# Patient Record
Sex: MALE | Race: BLACK OR AFRICAN AMERICAN | URBAN - METROPOLITAN AREA
[De-identification: names, ages, dates, MRNs, and addresses within clinical notes are randomized per-mention and may not be internally consistent; named-entity substitution may affect disease eponyms.]

---

## 2017-06-19 ENCOUNTER — OUTPATIENT (OUTPATIENT)
Dept: OUTPATIENT SERVICES | Facility: HOSPITAL | Age: 52
LOS: 1 days | Discharge: HOME | End: 2017-06-19

## 2017-06-19 DIAGNOSIS — T85.122A DISPLACEMENT OF IMPLANTED ELECTRONIC NEUROSTIMULATOR OF SPINAL CORD ELECTRODE (LEAD), INITIAL ENCOUNTER: ICD-10-CM

## 2017-06-19 DIAGNOSIS — T81.31XA DISRUPTION OF EXTERNAL OPERATION (SURGICAL) WOUND, NOT ELSEWHERE CLASSIFIED, INITIAL ENCOUNTER: ICD-10-CM

## 2017-06-28 DIAGNOSIS — Z01.818 ENCOUNTER FOR OTHER PREPROCEDURAL EXAMINATION: ICD-10-CM

## 2017-06-28 DIAGNOSIS — G47.30 SLEEP APNEA, UNSPECIFIED: ICD-10-CM

## 2017-06-28 DIAGNOSIS — E66.9 OBESITY, UNSPECIFIED: ICD-10-CM

## 2017-06-28 DIAGNOSIS — I10 ESSENTIAL (PRIMARY) HYPERTENSION: ICD-10-CM

## 2017-06-28 DIAGNOSIS — Z87.891 PERSONAL HISTORY OF NICOTINE DEPENDENCE: ICD-10-CM

## 2017-07-14 ENCOUNTER — OUTPATIENT (OUTPATIENT)
Dept: OUTPATIENT SERVICES | Facility: HOSPITAL | Age: 52
LOS: 1 days | Discharge: HOME | End: 2017-07-14

## 2017-07-14 DIAGNOSIS — Z01.818 ENCOUNTER FOR OTHER PREPROCEDURAL EXAMINATION: ICD-10-CM

## 2017-07-14 DIAGNOSIS — F45.42 PAIN DISORDER WITH RELATED PSYCHOLOGICAL FACTORS: ICD-10-CM

## 2017-07-14 DIAGNOSIS — M45.5 ANKYLOSING SPONDYLITIS OF THORACOLUMBAR REGION: ICD-10-CM

## 2017-07-14 DIAGNOSIS — T85.122A DISPLACEMENT OF IMPLANTED ELECTRONIC NEUROSTIMULATOR OF SPINAL CORD ELECTRODE (LEAD), INITIAL ENCOUNTER: ICD-10-CM

## 2017-07-14 DIAGNOSIS — T81.31XA DISRUPTION OF EXTERNAL OPERATION (SURGICAL) WOUND, NOT ELSEWHERE CLASSIFIED, INITIAL ENCOUNTER: ICD-10-CM

## 2017-07-21 ENCOUNTER — OUTPATIENT (OUTPATIENT)
Dept: OUTPATIENT SERVICES | Facility: HOSPITAL | Age: 52
LOS: 1 days | Discharge: HOME | End: 2017-07-21

## 2017-07-21 DIAGNOSIS — T85.122A DISPLACEMENT OF IMPLANTED ELECTRONIC NEUROSTIMULATOR OF SPINAL CORD ELECTRODE (LEAD), INITIAL ENCOUNTER: ICD-10-CM

## 2017-07-21 DIAGNOSIS — T81.31XA DISRUPTION OF EXTERNAL OPERATION (SURGICAL) WOUND, NOT ELSEWHERE CLASSIFIED, INITIAL ENCOUNTER: ICD-10-CM

## 2017-07-26 DIAGNOSIS — G89.29 OTHER CHRONIC PAIN: ICD-10-CM

## 2017-07-26 DIAGNOSIS — Z91.041 RADIOGRAPHIC DYE ALLERGY STATUS: ICD-10-CM

## 2017-07-26 DIAGNOSIS — E66.01 MORBID (SEVERE) OBESITY DUE TO EXCESS CALORIES: ICD-10-CM

## 2017-07-26 DIAGNOSIS — G62.9 POLYNEUROPATHY, UNSPECIFIED: ICD-10-CM

## 2017-07-26 DIAGNOSIS — F17.210 NICOTINE DEPENDENCE, CIGARETTES, UNCOMPLICATED: ICD-10-CM

## 2017-07-26 DIAGNOSIS — M54.5 LOW BACK PAIN: ICD-10-CM

## 2017-07-26 DIAGNOSIS — Z88.0 ALLERGY STATUS TO PENICILLIN: ICD-10-CM

## 2017-08-25 ENCOUNTER — EMERGENCY (EMERGENCY)
Facility: HOSPITAL | Age: 52
LOS: 0 days | Discharge: HOME | End: 2017-08-25
Admitting: INTERNAL MEDICINE

## 2017-08-25 DIAGNOSIS — T81.31XA DISRUPTION OF EXTERNAL OPERATION (SURGICAL) WOUND, NOT ELSEWHERE CLASSIFIED, INITIAL ENCOUNTER: ICD-10-CM

## 2017-08-25 DIAGNOSIS — G89.29 OTHER CHRONIC PAIN: ICD-10-CM

## 2017-08-25 DIAGNOSIS — M54.5 LOW BACK PAIN: ICD-10-CM

## 2017-08-25 DIAGNOSIS — Z98.890 OTHER SPECIFIED POSTPROCEDURAL STATES: ICD-10-CM
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
## 2017-08-25 DIAGNOSIS — T85.122A DISPLACEMENT OF IMPLANTED ELECTRONIC NEUROSTIMULATOR OF SPINAL CORD ELECTRODE (LEAD), INITIAL ENCOUNTER: ICD-10-CM

## 2017-08-25 DIAGNOSIS — Z88.0 ALLERGY STATUS TO PENICILLIN: ICD-10-CM

## 2017-08-25 DIAGNOSIS — I10 ESSENTIAL (PRIMARY) HYPERTENSION: ICD-10-CM

## 2017-08-25 DIAGNOSIS — M54.9 DORSALGIA, UNSPECIFIED: ICD-10-CM

## 2017-08-25 DIAGNOSIS — Z91.041 RADIOGRAPHIC DYE ALLERGY STATUS: ICD-10-CM

## 2017-08-25 DIAGNOSIS — Z79.899 OTHER LONG TERM (CURRENT) DRUG THERAPY: ICD-10-CM

## 2017-08-25 DIAGNOSIS — Z79.891 LONG TERM (CURRENT) USE OF OPIATE ANALGESIC: ICD-10-CM

## 2017-09-17 ENCOUNTER — INPATIENT (INPATIENT)
Facility: HOSPITAL | Age: 52
LOS: 4 days | Discharge: HOME | End: 2017-09-22
Attending: NEUROLOGICAL SURGERY | Admitting: INTERNAL MEDICINE

## 2017-09-17 DIAGNOSIS — T81.31XA DISRUPTION OF EXTERNAL OPERATION (SURGICAL) WOUND, NOT ELSEWHERE CLASSIFIED, INITIAL ENCOUNTER: ICD-10-CM

## 2017-09-17 DIAGNOSIS — T85.122A DISPLACEMENT OF IMPLANTED ELECTRONIC NEUROSTIMULATOR OF SPINAL CORD ELECTRODE (LEAD), INITIAL ENCOUNTER: ICD-10-CM
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
## 2017-09-26 DIAGNOSIS — T81.31XA DISRUPTION OF EXTERNAL OPERATION (SURGICAL) WOUND, NOT ELSEWHERE CLASSIFIED, INITIAL ENCOUNTER: ICD-10-CM

## 2017-09-26 DIAGNOSIS — I10 ESSENTIAL (PRIMARY) HYPERTENSION: ICD-10-CM

## 2017-09-26 DIAGNOSIS — G47.33 OBSTRUCTIVE SLEEP APNEA (ADULT) (PEDIATRIC): ICD-10-CM

## 2017-09-26 DIAGNOSIS — M51.26 OTHER INTERVERTEBRAL DISC DISPLACEMENT, LUMBAR REGION: ICD-10-CM

## 2017-09-26 DIAGNOSIS — M54.9 DORSALGIA, UNSPECIFIED: ICD-10-CM

## 2017-09-26 DIAGNOSIS — M10.9 GOUT, UNSPECIFIED: ICD-10-CM

## 2017-09-26 DIAGNOSIS — E66.01 MORBID (SEVERE) OBESITY DUE TO EXCESS CALORIES: ICD-10-CM

## 2017-09-26 DIAGNOSIS — Z88.0 ALLERGY STATUS TO PENICILLIN: ICD-10-CM

## 2017-09-26 DIAGNOSIS — F17.210 NICOTINE DEPENDENCE, CIGARETTES, UNCOMPLICATED: ICD-10-CM

## 2017-09-26 DIAGNOSIS — Z96.89 PRESENCE OF OTHER SPECIFIED FUNCTIONAL IMPLANTS: ICD-10-CM

## 2017-10-10 PROBLEM — Z00.00 ENCOUNTER FOR PREVENTIVE HEALTH EXAMINATION: Status: ACTIVE | Noted: 2017-10-10

## 2017-10-11 ENCOUNTER — EMERGENCY (EMERGENCY)
Facility: HOSPITAL | Age: 52
LOS: 0 days | Discharge: HOME | End: 2017-10-11
Admitting: INTERNAL MEDICINE

## 2017-10-11 DIAGNOSIS — Z79.899 OTHER LONG TERM (CURRENT) DRUG THERAPY: ICD-10-CM

## 2017-10-11 DIAGNOSIS — M54.5 LOW BACK PAIN: ICD-10-CM

## 2017-10-11 DIAGNOSIS — Z91.041 RADIOGRAPHIC DYE ALLERGY STATUS: ICD-10-CM

## 2017-10-11 DIAGNOSIS — Z88.0 ALLERGY STATUS TO PENICILLIN: ICD-10-CM

## 2017-10-11 DIAGNOSIS — Z79.891 LONG TERM (CURRENT) USE OF OPIATE ANALGESIC: ICD-10-CM

## 2017-10-11 DIAGNOSIS — R06.02 SHORTNESS OF BREATH: ICD-10-CM

## 2017-10-11 DIAGNOSIS — Z98.890 OTHER SPECIFIED POSTPROCEDURAL STATES: ICD-10-CM

## 2017-10-11 DIAGNOSIS — Z87.891 PERSONAL HISTORY OF NICOTINE DEPENDENCE: ICD-10-CM

## 2017-10-11 DIAGNOSIS — T85.122A DISPLACEMENT OF IMPLANTED ELECTRONIC NEUROSTIMULATOR OF SPINAL CORD ELECTRODE (LEAD), INITIAL ENCOUNTER: ICD-10-CM

## 2017-10-11 DIAGNOSIS — I10 ESSENTIAL (PRIMARY) HYPERTENSION: ICD-10-CM

## 2017-10-11 DIAGNOSIS — T81.31XA DISRUPTION OF EXTERNAL OPERATION (SURGICAL) WOUND, NOT ELSEWHERE CLASSIFIED, INITIAL ENCOUNTER: ICD-10-CM

## 2017-10-13 ENCOUNTER — EMERGENCY (EMERGENCY)
Facility: HOSPITAL | Age: 52
LOS: 0 days | Discharge: HOME | End: 2017-10-13
Admitting: FAMILY MEDICINE

## 2017-10-13 DIAGNOSIS — M54.5 LOW BACK PAIN: ICD-10-CM

## 2017-10-13 DIAGNOSIS — Z91.041 RADIOGRAPHIC DYE ALLERGY STATUS: ICD-10-CM

## 2017-10-13 DIAGNOSIS — Z79.891 LONG TERM (CURRENT) USE OF OPIATE ANALGESIC: ICD-10-CM

## 2017-10-13 DIAGNOSIS — R11.0 NAUSEA: ICD-10-CM

## 2017-10-13 DIAGNOSIS — T85.122A DISPLACEMENT OF IMPLANTED ELECTRONIC NEUROSTIMULATOR OF SPINAL CORD ELECTRODE (LEAD), INITIAL ENCOUNTER: ICD-10-CM

## 2017-10-13 DIAGNOSIS — Z79.899 OTHER LONG TERM (CURRENT) DRUG THERAPY: ICD-10-CM

## 2017-10-13 DIAGNOSIS — I10 ESSENTIAL (PRIMARY) HYPERTENSION: ICD-10-CM

## 2017-10-13 DIAGNOSIS — Z98.890 OTHER SPECIFIED POSTPROCEDURAL STATES: ICD-10-CM

## 2017-10-13 DIAGNOSIS — Z88.0 ALLERGY STATUS TO PENICILLIN: ICD-10-CM

## 2017-10-13 DIAGNOSIS — T81.31XA DISRUPTION OF EXTERNAL OPERATION (SURGICAL) WOUND, NOT ELSEWHERE CLASSIFIED, INITIAL ENCOUNTER: ICD-10-CM

## 2017-11-06 ENCOUNTER — OUTPATIENT (OUTPATIENT)
Dept: OUTPATIENT SERVICES | Facility: HOSPITAL | Age: 52
LOS: 1 days | Discharge: HOME | End: 2017-11-06

## 2017-11-06 DIAGNOSIS — G89.29 OTHER CHRONIC PAIN: ICD-10-CM

## 2017-11-06 DIAGNOSIS — T81.31XA DISRUPTION OF EXTERNAL OPERATION (SURGICAL) WOUND, NOT ELSEWHERE CLASSIFIED, INITIAL ENCOUNTER: ICD-10-CM

## 2017-11-06 DIAGNOSIS — Z01.818 ENCOUNTER FOR OTHER PREPROCEDURAL EXAMINATION: ICD-10-CM

## 2017-11-06 DIAGNOSIS — T85.122A DISPLACEMENT OF IMPLANTED ELECTRONIC NEUROSTIMULATOR OF SPINAL CORD ELECTRODE (LEAD), INITIAL ENCOUNTER: ICD-10-CM

## 2017-11-13 ENCOUNTER — INPATIENT (INPATIENT)
Facility: HOSPITAL | Age: 52
LOS: 0 days | Discharge: HOME | End: 2017-11-14
Attending: NEUROLOGICAL SURGERY

## 2017-11-13 DIAGNOSIS — T85.122A DISPLACEMENT OF IMPLANTED ELECTRONIC NEUROSTIMULATOR OF SPINAL CORD ELECTRODE (LEAD), INITIAL ENCOUNTER: ICD-10-CM

## 2017-11-13 DIAGNOSIS — T81.31XA DISRUPTION OF EXTERNAL OPERATION (SURGICAL) WOUND, NOT ELSEWHERE CLASSIFIED, INITIAL ENCOUNTER: ICD-10-CM

## 2017-11-17 DIAGNOSIS — T85.122A DISPLACEMENT OF IMPLANTED ELECTRONIC NEUROSTIMULATOR OF SPINAL CORD ELECTRODE (LEAD), INITIAL ENCOUNTER: ICD-10-CM

## 2017-11-17 DIAGNOSIS — I10 ESSENTIAL (PRIMARY) HYPERTENSION: ICD-10-CM

## 2017-11-17 DIAGNOSIS — G89.29 OTHER CHRONIC PAIN: ICD-10-CM
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
## 2017-11-17 DIAGNOSIS — F17.210 NICOTINE DEPENDENCE, CIGARETTES, UNCOMPLICATED: ICD-10-CM

## 2017-11-17 DIAGNOSIS — E66.01 MORBID (SEVERE) OBESITY DUE TO EXCESS CALORIES: ICD-10-CM

## 2017-11-17 DIAGNOSIS — M48.36 TRAUMATIC SPONDYLOPATHY, LUMBAR REGION: ICD-10-CM

## 2017-11-17 DIAGNOSIS — M54.5 LOW BACK PAIN: ICD-10-CM

## 2017-11-17 DIAGNOSIS — G47.33 OBSTRUCTIVE SLEEP APNEA (ADULT) (PEDIATRIC): ICD-10-CM

## 2018-01-08 ENCOUNTER — EMERGENCY (EMERGENCY)
Facility: HOSPITAL | Age: 53
LOS: 1 days | Discharge: LEFT BEFORE DISCHARGE | End: 2018-01-08
Admitting: INTERNAL MEDICINE

## 2018-01-08 DIAGNOSIS — M54.9 DORSALGIA, UNSPECIFIED: ICD-10-CM

## 2018-01-08 DIAGNOSIS — Z91.041 RADIOGRAPHIC DYE ALLERGY STATUS: ICD-10-CM

## 2018-01-08 DIAGNOSIS — M54.6 PAIN IN THORACIC SPINE: ICD-10-CM

## 2018-01-08 DIAGNOSIS — I10 ESSENTIAL (PRIMARY) HYPERTENSION: ICD-10-CM

## 2018-01-08 DIAGNOSIS — Z98.890 OTHER SPECIFIED POSTPROCEDURAL STATES: ICD-10-CM

## 2018-01-08 DIAGNOSIS — Z79.899 OTHER LONG TERM (CURRENT) DRUG THERAPY: ICD-10-CM

## 2018-01-08 DIAGNOSIS — Z79.891 LONG TERM (CURRENT) USE OF OPIATE ANALGESIC: ICD-10-CM

## 2018-01-08 DIAGNOSIS — Z88.0 ALLERGY STATUS TO PENICILLIN: ICD-10-CM

## 2018-01-24 ENCOUNTER — EMERGENCY (EMERGENCY)
Facility: HOSPITAL | Age: 53
LOS: 0 days | Discharge: LEFT BEFORE DISCHARGE | End: 2018-01-24

## 2018-01-24 DIAGNOSIS — Z79.899 OTHER LONG TERM (CURRENT) DRUG THERAPY: ICD-10-CM

## 2018-01-24 DIAGNOSIS — Z91.041 RADIOGRAPHIC DYE ALLERGY STATUS: ICD-10-CM

## 2018-01-24 DIAGNOSIS — Z98.890 OTHER SPECIFIED POSTPROCEDURAL STATES: ICD-10-CM

## 2018-01-24 DIAGNOSIS — R06.02 SHORTNESS OF BREATH: ICD-10-CM

## 2018-01-24 DIAGNOSIS — I10 ESSENTIAL (PRIMARY) HYPERTENSION: ICD-10-CM

## 2018-01-24 DIAGNOSIS — M54.5 LOW BACK PAIN: ICD-10-CM

## 2018-01-24 DIAGNOSIS — R07.89 OTHER CHEST PAIN: ICD-10-CM

## 2018-01-24 DIAGNOSIS — T85.122A DISPLACEMENT OF IMPLANTED ELECTRONIC NEUROSTIMULATOR OF SPINAL CORD ELECTRODE (LEAD), INITIAL ENCOUNTER: ICD-10-CM

## 2018-01-24 DIAGNOSIS — R10.9 UNSPECIFIED ABDOMINAL PAIN: ICD-10-CM

## 2018-01-24 DIAGNOSIS — M54.6 PAIN IN THORACIC SPINE: ICD-10-CM

## 2018-01-24 DIAGNOSIS — Z79.891 LONG TERM (CURRENT) USE OF OPIATE ANALGESIC: ICD-10-CM

## 2018-01-24 DIAGNOSIS — T81.31XA DISRUPTION OF EXTERNAL OPERATION (SURGICAL) WOUND, NOT ELSEWHERE CLASSIFIED, INITIAL ENCOUNTER: ICD-10-CM

## 2018-01-24 DIAGNOSIS — Z88.0 ALLERGY STATUS TO PENICILLIN: ICD-10-CM

## 2018-02-23 ENCOUNTER — INPATIENT (INPATIENT)
Facility: HOSPITAL | Age: 53
LOS: 20 days | Discharge: HOME HEALTH | End: 2018-03-16
Attending: INTERNAL MEDICINE | Admitting: INTERNAL MEDICINE

## 2018-02-23 VITALS
SYSTOLIC BLOOD PRESSURE: 192 MMHG | HEART RATE: 128 BPM | TEMPERATURE: 97 F | RESPIRATION RATE: 24 BRPM | DIASTOLIC BLOOD PRESSURE: 105 MMHG | OXYGEN SATURATION: 100 %

## 2018-02-23 DIAGNOSIS — L02.212 CUTANEOUS ABSCESS OF BACK [ANY PART, EXCEPT BUTTOCK]: ICD-10-CM

## 2018-02-23 DIAGNOSIS — Z98.890 OTHER SPECIFIED POSTPROCEDURAL STATES: Chronic | ICD-10-CM

## 2018-02-23 DIAGNOSIS — G47.33 OBSTRUCTIVE SLEEP APNEA (ADULT) (PEDIATRIC): ICD-10-CM

## 2018-02-23 DIAGNOSIS — M51.37 OTHER INTERVERTEBRAL DISC DEGENERATION, LUMBOSACRAL REGION: ICD-10-CM

## 2018-02-23 DIAGNOSIS — R60.0 LOCALIZED EDEMA: ICD-10-CM

## 2018-02-23 DIAGNOSIS — Z96.89 PRESENCE OF OTHER SPECIFIED FUNCTIONAL IMPLANTS: Chronic | ICD-10-CM

## 2018-02-23 DIAGNOSIS — R06.02 SHORTNESS OF BREATH: ICD-10-CM

## 2018-02-23 DIAGNOSIS — I10 ESSENTIAL (PRIMARY) HYPERTENSION: ICD-10-CM

## 2018-02-23 LAB
ANION GAP SERPL CALC-SCNC: 8 MMOL/L — SIGNIFICANT CHANGE UP (ref 7–14)
BUN SERPL-MCNC: 9 MG/DL — LOW (ref 10–20)
CALCIUM SERPL-MCNC: 9.4 MG/DL — SIGNIFICANT CHANGE UP (ref 8.5–10.1)
CHLORIDE SERPL-SCNC: 102 MMOL/L — SIGNIFICANT CHANGE UP (ref 98–110)
CO2 SERPL-SCNC: 30 MMOL/L — SIGNIFICANT CHANGE UP (ref 17–32)
CREAT SERPL-MCNC: 0.9 MG/DL — SIGNIFICANT CHANGE UP (ref 0.7–1.5)
GLUCOSE SERPL-MCNC: 245 MG/DL — HIGH (ref 70–110)
HCT VFR BLD CALC: 44.2 % — SIGNIFICANT CHANGE UP (ref 42–52)
HGB BLD-MCNC: 14.4 G/DL — SIGNIFICANT CHANGE UP (ref 14–18)
MCHC RBC-ENTMCNC: 28.9 PG — SIGNIFICANT CHANGE UP (ref 27–31)
MCHC RBC-ENTMCNC: 32.6 G/DL — SIGNIFICANT CHANGE UP (ref 32–37)
MCV RBC AUTO: 88.8 FL — SIGNIFICANT CHANGE UP (ref 80–94)
NRBC # BLD: 0 /100 WBCS — SIGNIFICANT CHANGE UP (ref 0–0)
PLATELET # BLD AUTO: 312 K/UL — SIGNIFICANT CHANGE UP (ref 130–400)
POTASSIUM SERPL-MCNC: 4.1 MMOL/L — SIGNIFICANT CHANGE UP (ref 3.5–5)
POTASSIUM SERPL-SCNC: 4.1 MMOL/L — SIGNIFICANT CHANGE UP (ref 3.5–5)
RBC # BLD: 4.98 M/UL — SIGNIFICANT CHANGE UP (ref 4.7–6.1)
RBC # FLD: 13 % — SIGNIFICANT CHANGE UP (ref 11.5–14.5)
SODIUM SERPL-SCNC: 140 MMOL/L — SIGNIFICANT CHANGE UP (ref 135–146)
WBC # BLD: 8.2 K/UL — SIGNIFICANT CHANGE UP (ref 4.8–10.8)
WBC # FLD AUTO: 8.2 K/UL — SIGNIFICANT CHANGE UP (ref 4.8–10.8)

## 2018-02-23 RX ORDER — METRONIDAZOLE 500 MG
500 TABLET ORAL EVERY 8 HOURS
Qty: 0 | Refills: 0 | Status: DISCONTINUED | OUTPATIENT
Start: 2018-02-23 | End: 2018-02-25

## 2018-02-23 RX ORDER — ENOXAPARIN SODIUM 100 MG/ML
40 INJECTION SUBCUTANEOUS AT BEDTIME
Qty: 0 | Refills: 0 | Status: DISCONTINUED | OUTPATIENT
Start: 2018-02-23 | End: 2018-03-16

## 2018-02-23 RX ORDER — CIPROFLOXACIN LACTATE 400MG/40ML
400 VIAL (ML) INTRAVENOUS EVERY 12 HOURS
Qty: 0 | Refills: 0 | Status: DISCONTINUED | OUTPATIENT
Start: 2018-02-23 | End: 2018-02-25

## 2018-02-23 RX ORDER — AMLODIPINE BESYLATE 2.5 MG/1
5 TABLET ORAL AT BEDTIME
Qty: 0 | Refills: 0 | Status: DISCONTINUED | OUTPATIENT
Start: 2018-02-23 | End: 2018-03-01

## 2018-02-23 RX ORDER — LOSARTAN POTASSIUM 100 MG/1
100 TABLET, FILM COATED ORAL AT BEDTIME
Qty: 0 | Refills: 0 | Status: DISCONTINUED | OUTPATIENT
Start: 2018-02-23 | End: 2018-03-16

## 2018-02-23 RX ORDER — OXYCODONE HYDROCHLORIDE 5 MG/1
10 TABLET ORAL
Qty: 0 | Refills: 0 | Status: DISCONTINUED | OUTPATIENT
Start: 2018-02-23 | End: 2018-03-02

## 2018-02-23 RX ORDER — SENNA PLUS 8.6 MG/1
1 TABLET ORAL AT BEDTIME
Qty: 0 | Refills: 0 | Status: DISCONTINUED | OUTPATIENT
Start: 2018-02-23 | End: 2018-03-16

## 2018-02-23 RX ORDER — VANCOMYCIN HCL 1 G
1500 VIAL (EA) INTRAVENOUS EVERY 12 HOURS
Qty: 0 | Refills: 0 | Status: DISCONTINUED | OUTPATIENT
Start: 2018-02-23 | End: 2018-02-27

## 2018-02-23 RX ORDER — ACETAMINOPHEN 500 MG
650 TABLET ORAL EVERY 6 HOURS
Qty: 0 | Refills: 0 | Status: DISCONTINUED | OUTPATIENT
Start: 2018-02-23 | End: 2018-03-16

## 2018-02-23 RX ORDER — PANTOPRAZOLE SODIUM 20 MG/1
40 TABLET, DELAYED RELEASE ORAL
Qty: 0 | Refills: 0 | Status: DISCONTINUED | OUTPATIENT
Start: 2018-02-23 | End: 2018-03-16

## 2018-02-23 RX ORDER — KETAMINE HYDROCHLORIDE 100 MG/ML
60 INJECTION INTRAMUSCULAR; INTRAVENOUS ONCE
Qty: 0 | Refills: 0 | Status: DISCONTINUED | OUTPATIENT
Start: 2018-02-23 | End: 2018-02-23

## 2018-02-23 RX ORDER — CYCLOBENZAPRINE HYDROCHLORIDE 10 MG/1
10 TABLET, FILM COATED ORAL THREE TIMES A DAY
Qty: 0 | Refills: 0 | Status: DISCONTINUED | OUTPATIENT
Start: 2018-02-23 | End: 2018-03-16

## 2018-02-23 RX ORDER — HYDROMORPHONE HYDROCHLORIDE 2 MG/ML
1 INJECTION INTRAMUSCULAR; INTRAVENOUS; SUBCUTANEOUS
Qty: 0 | Refills: 0 | Status: DISCONTINUED | OUTPATIENT
Start: 2018-02-23 | End: 2018-02-24

## 2018-02-23 RX ORDER — DOCUSATE SODIUM 100 MG
100 CAPSULE ORAL THREE TIMES A DAY
Qty: 0 | Refills: 0 | Status: DISCONTINUED | OUTPATIENT
Start: 2018-02-23 | End: 2018-03-16

## 2018-02-23 RX ADMIN — Medication 100 MILLIGRAM(S): at 21:17

## 2018-02-23 RX ADMIN — ENOXAPARIN SODIUM 40 MILLIGRAM(S): 100 INJECTION SUBCUTANEOUS at 21:18

## 2018-02-23 RX ADMIN — SENNA PLUS 1 TABLET(S): 8.6 TABLET ORAL at 21:17

## 2018-02-23 RX ADMIN — Medication 500 MILLIGRAM(S): at 21:15

## 2018-02-23 RX ADMIN — OXYCODONE HYDROCHLORIDE 10 MILLIGRAM(S): 5 TABLET ORAL at 21:17

## 2018-02-23 RX ADMIN — AMLODIPINE BESYLATE 5 MILLIGRAM(S): 2.5 TABLET ORAL at 21:19

## 2018-02-23 RX ADMIN — CYCLOBENZAPRINE HYDROCHLORIDE 10 MILLIGRAM(S): 10 TABLET, FILM COATED ORAL at 21:17

## 2018-02-23 RX ADMIN — HYDROMORPHONE HYDROCHLORIDE 1 MILLIGRAM(S): 2 INJECTION INTRAMUSCULAR; INTRAVENOUS; SUBCUTANEOUS at 18:49

## 2018-02-23 RX ADMIN — Medication 500 MILLIGRAM(S): at 19:28

## 2018-02-23 RX ADMIN — OXYCODONE HYDROCHLORIDE 10 MILLIGRAM(S): 5 TABLET ORAL at 23:35

## 2018-02-23 RX ADMIN — KETAMINE HYDROCHLORIDE 60 MILLIGRAM(S): 100 INJECTION INTRAMUSCULAR; INTRAVENOUS at 14:04

## 2018-02-23 RX ADMIN — LOSARTAN POTASSIUM 100 MILLIGRAM(S): 100 TABLET, FILM COATED ORAL at 21:17

## 2018-02-23 NOTE — ED PROVIDER NOTE - CARE PLAN
Principal Discharge DX:	Wound dehiscence  Secondary Diagnosis:	Intractable back pain  Secondary Diagnosis:	Back abscess

## 2018-02-23 NOTE — H&P ADULT - PROBLEM SELECTOR PLAN 3
On CPAP. If MRI required as per neurosurgery might need pulm eval as patient has RUSSELL and Mallampati score 4. New with LE pitting edema. Asymptomatic now. Consider 2D echo. Unlikely endocarditis. F/U cultures  Will check BNP

## 2018-02-23 NOTE — ED PROVIDER NOTE - SKIN, MLM
Skin normal color for race, warm, dry and intact. Superficial erythematous mildly wet laceration at wound site, appearing as if scab removed.

## 2018-02-23 NOTE — ED ADULT TRIAGE NOTE - CHIEF COMPLAINT QUOTE
Pt c/o back pain, had spinal cord stimulator placed on July, was moved in November and pt has been having pain since then but now pain is worse and going to abd

## 2018-02-23 NOTE — H&P ADULT - ATTENDING COMMENTS
pt seen and examined idependently, extensivr discussion with family, I have read and agree with above exam and poa  mri spine r/o abcess with sedation, pulm eval for sleep apnoea, continue iv abx,   neurosurgery eval,id eval  duplex le  conitinue current treatment

## 2018-02-23 NOTE — H&P ADULT - PSH
H/O arthroscopy of right knee    History of excision of pilonidal cyst    Spinal cord stimulator status

## 2018-02-23 NOTE — ED PROVIDER NOTE - ATTENDING CONTRIBUTION TO CARE
Pt is a 51yo male with hx of spinal stim surgery (Dr. Patrick) who comes in for 2 weeks of pain at site of surgery with wound dehiscense and leakage of fluid.  No fever.  Reports pain radiates down into buttocks and legs.  No vomiting.  Reports pain is severe and not responding to higher doses of opioids given to him by his pain mgmt doctor.    Exam: diffuse tenderness along spine, surgical scar with fluctuance, obese, clear lungs  Imp: ?abscess  Plan: labs, neurosx consult

## 2018-02-23 NOTE — H&P ADULT - PROBLEM SELECTOR PLAN 1
R/O epidural space infection. R/O OM R/O discitis R/O hardware infection r/O myelitis  Will cover for anaerobes and MRSA with IV antibiotics with adequate bony penetration  ID eval. F/U culture. F/U Neurosurgery for further recommendations  Might need MRI under anesthesia if needed might need pulmonary evaluation due to hx of RUSSELL.

## 2018-02-23 NOTE — H&P ADULT - NSHPPHYSICALEXAM_GEN_ALL_CORE
VS: WNL  General appearance: Morbid obesity noted. In moderate distress due to Pain  HEENT: Malampati score 4, Soft neck, no rigidity  Heart: RRR, normal S1 S2  Lungs: GBBS poor inspiratory effort due to pain  Abdomen: soft, non tender, non distended, +BS  Back: Surgical site 1x0.5 cm draining abcess with yellow drainage, tender to palpation, pain elicited with battery palpation too.   Extremities: +3 Pitting edema  Neuro: AAOx3, Unable to assess LE power and tone and gait due to severe tenderness/ pain on ambulation  Patient ambulating with a cane

## 2018-02-23 NOTE — H&P ADULT - PROBLEM SELECTOR PLAN 5
On CPAP. If MRI required as per neurosurgery might need pulm eval as patient has RUSSELL and Mallampati score 4.

## 2018-02-23 NOTE — H&P ADULT - PMH
Disc disease, degenerative, lumbar or lumbosacral    Hypertension    Morbid obesity    Obstructive sleep apnea

## 2018-02-23 NOTE — ED ADULT NURSE NOTE - OBJECTIVE STATEMENT
Pt presents to ED c/o generalized back pain. Pt had spinal stimulator moved in November. Has been in increasing pain over the past several weeks - states he can feel the battery pulling inside his back. Last night, he noticed there was a large "blister" at bottom of surgical scar that opened and drained bloody pus. No longer draining. Pt saw pain management doctor today who sent him to ED for possible infection.

## 2018-02-23 NOTE — CONSULT NOTE ADULT - SUBJECTIVE AND OBJECTIVE BOX
HISTORY OF PRESENT ILLNESS: Mr Mobley has a past history of placement of a pain stimulator which was revised back in November of 2018, was doing well and developed pain worsening over last 2 weeks and noticed a blister on his thoracic back wound which drained at 3am this morning, Culture obtained in the ED and patient admitted for ABX.    PAST MEDICAL & SURGICAL HISTORY:  Depression  Hypertension  Spinal cord stimulator status    FAMILY HISTORY:  Family history of diabetes mellitus in mother (Mother)  Family history of early CAD (Father)    Allergies    IV Contrast (Unknown)  penicillin (Unknown)    Intolerances        REVIEW OF SYSTEMS  negative for any major pulmonary, GI, cardiac, and psychiatric history other than those listed above.    MEDICATIONS:  Percocet 10      Vital Signs Last 24 Hrs  T(C): 36.1 (23 Feb 2018 11:24), Max: 36.1 (23 Feb 2018 11:24)  T(F): 97 (23 Feb 2018 11:24), Max: 97 (23 Feb 2018 11:24)  HR: 128 (23 Feb 2018 11:24) (128 - 128)  BP: 192/105 (23 Feb 2018 11:24) (192/105 - 192/105)  BP(mean): --  RR: 24 (23 Feb 2018 11:24) (24 - 24)  SpO2: 100% (23 Feb 2018 11:24) (100% - 100%)    PHYSICAL EXAM:  Neurological: Alert x3, NAD, NIHCOLSON with good strength ambulating well  Wound - thoracic- small amount of whitish drainage noted, tender, no fluctuance possibly stich abcsess, left flank wound- intact, no drainage, possible mild swelling, no erythema or fluctuance  LABS:                        14.4   8.20  )-----------( 312      ( 23 Feb 2018 12:23 )             44.2     02-23    140  |  102  |  9<L>  ----------------------------<  245<H>  4.1   |  30  |  0.9    Ca    9.4      23 Feb 2018 12:23    CULTURES: done    Assessment: wound drainage from thoracic wound of back s/p insertion of spinal cord stimulator    Plan: wound cultures obtained, will f/u with attending

## 2018-02-23 NOTE — ED PROVIDER NOTE - FAMILY HISTORY
Father  Still living? Unknown  Family history of early CAD, Age at diagnosis: Age Unknown     Mother  Still living? Unknown  Family history of diabetes mellitus in mother, Age at diagnosis: Age Unknown

## 2018-02-23 NOTE — ED PROVIDER NOTE - OBJECTIVE STATEMENT
Pt reports hx of L4-L5 and L5-S1 radiculopathy confirmed by MRI report at bedside, dx in 2015, that was palliated by spinal surgery with neurostimulator in Jul 2018 and corrected Nov 2018. Pt reports increasing back pain at the level of his surgery that wraps around to his corresponding dermatome. Pt is followed by pain mngmnt Dr Best who prescribed OP MRI which pt was unable to tolerate. Pt came to ED after Dr. Best was concerned for possible infection at wound site. Pt reports + draining at wound site. Physical exam today was positive for minimal irritation at wound site that appeared mildly wet. Pt denies fever, chills, nausea, vomiting, urinary dysfunction, constipation, or diarrhea. He denies chest pain or SOB, but reports heart palpitations. Pt reports he actively smokes 2 packs of cigs/day. He also reports increased b/l leg swelling, but notes he has not been ambulating for 2+ weeks due to pain. Pt was educated on importance of weight loss and healthy diet, and smoking cessation and its effects on wound care.

## 2018-02-23 NOTE — H&P ADULT - NSHPLABSRESULTS_GEN_ALL_CORE
14.4   8.20  )-----------( 312      ( 23 Feb 2018 12:23 )             44.2   02-23    140  |  102  |  9<L>  ----------------------------<  245<H>  4.1   |  30  |  0.9    Ca    9.4      23 Feb 2018 12:23

## 2018-02-23 NOTE — H&P ADULT - ASSESSMENT
Old surgical wound infection possible discitis, myelitis, neuritis  Possible Hardware infection  HTN  RUSSELL on CPAP

## 2018-02-23 NOTE — H&P ADULT - HISTORY OF PRESENT ILLNESS
53 yo M with Hx of Lumbar disc disease s/p Discectomy with Neurostimulator placement last year and neurostimulator revision last November , hx of previous neurostimulator battery infection treated with clindamycin, Hx of HTN presenting for above chief complaint. Patient reports that over the last few days he has been experiencing worsening mid spinal, Lower lumbar back pain, worse with motion, ambulation, valsalva, neck flexion, "100/10" in severity, electric in nature, relieved by sitting straight and avoiding motion, radiating bilaterally anteriorly to the abdomen and groin area and posteriorly along the sciatic nerve root distribution in the R lower extremity (along T10-T12, L1-L3 and L5-S1 dermatomes). He also reported that yesterday his family noticed a blister on the surgical wound that bursted and thick yellowish serous fluid started draining. Patient was at the pain management doctor yesterday who advised him to Go to the hospital for IV antibiotics. He was supposed to get an MRI done on Thursday but could not complete the test due to severe pain and discomfort. He also reports that over the last few days he has been having shortness of breath with ambulation and worsening LE swelling.  He reports no fever, chills, headaches, photo or phonophobia. No hx of cardiac disease.

## 2018-02-23 NOTE — ED PROVIDER NOTE - CHIEF COMPLAINT
The patient is a 52y Male complaining of back pain at wound site that wraps around to corresponding dermatomal level.

## 2018-02-24 LAB
ANION GAP SERPL CALC-SCNC: 8 MMOL/L — SIGNIFICANT CHANGE UP (ref 7–14)
BUN SERPL-MCNC: 10 MG/DL — SIGNIFICANT CHANGE UP (ref 10–20)
CALCIUM SERPL-MCNC: 9 MG/DL — SIGNIFICANT CHANGE UP (ref 8.5–10.1)
CHLORIDE SERPL-SCNC: 103 MMOL/L — SIGNIFICANT CHANGE UP (ref 98–110)
CO2 SERPL-SCNC: 27 MMOL/L — SIGNIFICANT CHANGE UP (ref 17–32)
CREAT SERPL-MCNC: 0.8 MG/DL — SIGNIFICANT CHANGE UP (ref 0.7–1.5)
GLUCOSE SERPL-MCNC: 129 MG/DL — HIGH (ref 70–110)
HCT VFR BLD CALC: 42.2 % — SIGNIFICANT CHANGE UP (ref 42–52)
HGB BLD-MCNC: 13.6 G/DL — LOW (ref 14–18)
MAGNESIUM SERPL-MCNC: 2.4 MG/DL — SIGNIFICANT CHANGE UP (ref 1.8–2.4)
MCHC RBC-ENTMCNC: 28.9 PG — SIGNIFICANT CHANGE UP (ref 27–31)
MCHC RBC-ENTMCNC: 32.2 G/DL — SIGNIFICANT CHANGE UP (ref 32–37)
MCV RBC AUTO: 89.8 FL — SIGNIFICANT CHANGE UP (ref 80–94)
NRBC # BLD: 0 /100 WBCS — SIGNIFICANT CHANGE UP (ref 0–0)
PLATELET # BLD AUTO: 267 K/UL — SIGNIFICANT CHANGE UP (ref 130–400)
POTASSIUM SERPL-MCNC: 4.2 MMOL/L — SIGNIFICANT CHANGE UP (ref 3.5–5)
POTASSIUM SERPL-SCNC: 4.2 MMOL/L — SIGNIFICANT CHANGE UP (ref 3.5–5)
RBC # BLD: 4.7 M/UL — SIGNIFICANT CHANGE UP (ref 4.7–6.1)
RBC # FLD: 13.2 % — SIGNIFICANT CHANGE UP (ref 11.5–14.5)
SODIUM SERPL-SCNC: 138 MMOL/L — SIGNIFICANT CHANGE UP (ref 135–146)
WBC # BLD: 7.25 K/UL — SIGNIFICANT CHANGE UP (ref 4.8–10.8)
WBC # FLD AUTO: 7.25 K/UL — SIGNIFICANT CHANGE UP (ref 4.8–10.8)

## 2018-02-24 RX ORDER — HYDROMORPHONE HYDROCHLORIDE 2 MG/ML
30 INJECTION INTRAMUSCULAR; INTRAVENOUS; SUBCUTANEOUS
Qty: 0 | Refills: 0 | Status: DISCONTINUED | OUTPATIENT
Start: 2018-02-24 | End: 2018-03-03

## 2018-02-24 RX ORDER — HYDROMORPHONE HYDROCHLORIDE 2 MG/ML
1 INJECTION INTRAMUSCULAR; INTRAVENOUS; SUBCUTANEOUS EVERY 4 HOURS
Qty: 0 | Refills: 0 | Status: DISCONTINUED | OUTPATIENT
Start: 2018-02-24 | End: 2018-02-25

## 2018-02-24 RX ORDER — HYDROMORPHONE HYDROCHLORIDE 2 MG/ML
30 INJECTION INTRAMUSCULAR; INTRAVENOUS; SUBCUTANEOUS
Qty: 0 | Refills: 0 | Status: DISCONTINUED | OUTPATIENT
Start: 2018-02-24 | End: 2018-02-24

## 2018-02-24 RX ORDER — NALOXONE HYDROCHLORIDE 4 MG/.1ML
0.1 SPRAY NASAL
Qty: 0 | Refills: 0 | Status: DISCONTINUED | OUTPATIENT
Start: 2018-02-24 | End: 2018-03-16

## 2018-02-24 RX ORDER — ONDANSETRON 8 MG/1
4 TABLET, FILM COATED ORAL EVERY 6 HOURS
Qty: 0 | Refills: 0 | Status: DISCONTINUED | OUTPATIENT
Start: 2018-02-24 | End: 2018-03-16

## 2018-02-24 RX ORDER — HYDROMORPHONE HYDROCHLORIDE 2 MG/ML
1 INJECTION INTRAMUSCULAR; INTRAVENOUS; SUBCUTANEOUS EVERY 4 HOURS
Qty: 0 | Refills: 0 | Status: DISCONTINUED | OUTPATIENT
Start: 2018-02-24 | End: 2018-02-24

## 2018-02-24 RX ADMIN — Medication 100 MILLIGRAM(S): at 05:12

## 2018-02-24 RX ADMIN — HYDROMORPHONE HYDROCHLORIDE 1 MILLIGRAM(S): 2 INJECTION INTRAMUSCULAR; INTRAVENOUS; SUBCUTANEOUS at 03:22

## 2018-02-24 RX ADMIN — Medication 100 MILLIGRAM(S): at 05:02

## 2018-02-24 RX ADMIN — HYDROMORPHONE HYDROCHLORIDE 1 MILLIGRAM(S): 2 INJECTION INTRAMUSCULAR; INTRAVENOUS; SUBCUTANEOUS at 22:53

## 2018-02-24 RX ADMIN — OXYCODONE HYDROCHLORIDE 10 MILLIGRAM(S): 5 TABLET ORAL at 06:30

## 2018-02-24 RX ADMIN — Medication 300 MILLIGRAM(S): at 07:08

## 2018-02-24 RX ADMIN — HYDROMORPHONE HYDROCHLORIDE 1 MILLIGRAM(S): 2 INJECTION INTRAMUSCULAR; INTRAVENOUS; SUBCUTANEOUS at 14:14

## 2018-02-24 RX ADMIN — AMLODIPINE BESYLATE 5 MILLIGRAM(S): 2.5 TABLET ORAL at 21:33

## 2018-02-24 RX ADMIN — Medication 100 MILLIGRAM(S): at 14:14

## 2018-02-24 RX ADMIN — HYDROMORPHONE HYDROCHLORIDE 1 MILLIGRAM(S): 2 INJECTION INTRAMUSCULAR; INTRAVENOUS; SUBCUTANEOUS at 09:59

## 2018-02-24 RX ADMIN — OXYCODONE HYDROCHLORIDE 10 MILLIGRAM(S): 5 TABLET ORAL at 05:10

## 2018-02-24 RX ADMIN — PANTOPRAZOLE SODIUM 40 MILLIGRAM(S): 20 TABLET, DELAYED RELEASE ORAL at 05:12

## 2018-02-24 RX ADMIN — Medication 500 MILLIGRAM(S): at 05:11

## 2018-02-24 RX ADMIN — Medication 500 MILLIGRAM(S): at 06:29

## 2018-02-24 RX ADMIN — Medication 100 MILLIGRAM(S): at 14:15

## 2018-02-24 RX ADMIN — SENNA PLUS 1 TABLET(S): 8.6 TABLET ORAL at 21:35

## 2018-02-24 RX ADMIN — ENOXAPARIN SODIUM 40 MILLIGRAM(S): 100 INJECTION SUBCUTANEOUS at 21:35

## 2018-02-24 RX ADMIN — Medication 100 MILLIGRAM(S): at 21:37

## 2018-02-24 RX ADMIN — OXYCODONE HYDROCHLORIDE 10 MILLIGRAM(S): 5 TABLET ORAL at 15:58

## 2018-02-24 RX ADMIN — OXYCODONE HYDROCHLORIDE 10 MILLIGRAM(S): 5 TABLET ORAL at 14:59

## 2018-02-24 RX ADMIN — Medication 100 MILLIGRAM(S): at 21:33

## 2018-02-24 RX ADMIN — HYDROMORPHONE HYDROCHLORIDE 1 MILLIGRAM(S): 2 INJECTION INTRAMUSCULAR; INTRAVENOUS; SUBCUTANEOUS at 17:43

## 2018-02-24 RX ADMIN — HYDROMORPHONE HYDROCHLORIDE 1 MILLIGRAM(S): 2 INJECTION INTRAMUSCULAR; INTRAVENOUS; SUBCUTANEOUS at 02:19

## 2018-02-24 RX ADMIN — LOSARTAN POTASSIUM 100 MILLIGRAM(S): 100 TABLET, FILM COATED ORAL at 21:33

## 2018-02-24 RX ADMIN — Medication 200 MILLIGRAM(S): at 06:08

## 2018-02-24 RX ADMIN — Medication 300 MILLIGRAM(S): at 19:39

## 2018-02-25 RX ORDER — FUROSEMIDE 40 MG
20 TABLET ORAL ONCE
Qty: 0 | Refills: 0 | Status: COMPLETED | OUTPATIENT
Start: 2018-02-25 | End: 2018-02-25

## 2018-02-25 RX ORDER — SODIUM CHLORIDE 9 MG/ML
1000 INJECTION INTRAMUSCULAR; INTRAVENOUS; SUBCUTANEOUS
Qty: 0 | Refills: 0 | Status: DISCONTINUED | OUTPATIENT
Start: 2018-02-25 | End: 2018-03-16

## 2018-02-25 RX ORDER — DIPHENHYDRAMINE HCL 50 MG
25 CAPSULE ORAL ONCE
Qty: 0 | Refills: 0 | Status: DISCONTINUED | OUTPATIENT
Start: 2018-02-25 | End: 2018-03-01

## 2018-02-25 RX ORDER — ALPRAZOLAM 0.25 MG
0.5 TABLET ORAL ONCE
Qty: 0 | Refills: 0 | Status: DISCONTINUED | OUTPATIENT
Start: 2018-02-25 | End: 2018-02-25

## 2018-02-25 RX ADMIN — Medication 300 MILLIGRAM(S): at 18:30

## 2018-02-25 RX ADMIN — Medication 20 MILLIGRAM(S): at 22:08

## 2018-02-25 RX ADMIN — HYDROMORPHONE HYDROCHLORIDE 30 MILLILITER(S): 2 INJECTION INTRAMUSCULAR; INTRAVENOUS; SUBCUTANEOUS at 11:47

## 2018-02-25 RX ADMIN — HYDROMORPHONE HYDROCHLORIDE 1 MILLIGRAM(S): 2 INJECTION INTRAMUSCULAR; INTRAVENOUS; SUBCUTANEOUS at 04:45

## 2018-02-25 RX ADMIN — AMLODIPINE BESYLATE 5 MILLIGRAM(S): 2.5 TABLET ORAL at 22:06

## 2018-02-25 RX ADMIN — Medication 0.5 MILLIGRAM(S): at 23:56

## 2018-02-25 RX ADMIN — Medication 100 MILLIGRAM(S): at 05:26

## 2018-02-25 RX ADMIN — HYDROMORPHONE HYDROCHLORIDE 30 MILLILITER(S): 2 INJECTION INTRAMUSCULAR; INTRAVENOUS; SUBCUTANEOUS at 23:09

## 2018-02-25 RX ADMIN — ENOXAPARIN SODIUM 40 MILLIGRAM(S): 100 INJECTION SUBCUTANEOUS at 22:07

## 2018-02-25 RX ADMIN — Medication 500 MILLIGRAM(S): at 05:25

## 2018-02-25 RX ADMIN — Medication 300 MILLIGRAM(S): at 05:26

## 2018-02-25 RX ADMIN — HYDROMORPHONE HYDROCHLORIDE 30 MILLILITER(S): 2 INJECTION INTRAMUSCULAR; INTRAVENOUS; SUBCUTANEOUS at 00:09

## 2018-02-25 RX ADMIN — Medication 100 MILLIGRAM(S): at 05:25

## 2018-02-25 RX ADMIN — Medication 200 MILLIGRAM(S): at 05:26

## 2018-02-25 RX ADMIN — LOSARTAN POTASSIUM 100 MILLIGRAM(S): 100 TABLET, FILM COATED ORAL at 22:06

## 2018-02-25 NOTE — CONSULT NOTE ADULT - SUBJECTIVE AND OBJECTIVE BOX
JOSE ALBERTO NO 52yMalePatient is a 52y old  Male who presents with a chief complaint of "Back pain shooting to abdomen and R LE and oozing back blister" (23 Feb 2018 17:16)      Patient has history of:  IV Contrast (Unknown)  penicillin (Unknown)      Adult/Nursing Home residence    WOUND DEHISCENCE; INTRACTABLE BACK PAIN; BACK ABSCESS  ^POST OP ISSUE SENT BY DR Chauhan h/o HF  Unknown h/o HF  Family history of diabetes mellitus in mother (Mother)  Family history of early CAD (Father)  Handoff  MEWS Score  Morbid obesity  Obstructive sleep apnea  Disc disease, degenerative, lumbar or lumbosacral  Depression  Hypertension  Wound dehiscence  Edema extremities  Shortness of breath on exertion  Hypertension  Obstructive sleep apnea  Disc disease, degenerative, lumbar or lumbosacral  Back abscess  History of excision of pilonidal cyst  H/O arthroscopy of right knee  Spinal cord stimulator status  POST OP ISSUE SENT BY DR Armas  Back abscess  Intractable back pain        Patient treated with:  ciprofloxacin   IVPB 400 milliGRAM(s) IV Intermittent every 12 hours  metroNIDAZOLE  IVPB 500 milliGRAM(s) IV Intermittent every 8 hours  vancomycin  IVPB 1500 milliGRAM(s) IV Intermittent every 12 hours        PHYSICAL EXAM  T(F): 95.8 (02-25-18 @ 10:01), Max: 98.3 (02-25-18 @ 00:15)  HR: 76 (02-25-18 @ 10:01) (76 - 97)  BP: 150/85 (02-25-18 @ 10:01) (133/62 - 186/90)  RR: 18 (02-25-18 @ 10:01) (18 - 22)  SpO2: 98% (02-25-18 @ 01:15) (97% - 98%)  Daily     Daily   HEENT: normal, no nuchal rigidity  Cor: RSR Nl S1 S2  Lungs: clear  Decreased breath sounds at bases    Abdomen: Nontender, Nl BS,     Ext: No clubbing,cyanosis or edema    LAB & RADIOLOGIC RESULTS:                        13.6   7.25  )-----------( 267      ( 24 Feb 2018 06:10 )             42.2         02-24    138  |  103  |  10  ----------------------------<  129<H>  4.2   |  27  |  0.8    Mg     2.4     02-24                       Culture - Abscess with Gram Stain (collected 23 Feb 2018 15:51)  Source: .Abscess thoracic incision  Preliminary Report (25 Feb 2018 09:38):    Moderate Staphylococcus aureus          Cxray:

## 2018-02-25 NOTE — PROGRESS NOTE ADULT - PROBLEM SELECTOR PLAN 1
infectious disease consultation noted and appreciated  continue on vancomycin and flagyl  neurosurgery follow up and plan needed

## 2018-02-25 NOTE — PROGRESS NOTE ADULT - SUBJECTIVE AND OBJECTIVE BOX
JOSE ALBERTO NO  52y  Male    Patient is a 52y old  Male who presents with a chief complaint of "Back pain shooting to abdomen and R LE and oozing back blister" (23 Feb 2018 17:16)    ALLERGIES:  IV Contrast (Unknown)  penicillin (Unknown)      INTERVAL HPI/OVERNIGHT EVENTS:  patient still c/o pain on morphine drips	    VITALS:  T(F): 95.8 (02-25-18 @ 10:01), Max: 98.3 (02-25-18 @ 00:15)  HR: 76 (02-25-18 @ 10:01) (76 - 97)  BP: 150/85 (02-25-18 @ 10:01) (133/62 - 186/90)  RR: 18 (02-25-18 @ 10:01) (18 - 22)  SpO2: 98% (02-25-18 @ 01:15) (97% - 98%)    LABS:  02-24    138  |  103  |  10  ----------------------------<  129<H>  4.2   |  27  |  0.8    Ca    9.0      24 Feb 2018 06:10  Mg     2.4     02-24      MICROBIOLOGY:    MEDICATION:  acetaminophen   Tablet. 650 milliGRAM(s) Oral every 6 hours PRN  amLODIPine   Tablet 5 milliGRAM(s) Oral at bedtime  cyclobenzaprine 10 milliGRAM(s) Oral three times a day PRN  docusate sodium 100 milliGRAM(s) Oral three times a day  enoxaparin Injectable 40 milliGRAM(s) SubCutaneous at bedtime  HYDROmorphone  Injectable 1 milliGRAM(s) IV Push every 4 hours PRN  HYDROmorphone PCA (1 mG/mL) 30 milliLiter(s) PCA Continuous PCA Continuous  losartan 100 milliGRAM(s) Oral at bedtime  naloxone Injectable 0.1 milliGRAM(s) IV Push every 3 minutes PRN  naproxen 500 milliGRAM(s) Oral two times a day  ondansetron Injectable 4 milliGRAM(s) IV Push every 6 hours PRN  oxyCODONE    IR 10 milliGRAM(s) Oral four times a day after meals PRN  pantoprazole    Tablet 40 milliGRAM(s) Oral before breakfast  senna 1 Tablet(s) Oral at bedtime  vancomycin  IVPB 1500 milliGRAM(s) IV Intermittent every 12 hours    RADIOLOGY & ADDITIONAL TESTS:

## 2018-02-25 NOTE — CHART NOTE - NSCHARTNOTEFT_GEN_A_CORE
Discussed case with neurosurgery PA on 4771, patient is in pain and needs MRI as recommended by neurosurgery but might need sedation as patient did not tolerate MRI from his pain as outpatient. Recommended by NS to get CT chest/abd/pelv w w/o c/s for the time being, and schedule MRI for soft tissue visualisation.

## 2018-02-26 LAB
-  AMPICILLIN/SULBACTAM: SIGNIFICANT CHANGE UP
-  CEFAZOLIN: SIGNIFICANT CHANGE UP
-  CIPROFLOXACIN: SIGNIFICANT CHANGE UP
-  CLINDAMYCIN: SIGNIFICANT CHANGE UP
-  ERYTHROMYCIN: SIGNIFICANT CHANGE UP
-  GENTAMICIN: SIGNIFICANT CHANGE UP
-  LEVOFLOXACIN: SIGNIFICANT CHANGE UP
-  MOXIFLOXACIN(AEROBIC): SIGNIFICANT CHANGE UP
-  OXACILLIN: SIGNIFICANT CHANGE UP
-  PENICILLIN: SIGNIFICANT CHANGE UP
-  RIFAMPIN: SIGNIFICANT CHANGE UP
-  TETRACYCLINE: SIGNIFICANT CHANGE UP
-  TRIMETHOPRIM/SULFAMETHOXAZOLE: SIGNIFICANT CHANGE UP
-  VANCOMYCIN: SIGNIFICANT CHANGE UP
ANION GAP SERPL CALC-SCNC: 7 MMOL/L — SIGNIFICANT CHANGE UP (ref 7–14)
BUN SERPL-MCNC: 8 MG/DL — LOW (ref 10–20)
CALCIUM SERPL-MCNC: 8.6 MG/DL — SIGNIFICANT CHANGE UP (ref 8.5–10.1)
CHLORIDE SERPL-SCNC: 99 MMOL/L — SIGNIFICANT CHANGE UP (ref 98–110)
CO2 SERPL-SCNC: 25 MMOL/L — SIGNIFICANT CHANGE UP (ref 17–32)
CREAT SERPL-MCNC: 0.8 MG/DL — SIGNIFICANT CHANGE UP (ref 0.7–1.5)
GLUCOSE SERPL-MCNC: 188 MG/DL — HIGH (ref 70–110)
HCT VFR BLD CALC: 41.1 % — LOW (ref 42–52)
HGB BLD-MCNC: 13.1 G/DL — LOW (ref 14–18)
MAGNESIUM SERPL-MCNC: 2.1 MG/DL — SIGNIFICANT CHANGE UP (ref 1.8–2.4)
MCHC RBC-ENTMCNC: 28.7 PG — SIGNIFICANT CHANGE UP (ref 27–31)
MCHC RBC-ENTMCNC: 31.9 G/DL — LOW (ref 32–37)
MCV RBC AUTO: 90.1 FL — SIGNIFICANT CHANGE UP (ref 80–94)
METHOD TYPE: SIGNIFICANT CHANGE UP
NRBC # BLD: 0 /100 WBCS — SIGNIFICANT CHANGE UP (ref 0–0)
PLATELET # BLD AUTO: 262 K/UL — SIGNIFICANT CHANGE UP (ref 130–400)
POTASSIUM SERPL-MCNC: 3.9 MMOL/L — SIGNIFICANT CHANGE UP (ref 3.5–5)
POTASSIUM SERPL-SCNC: 3.9 MMOL/L — SIGNIFICANT CHANGE UP (ref 3.5–5)
RBC # BLD: 4.56 M/UL — LOW (ref 4.7–6.1)
RBC # FLD: 13 % — SIGNIFICANT CHANGE UP (ref 11.5–14.5)
SODIUM SERPL-SCNC: 131 MMOL/L — LOW (ref 135–146)
WBC # BLD: 7.72 K/UL — SIGNIFICANT CHANGE UP (ref 4.8–10.8)
WBC # FLD AUTO: 7.72 K/UL — SIGNIFICANT CHANGE UP (ref 4.8–10.8)

## 2018-02-26 RX ORDER — ALPRAZOLAM 0.25 MG
0.5 TABLET ORAL ONCE
Qty: 0 | Refills: 0 | Status: DISCONTINUED | OUTPATIENT
Start: 2018-02-26 | End: 2018-02-26

## 2018-02-26 RX ADMIN — OXYCODONE HYDROCHLORIDE 10 MILLIGRAM(S): 5 TABLET ORAL at 21:19

## 2018-02-26 RX ADMIN — SODIUM CHLORIDE 10 MILLILITER(S): 9 INJECTION INTRAMUSCULAR; INTRAVENOUS; SUBCUTANEOUS at 21:51

## 2018-02-26 RX ADMIN — ENOXAPARIN SODIUM 40 MILLIGRAM(S): 100 INJECTION SUBCUTANEOUS at 21:12

## 2018-02-26 RX ADMIN — Medication 300 MILLIGRAM(S): at 17:49

## 2018-02-26 RX ADMIN — Medication 100 MILLIGRAM(S): at 05:11

## 2018-02-26 RX ADMIN — SENNA PLUS 1 TABLET(S): 8.6 TABLET ORAL at 21:12

## 2018-02-26 RX ADMIN — Medication 100 MILLIGRAM(S): at 21:12

## 2018-02-26 RX ADMIN — Medication 500 MILLIGRAM(S): at 17:48

## 2018-02-26 RX ADMIN — Medication 300 MILLIGRAM(S): at 05:11

## 2018-02-26 RX ADMIN — HYDROMORPHONE HYDROCHLORIDE 30 MILLILITER(S): 2 INJECTION INTRAMUSCULAR; INTRAVENOUS; SUBCUTANEOUS at 14:13

## 2018-02-26 RX ADMIN — AMLODIPINE BESYLATE 5 MILLIGRAM(S): 2.5 TABLET ORAL at 21:12

## 2018-02-26 RX ADMIN — OXYCODONE HYDROCHLORIDE 10 MILLIGRAM(S): 5 TABLET ORAL at 14:46

## 2018-02-26 RX ADMIN — Medication 0.5 MILLIGRAM(S): at 17:47

## 2018-02-26 RX ADMIN — OXYCODONE HYDROCHLORIDE 10 MILLIGRAM(S): 5 TABLET ORAL at 21:50

## 2018-02-26 RX ADMIN — Medication 0.5 MILLIGRAM(S): at 23:26

## 2018-02-26 RX ADMIN — Medication 100 MILLIGRAM(S): at 14:47

## 2018-02-26 RX ADMIN — LOSARTAN POTASSIUM 100 MILLIGRAM(S): 100 TABLET, FILM COATED ORAL at 21:12

## 2018-02-26 NOTE — CHART NOTE - NSCHARTNOTEFT_GEN_A_CORE
Patient's CT chest was reviewed.  There is no abscess or deep or subdermal signs signs of infection.  There is no tracking along his wires and no stranding in the fat or soft tissues.      The findings upon inspection of the wound and based on the CT support a localized skin infection likely a simple stitch abscess.  There does not appear to be any spinal infection or infection of his implant.      Given this, recommend continue antibiotics and dressing changes to the region daily.  Agree with pain management and patient has outpatient pain management which is managing his stimulator and his pain issues.  Acute adjustment in house is fine.  Patient has past MRI which shows multi-level degenerative lumbar disk disease and spondylosis but patient is NOT a candidate for any spinal procedures given his obesity and the diffuse scope of his degeneration.      Discussed with patient.  Recommend outpatient follow up in 1-2 weeks after discharge for wound check. Patient's CT/MRI pending.  Patient seen and examined.  There is no signs of abscess or deep or subdermal signs of infection.  There is no erythema and I cannot express any pus or purulence from the small area of excoriation from the inferior portion of the thoracic incision    The findings upon inspection of the wound support a localized skin infection likely a simple stitch abscess.  There does not appear to be any spinal infection or infection of his implant.      Given this, recommend continue antibiotics and dressing changes to the region daily.  Agree with pain management and patient has outpatient pain management which is managing his stimulator and his pain issues.  Acute adjustment in house is fine.  Patient has past MRI which shows multi-level degenerative lumbar disk disease and spondylosis but patient is NOT a candidate for any spinal procedures given his obesity and the diffuse scope of his degeneration.      Discussed with patient.  Recommend outpatient follow up in 1-2 weeks after discharge for wound check.  Will await results of any scanning.

## 2018-02-26 NOTE — CONSULT NOTE ADULT - SUBJECTIVE AND OBJECTIVE BOX
HPI:  51 yo M with Hx of Lumbar disc disease s/p Discectomy with Neurostimulator placement last year and neurostimulator revision last November , hx of previous neurostimulator battery infection treated with clindamycin, Hx of HTN presenting for above chief complaint. Patient reports that over the last few days he has been experiencing worsening mid spinal, Lower lumbar back pain, worse with motion, ambulation, valsalva, neck flexion, "100/10" in severity, electric in nature, relieved by sitting straight and avoiding motion, radiating bilaterally anteriorly to the abdomen and groin area and posteriorly along the sciatic nerve root distribution in the R lower extremity (along T10-T12, L1-L3 and L5-S1 dermatomes). He also reported that yesterday his family noticed a blister on the surgical wound that bursted and thick yellowish serous fluid started draining. Patient was at the pain management doctor yesterday who advised him to Go to the hospital for IV antibiotics. He was supposed to get an MRI done on Thursday but could not complete the test due to severe pain and discomfort. He also reports that over the last few days he has been having shortness of breath with ambulation and worsening LE swelling.  He reports no fever, chills, headaches, photo or phonophobia. No hx of cardiac disease. (23 Feb 2018 17:16)      PAST MEDICAL & SURGICAL HISTORY:  Morbid obesity  Obstructive sleep apnea  Disc disease, degenerative, lumbar or lumbosacral  Hypertension  History of excision of pilonidal cyst  H/O arthroscopy of right knee  Spinal cord stimulator status      Hospital Course:    TODAY'S SUBJECTIVE & REVIEW OF SYMPTOMS:     Constitutional WNL   Cardio WNL   Resp WNL   GI WNL  Heme WNL  Endo WNL  Skin WNL  MSK back pain  Neuro weakness  Cognitive WNL  Psych WNL      MEDICATIONS  (STANDING):  amLODIPine   Tablet 5 milliGRAM(s) Oral at bedtime  diphenhydrAMINE   Injectable 25 milliGRAM(s) IV Push once  docusate sodium 100 milliGRAM(s) Oral three times a day  enoxaparin Injectable 40 milliGRAM(s) SubCutaneous at bedtime  HYDROmorphone PCA (1 mG/mL) 30 milliLiter(s) PCA Continuous PCA Continuous  losartan 100 milliGRAM(s) Oral at bedtime  naproxen 500 milliGRAM(s) Oral two times a day  pantoprazole    Tablet 40 milliGRAM(s) Oral before breakfast  senna 1 Tablet(s) Oral at bedtime  sodium chloride 0.9%. 1000 milliLiter(s) (10 mL/Hr) IV Continuous <Continuous>  vancomycin  IVPB 1500 milliGRAM(s) IV Intermittent every 12 hours    MEDICATIONS  (PRN):  acetaminophen   Tablet. 650 milliGRAM(s) Oral every 6 hours PRN Mild Pain (1 - 3)  cyclobenzaprine 10 milliGRAM(s) Oral three times a day PRN Muscle Spasm  naloxone Injectable 0.1 milliGRAM(s) IV Push every 3 minutes PRN For ANY of the following changes in patient status:  A. RR LESS THAN 10 breaths per minute, B. Oxygen saturation LESS THAN 90%, C. Sedation score of 6  ondansetron Injectable 4 milliGRAM(s) IV Push every 6 hours PRN Nausea  oxyCODONE    IR 10 milliGRAM(s) Oral four times a day after meals PRN Moderate Pain (4 - 6)      FAMILY HISTORY:  Family history of diabetes mellitus in mother (Mother)  Family history of early CAD (Father)      Allergies    IV Contrast (Unknown)  penicillin (Unknown)    Intolerances        SOCIAL HISTORY:    [  ] Etoh  [  ] Smoking  [  ] Substance abuse     Home Environment:  [  ] Home Alone  [x  ] Lives with Family  [  ] Home Health Aid    Dwelling:  [  ] Apartment  [x  ] Private House  [  ] Adult Home  [  ] Skilled Nursing Facility      [  ] Short Term  [  ] Long Term  [ x ] Stairs       Elevator [  ]    FUNCTIONAL STATUS PTA: (Check all that apply)  Ambulation: [ x  ]Independent    [  ] Dependent     [  ] Non-Ambulatory  Assistive Device: [x  ] SA Cane  [  ]  Q Cane  [  ] Walker  [  ]  Wheelchair  ADL : [ x ] Independent  [  ]  Dependent       Vital Signs Last 24 Hrs  T(C): 36.1 (25 Feb 2018 17:12), Max: 36.1 (25 Feb 2018 17:12)  T(F): 96.9 (25 Feb 2018 17:12), Max: 96.9 (25 Feb 2018 17:12)  HR: 83 (25 Feb 2018 17:12) (76 - 88)  BP: 178/91 (26 Feb 2018 03:49) (140/68 - 183/86)  BP(mean): --  RR: 18 (26 Feb 2018 03:49) (18 - 20)  SpO2: --      PHYSICAL EXAM: Alert & Oriented X3  GENERAL: NAD, well-groomed, well-developed  HEAD:  Atraumatic, Normocephalic  EYES: EOMI, PERRLA, conjunctiva and sclera clear  NECK: Supple, No JVD, Normal thyroid  CHEST/LUNG: Clear to percussion bilaterally; No rales, rhonchi, wheezing, or rubs  HEART: Regular rate and rhythm; No murmurs, rubs, or gallops  ABDOMEN: Soft, Nontender, Nondistended; Bowel sounds present  EXTREMITIES: edema michael le    NERVOUS SYSTEM:  Cranial Nerves 2-12 intact [  ] Abnormal  [  ]  ROM: WFL all extremities [  ]  Abnormal [x  ]limited le  Motor Strength: WFL all extremities  [  ]  Abnormal [x  ]limited le  Sensation: intact to light touch [  ] Abnormal [ x ]decreased light touch michael le  Reflexes: Symmetric [  ]  Abnormal [  ]    FUNCTIONAL STATUS:  Bed Mobility: Independent [  ]  Supervision [  ]  Needs Assistance [x  ]  N/A [  ]  Transfers: Independent [  ]  Supervision [  ]  Needs Assistance [ x ]  N/A [  ]   Ambulation: Independent [  ]  Supervision [  ]  Needs Assistance [x  ]  N/A [  ]  ADL: Independent [  ] Requires Assistance [  ] N/A [  ]      LABS:                        13.1   7.72  )-----------( 262      ( 26 Feb 2018 07:15 )             41.1     02-26    131<L>  |  99  |  8<L>  ----------------------------<  188<H>  3.9   |  25  |  0.8    Ca    8.6      26 Feb 2018 07:15  Mg     2.1     02-26            RADIOLOGY & ADDITIONAL STUDIES:    Assesment:

## 2018-02-26 NOTE — PROGRESS NOTE ADULT - SUBJECTIVE AND OBJECTIVE BOX
Patient is a 52y old  Male who presents with a chief complaint of "Back pain shooting to abdomen and R LE and oozing back blister" (23 Feb 2018 17:16)      PAST MEDICAL & SURGICAL HISTORY:  Morbid obesity  Obstructive sleep apnea  Disc disease, degenerative, lumbar or lumbosacral  Hypertension  History of excision of pilonidal cyst  H/O arthroscopy of right knee  Spinal cord stimulator status      MEDICATIONS  (STANDING):  amLODIPine   Tablet 5 milliGRAM(s) Oral at bedtime  diphenhydrAMINE   Injectable 25 milliGRAM(s) IV Push once  docusate sodium 100 milliGRAM(s) Oral three times a day  enoxaparin Injectable 40 milliGRAM(s) SubCutaneous at bedtime  HYDROmorphone PCA (1 mG/mL) 30 milliLiter(s) PCA Continuous PCA Continuous  losartan 100 milliGRAM(s) Oral at bedtime  naproxen 500 milliGRAM(s) Oral two times a day  pantoprazole    Tablet 40 milliGRAM(s) Oral before breakfast  senna 1 Tablet(s) Oral at bedtime  sodium chloride 0.9%. 1000 milliLiter(s) (10 mL/Hr) IV Continuous <Continuous>  vancomycin  IVPB 1500 milliGRAM(s) IV Intermittent every 12 hours    MEDICATIONS  (PRN):  acetaminophen   Tablet. 650 milliGRAM(s) Oral every 6 hours PRN Mild Pain (1 - 3)  cyclobenzaprine 10 milliGRAM(s) Oral three times a day PRN Muscle Spasm  naloxone Injectable 0.1 milliGRAM(s) IV Push every 3 minutes PRN For ANY of the following changes in patient status:  A. RR LESS THAN 10 breaths per minute, B. Oxygen saturation LESS THAN 90%, C. Sedation score of 6  ondansetron Injectable 4 milliGRAM(s) IV Push every 6 hours PRN Nausea  oxyCODONE    IR 10 milliGRAM(s) Oral four times a day after meals PRN Moderate Pain (4 - 6)      Overnight events:    Vital Signs Last 24 Hrs  T(C): 36.9 (26 Feb 2018 09:20), Max: 36.9 (26 Feb 2018 09:20)  T(F): 98.5 (26 Feb 2018 09:20), Max: 98.5 (26 Feb 2018 09:20)  HR: 81 (26 Feb 2018 09:20) (81 - 88)  BP: 185/92 (26 Feb 2018 09:20) (140/68 - 185/92)  BP(mean): --  RR: 18 (26 Feb 2018 09:20) (18 - 20)  SpO2: --  CAPILLARY BLOOD GLUCOSE        I&O's Summary      Physical Exam:    -     General :     -      HEENT:    -      Cardiac:    -      Pulm:    -      GI:    -      Musculoskeletal:    -      Neuro:        Labs:                        13.1   7.72  )-----------( 262      ( 26 Feb 2018 07:15 )             41.1             02-26    131<L>  |  99  |  8<L>  ----------------------------<  188<H>  3.9   |  25  |  0.8    Ca    8.6      26 Feb 2018 07:15  Mg     2.1     02-26                          Culture - Blood (collected 24 Feb 2018 06:10)  Source: .Blood None  Preliminary Report (25 Feb 2018 17:00):    No growth to date.    Culture - Abscess with Gram Stain (collected 23 Feb 2018 15:51)  Source: .Abscess thoracic incision  Preliminary Report (25 Feb 2018 09:38):    Moderate Staphylococcus aureus  Organism: Staphylococcus aureus (26 Feb 2018 10:54)  Organism: Staphylococcus aureus (26 Feb 2018 10:54)        Imaging:    ECG:    T(C): 36.9 (02-26-18 @ 09:20), Max: 36.9 (02-26-18 @ 09:20)  HR: 81 (02-26-18 @ 09:20) (81 - 88)  BP: 185/92 (02-26-18 @ 09:20) (140/68 - 185/92)  RR: 18 (02-26-18 @ 09:20) (18 - 20)  SpO2: --

## 2018-02-26 NOTE — PROGRESS NOTE ADULT - PROBLEM SELECTOR PLAN 1
continue current antibiotic regimen. vancomycin 1500  follow with imaging studies to be done with sedation as patient cannot tolerate. follow with pulmonary clearance prior to considering sedation for imaging studies.

## 2018-02-26 NOTE — PROGRESS NOTE ADULT - ASSESSMENT
Assessment and Plan:   Problem/Plan - 1:  ·  Problem: Back abscess.s/p spinal cord stimiulator,  Plan: infectious disease consultation noted and appreciated  continue on vancomycin and flagyl  neurosurgery follow up and plan needed. MRI with sedation? pulmonary clearance.  duplex lower extremity  dc excessive ivf  ID followup   Pulmonary eval  Dr Eastman  DVT proph  Continue PCA pump  Pain mgmt follow up

## 2018-02-26 NOTE — PROGRESS NOTE ADULT - ASSESSMENT
Patient is a 52y old  Male who presents with a chief complaint of "Back pain shooting to abdomen and R LE and oozing back blister

## 2018-02-26 NOTE — PROGRESS NOTE ADULT - SUBJECTIVE AND OBJECTIVE BOX
Patient was seen and examined. Spoke with RN. Chart reviewed. c/o lower exrtremity swelling, and back painNo events overnight. wife at bedside, extensive discussion,  Vital Signs Last 24 Hrs  T(F): 98.5 (26 Feb 2018 09:20), Max: 98.5 (26 Feb 2018 09:20)  HR: 81 (26 Feb 2018 09:20) (81 - 88)  BP: 185/92 (26 Feb 2018 09:20) (140/68 - 185/92)  SpO2: --  MEDICATIONS  (STANDING):  amLODIPine   Tablet 5 milliGRAM(s) Oral at bedtime  diphenhydrAMINE   Injectable 25 milliGRAM(s) IV Push once  docusate sodium 100 milliGRAM(s) Oral three times a day  enoxaparin Injectable 40 milliGRAM(s) SubCutaneous at bedtime  HYDROmorphone PCA (1 mG/mL) 30 milliLiter(s) PCA Continuous PCA Continuous  losartan 100 milliGRAM(s) Oral at bedtime  naproxen 500 milliGRAM(s) Oral two times a day  pantoprazole    Tablet 40 milliGRAM(s) Oral before breakfast  senna 1 Tablet(s) Oral at bedtime  sodium chloride 0.9%. 1000 milliLiter(s) (10 mL/Hr) IV Continuous <Continuous>  vancomycin  IVPB 1500 milliGRAM(s) IV Intermittent every 12 hours    MEDICATIONS  (PRN):  acetaminophen   Tablet. 650 milliGRAM(s) Oral every 6 hours PRN Mild Pain (1 - 3)  cyclobenzaprine 10 milliGRAM(s) Oral three times a day PRN Muscle Spasm  naloxone Injectable 0.1 milliGRAM(s) IV Push every 3 minutes PRN For ANY of the following changes in patient status:  A. RR LESS THAN 10 breaths per minute, B. Oxygen saturation LESS THAN 90%, C. Sedation score of 6  ondansetron Injectable 4 milliGRAM(s) IV Push every 6 hours PRN Nausea  oxyCODONE    IR 10 milliGRAM(s) Oral four times a day after meals PRN Moderate Pain (4 - 6)    Labs:                        13.1   7.72  )-----------( 262      ( 26 Feb 2018 07:15 )             41.1     26 Feb 2018 07:15    131    |  99     |  8      ----------------------------<  188    3.9     |  25     |  0.8      Ca    8.6        26 Feb 2018 07:15  Mg     2.1       26 Feb 2018 07:15            Culture - Blood (collected 24 Feb 2018 06:10)  Source: .Blood None  Preliminary Report (25 Feb 2018 17:00):    No growth to date.    Culture - Abscess with Gram Stain (collected 23 Feb 2018 15:51)  Source: .Abscess thoracic incision  Preliminary Report (25 Feb 2018 09:38):    Moderate Staphylococcus aureus  Organism: Staphylococcus aureus (26 Feb 2018 10:54)  Organism: Staphylococcus aureus (26 Feb 2018 10:54)        Radiology:    General: comfortable, NAD  Neurology: A&Ox3, nonfocal  Head:  Normocephalic, atraumatic  ENT:  Mucosa moist, no ulcerations  Neck:  Supple, no JVD,   Skin: back incision with induration,  Resp: CTA B/L  CV: RRR, S1S2,   GI: Soft, NT, bowel sounds morbid obesity  MS: 2+edema, + peripheral pulses, FROM all 4 extremity

## 2018-02-26 NOTE — CONSULT NOTE ADULT - SUBJECTIVE AND OBJECTIVE BOX
Patient is a 52y old  Male who presents with a chief complaint of "Back pain shooting to abdomen and R LE and oozing back blister" (23 Feb 2018 17:16)      HPI:  53 yo M with Hx of Lumbar disc disease s/p Discectomy with Neurostimulator placement last year and neurostimulator revision last November , hx of previous neurostimulator battery infection treated with clindamycin, Hx of HTN presenting for above chief complaint. Patient reports that over the last few days he has been experiencing worsening mid spinal, Lower lumbar back pain, worse with motion, ambulation, valsalva, neck flexion, "100/10" in severity, electric in nature, relieved by sitting straight and avoiding motion, radiating bilaterally anteriorly to the abdomen and groin area and posteriorly along the sciatic nerve root distribution in the R lower extremity (along T10-T12, L1-L3 and L5-S1 dermatomes). He also reported that yesterday his family noticed a blister on the surgical wound that bursted and thick yellowish serous fluid started draining. Patient was at the pain management doctor yesterday who advised him to Go to the hospital for IV antibiotics. He was supposed to get an MRI done on Thursday but could not complete the test due to severe pain and discomfort. He also reports that over the last few days he has been having shortness of breath with ambulation and worsening LE swelling.  He reports no fever, chills, headaches, photo or phonophobia. No hx of cardiac disease. (23 Feb 2018 17:16)      PAST MEDICAL & SURGICAL HISTORY:  Morbid obesity  Obstructive sleep apnea  Disc disease, degenerative, lumbar or lumbosacral  Hypertension  History of excision of pilonidal cyst  H/O arthroscopy of right knee  Spinal cord stimulator status      SOCIAL HX:   Smoking                             FAMILY HISTORY:  Family history of diabetes mellitus in mother (Mother)  Family history of early CAD (Father)          Allergies    IV Contrast (Unknown)  penicillin (Unknown)    Intolerances          PHYSICAL EXAM  Vital Signs Last 24 Hrs  T(C): 36.7 (26 Feb 2018 13:46), Max: 36.9 (26 Feb 2018 09:20)  T(F): 98 (26 Feb 2018 13:46), Max: 98.5 (26 Feb 2018 09:20)  HR: 90 (26 Feb 2018 13:46) (81 - 90)  BP: 196/98 (26 Feb 2018 13:46) (140/68 - 196/98)  BP(mean): --  RR: 19 (26 Feb 2018 13:46) (18 - 20)  SpO2: --    General:    HEENT: AAO x3            Lymph Nodes: No lymphadenapathy  Neck:  Supple  Lungs: Clear bilaterally  Cardiovascular: S1S2  Abdomen: Soft, non tender  Extremities: NO edema or cyanosis  Skin: Intact          LAB:                        13.1   7.72  )-----------( 262      ( 26 Feb 2018 07:15 )             41.1                                               02-26    131<L>  |  99  |  8<L>  ----------------------------<  188<H>  3.9   |  25  |  0.8    Ca    8.6      26 Feb 2018 07:15  Mg     2.1     02-26                                                                                                                                          Culture - Blood (collected 24 Feb 2018 06:10)  Source: .Blood None  Preliminary Report (25 Feb 2018 17:00):    No growth to date.    Culture - Abscess with Gram Stain (collected 23 Feb 2018 15:51)  Source: .Abscess thoracic incision  Preliminary Report (25 Feb 2018 09:38):    Moderate Staphylococcus aureus  Organism: Staphylococcus aureus (26 Feb 2018 10:54)  Organism: Staphylococcus aureus (26 Feb 2018 10:54)                                                    MEDICATIONS  (STANDING):  amLODIPine   Tablet 5 milliGRAM(s) Oral at bedtime  diphenhydrAMINE   Injectable 25 milliGRAM(s) IV Push once  docusate sodium 100 milliGRAM(s) Oral three times a day  enoxaparin Injectable 40 milliGRAM(s) SubCutaneous at bedtime  HYDROmorphone PCA (1 mG/mL) 30 milliLiter(s) PCA Continuous PCA Continuous  losartan 100 milliGRAM(s) Oral at bedtime  naproxen 500 milliGRAM(s) Oral two times a day  pantoprazole    Tablet 40 milliGRAM(s) Oral before breakfast  senna 1 Tablet(s) Oral at bedtime  sodium chloride 0.9%. 1000 milliLiter(s) (10 mL/Hr) IV Continuous <Continuous>  vancomycin  IVPB 1500 milliGRAM(s) IV Intermittent every 12 hours    MEDICATIONS  (PRN):  acetaminophen   Tablet. 650 milliGRAM(s) Oral every 6 hours PRN Mild Pain (1 - 3)  cyclobenzaprine 10 milliGRAM(s) Oral three times a day PRN Muscle Spasm  naloxone Injectable 0.1 milliGRAM(s) IV Push every 3 minutes PRN For ANY of the following changes in patient status:  A. RR LESS THAN 10 breaths per minute, B. Oxygen saturation LESS THAN 90%, C. Sedation score of 6  ondansetron Injectable 4 milliGRAM(s) IV Push every 6 hours PRN Nausea  oxyCODONE    IR 10 milliGRAM(s) Oral four times a day after meals PRN Moderate Pain (4 - 6)

## 2018-02-27 LAB
ANION GAP SERPL CALC-SCNC: 8 MMOL/L — SIGNIFICANT CHANGE UP (ref 7–14)
BASOPHILS # BLD AUTO: 0.04 K/UL — SIGNIFICANT CHANGE UP (ref 0–0.2)
BASOPHILS NFR BLD AUTO: 0.5 % — SIGNIFICANT CHANGE UP (ref 0–1)
BUN SERPL-MCNC: 9 MG/DL — LOW (ref 10–20)
CALCIUM SERPL-MCNC: 9.1 MG/DL — SIGNIFICANT CHANGE UP (ref 8.5–10.1)
CHLORIDE SERPL-SCNC: 102 MMOL/L — SIGNIFICANT CHANGE UP (ref 98–110)
CO2 SERPL-SCNC: 30 MMOL/L — SIGNIFICANT CHANGE UP (ref 17–32)
CREAT SERPL-MCNC: 0.7 MG/DL — SIGNIFICANT CHANGE UP (ref 0.7–1.5)
EOSINOPHIL # BLD AUTO: 0.27 K/UL — SIGNIFICANT CHANGE UP (ref 0–0.7)
EOSINOPHIL NFR BLD AUTO: 3.3 % — SIGNIFICANT CHANGE UP (ref 0–8)
GLUCOSE SERPL-MCNC: 144 MG/DL — HIGH (ref 70–110)
HCT VFR BLD CALC: 41.4 % — LOW (ref 42–52)
HGB BLD-MCNC: 13.1 G/DL — LOW (ref 14–18)
IMM GRANULOCYTES NFR BLD AUTO: 0.2 % — SIGNIFICANT CHANGE UP (ref 0.1–0.3)
LYMPHOCYTES # BLD AUTO: 1.98 K/UL — SIGNIFICANT CHANGE UP (ref 1.2–3.4)
LYMPHOCYTES # BLD AUTO: 24.6 % — SIGNIFICANT CHANGE UP (ref 20.5–51.1)
MCHC RBC-ENTMCNC: 28.7 PG — SIGNIFICANT CHANGE UP (ref 27–31)
MCHC RBC-ENTMCNC: 31.6 G/DL — LOW (ref 32–37)
MCV RBC AUTO: 90.8 FL — SIGNIFICANT CHANGE UP (ref 80–94)
MONOCYTES # BLD AUTO: 0.82 K/UL — HIGH (ref 0.1–0.6)
MONOCYTES NFR BLD AUTO: 10.2 % — HIGH (ref 1.7–9.3)
NEUTROPHILS # BLD AUTO: 4.93 K/UL — SIGNIFICANT CHANGE UP (ref 1.4–6.5)
NEUTROPHILS NFR BLD AUTO: 61.2 % — SIGNIFICANT CHANGE UP (ref 42.2–75.2)
NRBC # BLD: 0 /100 WBCS — SIGNIFICANT CHANGE UP (ref 0–0)
PLATELET # BLD AUTO: 256 K/UL — SIGNIFICANT CHANGE UP (ref 130–400)
POTASSIUM SERPL-MCNC: 4.2 MMOL/L — SIGNIFICANT CHANGE UP (ref 3.5–5)
POTASSIUM SERPL-SCNC: 4.2 MMOL/L — SIGNIFICANT CHANGE UP (ref 3.5–5)
RBC # BLD: 4.56 M/UL — LOW (ref 4.7–6.1)
RBC # FLD: 12.9 % — SIGNIFICANT CHANGE UP (ref 11.5–14.5)
SODIUM SERPL-SCNC: 140 MMOL/L — SIGNIFICANT CHANGE UP (ref 135–146)
WBC # BLD: 8.06 K/UL — SIGNIFICANT CHANGE UP (ref 4.8–10.8)
WBC # FLD AUTO: 8.06 K/UL — SIGNIFICANT CHANGE UP (ref 4.8–10.8)

## 2018-02-27 RX ORDER — CEFAZOLIN SODIUM 1 G
2000 VIAL (EA) INJECTION EVERY 6 HOURS
Qty: 0 | Refills: 0 | Status: DISCONTINUED | OUTPATIENT
Start: 2018-02-28 | End: 2018-03-06

## 2018-02-27 RX ORDER — FUROSEMIDE 40 MG
40 TABLET ORAL DAILY
Qty: 0 | Refills: 0 | Status: DISCONTINUED | OUTPATIENT
Start: 2018-02-27 | End: 2018-03-02

## 2018-02-27 RX ORDER — NAFCILLIN 10 G/100ML
INJECTION, POWDER, FOR SOLUTION INTRAVENOUS
Qty: 0 | Refills: 0 | Status: DISCONTINUED | OUTPATIENT
Start: 2018-02-27 | End: 2018-02-27

## 2018-02-27 RX ORDER — CEFAZOLIN SODIUM 1 G
2000 VIAL (EA) INJECTION ONCE
Qty: 0 | Refills: 0 | Status: COMPLETED | OUTPATIENT
Start: 2018-02-27 | End: 2018-02-27

## 2018-02-27 RX ORDER — CEFAZOLIN SODIUM 1 G
VIAL (EA) INJECTION
Qty: 0 | Refills: 0 | Status: DISCONTINUED | OUTPATIENT
Start: 2018-02-27 | End: 2018-03-06

## 2018-02-27 RX ORDER — ALPRAZOLAM 0.25 MG
0.5 TABLET ORAL ONCE
Qty: 0 | Refills: 0 | Status: DISCONTINUED | OUTPATIENT
Start: 2018-02-27 | End: 2018-02-27

## 2018-02-27 RX ADMIN — Medication 100 MILLIGRAM(S): at 22:48

## 2018-02-27 RX ADMIN — LOSARTAN POTASSIUM 100 MILLIGRAM(S): 100 TABLET, FILM COATED ORAL at 21:22

## 2018-02-27 RX ADMIN — AMLODIPINE BESYLATE 5 MILLIGRAM(S): 2.5 TABLET ORAL at 21:21

## 2018-02-27 RX ADMIN — SENNA PLUS 1 TABLET(S): 8.6 TABLET ORAL at 21:22

## 2018-02-27 RX ADMIN — Medication 100 MILLIGRAM(S): at 06:12

## 2018-02-27 RX ADMIN — Medication 0.5 MILLIGRAM(S): at 21:20

## 2018-02-27 RX ADMIN — OXYCODONE HYDROCHLORIDE 10 MILLIGRAM(S): 5 TABLET ORAL at 02:45

## 2018-02-27 RX ADMIN — Medication 100 MILLIGRAM(S): at 22:45

## 2018-02-27 RX ADMIN — Medication 100 MILLIGRAM(S): at 17:41

## 2018-02-27 RX ADMIN — HYDROMORPHONE HYDROCHLORIDE 30 MILLILITER(S): 2 INJECTION INTRAMUSCULAR; INTRAVENOUS; SUBCUTANEOUS at 04:31

## 2018-02-27 RX ADMIN — PANTOPRAZOLE SODIUM 40 MILLIGRAM(S): 20 TABLET, DELAYED RELEASE ORAL at 06:12

## 2018-02-27 RX ADMIN — ENOXAPARIN SODIUM 40 MILLIGRAM(S): 100 INJECTION SUBCUTANEOUS at 21:22

## 2018-02-27 RX ADMIN — Medication 40 MILLIGRAM(S): at 17:41

## 2018-02-27 RX ADMIN — Medication 500 MILLIGRAM(S): at 06:12

## 2018-02-27 RX ADMIN — HYDROMORPHONE HYDROCHLORIDE 30 MILLILITER(S): 2 INJECTION INTRAMUSCULAR; INTRAVENOUS; SUBCUTANEOUS at 15:34

## 2018-02-27 RX ADMIN — SODIUM CHLORIDE 10 MILLILITER(S): 9 INJECTION INTRAMUSCULAR; INTRAVENOUS; SUBCUTANEOUS at 23:00

## 2018-02-27 RX ADMIN — Medication 500 MILLIGRAM(S): at 17:42

## 2018-02-27 RX ADMIN — OXYCODONE HYDROCHLORIDE 10 MILLIGRAM(S): 5 TABLET ORAL at 04:32

## 2018-02-27 RX ADMIN — Medication 100 MILLIGRAM(S): at 21:23

## 2018-02-27 RX ADMIN — OXYCODONE HYDROCHLORIDE 10 MILLIGRAM(S): 5 TABLET ORAL at 16:29

## 2018-02-27 RX ADMIN — Medication 300 MILLIGRAM(S): at 06:13

## 2018-02-27 NOTE — PROGRESS NOTE ADULT - SUBJECTIVE AND OBJECTIVE BOX
Patient is a 52y old  Male who presents with a chief complaint of "Back pain shooting to abdomen and R LE and oozing back blister" (23 Feb 2018 17:16)      PAST MEDICAL & SURGICAL HISTORY:  Morbid obesity  Obstructive sleep apnea  Disc disease, degenerative, lumbar or lumbosacral  Hypertension  History of excision of pilonidal cyst  H/O arthroscopy of right knee  Spinal cord stimulator status      MEDICATIONS  (STANDING):  amLODIPine   Tablet 5 milliGRAM(s) Oral at bedtime  clindamycin IVPB 900 milliGRAM(s) IV Intermittent every 8 hours  clindamycin IVPB 900 milliGRAM(s) IV Intermittent every 8 hours  diphenhydrAMINE   Injectable 25 milliGRAM(s) IV Push once  docusate sodium 100 milliGRAM(s) Oral three times a day  enoxaparin Injectable 40 milliGRAM(s) SubCutaneous at bedtime  furosemide    Tablet 40 milliGRAM(s) Oral daily  HYDROmorphone PCA (1 mG/mL) 30 milliLiter(s) PCA Continuous PCA Continuous  losartan 100 milliGRAM(s) Oral at bedtime  naproxen 500 milliGRAM(s) Oral two times a day  pantoprazole    Tablet 40 milliGRAM(s) Oral before breakfast  senna 1 Tablet(s) Oral at bedtime  sodium chloride 0.9%. 1000 milliLiter(s) (10 mL/Hr) IV Continuous <Continuous>    MEDICATIONS  (PRN):  acetaminophen   Tablet. 650 milliGRAM(s) Oral every 6 hours PRN Mild Pain (1 - 3)  cyclobenzaprine 10 milliGRAM(s) Oral three times a day PRN Muscle Spasm  naloxone Injectable 0.1 milliGRAM(s) IV Push every 3 minutes PRN For ANY of the following changes in patient status:  A. RR LESS THAN 10 breaths per minute, B. Oxygen saturation LESS THAN 90%, C. Sedation score of 6  ondansetron Injectable 4 milliGRAM(s) IV Push every 6 hours PRN Nausea  oxyCODONE    IR 10 milliGRAM(s) Oral four times a day after meals PRN Moderate Pain (4 - 6)      Overnight events:    Vital Signs Last 24 Hrs  T(C): 36.4 (27 Feb 2018 13:34), Max: 37.6 (27 Feb 2018 03:20)  T(F): 97.5 (27 Feb 2018 13:34), Max: 99.6 (27 Feb 2018 03:20)  HR: 79 (27 Feb 2018 13:34) (75 - 109)  BP: 139/80 (27 Feb 2018 13:34) (137/88 - 180/80)  BP(mean): --  RR: 20 (27 Feb 2018 13:34) (18 - 20)  SpO2: 95% (26 Feb 2018 17:10) (95% - 95%)  CAPILLARY BLOOD GLUCOSE        I&O's Summary    26 Feb 2018 07:01  -  27 Feb 2018 07:00  --------------------------------------------------------  IN: 0 mL / OUT: 100 mL / NET: -100 mL    27 Feb 2018 07:01  -  27 Feb 2018 13:51  --------------------------------------------------------  IN: 240 mL / OUT: 0 mL / NET: 240 mL        Physical Exam:    -     General : NAD    -      HEENT: NECK SOFT NO MASS     -      Cardiac: S1 S2 NO MURMUR     -      Pulm: GOOD BILATERAL AIR ENTRY     -      GI: ABD SOFT NON TENDER.     -      Musculoskeletal: LOWER BACK PAIN. BACK BLISTER QUESTIONABLE ABSCESS IN Neurotransmitter site.     -      Neuro: AAOx3         Labs:                        13.1   8.06  )-----------( 256      ( 27 Feb 2018 06:45 )             41.4             02-27    140  |  102  |  9<L>  ----------------------------<  144<H>  4.2   |  30  |  0.7    Ca    9.1      27 Feb 2018 06:45  Mg     2.1     02-26                            Imaging:    ECG:    T(C): 36.4 (02-27-18 @ 13:34), Max: 37.6 (02-27-18 @ 03:20)  HR: 79 (02-27-18 @ 13:34) (75 - 109)  BP: 139/80 (02-27-18 @ 13:34) (137/88 - 180/80)  RR: 20 (02-27-18 @ 13:34) (18 - 20)  SpO2: 95% (02-26-18 @ 17:10) (95% - 95%)

## 2018-02-27 NOTE — CONSULT NOTE ADULT - SUBJECTIVE AND OBJECTIVE BOX
JOSE ALBERTO NO  MRN-[id number]      HISTORY OF PRESENT ILLNESS:  Patient is a 52y old  Male who presents with a chief complaint of "Back pain shooting to abdomen and R LE and oozing back blister" (23 Feb 2018 17:16)    51 yo M with Hx of Lumbar disc disease s/p Discectomy with Neurostimulator placement last year and neurostimulator revision last November , hx of previous neurostimulator battery infection treated with clindamycin, Hx of HTN presenting for above chief complaint. Patient reports that over the last few days he has been experiencing worsening mid spinal, Lower lumbar back pain, worse with motion, ambulation, valsalva, neck flexion, "100/10" in severity, electric in nature, relieved by sitting straight and avoiding motion, radiating bilaterally anteriorly to the abdomen and groin area and posteriorly along the sciatic nerve root distribution in the R lower extremity (along T10-T12, L1-L3 and L5-S1 dermatomes). He also reported that yesterday his family noticed a blister on the surgical wound that bursted and thick yellowish serous fluid started draining. Patient was at the pain management doctor yesterday who advised him to Go to the hospital for IV antibiotics. He was supposed to get an MRI done on Thursday but could not complete the test due to severe pain and discomfort. He also reports that over the last few days he has been having shortness of breath with ambulation and worsening LE swelling.  He reports no fever, chills, headaches, photo or phonophobia. No hx of cardiac disease.       Back wound growing   Moderate Staphylococcus aureus  PMH/PSH:  PAST MEDICAL & SURGICAL HISTORY:  Morbid obesity  Obstructive sleep apnea  Disc disease, degenerative, lumbar or lumbosacral  Hypertension  History of excision of pilonidal cyst  H/O arthroscopy of right knee  Spinal cord stimulator status    ALLERGIES:  Allergies    IV Contrast (Unknown)  penicillin (Unknown)    Intolerances      SOCIAL HABITS:  FAMILY HISTORY:   FAMILY HISTORY:  Family history of diabetes mellitus in mother (Mother)  Family history of early CAD (Father)      REVIEW OF SYSTEM:  Elements of review of systems are negative or non-applicable except as noted above in HPI section.       HOME MEDICATIONS:  amLODIPine 5 mg oral tablet  losartan 100 mg oral tablet  methocarbamol 750 mg oral tablet  naproxen 500 mg oral delayed release tablet    MEDICATIONS:  MEDICATIONS  (STANDING):  amLODIPine   Tablet 5 milliGRAM(s) Oral at bedtime  diphenhydrAMINE   Injectable 25 milliGRAM(s) IV Push once  docusate sodium 100 milliGRAM(s) Oral three times a day  enoxaparin Injectable 40 milliGRAM(s) SubCutaneous at bedtime  furosemide    Tablet 40 milliGRAM(s) Oral daily  HYDROmorphone PCA (1 mG/mL) 30 milliLiter(s) PCA Continuous PCA Continuous  losartan 100 milliGRAM(s) Oral at bedtime  naproxen 500 milliGRAM(s) Oral two times a day  pantoprazole    Tablet 40 milliGRAM(s) Oral before breakfast  senna 1 Tablet(s) Oral at bedtime  sodium chloride 0.9%. 1000 milliLiter(s) (10 mL/Hr) IV Continuous <Continuous>  vancomycin  IVPB 1500 milliGRAM(s) IV Intermittent every 12 hours    MEDICATIONS  (PRN):  acetaminophen   Tablet. 650 milliGRAM(s) Oral every 6 hours PRN Mild Pain (1 - 3)  cyclobenzaprine 10 milliGRAM(s) Oral three times a day PRN Muscle Spasm  naloxone Injectable 0.1 milliGRAM(s) IV Push every 3 minutes PRN For ANY of the following changes in patient status:  A. RR LESS THAN 10 breaths per minute, B. Oxygen saturation LESS THAN 90%, C. Sedation score of 6  ondansetron Injectable 4 milliGRAM(s) IV Push every 6 hours PRN Nausea  oxyCODONE    IR 10 milliGRAM(s) Oral four times a day after meals PRN Moderate Pain (4 - 6)        VITALS:   Vital Signs Last 24 Hrs  T(C): 36.7 (27 Feb 2018 06:00), Max: 37.6 (27 Feb 2018 03:20)  T(F): 98 (27 Feb 2018 06:00), Max: 99.6 (27 Feb 2018 03:20)  HR: 75 (27 Feb 2018 06:00) (75 - 109)  BP: 137/88 (27 Feb 2018 06:00) (137/88 - 196/98)  BP(mean): --  RR: 20 (27 Feb 2018 06:00) (18 - 20)  SpO2: 95% (26 Feb 2018 17:10) (95% - 95%)  200.66      PHYSICAL EXAM:    GENERAL: NAD, well-developed  HEAD:  Atraumatic, Normocephalic  NECK: Supple, No JVD  CHEST/LUNG: Clear to auscultation bilaterally; No wheeze  HEART: Regular rate and rhythm; No murmurs, rubs, or gallops  ABDOMEN: Soft, Nontender, Nondistended; Bowel sounds present  EXTREMITIES:  Good peripheral Pulses, No clubbing, cyanosis, or edema        LABS:                        13.1   8.06  )-----------( 256      ( 27 Feb 2018 06:45 )             41.4     02-27    140  |  102  |  9<L>  ----------------------------<  144<H>  4.2   |  30  |  0.7    Ca    9.1      27 Feb 2018 06:45  Mg     2.1     02-26                Culture - Blood (collected 02-24-18 @ 06:10)  Source: .Blood None  Preliminary Report (02-25-18 @ 17:00):    No growth to date.    Culture - Abscess with Gram Stain (collected 02-23-18 @ 15:51)  Source: .Abscess thoracic incision  Preliminary Report (02-25-18 @ 09:38):    Moderate Staphylococcus aureus  Organism: Staphylococcus aureus (02-26-18 @ 10:54)  Organism: Staphylococcus aureus (02-26-18 @ 10:54)      -  Ampicillin/Sulbactam: S <=8/4      -  Cefazolin: S <=4      -  Ciprofloxacin: S <=1      -  Clindamycin: S <=0.25      -  Erythromycin: S <=0.25      -  Gentamicin: S <=1      -  Levofloxacin: S <=0.5      -  Moxifloxacin(Aerobic): S <=0.5      -  Oxacillin: S <=0.25      -  Penicillin: R 4      -  RIF- Rifampin: S <=1      -  Tetra/Doxy: S <=1      -  Trimethoprim/Sulfamethoxazole: S <=0.5/9.5      -  Vancomycin: S 2      Method Type: JAYCOB            ABG & VENT SETTINGS (when applicable)        DIAGNOSTIC STUDIES:    < from: [facility] Duplex Lower Ext Vein Scan, Bilat (02.26.18 @ 14:52) >  Impression:    No evidence of deep venous thrombosis or superficial thrombophlebitis in  bilateral lower extremities.    < end of copied text > JOSE ALBERTO NO  MRN-[id number]      HISTORY OF PRESENT ILLNESS:  Patient is a 52y old  Male who presents with a chief complaint of "Back pain shooting to abdomen and R LE and oozing back blister" (23 Feb 2018 17:16)    51 yo M with Hx of Lumbar disc disease s/p Discectomy with Neurostimulator placement last year and neurostimulator revision last November , hx of previous neurostimulator battery infection treated with clindamycin, Hx of HTN presenting for above chief complaint. Patient reports that over the last few days he has been experiencing worsening mid spinal, Lower lumbar back pain, worse with motion, ambulation, valsalva, neck flexion, "100/10" in severity, electric in nature, relieved by sitting straight and avoiding motion, radiating bilaterally anteriorly to the abdomen and groin area and posteriorly along the sciatic nerve root distribution in the R lower extremity (along T10-T12, L1-L3 and L5-S1 dermatomes). He also reported that yesterday his family noticed a blister on the surgical wound that bursted and thick yellowish serous fluid started draining. Patient was at the pain management doctor yesterday who advised him to Go to the hospital for IV antibiotics. He was supposed to get an MRI done on Thursday but could not complete the test due to severe pain and discomfort. He also reports that over the last few days he has been having shortness of breath with ambulation and worsening LE swelling.  He reports no fever, chills, headaches, photo or phonophobia. No hx of cardiac disease.   Pt compliant with cpap      Back wound growing   Moderate Staphylococcus aureus  PMH/PSH:  PAST MEDICAL & SURGICAL HISTORY:  Morbid obesity  Obstructive sleep apnea  Disc disease, degenerative, lumbar or lumbosacral  Hypertension  History of excision of pilonidal cyst  H/O arthroscopy of right knee  Spinal cord stimulator status    ALLERGIES:  Allergies    IV Contrast (Unknown)  penicillin (Unknown)    Intolerances      SOCIAL HABITS:   neg x 3    FAMILY HISTORY:   FAMILY HISTORY:  Family history of diabetes mellitus in mother (Mother)  Family history of early CAD (Father)      REVIEW OF SYSTEM:  Elements of review of systems are negative or non-applicable except as noted above in HPI section.       HOME MEDICATIONS:  amLODIPine 5 mg oral tablet  losartan 100 mg oral tablet  methocarbamol 750 mg oral tablet  naproxen 500 mg oral delayed release tablet    MEDICATIONS:  MEDICATIONS  (STANDING):  amLODIPine   Tablet 5 milliGRAM(s) Oral at bedtime  diphenhydrAMINE   Injectable 25 milliGRAM(s) IV Push once  docusate sodium 100 milliGRAM(s) Oral three times a day  enoxaparin Injectable 40 milliGRAM(s) SubCutaneous at bedtime  furosemide    Tablet 40 milliGRAM(s) Oral daily  HYDROmorphone PCA (1 mG/mL) 30 milliLiter(s) PCA Continuous PCA Continuous  losartan 100 milliGRAM(s) Oral at bedtime  naproxen 500 milliGRAM(s) Oral two times a day  pantoprazole    Tablet 40 milliGRAM(s) Oral before breakfast  senna 1 Tablet(s) Oral at bedtime  sodium chloride 0.9%. 1000 milliLiter(s) (10 mL/Hr) IV Continuous <Continuous>  vancomycin  IVPB 1500 milliGRAM(s) IV Intermittent every 12 hours    MEDICATIONS  (PRN):  acetaminophen   Tablet. 650 milliGRAM(s) Oral every 6 hours PRN Mild Pain (1 - 3)  cyclobenzaprine 10 milliGRAM(s) Oral three times a day PRN Muscle Spasm  naloxone Injectable 0.1 milliGRAM(s) IV Push every 3 minutes PRN For ANY of the following changes in patient status:  A. RR LESS THAN 10 breaths per minute, B. Oxygen saturation LESS THAN 90%, C. Sedation score of 6  ondansetron Injectable 4 milliGRAM(s) IV Push every 6 hours PRN Nausea  oxyCODONE    IR 10 milliGRAM(s) Oral four times a day after meals PRN Moderate Pain (4 - 6)        VITALS:   Vital Signs Last 24 Hrs  T(C): 36.7 (27 Feb 2018 06:00), Max: 37.6 (27 Feb 2018 03:20)  T(F): 98 (27 Feb 2018 06:00), Max: 99.6 (27 Feb 2018 03:20)  HR: 75 (27 Feb 2018 06:00) (75 - 109)  BP: 137/88 (27 Feb 2018 06:00) (137/88 - 196/98)  BP(mean): --  RR: 20 (27 Feb 2018 06:00) (18 - 20)  SpO2: 95% (26 Feb 2018 17:10) (95% - 95%)  200.66      PHYSICAL EXAM:    GENERAL: NAD, well-developed  HEAD:  Atraumatic, Normocephalic  NECK: Supple, No JVD  CHEST/LUNG: Clear to auscultation bilaterally; No wheeze  HEART: Regular rate and rhythm; No murmurs, rubs, or gallops  ABDOMEN:  obese Soft, Nontender, Nondistended; Bowel sounds present  EXTREMITIES:  Good peripheral Pulses, No clubbing, cyanosis, or edema        LABS:                        13.1   8.06  )-----------( 256      ( 27 Feb 2018 06:45 )             41.4     02-27    140  |  102  |  9<L>  ----------------------------<  144<H>  4.2   |  30  |  0.7    Ca    9.1      27 Feb 2018 06:45  Mg     2.1     02-26                Culture - Blood (collected 02-24-18 @ 06:10)  Source: .Blood None  Preliminary Report (02-25-18 @ 17:00):    No growth to date.    Culture - Abscess with Gram Stain (collected 02-23-18 @ 15:51)  Source: .Abscess thoracic incision  Preliminary Report (02-25-18 @ 09:38):    Moderate Staphylococcus aureus  Organism: Staphylococcus aureus (02-26-18 @ 10:54)  Organism: Staphylococcus aureus (02-26-18 @ 10:54)      -  Ampicillin/Sulbactam: S <=8/4      -  Cefazolin: S <=4      -  Ciprofloxacin: S <=1      -  Clindamycin: S <=0.25      -  Erythromycin: S <=0.25      -  Gentamicin: S <=1      -  Levofloxacin: S <=0.5      -  Moxifloxacin(Aerobic): S <=0.5      -  Oxacillin: S <=0.25      -  Penicillin: R 4      -  RIF- Rifampin: S <=1      -  Tetra/Doxy: S <=1      -  Trimethoprim/Sulfamethoxazole: S <=0.5/9.5      -  Vancomycin: S 2      Method Type: JAYCOB            ABG & VENT SETTINGS (when applicable)        DIAGNOSTIC STUDIES:    < from: [facility] Duplex Lower Ext Vein Scan, Bilat (02.26.18 @ 14:52) >  Impression:    No evidence of deep venous thrombosis or superficial thrombophlebitis in  bilateral lower extremities.    < end of copied text >

## 2018-02-27 NOTE — PROGRESS NOTE ADULT - SUBJECTIVE AND OBJECTIVE BOX
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  Nafcillin 2gm q4h  Clindamycin 900mg q8h  D/C other antibiotics  Will need a PICC line  MRI results pending.

## 2018-02-27 NOTE — PROGRESS NOTE ADULT - PROBLEM SELECTOR PLAN 1
continue current antibiotic regimen. vancomycin 1500  follow with imaging studies MRI T&L SPINE to be done with sedation as patient cannot tolerate PROCEDURE.   MRI made aware of Neurotransmitter and question of compatibility for MRI.   Follow with Vancomycin level in am continue current antibiotic regimen. vancomycin 1500  follow with imaging studies MRI T&L SPINE to be done with sedation as patient cannot tolerate PROCEDURE.   MRI made aware of Neurotransmitter and question of compatibility for MRI.   continue with nafcillin and clindamycin. vancomycin discontinued. follow with further ID recommendations.

## 2018-02-27 NOTE — PROGRESS NOTE ADULT - SUBJECTIVE AND OBJECTIVE BOX
Patient was seen and examined. Spoke with RN. Chart reviewed.  Had pain overnight- now improved sitting in recliner  Vital Signs Last 24 Hrs  T(F): 98 (27 Feb 2018 06:00), Max: 99.6 (27 Feb 2018 03:20)  HR: 75 (27 Feb 2018 06:00) (75 - 109)  BP: 137/88 (27 Feb 2018 06:00) (137/88 - 196/98)  SpO2: 95% (26 Feb 2018 17:10) (95% - 95%)  MEDICATIONS  (STANDING):  amLODIPine   Tablet 5 milliGRAM(s) Oral at bedtime  diphenhydrAMINE   Injectable 25 milliGRAM(s) IV Push once  docusate sodium 100 milliGRAM(s) Oral three times a day  enoxaparin Injectable 40 milliGRAM(s) SubCutaneous at bedtime  HYDROmorphone PCA (1 mG/mL) 30 milliLiter(s) PCA Continuous PCA Continuous  losartan 100 milliGRAM(s) Oral at bedtime  naproxen 500 milliGRAM(s) Oral two times a day  pantoprazole    Tablet 40 milliGRAM(s) Oral before breakfast  senna 1 Tablet(s) Oral at bedtime  sodium chloride 0.9%. 1000 milliLiter(s) (10 mL/Hr) IV Continuous <Continuous>  vancomycin  IVPB 1500 milliGRAM(s) IV Intermittent every 12 hours    MEDICATIONS  (PRN):  acetaminophen   Tablet. 650 milliGRAM(s) Oral every 6 hours PRN Mild Pain (1 - 3)  cyclobenzaprine 10 milliGRAM(s) Oral three times a day PRN Muscle Spasm  naloxone Injectable 0.1 milliGRAM(s) IV Push every 3 minutes PRN For ANY of the following changes in patient status:  A. RR LESS THAN 10 breaths per minute, B. Oxygen saturation LESS THAN 90%, C. Sedation score of 6  ondansetron Injectable 4 milliGRAM(s) IV Push every 6 hours PRN Nausea  oxyCODONE    IR 10 milliGRAM(s) Oral four times a day after meals PRN Moderate Pain (4 - 6)    Labs:                        13.1   7.72  )-----------( 262      ( 26 Feb 2018 07:15 )             41.1     26 Feb 2018 07:15    131    |  99     |  8      ----------------------------<  188    3.9     |  25     |  0.8      Ca    8.6        26 Feb 2018 07:15  Mg     2.1       26 Feb 2018 07:15            General: comfortable, NAD  Neurology: A&Ox3, nonfocal  Head:  Normocephalic, atraumatic  ENT:  Mucosa moist, no ulcerations  Neck:  Supple, no JVD,   Skin: surgical wound- back  Resp: CTA B/L  CV: RRR, S1S2,   GI: Soft, NT, bowel sounds; morbid obesity  MS: No edema, + peripheral pulses, FROM all 4 extremity      A/P:  51 yo M with Hx of Lumbar disc disease s/p Discectomy with Neurostimulator placement last year and neurostimulator revision last November , hx of previous neurostimulator battery infection treated with clindamycin, Hx of HTN presenting for worsening back pain and surgical wound    Vanco as per ID- f/u levels  ID f/u,  obtain CT thoracolumbar spine to evaluate for large drainable collection- needs ketamine (please call anesthesia).  ESR/CRP    Further plan to be made after imaging studies and upon NS Dr. Patrick's review.   DVT prophylaxis  Decubitus prevention- all measures as per RN protocol  Please call or text me with any questions or updates

## 2018-02-27 NOTE — PHYSICAL THERAPY INITIAL EVALUATION ADULT - ASSISTIVE DEVICE FOR TRANSFER: GAIT, REHAB EVAL
standard walker/straight cane/Pt with use of wall for support; educated on use of walker. Attempted SW, pt required supervision, pam. Pt reported he does not feel comfortable with RW.

## 2018-02-27 NOTE — CONSULT NOTE ADULT - SUBJECTIVE AND OBJECTIVE BOX
Chief Complaint:    HPI:  51 yo M with Hx of Lumbar disc disease s/p Discectomy with Neurostimulator placement last year and neurostimulator revision last November , hx of previous neurostimulator battery infection treated with clindamycin, Hx of HTN presenting for above chief complaint. Patient reports that over the last few days he has been experiencing worsening mid spinal, Lower lumbar back pain, worse with motion, ambulation, valsalva, neck flexion, "100/10" in severity, electric in nature, relieved by sitting straight and avoiding motion, radiating bilaterally anteriorly to the abdomen and groin area and posteriorly along the sciatic nerve root distribution in the R lower extremity (along T10-T12, L1-L3 and L5-S1 dermatomes). He also reported that yesterday his family noticed a blister on the surgical wound that bursted and thick yellowish serous fluid started draining. Patient was at the pain management doctor yesterday who advised him to Go to the hospital for IV antibiotics. He was supposed to get an MRI done on Thursday but could not complete the test due to severe pain and discomfort. He also reports that over the last few days he has been having shortness of breath with ambulation and worsening LE swelling.  He reports no fever, chills, headaches, photo or phonophobia. No hx of cardiac disease. (23 Feb 2018 17:16)    Mr. Mobley has a complicated spinal cord stimulator history. He initially had the medtronic spinal cord sitmulator placed in July 2017. After which he states that he had an infection at the generator site and the wound would not close. He states that he was placed on antibiotics and the stimulator was kept in place. After the antibiotics were able to help the wound close he states that the stimulator was not working properly and that he needed it to be 'pulled down'. He had a revision on the stimulator in November 2017. Since then he states that he has had worsening pain at the generator site and along the left side of spine and now on both sides of his low back. He has been on escalating doses of opioids as well.      Allergies    IV Contrast (Unknown)  penicillin (Unknown)    Intolerances        PAST MEDICAL & SURGICAL HISTORY:  Morbid obesity  Obstructive sleep apnea  Disc disease, degenerative, lumbar or lumbosacral  Hypertension  History of excision of pilonidal cyst  H/O arthroscopy of right knee  Spinal cord stimulator status      SOCIAL HISTORY:  Denies Smoking, Alcohol, or Drug Use    PAIN MEDICATIONS:  acetaminophen   Tablet. 650 milliGRAM(s) Oral every 6 hours PRN  cyclobenzaprine 10 milliGRAM(s) Oral three times a day PRN  HYDROmorphone PCA (1 mG/mL) 30 milliLiter(s) PCA Continuous PCA Continuous  naproxen 500 milliGRAM(s) Oral two times a day  ondansetron Injectable 4 milliGRAM(s) IV Push every 6 hours PRN  oxyCODONE    IR 10 milliGRAM(s) Oral four times a day after meals PRN    Heme:  enoxaparin Injectable 40 milliGRAM(s) SubCutaneous at bedtime    Antibiotics:  vancomycin  IVPB 1500 milliGRAM(s) IV Intermittent every 12 hours    Cardiovascular:  amLODIPine   Tablet 5 milliGRAM(s) Oral at bedtime  losartan 100 milliGRAM(s) Oral at bedtime    GI:  docusate sodium 100 milliGRAM(s) Oral three times a day  pantoprazole    Tablet 40 milliGRAM(s) Oral before breakfast  senna 1 Tablet(s) Oral at bedtime    Endocrine:    All Other Medications:  naloxone Injectable 0.1 milliGRAM(s) IV Push every 3 minutes PRN  sodium chloride 0.9%. 1000 milliLiter(s) IV Continuous <Continuous>      Vital Signs Last 24 Hrs  T(C): 36.7 (27 Feb 2018 06:00), Max: 37.6 (27 Feb 2018 03:20)  T(F): 98 (27 Feb 2018 06:00), Max: 99.6 (27 Feb 2018 03:20)  HR: 75 (27 Feb 2018 06:00) (75 - 109)  BP: 137/88 (27 Feb 2018 06:00) (137/88 - 196/98)  BP(mean): --  RR: 20 (27 Feb 2018 06:00) (18 - 20)  SpO2: 95% (26 Feb 2018 17:10) (95% - 95%)    PAIN SCORE:         SCALE USED: (1-10 VNRS)      PHYSICAL EXAM:    GENERAL: NAD, well-groomed, well-developed  HEAD:  Atraumatic, Normocephalic  EYES: EOMI, PERRLA, conjunctiva and sclera clear    lumbar spine-  left side- spinal cord stimulator leads-taught, tense- minimal tenderness.  battery site- minimal ttp. no erythema.  midline incision- small 5 mm erythema/skin breakdown at bottom of stimulator incision. non draining. incision otherwise non tender no erythema.        LABS:                          13.1   7.72  )-----------( 262      ( 26 Feb 2018 07:15 )             41.1     02-26    131<L>  |  99  |  8<L>  ----------------------------<  188<H>  3.9   |  25  |  0.8    Ca    8.6      26 Feb 2018 07:15  Mg     2.1     02-26    Radiology:  ultrasound venous- negative.        [place]  Reviewed Chief Complaint:    HPI:  53 yo M with Hx of Lumbar disc disease s/p Discectomy with Neurostimulator placement last year and neurostimulator revision last November , hx of previous neurostimulator battery infection treated with clindamycin, Hx of HTN presenting for above chief complaint. Patient reports that over the last few days he has been experiencing worsening mid spinal, Lower lumbar back pain, worse with motion, ambulation, valsalva, neck flexion, "100/10" in severity, electric in nature, relieved by sitting straight and avoiding motion, radiating bilaterally anteriorly to the abdomen and groin area and posteriorly along the sciatic nerve root distribution in the R lower extremity (along T10-T12, L1-L3 and L5-S1 dermatomes). He also reported that yesterday his family noticed a blister on the surgical wound that bursted and thick yellowish serous fluid started draining. Patient was at the pain management doctor yesterday who advised him to Go to the hospital for IV antibiotics. He was supposed to get an MRI done on Thursday but could not complete the test due to severe pain and discomfort. He also reports that over the last few days he has been having shortness of breath with ambulation and worsening LE swelling.  He reports no fever, chills, headaches, photo or phonophobia. No hx of cardiac disease. (23 Feb 2018 17:16)    Mr. Mobley has a complicated spinal cord stimulator history. He initially had the medtronic spinal cord sitmulator placed in July 2017. After which he states that he had an infection at the generator site and the wound would not close. He states that he was placed on antibiotics and the stimulator was kept in place. After the antibiotics were able to help the wound close he states that the stimulator was not working properly and that he needed it to be 'pulled down'. He had a revision on the stimulator in November 2017. Since then he states that he has had worsening pain at the generator site and along the left side of spine and now on both sides of his low back. He has been on escalating doses of opioids as well.      Allergies    IV Contrast (Unknown)  penicillin (Unknown)    Intolerances        PAST MEDICAL & SURGICAL HISTORY:  Morbid obesity  Obstructive sleep apnea  Disc disease, degenerative, lumbar or lumbosacral  Hypertension  History of excision of pilonidal cyst  H/O arthroscopy of right knee  Spinal cord stimulator status      SOCIAL HISTORY:  Denies Smoking, Alcohol, or Drug Use    PAIN MEDICATIONS:  acetaminophen   Tablet. 650 milliGRAM(s) Oral every 6 hours PRN  cyclobenzaprine 10 milliGRAM(s) Oral three times a day PRN  HYDROmorphone PCA (1 mG/mL) 30 milliLiter(s) PCA Continuous PCA Continuous  naproxen 500 milliGRAM(s) Oral two times a day  ondansetron Injectable 4 milliGRAM(s) IV Push every 6 hours PRN  oxyCODONE    IR 10 milliGRAM(s) Oral four times a day after meals PRN    Heme:  enoxaparin Injectable 40 milliGRAM(s) SubCutaneous at bedtime    Antibiotics:  vancomycin  IVPB 1500 milliGRAM(s) IV Intermittent every 12 hours    Cardiovascular:  amLODIPine   Tablet 5 milliGRAM(s) Oral at bedtime  losartan 100 milliGRAM(s) Oral at bedtime    GI:  docusate sodium 100 milliGRAM(s) Oral three times a day  pantoprazole    Tablet 40 milliGRAM(s) Oral before breakfast  senna 1 Tablet(s) Oral at bedtime    Endocrine:    All Other Medications:  naloxone Injectable 0.1 milliGRAM(s) IV Push every 3 minutes PRN  sodium chloride 0.9%. 1000 milliLiter(s) IV Continuous <Continuous>      Vital Signs Last 24 Hrs  T(C): 36.7 (27 Feb 2018 06:00), Max: 37.6 (27 Feb 2018 03:20)  T(F): 98 (27 Feb 2018 06:00), Max: 99.6 (27 Feb 2018 03:20)  HR: 75 (27 Feb 2018 06:00) (75 - 109)  BP: 137/88 (27 Feb 2018 06:00) (137/88 - 196/98)  BP(mean): --  RR: 20 (27 Feb 2018 06:00) (18 - 20)  SpO2: 95% (26 Feb 2018 17:10) (95% - 95%)    PAIN SCORE:         SCALE USED: (1-10 VNRS)      PHYSICAL EXAM:    GENERAL: NAD, well-groomed, well-developed  HEAD:  Atraumatic, Normocephalic  EYES: EOMI, PERRLA, conjunctiva and sclera clear    lumbar spine-  left side- spinal cord stimulator leads-taught, tense- minimal tenderness.  battery site- minimal ttp. no erythema.  midline incision- small 5 mm erythema/skin breakdown at bottom of stimulator incision. non draining. incision otherwise non tender no erythema.        LABS:  Blood cultures- negative  Thoracic incision culture- (+) staph aureus                        13.1   7.72  )-----------( 262      ( 26 Feb 2018 07:15 )             41.1     02-26    131<L>  |  99  |  8<L>  ----------------------------<  188<H>  3.9   |  25  |  0.8    Ca    8.6      26 Feb 2018 07:15  Mg     2.1     02-26    Radiology:  ultrasound venous- negative.        [place]  Reviewed-  2/16/18 oxycodone acetaminophen 10/325 #120  2/12/18 oxycodone acetaminophen 10/325#21  2/9/18 Morphine Sulfate ER 30 mg BID #60  2/8/18 oxycodone 30 mg tab #60  1/30/18 oxycodone acetaminophen 10/325 #30  1/10/18 oxycodone acetaminophen 10/325#90  1/2/18 oxycodone acetaminophen 10/325 #60

## 2018-02-27 NOTE — PHYSICAL THERAPY INITIAL EVALUATION ADULT - GAIT DEVIATIONS NOTED, PT EVAL
decreased weight-shifting ability/decreased step length/decreased phylicia/decreased velocity of limb motion

## 2018-02-28 LAB
ANION GAP SERPL CALC-SCNC: 4 MMOL/L — LOW (ref 7–14)
BASOPHILS # BLD AUTO: 0.03 K/UL — SIGNIFICANT CHANGE UP (ref 0–0.2)
BASOPHILS NFR BLD AUTO: 0.5 % — SIGNIFICANT CHANGE UP (ref 0–1)
BUN SERPL-MCNC: 8 MG/DL — LOW (ref 10–20)
CALCIUM SERPL-MCNC: 8.6 MG/DL — SIGNIFICANT CHANGE UP (ref 8.5–10.1)
CHLORIDE SERPL-SCNC: 102 MMOL/L — SIGNIFICANT CHANGE UP (ref 98–110)
CO2 SERPL-SCNC: 30 MMOL/L — SIGNIFICANT CHANGE UP (ref 17–32)
CREAT SERPL-MCNC: 0.7 MG/DL — SIGNIFICANT CHANGE UP (ref 0.7–1.5)
EOSINOPHIL # BLD AUTO: 0.23 K/UL — SIGNIFICANT CHANGE UP (ref 0–0.7)
EOSINOPHIL NFR BLD AUTO: 3.6 % — SIGNIFICANT CHANGE UP (ref 0–8)
GLUCOSE SERPL-MCNC: 155 MG/DL — HIGH (ref 70–110)
HCT VFR BLD CALC: 41 % — LOW (ref 42–52)
HGB BLD-MCNC: 12.9 G/DL — LOW (ref 14–18)
IMM GRANULOCYTES NFR BLD AUTO: 0.3 % — SIGNIFICANT CHANGE UP (ref 0.1–0.3)
LYMPHOCYTES # BLD AUTO: 1.94 K/UL — SIGNIFICANT CHANGE UP (ref 1.2–3.4)
LYMPHOCYTES # BLD AUTO: 30.1 % — SIGNIFICANT CHANGE UP (ref 20.5–51.1)
MCHC RBC-ENTMCNC: 28.7 PG — SIGNIFICANT CHANGE UP (ref 27–31)
MCHC RBC-ENTMCNC: 31.5 G/DL — LOW (ref 32–37)
MCV RBC AUTO: 91.1 FL — SIGNIFICANT CHANGE UP (ref 80–94)
MONOCYTES # BLD AUTO: 0.54 K/UL — SIGNIFICANT CHANGE UP (ref 0.1–0.6)
MONOCYTES NFR BLD AUTO: 8.4 % — SIGNIFICANT CHANGE UP (ref 1.7–9.3)
NEUTROPHILS # BLD AUTO: 3.68 K/UL — SIGNIFICANT CHANGE UP (ref 1.4–6.5)
NEUTROPHILS NFR BLD AUTO: 57.1 % — SIGNIFICANT CHANGE UP (ref 42.2–75.2)
NRBC # BLD: 0 /100 WBCS — SIGNIFICANT CHANGE UP (ref 0–0)
PLATELET # BLD AUTO: 279 K/UL — SIGNIFICANT CHANGE UP (ref 130–400)
POTASSIUM SERPL-MCNC: 4.1 MMOL/L — SIGNIFICANT CHANGE UP (ref 3.5–5)
POTASSIUM SERPL-SCNC: 4.1 MMOL/L — SIGNIFICANT CHANGE UP (ref 3.5–5)
RBC # BLD: 4.5 M/UL — LOW (ref 4.7–6.1)
RBC # FLD: 13.1 % — SIGNIFICANT CHANGE UP (ref 11.5–14.5)
SODIUM SERPL-SCNC: 136 MMOL/L — SIGNIFICANT CHANGE UP (ref 135–146)
WBC # BLD: 6.44 K/UL — SIGNIFICANT CHANGE UP (ref 4.8–10.8)
WBC # FLD AUTO: 6.44 K/UL — SIGNIFICANT CHANGE UP (ref 4.8–10.8)

## 2018-02-28 RX ORDER — ALPRAZOLAM 0.25 MG
0.5 TABLET ORAL AT BEDTIME
Qty: 0 | Refills: 0 | Status: DISCONTINUED | OUTPATIENT
Start: 2018-02-28 | End: 2018-03-07

## 2018-02-28 RX ORDER — ALPRAZOLAM 0.25 MG
0.25 TABLET ORAL ONCE
Qty: 0 | Refills: 0 | Status: DISCONTINUED | OUTPATIENT
Start: 2018-02-28 | End: 2018-02-28

## 2018-02-28 RX ADMIN — Medication 500 MILLIGRAM(S): at 06:10

## 2018-02-28 RX ADMIN — Medication 500 MILLIGRAM(S): at 18:37

## 2018-02-28 RX ADMIN — Medication 0.25 MILLIGRAM(S): at 12:02

## 2018-02-28 RX ADMIN — HYDROMORPHONE HYDROCHLORIDE 30 MILLILITER(S): 2 INJECTION INTRAMUSCULAR; INTRAVENOUS; SUBCUTANEOUS at 06:41

## 2018-02-28 RX ADMIN — OXYCODONE HYDROCHLORIDE 10 MILLIGRAM(S): 5 TABLET ORAL at 19:05

## 2018-02-28 RX ADMIN — ENOXAPARIN SODIUM 40 MILLIGRAM(S): 100 INJECTION SUBCUTANEOUS at 21:22

## 2018-02-28 RX ADMIN — OXYCODONE HYDROCHLORIDE 10 MILLIGRAM(S): 5 TABLET ORAL at 16:30

## 2018-02-28 RX ADMIN — Medication 100 MILLIGRAM(S): at 06:08

## 2018-02-28 RX ADMIN — Medication 40 MILLIGRAM(S): at 06:09

## 2018-02-28 RX ADMIN — Medication 500 MILLIGRAM(S): at 19:05

## 2018-02-28 RX ADMIN — Medication 100 MILLIGRAM(S): at 06:07

## 2018-02-28 RX ADMIN — Medication 100 MILLIGRAM(S): at 21:22

## 2018-02-28 RX ADMIN — Medication 500 MILLIGRAM(S): at 07:00

## 2018-02-28 RX ADMIN — Medication 100 MILLIGRAM(S): at 21:21

## 2018-02-28 RX ADMIN — HYDROMORPHONE HYDROCHLORIDE 30 MILLILITER(S): 2 INJECTION INTRAMUSCULAR; INTRAVENOUS; SUBCUTANEOUS at 22:43

## 2018-02-28 RX ADMIN — Medication 0.5 MILLIGRAM(S): at 21:22

## 2018-02-28 RX ADMIN — SENNA PLUS 1 TABLET(S): 8.6 TABLET ORAL at 21:22

## 2018-02-28 RX ADMIN — Medication 100 MILLIGRAM(S): at 11:20

## 2018-02-28 RX ADMIN — Medication 100 MILLIGRAM(S): at 21:15

## 2018-02-28 RX ADMIN — PANTOPRAZOLE SODIUM 40 MILLIGRAM(S): 20 TABLET, DELAYED RELEASE ORAL at 06:10

## 2018-02-28 RX ADMIN — OXYCODONE HYDROCHLORIDE 10 MILLIGRAM(S): 5 TABLET ORAL at 21:24

## 2018-02-28 RX ADMIN — AMLODIPINE BESYLATE 5 MILLIGRAM(S): 2.5 TABLET ORAL at 21:15

## 2018-02-28 RX ADMIN — Medication 100 MILLIGRAM(S): at 18:37

## 2018-02-28 RX ADMIN — LOSARTAN POTASSIUM 100 MILLIGRAM(S): 100 TABLET, FILM COATED ORAL at 21:22

## 2018-02-28 NOTE — PROGRESS NOTE ADULT - ASSESSMENT
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  Ancef 2gm q8h  Clindamycin 900mg q8h.  MRI pending  will need long term iv antibiotics.

## 2018-02-28 NOTE — PROGRESS NOTE ADULT - SUBJECTIVE AND OBJECTIVE BOX
Patient is a 52y old  Male who presents with a chief complaint of "Back pain shooting to abdomen and R LE and oozing back blister" (23 Feb 2018 17:16)      PAST MEDICAL & SURGICAL HISTORY:  Morbid obesity  Obstructive sleep apnea  Disc disease, degenerative, lumbar or lumbosacral  Hypertension  History of excision of pilonidal cyst  H/O arthroscopy of right knee  Spinal cord stimulator status      MEDICATIONS  (STANDING):  amLODIPine   Tablet 5 milliGRAM(s) Oral at bedtime  clindamycin IVPB 900 milliGRAM(s) IV Intermittent every 8 hours  clindamycin IVPB 900 milliGRAM(s) IV Intermittent every 8 hours  diphenhydrAMINE   Injectable 25 milliGRAM(s) IV Push once  docusate sodium 100 milliGRAM(s) Oral three times a day  enoxaparin Injectable 40 milliGRAM(s) SubCutaneous at bedtime  furosemide    Tablet 40 milliGRAM(s) Oral daily  HYDROmorphone PCA (1 mG/mL) 30 milliLiter(s) PCA Continuous PCA Continuous  losartan 100 milliGRAM(s) Oral at bedtime  naproxen 500 milliGRAM(s) Oral two times a day  pantoprazole    Tablet 40 milliGRAM(s) Oral before breakfast  senna 1 Tablet(s) Oral at bedtime  sodium chloride 0.9%. 1000 milliLiter(s) (10 mL/Hr) IV Continuous <Continuous>    MEDICATIONS  (PRN):  acetaminophen   Tablet. 650 milliGRAM(s) Oral every 6 hours PRN Mild Pain (1 - 3)  cyclobenzaprine 10 milliGRAM(s) Oral three times a day PRN Muscle Spasm  naloxone Injectable 0.1 milliGRAM(s) IV Push every 3 minutes PRN For ANY of the following changes in patient status:  A. RR LESS THAN 10 breaths per minute, B. Oxygen saturation LESS THAN 90%, C. Sedation score of 6  ondansetron Injectable 4 milliGRAM(s) IV Push every 6 hours PRN Nausea  oxyCODONE    IR 10 milliGRAM(s) Oral four times a day after meals PRN Moderate Pain (4 - 6)      Overnight events:    Vital Signs Last 24 Hrs  T(C): 36.6 (28 Feb 2018 11:00), Max: 36.6 (28 Feb 2018 11:00)  T(F): 97.9 (28 Feb 2018 11:00), Max: 97.9 (28 Feb 2018 11:00)  HR: 97 (28 Feb 2018 11:00) (79 - 97)  BP: 140/73 (28 Feb 2018 11:00) (139/80 - 184/83)  BP(mean): --  RR: 20 (28 Feb 2018 11:00) (20 - 20)  SpO2: --      I&O's Summary    26 Feb 2018 07:01  -  27 Feb 2018 07:00  --------------------------------------------------------  IN: 0 mL / OUT: 100 mL / NET: -100 mL    27 Feb 2018 07:01  -  27 Feb 2018 13:51  --------------------------------------------------------  IN: 240 mL / OUT: 0 mL / NET: 240 mL        Physical Exam:    -     General : NAD    -      HEENT: NECK SOFT NO MASS     -      Cardiac: S1 S2 NO MURMUR     -      Pulm: GOOD BILATERAL AIR ENTRY     -      GI: ABD SOFT NON TENDER.     -      Musculoskeletal: LOWER BACK PAIN. BACK BLISTER QUESTIONABLE ABSCESS IN Neurotransmitter site. no oozing or discharge noted on exam today.     -      Neuro: AAOx3         Labs:                                     12.9   6.44  )-----------( 279      ( 28 Feb 2018 06:33 )             41.0                  13.1   8.06  )-----------( 256      ( 27 Feb 2018 06:45 )             41.4             02-27 02-28    136  |  102  |  8<L>  ----------------------------<  155<H>  4.1   |  30  |  0.7    Ca    8.6      28 Feb 2018 06:33        140  |  102  |  9<L>  ----------------------------<  144<H>  4.2   |  30  |  0.7    Ca    9.1      27 Feb 2018 06:45  Mg     2.1     02-26                            Imaging:    ECG:    T(C): 36.4 (02-27-18 @ 13:34), Max: 37.6 (02-27-18 @ 03:20)  HR: 79 (02-27-18 @ 13:34) (75 - 109)  BP: 139/80 (02-27-18 @ 13:34) (137/88 - 180/80)  RR: 20 (02-27-18 @ 13:34) (18 - 20)  SpO2: 95% (02-26-18 @ 17:10) (95% - 95%)

## 2018-02-28 NOTE — CONSULT NOTE ADULT - SUBJECTIVE AND OBJECTIVE BOX
Patient is a 52y old  Male who presents with a chief complaint of "Back pain shooting to abdomen and R LE and oozing back blister" (23 Feb 2018 17:16)      HPI:  51 yo M with Hx of Lumbar disc disease s/p Discectomy with Neurostimulator placement last year and neurostimulator revision last November , hx of previous neurostimulator battery infection treated with clindamycin, Hx of HTN presenting for above chief complaint. Patient reports that over the last few days he has been experiencing worsening mid spinal, Lower lumbar back pain, worse with motion, ambulation, valsalva, neck flexion, "100/10" in severity, electric in nature, relieved by sitting straight and avoiding motion, radiating bilaterally anteriorly to the abdomen and groin area and posteriorly along the sciatic nerve root distribution in the R lower extremity (along T10-T12, L1-L3 and L5-S1 dermatomes). He also reported that yesterday his family noticed a blister on the surgical wound that bursted and thick yellowish serous fluid started draining. Patient was at the pain management doctor yesterday who advised him to Go to the hospital for IV antibiotics. He was supposed to get an MRI done on Thursday but could not complete the test due to severe pain and discomfort. He also reports that over the last few days he has been having shortness of breath with ambulation and worsening LE swelling.  He reports no fever, chills, headaches, photo or phonophobia. No hx of cardiac disease. (23 Feb 2018 17:16)    UROLOGY Pt with complaint of frequency q 3 hrs, nocturia x 1-2, occasional urgency, weak to medium stream, pt states stream starts and then abruptly stops and then restarts with hesitancy and straining, occasional PV fullness with PV driblling. Pt also with c/o of pain radiating to his groin.    PAST MEDICAL & SURGICAL HISTORY:  Morbid obesity  Obstructive sleep apnea  Disc disease, degenerative, lumbar or lumbosacral  Hypertension  History of excision of pilonidal cyst  H/O arthroscopy of right knee  Spinal cord stimulator status      REVIEW OF SYSTEMS:    CONSTITUTIONAL:  fevers or chills  HEENT: No visual changes  ENDO: No sweating  NECK: No pain or stiffness  MUSCULOSKELETAL: No back pain, no joint pain  RESPIRATORY: No shortness of breath  CARDIOVASCULAR: No chest pain  GASTROINTESTINAL: No abdominal or epigastric pain. No nausea, vomiting,  No diarrhea or constipation.   NEUROLOGICAL: No mental status changes  PSYCH: No depression, no mood changes  SKIN: No itching      MEDICATIONS  (STANDING):  amLODIPine   Tablet 5 milliGRAM(s) Oral at bedtime  ceFAZolin   IVPB 2000 milliGRAM(s) IV Intermittent every 6 hours  ceFAZolin   IVPB      clindamycin IVPB 900 milliGRAM(s) IV Intermittent every 8 hours  clindamycin IVPB 900 milliGRAM(s) IV Intermittent every 8 hours  diphenhydrAMINE   Injectable 25 milliGRAM(s) IV Push once  docusate sodium 100 milliGRAM(s) Oral three times a day  enoxaparin Injectable 40 milliGRAM(s) SubCutaneous at bedtime  furosemide    Tablet 40 milliGRAM(s) Oral daily  HYDROmorphone PCA (1 mG/mL) 30 milliLiter(s) PCA Continuous PCA Continuous  losartan 100 milliGRAM(s) Oral at bedtime  naproxen 500 milliGRAM(s) Oral two times a day  pantoprazole    Tablet 40 milliGRAM(s) Oral before breakfast  senna 1 Tablet(s) Oral at bedtime  sodium chloride 0.9%. 1000 milliLiter(s) (10 mL/Hr) IV Continuous <Continuous>    MEDICATIONS  (PRN):  acetaminophen   Tablet. 650 milliGRAM(s) Oral every 6 hours PRN Mild Pain (1 - 3)  cyclobenzaprine 10 milliGRAM(s) Oral three times a day PRN Muscle Spasm  naloxone Injectable 0.1 milliGRAM(s) IV Push every 3 minutes PRN For ANY of the following changes in patient status:  A. RR LESS THAN 10 breaths per minute, B. Oxygen saturation LESS THAN 90%, C. Sedation score of 6  ondansetron Injectable 4 milliGRAM(s) IV Push every 6 hours PRN Nausea  oxyCODONE    IR 10 milliGRAM(s) Oral four times a day after meals PRN Moderate Pain (4 - 6)      Allergies    IV Contrast (Unknown)  penicillin (Unknown)    Intolerances        SOCIAL HISTORY: No illicit drug use    FAMILY HISTORY:  Family history of diabetes mellitus in mother (Mother)  Family history of early CAD (Father)      Vital Signs Last 24 Hrs  T(C): 36.6 (28 Feb 2018 11:00), Max: 36.6 (28 Feb 2018 11:00)  T(F): 97.9 (28 Feb 2018 11:00), Max: 97.9 (28 Feb 2018 11:00)  HR: 97 (28 Feb 2018 11:00) (79 - 97)  BP: 140/73 (28 Feb 2018 11:00) (139/80 - 184/83)  BP(mean): --  RR: 20 (28 Feb 2018 11:00) (20 - 20)  SpO2: --    Daily Height in cm: 200.66 (27 Feb 2018 13:51)    Daily     PHYSICAL EXAM:    Constitutional: NAD, well-developed morbidly obese    Back: No CVA tenderness + low back pain at paraspinal muscles radiating out to the iliac crest.    Abd: Soft, NT/ND  morbidly obese bladder is not palpable, no urge to void with deep palpation    : nl m uncircumcised easily retractible no lesions or d/c. testicle x 2 sym non tender no masses     Extremities: +1 edema, with trophic skin changes    LABS:                        12.9   6.44  )-----------( 279      ( 28 Feb 2018 06:33 )             41.0     02-28    136  |  102  |  8<L>  ----------------------------<  155<H>  4.1   |  30  |  0.7    Ca    8.6      28 Feb 2018 06:33          Urine Culture: Culture - Abscess with Gram Stain (02.23.18 @ 15:51)    -  Ampicillin/Sulbactam: S <=8/4    -  Cefazolin: S <=4    -  Ciprofloxacin: S <=1    -  Clindamycin: S <=0.25    -  Erythromycin: S <=0.25    -  Gentamicin: S <=1    -  Levofloxacin: S <=0.5    -  Moxifloxacin(Aerobic): S <=0.5    -  Oxacillin: S <=0.25    -  Penicillin: R 4    -  RIF- Rifampin: S <=1    -  Tetra/Doxy: S <=1    -  Trimethoprim/Sulfamethoxazole: S <=0.5/9.5    -  Vancomycin: S 2    Specimen Source: .Abscess thoracic incision    Culture Results:   Moderate Staphylococcus aureus    Organism Identification: Staphylococcus aureus    Organism: Staphylococcus aureus    Method Type: JAYCOB

## 2018-02-28 NOTE — PROGRESS NOTE ADULT - PROBLEM SELECTOR PLAN 1
continue current antibiotic regimen.   follow with imaging studies MRI T&L SPINE to be done with sedation as patient cannot tolerate PROCEDURE.   MRI made aware of Neurotransmitter compatibility as per Neurosurgery.   continue with nafcillin and clindamycin.  follow with further ID recommendations.

## 2018-03-01 LAB
CULTURE RESULTS: SIGNIFICANT CHANGE UP
CULTURE RESULTS: SIGNIFICANT CHANGE UP
ORGANISM # SPEC MICROSCOPIC CNT: SIGNIFICANT CHANGE UP
ORGANISM # SPEC MICROSCOPIC CNT: SIGNIFICANT CHANGE UP
SPECIMEN SOURCE: SIGNIFICANT CHANGE UP
SPECIMEN SOURCE: SIGNIFICANT CHANGE UP

## 2018-03-01 RX ORDER — TAMSULOSIN HYDROCHLORIDE 0.4 MG/1
0.4 CAPSULE ORAL AT BEDTIME
Qty: 0 | Refills: 0 | Status: DISCONTINUED | OUTPATIENT
Start: 2018-03-01 | End: 2018-03-16

## 2018-03-01 RX ORDER — DIPHENHYDRAMINE HCL 50 MG
50 CAPSULE ORAL ONCE
Qty: 0 | Refills: 0 | Status: COMPLETED | OUTPATIENT
Start: 2018-03-01 | End: 2018-03-01

## 2018-03-01 RX ORDER — FUROSEMIDE 40 MG
40 TABLET ORAL
Qty: 0 | Refills: 0 | Status: DISCONTINUED | OUTPATIENT
Start: 2018-03-01 | End: 2018-03-06

## 2018-03-01 RX ADMIN — SENNA PLUS 1 TABLET(S): 8.6 TABLET ORAL at 21:46

## 2018-03-01 RX ADMIN — Medication 100 MILLIGRAM(S): at 21:56

## 2018-03-01 RX ADMIN — OXYCODONE HYDROCHLORIDE 10 MILLIGRAM(S): 5 TABLET ORAL at 13:21

## 2018-03-01 RX ADMIN — Medication 100 MILLIGRAM(S): at 12:14

## 2018-03-01 RX ADMIN — LOSARTAN POTASSIUM 100 MILLIGRAM(S): 100 TABLET, FILM COATED ORAL at 21:46

## 2018-03-01 RX ADMIN — Medication 40 MILLIGRAM(S): at 18:13

## 2018-03-01 RX ADMIN — OXYCODONE HYDROCHLORIDE 10 MILLIGRAM(S): 5 TABLET ORAL at 08:41

## 2018-03-01 RX ADMIN — Medication 100 MILLIGRAM(S): at 00:03

## 2018-03-01 RX ADMIN — Medication 100 MILLIGRAM(S): at 21:46

## 2018-03-01 RX ADMIN — Medication 500 MILLIGRAM(S): at 05:37

## 2018-03-01 RX ADMIN — Medication 50 MILLIGRAM(S): at 13:19

## 2018-03-01 RX ADMIN — Medication 0.5 MILLIGRAM(S): at 21:45

## 2018-03-01 RX ADMIN — Medication 100 MILLIGRAM(S): at 05:36

## 2018-03-01 RX ADMIN — OXYCODONE HYDROCHLORIDE 10 MILLIGRAM(S): 5 TABLET ORAL at 20:17

## 2018-03-01 RX ADMIN — Medication 100 MILLIGRAM(S): at 05:37

## 2018-03-01 RX ADMIN — OXYCODONE HYDROCHLORIDE 10 MILLIGRAM(S): 5 TABLET ORAL at 19:10

## 2018-03-01 RX ADMIN — OXYCODONE HYDROCHLORIDE 10 MILLIGRAM(S): 5 TABLET ORAL at 16:46

## 2018-03-01 RX ADMIN — Medication 100 MILLIGRAM(S): at 18:12

## 2018-03-01 RX ADMIN — Medication 40 MILLIGRAM(S): at 15:24

## 2018-03-01 RX ADMIN — ENOXAPARIN SODIUM 40 MILLIGRAM(S): 100 INJECTION SUBCUTANEOUS at 21:56

## 2018-03-01 RX ADMIN — Medication 40 MILLIGRAM(S): at 05:37

## 2018-03-01 RX ADMIN — TAMSULOSIN HYDROCHLORIDE 0.4 MILLIGRAM(S): 0.4 CAPSULE ORAL at 21:53

## 2018-03-01 NOTE — PROGRESS NOTE ADULT - PROBLEM SELECTOR PLAN 1
continue current antibiotic regimen.   FOLLOWING WITH ct T&L SPINE as patient cannot have MRI done due to weight and space limit of MRI being exceeded.  cannot tolerate PROCEDURE.   MRI made aware of Neurotransmitter compatibility as per Neurosurgery.   continue with nafcillin and clindamycin.  follow with further ID recommendations.

## 2018-03-01 NOTE — CONSULT NOTE ADULT - SUBJECTIVE AND OBJECTIVE BOX
[facility]  INITIAL CONSULT NOTE  --------------------------------------------------------------------------------  HPI:    51 yo bm with multiple comorbidities as below, admitted 2/23 with back pain from spinal abscess.  Pt's hosp course significant for persistent back pain and only able to remain in sitted position with worsening of chronic pedal edema, thus renal consulted    PAST HISTORY  --------------------------------------------------------------------------------  PAST MEDICAL & SURGICAL HISTORY:  Morbid obesity  Obstructive sleep apnea  Disc disease, degenerative, lumbar or lumbosacral  Hypertension  History of excision of pilonidal cyst  H/O arthroscopy of right knee  Spinal cord stimulator status    FAMILY HISTORY:  Family history of diabetes mellitus in mother (Mother)  Family history of early CAD (Father)    PAST SOCIAL HISTORY:    ALLERGIES & MEDICATIONS  --------------------------------------------------------------------------------  Allergies    IV Contrast (Unknown)  penicillin (Unknown)    Intolerances      Standing Inpatient Medications  amLODIPine   Tablet 5 milliGRAM(s) Oral at bedtime  ceFAZolin   IVPB 2000 milliGRAM(s) IV Intermittent every 6 hours  ceFAZolin   IVPB      clindamycin IVPB 900 milliGRAM(s) IV Intermittent every 8 hours  clindamycin IVPB 900 milliGRAM(s) IV Intermittent every 8 hours  docusate sodium 100 milliGRAM(s) Oral three times a day  enoxaparin Injectable 40 milliGRAM(s) SubCutaneous at bedtime  furosemide    Tablet 40 milliGRAM(s) Oral daily  furosemide   Injectable 40 milliGRAM(s) IV Push two times a day  HYDROmorphone PCA (1 mG/mL) 30 milliLiter(s) PCA Continuous PCA Continuous  losartan 100 milliGRAM(s) Oral at bedtime  naproxen 500 milliGRAM(s) Oral two times a day  pantoprazole    Tablet 40 milliGRAM(s) Oral before breakfast  senna 1 Tablet(s) Oral at bedtime  sodium chloride 0.9%. 1000 milliLiter(s) IV Continuous <Continuous>    PRN Inpatient Medications  acetaminophen   Tablet. 650 milliGRAM(s) Oral every 6 hours PRN  ALPRAZolam 0.5 milliGRAM(s) Oral at bedtime PRN  cyclobenzaprine 10 milliGRAM(s) Oral three times a day PRN  naloxone Injectable 0.1 milliGRAM(s) IV Push every 3 minutes PRN  ondansetron Injectable 4 milliGRAM(s) IV Push every 6 hours PRN  oxyCODONE    IR 10 milliGRAM(s) Oral four times a day after meals PRN      REVIEW OF SYSTEMS  --------------------------------------------------------------------------------  as above ,all else neg    VITALS/PHYSICAL EXAM  --------------------------------------------------------------------------------  T(C): 35.8 (03-01-18 @ 14:13), Max: 35.8 (03-01-18 @ 14:13)  HR: 91 (03-01-18 @ 14:13) (80 - 91)  BP: 181/96 (03-01-18 @ 14:13) (155/75 - 181/96)  RR: 20 (03-01-18 @ 14:13) (20 - 20)  SpO2: --  Wt(kg): --  Height (cm): 200.66 (03-01-18 @ 10:42)      Physical Exam:  	  NAD  morbidly obese  moist mucosa  no jvd  decreased b/l bs  distant hs  soft  3+pedal edema to thigh  no [place]  no rash    LABS/STUDIES  --------------------------------------------------------------------------------              12.9   6.44  >-----------<  279      [02-28-18 @ 06:33]              41.0     136  |  102  |  8   ----------------------------<  155      [02-28-18 @ 06:33]  4.1   |  30  |  0.7        Ca     8.6     [02-28-18 @ 06:33]            Creatinine Trend:  SCr 0.7 [02-28 @ 06:33]  SCr 0.7 [02-27 @ 06:45]  SCr 0.8 [02-26 @ 07:15]  SCr 0.8 [02-24 @ 06:10]  SCr 0.9 [02-23 @ 12:23]

## 2018-03-01 NOTE — PROGRESS NOTE ADULT - SUBJECTIVE AND OBJECTIVE BOX
Patient was seen and examined. Spoke with RN. Chart reviewed.    No events overnight.  Vital Signs Last 24 Hrs  T(F): 96.2 (01 Mar 2018 05:08), Max: 98.8 (28 Feb 2018 15:49)  HR: 80 (01 Mar 2018 05:08) (80 - 97)  BP: 155/75 (01 Mar 2018 05:08) (140/73 - 163/76)  SpO2: --  MEDICATIONS  (STANDING):  amLODIPine   Tablet 5 milliGRAM(s) Oral at bedtime  ceFAZolin   IVPB 2000 milliGRAM(s) IV Intermittent every 6 hours  ceFAZolin   IVPB      clindamycin IVPB 900 milliGRAM(s) IV Intermittent every 8 hours  clindamycin IVPB 900 milliGRAM(s) IV Intermittent every 8 hours  diphenhydrAMINE   Injectable 25 milliGRAM(s) IV Push once  docusate sodium 100 milliGRAM(s) Oral three times a day  enoxaparin Injectable 40 milliGRAM(s) SubCutaneous at bedtime  furosemide    Tablet 40 milliGRAM(s) Oral daily  HYDROmorphone PCA (1 mG/mL) 30 milliLiter(s) PCA Continuous PCA Continuous  losartan 100 milliGRAM(s) Oral at bedtime  naproxen 500 milliGRAM(s) Oral two times a day  pantoprazole    Tablet 40 milliGRAM(s) Oral before breakfast  senna 1 Tablet(s) Oral at bedtime  sodium chloride 0.9%. 1000 milliLiter(s) (10 mL/Hr) IV Continuous <Continuous>    MEDICATIONS  (PRN):  acetaminophen   Tablet. 650 milliGRAM(s) Oral every 6 hours PRN Mild Pain (1 - 3)  ALPRAZolam 0.5 milliGRAM(s) Oral at bedtime PRN anxiety  cyclobenzaprine 10 milliGRAM(s) Oral three times a day PRN Muscle Spasm  naloxone Injectable 0.1 milliGRAM(s) IV Push every 3 minutes PRN For ANY of the following changes in patient status:  A. RR LESS THAN 10 breaths per minute, B. Oxygen saturation LESS THAN 90%, C. Sedation score of 6  ondansetron Injectable 4 milliGRAM(s) IV Push every 6 hours PRN Nausea  oxyCODONE    IR 10 milliGRAM(s) Oral four times a day after meals PRN Moderate Pain (4 - 6)    Labs:                        12.9   6.44  )-----------( 279      ( 28 Feb 2018 06:33 )             41.0     28 Feb 2018 06:33    136    |  102    |  8      ----------------------------<  155    4.1     |  30     |  0.7      Ca    8.6        28 Feb 2018 06:33              Radiology:    General: comfortable, NAD  Neurology: A&Ox3, nonfocal  Head:  Normocephalic, atraumatic  ENT:  Mucosa moist, no ulcerations  Neck:  Supple, no JVD,   Skin: no breakdowns (as per RN)  Resp: CTA B/L  CV: RRR, S1S2,   GI: Soft, NT, bowel sounds  MS: No edema, + peripheral pulses, FROM all 4 extremity Patient was seen and examined. Spoke with RN. Chart reviewed.c/o excessive lower extremity swelling,    No events overnight.  Vital Signs Last 24 Hrs  T(F): 96.2 (01 Mar 2018 05:08), Max: 98.8 (28 Feb 2018 15:49)  HR: 80 (01 Mar 2018 05:08) (80 - 97)  BP: 155/75 (01 Mar 2018 05:08) (140/73 - 163/76)  SpO2: --  MEDICATIONS  (STANDING):  amLODIPine   Tablet 5 milliGRAM(s) Oral at bedtime  ceFAZolin   IVPB 2000 milliGRAM(s) IV Intermittent every 6 hours  ceFAZolin   IVPB      clindamycin IVPB 900 milliGRAM(s) IV Intermittent every 8 hours  clindamycin IVPB 900 milliGRAM(s) IV Intermittent every 8 hours  diphenhydrAMINE   Injectable 25 milliGRAM(s) IV Push once  docusate sodium 100 milliGRAM(s) Oral three times a day  enoxaparin Injectable 40 milliGRAM(s) SubCutaneous at bedtime  furosemide    Tablet 40 milliGRAM(s) Oral daily  HYDROmorphone PCA (1 mG/mL) 30 milliLiter(s) PCA Continuous PCA Continuous  losartan 100 milliGRAM(s) Oral at bedtime  naproxen 500 milliGRAM(s) Oral two times a day  pantoprazole    Tablet 40 milliGRAM(s) Oral before breakfast  senna 1 Tablet(s) Oral at bedtime  sodium chloride 0.9%. 1000 milliLiter(s) (10 mL/Hr) IV Continuous <Continuous>    MEDICATIONS  (PRN):  acetaminophen   Tablet. 650 milliGRAM(s) Oral every 6 hours PRN Mild Pain (1 - 3)  ALPRAZolam 0.5 milliGRAM(s) Oral at bedtime PRN anxiety  cyclobenzaprine 10 milliGRAM(s) Oral three times a day PRN Muscle Spasm  naloxone Injectable 0.1 milliGRAM(s) IV Push every 3 minutes PRN For ANY of the following changes in patient status:  A. RR LESS THAN 10 breaths per minute, B. Oxygen saturation LESS THAN 90%, C. Sedation score of 6  ondansetron Injectable 4 milliGRAM(s) IV Push every 6 hours PRN Nausea  oxyCODONE    IR 10 milliGRAM(s) Oral four times a day after meals PRN Moderate Pain (4 - 6)    Labs:                        12.9   6.44  )-----------( 279      ( 28 Feb 2018 06:33 )             41.0     28 Feb 2018 06:33    136    |  102    |  8      ----------------------------<  155    4.1     |  30     |  0.7      Ca    8.6        28 Feb 2018 06:33              Radiology:    General: comfortable, NAD  Neurology: A&Ox3, nonfocal  Head:  Normocephalic, atraumatic  ENT:  Mucosa moist, no ulcerations  Neck:  Supple, no JVD,   Skin: no breakdowns (as per RN)  Resp: CTA B/L  CV: RRR, S1S2,   GI: Soft, NT, bowel sounds morbid obesity  MS chr stasis changes, 2+ edema+ peripheral pulses, FROM all 4 extremity  back wound+ c/d

## 2018-03-01 NOTE — PROGRESS NOTE ADULT - SUBJECTIVE AND OBJECTIVE BOX
Patient is a 52y old  Male who presents with a chief complaint of "Back pain shooting to abdomen and R LE and oozing back blister" (23 Feb 2018 17:16)      PAST MEDICAL & SURGICAL HISTORY:  Morbid obesity  Obstructive sleep apnea  Disc disease, degenerative, lumbar or lumbosacral  Hypertension  History of excision of pilonidal cyst  H/O arthroscopy of right knee  Spinal cord stimulator status      MEDICATIONS  (STANDING):  amLODIPine   Tablet 5 milliGRAM(s) Oral at bedtime  ceFAZolin   IVPB 2000 milliGRAM(s) IV Intermittent every 6 hours  ceFAZolin   IVPB      clindamycin IVPB 900 milliGRAM(s) IV Intermittent every 8 hours  clindamycin IVPB 900 milliGRAM(s) IV Intermittent every 8 hours  diphenhydrAMINE   Injectable 25 milliGRAM(s) IV Push once  docusate sodium 100 milliGRAM(s) Oral three times a day  enoxaparin Injectable 40 milliGRAM(s) SubCutaneous at bedtime  furosemide    Tablet 40 milliGRAM(s) Oral daily  HYDROmorphone PCA (1 mG/mL) 30 milliLiter(s) PCA Continuous PCA Continuous  losartan 100 milliGRAM(s) Oral at bedtime  naproxen 500 milliGRAM(s) Oral two times a day  pantoprazole    Tablet 40 milliGRAM(s) Oral before breakfast  senna 1 Tablet(s) Oral at bedtime  sodium chloride 0.9%. 1000 milliLiter(s) (10 mL/Hr) IV Continuous <Continuous>    MEDICATIONS  (PRN):  acetaminophen   Tablet. 650 milliGRAM(s) Oral every 6 hours PRN Mild Pain (1 - 3)  ALPRAZolam 0.5 milliGRAM(s) Oral at bedtime PRN anxiety  cyclobenzaprine 10 milliGRAM(s) Oral three times a day PRN Muscle Spasm  naloxone Injectable 0.1 milliGRAM(s) IV Push every 3 minutes PRN For ANY of the following changes in patient status:  A. RR LESS THAN 10 breaths per minute, B. Oxygen saturation LESS THAN 90%, C. Sedation score of 6  ondansetron Injectable 4 milliGRAM(s) IV Push every 6 hours PRN Nausea  oxyCODONE    IR 10 milliGRAM(s) Oral four times a day after meals PRN Moderate Pain (4 - 6)      Overnight events:    Vital Signs Last 24 Hrs  T(C): 35.7 (01 Mar 2018 05:08), Max: 37.1 (28 Feb 2018 15:49)  T(F): 96.2 (01 Mar 2018 05:08), Max: 98.8 (28 Feb 2018 15:49)  HR: 80 (01 Mar 2018 05:08) (80 - 97)  BP: 155/75 (01 Mar 2018 05:08) (140/73 - 163/76)  BP(mean): --  RR: 20 (01 Mar 2018 05:08) (20 - 20)  SpO2: --  CAPILLARY BLOOD GLUCOSE        I&O's Summary      Physical Exam:    -     General : NAD    -      HEENT: NECK SOFT NO MASS    -      Cardiac: S1S2 NO MURMUR    -      Pulm: GOOD AIR ENTRY B/L     -      GI: ABD SOFT NON-TENDER     -      Musculoskeletal: LOWER BACK PAIN PERSISTENT.  NO DRAINAGE AT THIS TIME. WILL FOLLOW WITH IMAGING.     -      Neuro: AAOX3         Labs:                        12.9   6.44  )-----------( 279      ( 28 Feb 2018 06:33 )             41.0             02-28    136  |  102  |  8<L>  ----------------------------<  155<H>  4.1   |  30  |  0.7    Ca    8.6      28 Feb 2018 06:33                            Imaging:    ECG:    T(C): 35.7 (03-01-18 @ 05:08), Max: 37.1 (02-28-18 @ 15:49)  HR: 80 (03-01-18 @ 05:08) (80 - 97)  BP: 155/75 (03-01-18 @ 05:08) (140/73 - 163/76)  RR: 20 (03-01-18 @ 05:08) (20 - 20)  SpO2: --

## 2018-03-01 NOTE — PROGRESS NOTE ADULT - ASSESSMENT
· Assessment		  Patient is a 52y old  Male who presents with a chief complaint of "Back pain shooting to abdomen and R LE and oozing back blister    Problem/Plan - 1:  ·  Problem: Back abscess.  Plan: continue current antibiotic regimen.   follow with imaging studies MRI T&L SPINE to be done with sedation as patient cannot tolerate PROCEDURE.   MRI made aware of Neurotransmitter compatibility as per Neurosurgery.   continue with nafcillin and clindamycin.  follow with further ID recommendations.     Problem/Plan - 2:  ·  Problem: Disc disease, degenerative, lumbar or lumbosacral.  Plan: F/U neurosurgery recommendations   DVT PPI ppx.     Problem/Plan - 3:  ·  Problem: Obstructive sleep apnea.  Plan: stable patient on CPAP.     Problem/Plan - 4:  ·  Problem: Hypertension.  Plan: on losartan and Amlodipine.   · Assessment		  Patient is a 52y old  Male who presents with a chief complaint of "Back pain shooting to abdomen and R LE and oozing back blister    Problem/Plan - 1:  ·  Problem: Back abscess.  Plan: continue current antibiotic regimen.   follow with imaging studies MRI T&L SPINE to be done with sedation as patient cannot tolerate PROCEDURE.   MRI made aware of Neurotransmitter compatibility as per Neurosurgery.   continue with nafcillin and clindamycin.  follow with further ID recommendations.     Problem/Plan - 2:  ·  Problem: Disc disease, degenerative, lumbar or lumbosacral.  Plan: F/U neurosurgery recommendations   DVT PPI ppx.     Problem/Plan - 3:  ·  Problem: Obstructive sleep apnea.  Plan: stable patient on CPAP.     Problem/Plan - 4:  ·  Problem: Hypertension.  Plan: on losartan and Amlodipine. · Assessment		  Patient is a 52y old  Male who presents with a chief complaint of "Back pain shooting to abdomen and R LE and oozing back blister    Problem/Plan - 1:  ·  Problem: Back abscess.  Plan: continue current antibiotic regimen.   follow with imaging studies MRI T&L SPINE to be done with sedation as patient cannot tolerate PROCEDURE.   MRI made aware of Neurotransmitter compatibility as per Neurosurgery.   continue with nafcillin and clindamycin.  follow with further ID recommendations.  ct with sedation today    Problem/Plan - 2:  ·  Problem: Disc disease, degenerative, lumbar or lumbosacral.  Plan: F/U neurosurgery recommendations   DVT PPI ppx.     Problem/Plan - 3:  ·  Problem: Obstructive sleep apnea.  Plan: stable patient on CPAP.     Problem/Plan - 4:  ·  Problem: Hypertension.  Plan: on losartan and dc  Amlodipine.  renal dr padgett   iv lasix 40mg Q 12 today them q24hrs from am  follow lytes       discussed with nursing and ho at bedside

## 2018-03-01 NOTE — PROGRESS NOTE ADULT - SUBJECTIVE AND OBJECTIVE BOX
KAYLAJOSE ALBERTO GARZA  52y, Male      OVERNIGHT EVENTS:    no fevers, has pain, no more drianage.    VITALS:  T(F): 96.2, Max: 98.8 (02-28-18 @ 15:49)  HR: 80  BP: 155/75  RR: 20Vital Signs Last 24 Hrs  T(C): 35.7 (01 Mar 2018 05:08), Max: 37.1 (28 Feb 2018 15:49)  T(F): 96.2 (01 Mar 2018 05:08), Max: 98.8 (28 Feb 2018 15:49)  HR: 80 (01 Mar 2018 05:08) (80 - 97)  BP: 155/75 (01 Mar 2018 05:08) (140/73 - 163/76)  BP(mean): --  RR: 20 (01 Mar 2018 05:08) (20 - 20)  SpO2: --    TESTS & MEASUREMENTS:                        12.9   6.44  )-----------( 279      ( 28 Feb 2018 06:33 )             41.0     02-28    136  |  102  |  8<L>  ----------------------------<  155<H>  4.1   |  30  |  0.7    Ca    8.6      28 Feb 2018 06:33          Culture - Blood (collected 02-24-18 @ 06:10)  Source: .Blood None  Preliminary Report (02-25-18 @ 17:00):    No growth to date.    Culture - Abscess with Gram Stain (collected 02-23-18 @ 15:51)  Source: .Abscess thoracic incision  Preliminary Report (02-25-18 @ 09:38):    Moderate Staphylococcus aureus  Organism: Staphylococcus aureus (02-26-18 @ 10:54)  Organism: Staphylococcus aureus (02-26-18 @ 10:54)      -  Ampicillin/Sulbactam: S <=8/4      -  Cefazolin: S <=4      -  Ciprofloxacin: S <=1      -  Clindamycin: S <=0.25      -  Erythromycin: S <=0.25      -  Gentamicin: S <=1      -  Levofloxacin: S <=0.5      -  Moxifloxacin(Aerobic): S <=0.5      -  Oxacillin: S <=0.25      -  Penicillin: R 4      -  RIF- Rifampin: S <=1      -  Tetra/Doxy: S <=1      -  Trimethoprim/Sulfamethoxazole: S <=0.5/9.5      -  Vancomycin: S 2      Method Type: JAYCOB            RADIOLOGY & ADDITIONAL TESTS:    ANTIBIOTICS:  ceFAZolin   IVPB 2000 milliGRAM(s) IV Intermittent every 6 hours  ceFAZolin   IVPB      clindamycin IVPB 900 milliGRAM(s) IV Intermittent every 8 hours  clindamycin IVPB 900 milliGRAM(s) IV Intermittent every 8 hours

## 2018-03-01 NOTE — PROGRESS NOTE ADULT - ASSESSMENT
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  drainage has ceased  Ancef 2gm q8h  Clindamycin 900mg q8h.  MRI pending. Might determine duration of therapy.  will need long term iv antibiotics.

## 2018-03-02 DIAGNOSIS — R60.0 LOCALIZED EDEMA: ICD-10-CM

## 2018-03-02 LAB
ANION GAP SERPL CALC-SCNC: 15 MMOL/L — HIGH (ref 7–14)
BASOPHILS # BLD AUTO: 0.04 K/UL — SIGNIFICANT CHANGE UP (ref 0–0.2)
BASOPHILS NFR BLD AUTO: 0.6 % — SIGNIFICANT CHANGE UP (ref 0–1)
BUN SERPL-MCNC: 9 MG/DL — LOW (ref 10–20)
CALCIUM SERPL-MCNC: 9 MG/DL — SIGNIFICANT CHANGE UP (ref 8.5–10.1)
CHLORIDE SERPL-SCNC: 99 MMOL/L — SIGNIFICANT CHANGE UP (ref 98–110)
CO2 SERPL-SCNC: 26 MMOL/L — SIGNIFICANT CHANGE UP (ref 17–32)
CREAT SERPL-MCNC: 0.9 MG/DL — SIGNIFICANT CHANGE UP (ref 0.7–1.5)
EOSINOPHIL # BLD AUTO: 0.32 K/UL — SIGNIFICANT CHANGE UP (ref 0–0.7)
EOSINOPHIL NFR BLD AUTO: 4.5 % — SIGNIFICANT CHANGE UP (ref 0–8)
GLUCOSE SERPL-MCNC: 213 MG/DL — HIGH (ref 70–110)
HCT VFR BLD CALC: 41.5 % — LOW (ref 42–52)
HGB BLD-MCNC: 13.2 G/DL — LOW (ref 14–18)
IMM GRANULOCYTES NFR BLD AUTO: 0.3 % — SIGNIFICANT CHANGE UP (ref 0.1–0.3)
LYMPHOCYTES # BLD AUTO: 2.06 K/UL — SIGNIFICANT CHANGE UP (ref 1.2–3.4)
LYMPHOCYTES # BLD AUTO: 28.8 % — SIGNIFICANT CHANGE UP (ref 20.5–51.1)
MCHC RBC-ENTMCNC: 28.5 PG — SIGNIFICANT CHANGE UP (ref 27–31)
MCHC RBC-ENTMCNC: 31.8 G/DL — LOW (ref 32–37)
MCV RBC AUTO: 89.6 FL — SIGNIFICANT CHANGE UP (ref 80–94)
MONOCYTES # BLD AUTO: 0.6 K/UL — SIGNIFICANT CHANGE UP (ref 0.1–0.6)
MONOCYTES NFR BLD AUTO: 8.4 % — SIGNIFICANT CHANGE UP (ref 1.7–9.3)
NEUTROPHILS # BLD AUTO: 4.12 K/UL — SIGNIFICANT CHANGE UP (ref 1.4–6.5)
NEUTROPHILS NFR BLD AUTO: 57.4 % — SIGNIFICANT CHANGE UP (ref 42.2–75.2)
NRBC # BLD: 0 /100 WBCS — SIGNIFICANT CHANGE UP (ref 0–0)
PLATELET # BLD AUTO: 344 K/UL — SIGNIFICANT CHANGE UP (ref 130–400)
POTASSIUM SERPL-MCNC: 4 MMOL/L — SIGNIFICANT CHANGE UP (ref 3.5–5)
POTASSIUM SERPL-SCNC: 4 MMOL/L — SIGNIFICANT CHANGE UP (ref 3.5–5)
RBC # BLD: 4.63 M/UL — LOW (ref 4.7–6.1)
RBC # FLD: 13.2 % — SIGNIFICANT CHANGE UP (ref 11.5–14.5)
SODIUM SERPL-SCNC: 140 MMOL/L — SIGNIFICANT CHANGE UP (ref 135–146)
WBC # BLD: 7.16 K/UL — SIGNIFICANT CHANGE UP (ref 4.8–10.8)
WBC # FLD AUTO: 7.16 K/UL — SIGNIFICANT CHANGE UP (ref 4.8–10.8)

## 2018-03-02 RX ORDER — OXYCODONE HYDROCHLORIDE 5 MG/1
10 TABLET ORAL
Qty: 0 | Refills: 0 | Status: DISCONTINUED | OUTPATIENT
Start: 2018-03-02 | End: 2018-03-07

## 2018-03-02 RX ADMIN — OXYCODONE HYDROCHLORIDE 10 MILLIGRAM(S): 5 TABLET ORAL at 03:45

## 2018-03-02 RX ADMIN — OXYCODONE HYDROCHLORIDE 10 MILLIGRAM(S): 5 TABLET ORAL at 17:36

## 2018-03-02 RX ADMIN — Medication 100 MILLIGRAM(S): at 07:02

## 2018-03-02 RX ADMIN — Medication 100 MILLIGRAM(S): at 12:17

## 2018-03-02 RX ADMIN — Medication 0.5 MILLIGRAM(S): at 21:10

## 2018-03-02 RX ADMIN — Medication 100 MILLIGRAM(S): at 06:59

## 2018-03-02 RX ADMIN — Medication 500 MILLIGRAM(S): at 17:32

## 2018-03-02 RX ADMIN — Medication 100 MILLIGRAM(S): at 17:32

## 2018-03-02 RX ADMIN — HYDROMORPHONE HYDROCHLORIDE 30 MILLILITER(S): 2 INJECTION INTRAMUSCULAR; INTRAVENOUS; SUBCUTANEOUS at 04:33

## 2018-03-02 RX ADMIN — Medication 500 MILLIGRAM(S): at 18:52

## 2018-03-02 RX ADMIN — OXYCODONE HYDROCHLORIDE 10 MILLIGRAM(S): 5 TABLET ORAL at 22:45

## 2018-03-02 RX ADMIN — PANTOPRAZOLE SODIUM 40 MILLIGRAM(S): 20 TABLET, DELAYED RELEASE ORAL at 07:03

## 2018-03-02 RX ADMIN — Medication 100 MILLIGRAM(S): at 07:01

## 2018-03-02 RX ADMIN — Medication 100 MILLIGRAM(S): at 23:26

## 2018-03-02 RX ADMIN — OXYCODONE HYDROCHLORIDE 10 MILLIGRAM(S): 5 TABLET ORAL at 22:30

## 2018-03-02 RX ADMIN — Medication 100 MILLIGRAM(S): at 00:17

## 2018-03-02 RX ADMIN — OXYCODONE HYDROCHLORIDE 10 MILLIGRAM(S): 5 TABLET ORAL at 10:04

## 2018-03-02 RX ADMIN — Medication 100 MILLIGRAM(S): at 13:30

## 2018-03-02 RX ADMIN — OXYCODONE HYDROCHLORIDE 10 MILLIGRAM(S): 5 TABLET ORAL at 00:46

## 2018-03-02 RX ADMIN — ENOXAPARIN SODIUM 40 MILLIGRAM(S): 100 INJECTION SUBCUTANEOUS at 21:11

## 2018-03-02 RX ADMIN — SENNA PLUS 1 TABLET(S): 8.6 TABLET ORAL at 21:12

## 2018-03-02 RX ADMIN — Medication 40 MILLIGRAM(S): at 17:32

## 2018-03-02 RX ADMIN — Medication 500 MILLIGRAM(S): at 07:02

## 2018-03-02 RX ADMIN — HYDROMORPHONE HYDROCHLORIDE 30 MILLILITER(S): 2 INJECTION INTRAMUSCULAR; INTRAVENOUS; SUBCUTANEOUS at 22:54

## 2018-03-02 RX ADMIN — OXYCODONE HYDROCHLORIDE 10 MILLIGRAM(S): 5 TABLET ORAL at 08:22

## 2018-03-02 RX ADMIN — LOSARTAN POTASSIUM 100 MILLIGRAM(S): 100 TABLET, FILM COATED ORAL at 21:12

## 2018-03-02 RX ADMIN — OXYCODONE HYDROCHLORIDE 10 MILLIGRAM(S): 5 TABLET ORAL at 18:52

## 2018-03-02 RX ADMIN — TAMSULOSIN HYDROCHLORIDE 0.4 MILLIGRAM(S): 0.4 CAPSULE ORAL at 21:12

## 2018-03-02 RX ADMIN — Medication 100 MILLIGRAM(S): at 21:12

## 2018-03-02 NOTE — PROGRESS NOTE ADULT - ASSESSMENT
Pedal edema  morbid obesity  back pain  HTN  RUSSELL    plan:  check ua and u p/c  elevate legs if possible  ACE wraps to legs   avoid nsaid  off norvasc  cont iv lasix -> change to q12h 6 and 6  keep negative  fluid restriction  f/u bmp Pedal edema  morbid obesity  back pain  HTN  RUSSELL    plan:  check ua and u p/c  elevate legs if possible  ACE wraps to legs   avoid nsaid  off norvasc  cont iv lasix -> change to q12h 6 and 6  keep negative  fluid restriction  f/u bmp  full code

## 2018-03-02 NOTE — CHART NOTE - NSCHARTNOTEFT_GEN_A_CORE
I have reviewed the patient's CT scans.  There is no signs of abscess deep or superficial or around any of the spinal stimulator components.  Typical infiltrative changes are usual post operative changes to be expected.  The findings appear consistent with just a superficial skin infection--likely a stitch abscess.  At this time there is no need for removal of the SCS or intervention surgically as these superficial findings usually respond well to antibiotics and local wound care.  Recommend dressing changes and continued antibiotics.  Continue followup with pain management as outpatient for adjustment to the SCS and follow up with me in neurosurgery in 1 week for wound check.

## 2018-03-02 NOTE — PROGRESS NOTE ADULT - ASSESSMENT
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  drainage has ceased  MRI could not be done given pts size  CT revealed no abscess. Paraspinal abscess could not be ruled out.  Ancef 2gm q8h  Clindamycin 900mg q8h.  PICC please  6 weeks of Ancef 2gm q8 hrs starting 2/23  D/C clinda.  F/U with Dr Meeks [phone].    recall prn please.

## 2018-03-02 NOTE — PROGRESS NOTE ADULT - SUBJECTIVE AND OBJECTIVE BOX
Renal f/u:    Voiding better with iv lasix.  Legs slightly less swollen.  Still with back discomfort.  Refused lasix at night so can sleep.    PAST HISTORY  --------------------------------------------------------------------------------  PAST MEDICAL & SURGICAL HISTORY:  Morbid obesity  Obstructive sleep apnea  Disc disease, degenerative, lumbar or lumbosacral  Hypertension  History of excision of pilonidal cyst  H/O arthroscopy of right knee  Spinal cord stimulator status    FAMILY HISTORY:  Family history of diabetes mellitus in mother (Mother)  Family history of early CAD (Father)    PAST SOCIAL HISTORY:    ALLERGIES & MEDICATIONS  --------------------------------------------------------------------------------  Allergies    IV Contrast (Unknown)  penicillin (Unknown)    Intolerances    Physical Exam:  	  NAD  morbidly obese  moist mucosa  no jvd  decreased b/l bs  distant hs  soft  3+pedal edema to thigh  no matamoros  no rash    Hospital Medications:   MEDICATIONS  (STANDING):  ceFAZolin   IVPB 2000 milliGRAM(s) IV Intermittent every 6 hours  ceFAZolin   IVPB      docusate sodium 100 milliGRAM(s) Oral three times a day  enoxaparin Injectable 40 milliGRAM(s) SubCutaneous at bedtime  furosemide   Injectable 40 milliGRAM(s) IV Push two times a day  HYDROmorphone PCA (1 mG/mL) 30 milliLiter(s) PCA Continuous PCA Continuous  losartan 100 milliGRAM(s) Oral at bedtime  naproxen 500 milliGRAM(s) Oral two times a day  pantoprazole    Tablet 40 milliGRAM(s) Oral before breakfast  senna 1 Tablet(s) Oral at bedtime  sodium chloride 0.9%. 1000 milliLiter(s) (10 mL/Hr) IV Continuous <Continuous>  tamsulosin 0.4 milliGRAM(s) Oral at bedtime        VITALS:  T(F): 97.8 (03-02-18 @ 05:12), Max: 97.8 (03-02-18 @ 05:12)  HR: 89 (03-02-18 @ 12:31)  BP: 175/84 (03-02-18 @ 12:31)  RR: 20 (03-02-18 @ 12:31)  SpO2: --  Wt(kg): --        LABS:            Urine Studies:        RADIOLOGY & ADDITIONAL STUDIES: Renal f/u:    Voiding better with iv lasix.  Legs slightly less swollen.  Still with back discomfort.  Refused lasix at night so can sleep.    PAST HISTORY  --------------------------------------------------------------------------------  PAST MEDICAL & SURGICAL HISTORY:  Morbid obesity  Obstructive sleep apnea  Disc disease, degenerative, lumbar or lumbosacral  Hypertension  History of excision of pilonidal cyst  H/O arthroscopy of right knee  Spinal cord stimulator status    FAMILY HISTORY:  Family history of diabetes mellitus in mother (Mother)  Family history of early CAD (Father)    PAST SOCIAL HISTORY:    ALLERGIES & MEDICATIONS  --------------------------------------------------------------------------------  Allergies    IV Contrast (Unknown)  penicillin (Unknown)    Intolerances    Physical Exam:  	  NAD  morbidly obese  moist mucosa  no jvd  decreased b/l bs  distant hs  soft  3+pedal edema to thigh  no matamoros  no rash    Hospital Medications:   MEDICATIONS  (STANDING):  ceFAZolin   IVPB 2000 milliGRAM(s) IV Intermittent every 6 hours  ceFAZolin   IVPB      docusate sodium 100 milliGRAM(s) Oral three times a day  enoxaparin Injectable 40 milliGRAM(s) SubCutaneous at bedtime  furosemide   Injectable 40 milliGRAM(s) IV Push two times a day  HYDROmorphone PCA (1 mG/mL) 30 milliLiter(s) PCA Continuous PCA Continuous  losartan 100 milliGRAM(s) Oral at bedtime  naproxen 500 milliGRAM(s) Oral two times a day  pantoprazole    Tablet 40 milliGRAM(s) Oral before breakfast  senna 1 Tablet(s) Oral at bedtime  sodium chloride 0.9%. 1000 milliLiter(s) (10 mL/Hr) IV Continuous <Continuous>  tamsulosin 0.4 milliGRAM(s) Oral at bedtime        VITALS:  T(F): 97.8 (03-02-18 @ 05:12), Max: 97.8 (03-02-18 @ 05:12)  HR: 89 (03-02-18 @ 12:31)  BP: 175/84 (03-02-18 @ 12:31)  RR: 20 (03-02-18 @ 12:31)  SpO2: --  Wt(kg): --        LABS:            Urine Studies:        advance directives d/w pt in detail

## 2018-03-02 NOTE — DIETITIAN INITIAL EVALUATION ADULT. - OTHER INFO
Pt p/w back pain from spinal abscess and worsening pedal edema to thigh. Monitor renal fxn and lytes closely. RD Screen--LOS

## 2018-03-02 NOTE — DIETITIAN INITIAL EVALUATION ADULT. - ENERGY NEEDS
Estimated Calorie Needs: 2577-9946 kcal/day (20-25 kcal/kg IBW)   Estimated Protein Needs: 100-120 gm/day (1-1.2 gm/kg IBW)   Estimated Fluid Needs: per physician

## 2018-03-02 NOTE — DIETITIAN INITIAL EVALUATION ADULT. - PHYSICAL APPEARANCE
A&Ox3, 4+ edema to R leg, 3+ pedal edema to thigh, BMI: 55.1 (stated wt: 490#/6'7"), Geovany 18, UBW: 360-365 (last weighed that ~2 years ago)/other (specify)/obese

## 2018-03-02 NOTE — PROGRESS NOTE ADULT - SUBJECTIVE AND OBJECTIVE BOX
Patient was seen and examined. Spoke with RN. Chart reviewed.  Complains of chronic pain  Vital Signs Last 24 Hrs  T(F): 97.8 (02 Mar 2018 05:12), Max: 97.8 (02 Mar 2018 05:12)  HR: 91 (02 Mar 2018 05:12) (91 - 96)  BP: 135/77 (02 Mar 2018 05:12) (135/77 - 192/88)  SpO2: --  MEDICATIONS  (STANDING):  ceFAZolin   IVPB 2000 milliGRAM(s) IV Intermittent every 6 hours  ceFAZolin   IVPB      clindamycin IVPB 900 milliGRAM(s) IV Intermittent every 8 hours  clindamycin IVPB 900 milliGRAM(s) IV Intermittent every 8 hours  docusate sodium 100 milliGRAM(s) Oral three times a day  enoxaparin Injectable 40 milliGRAM(s) SubCutaneous at bedtime  furosemide   Injectable 40 milliGRAM(s) IV Push two times a day  HYDROmorphone PCA (1 mG/mL) 30 milliLiter(s) PCA Continuous PCA Continuous  losartan 100 milliGRAM(s) Oral at bedtime  naproxen 500 milliGRAM(s) Oral two times a day  pantoprazole    Tablet 40 milliGRAM(s) Oral before breakfast  senna 1 Tablet(s) Oral at bedtime  sodium chloride 0.9%. 1000 milliLiter(s) (10 mL/Hr) IV Continuous <Continuous>  tamsulosin 0.4 milliGRAM(s) Oral at bedtime    MEDICATIONS  (PRN):  acetaminophen   Tablet. 650 milliGRAM(s) Oral every 6 hours PRN Mild Pain (1 - 3)  ALPRAZolam 0.5 milliGRAM(s) Oral at bedtime PRN anxiety  cyclobenzaprine 10 milliGRAM(s) Oral three times a day PRN Muscle Spasm  naloxone Injectable 0.1 milliGRAM(s) IV Push every 3 minutes PRN For ANY of the following changes in patient status:  A. RR LESS THAN 10 breaths per minute, B. Oxygen saturation LESS THAN 90%, C. Sedation score of 6  ondansetron Injectable 4 milliGRAM(s) IV Push every 6 hours PRN Nausea  oxyCODONE    IR 10 milliGRAM(s) Oral four times a day after meals PRN Moderate Pain (4 - 6)        General: comfortable, NAD  Neurology: A&Ox3, nonfocal  Head:  Normocephalic, atraumatic  ENT:  Mucosa moist, no ulcerations  Neck:  Supple, no JVD,   Skin: no breakdowns (as per RN)  Resp: CTA B/L  CV: RRR, S1S2,   GI: Soft, NT, bowel sounds; obese  MS: No edema, + peripheral pulses, FROM all 4 extremity      A/P:  Patient is a 52y old  Male who presents with a chief complaint of "Back pain shooting to abdomen and R LE and oozing back blister    Problem/Plan - 1:  ·  Problem: Back abscess.  As per ID- 6 weeks of Ancef 2gm q8 hrs starting 2/23 via PICC  D/C clinda.    Problem/Plan - 2:  ·  Problem: Disc disease, degenerative, lumbar or lumbosacral. Pain control; NS follow up   DVT PPI ppx.     Problem/Plan - 3:  ·  Problem: Obstructive sleep apnea.  Plan: stable patient on CPAP.     Problem/Plan - 4:  ·  Problem: Hypertension.  Plan: on losartan and Amlodipine.    DVT prophylaxis  Decubitus prevention- all measures as per RN protocol  Please call or text me with any questions or updates

## 2018-03-02 NOTE — DIETITIAN INITIAL EVALUATION ADULT. - ORAL INTAKE PTA
good/Pt reports good appetite/po intake. Drinks 2-3 slimfast shakes daily. Pt reports follow low sodium diet at home, doesn't cook with salt; states he doesn't even have salt at home. NKFA.

## 2018-03-02 NOTE — DIETITIAN INITIAL EVALUATION ADULT. - PROBLEM SELECTOR PLAN 3
New with LE pitting edema. Asymptomatic now. Consider 2D echo. Unlikely endocarditis. F/U cultures  Will check BNP

## 2018-03-02 NOTE — PROGRESS NOTE ADULT - PROBLEM SELECTOR PLAN 1
continue current antibiotic regimen.   FOLLOWING WITH ct T&L SPINE as patient cannot have MRI done due to weight and space limit of MRI being exceeded.  cannot tolerate PROCEDURE.   Case discussed with ID as there is low suspicion for a deep intraspinal abscess. patient needs IV antibiotics for 6 weeks and therefore will need a picc.

## 2018-03-02 NOTE — DIETITIAN INITIAL EVALUATION ADULT. - FEEDING SKILL
Pt reports food isn't good, but eats ~50% of his meals, as well as getting food brought in from home vs. EMR po intake of 100% of all meals./independent

## 2018-03-02 NOTE — PROGRESS NOTE ADULT - SUBJECTIVE AND OBJECTIVE BOX
Patient is a 52y old  Male who presents with a chief complaint of "Back pain shooting to abdomen and R LE and oozing back blister" (23 Feb 2018 17:16)      PAST MEDICAL & SURGICAL HISTORY:  Morbid obesity  Obstructive sleep apnea  Disc disease, degenerative, lumbar or lumbosacral  Hypertension  History of excision of pilonidal cyst  H/O arthroscopy of right knee  Spinal cord stimulator status      MEDICATIONS  (STANDING):  ceFAZolin   IVPB 2000 milliGRAM(s) IV Intermittent every 6 hours  ceFAZolin   IVPB      clindamycin IVPB 900 milliGRAM(s) IV Intermittent every 8 hours  clindamycin IVPB 900 milliGRAM(s) IV Intermittent every 8 hours  docusate sodium 100 milliGRAM(s) Oral three times a day  enoxaparin Injectable 40 milliGRAM(s) SubCutaneous at bedtime  furosemide   Injectable 40 milliGRAM(s) IV Push two times a day  HYDROmorphone PCA (1 mG/mL) 30 milliLiter(s) PCA Continuous PCA Continuous  losartan 100 milliGRAM(s) Oral at bedtime  naproxen 500 milliGRAM(s) Oral two times a day  pantoprazole    Tablet 40 milliGRAM(s) Oral before breakfast  senna 1 Tablet(s) Oral at bedtime  sodium chloride 0.9%. 1000 milliLiter(s) (10 mL/Hr) IV Continuous <Continuous>  tamsulosin 0.4 milliGRAM(s) Oral at bedtime    MEDICATIONS  (PRN):  acetaminophen   Tablet. 650 milliGRAM(s) Oral every 6 hours PRN Mild Pain (1 - 3)  ALPRAZolam 0.5 milliGRAM(s) Oral at bedtime PRN anxiety  cyclobenzaprine 10 milliGRAM(s) Oral three times a day PRN Muscle Spasm  naloxone Injectable 0.1 milliGRAM(s) IV Push every 3 minutes PRN For ANY of the following changes in patient status:  A. RR LESS THAN 10 breaths per minute, B. Oxygen saturation LESS THAN 90%, C. Sedation score of 6  ondansetron Injectable 4 milliGRAM(s) IV Push every 6 hours PRN Nausea  oxyCODONE    IR 10 milliGRAM(s) Oral four times a day after meals PRN Moderate Pain (4 - 6)      Overnight events:    Vital Signs Last 24 Hrs  T(C): 36.6 (02 Mar 2018 05:12), Max: 36.6 (02 Mar 2018 05:12)  T(F): 97.8 (02 Mar 2018 05:12), Max: 97.8 (02 Mar 2018 05:12)  HR: 91 (02 Mar 2018 05:12) (91 - 96)  BP: 135/77 (02 Mar 2018 05:12) (135/77 - 192/88)  BP(mean): --  RR: 20 (02 Mar 2018 05:12) (20 - 20)  SpO2: --  CAPILLARY BLOOD GLUCOSE        I&O's Summary      Physical Exam:    -     General :     -      HEENT:    -      Cardiac:    -      Pulm:    -      GI:    -      Musculoskeletal:    -      Neuro:        Labs:                                        Imaging:    ECG:    T(C): 36.6 (03-02-18 @ 05:12), Max: 36.6 (03-02-18 @ 05:12)  HR: 91 (03-02-18 @ 05:12) (91 - 96)  BP: 135/77 (03-02-18 @ 05:12) (135/77 - 192/88)  RR: 20 (03-02-18 @ 05:12) (20 - 20)  SpO2: --

## 2018-03-02 NOTE — PROGRESS NOTE ADULT - SUBJECTIVE AND OBJECTIVE BOX
ONEALJOSE ALBERTO  52y, Male      OVERNIGHT EVENTS:    no fevers, has pain, no drainage.    VITALS:  T(F): 97.8, Max: 97.8 (03-02-18 @ 05:12)  HR: 91  BP: 135/77  RR: 20Vital Signs Last 24 Hrs  T(C): 36.6 (02 Mar 2018 05:12), Max: 36.6 (02 Mar 2018 05:12)  T(F): 97.8 (02 Mar 2018 05:12), Max: 97.8 (02 Mar 2018 05:12)  HR: 91 (02 Mar 2018 05:12) (91 - 96)  BP: 135/77 (02 Mar 2018 05:12) (135/77 - 192/88)  BP(mean): --  RR: 20 (02 Mar 2018 05:12) (20 - 20)  SpO2: --    TESTS & MEASUREMENTS:              Culture - Blood (collected 02-24-18 @ 06:10)  Source: .Blood None  Final Report (03-01-18 @ 17:01):    No growth at 5 days.    Culture - Abscess with Gram Stain (collected 02-23-18 @ 15:51)  Source: .Abscess thoracic incision  Final Report (03-01-18 @ 14:26):    Moderate Staphylococcus aureus  Organism: Staphylococcus aureus (03-01-18 @ 14:26)  Organism: Staphylococcus aureus (03-01-18 @ 14:26)      -  Ampicillin/Sulbactam: S <=8/4      -  Cefazolin: S <=4      -  Ciprofloxacin: S <=1      -  Clindamycin: S <=0.25      -  Erythromycin: S <=0.25      -  Gentamicin: S <=1      -  Levofloxacin: S <=0.5      -  Moxifloxacin(Aerobic): S <=0.5      -  Oxacillin: S <=0.25      -  Penicillin: R 4      -  RIF- Rifampin: S <=1      -  Tetra/Doxy: S <=1      -  Trimethoprim/Sulfamethoxazole: S <=0.5/9.5      -  Vancomycin: S 2      Method Type: JAYCOB            RADIOLOGY & ADDITIONAL TESTS:    ANTIBIOTICS:  ceFAZolin   IVPB 2000 milliGRAM(s) IV Intermittent every 6 hours  ceFAZolin   IVPB      clindamycin IVPB 900 milliGRAM(s) IV Intermittent every 8 hours  clindamycin IVPB 900 milliGRAM(s) IV Intermittent every 8 hours

## 2018-03-03 RX ORDER — HYDROMORPHONE HYDROCHLORIDE 2 MG/ML
30 INJECTION INTRAMUSCULAR; INTRAVENOUS; SUBCUTANEOUS
Qty: 0 | Refills: 0 | Status: DISCONTINUED | OUTPATIENT
Start: 2018-03-03 | End: 2018-03-06

## 2018-03-03 RX ORDER — NIFEDIPINE 30 MG
30 TABLET, EXTENDED RELEASE 24 HR ORAL DAILY
Qty: 0 | Refills: 0 | Status: DISCONTINUED | OUTPATIENT
Start: 2018-03-03 | End: 2018-03-16

## 2018-03-03 RX ADMIN — Medication 100 MILLIGRAM(S): at 17:12

## 2018-03-03 RX ADMIN — Medication 500 MILLIGRAM(S): at 06:00

## 2018-03-03 RX ADMIN — OXYCODONE HYDROCHLORIDE 10 MILLIGRAM(S): 5 TABLET ORAL at 01:40

## 2018-03-03 RX ADMIN — OXYCODONE HYDROCHLORIDE 10 MILLIGRAM(S): 5 TABLET ORAL at 05:44

## 2018-03-03 RX ADMIN — Medication 40 MILLIGRAM(S): at 06:44

## 2018-03-03 RX ADMIN — SENNA PLUS 1 TABLET(S): 8.6 TABLET ORAL at 21:43

## 2018-03-03 RX ADMIN — TAMSULOSIN HYDROCHLORIDE 0.4 MILLIGRAM(S): 0.4 CAPSULE ORAL at 21:43

## 2018-03-03 RX ADMIN — Medication 100 MILLIGRAM(S): at 05:44

## 2018-03-03 RX ADMIN — HYDROMORPHONE HYDROCHLORIDE 30 MILLILITER(S): 2 INJECTION INTRAMUSCULAR; INTRAVENOUS; SUBCUTANEOUS at 09:42

## 2018-03-03 RX ADMIN — Medication 100 MILLIGRAM(S): at 13:59

## 2018-03-03 RX ADMIN — Medication 30 MILLIGRAM(S): at 15:38

## 2018-03-03 RX ADMIN — LOSARTAN POTASSIUM 100 MILLIGRAM(S): 100 TABLET, FILM COATED ORAL at 21:42

## 2018-03-03 RX ADMIN — OXYCODONE HYDROCHLORIDE 10 MILLIGRAM(S): 5 TABLET ORAL at 21:50

## 2018-03-03 RX ADMIN — Medication 500 MILLIGRAM(S): at 17:15

## 2018-03-03 RX ADMIN — OXYCODONE HYDROCHLORIDE 10 MILLIGRAM(S): 5 TABLET ORAL at 17:50

## 2018-03-03 RX ADMIN — PANTOPRAZOLE SODIUM 40 MILLIGRAM(S): 20 TABLET, DELAYED RELEASE ORAL at 08:15

## 2018-03-03 RX ADMIN — Medication 100 MILLIGRAM(S): at 21:40

## 2018-03-03 RX ADMIN — Medication 0.5 MILLIGRAM(S): at 21:48

## 2018-03-03 RX ADMIN — ENOXAPARIN SODIUM 40 MILLIGRAM(S): 100 INJECTION SUBCUTANEOUS at 21:41

## 2018-03-03 RX ADMIN — Medication 500 MILLIGRAM(S): at 05:44

## 2018-03-03 RX ADMIN — Medication 100 MILLIGRAM(S): at 05:43

## 2018-03-03 RX ADMIN — OXYCODONE HYDROCHLORIDE 10 MILLIGRAM(S): 5 TABLET ORAL at 17:21

## 2018-03-03 RX ADMIN — Medication 100 MILLIGRAM(S): at 11:22

## 2018-03-03 RX ADMIN — Medication 100 MILLIGRAM(S): at 23:13

## 2018-03-03 RX ADMIN — Medication 500 MILLIGRAM(S): at 17:50

## 2018-03-03 RX ADMIN — Medication 40 MILLIGRAM(S): at 17:16

## 2018-03-03 NOTE — PROGRESS NOTE ADULT - SUBJECTIVE AND OBJECTIVE BOX
Patient was seen and examined. Spoke with RN. Chart reviewed.    No events overnight.  Vital Signs Last 24 Hrs  T(F): 96.3 (03 Mar 2018 17:57), Max: 96.7 (03 Mar 2018 03:00)  HR: 102 (03 Mar 2018 17:57) (79 - 102)  BP: 161/84 (03 Mar 2018 17:57) (113/56 - 197/89)  SpO2: --  MEDICATIONS  (STANDING):  ceFAZolin   IVPB 2000 milliGRAM(s) IV Intermittent every 6 hours  ceFAZolin   IVPB      docusate sodium 100 milliGRAM(s) Oral three times a day  enoxaparin Injectable 40 milliGRAM(s) SubCutaneous at bedtime  furosemide   Injectable 40 milliGRAM(s) IV Push two times a day  HYDROmorphone PCA (1 mG/mL) 30 milliLiter(s) PCA Continuous PCA Continuous  losartan 100 milliGRAM(s) Oral at bedtime  naproxen 500 milliGRAM(s) Oral two times a day  NIFEdipine XL 30 milliGRAM(s) Oral daily  pantoprazole    Tablet 40 milliGRAM(s) Oral before breakfast  senna 1 Tablet(s) Oral at bedtime  sodium chloride 0.9%. 1000 milliLiter(s) (10 mL/Hr) IV Continuous <Continuous>  tamsulosin 0.4 milliGRAM(s) Oral at bedtime    MEDICATIONS  (PRN):  acetaminophen   Tablet. 650 milliGRAM(s) Oral every 6 hours PRN Mild Pain (1 - 3)  ALPRAZolam 0.5 milliGRAM(s) Oral at bedtime PRN anxiety  cyclobenzaprine 10 milliGRAM(s) Oral three times a day PRN Muscle Spasm  naloxone Injectable 0.1 milliGRAM(s) IV Push every 3 minutes PRN For ANY of the following changes in patient status:  A. RR LESS THAN 10 breaths per minute, B. Oxygen saturation LESS THAN 90%, C. Sedation score of 6  ondansetron Injectable 4 milliGRAM(s) IV Push every 6 hours PRN Nausea  oxyCODONE    IR 10 milliGRAM(s) Oral four times a day after meals PRN Moderate Pain (4 - 6)    Labs:                        13.2   7.16  )-----------( 344      ( 02 Mar 2018 18:12 )             41.5     02 Mar 2018 18:12    140    |  99     |  9      ----------------------------<  213    4.0     |  26     |  0.9      Ca    9.0        02 Mar 2018 18:12              Radiology:    General: comfortable, NAD  Neurology: A&Ox3, nonfocal  Head:  Normocephalic, atraumatic  ENT:  Mucosa moist, no ulcerations  Neck:  Supple, no JVD,   Skin: no breakdowns (as per RN)  Resp: CTA B/L  CV: RRR, S1S2,   GI: Soft, NT, bowel sound obese  back,dehiscence

## 2018-03-03 NOTE — PROGRESS NOTE ADULT - ASSESSMENT
Problem/Plan - 1:  ·  Problem: Back abscess.  Plan: continue current antibiotic regimen.   follow with imaging studies MRI T&L SPINE to be done with sedation as patient cannot tolerate PROCEDURE.   MRI made aware of Neurotransmitter compatibility as per Neurosurgery.   continue with nafcillin and clindamycin.  follow with further ID recommendations.  ct with sedation today    Problem/Plan - 2:  ·  Problem: Disc disease, degenerative, lumbar or lumbosacral.  Plan: F/U neurosurgery recommendations   DVT PPI ppx.     Problem/Plan - 3:  ·  Problem: Obstructive sleep apnea.  Plan: stable patient on CPAP.     Problem/Plan - 4:Problem: Hypertension.  Plan: on losartan and dc  Amlodipine.  renal dr padgett   iv lasix 40mg Q 12 today them q24hrs from am  follow fco       discussed with nursing and ho at [facility]

## 2018-03-04 LAB
FLU A RESULT: NEGATIVE — SIGNIFICANT CHANGE UP
FLU A RESULT: NEGATIVE — SIGNIFICANT CHANGE UP
FLUAV AG NPH QL: NEGATIVE — SIGNIFICANT CHANGE UP
FLUBV AG NPH QL: NEGATIVE — SIGNIFICANT CHANGE UP

## 2018-03-04 RX ADMIN — Medication 100 MILLIGRAM(S): at 21:29

## 2018-03-04 RX ADMIN — OXYCODONE HYDROCHLORIDE 10 MILLIGRAM(S): 5 TABLET ORAL at 21:28

## 2018-03-04 RX ADMIN — Medication 500 MILLIGRAM(S): at 17:17

## 2018-03-04 RX ADMIN — Medication 100 MILLIGRAM(S): at 17:15

## 2018-03-04 RX ADMIN — SENNA PLUS 1 TABLET(S): 8.6 TABLET ORAL at 21:31

## 2018-03-04 RX ADMIN — LOSARTAN POTASSIUM 100 MILLIGRAM(S): 100 TABLET, FILM COATED ORAL at 21:30

## 2018-03-04 RX ADMIN — ENOXAPARIN SODIUM 40 MILLIGRAM(S): 100 INJECTION SUBCUTANEOUS at 21:30

## 2018-03-04 RX ADMIN — Medication 100 MILLIGRAM(S): at 05:07

## 2018-03-04 RX ADMIN — OXYCODONE HYDROCHLORIDE 10 MILLIGRAM(S): 5 TABLET ORAL at 04:20

## 2018-03-04 RX ADMIN — Medication 100 MILLIGRAM(S): at 23:54

## 2018-03-04 RX ADMIN — OXYCODONE HYDROCHLORIDE 10 MILLIGRAM(S): 5 TABLET ORAL at 11:13

## 2018-03-04 RX ADMIN — HYDROMORPHONE HYDROCHLORIDE 30 MILLILITER(S): 2 INJECTION INTRAMUSCULAR; INTRAVENOUS; SUBCUTANEOUS at 07:51

## 2018-03-04 RX ADMIN — Medication 30 MILLIGRAM(S): at 05:13

## 2018-03-04 RX ADMIN — Medication 100 MILLIGRAM(S): at 11:07

## 2018-03-04 RX ADMIN — Medication 500 MILLIGRAM(S): at 05:09

## 2018-03-04 RX ADMIN — Medication 100 MILLIGRAM(S): at 05:09

## 2018-03-04 RX ADMIN — PANTOPRAZOLE SODIUM 40 MILLIGRAM(S): 20 TABLET, DELAYED RELEASE ORAL at 06:05

## 2018-03-04 RX ADMIN — Medication 0.5 MILLIGRAM(S): at 21:28

## 2018-03-04 RX ADMIN — OXYCODONE HYDROCHLORIDE 10 MILLIGRAM(S): 5 TABLET ORAL at 11:40

## 2018-03-04 RX ADMIN — Medication 500 MILLIGRAM(S): at 17:50

## 2018-03-04 RX ADMIN — Medication 40 MILLIGRAM(S): at 17:17

## 2018-03-04 NOTE — PROGRESS NOTE ADULT - SUBJECTIVE AND OBJECTIVE BOX
Patient is a 52y old  Male who presents with a chief complaint of "Back pain shooting to abdomen and R LE and oozing back blister" (23 Feb 2018 17:16)      PAST MEDICAL & SURGICAL HISTORY:  Morbid obesity  Obstructive sleep apnea  Disc disease, degenerative, lumbar or lumbosacral  Hypertension  History of excision of pilonidal cyst  H/O arthroscopy of right knee  Spinal cord stimulator status      MEDICATIONS  (STANDING):  ceFAZolin   IVPB 2000 milliGRAM(s) IV Intermittent every 6 hours  ceFAZolin   IVPB      docusate sodium 100 milliGRAM(s) Oral three times a day  enoxaparin Injectable 40 milliGRAM(s) SubCutaneous at bedtime  furosemide   Injectable 40 milliGRAM(s) IV Push two times a day  HYDROmorphone PCA (1 mG/mL) 30 milliLiter(s) PCA Continuous PCA Continuous  losartan 100 milliGRAM(s) Oral at bedtime  naproxen 500 milliGRAM(s) Oral two times a day  NIFEdipine XL 30 milliGRAM(s) Oral daily  pantoprazole    Tablet 40 milliGRAM(s) Oral before breakfast  senna 1 Tablet(s) Oral at bedtime  sodium chloride 0.9%. 1000 milliLiter(s) (10 mL/Hr) IV Continuous <Continuous>  tamsulosin 0.4 milliGRAM(s) Oral at bedtime    MEDICATIONS  (PRN):  acetaminophen   Tablet. 650 milliGRAM(s) Oral every 6 hours PRN Mild Pain (1 - 3)  ALPRAZolam 0.5 milliGRAM(s) Oral at bedtime PRN anxiety  cyclobenzaprine 10 milliGRAM(s) Oral three times a day PRN Muscle Spasm  naloxone Injectable 0.1 milliGRAM(s) IV Push every 3 minutes PRN For ANY of the following changes in patient status:  A. RR LESS THAN 10 breaths per minute, B. Oxygen saturation LESS THAN 90%, C. Sedation score of 6  ondansetron Injectable 4 milliGRAM(s) IV Push every 6 hours PRN Nausea  oxyCODONE    IR 10 milliGRAM(s) Oral four times a day after meals PRN Moderate Pain (4 - 6)      Overnight events:    Vital Signs Last 24 Hrs  T(C): 35.7 (03 Mar 2018 17:57), Max: 35.9 (03 Mar 2018 03:00)  T(F): 96.3 (03 Mar 2018 17:57), Max: 96.7 (03 Mar 2018 03:00)  HR: 102 (03 Mar 2018 17:57) (79 - 102)  BP: 161/84 (03 Mar 2018 17:57) (113/56 - 197/89)  BP(mean): --  RR: 20 (03 Mar 2018 17:57) (20 - 20)  SpO2: --  CAPILLARY BLOOD GLUCOSE        I&O's Summary    03 Mar 2018 07:01  -  04 Mar 2018 00:26  --------------------------------------------------------  IN: 110 mL / OUT: 0 mL / NET: 110 mL        Physical Exam:    -     General :     -      HEENT:    -      Cardiac:    -      Pulm:    -      GI:    -      Musculoskeletal:    -      Neuro:        Labs:                        13.2   7.16  )-----------( 344      ( 02 Mar 2018 18:12 )             41.5             03-02    140  |  99  |  9<L>  ----------------------------<  213<H>  4.0   |  26  |  0.9    Ca    9.0      02 Mar 2018 18:12                            Imaging:    ECG:    T(C): 35.7 (03-03-18 @ 17:57), Max: 35.9 (03-03-18 @ 03:00)  HR: 102 (03-03-18 @ 17:57) (79 - 102)  BP: 161/84 (03-03-18 @ 17:57) (113/56 - 197/89)  RR: 20 (03-03-18 @ 17:57) (20 - 20)  SpO2: -- Patient is a 52y old  Male who presents with a chief complaint of "Back pain shooting to abdomen and R LE and oozing back blister" (23 Feb 2018 17:16)      PAST MEDICAL & SURGICAL HISTORY:  Morbid obesity  Obstructive sleep apnea  Disc disease, degenerative, lumbar or lumbosacral  Hypertension  History of excision of pilonidal cyst  H/O arthroscopy of right knee  Spinal cord stimulator status      MEDICATIONS  (STANDING):  ceFAZolin   IVPB 2000 milliGRAM(s) IV Intermittent every 6 hours  ceFAZolin   IVPB      docusate sodium 100 milliGRAM(s) Oral three times a day  enoxaparin Injectable 40 milliGRAM(s) SubCutaneous at bedtime  furosemide   Injectable 40 milliGRAM(s) IV Push two times a day  HYDROmorphone PCA (1 mG/mL) 30 milliLiter(s) PCA Continuous PCA Continuous  losartan 100 milliGRAM(s) Oral at bedtime  naproxen 500 milliGRAM(s) Oral two times a day  NIFEdipine XL 30 milliGRAM(s) Oral daily  pantoprazole    Tablet 40 milliGRAM(s) Oral before breakfast  senna 1 Tablet(s) Oral at bedtime  sodium chloride 0.9%. 1000 milliLiter(s) (10 mL/Hr) IV Continuous <Continuous>  tamsulosin 0.4 milliGRAM(s) Oral at bedtime    MEDICATIONS  (PRN):  acetaminophen   Tablet. 650 milliGRAM(s) Oral every 6 hours PRN Mild Pain (1 - 3)  ALPRAZolam 0.5 milliGRAM(s) Oral at bedtime PRN anxiety  cyclobenzaprine 10 milliGRAM(s) Oral three times a day PRN Muscle Spasm  naloxone Injectable 0.1 milliGRAM(s) IV Push every 3 minutes PRN For ANY of the following changes in patient status:  A. RR LESS THAN 10 breaths per minute, B. Oxygen saturation LESS THAN 90%, C. Sedation score of 6  ondansetron Injectable 4 milliGRAM(s) IV Push every 6 hours PRN Nausea  oxyCODONE    IR 10 milliGRAM(s) Oral four times a day after meals PRN Moderate Pain (4 - 6)      Overnight events:    Vital Signs Last 24 Hrs  T(C): 35.7 (03 Mar 2018 17:57), Max: 35.9 (03 Mar 2018 03:00)  T(F): 96.3 (03 Mar 2018 17:57), Max: 96.7 (03 Mar 2018 03:00)  HR: 102 (03 Mar 2018 17:57) (79 - 102)  BP: 161/84 (03 Mar 2018 17:57) (113/56 - 197/89)  BP(mean): --  RR: 20 (03 Mar 2018 17:57) (20 - 20)  SpO2: --  CAPILLARY BLOOD GLUCOSE        I&O's Summary    03 Mar 2018 07:01  -  04 Mar 2018 00:26  --------------------------------------------------------  IN: 110 mL / OUT: 0 mL / NET: 110 mL        Physical Exam:    -     General : NAD     -      HEENT: NECK SOFT NO MASS     -      Cardiac: S1S2 NO MURMUR     -      Pulm: GOOD AIR ENTRY BILATERAL     -      GI: ABD SOFT NON-TENDER     -      Musculoskeletal: WNL     -      Neuro: AAOX3         Labs:                        13.2   7.16  )-----------( 344      ( 02 Mar 2018 18:12 )             41.5             03-02    140  |  99  |  9<L>  ----------------------------<  213<H>  4.0   |  26  |  0.9    Ca    9.0      02 Mar 2018 18:12                            Imaging:    ECG:    T(C): 35.7 (03-03-18 @ 17:57), Max: 35.9 (03-03-18 @ 03:00)  HR: 102 (03-03-18 @ 17:57) (79 - 102)  BP: 161/84 (03-03-18 @ 17:57) (113/56 - 197/89)  RR: 20 (03-03-18 @ 17:57) (20 - 20)  SpO2: --

## 2018-03-04 NOTE — PROGRESS NOTE ADULT - SUBJECTIVE AND OBJECTIVE BOX
Patient was seen and examined. Spoke with RN. Chart reviewed.  No events overnight other than chronic pain  Vital Signs Last 24 Hrs  T(F): 97.7 (04 Mar 2018 06:02), Max: 97.7 (04 Mar 2018 06:02)  HR: 92 (04 Mar 2018 06:02) (87 - 102)  BP: 132/61 (04 Mar 2018 06:02) (132/61 - 197/89)  SpO2: --  MEDICATIONS  (STANDING):  ceFAZolin   IVPB 2000 milliGRAM(s) IV Intermittent every 6 hours  ceFAZolin   IVPB      docusate sodium 100 milliGRAM(s) Oral three times a day  enoxaparin Injectable 40 milliGRAM(s) SubCutaneous at bedtime  furosemide   Injectable 40 milliGRAM(s) IV Push two times a day  HYDROmorphone PCA (1 mG/mL) 30 milliLiter(s) PCA Continuous PCA Continuous  losartan 100 milliGRAM(s) Oral at bedtime  naproxen 500 milliGRAM(s) Oral two times a day  NIFEdipine XL 30 milliGRAM(s) Oral daily  pantoprazole    Tablet 40 milliGRAM(s) Oral before breakfast  senna 1 Tablet(s) Oral at bedtime  sodium chloride 0.9%. 1000 milliLiter(s) (10 mL/Hr) IV Continuous <Continuous>  tamsulosin 0.4 milliGRAM(s) Oral at bedtime    MEDICATIONS  (PRN):  acetaminophen   Tablet. 650 milliGRAM(s) Oral every 6 hours PRN Mild Pain (1 - 3)  ALPRAZolam 0.5 milliGRAM(s) Oral at bedtime PRN anxiety  cyclobenzaprine 10 milliGRAM(s) Oral three times a day PRN Muscle Spasm  naloxone Injectable 0.1 milliGRAM(s) IV Push every 3 minutes PRN For ANY of the following changes in patient status:  A. RR LESS THAN 10 breaths per minute, B. Oxygen saturation LESS THAN 90%, C. Sedation score of 6  ondansetron Injectable 4 milliGRAM(s) IV Push every 6 hours PRN Nausea  oxyCODONE    IR 10 milliGRAM(s) Oral four times a day after meals PRN Moderate Pain (4 - 6)    Labs:                        13.2   7.16  )-----------( 344      ( 02 Mar 2018 18:12 )             41.5     02 Mar 2018 18:12    140    |  99     |  9      ----------------------------<  213    4.0     |  26     |  0.9      Ca    9.0        02 Mar 2018 18:12            General: comfortable, NAD  Neurology: A&Ox3, nonfocal  Head:  Normocephalic, atraumatic  ENT:  Mucosa moist, no ulcerations  Neck:  Supple, no JVD,   Resp: CTA B/L  CV: RRR, S1S2,   GI: Soft, NT, bowel sounds, morbidly obese  MS: No edema, + peripheral pulses, FROM all 4 extremity      A/P:  Patient is a 52y old  Male who presents with back pain s/p neurosurgery in August and November by Dr Patrick.    Please call NS to follow- Pt wants to speak with Dr Patrick    Problem/Plan - 1:  ·  Problem: Back abscess.  Plan: continue current antibiotic regimen.   FOLLOWING WITH ct T&L SPINE as patient cannot have MRI done due to weight and space limit of MRI being exceeded.  cannot tolerate PROCEDURE.   Case discussed with ID as there is low suspicion for a deep intraspinal abscess. patient needs IV antibiotics for 6 weeks and therefore needs a picc.     Problem/Plan - 2:  ·  Problem: Disc disease, degenerative, lumbar or lumbosacral.  Plan: F/U neurosurgery recommendations.   Follow with Dr. Lombardo Pain management.   DVT PPI ppx.     Problem/Plan - 3:  ·  Problem: Obstructive sleep apnea.  Plan: stable patient on CPAP.     Problem/Plan - 4:  ·  Problem: Hypertension.  Plan: on losartan   Lasix 40 IV bid.     Problem/Plan - 5:  ·  Problem: Lower extremity edema.  Plan: continue with Lasix   Ace wraps of lower extremities as tolerated.    Plan as per neurosurgery    DVT prophylaxis  Decubitus prevention- all measures as per RN protocol  Please call or text me with any questions or updates

## 2018-03-05 RX ORDER — MORPHINE SULFATE 50 MG/1
2 CAPSULE, EXTENDED RELEASE ORAL
Qty: 0 | Refills: 0 | Status: DISCONTINUED | OUTPATIENT
Start: 2018-03-05 | End: 2018-03-13

## 2018-03-05 RX ORDER — MORPHINE SULFATE 50 MG/1
60 CAPSULE, EXTENDED RELEASE ORAL EVERY 12 HOURS
Qty: 0 | Refills: 0 | Status: DISCONTINUED | OUTPATIENT
Start: 2018-03-05 | End: 2018-03-08

## 2018-03-05 RX ADMIN — Medication 30 MILLIGRAM(S): at 05:21

## 2018-03-05 RX ADMIN — PANTOPRAZOLE SODIUM 40 MILLIGRAM(S): 20 TABLET, DELAYED RELEASE ORAL at 06:38

## 2018-03-05 RX ADMIN — Medication 40 MILLIGRAM(S): at 19:09

## 2018-03-05 RX ADMIN — Medication 500 MILLIGRAM(S): at 05:20

## 2018-03-05 RX ADMIN — SENNA PLUS 1 TABLET(S): 8.6 TABLET ORAL at 21:42

## 2018-03-05 RX ADMIN — Medication 100 MILLIGRAM(S): at 12:02

## 2018-03-05 RX ADMIN — Medication 100 MILLIGRAM(S): at 21:42

## 2018-03-05 RX ADMIN — Medication 500 MILLIGRAM(S): at 19:08

## 2018-03-05 RX ADMIN — Medication 40 MILLIGRAM(S): at 05:19

## 2018-03-05 RX ADMIN — OXYCODONE HYDROCHLORIDE 10 MILLIGRAM(S): 5 TABLET ORAL at 08:27

## 2018-03-05 RX ADMIN — ENOXAPARIN SODIUM 40 MILLIGRAM(S): 100 INJECTION SUBCUTANEOUS at 21:43

## 2018-03-05 RX ADMIN — LOSARTAN POTASSIUM 100 MILLIGRAM(S): 100 TABLET, FILM COATED ORAL at 21:42

## 2018-03-05 RX ADMIN — OXYCODONE HYDROCHLORIDE 10 MILLIGRAM(S): 5 TABLET ORAL at 21:43

## 2018-03-05 RX ADMIN — MORPHINE SULFATE 60 MILLIGRAM(S): 50 CAPSULE, EXTENDED RELEASE ORAL at 19:09

## 2018-03-05 RX ADMIN — OXYCODONE HYDROCHLORIDE 10 MILLIGRAM(S): 5 TABLET ORAL at 02:33

## 2018-03-05 RX ADMIN — Medication 100 MILLIGRAM(S): at 19:12

## 2018-03-05 RX ADMIN — Medication 100 MILLIGRAM(S): at 05:18

## 2018-03-05 RX ADMIN — Medication 0.5 MILLIGRAM(S): at 21:43

## 2018-03-05 RX ADMIN — TAMSULOSIN HYDROCHLORIDE 0.4 MILLIGRAM(S): 0.4 CAPSULE ORAL at 21:42

## 2018-03-05 RX ADMIN — OXYCODONE HYDROCHLORIDE 10 MILLIGRAM(S): 5 TABLET ORAL at 22:30

## 2018-03-05 RX ADMIN — HYDROMORPHONE HYDROCHLORIDE 30 MILLILITER(S): 2 INJECTION INTRAMUSCULAR; INTRAVENOUS; SUBCUTANEOUS at 02:18

## 2018-03-05 RX ADMIN — Medication 100 MILLIGRAM(S): at 05:19

## 2018-03-05 NOTE — PROGRESS NOTE ADULT - SUBJECTIVE AND OBJECTIVE BOX
Patient is a 52y old  Male who presents with a chief complaint of "Back pain shooting to abdomen and R LE and oozing back blister" (23 Feb 2018 17:16).       PAST MEDICAL & SURGICAL HISTORY:  Morbid obesity  Obstructive sleep apnea  Disc disease, degenerative, lumbar or lumbosacral  Hypertension  History of excision of pilonidal cyst  H/O arthroscopy of right knee  Spinal cord stimulator status      IV Contrast (Unknown)  penicillin (Unknown)      MEDICATIONS  (STANDING):  ceFAZolin   IVPB 2000 milliGRAM(s) IV Intermittent every 6 hours  ceFAZolin   IVPB      docusate sodium 100 milliGRAM(s) Oral three times a day  enoxaparin Injectable 40 milliGRAM(s) SubCutaneous at bedtime  furosemide   Injectable 40 milliGRAM(s) IV Push two times a day  HYDROmorphone PCA (1 mG/mL) 30 milliLiter(s) PCA Continuous PCA Continuous  losartan 100 milliGRAM(s) Oral at bedtime  morphine ER Tablet 60 milliGRAM(s) Oral every 12 hours  naproxen 500 milliGRAM(s) Oral two times a day  NIFEdipine XL 30 milliGRAM(s) Oral daily  pantoprazole    Tablet 40 milliGRAM(s) Oral before breakfast  senna 1 Tablet(s) Oral at bedtime  sodium chloride 0.9%. 1000 milliLiter(s) (10 mL/Hr) IV Continuous <Continuous>  tamsulosin 0.4 milliGRAM(s) Oral at bedtime    MEDICATIONS  (PRN):  acetaminophen   Tablet. 650 milliGRAM(s) Oral every 6 hours PRN Mild Pain (1 - 3)  ALPRAZolam 0.5 milliGRAM(s) Oral at bedtime PRN anxiety  cyclobenzaprine 10 milliGRAM(s) Oral three times a day PRN Muscle Spasm  morphine  - Injectable 2 milliGRAM(s) IV Push every 2 hours PRN Severe Pain (7 - 10)  naloxone Injectable 0.1 milliGRAM(s) IV Push every 3 minutes PRN For ANY of the following changes in patient status:  A. RR LESS THAN 10 breaths per minute, B. Oxygen saturation LESS THAN 90%, C. Sedation score of 6  ondansetron Injectable 4 milliGRAM(s) IV Push every 6 hours PRN Nausea  oxyCODONE    IR 10 milliGRAM(s) Oral four times a day after meals PRN Moderate Pain (4 - 6)      Overnight events:    Vital Signs Last 24 Hrs  T(C): 36.3 (05 Mar 2018 13:59), Max: 36.4 (05 Mar 2018 05:21)  T(F): 97.3 (05 Mar 2018 13:59), Max: 97.6 (05 Mar 2018 05:21)  HR: 84 (05 Mar 2018 13:59) (78 - 94)  BP: 153/70 (05 Mar 2018 13:59) (106/60 - 154/70)  BP(mean): --  RR: 20 (05 Mar 2018 13:59) (20 - 20)  SpO2: --  CAPILLARY BLOOD GLUCOSE        I&O's Summary    04 Mar 2018 07:01  -  05 Mar 2018 07:00  --------------------------------------------------------  IN: 160 mL / OUT: 0 mL / NET: 160 mL        PHYSICAL EXAM:  	  HEENT:   Normal oral mucosa, PERRL, EOMI	  Lymphatic: No lymphadenopathy  Cardiovascular: Normal S1 S2, No JVD, No murmurs, No edema  Respiratory: Lungs clear to auscultation	  Psychiatry: A & O x 3, Mood & affect appropriate  Gastrointestinal:  Soft, Non-tender, + BS	  Skin in the back: + surgical  scar, c/d/i, no pus/ drainage. + erythema on bilateral lower extremities  Neurologic: Non-focal, A&Ox3, nonfocal, NICHOLSON x 4  Extremities: Normal range of motion. + erythema, pain on palpation. +  3+ pitting edmea      Labs:              No new labs    Imaging:    < from: CT Lumbar Spine w/ IV Cont (03.01.18 @ 13:51) >  1.  CT scan through the thoracic and lumbar spine limited due to the   large field of view and artifact from patient body habitus.    2.  Spinal stimulator present, entering the canal between T10-11 and   terminating within the dorsal aspect of the spinal canal.    3.  Infiltrative changes around the superficial portion of the stimulator   with no discrete paraspinal abscess.    4.  Intraspinal abscess cannot be excluded on CT scan.      < end of copied text > Patient is a 52y old  Male who presents with a chief complaint of "Back pain shooting to abdomen and R LE and oozing back blister" (23 Feb 2018 17:16). back pain improved, bilateral lower extremity pain and swelling improved.       PAST MEDICAL & SURGICAL HISTORY:  Morbid obesity  Obstructive sleep apnea  Disc disease, degenerative, lumbar or lumbosacral  Hypertension  History of excision of pilonidal cyst  H/O arthroscopy of right knee  Spinal cord stimulator status      IV Contrast (Unknown)  penicillin (Unknown)      MEDICATIONS  (STANDING):  ceFAZolin   IVPB 2000 milliGRAM(s) IV Intermittent every 6 hours  ceFAZolin   IVPB      docusate sodium 100 milliGRAM(s) Oral three times a day  enoxaparin Injectable 40 milliGRAM(s) SubCutaneous at bedtime  furosemide   Injectable 40 milliGRAM(s) IV Push two times a day  HYDROmorphone PCA (1 mG/mL) 30 milliLiter(s) PCA Continuous PCA Continuous  losartan 100 milliGRAM(s) Oral at bedtime  morphine ER Tablet 60 milliGRAM(s) Oral every 12 hours  naproxen 500 milliGRAM(s) Oral two times a day  NIFEdipine XL 30 milliGRAM(s) Oral daily  pantoprazole    Tablet 40 milliGRAM(s) Oral before breakfast  senna 1 Tablet(s) Oral at bedtime  sodium chloride 0.9%. 1000 milliLiter(s) (10 mL/Hr) IV Continuous <Continuous>  tamsulosin 0.4 milliGRAM(s) Oral at bedtime    MEDICATIONS  (PRN):  acetaminophen   Tablet. 650 milliGRAM(s) Oral every 6 hours PRN Mild Pain (1 - 3)  ALPRAZolam 0.5 milliGRAM(s) Oral at bedtime PRN anxiety  cyclobenzaprine 10 milliGRAM(s) Oral three times a day PRN Muscle Spasm  morphine  - Injectable 2 milliGRAM(s) IV Push every 2 hours PRN Severe Pain (7 - 10)  naloxone Injectable 0.1 milliGRAM(s) IV Push every 3 minutes PRN For ANY of the following changes in patient status:  A. RR LESS THAN 10 breaths per minute, B. Oxygen saturation LESS THAN 90%, C. Sedation score of 6  ondansetron Injectable 4 milliGRAM(s) IV Push every 6 hours PRN Nausea  oxyCODONE    IR 10 milliGRAM(s) Oral four times a day after meals PRN Moderate Pain (4 - 6)      Overnight events:    Vital Signs Last 24 Hrs  T(C): 36.3 (05 Mar 2018 13:59), Max: 36.4 (05 Mar 2018 05:21)  T(F): 97.3 (05 Mar 2018 13:59), Max: 97.6 (05 Mar 2018 05:21)  HR: 84 (05 Mar 2018 13:59) (78 - 94)  BP: 153/70 (05 Mar 2018 13:59) (106/60 - 154/70)  BP(mean): --  RR: 20 (05 Mar 2018 13:59) (20 - 20)  SpO2: --  CAPILLARY BLOOD GLUCOSE        I&O's Summary    04 Mar 2018 07:01  -  05 Mar 2018 07:00  --------------------------------------------------------  IN: 160 mL / OUT: 0 mL / NET: 160 mL        PHYSICAL EXAM:  	  HEENT:   Normal oral mucosa, PERRL, EOMI	  Lymphatic: No lymphadenopathy  Cardiovascular: Normal S1 S2, No JVD, No murmurs, No edema  Respiratory: Lungs clear to auscultation	  Psychiatry: A & O x 3, Mood & affect appropriate  Gastrointestinal:  Soft, Non-tender, + BS	  Skin in the back: + surgical  scar, c/d/i, no pus/ drainage. + erythema on bilateral lower extremities  Neurologic: Non-focal, A&Ox3, nonfocal, NICHOLSON x 4  Extremities: Normal range of motion. + erythema, pain on palpation. +  3+ pitting edmea      Labs:              No new labs    Imaging:    < from: CT Lumbar Spine w/ IV Cont (03.01.18 @ 13:51) >  1.  CT scan through the thoracic and lumbar spine limited due to the   large field of view and artifact from patient body habitus.    2.  Spinal stimulator present, entering the canal between T10-11 and   terminating within the dorsal aspect of the spinal canal.    3.  Infiltrative changes around the superficial portion of the stimulator   with no discrete paraspinal abscess.    4.  Intraspinal abscess cannot be excluded on CT scan.      < end of copied text >

## 2018-03-05 NOTE — PROGRESS NOTE ADULT - PROBLEM SELECTOR PROBLEM 1
Back abscess

## 2018-03-05 NOTE — PROGRESS NOTE ADULT - PROBLEM SELECTOR PLAN 1
continue current antibiotic regimen.   FOLLOWING WITH ct T&L SPINE as patient cannot have MRI done due to weight and space limit of MRI being exceeded.  cannot tolerate PROCEDURE.   Case discussed with ID as there is low suspicion for a deep intraspinal abscess. patient needs IV antibiotics for 6 weeks and therefore will need a picc to be placed today

## 2018-03-05 NOTE — PROGRESS NOTE ADULT - PROBLEM SELECTOR PLAN 5
continue with Lasix   Follow electrolytes   Discontinued Amlodipine   Ace wraps of lower extremities as tolerated.

## 2018-03-05 NOTE — PROGRESS NOTE ADULT - ENMT
negative
No oral lesions; no gross abnormalities
negative

## 2018-03-05 NOTE — PROGRESS NOTE ADULT - SUBJECTIVE AND OBJECTIVE BOX
Patient was seen and examined. Spoke with RN. Chart reviewed.  No events overnight. Still using PCA pump; no new complaints  Awiting Dr Patrick f/u  Vital Signs Last 24 Hrs  T(F): 97.6 (05 Mar 2018 05:21), Max: 97.6 (05 Mar 2018 05:21)  HR: 78 (05 Mar 2018 05:21) (78 - 94)  BP: 106/60 (05 Mar 2018 05:21) (106/60 - 154/70)  SpO2: 99% (04 Mar 2018 14:00) (99% - 99%)  MEDICATIONS  (STANDING):  ceFAZolin   IVPB 2000 milliGRAM(s) IV Intermittent every 6 hours  ceFAZolin   IVPB      docusate sodium 100 milliGRAM(s) Oral three times a day  enoxaparin Injectable 40 milliGRAM(s) SubCutaneous at bedtime  furosemide   Injectable 40 milliGRAM(s) IV Push two times a day  HYDROmorphone PCA (1 mG/mL) 30 milliLiter(s) PCA Continuous PCA Continuous  losartan 100 milliGRAM(s) Oral at bedtime  naproxen 500 milliGRAM(s) Oral two times a day  NIFEdipine XL 30 milliGRAM(s) Oral daily  pantoprazole    Tablet 40 milliGRAM(s) Oral before breakfast  senna 1 Tablet(s) Oral at bedtime  sodium chloride 0.9%. 1000 milliLiter(s) (10 mL/Hr) IV Continuous <Continuous>  tamsulosin 0.4 milliGRAM(s) Oral at bedtime    MEDICATIONS  (PRN):  acetaminophen   Tablet. 650 milliGRAM(s) Oral every 6 hours PRN Mild Pain (1 - 3)  ALPRAZolam 0.5 milliGRAM(s) Oral at bedtime PRN anxiety  cyclobenzaprine 10 milliGRAM(s) Oral three times a day PRN Muscle Spasm  naloxone Injectable 0.1 milliGRAM(s) IV Push every 3 minutes PRN For ANY of the following changes in patient status:  A. RR LESS THAN 10 breaths per minute, B. Oxygen saturation LESS THAN 90%, C. Sedation score of 6  ondansetron Injectable 4 milliGRAM(s) IV Push every 6 hours PRN Nausea  oxyCODONE    IR 10 milliGRAM(s) Oral four times a day after meals PRN Moderate Pain (4 - 6)        General: comfortable, NAD  Neurology: A&Ox3, nonfocal  Head:  Normocephalic, atraumatic  ENT:  Mucosa moist, no ulcerations  Neck:  Supple, no JVD,   Resp: CTA B/L  CV: RRR, S1S2,   GI: Soft, NT, bowel sounds, morbid obesity  MS: No edema, + peripheral pulses, FROM all 4 extremity      A/P:  Patient is a 52y old  Male who presents with back pain s/p neurosurgery in August and November by Dr Patrick.    Please call NS to follow- Pt wants to speak with Dr Patrick    ID f/u- Dr paulson    Problem/Plan - 1:  ·  Problem: Back abscess.  Plan: continue current antibiotic regimen.   FOLLOWING WITH ct T&L SPINE as patient cannot have MRI done due to weight and space limit of MRI being exceeded.    As per ID  there is low suspicion for a deep intraspinal abscess. patient needs IV antibiotics for 6 weeks and therefore needs a picc- but refusing until speaks with Dr Patrick.     Problem/Plan - 2:  ·  Problem: Disc disease, degenerative, lumbar or lumbosacral.  Plan: F/U neurosurgery recommendations.   Follow with Dr. Lombardo Pain management.   DVT PPI ppx.     Problem/Plan - 3:  ·  Problem: Obstructive sleep apnea.  Plan: stable patient on CPAP.     Problem/Plan - 4:  ·  Problem: Hypertension.  Plan: on losartan   Lasix 40 IV bid.     Problem/Plan - 5:  ·  Problem: Lower extremity edema.  Plan: continue with Lasix   Ace wraps of lower extremities as tolerated.    Plan as per neurosurgery      DVT prophylaxis  Decubitus prevention- all measures as per RN protocol  Please call or text me with any questions or updates

## 2018-03-05 NOTE — PROGRESS NOTE ADULT - PROBLEM SELECTOR PROBLEM 3
Obstructive sleep apnea

## 2018-03-05 NOTE — PROGRESS NOTE ADULT - HEMATOLOGY/LYMPHATICS
not applicable

## 2018-03-05 NOTE — PROGRESS NOTE ADULT - PROBLEM SELECTOR PLAN 4
on losartan   Lasix 40 IV bid
on losartan and Amlodipine.

## 2018-03-05 NOTE — PROGRESS NOTE ADULT - PROBLEM SELECTOR PROBLEM 2
Disc disease, degenerative, lumbar or lumbosacral

## 2018-03-05 NOTE — PROGRESS NOTE ADULT - ASSESSMENT
51 yo M with PMHx as listed presents with back pain and admitted for       1.   - Per neural surgery: CT scan did not show  abscess   - per ID: PICC line and IV antibiotics (Cefazolin 2gm q6) for 6 weeks  -pt can not go to MRI 2/2 body habitus 51 yo M with PMHx as listed presents with acute  back pain.      1. acute on chronic back pain possibly 2/2 spinal abscess 2/2 MEREDITH  - Per neural surgery: CT scan did not show spinal abscess. pt likely has superficial abscess from stitches, no need to take out spinal stimulator for now, continue with antibiotics  - per ID: PICC line and IV antibiotics (Cefazolin 2gm q6) for 6 weeks  -pt can not go to MRI 2/2 body habitus  - pt currently on PCA Dilaudid pump, spoke with pain management on the phone, switch to PO MS contin 60mg q12 and oxycodine 10mg q6 prn and morphine 2 mg IV q2 prn.   - if pain is not controlled, change MS contin to Oxycontin 80mg q12.    2. 53 yo M with PMHx as listed presents with acute  back pain.      1. acute on chronic back pain possibly 2/2 spinal abscess 2/2 MEREDITH  - Per neural surgery: CT scan did not show spinal abscess. pt likely has superficial abscess from stitches, no need to take out spinal stimulator for now, continue with antibiotics  - per ID: PICC line and IV antibiotics (Cefazolin 2gm q6) for 6 weeks  -pt can not go to MRI 2/2 body habitus  - pt currently on PCA Dilaudid pump, spoke with pain management on the phone, switch to PO MS contin 60mg q12 and oxycodine 10mg q6 prn and morphine 2 mg IV q2 prn.   - if pain is not controlled, change MS contin to Oxycontin 80mg q12.    2.  bilateral lower extremity pain and swelling- improved  - decrease IV lasix 40mg BID to PO lasix 20mg BID  - echo shows normal EF    3. HTN- controlled  - c/w losartan 100mg qd and nifedipine    4. RUSSELL on cpap    5. DVT ppx- lovenox subq

## 2018-03-05 NOTE — PROGRESS NOTE ADULT - SUBJECTIVE AND OBJECTIVE BOX
Patient is a 52y old  Male who presents with a chief complaint of "Back pain shooting to abdomen and R LE and oozing back blister" (23 Feb 2018 17:16)      PAST MEDICAL & SURGICAL HISTORY:  Morbid obesity  Obstructive sleep apnea  Disc disease, degenerative, lumbar or lumbosacral  Hypertension  History of excision of pilonidal cyst  H/O arthroscopy of right knee  Spinal cord stimulator status      MEDICATIONS  (STANDING):  ceFAZolin   IVPB 2000 milliGRAM(s) IV Intermittent every 6 hours  ceFAZolin   IVPB      docusate sodium 100 milliGRAM(s) Oral three times a day  enoxaparin Injectable 40 milliGRAM(s) SubCutaneous at bedtime  furosemide   Injectable 40 milliGRAM(s) IV Push two times a day  HYDROmorphone PCA (1 mG/mL) 30 milliLiter(s) PCA Continuous PCA Continuous  losartan 100 milliGRAM(s) Oral at bedtime  naproxen 500 milliGRAM(s) Oral two times a day  NIFEdipine XL 30 milliGRAM(s) Oral daily  pantoprazole    Tablet 40 milliGRAM(s) Oral before breakfast  senna 1 Tablet(s) Oral at bedtime  sodium chloride 0.9%. 1000 milliLiter(s) (10 mL/Hr) IV Continuous <Continuous>  tamsulosin 0.4 milliGRAM(s) Oral at bedtime    MEDICATIONS  (PRN):  acetaminophen   Tablet. 650 milliGRAM(s) Oral every 6 hours PRN Mild Pain (1 - 3)  ALPRAZolam 0.5 milliGRAM(s) Oral at bedtime PRN anxiety  cyclobenzaprine 10 milliGRAM(s) Oral three times a day PRN Muscle Spasm  naloxone Injectable 0.1 milliGRAM(s) IV Push every 3 minutes PRN For ANY of the following changes in patient status:  A. RR LESS THAN 10 breaths per minute, B. Oxygen saturation LESS THAN 90%, C. Sedation score of 6  ondansetron Injectable 4 milliGRAM(s) IV Push every 6 hours PRN Nausea  oxyCODONE    IR 10 milliGRAM(s) Oral four times a day after meals PRN Moderate Pain (4 - 6)      Overnight events:    Vital Signs Last 24 Hrs  T(C): 35.8 (05 Mar 2018 10:30), Max: 36.4 (05 Mar 2018 05:21)  T(F): 96.5 (05 Mar 2018 10:30), Max: 97.6 (05 Mar 2018 05:21)  HR: 91 (05 Mar 2018 10:30) (78 - 94)  BP: 153/71 (05 Mar 2018 10:30) (106/60 - 154/70)  BP(mean): --  RR: 20 (05 Mar 2018 10:30) (20 - 22)  SpO2: 99% (04 Mar 2018 14:00) (99% - 99%)  CAPILLARY BLOOD GLUCOSE        I&O's Summary    04 Mar 2018 07:01  -  05 Mar 2018 07:00  --------------------------------------------------------  IN: 160 mL / OUT: 0 mL / NET: 160 mL        Physical Exam:    -     General :  NAD     -      HEENT: NECK SOFT NO MASS     -      Cardiac: S1S2 NO MURMUR     -      Pulm: GOOD BILATERAL AIR ENTRY     -      GI: ABD SOFT NON TENDER     -      Musculoskeletal: PERSISTENT LOWER BACK PAIN     -      Neuro: AAOX3         Labs:                                        Imaging:    ECG:    T(C): 35.8 (03-05-18 @ 10:30), Max: 36.4 (03-05-18 @ 05:21)  HR: 91 (03-05-18 @ 10:30) (78 - 94)  BP: 153/71 (03-05-18 @ 10:30) (106/60 - 154/70)  RR: 20 (03-05-18 @ 10:30) (20 - 22)  SpO2: 99% (03-04-18 @ 14:00) (99% - 99%)

## 2018-03-06 RX ORDER — FUROSEMIDE 40 MG
20 TABLET ORAL
Qty: 0 | Refills: 0 | Status: DISCONTINUED | OUTPATIENT
Start: 2018-03-06 | End: 2018-03-07

## 2018-03-06 RX ORDER — CEFAZOLIN SODIUM 1 G
2000 VIAL (EA) INJECTION EVERY 8 HOURS
Qty: 0 | Refills: 0 | Status: DISCONTINUED | OUTPATIENT
Start: 2018-03-06 | End: 2018-03-16

## 2018-03-06 RX ORDER — FUROSEMIDE 40 MG
80 TABLET ORAL ONCE
Qty: 0 | Refills: 0 | Status: COMPLETED | OUTPATIENT
Start: 2018-03-06 | End: 2018-03-06

## 2018-03-06 RX ADMIN — MORPHINE SULFATE 2 MILLIGRAM(S): 50 CAPSULE, EXTENDED RELEASE ORAL at 02:04

## 2018-03-06 RX ADMIN — ENOXAPARIN SODIUM 40 MILLIGRAM(S): 100 INJECTION SUBCUTANEOUS at 21:25

## 2018-03-06 RX ADMIN — Medication 80 MILLIGRAM(S): at 15:06

## 2018-03-06 RX ADMIN — Medication 20 MILLIGRAM(S): at 18:00

## 2018-03-06 RX ADMIN — Medication 40 MILLIGRAM(S): at 05:47

## 2018-03-06 RX ADMIN — MORPHINE SULFATE 2 MILLIGRAM(S): 50 CAPSULE, EXTENDED RELEASE ORAL at 02:45

## 2018-03-06 RX ADMIN — Medication 500 MILLIGRAM(S): at 18:00

## 2018-03-06 RX ADMIN — Medication 30 MILLIGRAM(S): at 05:47

## 2018-03-06 RX ADMIN — MORPHINE SULFATE 60 MILLIGRAM(S): 50 CAPSULE, EXTENDED RELEASE ORAL at 06:45

## 2018-03-06 RX ADMIN — MORPHINE SULFATE 60 MILLIGRAM(S): 50 CAPSULE, EXTENDED RELEASE ORAL at 05:47

## 2018-03-06 RX ADMIN — Medication 100 MILLIGRAM(S): at 21:27

## 2018-03-06 RX ADMIN — OXYCODONE HYDROCHLORIDE 10 MILLIGRAM(S): 5 TABLET ORAL at 09:29

## 2018-03-06 RX ADMIN — Medication 100 MILLIGRAM(S): at 00:59

## 2018-03-06 RX ADMIN — TAMSULOSIN HYDROCHLORIDE 0.4 MILLIGRAM(S): 0.4 CAPSULE ORAL at 21:23

## 2018-03-06 RX ADMIN — Medication 500 MILLIGRAM(S): at 05:48

## 2018-03-06 RX ADMIN — OXYCODONE HYDROCHLORIDE 10 MILLIGRAM(S): 5 TABLET ORAL at 11:02

## 2018-03-06 RX ADMIN — Medication 100 MILLIGRAM(S): at 05:47

## 2018-03-06 RX ADMIN — Medication 100 MILLIGRAM(S): at 13:42

## 2018-03-06 RX ADMIN — SENNA PLUS 1 TABLET(S): 8.6 TABLET ORAL at 21:24

## 2018-03-06 RX ADMIN — Medication 100 MILLIGRAM(S): at 21:23

## 2018-03-06 RX ADMIN — MORPHINE SULFATE 60 MILLIGRAM(S): 50 CAPSULE, EXTENDED RELEASE ORAL at 18:00

## 2018-03-06 RX ADMIN — PANTOPRAZOLE SODIUM 40 MILLIGRAM(S): 20 TABLET, DELAYED RELEASE ORAL at 06:46

## 2018-03-06 RX ADMIN — Medication 0.5 MILLIGRAM(S): at 21:24

## 2018-03-06 RX ADMIN — OXYCODONE HYDROCHLORIDE 10 MILLIGRAM(S): 5 TABLET ORAL at 13:51

## 2018-03-06 RX ADMIN — Medication 500 MILLIGRAM(S): at 06:45

## 2018-03-06 RX ADMIN — Medication 100 MILLIGRAM(S): at 13:43

## 2018-03-06 RX ADMIN — OXYCODONE HYDROCHLORIDE 10 MILLIGRAM(S): 5 TABLET ORAL at 21:24

## 2018-03-06 RX ADMIN — LOSARTAN POTASSIUM 100 MILLIGRAM(S): 100 TABLET, FILM COATED ORAL at 21:23

## 2018-03-06 RX ADMIN — Medication 100 MILLIGRAM(S): at 05:48

## 2018-03-06 NOTE — CHART NOTE - NSCHARTNOTEFT_GEN_A_CORE
I saw Mr. Mobley today.  He still complains of chronic pain in the lower back as well around the stimulator regions all unchanged from both pre-op and post-op.  His incision on the left lower back where the stimulator battery is placed is completely healed and there is no signs of infection around it.  There is no signs of fluid collection around the battery and I can palpate the battery without any difficulty.  The CT scan shows no hematoma around it and no signs of infection/abscess around it or the wires.  The thoracic incision is now also healed.,  The small inferior stitch abscess has resolved and there is no erythema/cellulitis, or fluctuance around the thoracic incision.  The CT scan shows no deep infection/abscess and no signs of infection around the wires or the lead.  Continuation of the IV abx via PICC is a reasonable idea to prevent any chance of contamination of the stimulator, but at this time there is no stimulator infection.    This patient has a chronic pain disorder, and need to be managed by the Pain Management department.  His issues are not surgical as he is not a surgical candidate for any lumbar spine procedures, which is why he was referred for the SCS.  With his SCS and proper pain management rx he should be able to get some control of his pain, but it is not necessarily possible for him to be pain free given his obesity, leg swelling, and pyschological issues regarding work and perception of disease process.      It is appropriate that he be managed by inpatient Pain Management while inhouse, then follow up with Dr. Matthews in Pain Management after discharge.  From a neurosurgical perspective we will see him as outpatient in conjunction with Dr. Matthews for wound checks.

## 2018-03-06 NOTE — PROGRESS NOTE ADULT - ASSESSMENT
Pedal edema  morbid obesity  back pain  HTN  RUSSELL    plan:  cont lasix 40mg po bid  add metolazone 5mg po qd for 5 days  lasix 80mg ivp x1 now  elevate legs if possible  try to place LE b/l ACE wraps   pain control  fluid restriction  f/u lytes, renal fx with diuresis

## 2018-03-06 NOTE — PROGRESS NOTE ADULT - SUBJECTIVE AND OBJECTIVE BOX
Patient is a 52y old  Male who presents with a chief complaint of "Back pain shooting to abdomen and R LE and oozing back blister" (23 Feb 2018 17:16)      PAST MEDICAL & SURGICAL HISTORY:  Morbid obesity  Obstructive sleep apnea  Disc disease, degenerative, lumbar or lumbosacral  Hypertension  History of excision of pilonidal cyst  H/O arthroscopy of right knee  Spinal cord stimulator status      MEDICATIONS  (STANDING):  ceFAZolin   IVPB 2000 milliGRAM(s) IV Intermittent every 8 hours  docusate sodium 100 milliGRAM(s) Oral three times a day  enoxaparin Injectable 40 milliGRAM(s) SubCutaneous at bedtime  furosemide    Tablet 20 milliGRAM(s) Oral two times a day  furosemide   Injectable 80 milliGRAM(s) IV Push once  losartan 100 milliGRAM(s) Oral at bedtime  metolazone 5 milliGRAM(s) Oral daily  morphine ER Tablet 60 milliGRAM(s) Oral every 12 hours  naproxen 500 milliGRAM(s) Oral two times a day  NIFEdipine XL 30 milliGRAM(s) Oral daily  pantoprazole    Tablet 40 milliGRAM(s) Oral before breakfast  senna 1 Tablet(s) Oral at bedtime  sodium chloride 0.9%. 1000 milliLiter(s) (10 mL/Hr) IV Continuous <Continuous>  tamsulosin 0.4 milliGRAM(s) Oral at bedtime    MEDICATIONS  (PRN):  acetaminophen   Tablet. 650 milliGRAM(s) Oral every 6 hours PRN Mild Pain (1 - 3)  ALPRAZolam 0.5 milliGRAM(s) Oral at bedtime PRN anxiety  cyclobenzaprine 10 milliGRAM(s) Oral three times a day PRN Muscle Spasm  morphine  - Injectable 2 milliGRAM(s) IV Push every 2 hours PRN Severe Pain (7 - 10)  naloxone Injectable 0.1 milliGRAM(s) IV Push every 3 minutes PRN For ANY of the following changes in patient status:  A. RR LESS THAN 10 breaths per minute, B. Oxygen saturation LESS THAN 90%, C. Sedation score of 6  ondansetron Injectable 4 milliGRAM(s) IV Push every 6 hours PRN Nausea  oxyCODONE    IR 10 milliGRAM(s) Oral four times a day after meals PRN Moderate Pain (4 - 6)      Overnight events:    Vital Signs Last 24 Hrs  T(C): 36.7 (06 Mar 2018 06:49), Max: 36.7 (05 Mar 2018 20:42)  T(F): 98 (06 Mar 2018 06:49), Max: 98.1 (05 Mar 2018 20:42)  HR: 83 (06 Mar 2018 05:56) (82 - 84)  BP: 139/66 (06 Mar 2018 05:56) (139/66 - 160/86)  BP(mean): --  RR: 24 (06 Mar 2018 05:56) (20 - 24)  SpO2: 98% (06 Mar 2018 05:56) (98% - 98%)  CAPILLARY BLOOD GLUCOSE        I&O's Summary    05 Mar 2018 07:01  -  06 Mar 2018 07:00  --------------------------------------------------------  IN: 460 mL / OUT: 0 mL / NET: 460 mL        Physical Exam:    -     General : NAD     -      HEENT: NECK SOFT NO MASS     -      Cardiac: S1S2 NO MURMUR     -      Pulm: GOOD AIR ENTRY BILATERALLY     -      GI: ABD SOFT NON-TENDER     -      Musculoskeletal: LOWER EXTREMITY SWELLING. LOWER BACK PAIN.     -      Neuro: aaox3        Labs:            patient has been refusing labs.                             Imaging:    ECG:    T(C): 36.7 (03-06-18 @ 06:49), Max: 36.7 (03-05-18 @ 20:42)  HR: 83 (03-06-18 @ 05:56) (82 - 84)  BP: 139/66 (03-06-18 @ 05:56) (139/66 - 160/86)  RR: 24 (03-06-18 @ 05:56) (20 - 24)  SpO2: 98% (03-06-18 @ 05:56) (98% - 98%)

## 2018-03-06 NOTE — PROGRESS NOTE ADULT - SUBJECTIVE AND OBJECTIVE BOX
Renal f/u:    C/o worsening LE swelling and worsening leg discomfort off PCA pump.  Unable to lie flat.  No sob, fever, cp    PAST HISTORY  --------------------------------------------------------------------------------  PAST MEDICAL & SURGICAL HISTORY:  Morbid obesity  Obstructive sleep apnea  Disc disease, degenerative, lumbar or lumbosacral  Hypertension  History of excision of pilonidal cyst  H/O arthroscopy of right knee  Spinal cord stimulator status    FAMILY HISTORY:  Family history of diabetes mellitus in mother (Mother)  Family history of early CAD (Father)    PAST SOCIAL HISTORY:    ALLERGIES & MEDICATIONS  --------------------------------------------------------------------------------  Allergies    IV Contrast (Unknown)  penicillin (Unknown)    Intolerances    Physical Exam:  	  NAD  morbidly obese  moist mucosa  no jvd  decreased b/l bs  distant hs  soft  3+pedal edema to thigh  no matamoros  no rash    Hospital Medications:   MEDICATIONS  (STANDING):  ceFAZolin   IVPB 2000 milliGRAM(s) IV Intermittent every 8 hours  docusate sodium 100 milliGRAM(s) Oral three times a day  enoxaparin Injectable 40 milliGRAM(s) SubCutaneous at bedtime  furosemide    Tablet 20 milliGRAM(s) Oral two times a day  furosemide   Injectable 80 milliGRAM(s) IV Push once  losartan 100 milliGRAM(s) Oral at bedtime  metolazone 5 milliGRAM(s) Oral daily  morphine ER Tablet 60 milliGRAM(s) Oral every 12 hours  naproxen 500 milliGRAM(s) Oral two times a day  NIFEdipine XL 30 milliGRAM(s) Oral daily  pantoprazole    Tablet 40 milliGRAM(s) Oral before breakfast  senna 1 Tablet(s) Oral at bedtime  sodium chloride 0.9%. 1000 milliLiter(s) (10 mL/Hr) IV Continuous <Continuous>  tamsulosin 0.4 milliGRAM(s) Oral at bedtime        VITALS:  T(F): 98 (03-06-18 @ 06:49), Max: 98.1 (03-05-18 @ 20:42)  HR: 83 (03-06-18 @ 05:56)  BP: 139/66 (03-06-18 @ 05:56)  RR: 24 (03-06-18 @ 05:56)  SpO2: 98% (03-06-18 @ 05:56)  Wt(kg): --    03-04 @ 07:01  -  03-05 @ 07:00  --------------------------------------------------------  IN: 160 mL / OUT: 0 mL / NET: 160 mL    03-05 @ 07:01  -  03-06 @ 07:00  --------------------------------------------------------  IN: 460 mL / OUT: 0 mL / NET: 460 mL

## 2018-03-06 NOTE — PROGRESS NOTE ADULT - ASSESSMENT
53 yo M with PMHx as listed presents with acute  back pain.      1. acute on chronic back pain possibly 2/2 spinal abscess 2/2 MEREDITH  - Per neural surgery: CT scan did not show spinal abscess. Pt likely has superficial abscess from stitches, no need to take out spinal stimulator for now, continue with antibiotics  - per ID: PICC line and IV antibiotics  for 6 weeks  -pt can not go to MRI 2/2 body habitus  - pt currently on PCA Dilaudid pump, discussed with pain management on the phone, switched to PO MS contin 60mg q12 and oxycodine 10mg q6 prn and morphine 2 mg IV q2 prn.       2.  bilateral lower extremity pain and swelling- improved  - decrease IV lasix 40mg BID to PO lasix 20mg BID  - echo shows normal EF    3. HTN- controlled  - c/w losartan 100mg qd and nifedipine    4. RUSSELL on cpap    5. DVT ppx- lovenox subq

## 2018-03-06 NOTE — PROGRESS NOTE ADULT - SUBJECTIVE AND OBJECTIVE BOX
Patient is a 52y old  Male who presents with a chief complaint of "Back pain shooting to abdomen and R LE and oozing back blister" (23 Feb 2018 17:16). back pain improved, bilateral lower extremity pain and swelling improved.       PAST MEDICAL & SURGICAL HISTORY:  Morbid obesity  Obstructive sleep apnea  Disc disease, degenerative, lumbar or lumbosacral  Hypertension  History of excision of pilonidal cyst  H/O arthroscopy of right knee  Spinal cord stimulator status      IV Contrast (Unknown)  penicillin (Unknown)      MEDICATIONS  (STANDING):  ceFAZolin   IVPB 2000 milliGRAM(s) IV Intermittent every 6 hours  ceFAZolin   IVPB      docusate sodium 100 milliGRAM(s) Oral three times a day  enoxaparin Injectable 40 milliGRAM(s) SubCutaneous at bedtime  furosemide   Injectable 40 milliGRAM(s) IV Push two times a day  HYDROmorphone PCA (1 mG/mL) 30 milliLiter(s) PCA Continuous PCA Continuous  losartan 100 milliGRAM(s) Oral at bedtime  morphine ER Tablet 60 milliGRAM(s) Oral every 12 hours  naproxen 500 milliGRAM(s) Oral two times a day  NIFEdipine XL 30 milliGRAM(s) Oral daily  pantoprazole    Tablet 40 milliGRAM(s) Oral before breakfast  senna 1 Tablet(s) Oral at bedtime  sodium chloride 0.9%. 1000 milliLiter(s) (10 mL/Hr) IV Continuous <Continuous>  tamsulosin 0.4 milliGRAM(s) Oral at bedtime    MEDICATIONS  (PRN):  acetaminophen   Tablet. 650 milliGRAM(s) Oral every 6 hours PRN Mild Pain (1 - 3)  ALPRAZolam 0.5 milliGRAM(s) Oral at bedtime PRN anxiety  cyclobenzaprine 10 milliGRAM(s) Oral three times a day PRN Muscle Spasm  morphine  - Injectable 2 milliGRAM(s) IV Push every 2 hours PRN Severe Pain (7 - 10)  naloxone Injectable 0.1 milliGRAM(s) IV Push every 3 minutes PRN For ANY of the following changes in patient status:  A. RR LESS THAN 10 breaths per minute, B. Oxygen saturation LESS THAN 90%, C. Sedation score of 6  ondansetron Injectable 4 milliGRAM(s) IV Push every 6 hours PRN Nausea  oxyCODONE    IR 10 milliGRAM(s) Oral four times a day after meals PRN Moderate Pain (4 - 6)      Overnight events:   Pt is sitting in bed complaining of increased sensation and swelling over the wound since antibiotic sched has changed and since pain pump has been stopped. He is very concerned going forward of care and treatment and feels he has been down this road before. No complaints of fevers or chills, but has chronic pain.       Vital Signs (24 Hrs):  T(C): 36.7 (03-06-18 @ 06:49), Max: 36.7 (03-05-18 @ 20:42)  HR: 83 (03-06-18 @ 05:56) (82 - 91)  BP: 139/66 (03-06-18 @ 05:56) (139/66 - 160/86)  RR: 24 (03-06-18 @ 05:56) (20 - 24)  SpO2: 98% (03-06-18 @ 05:56) (98% - 98%)  Wt(kg): --  Daily Height in cm: 200.66 (05 Mar 2018 11:28)    Daily     I&O's Summary    05 Mar 2018 07:01  -  06 Mar 2018 07:00  --------------------------------------------------------  IN: 460 mL / OUT: 0 mL / NET: 460 mL        PHYSICAL EXAM:  	  HEENT:   Normal oral mucosa, PERRL, EOMI	  Lymphatic: No lymphadenopathy  Cardiovascular: Normal S1 S2, No JVD, No murmurs, No edema  Respiratory: Lungs clear to auscultation	  Psychiatry: A & O x 3, Mood & affect appropriate  Gastrointestinal:  Soft, Non-tender, + BS	  Skin in the back: + surgical  scar, c/d/i, no pus/ drainage. + erythema on bilateral lower extremities - no eveidence of swelling around surgical areas on palpation  Neurologic: Non-focal, A&Ox3, nonfocal, NICHOLSON x 4  Extremities: Normal range of motion. + erythema, pain on palpation. +  3+ pitting edmea      Labs:         No new labs    Imaging:    < from: CT Lumbar Spine w/ IV Cont (03.01.18 @ 13:51) >  1.  CT scan through the thoracic and lumbar spine limited due to the   large field of view and artifact from patient body habitus.    2.  Spinal stimulator present, entering the canal between T10-11 and   terminating within the dorsal aspect of the spinal canal.    3.  Infiltrative changes around the superficial portion of the stimulator   with no discrete paraspinal abscess.    4.  Intraspinal abscess cannot be excluded on CT scan.      < end of copied text >

## 2018-03-06 NOTE — PROGRESS NOTE ADULT - ASSESSMENT
51 yo M with PMHx as listed presents with acute  back pain.      1. acute on chronic back pain possibly 2/2 spinal abscess 2/2 MEREDITH  - Per neural surgery: CT scan did not show spinal abscess. pt likely has superficial abscess from stitches, no need to take out spinal stimulator for now, continue with antibiotics  - per ID: PICC line and IV antibiotics (Cefazolin 2gm q6) for 6 weeks - awaiting placement   -pt can not go to MRI 2/2 body habitus - setting up at [facility] radi[facility]  - pt currently on PCA Dilaudid pump, spoke with pain management on the phone, switch to PO MS contin 60mg q12 and oxycodine 10mg q6 prn and morphine 2 mg IV q2 prn.   - if pain is not controlled, change MS contin to Oxycontin 80mg q12.  - spoke with neurosurg PA - will re-eval today with attending. They are aware of his issues and have been looking after him and his issues    2.  bilateral lower extremity pain and swelling- improved  - decrease IV lasix 40mg BID to PO lasix 20mg BID  - echo shows normal EF    3. HTN- controlled  - c/w losartan 100mg qd and nifedipine    4. RUSSELL on cpap    5. DVT ppx- lovenox subq

## 2018-03-07 LAB
ANION GAP SERPL CALC-SCNC: 10 MMOL/L — SIGNIFICANT CHANGE UP (ref 7–14)
BASOPHILS # BLD AUTO: 0.05 K/UL — SIGNIFICANT CHANGE UP (ref 0–0.2)
BASOPHILS NFR BLD AUTO: 0.7 % — SIGNIFICANT CHANGE UP (ref 0–1)
BUN SERPL-MCNC: 9 MG/DL — LOW (ref 10–20)
CALCIUM SERPL-MCNC: 9.9 MG/DL — SIGNIFICANT CHANGE UP (ref 8.5–10.1)
CHLORIDE SERPL-SCNC: 97 MMOL/L — LOW (ref 98–110)
CO2 SERPL-SCNC: 30 MMOL/L — SIGNIFICANT CHANGE UP (ref 17–32)
CREAT SERPL-MCNC: 0.9 MG/DL — SIGNIFICANT CHANGE UP (ref 0.7–1.5)
EOSINOPHIL # BLD AUTO: 0.47 K/UL — SIGNIFICANT CHANGE UP (ref 0–0.7)
EOSINOPHIL NFR BLD AUTO: 6.4 % — SIGNIFICANT CHANGE UP (ref 0–8)
GLUCOSE SERPL-MCNC: 170 MG/DL — HIGH (ref 70–110)
HCT VFR BLD CALC: 43 % — SIGNIFICANT CHANGE UP (ref 42–52)
HGB BLD-MCNC: 14.1 G/DL — SIGNIFICANT CHANGE UP (ref 14–18)
IMM GRANULOCYTES NFR BLD AUTO: 0.1 % — SIGNIFICANT CHANGE UP (ref 0.1–0.3)
LYMPHOCYTES # BLD AUTO: 2.16 K/UL — SIGNIFICANT CHANGE UP (ref 1.2–3.4)
LYMPHOCYTES # BLD AUTO: 29.4 % — SIGNIFICANT CHANGE UP (ref 20.5–51.1)
MCHC RBC-ENTMCNC: 28.7 PG — SIGNIFICANT CHANGE UP (ref 27–31)
MCHC RBC-ENTMCNC: 32.8 G/DL — SIGNIFICANT CHANGE UP (ref 32–37)
MCV RBC AUTO: 87.4 FL — SIGNIFICANT CHANGE UP (ref 80–94)
MONOCYTES # BLD AUTO: 0.62 K/UL — HIGH (ref 0.1–0.6)
MONOCYTES NFR BLD AUTO: 8.4 % — SIGNIFICANT CHANGE UP (ref 1.7–9.3)
NEUTROPHILS # BLD AUTO: 4.03 K/UL — SIGNIFICANT CHANGE UP (ref 1.4–6.5)
NEUTROPHILS NFR BLD AUTO: 55 % — SIGNIFICANT CHANGE UP (ref 42.2–75.2)
NRBC # BLD: 0 /100 WBCS — SIGNIFICANT CHANGE UP (ref 0–0)
PLATELET # BLD AUTO: 328 K/UL — SIGNIFICANT CHANGE UP (ref 130–400)
POTASSIUM SERPL-MCNC: 3.7 MMOL/L — SIGNIFICANT CHANGE UP (ref 3.5–5)
POTASSIUM SERPL-SCNC: 3.7 MMOL/L — SIGNIFICANT CHANGE UP (ref 3.5–5)
RBC # BLD: 4.92 M/UL — SIGNIFICANT CHANGE UP (ref 4.7–6.1)
RBC # FLD: 13.1 % — SIGNIFICANT CHANGE UP (ref 11.5–14.5)
SODIUM SERPL-SCNC: 137 MMOL/L — SIGNIFICANT CHANGE UP (ref 135–146)
WBC # BLD: 7.34 K/UL — SIGNIFICANT CHANGE UP (ref 4.8–10.8)
WBC # FLD AUTO: 7.34 K/UL — SIGNIFICANT CHANGE UP (ref 4.8–10.8)

## 2018-03-07 RX ORDER — MICONAZOLE NITRATE 2 %
1 CREAM (GRAM) TOPICAL
Qty: 0 | Refills: 0 | Status: DISCONTINUED | OUTPATIENT
Start: 2018-03-07 | End: 2018-03-16

## 2018-03-07 RX ORDER — FUROSEMIDE 40 MG
40 TABLET ORAL
Qty: 0 | Refills: 0 | Status: DISCONTINUED | OUTPATIENT
Start: 2018-03-07 | End: 2018-03-16

## 2018-03-07 RX ADMIN — Medication 100 MILLIGRAM(S): at 05:15

## 2018-03-07 RX ADMIN — Medication 500 MILLIGRAM(S): at 18:10

## 2018-03-07 RX ADMIN — MORPHINE SULFATE 60 MILLIGRAM(S): 50 CAPSULE, EXTENDED RELEASE ORAL at 06:17

## 2018-03-07 RX ADMIN — LOSARTAN POTASSIUM 100 MILLIGRAM(S): 100 TABLET, FILM COATED ORAL at 21:30

## 2018-03-07 RX ADMIN — MORPHINE SULFATE 60 MILLIGRAM(S): 50 CAPSULE, EXTENDED RELEASE ORAL at 18:10

## 2018-03-07 RX ADMIN — MORPHINE SULFATE 2 MILLIGRAM(S): 50 CAPSULE, EXTENDED RELEASE ORAL at 13:13

## 2018-03-07 RX ADMIN — Medication 100 MILLIGRAM(S): at 21:29

## 2018-03-07 RX ADMIN — MORPHINE SULFATE 60 MILLIGRAM(S): 50 CAPSULE, EXTENDED RELEASE ORAL at 18:03

## 2018-03-07 RX ADMIN — MORPHINE SULFATE 2 MILLIGRAM(S): 50 CAPSULE, EXTENDED RELEASE ORAL at 08:45

## 2018-03-07 RX ADMIN — Medication 0.5 MILLIGRAM(S): at 21:32

## 2018-03-07 RX ADMIN — Medication 20 MILLIGRAM(S): at 05:15

## 2018-03-07 RX ADMIN — Medication 100 MILLIGRAM(S): at 14:07

## 2018-03-07 RX ADMIN — SENNA PLUS 1 TABLET(S): 8.6 TABLET ORAL at 21:30

## 2018-03-07 RX ADMIN — Medication 500 MILLIGRAM(S): at 17:08

## 2018-03-07 RX ADMIN — ENOXAPARIN SODIUM 40 MILLIGRAM(S): 100 INJECTION SUBCUTANEOUS at 21:30

## 2018-03-07 RX ADMIN — Medication 500 MILLIGRAM(S): at 05:30

## 2018-03-07 RX ADMIN — MORPHINE SULFATE 2 MILLIGRAM(S): 50 CAPSULE, EXTENDED RELEASE ORAL at 05:15

## 2018-03-07 RX ADMIN — MORPHINE SULFATE 2 MILLIGRAM(S): 50 CAPSULE, EXTENDED RELEASE ORAL at 12:49

## 2018-03-07 RX ADMIN — OXYCODONE HYDROCHLORIDE 10 MILLIGRAM(S): 5 TABLET ORAL at 21:33

## 2018-03-07 RX ADMIN — TAMSULOSIN HYDROCHLORIDE 0.4 MILLIGRAM(S): 0.4 CAPSULE ORAL at 21:30

## 2018-03-07 RX ADMIN — OXYCODONE HYDROCHLORIDE 10 MILLIGRAM(S): 5 TABLET ORAL at 15:30

## 2018-03-07 RX ADMIN — OXYCODONE HYDROCHLORIDE 10 MILLIGRAM(S): 5 TABLET ORAL at 14:47

## 2018-03-07 RX ADMIN — MORPHINE SULFATE 2 MILLIGRAM(S): 50 CAPSULE, EXTENDED RELEASE ORAL at 08:22

## 2018-03-07 RX ADMIN — Medication 40 MILLIGRAM(S): at 17:08

## 2018-03-07 RX ADMIN — Medication 500 MILLIGRAM(S): at 05:15

## 2018-03-07 RX ADMIN — MORPHINE SULFATE 2 MILLIGRAM(S): 50 CAPSULE, EXTENDED RELEASE ORAL at 19:52

## 2018-03-07 RX ADMIN — MORPHINE SULFATE 60 MILLIGRAM(S): 50 CAPSULE, EXTENDED RELEASE ORAL at 06:49

## 2018-03-07 RX ADMIN — Medication 30 MILLIGRAM(S): at 05:15

## 2018-03-07 RX ADMIN — Medication 0.5 APPLICATION(S): at 17:10

## 2018-03-07 RX ADMIN — PANTOPRAZOLE SODIUM 40 MILLIGRAM(S): 20 TABLET, DELAYED RELEASE ORAL at 09:18

## 2018-03-07 RX ADMIN — CYCLOBENZAPRINE HYDROCHLORIDE 10 MILLIGRAM(S): 10 TABLET, FILM COATED ORAL at 17:12

## 2018-03-07 NOTE — CONSULT NOTE ADULT - ATTENDING COMMENTS
The patient was examined on afternoon rounds.   Pt known to neurosurgical service, pt of Dr. Patrick, with morbid obesity, smoker, intractable pain s/p spinal stimulator placement. Pt presents with drainage with from lower portion of thoracic incision.  Denies fevers, chills. +increased pain.  On exam, 5/5 strength in legs. Able to ambulate with cane, baseline.  Exam of incision reveals small (0.5 cm) area of yellowish/fibrinous appearance of lower incision.  No active drainage expressed.  No génesis erythema or fluctuance appreciable.     PLAN: ID consult,  obtain CT thoracolumbar spine to evaluate for large drainable collection.  ESR/CRP    Further plan to be made after imaging studies and upon Dr. Patrick's review.
Patient with LUTS and UTI    staph aureus in urine    complete abx therapy  agree with above  LUTS has now resolved  bladder sonogram proves that he empties completely -- sonogram reviewed by me  recommend flomax to help with bladder empthing  can follow up as outpatient for further evaluation -- uroflow
Erythema and edema present  Continue with Antibiotics  Resolving Cellulitis Bilateral  Picc line for long term antibiotics   Podiatry to follow up while in house

## 2018-03-07 NOTE — CONSULT NOTE ADULT - CONSULT REQUESTED DATE/TIME
26-Feb-2018 13:56
01-Mar-2018 16:13
06-Mar-2018
25-Feb-2018 11:20
26-Feb-2018 09:35
27-Feb-2018 07:57
28-Feb-2018 12:27
23-Feb-2018 16:15
27-Feb-2018 11:09

## 2018-03-07 NOTE — PROGRESS NOTE ADULT - ASSESSMENT
53 yo M with PMHx as listed presents with acute  back pain.      1. acute on chronic back pain possibly 2/2 spinal abscess 2/2 MEREDITH  - Per neural surgery: CT scan did not show spinal abscess. Pt likely has superficial abscess from stitches, no need to take out spinal stimulator for now, continue with antibiotics  - per ID: PICC line and IV antibiotics  for 6 weeks patient refusing to have PICC line placed.   -pt can not go to MRI 2/2 body habitus however MRI can be done as out patient since patients lower back pain is chronic and there is nothing to be done acutely.   - pt currently on PCA Dilaudid pump, discussed with pain management on the phone, switched to PO MS contin 60mg q12 and oxycodine 10mg q6 prn and morphine 2 mg IV q2 prn.       2.  bilateral lower extremity pain and swelling  - lasix increased to 40mg twice daily PO  Metolazone 5 mg added as per Renal   Follow with Podiatry recommendations   Follow vascular recommendations   follow with Lower Extremity Venous and Arterial duplex.    - echo shows normal EF    3. HTN- controlled  - c/w losartan 100mg qd and nifedipine    4. RUSSELL on cpap    5. DVT ppx- lovenox subq

## 2018-03-07 NOTE — PROGRESS NOTE ADULT - ASSESSMENT
Pedal edema  morbid obesity  back pain  HTN  RUSSELL    plan:  increase lasix 40mg po bid  add metolazone 5mg po qd for 5 days  f/u bmp  check le duplex  f/u podiatry recommendations  dvt prophylaxis

## 2018-03-07 NOTE — PROGRESS NOTE ADULT - SUBJECTIVE AND OBJECTIVE BOX
Patient is a 52y old  Male who presents with a chief complaint of "Back pain shooting to abdomen and R LE and oozing back blister" (23 Feb 2018 17:16)      PAST MEDICAL & SURGICAL HISTORY:  Morbid obesity  Obstructive sleep apnea  Disc disease, degenerative, lumbar or lumbosacral  Hypertension  History of excision of pilonidal cyst  H/O arthroscopy of right knee  Spinal cord stimulator status      MEDICATIONS  (STANDING):  ceFAZolin   IVPB 2000 milliGRAM(s) IV Intermittent every 8 hours  docusate sodium 100 milliGRAM(s) Oral three times a day  enoxaparin Injectable 40 milliGRAM(s) SubCutaneous at bedtime  furosemide    Tablet 40 milliGRAM(s) Oral two times a day  losartan 100 milliGRAM(s) Oral at bedtime  metolazone 5 milliGRAM(s) Oral daily  miconazole 2% Cream 1 Application(s) Topical two times a day  morphine ER Tablet 60 milliGRAM(s) Oral every 12 hours  naproxen 500 milliGRAM(s) Oral two times a day  NIFEdipine XL 30 milliGRAM(s) Oral daily  pantoprazole    Tablet 40 milliGRAM(s) Oral before breakfast  senna 1 Tablet(s) Oral at bedtime  sodium chloride 0.9%. 1000 milliLiter(s) (10 mL/Hr) IV Continuous <Continuous>  tamsulosin 0.4 milliGRAM(s) Oral at bedtime    MEDICATIONS  (PRN):  acetaminophen   Tablet. 650 milliGRAM(s) Oral every 6 hours PRN Mild Pain (1 - 3)  ALPRAZolam 0.5 milliGRAM(s) Oral at bedtime PRN anxiety  cyclobenzaprine 10 milliGRAM(s) Oral three times a day PRN Muscle Spasm  morphine  - Injectable 2 milliGRAM(s) IV Push every 2 hours PRN Severe Pain (7 - 10)  naloxone Injectable 0.1 milliGRAM(s) IV Push every 3 minutes PRN For ANY of the following changes in patient status:  A. RR LESS THAN 10 breaths per minute, B. Oxygen saturation LESS THAN 90%, C. Sedation score of 6  ondansetron Injectable 4 milliGRAM(s) IV Push every 6 hours PRN Nausea  oxyCODONE    IR 10 milliGRAM(s) Oral four times a day after meals PRN Moderate Pain (4 - 6)      Overnight events:    Vital Signs Last 24 Hrs  T(C): 36.3 (07 Mar 2018 05:00), Max: 36.3 (07 Mar 2018 05:00)  T(F): 97.3 (07 Mar 2018 05:00), Max: 97.3 (07 Mar 2018 05:00)  HR: 86 (07 Mar 2018 05:00) (79 - 87)  BP: 126/60 (07 Mar 2018 05:00) (126/60 - 172/78)  BP(mean): --  RR: 18 (07 Mar 2018 05:00) (18 - 20)  SpO2: --  CAPILLARY BLOOD GLUCOSE        I&O's Summary    06 Mar 2018 07:01  -  07 Mar 2018 07:00  --------------------------------------------------------  IN: 480 mL / OUT: 0 mL / NET: 480 mL        Physical Exam:    -     General : Patient not in any distress this am, however he is complaining about several non-specific issues.     -      HEENT: neck soft no mass     -      Cardiac: s1s2 no murmur     -      Pulm: good air entry     -      GI: abd soft non-tender     -      Musculoskeletal: wnl, lower back pain     -      Neuro: aaox3         Labs:  patient refusing labs for several days. the risks explained and patient states he will not have labs done until he finds out what is going on with his back. will attempt to discuss with patient once again as he continues to refuse.                                         Imaging:    ECG:    T(C): 36.3 (03-07-18 @ 05:00), Max: 36.3 (03-07-18 @ 05:00)  HR: 86 (03-07-18 @ 05:00) (79 - 87)  BP: 126/60 (03-07-18 @ 05:00) (126/60 - 172/78)  RR: 18 (03-07-18 @ 05:00) (18 - 20)  SpO2: --

## 2018-03-07 NOTE — CONSULT NOTE ADULT - SUBJECTIVE AND OBJECTIVE BOX
PROGRESS NOTE   Patient is a 52y old  Male who presents with a chief complaint of "Back pain shooting to abdomen and R LE and oozing back blister" (23 Feb 2018 17:16). Podiatry consulted for B/L LE cellulitis      HPI:  51 yo M with Hx of Lumbar disc disease s/p Discectomy with Neurostimulator placement last year and neurostimulator revision last November , hx of previous neurostimulator battery infection treated with clindamycin, Hx of HTN presenting for above chief complaint. Patient reports that over the last few days he has been experiencing worsening mid spinal, Lower lumbar back pain, worse with motion, ambulation, valsalva, neck flexion, "100/10" in severity, electric in nature, relieved by sitting straight and avoiding motion, radiating bilaterally anteriorly to the abdomen and groin area and posteriorly along the sciatic nerve root distribution in the R lower extremity (along T10-T12, L1-L3 and L5-S1 dermatomes). He also reported that yesterday his family noticed a blister on the surgical wound that bursted and thick yellowish serous fluid started draining. Patient was at the pain management doctor yesterday who advised him to Go to the hospital for IV antibiotics. He was supposed to get an MRI done on Thursday but could not complete the test due to severe pain and discomfort. He also reports that over the last few days he has been having shortness of breath with ambulation and worsening LE swelling.  He reports no fever, chills, headaches, photo or phonophobia. No hx of cardiac disease. (23 Feb 2018 17:16). Pt stated that he noticed a rapid onset of erythema and edema to B/L LE x 1 week. Pt stated that his LE's were painful to the touch and that he was unable to obtain relief by elevating his legs. Pt stated that he has not been to a podiatrist in a "very long time", but would like to f/u with  while in house.           Vital Signs Last 24 Hrs  T(C): 35.8 (06 Mar 2018 21:30), Max: 36.7 (06 Mar 2018 06:49)  T(F): 96.4 (06 Mar 2018 21:30), Max: 98 (06 Mar 2018 06:49)  HR: 79 (06 Mar 2018 21:30) (79 - 87)  BP: 172/78 (06 Mar 2018 21:30) (139/66 - 172/78)  BP(mean): --  RR: 20 (06 Mar 2018 21:30) (20 - 24)  SpO2: 98% (06 Mar 2018 05:56) (98% - 98%)      PHYSICAL EXAM  GEN: JOSE ALBERTO NO is a pleasant well-nourished, well developed 52y Male in no acute distress, alert awake, and oriented to person, place and time.   LE Focused:      Vasc:  - DP/PT non-palpable B/L LE  - Increased skin temp B/L LE  - cap fill < 3 sec B/L    Neuro:   - WNL B/L     Derm:  - Erythema and edema to B/L LE  - Excoriations to dorsum of digits & forefoot B/L      MSK:  - muscle strength 2/5 in all directions B/L LE    Assessment:   1. B/L LE cellulitis  2. Tinea pedis to B/L feet    Plan:  1. Pt evaluated @ bedside  2. Consult vascular  3. Order arterial/venous duplex B/L  4. Order Clotrimazole and apply q24 to affected area B/L  5. Continue IV abx per ID recs  6. Plan will be discussed w/ attending

## 2018-03-07 NOTE — CONSULT NOTE ADULT - PROVIDER SPECIALTY LIST ADULT
Infectious Disease
Nephrology
Pain Medicine
Physiatry
Podiatry
Pulmonology
Urology
Neurosurgery
Pulmonology

## 2018-03-07 NOTE — PROGRESS NOTE ADULT - SUBJECTIVE AND OBJECTIVE BOX
Nephrology f/u:      Multiple complaints, refusing blood draws, requesting le duplex.      PAST HISTORY  --------------------------------------------------------------------------------  PAST MEDICAL & SURGICAL HISTORY:  Morbid obesity  Obstructive sleep apnea  Disc disease, degenerative, lumbar or lumbosacral  Hypertension  History of excision of pilonidal cyst  H/O arthroscopy of right knee  Spinal cord stimulator status    FAMILY HISTORY:  Family history of diabetes mellitus in mother (Mother)  Family history of early CAD (Father)    PAST SOCIAL HISTORY:    ALLERGIES & MEDICATIONS  --------------------------------------------------------------------------------  Allergies    IV Contrast (Unknown)  penicillin (Unknown)    Intolerances    Physical Exam:  	  NAD  morbidly obese  moist mucosa  no jvd  decreased b/l bs  distant hs  soft  3+pedal edema to thigh  no matamoros  no rash    Hospital Medications:   MEDICATIONS  (STANDING):  ceFAZolin   IVPB 2000 milliGRAM(s) IV Intermittent every 8 hours  clotrimazole 1% Cream 1 Application(s) Topical once  docusate sodium 100 milliGRAM(s) Oral three times a day  enoxaparin Injectable 40 milliGRAM(s) SubCutaneous at bedtime  furosemide    Tablet 20 milliGRAM(s) Oral two times a day  losartan 100 milliGRAM(s) Oral at bedtime  metolazone 5 milliGRAM(s) Oral daily  morphine ER Tablet 60 milliGRAM(s) Oral every 12 hours  naproxen 500 milliGRAM(s) Oral two times a day  NIFEdipine XL 30 milliGRAM(s) Oral daily  pantoprazole    Tablet 40 milliGRAM(s) Oral before breakfast  senna 1 Tablet(s) Oral at bedtime  sodium chloride 0.9%. 1000 milliLiter(s) (10 mL/Hr) IV Continuous <Continuous>  tamsulosin 0.4 milliGRAM(s) Oral at bedtime        VITALS:  T(F): 97.3 (03-07-18 @ 05:00), Max: 97.3 (03-07-18 @ 05:00)  HR: 86 (03-07-18 @ 05:00)  BP: 126/60 (03-07-18 @ 05:00)  RR: 18 (03-07-18 @ 05:00)  SpO2: --  Wt(kg): --    03-05 @ 07:01  -  03-06 @ 07:00  --------------------------------------------------------  IN: 460 mL / OUT: 0 mL / NET: 460 mL    03-06 @ 07:01  -  03-07 @ 07:00  --------------------------------------------------------  IN: 480 mL / OUT: 0 mL / NET: 480 mL      Height (cm): 200.66 (03-06 @ 13:31)    LABS:

## 2018-03-07 NOTE — CONSULT NOTE ADULT - CONSULT REASON
clearance for sedation
riaz, anesthesia prior to daignostic imaging
B/L LE Cellulitis
R/O diskitis
back pain
gait dysfunction
interruption of urinary stream
pedal edema
wound drainage

## 2018-03-08 DIAGNOSIS — L03.119 CELLULITIS OF UNSPECIFIED PART OF LIMB: ICD-10-CM

## 2018-03-08 LAB
ALBUMIN SERPL ELPH-MCNC: 3.5 G/DL — SIGNIFICANT CHANGE UP (ref 3–5.5)
ALP SERPL-CCNC: 55 U/L — SIGNIFICANT CHANGE UP (ref 30–115)
ALT FLD-CCNC: 19 U/L — SIGNIFICANT CHANGE UP (ref 0–41)
ANION GAP SERPL CALC-SCNC: 12 MMOL/L — SIGNIFICANT CHANGE UP (ref 7–14)
AST SERPL-CCNC: 24 U/L — SIGNIFICANT CHANGE UP (ref 0–41)
BASOPHILS # BLD AUTO: 0.04 K/UL — SIGNIFICANT CHANGE UP (ref 0–0.2)
BASOPHILS NFR BLD AUTO: 0.6 % — SIGNIFICANT CHANGE UP (ref 0–1)
BILIRUB SERPL-MCNC: 0.7 MG/DL — SIGNIFICANT CHANGE UP (ref 0.2–1.2)
BUN SERPL-MCNC: 12 MG/DL — SIGNIFICANT CHANGE UP (ref 10–20)
CALCIUM SERPL-MCNC: 9.6 MG/DL — SIGNIFICANT CHANGE UP (ref 8.5–10.1)
CHLORIDE SERPL-SCNC: 95 MMOL/L — LOW (ref 98–110)
CO2 SERPL-SCNC: 30 MMOL/L — SIGNIFICANT CHANGE UP (ref 17–32)
CREAT SERPL-MCNC: 0.9 MG/DL — SIGNIFICANT CHANGE UP (ref 0.7–1.5)
EOSINOPHIL # BLD AUTO: 0.45 K/UL — SIGNIFICANT CHANGE UP (ref 0–0.7)
EOSINOPHIL NFR BLD AUTO: 6.4 % — SIGNIFICANT CHANGE UP (ref 0–8)
GLUCOSE SERPL-MCNC: 190 MG/DL — HIGH (ref 70–110)
HCT VFR BLD CALC: 40.5 % — LOW (ref 42–52)
HGB BLD-MCNC: 13.1 G/DL — LOW (ref 14–18)
IMM GRANULOCYTES NFR BLD AUTO: 0.3 % — SIGNIFICANT CHANGE UP (ref 0.1–0.3)
LYMPHOCYTES # BLD AUTO: 2.08 K/UL — SIGNIFICANT CHANGE UP (ref 1.2–3.4)
LYMPHOCYTES # BLD AUTO: 29.5 % — SIGNIFICANT CHANGE UP (ref 20.5–51.1)
MCHC RBC-ENTMCNC: 28.4 PG — SIGNIFICANT CHANGE UP (ref 27–31)
MCHC RBC-ENTMCNC: 32.3 G/DL — SIGNIFICANT CHANGE UP (ref 32–37)
MCV RBC AUTO: 87.9 FL — SIGNIFICANT CHANGE UP (ref 80–94)
MONOCYTES # BLD AUTO: 0.65 K/UL — HIGH (ref 0.1–0.6)
MONOCYTES NFR BLD AUTO: 9.2 % — SIGNIFICANT CHANGE UP (ref 1.7–9.3)
NEUTROPHILS # BLD AUTO: 3.82 K/UL — SIGNIFICANT CHANGE UP (ref 1.4–6.5)
NEUTROPHILS NFR BLD AUTO: 54 % — SIGNIFICANT CHANGE UP (ref 42.2–75.2)
NRBC # BLD: 0 /100 WBCS — SIGNIFICANT CHANGE UP (ref 0–0)
PLATELET # BLD AUTO: 301 K/UL — SIGNIFICANT CHANGE UP (ref 130–400)
POTASSIUM SERPL-MCNC: 3.3 MMOL/L — LOW (ref 3.5–5)
POTASSIUM SERPL-SCNC: 3.3 MMOL/L — LOW (ref 3.5–5)
PROT SERPL-MCNC: 5.9 G/DL — LOW (ref 6–8)
RBC # BLD: 4.61 M/UL — LOW (ref 4.7–6.1)
RBC # FLD: 13.1 % — SIGNIFICANT CHANGE UP (ref 11.5–14.5)
SODIUM SERPL-SCNC: 137 MMOL/L — SIGNIFICANT CHANGE UP (ref 135–146)
WBC # BLD: 7.06 K/UL — SIGNIFICANT CHANGE UP (ref 4.8–10.8)
WBC # FLD AUTO: 7.06 K/UL — SIGNIFICANT CHANGE UP (ref 4.8–10.8)

## 2018-03-08 RX ORDER — ALPRAZOLAM 0.25 MG
0.5 TABLET ORAL AT BEDTIME
Qty: 0 | Refills: 0 | Status: DISCONTINUED | OUTPATIENT
Start: 2018-03-08 | End: 2018-03-09

## 2018-03-08 RX ORDER — POTASSIUM CHLORIDE 20 MEQ
40 PACKET (EA) ORAL EVERY 4 HOURS
Qty: 0 | Refills: 0 | Status: COMPLETED | OUTPATIENT
Start: 2018-03-08 | End: 2018-03-08

## 2018-03-08 RX ORDER — OXYCODONE HYDROCHLORIDE 5 MG/1
80 TABLET ORAL EVERY 12 HOURS
Qty: 0 | Refills: 0 | Status: DISCONTINUED | OUTPATIENT
Start: 2018-03-08 | End: 2018-03-15

## 2018-03-08 RX ADMIN — MORPHINE SULFATE 60 MILLIGRAM(S): 50 CAPSULE, EXTENDED RELEASE ORAL at 08:29

## 2018-03-08 RX ADMIN — Medication 100 MILLIGRAM(S): at 05:22

## 2018-03-08 RX ADMIN — MORPHINE SULFATE 2 MILLIGRAM(S): 50 CAPSULE, EXTENDED RELEASE ORAL at 16:46

## 2018-03-08 RX ADMIN — Medication 100 MILLIGRAM(S): at 14:33

## 2018-03-08 RX ADMIN — TAMSULOSIN HYDROCHLORIDE 0.4 MILLIGRAM(S): 0.4 CAPSULE ORAL at 21:45

## 2018-03-08 RX ADMIN — Medication 1 APPLICATION(S): at 06:20

## 2018-03-08 RX ADMIN — MORPHINE SULFATE 2 MILLIGRAM(S): 50 CAPSULE, EXTENDED RELEASE ORAL at 08:33

## 2018-03-08 RX ADMIN — Medication 40 MILLIGRAM(S): at 05:22

## 2018-03-08 RX ADMIN — Medication 30 MILLIGRAM(S): at 05:27

## 2018-03-08 RX ADMIN — MORPHINE SULFATE 60 MILLIGRAM(S): 50 CAPSULE, EXTENDED RELEASE ORAL at 05:23

## 2018-03-08 RX ADMIN — Medication 100 MILLIGRAM(S): at 05:21

## 2018-03-08 RX ADMIN — MORPHINE SULFATE 2 MILLIGRAM(S): 50 CAPSULE, EXTENDED RELEASE ORAL at 14:28

## 2018-03-08 RX ADMIN — MORPHINE SULFATE 2 MILLIGRAM(S): 50 CAPSULE, EXTENDED RELEASE ORAL at 13:29

## 2018-03-08 RX ADMIN — Medication 500 MILLIGRAM(S): at 18:04

## 2018-03-08 RX ADMIN — Medication 40 MILLIEQUIVALENT(S): at 18:20

## 2018-03-08 RX ADMIN — OXYCODONE HYDROCHLORIDE 80 MILLIGRAM(S): 5 TABLET ORAL at 18:04

## 2018-03-08 RX ADMIN — MORPHINE SULFATE 2 MILLIGRAM(S): 50 CAPSULE, EXTENDED RELEASE ORAL at 00:48

## 2018-03-08 RX ADMIN — MORPHINE SULFATE 2 MILLIGRAM(S): 50 CAPSULE, EXTENDED RELEASE ORAL at 12:16

## 2018-03-08 RX ADMIN — Medication 40 MILLIEQUIVALENT(S): at 21:46

## 2018-03-08 RX ADMIN — MORPHINE SULFATE 2 MILLIGRAM(S): 50 CAPSULE, EXTENDED RELEASE ORAL at 10:32

## 2018-03-08 RX ADMIN — MORPHINE SULFATE 2 MILLIGRAM(S): 50 CAPSULE, EXTENDED RELEASE ORAL at 20:54

## 2018-03-08 RX ADMIN — MORPHINE SULFATE 2 MILLIGRAM(S): 50 CAPSULE, EXTENDED RELEASE ORAL at 06:21

## 2018-03-08 RX ADMIN — Medication 0.5 APPLICATION(S): at 18:07

## 2018-03-08 RX ADMIN — PANTOPRAZOLE SODIUM 40 MILLIGRAM(S): 20 TABLET, DELAYED RELEASE ORAL at 06:19

## 2018-03-08 RX ADMIN — Medication 500 MILLIGRAM(S): at 08:30

## 2018-03-08 RX ADMIN — MORPHINE SULFATE 2 MILLIGRAM(S): 50 CAPSULE, EXTENDED RELEASE ORAL at 23:16

## 2018-03-08 RX ADMIN — Medication 100 MILLIGRAM(S): at 21:46

## 2018-03-08 RX ADMIN — ENOXAPARIN SODIUM 40 MILLIGRAM(S): 100 INJECTION SUBCUTANEOUS at 21:46

## 2018-03-08 RX ADMIN — LOSARTAN POTASSIUM 100 MILLIGRAM(S): 100 TABLET, FILM COATED ORAL at 21:46

## 2018-03-08 RX ADMIN — SENNA PLUS 1 TABLET(S): 8.6 TABLET ORAL at 21:46

## 2018-03-08 RX ADMIN — Medication 500 MILLIGRAM(S): at 05:26

## 2018-03-08 RX ADMIN — Medication 40 MILLIGRAM(S): at 18:04

## 2018-03-08 NOTE — PROGRESS NOTE ADULT - SUBJECTIVE AND OBJECTIVE BOX
Patient is a 52y old  Male who presents with a chief complaint of "Back pain shooting to abdomen and R LE and oozing back blister" (23 Feb 2018 17:16)      PAST MEDICAL & SURGICAL HISTORY:  Morbid obesity  Obstructive sleep apnea  Disc disease, degenerative, lumbar or lumbosacral  Hypertension  History of excision of pilonidal cyst  H/O arthroscopy of right knee  Spinal cord stimulator status      MEDICATIONS  (STANDING):  ceFAZolin   IVPB 2000 milliGRAM(s) IV Intermittent every 8 hours  docusate sodium 100 milliGRAM(s) Oral three times a day  enoxaparin Injectable 40 milliGRAM(s) SubCutaneous at bedtime  furosemide    Tablet 40 milliGRAM(s) Oral two times a day  losartan 100 milliGRAM(s) Oral at bedtime  metolazone 5 milliGRAM(s) Oral daily  miconazole 2% Cream 1 Application(s) Topical two times a day  morphine ER Tablet 60 milliGRAM(s) Oral every 12 hours  naproxen 500 milliGRAM(s) Oral two times a day  NIFEdipine XL 30 milliGRAM(s) Oral daily  pantoprazole    Tablet 40 milliGRAM(s) Oral before breakfast  potassium chloride    Tablet ER 40 milliEquivalent(s) Oral every 4 hours  senna 1 Tablet(s) Oral at bedtime  sodium chloride 0.9%. 1000 milliLiter(s) (10 mL/Hr) IV Continuous <Continuous>  tamsulosin 0.4 milliGRAM(s) Oral at bedtime    MEDICATIONS  (PRN):  acetaminophen   Tablet. 650 milliGRAM(s) Oral every 6 hours PRN Mild Pain (1 - 3)  cyclobenzaprine 10 milliGRAM(s) Oral three times a day PRN Muscle Spasm  morphine  - Injectable 2 milliGRAM(s) IV Push every 2 hours PRN Severe Pain (7 - 10)  naloxone Injectable 0.1 milliGRAM(s) IV Push every 3 minutes PRN For ANY of the following changes in patient status:  A. RR LESS THAN 10 breaths per minute, B. Oxygen saturation LESS THAN 90%, C. Sedation score of 6  ondansetron Injectable 4 milliGRAM(s) IV Push every 6 hours PRN Nausea  oxyCODONE    IR 10 milliGRAM(s) Oral four times a day after meals PRN Moderate Pain (4 - 6)      Overnight events:    Vital Signs Last 24 Hrs  T(C): 35.5 (08 Mar 2018 05:15), Max: 35.8 (07 Mar 2018 21:43)  T(F): 95.9 (08 Mar 2018 05:15), Max: 96.5 (07 Mar 2018 21:43)  HR: 82 (08 Mar 2018 05:15) (78 - 82)  BP: 128/71 (08 Mar 2018 05:15) (128/71 - 151/76)  BP(mean): --  RR: 18 (08 Mar 2018 05:15) (18 - 20)  SpO2: --  CAPILLARY BLOOD GLUCOSE        I&O's Summary    07 Mar 2018 07:01  -  08 Mar 2018 07:00  --------------------------------------------------------  IN: 50 mL / OUT: 0 mL / NET: 50 mL    08 Mar 2018 07:01  -  08 Mar 2018 13:45  --------------------------------------------------------  IN: 240 mL / OUT: 0 mL / NET: 240 mL        Physical Exam:    -     General :     -      HEENT:    -      Cardiac:    -      Pulm:    -      GI:    -      Musculoskeletal:    -      Neuro:        Labs:                        13.1   7.06  )-----------( 301      ( 08 Mar 2018 07:17 )             40.5             03-08    137  |  95<L>  |  12  ----------------------------<  190<H>  3.3<L>   |  30  |  0.9    Ca    9.6      08 Mar 2018 07:17    TPro  5.9<L>  /  Alb  3.5  /  TBili  0.7  /  DBili  x   /  AST  24  /  ALT  19  /  AlkPhos  55  03-08    LIVER FUNCTIONS - ( 08 Mar 2018 07:17 )  Alb: 3.5 g/dL / Pro: 5.9 g/dL / ALK PHOS: 55 U/L / ALT: 19 U/L / AST: 24 U/L / GGT: x                               Imaging:    ECG:    T(C): 35.5 (03-08-18 @ 05:15), Max: 35.8 (03-07-18 @ 21:43)  HR: 82 (03-08-18 @ 05:15) (78 - 82)  BP: 128/71 (03-08-18 @ 05:15) (128/71 - 151/76)  RR: 18 (03-08-18 @ 05:15) (18 - 20)  SpO2: -- Patient is a 52y old  Male who presents with a chief complaint of "Back pain shooting to abdomen and R LE and oozing back blister" (23 Feb 2018 17:16)      PAST MEDICAL & SURGICAL HISTORY:  Morbid obesity  Obstructive sleep apnea  Disc disease, degenerative, lumbar or lumbosacral  Hypertension  History of excision of pilonidal cyst  H/O arthroscopy of right knee  Spinal cord stimulator status      MEDICATIONS  (STANDING):  ceFAZolin   IVPB 2000 milliGRAM(s) IV Intermittent every 8 hours  docusate sodium 100 milliGRAM(s) Oral three times a day  enoxaparin Injectable 40 milliGRAM(s) SubCutaneous at bedtime  furosemide    Tablet 40 milliGRAM(s) Oral two times a day  losartan 100 milliGRAM(s) Oral at bedtime  metolazone 5 milliGRAM(s) Oral daily  miconazole 2% Cream 1 Application(s) Topical two times a day  morphine ER Tablet 60 milliGRAM(s) Oral every 12 hours  naproxen 500 milliGRAM(s) Oral two times a day  NIFEdipine XL 30 milliGRAM(s) Oral daily  pantoprazole    Tablet 40 milliGRAM(s) Oral before breakfast  potassium chloride    Tablet ER 40 milliEquivalent(s) Oral every 4 hours  senna 1 Tablet(s) Oral at bedtime  sodium chloride 0.9%. 1000 milliLiter(s) (10 mL/Hr) IV Continuous <Continuous>  tamsulosin 0.4 milliGRAM(s) Oral at bedtime    MEDICATIONS  (PRN):  acetaminophen   Tablet. 650 milliGRAM(s) Oral every 6 hours PRN Mild Pain (1 - 3)  cyclobenzaprine 10 milliGRAM(s) Oral three times a day PRN Muscle Spasm  morphine  - Injectable 2 milliGRAM(s) IV Push every 2 hours PRN Severe Pain (7 - 10)  naloxone Injectable 0.1 milliGRAM(s) IV Push every 3 minutes PRN For ANY of the following changes in patient status:  A. RR LESS THAN 10 breaths per minute, B. Oxygen saturation LESS THAN 90%, C. Sedation score of 6  ondansetron Injectable 4 milliGRAM(s) IV Push every 6 hours PRN Nausea  oxyCODONE    IR 10 milliGRAM(s) Oral four times a day after meals PRN Moderate Pain (4 - 6)      Overnight events:    Vital Signs Last 24 Hrs  T(C): 35.5 (08 Mar 2018 05:15), Max: 35.8 (07 Mar 2018 21:43)  T(F): 95.9 (08 Mar 2018 05:15), Max: 96.5 (07 Mar 2018 21:43)  HR: 82 (08 Mar 2018 05:15) (78 - 82)  BP: 128/71 (08 Mar 2018 05:15) (128/71 - 151/76)  BP(mean): --  RR: 18 (08 Mar 2018 05:15) (18 - 20)  SpO2: --  CAPILLARY BLOOD GLUCOSE        I&O's Summary    07 Mar 2018 07:01  -  08 Mar 2018 07:00  --------------------------------------------------------  IN: 50 mL / OUT: 0 mL / NET: 50 mL    08 Mar 2018 07:01  -  08 Mar 2018 13:45  --------------------------------------------------------  IN: 240 mL / OUT: 0 mL / NET: 240 mL        Physical Exam:    -     General : NAD     -      HEENT: NECK SOFT NO MASS     -      Cardiac: S1S2 NO MURMUR     -      Pulm: GOOD AIR ENTRY     -      GI: ABD SOFT NON TENDER     -      Musculoskeletal: Bilateral lower extremity edema. improved from yesterday however persistent.     -      Neuro: aaox3         Labs:                        13.1   7.06  )-----------( 301      ( 08 Mar 2018 07:17 )             40.5             03-08    137  |  95<L>  |  12  ----------------------------<  190<H>  3.3<L>   |  30  |  0.9    Ca    9.6      08 Mar 2018 07:17    TPro  5.9<L>  /  Alb  3.5  /  TBili  0.7  /  DBili  x   /  AST  24  /  ALT  19  /  AlkPhos  55  03-08    LIVER FUNCTIONS - ( 08 Mar 2018 07:17 )  Alb: 3.5 g/dL / Pro: 5.9 g/dL / ALK PHOS: 55 U/L / ALT: 19 U/L / AST: 24 U/L / GGT: x                               Imaging:    ECG:    T(C): 35.5 (03-08-18 @ 05:15), Max: 35.8 (03-07-18 @ 21:43)  HR: 82 (03-08-18 @ 05:15) (78 - 82)  BP: 128/71 (03-08-18 @ 05:15) (128/71 - 151/76)  RR: 18 (03-08-18 @ 05:15) (18 - 20)  SpO2: --

## 2018-03-08 NOTE — PROGRESS NOTE ADULT - SUBJECTIVE AND OBJECTIVE BOX
Nephrology f/u:    Spoke with team and nursing.  Pt with improved edema.  C/o pruritis and dryness.  K+ low, renal fx stable on diuretics.  No cp, sob, fever, still with back pain, but able to raise legs now    Physical Exam:  	  NAD  morbidly obese  moist mucosa  no jvd  decreased b/l bs  distant hs  soft  3+pedal edema to thigh  no matamoros  no rash    Hospital Medications:   MEDICATIONS  (STANDING):  ceFAZolin   IVPB 2000 milliGRAM(s) IV Intermittent every 8 hours  docusate sodium 100 milliGRAM(s) Oral three times a day  enoxaparin Injectable 40 milliGRAM(s) SubCutaneous at bedtime  furosemide    Tablet 40 milliGRAM(s) Oral two times a day  losartan 100 milliGRAM(s) Oral at bedtime  metolazone 5 milliGRAM(s) Oral daily  miconazole 2% Cream 1 Application(s) Topical two times a day  morphine ER Tablet 60 milliGRAM(s) Oral every 12 hours  naproxen 500 milliGRAM(s) Oral two times a day  NIFEdipine XL 30 milliGRAM(s) Oral daily  pantoprazole    Tablet 40 milliGRAM(s) Oral before breakfast  potassium chloride    Tablet ER 40 milliEquivalent(s) Oral every 4 hours  senna 1 Tablet(s) Oral at bedtime  sodium chloride 0.9%. 1000 milliLiter(s) (10 mL/Hr) IV Continuous <Continuous>  tamsulosin 0.4 milliGRAM(s) Oral at bedtime        VITALS:  T(F): 95.9 (03-08-18 @ 05:15), Max: 97.2 (03-07-18 @ 13:23)  HR: 82 (03-08-18 @ 05:15)  BP: 128/71 (03-08-18 @ 05:15)  RR: 18 (03-08-18 @ 05:15)  SpO2: --  Wt(kg): --    03-06 @ 07:01  -  03-07 @ 07:00  --------------------------------------------------------  IN: 480 mL / OUT: 0 mL / NET: 480 mL    03-07 @ 07:01 - 03-08 @ 07:00  --------------------------------------------------------  IN: 50 mL / OUT: 0 mL / NET: 50 mL    03-08 @ 07:01 - 03-08 @ 13:18  --------------------------------------------------------  IN: 240 mL / OUT: 0 mL / NET: 240 mL          LABS:  03-08    137  |  95<L>  |  12  ----------------------------<  190<H>  3.3<L>   |  30  |  0.9    Ca    9.6      08 Mar 2018 07:17    TPro  5.9<L>  /  Alb  3.5  /  TBili  0.7  /  DBili      /  AST  24  /  ALT  19  /  AlkPhos  55  03-08                          13.1   7.06  )-----------( 301      ( 08 Mar 2018 07:17 )             40.5       Urine Studies:        RADIOLOGY & ADDITIONAL STUDIES:    LE duplex - neg

## 2018-03-08 NOTE — PROGRESS NOTE ADULT - ASSESSMENT
51 yo M with PMHx as listed presents with acute  back pain.      1. acute on chronic back pain possibly 2/2 spinal abscess 2/2 MEREDITH  - Per neural surgery: CT scan did not show spinal abscess. Pt likely has superficial abscess from stitches, no need to take out spinal stimulator for now, continue with antibiotics  - per ID: PICC line and IV antibiotics  for 6 weeks patient  now agreeable to PICC line.   -pt can not go to MRI 2/2 body habitus however MRI can be done as out patient since patients lower back pain is chronic and there is nothing to be done acutely.   - pt currently on PCA Dilaudid pump, discussed with pain management on the phone, switched to PO MS contin 60mg q12 and oxycodine 10mg q6 prn and morphine 2 mg IV q2 prn.       2.  bilateral lower extremity pain and swelling  - lasix increased to 40mg twice daily PO  Metolazone 5 mg added as per Renal   Follow with Podiatry recommendations   Follow vascular recommendations   follow with Lower Extremity Venous and Arterial duplex both negative for DVT or arterial occlusive disease.   - echo shows normal EF    3. HTN- controlled  - c/w losartan 100mg qd and nifedipine    4. RUSSELL on cpap    5. DVT ppx- lovenox subq 51 yo M with PMHx as listed presents with acute  back pain.      1. acute on chronic back pain possibly 2/2 spinal abscess 2/2 MEREDITH  - Per neural surgery: CT scan did not show spinal abscess. Pt likely has superficial abscess from stitches, no need to take out spinal stimulator for now, continue with antibiotics  - per ID: PICC line and IV antibiotics  for 6 weeks patient  now agreeable to PICC line.   -pt can not go to MRI 2/2 body habitus however MRI can be done as out patient since patients lower back pain is chronic and there is nothing to be done acutely.   - pt currently on PCA Dilaudid pump, discussed with pain management on the phone, switched to PO MS contin 60mg q12 and oxycodine 10mg q6 prn and morphine 2 mg IV q2 prn.   Patient is having Break through pain and it is not acceptably controlled. case discussed with Dr. Lombardo and we are in agreement to discontinue Oxycontin 60 Q12H and start the patient on Oxycontin 80 q12h. Dr. Lombardo will follow with patient in am for tolerance and assessment of pain.       2.  bilateral lower extremity pain and swelling  - lasix increased to 40mg twice daily PO  Metolazone 5 mg added as per Renal   Follow with Podiatry recommendations   Follow vascular recommendations   follow with Lower Extremity Venous and Arterial duplex both negative for DVT or arterial occlusive disease.   - echo shows normal EF    3. HTN- controlled  - c/w losartan 100mg qd and nifedipine    4. RUSSELL on cpap    5. DVT ppx- lovenox subq

## 2018-03-08 NOTE — PROGRESS NOTE ADULT - ASSESSMENT
Pedal edema  hypokalemia  morbid obesity  back pain  HTN  RUSSELL    plan:  cont lasix 40mg po bid  short course metolazone  careful with precipitating mike due to diuretics with arb and nsaids on board  kcl 40 po x 2  f/u bmp  dvt prophylaxis  abx per id   pain control

## 2018-03-09 LAB
ANION GAP SERPL CALC-SCNC: 13 MMOL/L — SIGNIFICANT CHANGE UP (ref 7–14)
BASOPHILS # BLD AUTO: 0.05 K/UL — SIGNIFICANT CHANGE UP (ref 0–0.2)
BASOPHILS NFR BLD AUTO: 0.7 % — SIGNIFICANT CHANGE UP (ref 0–1)
BUN SERPL-MCNC: 15 MG/DL — SIGNIFICANT CHANGE UP (ref 10–20)
CALCIUM SERPL-MCNC: 9.7 MG/DL — SIGNIFICANT CHANGE UP (ref 8.5–10.1)
CHLORIDE SERPL-SCNC: 92 MMOL/L — LOW (ref 98–110)
CO2 SERPL-SCNC: 32 MMOL/L — SIGNIFICANT CHANGE UP (ref 17–32)
CREAT SERPL-MCNC: 1 MG/DL — SIGNIFICANT CHANGE UP (ref 0.7–1.5)
EOSINOPHIL # BLD AUTO: 0.46 K/UL — SIGNIFICANT CHANGE UP (ref 0–0.7)
EOSINOPHIL NFR BLD AUTO: 6.5 % — SIGNIFICANT CHANGE UP (ref 0–8)
GLUCOSE SERPL-MCNC: 211 MG/DL — HIGH (ref 70–110)
HCT VFR BLD CALC: 42.7 % — SIGNIFICANT CHANGE UP (ref 42–52)
HGB BLD-MCNC: 13.8 G/DL — LOW (ref 14–18)
IMM GRANULOCYTES NFR BLD AUTO: 0.4 % — HIGH (ref 0.1–0.3)
LYMPHOCYTES # BLD AUTO: 2.38 K/UL — SIGNIFICANT CHANGE UP (ref 1.2–3.4)
LYMPHOCYTES # BLD AUTO: 33.8 % — SIGNIFICANT CHANGE UP (ref 20.5–51.1)
MCHC RBC-ENTMCNC: 28.2 PG — SIGNIFICANT CHANGE UP (ref 27–31)
MCHC RBC-ENTMCNC: 32.3 G/DL — SIGNIFICANT CHANGE UP (ref 32–37)
MCV RBC AUTO: 87.1 FL — SIGNIFICANT CHANGE UP (ref 80–94)
MONOCYTES # BLD AUTO: 0.52 K/UL — SIGNIFICANT CHANGE UP (ref 0.1–0.6)
MONOCYTES NFR BLD AUTO: 7.4 % — SIGNIFICANT CHANGE UP (ref 1.7–9.3)
NEUTROPHILS # BLD AUTO: 3.6 K/UL — SIGNIFICANT CHANGE UP (ref 1.4–6.5)
NEUTROPHILS NFR BLD AUTO: 51.2 % — SIGNIFICANT CHANGE UP (ref 42.2–75.2)
NRBC # BLD: 0 /100 WBCS — SIGNIFICANT CHANGE UP (ref 0–0)
PLATELET # BLD AUTO: 308 K/UL — SIGNIFICANT CHANGE UP (ref 130–400)
POTASSIUM SERPL-MCNC: 3.5 MMOL/L — SIGNIFICANT CHANGE UP (ref 3.5–5)
POTASSIUM SERPL-SCNC: 3.5 MMOL/L — SIGNIFICANT CHANGE UP (ref 3.5–5)
RBC # BLD: 4.9 M/UL — SIGNIFICANT CHANGE UP (ref 4.7–6.1)
RBC # FLD: 13 % — SIGNIFICANT CHANGE UP (ref 11.5–14.5)
SODIUM SERPL-SCNC: 137 MMOL/L — SIGNIFICANT CHANGE UP (ref 135–146)
WBC # BLD: 7.04 K/UL — SIGNIFICANT CHANGE UP (ref 4.8–10.8)
WBC # FLD AUTO: 7.04 K/UL — SIGNIFICANT CHANGE UP (ref 4.8–10.8)

## 2018-03-09 RX ORDER — ALPRAZOLAM 0.25 MG
0.5 TABLET ORAL ONCE
Qty: 0 | Refills: 0 | Status: DISCONTINUED | OUTPATIENT
Start: 2018-03-09 | End: 2018-03-09

## 2018-03-09 RX ADMIN — Medication 100 MILLIGRAM(S): at 13:47

## 2018-03-09 RX ADMIN — SENNA PLUS 1 TABLET(S): 8.6 TABLET ORAL at 21:20

## 2018-03-09 RX ADMIN — Medication 100 MILLIGRAM(S): at 13:48

## 2018-03-09 RX ADMIN — Medication 100 MILLIGRAM(S): at 21:20

## 2018-03-09 RX ADMIN — MORPHINE SULFATE 2 MILLIGRAM(S): 50 CAPSULE, EXTENDED RELEASE ORAL at 22:35

## 2018-03-09 RX ADMIN — Medication 500 MILLIGRAM(S): at 17:00

## 2018-03-09 RX ADMIN — LOSARTAN POTASSIUM 100 MILLIGRAM(S): 100 TABLET, FILM COATED ORAL at 21:20

## 2018-03-09 RX ADMIN — TAMSULOSIN HYDROCHLORIDE 0.4 MILLIGRAM(S): 0.4 CAPSULE ORAL at 21:20

## 2018-03-09 RX ADMIN — MORPHINE SULFATE 2 MILLIGRAM(S): 50 CAPSULE, EXTENDED RELEASE ORAL at 13:53

## 2018-03-09 RX ADMIN — Medication 40 MILLIGRAM(S): at 05:56

## 2018-03-09 RX ADMIN — Medication 500 MILLIGRAM(S): at 06:09

## 2018-03-09 RX ADMIN — MORPHINE SULFATE 2 MILLIGRAM(S): 50 CAPSULE, EXTENDED RELEASE ORAL at 03:25

## 2018-03-09 RX ADMIN — MORPHINE SULFATE 2 MILLIGRAM(S): 50 CAPSULE, EXTENDED RELEASE ORAL at 10:53

## 2018-03-09 RX ADMIN — ENOXAPARIN SODIUM 40 MILLIGRAM(S): 100 INJECTION SUBCUTANEOUS at 21:19

## 2018-03-09 RX ADMIN — Medication 1 APPLICATION(S): at 17:00

## 2018-03-09 RX ADMIN — OXYCODONE HYDROCHLORIDE 80 MILLIGRAM(S): 5 TABLET ORAL at 17:00

## 2018-03-09 RX ADMIN — MORPHINE SULFATE 2 MILLIGRAM(S): 50 CAPSULE, EXTENDED RELEASE ORAL at 06:09

## 2018-03-09 RX ADMIN — MORPHINE SULFATE 2 MILLIGRAM(S): 50 CAPSULE, EXTENDED RELEASE ORAL at 22:12

## 2018-03-09 RX ADMIN — Medication 40 MILLIGRAM(S): at 17:00

## 2018-03-09 RX ADMIN — Medication 0.5 MILLIGRAM(S): at 22:34

## 2018-03-09 RX ADMIN — Medication 0.5 MILLIGRAM(S): at 03:26

## 2018-03-09 RX ADMIN — Medication 500 MILLIGRAM(S): at 05:56

## 2018-03-09 RX ADMIN — MORPHINE SULFATE 2 MILLIGRAM(S): 50 CAPSULE, EXTENDED RELEASE ORAL at 04:49

## 2018-03-09 RX ADMIN — Medication 1 APPLICATION(S): at 05:56

## 2018-03-09 RX ADMIN — Medication 30 MILLIGRAM(S): at 05:56

## 2018-03-09 RX ADMIN — OXYCODONE HYDROCHLORIDE 80 MILLIGRAM(S): 5 TABLET ORAL at 05:57

## 2018-03-09 RX ADMIN — MORPHINE SULFATE 2 MILLIGRAM(S): 50 CAPSULE, EXTENDED RELEASE ORAL at 16:53

## 2018-03-09 RX ADMIN — Medication 100 MILLIGRAM(S): at 06:09

## 2018-03-09 RX ADMIN — Medication 100 MILLIGRAM(S): at 05:56

## 2018-03-09 NOTE — PROGRESS NOTE ADULT - ASSESSMENT
51 yo M with PMHx as listed presents with acute  back pain.      1. acute on chronic back pain possibly 2/2 spinal abscess 2/2 MEREDITH  - Per neural surgery: CT scan did not show spinal abscess. Pt likely has superficial abscess from stitches, no need to take out spinal stimulator for now, continue with antibiotics  - per ID: PICC line and IV antibiotics  for 6 weeks patient  now agreeable to PICC line.   -patient following with Neurosurgery 2nd opinion from [facility] staff   - pt currently on PCA Dilaudid pump, discussed with pain management on the phone, switched to PO MS contin 60mg q12 and oxycodine 10mg q6 prn and morphine 2 mg IV q2 prn.   Patient is having Break through pain and it is not acceptably controlled. case discussed with Dr. Lombardo and we are in agreement to discontinue Oxycontin 60 Q12H and start the patient on Oxycontin 80 q12h. Dr. Lombardo will follow with patient today tolerance and assessment of pain.   -will follow with ID as patient wants further information on infection start and duration   -Case discussed with Dr. Kelley who wants second opinion from neurosurgery for patient in order to evaluate and exercise the fact of thoroughly assessing the case and possible treatment modalities.        2.  bilateral lower extremity pain and swelling  - lasix increased to 40mg twice daily PO  Metolazone 5 mg added as per Renal   Follow with further podiatry recommendations as patient wants further recommendations and clarification.   Follow vascular recommendations   follow with Lower Extremity Venous and Arterial duplex both negative for DVT or arterial occlusive disease.   - echo shows normal EF    3. HTN- controlled  - c/w losartan 100mg qd and nifedipine    4. RUSSELL on cpap    5. DVT ppx- lovenox subq

## 2018-03-09 NOTE — PROGRESS NOTE ADULT - SUBJECTIVE AND OBJECTIVE BOX
Nephrology f/u:    Pt seen outside hospital, ambulating while holding wheelchair.  Edema slightly better.  K+ improved.  Still with back pain.  No fever.  BP's acceptable.      Physical Exam:  	  NAD  morbidly obese  moist mucosa  no jvd  decreased b/l bs  distant hs  soft  3+pedal edema to thigh  no matamoros  no rash    Hospital Medications:   MEDICATIONS  (STANDING):  ceFAZolin   IVPB 2000 milliGRAM(s) IV Intermittent every 8 hours  docusate sodium 100 milliGRAM(s) Oral three times a day  enoxaparin Injectable 40 milliGRAM(s) SubCutaneous at bedtime  furosemide    Tablet 40 milliGRAM(s) Oral two times a day  losartan 100 milliGRAM(s) Oral at bedtime  metolazone 5 milliGRAM(s) Oral daily  miconazole 2% Cream 1 Application(s) Topical two times a day  naproxen 500 milliGRAM(s) Oral two times a day  NIFEdipine XL 30 milliGRAM(s) Oral daily  oxyCODONE  ER Tablet 80 milliGRAM(s) Oral every 12 hours  pantoprazole    Tablet 40 milliGRAM(s) Oral before breakfast  senna 1 Tablet(s) Oral at bedtime  sodium chloride 0.9%. 1000 milliLiter(s) (10 mL/Hr) IV Continuous <Continuous>  tamsulosin 0.4 milliGRAM(s) Oral at bedtime        VITALS:  T(F): 97.6 (03-09-18 @ 05:13), Max: 97.7 (03-08-18 @ 20:15)  HR: 81 (03-09-18 @ 05:13)  BP: 133/75 (03-09-18 @ 05:13)  RR: 20 (03-09-18 @ 05:13)  SpO2: --  Wt(kg): --    03-07 @ 07:01  -  03-08 @ 07:00  --------------------------------------------------------  IN: 50 mL / OUT: 0 mL / NET: 50 mL    03-08 @ 07:01  -  03-09 @ 07:00  --------------------------------------------------------  IN: 240 mL / OUT: 0 mL / NET: 240 mL    03-09 @ 07:01  -  03-09 @ 13:02  --------------------------------------------------------  IN: 240 mL / OUT: 0 mL / NET: 240 mL      Height (cm): 200.66 (03-08 @ 13:45)    LABS:  03-09    137  |  92<L>  |  15  ----------------------------<  211<H>  3.5   |  32  |  1.0    Ca    9.7      09 Mar 2018 09:25    TPro  5.9<L>  /  Alb  3.5  /  TBili  0.7  /  DBili      /  AST  24  /  ALT  19  /  AlkPhos  55  03-08                          13.8   7.04  )-----------( 308      ( 09 Mar 2018 09:25 )             42.7       Urine Studies:        RADIOLOGY & ADDITIONAL STUDIES:

## 2018-03-09 NOTE — PROGRESS NOTE ADULT - ASSESSMENT
Pedal edema  hypokalemia  morbid obesity  back pain  HTN  RUSSELL    plan:  cont lasix 40mg po bid  complete metolazone  careful with precipitating mike due to diuretics with arb and nsaids on board  kcl prn  no change in bp meds  f/u bmp  dvt prophylaxis  abx per id   pain control

## 2018-03-09 NOTE — CHART NOTE - NSCHARTNOTEFT_GEN_A_CORE
Registered Dietitian Not At Risk Follow-Up    Pt has improved lower extremity edema, Coninue abx as per id--pt to have PICC line placement. Meds and labs reviewed. No GI distress noted. Pt reports he has been eating 50% of his meal trays vs EMR documentation of % po intake. Pt reports wife just brought in his "SlimFast" shakes and he has been drinking those daily. Pt's wife also brings in food from home-- pt is meeting his pro/kcal needs. No further nutritional interventions at this time.

## 2018-03-09 NOTE — PROGRESS NOTE ADULT - ATTENDING COMMENTS
pt seen and examined independently, I have read and agree with above exam and plan of care,  extensive discussion with patient along with nursing staff, ho, case mgmgt and   id followup, podiatry followup  2nd opinion neurosurgery  picc line conitnue iv abx  rehab /pt   dc planning
pt seen and examined independently, I have read and agree with above, extensive discussion with patient  improved lower extremity edema, coninue abx as per id  picc line placemnet  pain mgmt followup  coninue current treatment
t seen and examined indep. I have read and agree with above,  awaiting mri with sedation, neurosurg/id following  coninue current treatments

## 2018-03-09 NOTE — PROGRESS NOTE ADULT - SUBJECTIVE AND OBJECTIVE BOX
Patient is a 52y old  Male who presents with a chief complaint of "Back pain shooting to abdomen and R LE and oozing back blister" (23 Feb 2018 17:16)      PAST MEDICAL & SURGICAL HISTORY:  Morbid obesity  Obstructive sleep apnea  Disc disease, degenerative, lumbar or lumbosacral  Hypertension  History of excision of pilonidal cyst  H/O arthroscopy of right knee  Spinal cord stimulator status      MEDICATIONS  (STANDING):  ceFAZolin   IVPB 2000 milliGRAM(s) IV Intermittent every 8 hours  docusate sodium 100 milliGRAM(s) Oral three times a day  enoxaparin Injectable 40 milliGRAM(s) SubCutaneous at bedtime  furosemide    Tablet 40 milliGRAM(s) Oral two times a day  losartan 100 milliGRAM(s) Oral at bedtime  metolazone 5 milliGRAM(s) Oral daily  miconazole 2% Cream 1 Application(s) Topical two times a day  naproxen 500 milliGRAM(s) Oral two times a day  NIFEdipine XL 30 milliGRAM(s) Oral daily  oxyCODONE  ER Tablet 80 milliGRAM(s) Oral every 12 hours  pantoprazole    Tablet 40 milliGRAM(s) Oral before breakfast  senna 1 Tablet(s) Oral at bedtime  sodium chloride 0.9%. 1000 milliLiter(s) (10 mL/Hr) IV Continuous <Continuous>  tamsulosin 0.4 milliGRAM(s) Oral at bedtime    MEDICATIONS  (PRN):  acetaminophen   Tablet. 650 milliGRAM(s) Oral every 6 hours PRN Mild Pain (1 - 3)  ALPRAZolam 0.5 milliGRAM(s) Oral at bedtime PRN anxiety  cyclobenzaprine 10 milliGRAM(s) Oral three times a day PRN Muscle Spasm  morphine  - Injectable 2 milliGRAM(s) IV Push every 2 hours PRN Severe Pain (7 - 10)  naloxone Injectable 0.1 milliGRAM(s) IV Push every 3 minutes PRN For ANY of the following changes in patient status:  A. RR LESS THAN 10 breaths per minute, B. Oxygen saturation LESS THAN 90%, C. Sedation score of 6  ondansetron Injectable 4 milliGRAM(s) IV Push every 6 hours PRN Nausea  oxyCODONE    IR 10 milliGRAM(s) Oral four times a day after meals PRN Moderate Pain (4 - 6)      Overnight events:    Vital Signs Last 24 Hrs  T(C): 36.1 (09 Mar 2018 13:49), Max: 36.5 (08 Mar 2018 20:15)  T(F): 97 (09 Mar 2018 13:49), Max: 97.7 (08 Mar 2018 20:15)  HR: 101 (09 Mar 2018 13:49) (81 - 101)  BP: 154/67 (09 Mar 2018 13:49) (133/75 - 169/88)  BP(mean): --  RR: 20 (09 Mar 2018 13:49) (20 - 20)  SpO2: --  CAPILLARY BLOOD GLUCOSE        I&O's Summary    08 Mar 2018 07:01  -  09 Mar 2018 07:00  --------------------------------------------------------  IN: 240 mL / OUT: 0 mL / NET: 240 mL    09 Mar 2018 07:01  -  09 Mar 2018 15:23  --------------------------------------------------------  IN: 240 mL / OUT: 0 mL / NET: 240 mL        Physical Exam:    -     General : NAD. PATIENT RESTING IN BED COMFORTABLY.     -      HEENT: NECK SOFT NO MASS     -      Cardiac: S1S2 NO MURMUR     -      Pulm: GOOD BILATERAL AIR ENTRY     -      GI: ABD SOFT NON - TENDER     -      Musculoskeletal: LOWER BACK PAIN. LOWER EXTREMITY EDEMA IMPROVED FROM YESTERDAY.     -      Neuro: AAOX3         Labs:                        13.8   7.04  )-----------( 308      ( 09 Mar 2018 09:25 )             42.7             03-09    137  |  92<L>  |  15  ----------------------------<  211<H>  3.5   |  32  |  1.0    Ca    9.7      09 Mar 2018 09:25    TPro  5.9<L>  /  Alb  3.5  /  TBili  0.7  /  DBili  x   /  AST  24  /  ALT  19  /  AlkPhos  55  03-08    LIVER FUNCTIONS - ( 08 Mar 2018 07:17 )  Alb: 3.5 g/dL / Pro: 5.9 g/dL / ALK PHOS: 55 U/L / ALT: 19 U/L / AST: 24 U/L / GGT: x                               T(C): 36.1 (03-09-18 @ 13:49), Max: 36.5 (03-08-18 @ 20:15)  HR: 101 (03-09-18 @ 13:49) (81 - 101)  BP: 154/67 (03-09-18 @ 13:49) (133/75 - 169/88)  RR: 20 (03-09-18 @ 13:49) (20 - 20)  SpO2: -- Pt. lives in assisted living facility and requires max. assist for transfers and assist for all ADL

## 2018-03-10 RX ORDER — ALPRAZOLAM 0.25 MG
0.5 TABLET ORAL AT BEDTIME
Qty: 0 | Refills: 0 | Status: DISCONTINUED | OUTPATIENT
Start: 2018-03-10 | End: 2018-03-16

## 2018-03-10 RX ADMIN — SENNA PLUS 1 TABLET(S): 8.6 TABLET ORAL at 21:06

## 2018-03-10 RX ADMIN — MORPHINE SULFATE 2 MILLIGRAM(S): 50 CAPSULE, EXTENDED RELEASE ORAL at 23:51

## 2018-03-10 RX ADMIN — Medication 40 MILLIGRAM(S): at 06:20

## 2018-03-10 RX ADMIN — Medication 100 MILLIGRAM(S): at 21:12

## 2018-03-10 RX ADMIN — MORPHINE SULFATE 2 MILLIGRAM(S): 50 CAPSULE, EXTENDED RELEASE ORAL at 15:24

## 2018-03-10 RX ADMIN — PANTOPRAZOLE SODIUM 40 MILLIGRAM(S): 20 TABLET, DELAYED RELEASE ORAL at 06:22

## 2018-03-10 RX ADMIN — Medication 500 MILLIGRAM(S): at 06:21

## 2018-03-10 RX ADMIN — Medication 100 MILLIGRAM(S): at 06:19

## 2018-03-10 RX ADMIN — Medication 500 MILLIGRAM(S): at 17:31

## 2018-03-10 RX ADMIN — ENOXAPARIN SODIUM 40 MILLIGRAM(S): 100 INJECTION SUBCUTANEOUS at 21:06

## 2018-03-10 RX ADMIN — OXYCODONE HYDROCHLORIDE 80 MILLIGRAM(S): 5 TABLET ORAL at 06:19

## 2018-03-10 RX ADMIN — Medication 1 APPLICATION(S): at 07:39

## 2018-03-10 RX ADMIN — Medication 1 APPLICATION(S): at 17:30

## 2018-03-10 RX ADMIN — Medication 30 MILLIGRAM(S): at 06:22

## 2018-03-10 RX ADMIN — MORPHINE SULFATE 2 MILLIGRAM(S): 50 CAPSULE, EXTENDED RELEASE ORAL at 11:25

## 2018-03-10 RX ADMIN — LOSARTAN POTASSIUM 100 MILLIGRAM(S): 100 TABLET, FILM COATED ORAL at 21:05

## 2018-03-10 RX ADMIN — Medication 100 MILLIGRAM(S): at 06:20

## 2018-03-10 RX ADMIN — MORPHINE SULFATE 2 MILLIGRAM(S): 50 CAPSULE, EXTENDED RELEASE ORAL at 17:36

## 2018-03-10 RX ADMIN — Medication 100 MILLIGRAM(S): at 21:05

## 2018-03-10 RX ADMIN — Medication 40 MILLIGRAM(S): at 17:29

## 2018-03-10 RX ADMIN — MORPHINE SULFATE 2 MILLIGRAM(S): 50 CAPSULE, EXTENDED RELEASE ORAL at 21:16

## 2018-03-10 RX ADMIN — Medication 100 MILLIGRAM(S): at 14:42

## 2018-03-10 RX ADMIN — Medication 100 MILLIGRAM(S): at 14:43

## 2018-03-10 RX ADMIN — Medication 0.5 MILLIGRAM(S): at 21:03

## 2018-03-10 RX ADMIN — TAMSULOSIN HYDROCHLORIDE 0.4 MILLIGRAM(S): 0.4 CAPSULE ORAL at 21:06

## 2018-03-10 RX ADMIN — MORPHINE SULFATE 2 MILLIGRAM(S): 50 CAPSULE, EXTENDED RELEASE ORAL at 21:12

## 2018-03-10 RX ADMIN — MORPHINE SULFATE 2 MILLIGRAM(S): 50 CAPSULE, EXTENDED RELEASE ORAL at 06:35

## 2018-03-10 RX ADMIN — MORPHINE SULFATE 2 MILLIGRAM(S): 50 CAPSULE, EXTENDED RELEASE ORAL at 07:40

## 2018-03-10 RX ADMIN — OXYCODONE HYDROCHLORIDE 80 MILLIGRAM(S): 5 TABLET ORAL at 17:31

## 2018-03-10 NOTE — PROGRESS NOTE ADULT - GASTROINTESTINAL
Soft, non-tender, no hepatosplenomegaly, normal bowel sounds
negative

## 2018-03-10 NOTE — PROGRESS NOTE ADULT - ASSESSMENT
53 yo M with PMHx as listed presents with acute  back pain.      1. acute on chronic back pain possibly 2/2 spinal abscess 2/2 MEREDITH  - Per neural surgery: CT scan did not show spinal abscess. Pt likely has superficial abscess from stitches, no need to take out spinal stimulator for now, continue with antibiotics  - per ID: PICC line and IV antibiotics  for 6 weeks patient  Patient received picc line last night. he tolerated procedure well.   -patient following with Neurosurgery 2nd opinion from [facility] staff   Patient is having Break through pain and it is not acceptably controlled. case discussed with Dr. Lombardo and we are in agreement to discontinue Oxycontin 60 Q12H and start the patient on Oxycontin 80 q12h. Dr. Lombardo will follow with patient today for tolerance and assessment of pain.   -will follow with ID as patient wants further information on infection start and duration   -Case discussed with Dr. Kelley who wants second opinion from neurosurgery for patient in order to evaluate and exercise the fact of thoroughly assessing the case and possible alternate treatment modalities.        2.  bilateral lower extremity pain and swelling  - lasix increased to 40mg twice daily PO  Metolazone 5 mg added as per Renal   Follow with further podiatry recommendations as patient wants further recommendations and clarification.   Follow vascular recommendations   follow with Lower Extremity Venous and Arterial duplex both negative for DVT or arterial occlusive disease.   - echo shows normal EF    3. HTN- controlled  - c/w losartan 100mg qd and nifedipine    4. RUSSELL on cpap    5. DVT ppx- lovenox subq

## 2018-03-10 NOTE — PROGRESS NOTE ADULT - CARDIOVASCULAR
Regular rate & rhythm, normal S1, S2; no murmurs, gallops or rubs; no S3, S4
negative
Regular rate & rhythm, normal S1, S2; no murmurs, gallops or rubs; no S3, S4
negative
negative

## 2018-03-10 NOTE — PROGRESS NOTE ADULT - SUBJECTIVE AND OBJECTIVE BOX
Patient is a 52y old  Male who presents with a chief complaint of "Back pain shooting to abdomen and R LE and oozing back blister" (23 Feb 2018 17:16)      PAST MEDICAL & SURGICAL HISTORY:  Morbid obesity  Obstructive sleep apnea  Disc disease, degenerative, lumbar or lumbosacral  Hypertension  History of excision of pilonidal cyst  H/O arthroscopy of right knee  Spinal cord stimulator status      MEDICATIONS  (STANDING):  ceFAZolin   IVPB 2000 milliGRAM(s) IV Intermittent every 8 hours  docusate sodium 100 milliGRAM(s) Oral three times a day  enoxaparin Injectable 40 milliGRAM(s) SubCutaneous at bedtime  furosemide    Tablet 40 milliGRAM(s) Oral two times a day  losartan 100 milliGRAM(s) Oral at bedtime  metolazone 5 milliGRAM(s) Oral daily  miconazole 2% Cream 1 Application(s) Topical two times a day  naproxen 500 milliGRAM(s) Oral two times a day  NIFEdipine XL 30 milliGRAM(s) Oral daily  oxyCODONE  ER Tablet 80 milliGRAM(s) Oral every 12 hours  pantoprazole    Tablet 40 milliGRAM(s) Oral before breakfast  senna 1 Tablet(s) Oral at bedtime  sodium chloride 0.9%. 1000 milliLiter(s) (10 mL/Hr) IV Continuous <Continuous>  tamsulosin 0.4 milliGRAM(s) Oral at bedtime    MEDICATIONS  (PRN):  acetaminophen   Tablet. 650 milliGRAM(s) Oral every 6 hours PRN Mild Pain (1 - 3)  cyclobenzaprine 10 milliGRAM(s) Oral three times a day PRN Muscle Spasm  morphine  - Injectable 2 milliGRAM(s) IV Push every 2 hours PRN Severe Pain (7 - 10)  naloxone Injectable 0.1 milliGRAM(s) IV Push every 3 minutes PRN For ANY of the following changes in patient status:  A. RR LESS THAN 10 breaths per minute, B. Oxygen saturation LESS THAN 90%, C. Sedation score of 6  ondansetron Injectable 4 milliGRAM(s) IV Push every 6 hours PRN Nausea      Overnight events:    Vital Signs Last 24 Hrs  T(C): 36.4 (10 Mar 2018 05:03), Max: 36.4 (10 Mar 2018 05:03)  T(F): 97.5 (10 Mar 2018 05:03), Max: 97.5 (10 Mar 2018 05:03)  HR: 79 (10 Mar 2018 05:03) (79 - 108)  BP: 123/68 (10 Mar 2018 05:03) (123/68 - 174/92)  BP(mean): --  RR: 20 (10 Mar 2018 05:03) (20 - 20)  SpO2: --  CAPILLARY BLOOD GLUCOSE        I&O's Summary    09 Mar 2018 07:01  -  10 Mar 2018 07:00  --------------------------------------------------------  IN: 240 mL / OUT: 0 mL / NET: 240 mL        Physical Exam:    -     General : Patient seen and examined this morning. he is not in any acute distress he was having breakfast while conversating with me and his wife about his care.     -      HEENT: neck soft no mass     -      Cardiac: s1s2 no mumur     -      Pulm: good air entry bilaterally     -      GI: abd soft non-tender     -      Musculoskeletal: wnl     -      Neuro: AAOX3         Labs:                        13.8   7.04  )-----------( 308      ( 09 Mar 2018 09:25 )             42.7             03-09    137  |  92<L>  |  15  ----------------------------<  211<H>  3.5   |  32  |  1.0    Ca    9.7      09 Mar 2018 09:25                            Imaging:    ECG:    T(C): 36.4 (03-10-18 @ 05:03), Max: 36.4 (03-10-18 @ 05:03)  HR: 79 (03-10-18 @ 05:03) (79 - 108)  BP: 123/68 (03-10-18 @ 05:03) (123/68 - 174/92)  RR: 20 (03-10-18 @ 05:03) (20 - 20)  SpO2: --

## 2018-03-10 NOTE — PROGRESS NOTE ADULT - SUBJECTIVE AND OBJECTIVE BOX
JOSE ALBERTO NO  52y  Male    Patient is a 52y old  Male who presents with a chief complaint of "Back pain shooting to abdomen and R LE and oozing back blister" (23 Feb 2018 17:16)    ALLERGIES:  IV Contrast (Unknown)  penicillin (Unknown)      INTERVAL HPI/OVERNIGHT EVENTS:    VITALS:  T(F): 96.4 (03-10-18 @ 12:04), Max: 97.5 (03-10-18 @ 05:03)  HR: 92 (03-10-18 @ 12:04) (79 - 108)  BP: 129/69 (03-10-18 @ 12:04) (123/68 - 174/92)  RR: 20 (03-10-18 @ 12:04) (20 - 20)  SpO2: --    LABS:  03-09    137  |  92<L>  |  15  ----------------------------<  211<H>  3.5   |  32  |  1.0    Ca    9.7      09 Mar 2018 09:25      MICROBIOLOGY:    MEDICATION:  ceFAZolin   IVPB 2000 milliGRAM(s) IV Intermittent every 8 hours  furosemide    Tablet 40 milliGRAM(s) Oral two times a day  metolazone 5 milliGRAM(s) Oral daily  miconazole 2% Cream 1 Application(s) Topical two times a day  oxyCODONE  ER Tablet 80 milliGRAM(s) Oral every 12 hours    RADIOLOGY & ADDITIONAL TESTS:

## 2018-03-10 NOTE — PROGRESS NOTE ADULT - ASSESSMENT
51 yo M with PMHx as listed presents with acute  back pain.      1. acute on chronic back pain possibly 2/2 spinal abscess 2/2 MEREDITH  - Per neural surgery: CT scan did not show spinal abscess. Pt likely has superficial abscess from stitches, no need to take out spinal stimulator for now, continue with antibiotics  - per ID: PICC line and IV antibiotics  for 6 weeks patient  Patient received picc line last night. he tolerated procedure well.   -patient following with Neurosurgery 2nd opinion from [facility] staff   Patient is having Break through pain and it is not acceptably controlled. case discussed with Dr. Lombardo and we are in agreement to discontinue Oxycontin 60 Q12H and start the patient on Oxycontin 80 q12h. Dr. Lombardo will follow with patient today for tolerance and assessment of pain.   -will follow with ID as patient wants further information on infection start and duration   -Case discussed with Dr. Kelley who wants second opinion from neurosurgery for patient in order to evaluate and exercise the fact of thoroughly assessing the case and possible alternate treatment modalities.        2.  bilateral lower extremity pain and swelling  - lasix increased to 40mg twice daily PO  Metolazone 5 mg added as per Renal   Follow with further podiatry recommendations as patient wants further recommendations and clarification.   Follow vascular recommendations   follow with Lower Extremity Venous and Arterial duplex both negative for DVT or arterial occlusive disease.   - echo shows normal EF    3. HTN- controlled  - c/w losartan 100mg qd and nifedipine    4. RUSSELL on cpap    5. DVT ppx- lovenox subq

## 2018-03-11 LAB
ANION GAP SERPL CALC-SCNC: 10 MMOL/L — SIGNIFICANT CHANGE UP (ref 7–14)
BASOPHILS # BLD AUTO: 0.04 K/UL — SIGNIFICANT CHANGE UP (ref 0–0.2)
BASOPHILS NFR BLD AUTO: 0.6 % — SIGNIFICANT CHANGE UP (ref 0–1)
BUN SERPL-MCNC: 21 MG/DL — HIGH (ref 10–20)
CALCIUM SERPL-MCNC: 9.3 MG/DL — SIGNIFICANT CHANGE UP (ref 8.5–10.1)
CHLORIDE SERPL-SCNC: 93 MMOL/L — LOW (ref 98–110)
CO2 SERPL-SCNC: 34 MMOL/L — HIGH (ref 17–32)
CREAT SERPL-MCNC: 1.2 MG/DL — SIGNIFICANT CHANGE UP (ref 0.7–1.5)
EOSINOPHIL # BLD AUTO: 0.28 K/UL — SIGNIFICANT CHANGE UP (ref 0–0.7)
EOSINOPHIL NFR BLD AUTO: 4.2 % — SIGNIFICANT CHANGE UP (ref 0–8)
GLUCOSE SERPL-MCNC: 174 MG/DL — HIGH (ref 70–110)
HCT VFR BLD CALC: 39 % — LOW (ref 42–52)
HGB BLD-MCNC: 12.8 G/DL — LOW (ref 14–18)
IMM GRANULOCYTES NFR BLD AUTO: 0.3 % — SIGNIFICANT CHANGE UP (ref 0.1–0.3)
LYMPHOCYTES # BLD AUTO: 2.24 K/UL — SIGNIFICANT CHANGE UP (ref 1.2–3.4)
LYMPHOCYTES # BLD AUTO: 33.2 % — SIGNIFICANT CHANGE UP (ref 20.5–51.1)
MCHC RBC-ENTMCNC: 29 PG — SIGNIFICANT CHANGE UP (ref 27–31)
MCHC RBC-ENTMCNC: 32.8 G/DL — SIGNIFICANT CHANGE UP (ref 32–37)
MCV RBC AUTO: 88.4 FL — SIGNIFICANT CHANGE UP (ref 80–94)
MONOCYTES # BLD AUTO: 0.7 K/UL — HIGH (ref 0.1–0.6)
MONOCYTES NFR BLD AUTO: 10.4 % — HIGH (ref 1.7–9.3)
NEUTROPHILS # BLD AUTO: 3.46 K/UL — SIGNIFICANT CHANGE UP (ref 1.4–6.5)
NEUTROPHILS NFR BLD AUTO: 51.3 % — SIGNIFICANT CHANGE UP (ref 42.2–75.2)
NRBC # BLD: 0 /100 WBCS — SIGNIFICANT CHANGE UP (ref 0–0)
PLATELET # BLD AUTO: 275 K/UL — SIGNIFICANT CHANGE UP (ref 130–400)
POTASSIUM SERPL-MCNC: 3.2 MMOL/L — LOW (ref 3.5–5)
POTASSIUM SERPL-SCNC: 3.2 MMOL/L — LOW (ref 3.5–5)
RBC # BLD: 4.41 M/UL — LOW (ref 4.7–6.1)
RBC # FLD: 13 % — SIGNIFICANT CHANGE UP (ref 11.5–14.5)
SODIUM SERPL-SCNC: 137 MMOL/L — SIGNIFICANT CHANGE UP (ref 135–146)
WBC # BLD: 6.74 K/UL — SIGNIFICANT CHANGE UP (ref 4.8–10.8)
WBC # FLD AUTO: 6.74 K/UL — SIGNIFICANT CHANGE UP (ref 4.8–10.8)

## 2018-03-11 RX ADMIN — Medication 100 MILLIGRAM(S): at 14:09

## 2018-03-11 RX ADMIN — Medication 100 MILLIGRAM(S): at 21:43

## 2018-03-11 RX ADMIN — OXYCODONE HYDROCHLORIDE 80 MILLIGRAM(S): 5 TABLET ORAL at 06:34

## 2018-03-11 RX ADMIN — MORPHINE SULFATE 2 MILLIGRAM(S): 50 CAPSULE, EXTENDED RELEASE ORAL at 00:21

## 2018-03-11 RX ADMIN — PANTOPRAZOLE SODIUM 40 MILLIGRAM(S): 20 TABLET, DELAYED RELEASE ORAL at 06:37

## 2018-03-11 RX ADMIN — MORPHINE SULFATE 2 MILLIGRAM(S): 50 CAPSULE, EXTENDED RELEASE ORAL at 17:38

## 2018-03-11 RX ADMIN — ENOXAPARIN SODIUM 40 MILLIGRAM(S): 100 INJECTION SUBCUTANEOUS at 21:45

## 2018-03-11 RX ADMIN — Medication 1 APPLICATION(S): at 17:39

## 2018-03-11 RX ADMIN — Medication 100 MILLIGRAM(S): at 06:34

## 2018-03-11 RX ADMIN — Medication 40 MILLIGRAM(S): at 17:37

## 2018-03-11 RX ADMIN — Medication 1 APPLICATION(S): at 06:37

## 2018-03-11 RX ADMIN — MORPHINE SULFATE 2 MILLIGRAM(S): 50 CAPSULE, EXTENDED RELEASE ORAL at 12:05

## 2018-03-11 RX ADMIN — Medication 30 MILLIGRAM(S): at 06:37

## 2018-03-11 RX ADMIN — MORPHINE SULFATE 2 MILLIGRAM(S): 50 CAPSULE, EXTENDED RELEASE ORAL at 03:55

## 2018-03-11 RX ADMIN — MORPHINE SULFATE 2 MILLIGRAM(S): 50 CAPSULE, EXTENDED RELEASE ORAL at 07:07

## 2018-03-11 RX ADMIN — OXYCODONE HYDROCHLORIDE 80 MILLIGRAM(S): 5 TABLET ORAL at 07:07

## 2018-03-11 RX ADMIN — Medication 100 MILLIGRAM(S): at 06:37

## 2018-03-11 RX ADMIN — MORPHINE SULFATE 2 MILLIGRAM(S): 50 CAPSULE, EXTENDED RELEASE ORAL at 14:09

## 2018-03-11 RX ADMIN — LOSARTAN POTASSIUM 100 MILLIGRAM(S): 100 TABLET, FILM COATED ORAL at 21:44

## 2018-03-11 RX ADMIN — Medication 100 MILLIGRAM(S): at 21:44

## 2018-03-11 RX ADMIN — Medication 500 MILLIGRAM(S): at 06:37

## 2018-03-11 RX ADMIN — MORPHINE SULFATE 2 MILLIGRAM(S): 50 CAPSULE, EXTENDED RELEASE ORAL at 22:39

## 2018-03-11 RX ADMIN — Medication 500 MILLIGRAM(S): at 17:38

## 2018-03-11 RX ADMIN — MORPHINE SULFATE 2 MILLIGRAM(S): 50 CAPSULE, EXTENDED RELEASE ORAL at 09:40

## 2018-03-11 RX ADMIN — OXYCODONE HYDROCHLORIDE 80 MILLIGRAM(S): 5 TABLET ORAL at 17:37

## 2018-03-11 RX ADMIN — MORPHINE SULFATE 2 MILLIGRAM(S): 50 CAPSULE, EXTENDED RELEASE ORAL at 23:35

## 2018-03-11 RX ADMIN — Medication 500 MILLIGRAM(S): at 07:07

## 2018-03-11 RX ADMIN — MORPHINE SULFATE 2 MILLIGRAM(S): 50 CAPSULE, EXTENDED RELEASE ORAL at 20:25

## 2018-03-11 RX ADMIN — SENNA PLUS 1 TABLET(S): 8.6 TABLET ORAL at 21:44

## 2018-03-11 RX ADMIN — Medication 40 MILLIGRAM(S): at 06:37

## 2018-03-11 RX ADMIN — MORPHINE SULFATE 2 MILLIGRAM(S): 50 CAPSULE, EXTENDED RELEASE ORAL at 06:34

## 2018-03-11 RX ADMIN — MORPHINE SULFATE 2 MILLIGRAM(S): 50 CAPSULE, EXTENDED RELEASE ORAL at 21:00

## 2018-03-11 RX ADMIN — TAMSULOSIN HYDROCHLORIDE 0.4 MILLIGRAM(S): 0.4 CAPSULE ORAL at 21:44

## 2018-03-11 RX ADMIN — Medication 0.5 MILLIGRAM(S): at 21:46

## 2018-03-11 NOTE — CHART NOTE - NSCHARTNOTEFT_GEN_A_CORE
Went today to see Mr Mobley and inspect his wound.  He refused to allow me to look at his back and I was not able to inspect his incisions.  He would prefer an opinion from a different neurosurgeon.  Given this, we will have Dr. Restrepo come and see the patient tomorrow and inspect his incision and leave any further recommendations as patient does not wish to have me follow him any further.

## 2018-03-11 NOTE — PROGRESS NOTE ADULT - SUBJECTIVE AND OBJECTIVE BOX
Patient was seen- resting comfortably. Spoke with RN. Chart reviewed.  No events overnight.  Vital Signs Last 24 Hrs  T(F): 96.7 (11 Mar 2018 04:57), Max: 96.7 (11 Mar 2018 04:57)  HR: 81 (11 Mar 2018 04:57) (81 - 92)  BP: 139/67 (11 Mar 2018 04:57) (129/69 - 142/83)  SpO2: --  MEDICATIONS  (STANDING):  ALPRAZolam 0.5 milliGRAM(s) Oral at bedtime  ceFAZolin   IVPB 2000 milliGRAM(s) IV Intermittent every 8 hours  docusate sodium 100 milliGRAM(s) Oral three times a day  enoxaparin Injectable 40 milliGRAM(s) SubCutaneous at bedtime  furosemide    Tablet 40 milliGRAM(s) Oral two times a day  losartan 100 milliGRAM(s) Oral at bedtime  miconazole 2% Cream 1 Application(s) Topical two times a day  naproxen 500 milliGRAM(s) Oral two times a day  NIFEdipine XL 30 milliGRAM(s) Oral daily  oxyCODONE  ER Tablet 80 milliGRAM(s) Oral every 12 hours  pantoprazole    Tablet 40 milliGRAM(s) Oral before breakfast  senna 1 Tablet(s) Oral at bedtime  sodium chloride 0.9%. 1000 milliLiter(s) (10 mL/Hr) IV Continuous <Continuous>  tamsulosin 0.4 milliGRAM(s) Oral at bedtime    MEDICATIONS  (PRN):  acetaminophen   Tablet. 650 milliGRAM(s) Oral every 6 hours PRN Mild Pain (1 - 3)  cyclobenzaprine 10 milliGRAM(s) Oral three times a day PRN Muscle Spasm  morphine  - Injectable 2 milliGRAM(s) IV Push every 2 hours PRN Severe Pain (7 - 10)  naloxone Injectable 0.1 milliGRAM(s) IV Push every 3 minutes PRN For ANY of the following changes in patient status:  A. RR LESS THAN 10 breaths per minute, B. Oxygen saturation LESS THAN 90%, C. Sedation score of 6  ondansetron Injectable 4 milliGRAM(s) IV Push every 6 hours PRN Nausea    Labs:                        13.8   7.04  )-----------( 308      ( 09 Mar 2018 09:25 )             42.7     11 Mar 2018 06:14    137    |  93     |  21     ----------------------------<  174    3.2     |  34     |  1.2    09 Mar 2018 09:25    137    |  92     |  15     ----------------------------<  211    3.5     |  32     |  1.0      Ca    9.3        11 Mar 2018 06:14  Ca    9.7        09 Mar 2018 09:25            General: comfortable, NAD  Neurology: A&Ox3, nonfocal  Head:  Normocephalic, atraumatic  ENT:  Mucosa moist, no ulcerations  Neck:  Supple, no JVD,   Resp: CTA B/L  CV: RRR, S1S2,   GI: Soft, NT, bowel sounds; obese  MS: No edema, + peripheral pulses, FROM all 4 extremity      A/P:  51 yo M with PMHx as listed presents with acute  back pain.      1. acute on chronic back pain possibly 2/2 spinal abscess 2/2 MEREDITH  - Per neurosurgery: CT scan did not show spinal abscess. Pt likely has superficial abscess from stitches, no need to take out spinal stimulator for now, continue with antibiotics; outpt neurosurgery f/u  - per ID: PICC line and IV antibiotics  for 6 weeks patient   -patient following with Neurosurgery   - Pain mgmnt/ Dr. Lombardo  discontinued Oxycontin 60 Q12H and started the patient on Oxycontin 80 q12h. Dr. Lombardo will follow  for tolerance and assessment of pain.    -ID f/u- Dr Thomas- as patient wants further information on infection start and duration       2.  bilateral lower extremity pain and swelling  - lasix increased to 40mg twice daily PO  Metolazone 5 mg added as per Renal   Follow with further podiatry recommendations   Follow vascular recommendations   Follow with renal   - Lower Extremity Venous and Arterial duplex both negative for DVT or arterial occlusive disease.   - echo shows normal EF    3. HTN- controlled  - c/w losartan 100mg qd and nifedipine    4. RUSSELL on cpap    DC planning this week      DVT prophylaxis  Decubitus prevention- all measures as per RN protocol  Please call or text me with any questions or updates

## 2018-03-12 LAB
ANION GAP SERPL CALC-SCNC: 10 MMOL/L — SIGNIFICANT CHANGE UP (ref 7–14)
BASOPHILS # BLD AUTO: 0.06 K/UL — SIGNIFICANT CHANGE UP (ref 0–0.2)
BASOPHILS NFR BLD AUTO: 1 % — SIGNIFICANT CHANGE UP (ref 0–1)
BUN SERPL-MCNC: 19 MG/DL — SIGNIFICANT CHANGE UP (ref 10–20)
CALCIUM SERPL-MCNC: 9.2 MG/DL — SIGNIFICANT CHANGE UP (ref 8.5–10.1)
CHLORIDE SERPL-SCNC: 93 MMOL/L — LOW (ref 98–110)
CO2 SERPL-SCNC: 33 MMOL/L — HIGH (ref 17–32)
CREAT SERPL-MCNC: 1.1 MG/DL — SIGNIFICANT CHANGE UP (ref 0.7–1.5)
EOSINOPHIL # BLD AUTO: 0.33 K/UL — SIGNIFICANT CHANGE UP (ref 0–0.7)
EOSINOPHIL NFR BLD AUTO: 5.3 % — SIGNIFICANT CHANGE UP (ref 0–8)
GLUCOSE SERPL-MCNC: 158 MG/DL — HIGH (ref 70–110)
HCT VFR BLD CALC: 40.2 % — LOW (ref 42–52)
HGB BLD-MCNC: 13.1 G/DL — LOW (ref 14–18)
IMM GRANULOCYTES NFR BLD AUTO: 0.3 % — SIGNIFICANT CHANGE UP (ref 0.1–0.3)
LYMPHOCYTES # BLD AUTO: 2.3 K/UL — SIGNIFICANT CHANGE UP (ref 1.2–3.4)
LYMPHOCYTES # BLD AUTO: 37.2 % — SIGNIFICANT CHANGE UP (ref 20.5–51.1)
MCHC RBC-ENTMCNC: 28.6 PG — SIGNIFICANT CHANGE UP (ref 27–31)
MCHC RBC-ENTMCNC: 32.6 G/DL — SIGNIFICANT CHANGE UP (ref 32–37)
MCV RBC AUTO: 87.8 FL — SIGNIFICANT CHANGE UP (ref 80–94)
MONOCYTES # BLD AUTO: 0.61 K/UL — HIGH (ref 0.1–0.6)
MONOCYTES NFR BLD AUTO: 9.9 % — HIGH (ref 1.7–9.3)
NEUTROPHILS # BLD AUTO: 2.87 K/UL — SIGNIFICANT CHANGE UP (ref 1.4–6.5)
NEUTROPHILS NFR BLD AUTO: 46.3 % — SIGNIFICANT CHANGE UP (ref 42.2–75.2)
NRBC # BLD: 0 /100 WBCS — SIGNIFICANT CHANGE UP (ref 0–0)
PLATELET # BLD AUTO: 275 K/UL — SIGNIFICANT CHANGE UP (ref 130–400)
POTASSIUM SERPL-MCNC: 3.2 MMOL/L — LOW (ref 3.5–5)
POTASSIUM SERPL-SCNC: 3.2 MMOL/L — LOW (ref 3.5–5)
RBC # BLD: 4.58 M/UL — LOW (ref 4.7–6.1)
RBC # FLD: 12.9 % — SIGNIFICANT CHANGE UP (ref 11.5–14.5)
SODIUM SERPL-SCNC: 136 MMOL/L — SIGNIFICANT CHANGE UP (ref 135–146)
WBC # BLD: 6.19 K/UL — SIGNIFICANT CHANGE UP (ref 4.8–10.8)
WBC # FLD AUTO: 6.19 K/UL — SIGNIFICANT CHANGE UP (ref 4.8–10.8)

## 2018-03-12 RX ADMIN — MORPHINE SULFATE 2 MILLIGRAM(S): 50 CAPSULE, EXTENDED RELEASE ORAL at 22:13

## 2018-03-12 RX ADMIN — MORPHINE SULFATE 2 MILLIGRAM(S): 50 CAPSULE, EXTENDED RELEASE ORAL at 21:24

## 2018-03-12 RX ADMIN — SENNA PLUS 1 TABLET(S): 8.6 TABLET ORAL at 21:25

## 2018-03-12 RX ADMIN — MORPHINE SULFATE 2 MILLIGRAM(S): 50 CAPSULE, EXTENDED RELEASE ORAL at 02:36

## 2018-03-12 RX ADMIN — MORPHINE SULFATE 2 MILLIGRAM(S): 50 CAPSULE, EXTENDED RELEASE ORAL at 18:59

## 2018-03-12 RX ADMIN — MORPHINE SULFATE 2 MILLIGRAM(S): 50 CAPSULE, EXTENDED RELEASE ORAL at 05:56

## 2018-03-12 RX ADMIN — MORPHINE SULFATE 2 MILLIGRAM(S): 50 CAPSULE, EXTENDED RELEASE ORAL at 09:00

## 2018-03-12 RX ADMIN — Medication 1 APPLICATION(S): at 05:58

## 2018-03-12 RX ADMIN — Medication 500 MILLIGRAM(S): at 07:43

## 2018-03-12 RX ADMIN — PANTOPRAZOLE SODIUM 40 MILLIGRAM(S): 20 TABLET, DELAYED RELEASE ORAL at 08:02

## 2018-03-12 RX ADMIN — LOSARTAN POTASSIUM 100 MILLIGRAM(S): 100 TABLET, FILM COATED ORAL at 21:27

## 2018-03-12 RX ADMIN — Medication 0.5 APPLICATION(S): at 17:36

## 2018-03-12 RX ADMIN — OXYCODONE HYDROCHLORIDE 80 MILLIGRAM(S): 5 TABLET ORAL at 17:44

## 2018-03-12 RX ADMIN — MORPHINE SULFATE 2 MILLIGRAM(S): 50 CAPSULE, EXTENDED RELEASE ORAL at 11:26

## 2018-03-12 RX ADMIN — Medication 100 MILLIGRAM(S): at 21:25

## 2018-03-12 RX ADMIN — Medication 30 MILLIGRAM(S): at 05:57

## 2018-03-12 RX ADMIN — Medication 40 MILLIGRAM(S): at 17:33

## 2018-03-12 RX ADMIN — MORPHINE SULFATE 2 MILLIGRAM(S): 50 CAPSULE, EXTENDED RELEASE ORAL at 11:45

## 2018-03-12 RX ADMIN — Medication 40 MILLIGRAM(S): at 05:57

## 2018-03-12 RX ADMIN — ENOXAPARIN SODIUM 40 MILLIGRAM(S): 100 INJECTION SUBCUTANEOUS at 21:26

## 2018-03-12 RX ADMIN — Medication 500 MILLIGRAM(S): at 17:33

## 2018-03-12 RX ADMIN — MORPHINE SULFATE 2 MILLIGRAM(S): 50 CAPSULE, EXTENDED RELEASE ORAL at 08:47

## 2018-03-12 RX ADMIN — MORPHINE SULFATE 2 MILLIGRAM(S): 50 CAPSULE, EXTENDED RELEASE ORAL at 02:51

## 2018-03-12 RX ADMIN — Medication 100 MILLIGRAM(S): at 14:21

## 2018-03-12 RX ADMIN — MORPHINE SULFATE 2 MILLIGRAM(S): 50 CAPSULE, EXTENDED RELEASE ORAL at 14:19

## 2018-03-12 RX ADMIN — Medication 100 MILLIGRAM(S): at 05:58

## 2018-03-12 RX ADMIN — Medication 500 MILLIGRAM(S): at 05:57

## 2018-03-12 RX ADMIN — Medication 100 MILLIGRAM(S): at 05:56

## 2018-03-12 RX ADMIN — Medication 100 MILLIGRAM(S): at 21:27

## 2018-03-12 RX ADMIN — MORPHINE SULFATE 2 MILLIGRAM(S): 50 CAPSULE, EXTENDED RELEASE ORAL at 14:35

## 2018-03-12 RX ADMIN — Medication 500 MILLIGRAM(S): at 17:44

## 2018-03-12 RX ADMIN — TAMSULOSIN HYDROCHLORIDE 0.4 MILLIGRAM(S): 0.4 CAPSULE ORAL at 21:25

## 2018-03-12 RX ADMIN — MORPHINE SULFATE 2 MILLIGRAM(S): 50 CAPSULE, EXTENDED RELEASE ORAL at 16:28

## 2018-03-12 RX ADMIN — OXYCODONE HYDROCHLORIDE 80 MILLIGRAM(S): 5 TABLET ORAL at 17:38

## 2018-03-12 RX ADMIN — OXYCODONE HYDROCHLORIDE 80 MILLIGRAM(S): 5 TABLET ORAL at 05:59

## 2018-03-12 RX ADMIN — Medication 0.5 MILLIGRAM(S): at 21:24

## 2018-03-12 RX ADMIN — MORPHINE SULFATE 2 MILLIGRAM(S): 50 CAPSULE, EXTENDED RELEASE ORAL at 19:15

## 2018-03-12 RX ADMIN — MORPHINE SULFATE 2 MILLIGRAM(S): 50 CAPSULE, EXTENDED RELEASE ORAL at 16:45

## 2018-03-12 NOTE — PROGRESS NOTE ADULT - SUBJECTIVE AND OBJECTIVE BOX
Pt seen and examined at bedside with Dr. Restrepo for second opinion regarding spinal cord stimulator placed in November. CT scan reviewed with Dr. Restrepo. Pt admits he has some relief with the spinal cord stimulator but still complaining of pain at incisional site. The site looks well healed, clean and dry. Recommend getting MRI T and L spine as outpatient if unable to obtain inpatient. Can follow up as outpatient with Dr. Restrepo [phone], [address] [facility]    D/w attending

## 2018-03-12 NOTE — PROGRESS NOTE ADULT - ASSESSMENT
51 yo M with PMHx as listed presents with acute  back pain along with drainage from back.       1. acute on chronic back pain possibly 2/2 spinal abscess 2/2 MEREDITH  - Per neural surgery: CT scan did not show spinal abscess. Pt likely has superficial abscess from stitches, no need to take out spinal stimulator for now, continue with antibiotics  - per ID: PICC line and IV antibiotics  for 6 weeks. PATIENT RECEIVED PICC LINE.   -patient following with Neurosurgery 2nd opinion from [facility] staff   - pt currently on PCA Dilaudid pump, discussed with pain management on the phone, switched to PO MS contin 60mg q12 and oxycodine 10mg q6 prn and morphine 2 mg IV q2 prn.   Patient is having Break through pain and it is not acceptably controlled. case discussed with Dr. Lombardo and we are in agreement to discontinue Oxycontin 60 Q12H and start the patient on Oxycontin 80 q12h. Dr. Lombardo will follow with patient today tolerance and assessment of pain.   -will follow with ID as patient wants further information on infection start and duration   -Case discussed with Dr. Kelley who wants second opinion from neurosurgery for patient in order to evaluate and exercise the fact of thoroughly assessing the case and possible treatment modalities.    Dr. Restrepo (Neurosurgery) patient needs to have MRI done as outpatient once discharged.   Follow with Pain management as Consult is pending.         2.  bilateral lower extremity pain and swelling  - lasix increased to 40mg twice daily PO  Metolazone 5 mg added as per Renal   Follow with further podiatry recommendations as patient wants further recommendations and clarification.   follow with Lower Extremity Venous and Arterial duplex both negative for DVT or arterial occlusive disease.   - echo shows normal EF    3. HTN- controlled  - c/w losartan 100mg qd and nifedipine    4. RUSSELL on cpap    5. DVT ppx- lovenox subq

## 2018-03-12 NOTE — PROGRESS NOTE ADULT - SUBJECTIVE AND OBJECTIVE BOX
Patient is a 52y old  Male who presents with a chief complaint of "Back pain shooting to abdomen and R LE and oozing back blister" (23 Feb 2018 17:16)      PAST MEDICAL & SURGICAL HISTORY:  Morbid obesity  Obstructive sleep apnea  Disc disease, degenerative, lumbar or lumbosacral  Hypertension  History of excision of pilonidal cyst  H/O arthroscopy of right knee  Spinal cord stimulator status      MEDICATIONS  (STANDING):  ALPRAZolam 0.5 milliGRAM(s) Oral at bedtime  ceFAZolin   IVPB 2000 milliGRAM(s) IV Intermittent every 8 hours  docusate sodium 100 milliGRAM(s) Oral three times a day  enoxaparin Injectable 40 milliGRAM(s) SubCutaneous at bedtime  furosemide    Tablet 40 milliGRAM(s) Oral two times a day  losartan 100 milliGRAM(s) Oral at bedtime  miconazole 2% Cream 1 Application(s) Topical two times a day  naproxen 500 milliGRAM(s) Oral two times a day  NIFEdipine XL 30 milliGRAM(s) Oral daily  oxyCODONE  ER Tablet 80 milliGRAM(s) Oral every 12 hours  pantoprazole    Tablet 40 milliGRAM(s) Oral before breakfast  senna 1 Tablet(s) Oral at bedtime  sodium chloride 0.9%. 1000 milliLiter(s) (10 mL/Hr) IV Continuous <Continuous>  tamsulosin 0.4 milliGRAM(s) Oral at bedtime    MEDICATIONS  (PRN):  acetaminophen   Tablet. 650 milliGRAM(s) Oral every 6 hours PRN Mild Pain (1 - 3)  cyclobenzaprine 10 milliGRAM(s) Oral three times a day PRN Muscle Spasm  morphine  - Injectable 2 milliGRAM(s) IV Push every 2 hours PRN Severe Pain (7 - 10)  naloxone Injectable 0.1 milliGRAM(s) IV Push every 3 minutes PRN For ANY of the following changes in patient status:  A. RR LESS THAN 10 breaths per minute, B. Oxygen saturation LESS THAN 90%, C. Sedation score of 6  ondansetron Injectable 4 milliGRAM(s) IV Push every 6 hours PRN Nausea      Overnight events:    Vital Signs Last 24 Hrs  T(C): 36.1 (12 Mar 2018 12:09), Max: 36.4 (12 Mar 2018 05:03)  T(F): 96.9 (12 Mar 2018 12:09), Max: 97.6 (12 Mar 2018 05:03)  HR: 99 (12 Mar 2018 12:09) (72 - 99)  BP: 139/76 (12 Mar 2018 12:09) (128/72 - 144/63)  BP(mean): --  RR: 20 (12 Mar 2018 12:09) (18 - 20)  SpO2: --  CAPILLARY BLOOD GLUCOSE        I&O's Summary    11 Mar 2018 07:01  -  12 Mar 2018 07:00  --------------------------------------------------------  IN: 480 mL / OUT: 0 mL / NET: 480 mL        Physical Exam:    -     General : NAD     -      HEENT: NECK SOFT NO MASS     -      Cardiac: s1s2 no murmur     -      Pulm: good air entry bilaterally     -      GI: abd soft non tender     -      Musculoskeletal: bilateral lower extremity edema much improved. persistent lower back pain however patient much more comfortable.     -      Neuro: AAOX3         Labs:                        13.1   6.19  )-----------( 275      ( 12 Mar 2018 06:18 )             40.2             03-12    136  |  93<L>  |  19  ----------------------------<  158<H>  3.2<L>   |  33<H>  |  1.1    Ca    9.2      12 Mar 2018 06:18                            Imaging:    ECG:    T(C): 36.1 (03-12-18 @ 12:09), Max: 36.4 (03-12-18 @ 05:03)  HR: 99 (03-12-18 @ 12:09) (72 - 99)  BP: 139/76 (03-12-18 @ 12:09) (128/72 - 144/63)  RR: 20 (03-12-18 @ 12:09) (18 - 20)  SpO2: --

## 2018-03-13 ENCOUNTER — TRANSCRIPTION ENCOUNTER (OUTPATIENT)
Age: 53
End: 2018-03-13

## 2018-03-13 RX ORDER — MORPHINE SULFATE 50 MG/1
2 CAPSULE, EXTENDED RELEASE ORAL
Qty: 0 | Refills: 0 | Status: DISCONTINUED | OUTPATIENT
Start: 2018-03-13 | End: 2018-03-15

## 2018-03-13 RX ADMIN — MORPHINE SULFATE 2 MILLIGRAM(S): 50 CAPSULE, EXTENDED RELEASE ORAL at 22:45

## 2018-03-13 RX ADMIN — MORPHINE SULFATE 2 MILLIGRAM(S): 50 CAPSULE, EXTENDED RELEASE ORAL at 17:45

## 2018-03-13 RX ADMIN — Medication 100 MILLIGRAM(S): at 14:20

## 2018-03-13 RX ADMIN — Medication 30 MILLIGRAM(S): at 05:50

## 2018-03-13 RX ADMIN — MORPHINE SULFATE 2 MILLIGRAM(S): 50 CAPSULE, EXTENDED RELEASE ORAL at 01:30

## 2018-03-13 RX ADMIN — LOSARTAN POTASSIUM 100 MILLIGRAM(S): 100 TABLET, FILM COATED ORAL at 22:16

## 2018-03-13 RX ADMIN — SENNA PLUS 1 TABLET(S): 8.6 TABLET ORAL at 22:17

## 2018-03-13 RX ADMIN — MORPHINE SULFATE 2 MILLIGRAM(S): 50 CAPSULE, EXTENDED RELEASE ORAL at 20:30

## 2018-03-13 RX ADMIN — Medication 100 MILLIGRAM(S): at 22:16

## 2018-03-13 RX ADMIN — Medication 500 MILLIGRAM(S): at 17:33

## 2018-03-13 RX ADMIN — OXYCODONE HYDROCHLORIDE 80 MILLIGRAM(S): 5 TABLET ORAL at 05:49

## 2018-03-13 RX ADMIN — OXYCODONE HYDROCHLORIDE 80 MILLIGRAM(S): 5 TABLET ORAL at 17:35

## 2018-03-13 RX ADMIN — MORPHINE SULFATE 2 MILLIGRAM(S): 50 CAPSULE, EXTENDED RELEASE ORAL at 12:44

## 2018-03-13 RX ADMIN — MORPHINE SULFATE 2 MILLIGRAM(S): 50 CAPSULE, EXTENDED RELEASE ORAL at 20:04

## 2018-03-13 RX ADMIN — MORPHINE SULFATE 2 MILLIGRAM(S): 50 CAPSULE, EXTENDED RELEASE ORAL at 04:17

## 2018-03-13 RX ADMIN — MORPHINE SULFATE 2 MILLIGRAM(S): 50 CAPSULE, EXTENDED RELEASE ORAL at 05:45

## 2018-03-13 RX ADMIN — Medication 100 MILLIGRAM(S): at 05:50

## 2018-03-13 RX ADMIN — MORPHINE SULFATE 2 MILLIGRAM(S): 50 CAPSULE, EXTENDED RELEASE ORAL at 15:20

## 2018-03-13 RX ADMIN — MORPHINE SULFATE 2 MILLIGRAM(S): 50 CAPSULE, EXTENDED RELEASE ORAL at 09:33

## 2018-03-13 RX ADMIN — MORPHINE SULFATE 2 MILLIGRAM(S): 50 CAPSULE, EXTENDED RELEASE ORAL at 01:42

## 2018-03-13 RX ADMIN — PANTOPRAZOLE SODIUM 40 MILLIGRAM(S): 20 TABLET, DELAYED RELEASE ORAL at 06:02

## 2018-03-13 RX ADMIN — MORPHINE SULFATE 2 MILLIGRAM(S): 50 CAPSULE, EXTENDED RELEASE ORAL at 09:48

## 2018-03-13 RX ADMIN — Medication 100 MILLIGRAM(S): at 14:21

## 2018-03-13 RX ADMIN — MORPHINE SULFATE 2 MILLIGRAM(S): 50 CAPSULE, EXTENDED RELEASE ORAL at 22:15

## 2018-03-13 RX ADMIN — Medication 100 MILLIGRAM(S): at 05:52

## 2018-03-13 RX ADMIN — Medication 1 APPLICATION(S): at 05:51

## 2018-03-13 RX ADMIN — MORPHINE SULFATE 2 MILLIGRAM(S): 50 CAPSULE, EXTENDED RELEASE ORAL at 17:32

## 2018-03-13 RX ADMIN — Medication 1 APPLICATION(S): at 17:34

## 2018-03-13 RX ADMIN — ENOXAPARIN SODIUM 40 MILLIGRAM(S): 100 INJECTION SUBCUTANEOUS at 22:17

## 2018-03-13 RX ADMIN — Medication 0.5 MILLIGRAM(S): at 22:15

## 2018-03-13 RX ADMIN — MORPHINE SULFATE 2 MILLIGRAM(S): 50 CAPSULE, EXTENDED RELEASE ORAL at 13:00

## 2018-03-13 RX ADMIN — Medication 100 MILLIGRAM(S): at 22:17

## 2018-03-13 RX ADMIN — Medication 40 MILLIGRAM(S): at 05:50

## 2018-03-13 RX ADMIN — Medication 40 MILLIGRAM(S): at 17:33

## 2018-03-13 RX ADMIN — MORPHINE SULFATE 2 MILLIGRAM(S): 50 CAPSULE, EXTENDED RELEASE ORAL at 15:07

## 2018-03-13 RX ADMIN — Medication 500 MILLIGRAM(S): at 05:50

## 2018-03-13 RX ADMIN — TAMSULOSIN HYDROCHLORIDE 0.4 MILLIGRAM(S): 0.4 CAPSULE ORAL at 22:16

## 2018-03-13 NOTE — DISCHARGE NOTE ADULT - ADDITIONAL INSTRUCTIONS
Pedal pulse present 4 +  malleolus pulse 3+    Denies injury or fall patient is to follow with Infectious Disease Dr. Willson all contact information provided to patient. patient states he is going to have an MRI done on Friday for where he will take those results to another Neurosurgeon in [place] where he will get a third opinion in reference to his persistent lower back pain. He is also to follow with Dr. Mitchell in reference to his lower extremity edema and Electrolyte monitoring as he is on lasix. He has been provided with all contact information and followups. He will be seeing pain management in [place] as he does not want to see anyone in [place]. patient is to follow with Infectious Disease Dr. Willson all contact information provided to patient. patient states he is going to have an MRI done on Friday for where he will take those results to another Neurosurgeon in [place] where he will get a third opinion in reference to his persistent lower back pain. He is also to follow with Dr. Mitchell in reference to his lower extremity edema and Electrolyte monitoring as he is on lasix. He has been provided with all contact information and followups. He will be seeing pain management in [place] as he does not want to see anyone in [place]. patient also understands that he will go have blood work at his PMD Dr. Summers for hypokalemia that has resolved but patient is on furosemide.

## 2018-03-13 NOTE — DISCHARGE NOTE ADULT - HOSPITAL COURSE
51 yo M with Hx of Lumbar disc disease s/p Discectomy with Neurostimulator placement last year and neurostimulator revision last November , hx of previous neurostimulator battery infection treated with clindamycin, Hx of HTN presenting for above chief complaint. Patient reports that over the last few days he has been experiencing worsening mid spinal, Lower lumbar back pain, worse with motion, ambulation, valsalva, neck flexion, "100/10" in severity, electric in nature, relieved by sitting straight and avoiding motion, radiating bilaterally anteriorly to the abdomen and groin area and posteriorly along the sciatic nerve root distribution in the R lower extremity (along T10-T12, L1-L3 and L5-S1 dermatomes). He also reported that yesterday his family noticed a blister on the surgical wound that bursted and thick yellowish serous fluid started draining. Patient was at the pain management doctor yesterday who advised him to Go to the hospital for IV antibiotics. He was supposed to get an MRI done on Thursday but could not complete the test due to severe pain and discomfort. He also reports that over the last few days he has been having shortness of breath with ambulation and worsening LE swelling.  He reports no fever, chills, headaches, photo or phonophobia. No hx of cardiac disease.     patient had CT scan which REVEALED A SUPERFICIAL INFECTION FOR WHICH HE WAS TREATED WITH IV ANTIBIOTICS AND SENT HOME WITH A PICC LINE. PATIENT TO RECEIVE TOTAL OF 6 WEEKS OF ANTIBIOTICS. HE IS GOING TO FOLLOW WITH AN MRI AS OUTPATIENT AND FOLLOW WITH HIS PMD AND NEW NEUROSURGEON FOR A THIRD OPINION. PATIENT NEEDS FOLLOWUP WITH PMD TO ENSURE ELECTROLYTES ARE WITHIN NORMAL LIMITS AS HE IS ON FUROSEMIDE. HE UNDERSTANDS THIS AND IS AGREEABLE TO SEE DR. MCCULLOUGH ON MONDAY.

## 2018-03-13 NOTE — PROGRESS NOTE ADULT - SUBJECTIVE AND OBJECTIVE BOX
Patient was seen and examined. Spoke with RN. Chart reviewed. extensive discussion with wife and patient at bedside  No events overnight.  Vital Signs Last 24 Hrs  T(F): 96.8 (13 Mar 2018 05:24), Max: 96.9 (12 Mar 2018 12:09)  HR: 77 (13 Mar 2018 05:24) (77 - 99)  BP: 137/66 (13 Mar 2018 05:24) (131/61 - 139/76)  SpO2: --  MEDICATIONS  (STANDING):  ALPRAZolam 0.5 milliGRAM(s) Oral at bedtime  ceFAZolin   IVPB 2000 milliGRAM(s) IV Intermittent every 8 hours  docusate sodium 100 milliGRAM(s) Oral three times a day  enoxaparin Injectable 40 milliGRAM(s) SubCutaneous at bedtime  furosemide    Tablet 40 milliGRAM(s) Oral two times a day  losartan 100 milliGRAM(s) Oral at bedtime  miconazole 2% Cream 1 Application(s) Topical two times a day  naproxen 500 milliGRAM(s) Oral two times a day  NIFEdipine XL 30 milliGRAM(s) Oral daily  oxyCODONE  ER Tablet 80 milliGRAM(s) Oral every 12 hours  pantoprazole    Tablet 40 milliGRAM(s) Oral before breakfast  senna 1 Tablet(s) Oral at bedtime  sodium chloride 0.9%. 1000 milliLiter(s) (10 mL/Hr) IV Continuous <Continuous>  tamsulosin 0.4 milliGRAM(s) Oral at bedtime    MEDICATIONS  (PRN):  acetaminophen   Tablet. 650 milliGRAM(s) Oral every 6 hours PRN Mild Pain (1 - 3)  cyclobenzaprine 10 milliGRAM(s) Oral three times a day PRN Muscle Spasm  morphine  - Injectable 2 milliGRAM(s) IV Push every 2 hours PRN Severe Pain (7 - 10)  naloxone Injectable 0.1 milliGRAM(s) IV Push every 3 minutes PRN For ANY of the following changes in patient status:  A. RR LESS THAN 10 breaths per minute, B. Oxygen saturation LESS THAN 90%, C. Sedation score of 6  ondansetron Injectable 4 milliGRAM(s) IV Push every 6 hours PRN Nausea    Labs:                        13.1   6.19  )-----------( 275      ( 12 Mar 2018 06:18 )             40.2     12 Mar 2018 06:18    136    |  93     |  19     ----------------------------<  158    3.2     |  33     |  1.1      Ca    9.2        12 Mar 2018 06:18              Radiology:    General: comfortable, NAD  Neurology: A&Ox3, nonfocal  Head:  Normocephalic, atraumatic  ENT:  Mucosa moist, no ulcerations  Neck:  Supple, no JVD,   Skin: no breakdowns (as per RN)  Resp: CTA B/L  CV: RRR, S1S2,   GI: Soft, NT, bowel sounds obese  MS edema+ chr stasis changes, + peripheral pulses, FROM all 4 extremity

## 2018-03-13 NOTE — DISCHARGE NOTE ADULT - MEDICATION SUMMARY - MEDICATIONS TO TAKE
I will START or STAY ON the medications listed below when I get home from the hospital:    naproxen 500 mg oral delayed release tablet  -- 1 tab(s) by mouth 2 times a day  -- Indication: For Back abscess    oxyCODONE 80 mg oral tablet, extended release  -- 1 tab(s) by mouth every 12 hours MDD:2  -- Indication: For Back abscess    oxyCODONE 10 mg oral tablet  -- 1 tab(s) by mouth every 6 hours, As needed, Severe Pain (7 - 10) MDD:3  -- Indication: For pain    losartan 100 mg oral tablet  -- 1 tab(s) by mouth once a day  -- Indication: For Htn    tamsulosin 0.4 mg oral capsule  -- 1 cap(s) by mouth once a day (at bedtime)  -- Indication: For Bph    Procardia XL 30 mg oral tablet, extended release  -- 1 tab(s) by mouth once a day  -- Indication: For Htn    ceFAZolin 2 g intravenous injection  -- 2 gram(s) intravenous every 8 hours  -- Indication: For Back abscess    miconazole 2% topical cream  -- 1 application on skin 2 times a day  -- Indication: For Lower extremity fungal     furosemide 40 mg oral tablet  -- 1 tab(s) by mouth once a day   -- Indication: For  htn     senna oral tablet  -- 1 tab(s) by mouth once a day (at bedtime)  -- Indication: For Constipation     Colace 100 mg oral capsule  -- 1 cap(s) by mouth 3 times a day  -- Indication: For Constipation     cyclobenzaprine 10 mg oral tablet  -- 1 tab(s) by mouth 3 times a day, As Needed - for muscle spasm  -- May cause drowsiness.  Alcohol may intensify this effect.  Use care when operating dangerous machinery.  Obtain medical advice before taking any non-prescription drugs as some may affect the action of this medication.    -- Indication: For Lower back pain

## 2018-03-13 NOTE — DISCHARGE NOTE ADULT - PATIENT PORTAL LINK FT
You can access the [facility] Patient Portal, offered by [facility], by registering with the following website: [email or web address]

## 2018-03-13 NOTE — PROGRESS NOTE ADULT - SUBJECTIVE AND OBJECTIVE BOX
Patient is a 52y old  Male who presents with a chief complaint of "Back pain shooting to abdomen and R LE and oozing back blister" (23 Feb 2018 17:16)      PAST MEDICAL & SURGICAL HISTORY:  Morbid obesity  Obstructive sleep apnea  Disc disease, degenerative, lumbar or lumbosacral  Hypertension  History of excision of pilonidal cyst  H/O arthroscopy of right knee  Spinal cord stimulator status      MEDICATIONS  (STANDING):  ALPRAZolam 0.5 milliGRAM(s) Oral at bedtime  ceFAZolin   IVPB 2000 milliGRAM(s) IV Intermittent every 8 hours  docusate sodium 100 milliGRAM(s) Oral three times a day  enoxaparin Injectable 40 milliGRAM(s) SubCutaneous at bedtime  furosemide    Tablet 40 milliGRAM(s) Oral two times a day  losartan 100 milliGRAM(s) Oral at bedtime  miconazole 2% Cream 1 Application(s) Topical two times a day  naproxen 500 milliGRAM(s) Oral two times a day  NIFEdipine XL 30 milliGRAM(s) Oral daily  oxyCODONE  ER Tablet 80 milliGRAM(s) Oral every 12 hours  pantoprazole    Tablet 40 milliGRAM(s) Oral before breakfast  senna 1 Tablet(s) Oral at bedtime  sodium chloride 0.9%. 1000 milliLiter(s) (10 mL/Hr) IV Continuous <Continuous>  tamsulosin 0.4 milliGRAM(s) Oral at bedtime    MEDICATIONS  (PRN):  acetaminophen   Tablet. 650 milliGRAM(s) Oral every 6 hours PRN Mild Pain (1 - 3)  cyclobenzaprine 10 milliGRAM(s) Oral three times a day PRN Muscle Spasm  morphine  - Injectable 2 milliGRAM(s) IV Push every 2 hours PRN Severe Pain (7 - 10)  naloxone Injectable 0.1 milliGRAM(s) IV Push every 3 minutes PRN For ANY of the following changes in patient status:  A. RR LESS THAN 10 breaths per minute, B. Oxygen saturation LESS THAN 90%, C. Sedation score of 6  ondansetron Injectable 4 milliGRAM(s) IV Push every 6 hours PRN Nausea      Overnight events:    Vital Signs Last 24 Hrs  T(C): 36 (13 Mar 2018 05:24), Max: 36.1 (12 Mar 2018 12:09)  T(F): 96.8 (13 Mar 2018 05:24), Max: 96.9 (12 Mar 2018 12:09)  HR: 77 (13 Mar 2018 05:24) (77 - 99)  BP: 137/66 (13 Mar 2018 05:24) (131/61 - 139/76)  BP(mean): --  RR: 18 (13 Mar 2018 05:24) (18 - 20)  SpO2: --  CAPILLARY BLOOD GLUCOSE        I&O's Summary      Physical Exam:    -     General : NAD. patient sitting up in chair this am. he is very upset because his xanax was not given to him at a specific time that he wanted. patient does not appear to be in pain at this time however he is persistent that he wants pain management to come by. He also has requested that infectious disease come by to talk to him about how these infections work and he wants a specific date of when this infection could have started.     -      HEENT: Neck soft no mass     -      Cardiac: s1s2 no mumur     -      Pulm: good air entry bilaterally     -      GI: abd soft non tender     -      Musculoskeletal: wnl     -      Neuro: aaox3         Labs:                        13.1   6.19  )-----------( 275      ( 12 Mar 2018 06:18 )             40.2             03-12    136  |  93<L>  |  19  ----------------------------<  158<H>  3.2<L>   |  33<H>  |  1.1    Ca    9.2      12 Mar 2018 06:18                            Imaging:    ECG:    T(C): 36 (03-13-18 @ 05:24), Max: 36.1 (03-12-18 @ 12:09)  HR: 77 (03-13-18 @ 05:24) (77 - 99)  BP: 137/66 (03-13-18 @ 05:24) (131/61 - 139/76)  RR: 18 (03-13-18 @ 05:24) (18 - 20)  SpO2: --

## 2018-03-13 NOTE — DISCHARGE NOTE ADULT - PLAN OF CARE
resolution and stability patient to follow with Dr. Meeks Infectious disease.   Continue IV antibiotics. End date 04/06/2018

## 2018-03-13 NOTE — DISCHARGE NOTE ADULT - CARE PROVIDER_API CALL
Felix Summers), Medicine  [address] [place] [address]  Phone: [phone]  Fax: [phone]

## 2018-03-13 NOTE — PROGRESS NOTE ADULT - ASSESSMENT
53 yo M with PMHx as listed presents with acute  back pain along with drainage from back.       1. acute on chronic back pain possibly 2/2 spinal abscess 2/2 MEREDITH  - Per neurosurgery: CT scan did not show spinal abscess. Pt likely has superficial abscess from stitches, no need to take out spinal stimulator for now, continue with antibiotics  - per ID: PICC line and IV antibiotics  for 6 weeks. PATIENT RECEIVED PICC LINE.   -patient following with Neurosurgery 2nd opinion from [facility] staff   - pt currently on PCA Dilaudid pump, discussed with pain management on the phone, switched to PO MS contin 60mg q12 and oxycodine 10mg q6 prn and morphine 2 mg IV q2 prn.   Patient is having Break through pain and it is not acceptably controlled. case discussed with Dr. Lombardo and we are in agreement to discontinue Oxycontin 60 Q12H and start the patient on Oxycontin 80 q12h. Dr. Lombardo will follow with patient today tolerance and assessment of pain.   -will follow with ID as patient wants further information on infection start and duration   Dr. Restrepo (Neurosurgery) patient needs to have MRI done as outpatient once discharged.   Follow with Pain management as they were recalled as patient wants more information and states he is still in pain.   Follow with PMD as out patient.         2.  bilateral lower extremity pain and swelling  - lasix increased to 40mg twice daily PO  Metolazone 5 mg added as per Renal   Follow with further podiatry recommendations as patient wants further recommendations and clarification. podiatry verbally stated patient is to only use clotrimazole cream no other intervention or recommendations at this time   follow with Lower Extremity Venous and Arterial duplex both negative for DVT or arterial occlusive disease.   - echo shows normal EF  -Lower extremities are improved patient ambulating more at this point.     3. HTN- controlled  - c/w losartan 100mg qd and nifedipine    4. RUSSELL on cpap    5. DVT ppx- lovenox subq 53 yo M with PMHx as listed presents with acute  back pain along with drainage from back.       1. acute on chronic back pain possibly 2/2 spinal abscess 2/2 MEREDITH  - Per neurosurgery: CT scan did not show spinal abscess. Pt likely has superficial abscess from stitches, no need to take out spinal stimulator for now, continue with antibiotics  - per ID: PICC line and IV antibiotics  for 6 weeks. PATIENT RECEIVED PICC LINE.   -patient following with Neurosurgery 2nd opinion from [facility] staff   - case discussed with pain management on the phone, switched to PO MS contin 60mg q12 and oxycodine 10mg q6 prn.   Patient is having Break through pain and it is not acceptably controlled. Case discussed with Dr. Lombardo and we are in agreement to discontinue Oxycontin 60 Q12H and start the patient on Oxycontin 80 q12h. Dr. Lombardo will follow with patient to asses tolerance and assessment of pain.   -will follow with ID as patient wants further information on infection start and duration   Dr. Restrepo (Neurosurgery) patient needs to have MRI done as outpatient once discharged.   Follow with Pain management as they were recalled as patient wants more information and states he is still in pain.   Follow with PMD as out patient.         2.  bilateral lower extremity pain and swelling  - lasix increased to 40mg twice daily PO  Metolazone 5 mg added as per Renal   Follow with further podiatry recommendations as patient wants further recommendations and clarification. podiatry verbally stated patient is to only use clotrimazole cream no other intervention or recommendations at this time   follow with Lower Extremity Venous and Arterial duplex both negative for DVT or arterial occlusive disease.   - echo shows normal EF  -Lower extremities are improved patient ambulating more at this point.     3. HTN- controlled  - c/w losartan 100mg qd and nifedipine    4. RUSSELL on cpap    5. DVT ppx- lovenox subq

## 2018-03-13 NOTE — DISCHARGE NOTE ADULT - CARE PLAN
Principal Discharge DX:	Back abscess  Goal:	resolution and stability  Assessment and plan of treatment:	patient to follow with Dr. Meeks Infectious disease.   Continue IV antibiotics. End date 04/06/2018

## 2018-03-13 NOTE — DISCHARGE NOTE ADULT - HOME CARE AGENCY
HOME CARE [facility] [phone] AND [facility] INFUSION THERAPY  [phone]

## 2018-03-13 NOTE — DISCHARGE NOTE ADULT - MEDICATION SUMMARY - MEDICATIONS TO STOP TAKING
I will STOP taking the medications listed below when I get home from the hospital:    Percocet 5/325  -- 1-2 tab(s) by mouth every 4 hours, As Needed - for severe pain    methocarbamol 750 mg oral tablet  -- 2 tab(s) by mouth 3 times a day    amLODIPine 5 mg oral tablet  -- 1 tab(s) by mouth once a day

## 2018-03-14 LAB
ANION GAP SERPL CALC-SCNC: 10 MMOL/L — SIGNIFICANT CHANGE UP (ref 7–14)
BASOPHILS # BLD AUTO: 0.07 K/UL — SIGNIFICANT CHANGE UP (ref 0–0.2)
BASOPHILS NFR BLD AUTO: 1 % — SIGNIFICANT CHANGE UP (ref 0–1)
BUN SERPL-MCNC: 18 MG/DL — SIGNIFICANT CHANGE UP (ref 10–20)
CALCIUM SERPL-MCNC: 9.3 MG/DL — SIGNIFICANT CHANGE UP (ref 8.5–10.1)
CHLORIDE SERPL-SCNC: 96 MMOL/L — LOW (ref 98–110)
CO2 SERPL-SCNC: 32 MMOL/L — SIGNIFICANT CHANGE UP (ref 17–32)
CREAT SERPL-MCNC: 1 MG/DL — SIGNIFICANT CHANGE UP (ref 0.7–1.5)
EOSINOPHIL # BLD AUTO: 0.61 K/UL — SIGNIFICANT CHANGE UP (ref 0–0.7)
EOSINOPHIL NFR BLD AUTO: 8.8 % — HIGH (ref 0–8)
GLUCOSE SERPL-MCNC: 217 MG/DL — HIGH (ref 70–110)
HCT VFR BLD CALC: 41 % — LOW (ref 42–52)
HGB BLD-MCNC: 13.4 G/DL — LOW (ref 14–18)
IMM GRANULOCYTES NFR BLD AUTO: 0.3 % — SIGNIFICANT CHANGE UP (ref 0.1–0.3)
LYMPHOCYTES # BLD AUTO: 2.04 K/UL — SIGNIFICANT CHANGE UP (ref 1.2–3.4)
LYMPHOCYTES # BLD AUTO: 29.6 % — SIGNIFICANT CHANGE UP (ref 20.5–51.1)
MCHC RBC-ENTMCNC: 28.6 PG — SIGNIFICANT CHANGE UP (ref 27–31)
MCHC RBC-ENTMCNC: 32.7 G/DL — SIGNIFICANT CHANGE UP (ref 32–37)
MCV RBC AUTO: 87.6 FL — SIGNIFICANT CHANGE UP (ref 80–94)
MONOCYTES # BLD AUTO: 0.61 K/UL — HIGH (ref 0.1–0.6)
MONOCYTES NFR BLD AUTO: 8.8 % — SIGNIFICANT CHANGE UP (ref 1.7–9.3)
NEUTROPHILS # BLD AUTO: 3.55 K/UL — SIGNIFICANT CHANGE UP (ref 1.4–6.5)
NEUTROPHILS NFR BLD AUTO: 51.5 % — SIGNIFICANT CHANGE UP (ref 42.2–75.2)
NRBC # BLD: 0 /100 WBCS — SIGNIFICANT CHANGE UP (ref 0–0)
PLATELET # BLD AUTO: 282 K/UL — SIGNIFICANT CHANGE UP (ref 130–400)
POTASSIUM SERPL-MCNC: 3.3 MMOL/L — LOW (ref 3.5–5)
POTASSIUM SERPL-SCNC: 3.3 MMOL/L — LOW (ref 3.5–5)
RBC # BLD: 4.68 M/UL — LOW (ref 4.7–6.1)
RBC # FLD: 12.6 % — SIGNIFICANT CHANGE UP (ref 11.5–14.5)
SODIUM SERPL-SCNC: 138 MMOL/L — SIGNIFICANT CHANGE UP (ref 135–146)
WBC # BLD: 6.9 K/UL — SIGNIFICANT CHANGE UP (ref 4.8–10.8)
WBC # FLD AUTO: 6.9 K/UL — SIGNIFICANT CHANGE UP (ref 4.8–10.8)

## 2018-03-14 RX ORDER — MICONAZOLE NITRATE 2 %
1 CREAM (GRAM) TOPICAL
Qty: 60 | Refills: 0 | OUTPATIENT
Start: 2018-03-14 | End: 2018-03-23

## 2018-03-14 RX ORDER — NIFEDIPINE 30 MG
1 TABLET, EXTENDED RELEASE 24 HR ORAL
Qty: 30 | Refills: 0 | OUTPATIENT
Start: 2018-03-14 | End: 2018-04-12

## 2018-03-14 RX ORDER — AMLODIPINE BESYLATE 2.5 MG/1
1 TABLET ORAL
Qty: 0 | Refills: 0 | COMMUNITY

## 2018-03-14 RX ORDER — FUROSEMIDE 40 MG
1 TABLET ORAL
Qty: 30 | Refills: 0 | OUTPATIENT
Start: 2018-03-14 | End: 2018-04-12

## 2018-03-14 RX ORDER — OXYCODONE HYDROCHLORIDE 5 MG/1
1 TABLET ORAL
Qty: 14 | Refills: 0 | OUTPATIENT
Start: 2018-03-14 | End: 2018-03-20

## 2018-03-14 RX ORDER — CEFAZOLIN SODIUM 1 G
2 VIAL (EA) INJECTION
Qty: 0 | Refills: 0 | DISCHARGE
Start: 2018-03-14

## 2018-03-14 RX ORDER — SENNA PLUS 8.6 MG/1
1 TABLET ORAL
Qty: 30 | Refills: 0 | OUTPATIENT
Start: 2018-03-14 | End: 2018-04-12

## 2018-03-14 RX ORDER — METHOCARBAMOL 500 MG/1
2 TABLET, FILM COATED ORAL
Qty: 0 | Refills: 0 | COMMUNITY

## 2018-03-14 RX ORDER — DOCUSATE SODIUM 100 MG
1 CAPSULE ORAL
Qty: 90 | Refills: 0 | OUTPATIENT
Start: 2018-03-14 | End: 2018-04-12

## 2018-03-14 RX ORDER — TAMSULOSIN HYDROCHLORIDE 0.4 MG/1
1 CAPSULE ORAL
Qty: 30 | Refills: 0 | OUTPATIENT
Start: 2018-03-14 | End: 2018-04-12

## 2018-03-14 RX ADMIN — MORPHINE SULFATE 2 MILLIGRAM(S): 50 CAPSULE, EXTENDED RELEASE ORAL at 09:12

## 2018-03-14 RX ADMIN — MORPHINE SULFATE 2 MILLIGRAM(S): 50 CAPSULE, EXTENDED RELEASE ORAL at 23:58

## 2018-03-14 RX ADMIN — LOSARTAN POTASSIUM 100 MILLIGRAM(S): 100 TABLET, FILM COATED ORAL at 21:33

## 2018-03-14 RX ADMIN — MORPHINE SULFATE 2 MILLIGRAM(S): 50 CAPSULE, EXTENDED RELEASE ORAL at 14:10

## 2018-03-14 RX ADMIN — MORPHINE SULFATE 2 MILLIGRAM(S): 50 CAPSULE, EXTENDED RELEASE ORAL at 03:42

## 2018-03-14 RX ADMIN — MORPHINE SULFATE 2 MILLIGRAM(S): 50 CAPSULE, EXTENDED RELEASE ORAL at 17:20

## 2018-03-14 RX ADMIN — Medication 500 MILLIGRAM(S): at 18:20

## 2018-03-14 RX ADMIN — SENNA PLUS 1 TABLET(S): 8.6 TABLET ORAL at 21:32

## 2018-03-14 RX ADMIN — Medication 100 MILLIGRAM(S): at 21:32

## 2018-03-14 RX ADMIN — Medication 0.5 MILLIGRAM(S): at 21:32

## 2018-03-14 RX ADMIN — Medication 500 MILLIGRAM(S): at 06:58

## 2018-03-14 RX ADMIN — Medication 100 MILLIGRAM(S): at 13:25

## 2018-03-14 RX ADMIN — MORPHINE SULFATE 2 MILLIGRAM(S): 50 CAPSULE, EXTENDED RELEASE ORAL at 18:20

## 2018-03-14 RX ADMIN — Medication 250 MILLIGRAM(S): at 17:19

## 2018-03-14 RX ADMIN — Medication 40 MILLIGRAM(S): at 17:20

## 2018-03-14 RX ADMIN — MORPHINE SULFATE 2 MILLIGRAM(S): 50 CAPSULE, EXTENDED RELEASE ORAL at 10:00

## 2018-03-14 RX ADMIN — MORPHINE SULFATE 2 MILLIGRAM(S): 50 CAPSULE, EXTENDED RELEASE ORAL at 15:47

## 2018-03-14 RX ADMIN — MORPHINE SULFATE 2 MILLIGRAM(S): 50 CAPSULE, EXTENDED RELEASE ORAL at 21:41

## 2018-03-14 RX ADMIN — MORPHINE SULFATE 2 MILLIGRAM(S): 50 CAPSULE, EXTENDED RELEASE ORAL at 13:23

## 2018-03-14 RX ADMIN — Medication 1 APPLICATION(S): at 06:03

## 2018-03-14 RX ADMIN — OXYCODONE HYDROCHLORIDE 80 MILLIGRAM(S): 5 TABLET ORAL at 06:58

## 2018-03-14 RX ADMIN — MORPHINE SULFATE 2 MILLIGRAM(S): 50 CAPSULE, EXTENDED RELEASE ORAL at 04:15

## 2018-03-14 RX ADMIN — Medication 100 MILLIGRAM(S): at 21:33

## 2018-03-14 RX ADMIN — PANTOPRAZOLE SODIUM 40 MILLIGRAM(S): 20 TABLET, DELAYED RELEASE ORAL at 09:14

## 2018-03-14 RX ADMIN — MORPHINE SULFATE 2 MILLIGRAM(S): 50 CAPSULE, EXTENDED RELEASE ORAL at 06:20

## 2018-03-14 RX ADMIN — OXYCODONE HYDROCHLORIDE 80 MILLIGRAM(S): 5 TABLET ORAL at 17:53

## 2018-03-14 RX ADMIN — OXYCODONE HYDROCHLORIDE 80 MILLIGRAM(S): 5 TABLET ORAL at 18:20

## 2018-03-14 RX ADMIN — MORPHINE SULFATE 2 MILLIGRAM(S): 50 CAPSULE, EXTENDED RELEASE ORAL at 16:30

## 2018-03-14 RX ADMIN — Medication 100 MILLIGRAM(S): at 05:51

## 2018-03-14 RX ADMIN — Medication 100 MILLIGRAM(S): at 05:50

## 2018-03-14 RX ADMIN — OXYCODONE HYDROCHLORIDE 80 MILLIGRAM(S): 5 TABLET ORAL at 08:10

## 2018-03-14 RX ADMIN — Medication 40 MILLIGRAM(S): at 05:51

## 2018-03-14 RX ADMIN — Medication 30 MILLIGRAM(S): at 05:51

## 2018-03-14 RX ADMIN — TAMSULOSIN HYDROCHLORIDE 0.4 MILLIGRAM(S): 0.4 CAPSULE ORAL at 21:32

## 2018-03-14 RX ADMIN — MORPHINE SULFATE 2 MILLIGRAM(S): 50 CAPSULE, EXTENDED RELEASE ORAL at 19:45

## 2018-03-14 RX ADMIN — MORPHINE SULFATE 2 MILLIGRAM(S): 50 CAPSULE, EXTENDED RELEASE ORAL at 05:50

## 2018-03-14 RX ADMIN — ENOXAPARIN SODIUM 40 MILLIGRAM(S): 100 INJECTION SUBCUTANEOUS at 21:33

## 2018-03-14 RX ADMIN — Medication 500 MILLIGRAM(S): at 08:09

## 2018-03-14 RX ADMIN — Medication 1 APPLICATION(S): at 17:23

## 2018-03-14 NOTE — PROGRESS NOTE ADULT - ASSESSMENT
53 yo M with PMHx as listed presents with acute  back pain along with drainage from back.       1. acute on chronic back pain possibly 2/2 spinal abscess 2/2 MEREDITH  - Per neurosurgery: CT scan did not show spinal abscess. Pt likely has superficial abscess from stitches, no need to take out spinal stimulator for now, continue with antibiotics  - per ID: PICC line and IV antibiotics  for 6 weeks. PATIENT RECEIVED PICC LINE.   -patient following with Neurosurgery 2nd opinion from [facility] staff   - , switched to PO MS contin 60mg q12 and oxycodine 10mg q6 prn    Patient is having Break through pain and it is not acceptably controlled. case discussed with Dr. Lombardo and we are in agreement to discontinue Oxycontin 60 Q12H and start the patient on Oxycontin 80 q12h. Dr. Lombardo will follow with patient today tolerance and assessment of pain.   -will follow with ID as patient wants further information on infection start and duration   Dr. Restrepo (Neurosurgery) patient needs to have MRI done as outpatient once discharged.   Follow with Pain management as they were recalled as patient wants more information and states he is still in pain.   Follow with PMD as out patient.   2.  bilateral lower extremity pain and swelling  - lasix increased to 40mg twice daily PO  Metolazone 5 mg added as per Renal   Follow with further podiatry recommendations as patient wants further recommendations and clarification. podiatry verbally stated patient is to only use clotrimazole cream no other intervention or recommendations at this time   follow with Lower Extremity Venous and Arterial duplex both negative for DVT or arterial occlusive disease.  echo shows normal EF  -Lower extremities are improved patient ambulating more at this point.     3. HTN- controlled  - c/w losartan 100mg qd and nifedipine    4. RUSSELL on cpap    5. DVT ppx- lovenox subq   rehab/pt  to work with pt, he has 11 pts in his home  dc in am  discussed with hayes

## 2018-03-14 NOTE — PROGRESS NOTE ADULT - SUBJECTIVE AND OBJECTIVE BOX
Patient is a 52y old  Male who presents with a chief complaint of patient comes in complaining of persistent lower back pain and oozing from back at incision site. (13 Mar 2018 12:20)      PAST MEDICAL & SURGICAL HISTORY:  Morbid obesity  Obstructive sleep apnea  Disc disease, degenerative, lumbar or lumbosacral  Hypertension  History of excision of pilonidal cyst  H/O arthroscopy of right knee  Spinal cord stimulator status      MEDICATIONS  (STANDING):  ALPRAZolam 0.5 milliGRAM(s) Oral at bedtime  ceFAZolin   IVPB 2000 milliGRAM(s) IV Intermittent every 8 hours  docusate sodium 100 milliGRAM(s) Oral three times a day  enoxaparin Injectable 40 milliGRAM(s) SubCutaneous at bedtime  furosemide    Tablet 40 milliGRAM(s) Oral two times a day  losartan 100 milliGRAM(s) Oral at bedtime  miconazole 2% Cream 1 Application(s) Topical two times a day  naproxen 500 milliGRAM(s) Oral two times a day  NIFEdipine XL 30 milliGRAM(s) Oral daily  oxyCODONE  ER Tablet 80 milliGRAM(s) Oral every 12 hours  pantoprazole    Tablet 40 milliGRAM(s) Oral before breakfast  senna 1 Tablet(s) Oral at bedtime  sodium chloride 0.9%. 1000 milliLiter(s) (10 mL/Hr) IV Continuous <Continuous>  tamsulosin 0.4 milliGRAM(s) Oral at bedtime    MEDICATIONS  (PRN):  acetaminophen   Tablet. 650 milliGRAM(s) Oral every 6 hours PRN Mild Pain (1 - 3)  cyclobenzaprine 10 milliGRAM(s) Oral three times a day PRN Muscle Spasm  morphine  - Injectable 2 milliGRAM(s) IV Push every 2 hours PRN Severe Pain (7 - 10)  naloxone Injectable 0.1 milliGRAM(s) IV Push every 3 minutes PRN For ANY of the following changes in patient status:  A. RR LESS THAN 10 breaths per minute, B. Oxygen saturation LESS THAN 90%, C. Sedation score of 6  ondansetron Injectable 4 milliGRAM(s) IV Push every 6 hours PRN Nausea      Overnight events:    Vital Signs Last 24 Hrs  T(C): 36.1 (14 Mar 2018 05:18), Max: 36.1 (14 Mar 2018 05:18)  T(F): 96.9 (14 Mar 2018 05:18), Max: 96.9 (14 Mar 2018 05:18)  HR: 79 (14 Mar 2018 05:18) (79 - 87)  BP: 128/60 (14 Mar 2018 05:18) (128/60 - 159/75)  BP(mean): --  RR: 18 (14 Mar 2018 05:18) (18 - 18)  SpO2: --  CAPILLARY BLOOD GLUCOSE        I&O's Summary    13 Mar 2018 07:01  -  14 Mar 2018 07:00  --------------------------------------------------------  IN: 0 mL / OUT: 1 mL / NET: -1 mL        Physical Exam:    -     General : NAD, patient not in any acute distress this am. He is sitting in chair comfortably on the phone. He did not express any acute pain to me.     -      HEENT: neck soft no mass     -      Cardiac: s1s2 no murmur     -      Pulm: good air entry bilaterally     -      GI: abd soft non tender     -      Musculoskeletal: bILATERAL LOWER EXTREMITY EDEMA MUCH IMPROVED.     -      Neuro: AAOX3         Labs:            PATIENT REFUSING ALL LABS.                         T(C): 36.1 (03-14-18 @ 05:18), Max: 36.1 (03-14-18 @ 05:18)  HR: 79 (03-14-18 @ 05:18) (79 - 87)  BP: 128/60 (03-14-18 @ 05:18) (128/60 - 159/75)  RR: 18 (03-14-18 @ 05:18) (18 - 18)  SpO2: --

## 2018-03-14 NOTE — PROGRESS NOTE ADULT - SUBJECTIVE AND OBJECTIVE BOX
Patient was seen and examined. Spoke with RN. Chart reviewed. comf,     No events overnight.  Vital Signs Last 24 Hrs  T(F): 96.9 (14 Mar 2018 05:18), Max: 96.9 (14 Mar 2018 05:18)  HR: 79 (14 Mar 2018 05:18) (79 - 87)  BP: 128/60 (14 Mar 2018 05:18) (128/60 - 159/75)  SpO2: --  MEDICATIONS  (STANDING):  ALPRAZolam 0.5 milliGRAM(s) Oral at bedtime  ceFAZolin   IVPB 2000 milliGRAM(s) IV Intermittent every 8 hours  docusate sodium 100 milliGRAM(s) Oral three times a day  enoxaparin Injectable 40 milliGRAM(s) SubCutaneous at bedtime  furosemide    Tablet 40 milliGRAM(s) Oral two times a day  losartan 100 milliGRAM(s) Oral at bedtime  miconazole 2% Cream 1 Application(s) Topical two times a day  naproxen 500 milliGRAM(s) Oral two times a day  NIFEdipine XL 30 milliGRAM(s) Oral daily  oxyCODONE  ER Tablet 80 milliGRAM(s) Oral every 12 hours  pantoprazole    Tablet 40 milliGRAM(s) Oral before breakfast  senna 1 Tablet(s) Oral at bedtime  sodium chloride 0.9%. 1000 milliLiter(s) (10 mL/Hr) IV Continuous <Continuous>  tamsulosin 0.4 milliGRAM(s) Oral at bedtime    MEDICATIONS  (PRN):  acetaminophen   Tablet. 650 milliGRAM(s) Oral every 6 hours PRN Mild Pain (1 - 3)  cyclobenzaprine 10 milliGRAM(s) Oral three times a day PRN Muscle Spasm  morphine  - Injectable 2 milliGRAM(s) IV Push every 2 hours PRN Severe Pain (7 - 10)  naloxone Injectable 0.1 milliGRAM(s) IV Push every 3 minutes PRN For ANY of the following changes in patient status:  A. RR LESS THAN 10 breaths per minute, B. Oxygen saturation LESS THAN 90%, C. Sedation score of 6  ondansetron Injectable 4 milliGRAM(s) IV Push every 6 hours PRN Nausea    Labs:                  Radiology:    General: comfortable, NAD  Neurology: A&Ox3, nonfocal  Head:  Normocephalic, atraumatic  ENT:  Mucosa moist, no ulcerations  Neck:  Supple, no JVD,   Skin: no breakdowns (as per RN)  Resp: CTA B/L  CV: RRR, S1S2,   GI: Soft, NT, bowel sounds  MS: No edema, + peripheral pulses, FROM all 4 extremity

## 2018-03-14 NOTE — PROGRESS NOTE ADULT - ASSESSMENT
53 yo M with PMHx as listed presents with acute  back pain along with drainage from back.       1. acute on chronic back pain possibly 2/2 spinal abscess 2/2 MEREDITH  - Per neurosurgery: CT scan did not show spinal abscess. Pt likely has superficial abscess from stitches, no need to take out spinal stimulator for now, continue with antibiotics  - per ID: PICC line and IV antibiotics  for 6 weeks. PATIENT RECEIVED PICC LINE. Antibiotic end date 04/06/2018  -patient following with Neurosurgery 2nd opinion from [facility] staff   - case discussed with pain management on the phone, switched to PO MS contin 60mg q12 and oxycodine 10mg q6 prn.   Patient is having Break through pain and it is not acceptably controlled. Case discussed with Dr. Lombardo and we are in agreement to discontinue Oxycontin 60 Q12H and start the patient on Oxycontin 80 q12h. Dr. Lombardo will follow with patient to asses tolerance and assessment of pain.   Dr. Restrepo (Neurosurgery) patient needs to have MRI done as outpatient once discharged.   Follow with Pain management as they were recalled as patient wants more information and states he is still in pain. however patient now wants to see pain management off of [place].   Follow with PMD as out patient.         2.  bilateral lower extremity pain and swelling  - lasix increased to 40mg twice daily PO  Metolazone 5 mg added as per Renal   Podiatry stated patient is to only use clotrimazole cream no other intervention or recommendations at this time   follow with Lower Extremity Venous and Arterial duplex both negative for DVT or arterial occlusive disease.   - echo shows normal EF  -Lower extremities are improved patient ambulating more at this point.     3. HTN- controlled  - c/w losartan 100mg qd and nifedipine    4. RUSSELL on cpap    5. DVT ppx- lovenox subq    6. Disposition patient needs wheel chair and followup with PT  before he can be discharged.

## 2018-03-15 RX ORDER — OXYCODONE AND ACETAMINOPHEN 5; 325 MG/1; MG/1
1 TABLET ORAL ONCE
Qty: 0 | Refills: 0 | Status: DISCONTINUED | OUTPATIENT
Start: 2018-03-15 | End: 2018-03-15

## 2018-03-15 RX ORDER — POTASSIUM CHLORIDE 20 MEQ
20 PACKET (EA) ORAL
Qty: 0 | Refills: 0 | Status: COMPLETED | OUTPATIENT
Start: 2018-03-15 | End: 2018-03-15

## 2018-03-15 RX ORDER — POTASSIUM CHLORIDE 20 MEQ
20 PACKET (EA) ORAL
Qty: 0 | Refills: 0 | Status: DISCONTINUED | OUTPATIENT
Start: 2018-03-15 | End: 2018-03-15

## 2018-03-15 RX ORDER — OXYCODONE HYDROCHLORIDE 5 MG/1
10 TABLET ORAL EVERY 6 HOURS
Qty: 0 | Refills: 0 | Status: DISCONTINUED | OUTPATIENT
Start: 2018-03-15 | End: 2018-03-16

## 2018-03-15 RX ADMIN — OXYCODONE HYDROCHLORIDE 10 MILLIGRAM(S): 5 TABLET ORAL at 22:37

## 2018-03-15 RX ADMIN — MORPHINE SULFATE 2 MILLIGRAM(S): 50 CAPSULE, EXTENDED RELEASE ORAL at 08:30

## 2018-03-15 RX ADMIN — OXYCODONE HYDROCHLORIDE 80 MILLIGRAM(S): 5 TABLET ORAL at 05:24

## 2018-03-15 RX ADMIN — Medication 100 MILLIGRAM(S): at 05:19

## 2018-03-15 RX ADMIN — Medication 20 MILLIEQUIVALENT(S): at 15:41

## 2018-03-15 RX ADMIN — MORPHINE SULFATE 2 MILLIGRAM(S): 50 CAPSULE, EXTENDED RELEASE ORAL at 04:12

## 2018-03-15 RX ADMIN — MORPHINE SULFATE 2 MILLIGRAM(S): 50 CAPSULE, EXTENDED RELEASE ORAL at 08:12

## 2018-03-15 RX ADMIN — Medication 1 APPLICATION(S): at 05:23

## 2018-03-15 RX ADMIN — Medication 30 MILLIGRAM(S): at 05:18

## 2018-03-15 RX ADMIN — Medication 100 MILLIGRAM(S): at 13:00

## 2018-03-15 RX ADMIN — Medication 0.5 MILLIGRAM(S): at 21:58

## 2018-03-15 RX ADMIN — OXYCODONE HYDROCHLORIDE 10 MILLIGRAM(S): 5 TABLET ORAL at 17:10

## 2018-03-15 RX ADMIN — OXYCODONE AND ACETAMINOPHEN 1 TABLET(S): 5; 325 TABLET ORAL at 21:58

## 2018-03-15 RX ADMIN — OXYCODONE HYDROCHLORIDE 80 MILLIGRAM(S): 5 TABLET ORAL at 17:54

## 2018-03-15 RX ADMIN — Medication 40 MILLIGRAM(S): at 05:19

## 2018-03-15 RX ADMIN — Medication 100 MILLIGRAM(S): at 21:59

## 2018-03-15 RX ADMIN — OXYCODONE HYDROCHLORIDE 10 MILLIGRAM(S): 5 TABLET ORAL at 16:39

## 2018-03-15 RX ADMIN — MORPHINE SULFATE 2 MILLIGRAM(S): 50 CAPSULE, EXTENDED RELEASE ORAL at 11:55

## 2018-03-15 RX ADMIN — Medication 100 MILLIGRAM(S): at 05:20

## 2018-03-15 RX ADMIN — Medication 20 MILLIEQUIVALENT(S): at 17:53

## 2018-03-15 RX ADMIN — MORPHINE SULFATE 2 MILLIGRAM(S): 50 CAPSULE, EXTENDED RELEASE ORAL at 14:19

## 2018-03-15 RX ADMIN — MORPHINE SULFATE 2 MILLIGRAM(S): 50 CAPSULE, EXTENDED RELEASE ORAL at 12:10

## 2018-03-15 RX ADMIN — Medication 100 MILLIGRAM(S): at 13:01

## 2018-03-15 RX ADMIN — LOSARTAN POTASSIUM 100 MILLIGRAM(S): 100 TABLET, FILM COATED ORAL at 21:59

## 2018-03-15 RX ADMIN — PANTOPRAZOLE SODIUM 40 MILLIGRAM(S): 20 TABLET, DELAYED RELEASE ORAL at 08:03

## 2018-03-15 RX ADMIN — Medication 500 MILLIGRAM(S): at 05:19

## 2018-03-15 RX ADMIN — Medication 500 MILLIGRAM(S): at 17:54

## 2018-03-15 RX ADMIN — Medication 1 APPLICATION(S): at 17:55

## 2018-03-15 RX ADMIN — TAMSULOSIN HYDROCHLORIDE 0.4 MILLIGRAM(S): 0.4 CAPSULE ORAL at 21:59

## 2018-03-15 RX ADMIN — Medication 40 MILLIGRAM(S): at 17:53

## 2018-03-15 RX ADMIN — MORPHINE SULFATE 2 MILLIGRAM(S): 50 CAPSULE, EXTENDED RELEASE ORAL at 04:11

## 2018-03-15 RX ADMIN — MORPHINE SULFATE 2 MILLIGRAM(S): 50 CAPSULE, EXTENDED RELEASE ORAL at 14:35

## 2018-03-15 RX ADMIN — SENNA PLUS 1 TABLET(S): 8.6 TABLET ORAL at 21:59

## 2018-03-15 RX ADMIN — MORPHINE SULFATE 2 MILLIGRAM(S): 50 CAPSULE, EXTENDED RELEASE ORAL at 05:31

## 2018-03-15 RX ADMIN — ENOXAPARIN SODIUM 40 MILLIGRAM(S): 100 INJECTION SUBCUTANEOUS at 21:59

## 2018-03-15 NOTE — PROGRESS NOTE ADULT - SUBJECTIVE AND OBJECTIVE BOX
Patient is a 52y old  Male who presents with a chief complaint of patient comes in complaining of persistent lower back pain and oozing from back at incision site. (13 Mar 2018 12:20)      PAST MEDICAL & SURGICAL HISTORY:  Morbid obesity  Obstructive sleep apnea  Disc disease, degenerative, lumbar or lumbosacral  Hypertension  History of excision of pilonidal cyst  H/O arthroscopy of right knee  Spinal cord stimulator status      MEDICATIONS  (STANDING):  ALPRAZolam 0.5 milliGRAM(s) Oral at bedtime  ceFAZolin   IVPB 2000 milliGRAM(s) IV Intermittent every 8 hours  docusate sodium 100 milliGRAM(s) Oral three times a day  enoxaparin Injectable 40 milliGRAM(s) SubCutaneous at bedtime  furosemide    Tablet 40 milliGRAM(s) Oral two times a day  losartan 100 milliGRAM(s) Oral at bedtime  miconazole 2% Cream 1 Application(s) Topical two times a day  naproxen 500 milliGRAM(s) Oral two times a day  NIFEdipine XL 30 milliGRAM(s) Oral daily  oxyCODONE  ER Tablet 80 milliGRAM(s) Oral every 12 hours  pantoprazole    Tablet 40 milliGRAM(s) Oral before breakfast  potassium chloride    Tablet ER 20 milliEquivalent(s) Oral every 2 hours  senna 1 Tablet(s) Oral at bedtime  sodium chloride 0.9%. 1000 milliLiter(s) (10 mL/Hr) IV Continuous <Continuous>  tamsulosin 0.4 milliGRAM(s) Oral at bedtime    MEDICATIONS  (PRN):  acetaminophen   Tablet. 650 milliGRAM(s) Oral every 6 hours PRN Mild Pain (1 - 3)  cyclobenzaprine 10 milliGRAM(s) Oral three times a day PRN Muscle Spasm  naloxone Injectable 0.1 milliGRAM(s) IV Push every 3 minutes PRN For ANY of the following changes in patient status:  A. RR LESS THAN 10 breaths per minute, B. Oxygen saturation LESS THAN 90%, C. Sedation score of 6  ondansetron Injectable 4 milliGRAM(s) IV Push every 6 hours PRN Nausea      Overnight events:    Vital Signs Last 24 Hrs  T(C): 36.2 (15 Mar 2018 13:52), Max: 36.8 (14 Mar 2018 21:04)  T(F): 97.1 (15 Mar 2018 13:52), Max: 98.3 (14 Mar 2018 21:04)  HR: 87 (15 Mar 2018 13:52) (80 - 95)  BP: 109/59 (15 Mar 2018 13:52) (109/59 - 148/73)  BP(mean): --  RR: 18 (15 Mar 2018 13:52) (18 - 18)  SpO2: --  CAPILLARY BLOOD GLUCOSE        I&O's Summary      Physical Exam:    -     General : NAD, patient sitting in chair comfortably this am.     -      HEENT: neck soft no mass     -      Cardiac: s1s2 no murmur     -      Pulm: good air entry bilaterally     -      GI: abd soft non tender     -      Musculoskeletal: wnl     -      Neuro: aaox3         Labs:                        13.4   6.90  )-----------( 282      ( 14 Mar 2018 17:59 )             41.0             03-14    138  |  96<L>  |  18  ----------------------------<  217<H>  3.3<L>   |  32  |  1.0    Ca    9.3      14 Mar 2018 17:59                            T(C): 36.2 (03-15-18 @ 13:52), Max: 36.8 (03-14-18 @ 21:04)  HR: 87 (03-15-18 @ 13:52) (80 - 95)  BP: 109/59 (03-15-18 @ 13:52) (109/59 - 148/73)  RR: 18 (03-15-18 @ 13:52) (18 - 18)  SpO2: --

## 2018-03-15 NOTE — PROGRESS NOTE ADULT - PROVIDER SPECIALTY LIST ADULT
Infectious Disease
Internal Medicine
Nephrology
Neurosurgery
Internal Medicine
Nephrology
Internal Medicine

## 2018-03-15 NOTE — PROGRESS NOTE ADULT - ASSESSMENT
3 yo M with PMHx as listed presents with acute  back pain along with drainage from back.       1. acute on chronic back pain possibly 2/2 spinal abscess 2/2 MEREDITH  - Per neurosurgery: CT scan did not show spinal abscess. Pt likely has superficial abscess from stitches, no need to take out spinal stimulator for now, continue with antibiotics  - per ID: PICC line and IV antibiotics  for 6 weeks. PATIENT RECEIVED PICC LINE. Antibiotic end date 04/06/2018  -patient following with Neurosurgery 2nd opinion from [facility] staff   - case discussed with pain management on the phone, switched to PO MS contin 60mg q12 and oxycodine 10mg q6 prn.   Patient is having Break through pain and it is not acceptably controlled. Case discussed with Dr. Lombardo and we are in agreement to discontinue Oxycontin 60 Q12H and start the patient on Oxycontin 80 q12h. Dr. Lombardo will follow with patient to asses tolerance and assessment of pain.   Dr. Restrepo (Neurosurgery) patient needs to have MRI done as outpatient once discharged.   Follow with Pain management as they were recalled as patient wants more information and states he is still in pain. however patient now wants to see pain management off of [place].   Follow with PMD as out patient.         2.  bilateral lower extremity pain and swelling  - lasix increased to 40mg twice daily PO  Metolazone 5 mg added as per Renal   Podiatry stated patient is to only use clotrimazole cream no other intervention or recommendations at this time   follow with Lower Extremity Venous and Arterial duplex both negative for DVT or arterial occlusive disease.   - echo shows normal EF  -Lower extremities are improved patient ambulating more at this point.     3. HTN- controlled  - c/w losartan 100mg qd and nifedipine    4. RUSSELL on cpap    5. DVT ppx- lovenox subq    6. Disposition patient needs wheel chair and followup with PT  before he can be discharged.

## 2018-03-15 NOTE — PROGRESS NOTE ADULT - ASSESSMENT
53 yo M with PMHx as listed presents with acute  back pain along with drainage from back.       1. acute on chronic back pain possibly 2/2 spinal abscess 2/2 MEREDITH  - Per neurosurgery: CT scan did not show spinal abscess. Pt likely has superficial abscess from stitches, no need to take out spinal stimulator for now, continue with antibiotics  - per ID: PICC line and IV antibiotics  for 6 weeks. PATIENT RECEIVED PICC LINE. Antibiotic end date 04/06/2018  Patient is having Break through pain and it is not acceptably controlled. Case discussed with Dr. Lombardo and we are in agreement to discontinue Oxycontin 60 Q12H and start the patient on Oxycontin 80 q12h. Dr. Lombardo will follow with patient to asses tolerance and assessment of pain.   Dr. Restrepo (Neurosurgery) patient needs to have MRI done as outpatient once discharged.   Follow with Pain management as they were recalled as patient wants more information and states he is still in pain. however patient now wants to see pain management off of [place].   Follow with PMD as out patient.   patient walked with PT today and was able to climb stairs and walked with cane.       2.  bilateral lower extremity pain and swelling  - lasix increased to 40mg twice daily PO  Metolazone 5 mg added as per Renal   Podiatry stated patient is to only use clotrimazole cream no other intervention or recommendations at this time   follow with Lower Extremity Venous and Arterial duplex both negative for DVT or arterial occlusive disease.   - echo shows normal EF  -Lower extremities are improved patient ambulating more at this point.   follow with Potassium levels.     3. HTN- controlled  - c/w losartan 100mg qd and nifedipine    4. RUSSELL on cpap    5. DVT ppx- lovenox subq    6. Disposition patient anticipated for discharge tomorrow. several attempts have been made to have antibiotics delivered to patients home and patients wife has repeatedly refused to take them and has made several excuses to state that she is not home. at this point we are in agreement with patient and patients wife that someone will be home tomorrow for the antibiotics and the nursing staff to come by.

## 2018-03-15 NOTE — PROGRESS NOTE ADULT - SUBJECTIVE AND OBJECTIVE BOX
Patient was seen and examined. Spoke with RN. Chart reviewed.    No events overnight.  Vital Signs Last 24 Hrs  T(F): 96 (15 Mar 2018 05:13), Max: 98.3 (14 Mar 2018 21:04)  HR: 80 (15 Mar 2018 05:13) (80 - 97)  BP: 144/73 (15 Mar 2018 05:13) (139/80 - 148/73)  SpO2: --  MEDICATIONS  (STANDING):  ALPRAZolam 0.5 milliGRAM(s) Oral at bedtime  ceFAZolin   IVPB 2000 milliGRAM(s) IV Intermittent every 8 hours  docusate sodium 100 milliGRAM(s) Oral three times a day  enoxaparin Injectable 40 milliGRAM(s) SubCutaneous at bedtime  furosemide    Tablet 40 milliGRAM(s) Oral two times a day  losartan 100 milliGRAM(s) Oral at bedtime  miconazole 2% Cream 1 Application(s) Topical two times a day  naproxen 500 milliGRAM(s) Oral two times a day  NIFEdipine XL 30 milliGRAM(s) Oral daily  oxyCODONE  ER Tablet 80 milliGRAM(s) Oral every 12 hours  pantoprazole    Tablet 40 milliGRAM(s) Oral before breakfast  senna 1 Tablet(s) Oral at bedtime  sodium chloride 0.9%. 1000 milliLiter(s) (10 mL/Hr) IV Continuous <Continuous>  tamsulosin 0.4 milliGRAM(s) Oral at bedtime    MEDICATIONS  (PRN):  acetaminophen   Tablet. 650 milliGRAM(s) Oral every 6 hours PRN Mild Pain (1 - 3)  cyclobenzaprine 10 milliGRAM(s) Oral three times a day PRN Muscle Spasm  morphine  - Injectable 2 milliGRAM(s) IV Push every 2 hours PRN Severe Pain (7 - 10)  naloxone Injectable 0.1 milliGRAM(s) IV Push every 3 minutes PRN For ANY of the following changes in patient status:  A. RR LESS THAN 10 breaths per minute, B. Oxygen saturation LESS THAN 90%, C. Sedation score of 6  ondansetron Injectable 4 milliGRAM(s) IV Push every 6 hours PRN Nausea    Labs:                        13.4   6.90  )-----------( 282      ( 14 Mar 2018 17:59 )             41.0     14 Mar 2018 17:59    138    |  96     |  18     ----------------------------<  217    3.3     |  32     |  1.0      Ca    9.3        14 Mar 2018 17:59              Radiology:    General: comfortable, NAD  Neurology: A&Ox3, nonfocal  Head:  Normocephalic, atraumatic  ENT:  Mucosa moist, no ulcerations  Neck:  Supple, no JVD,   Skin: no breakdowns (as per RN)  Resp: CTA B/L  CV: RRR, S1S2,   GI: Soft, NT, bowel sounds morbid obesity  M edema, + peripheral pulses, FROM all 4 extremity

## 2018-03-16 VITALS
SYSTOLIC BLOOD PRESSURE: 133 MMHG | TEMPERATURE: 96 F | RESPIRATION RATE: 22 BRPM | DIASTOLIC BLOOD PRESSURE: 61 MMHG | HEART RATE: 80 BPM

## 2018-03-16 RX ORDER — TAMSULOSIN HYDROCHLORIDE 0.4 MG/1
1 CAPSULE ORAL
Qty: 30 | Refills: 0 | OUTPATIENT
Start: 2018-03-16 | End: 2018-04-14

## 2018-03-16 RX ORDER — OXYCODONE HYDROCHLORIDE 5 MG/1
1 TABLET ORAL
Qty: 14 | Refills: 0 | OUTPATIENT
Start: 2018-03-16 | End: 2018-03-22

## 2018-03-16 RX ORDER — OXYCODONE HYDROCHLORIDE 5 MG/1
1 TABLET ORAL
Qty: 28 | Refills: 0 | OUTPATIENT
Start: 2018-03-16 | End: 2018-03-22

## 2018-03-16 RX ORDER — DOCUSATE SODIUM 100 MG
1 CAPSULE ORAL
Qty: 90 | Refills: 0 | OUTPATIENT
Start: 2018-03-16 | End: 2018-04-14

## 2018-03-16 RX ORDER — MICONAZOLE NITRATE 2 %
1 CREAM (GRAM) TOPICAL
Qty: 1 | Refills: 0 | OUTPATIENT
Start: 2018-03-16 | End: 2018-03-25

## 2018-03-16 RX ORDER — OXYCODONE HYDROCHLORIDE 5 MG/1
80 TABLET ORAL EVERY 12 HOURS
Qty: 0 | Refills: 0 | Status: DISCONTINUED | OUTPATIENT
Start: 2018-03-16 | End: 2018-03-16

## 2018-03-16 RX ORDER — POTASSIUM CHLORIDE 20 MEQ
40 PACKET (EA) ORAL
Qty: 0 | Refills: 0 | Status: COMPLETED | OUTPATIENT
Start: 2018-03-16 | End: 2018-03-16

## 2018-03-16 RX ORDER — FUROSEMIDE 40 MG
1 TABLET ORAL
Qty: 30 | Refills: 0 | OUTPATIENT
Start: 2018-03-16 | End: 2018-04-14

## 2018-03-16 RX ORDER — SENNA PLUS 8.6 MG/1
1 TABLET ORAL
Qty: 30 | Refills: 0 | OUTPATIENT
Start: 2018-03-16 | End: 2018-04-14

## 2018-03-16 RX ORDER — OXYCODONE AND ACETAMINOPHEN 5; 325 MG/1; MG/1
1 TABLET ORAL ONCE
Qty: 0 | Refills: 0 | Status: DISCONTINUED | OUTPATIENT
Start: 2018-03-16 | End: 2018-03-16

## 2018-03-16 RX ORDER — NIFEDIPINE 30 MG
1 TABLET, EXTENDED RELEASE 24 HR ORAL
Qty: 30 | Refills: 0 | OUTPATIENT
Start: 2018-03-16 | End: 2018-04-14

## 2018-03-16 RX ADMIN — Medication 30 MILLIGRAM(S): at 08:50

## 2018-03-16 RX ADMIN — Medication 40 MILLIEQUIVALENT(S): at 08:58

## 2018-03-16 RX ADMIN — OXYCODONE AND ACETAMINOPHEN 1 TABLET(S): 5; 325 TABLET ORAL at 03:14

## 2018-03-16 RX ADMIN — Medication 500 MILLIGRAM(S): at 08:50

## 2018-03-16 RX ADMIN — Medication 40 MILLIGRAM(S): at 08:49

## 2018-03-16 RX ADMIN — Medication 100 MILLIGRAM(S): at 08:54

## 2018-03-16 RX ADMIN — Medication 100 MILLIGRAM(S): at 08:49

## 2018-03-16 RX ADMIN — Medication 1 APPLICATION(S): at 08:49

## 2018-03-16 RX ADMIN — OXYCODONE AND ACETAMINOPHEN 1 TABLET(S): 5; 325 TABLET ORAL at 06:38

## 2018-03-16 RX ADMIN — PANTOPRAZOLE SODIUM 40 MILLIGRAM(S): 20 TABLET, DELAYED RELEASE ORAL at 08:58

## 2018-03-16 NOTE — CHART NOTE - NSCHARTNOTEFT_GEN_A_CORE
Registered Dietitian Not at Risk Follow-Up    Pt is still complaining of pain, but is being discharged today. Pt reports he just wants to go home; pt is tired of being in the hospital. Meds and labs reviewed. No GI distress noted. Pt has been consuming 100% of his meal trays. No nutritional interventions at this time.

## 2018-03-19 DIAGNOSIS — G89.29 OTHER CHRONIC PAIN: ICD-10-CM

## 2018-03-19 DIAGNOSIS — L02.212 CUTANEOUS ABSCESS OF BACK [ANY PART, EXCEPT BUTTOCK]: ICD-10-CM

## 2018-03-19 DIAGNOSIS — Z16.39 RESISTANCE TO OTHER SPECIFIED ANTIMICROBIAL DRUG: ICD-10-CM

## 2018-03-19 DIAGNOSIS — B95.61 METHICILLIN SUSCEPTIBLE STAPHYLOCOCCUS AUREUS INFECTION AS THE CAUSE OF DISEASES CLASSIFIED ELSEWHERE: ICD-10-CM

## 2018-03-19 DIAGNOSIS — G47.33 OBSTRUCTIVE SLEEP APNEA (ADULT) (PEDIATRIC): ICD-10-CM

## 2018-03-19 DIAGNOSIS — N39.0 URINARY TRACT INFECTION, SITE NOT SPECIFIED: ICD-10-CM

## 2018-03-19 DIAGNOSIS — T81.32XA DISRUPTION OF INTERNAL OPERATION (SURGICAL) WOUND, NOT ELSEWHERE CLASSIFIED, INITIAL ENCOUNTER: ICD-10-CM

## 2018-03-19 DIAGNOSIS — F32.9 MAJOR DEPRESSIVE DISORDER, SINGLE EPISODE, UNSPECIFIED: ICD-10-CM

## 2018-03-19 DIAGNOSIS — L03.116 CELLULITIS OF LEFT LOWER LIMB: ICD-10-CM

## 2018-03-19 DIAGNOSIS — M51.37 OTHER INTERVERTEBRAL DISC DEGENERATION, LUMBOSACRAL REGION: ICD-10-CM
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
## 2018-03-19 DIAGNOSIS — L03.115 CELLULITIS OF RIGHT LOWER LIMB: ICD-10-CM

## 2018-03-19 DIAGNOSIS — E66.01 MORBID (SEVERE) OBESITY DUE TO EXCESS CALORIES: ICD-10-CM

## 2018-03-19 DIAGNOSIS — T81.4XXA INFECTION FOLLOWING A PROCEDURE, INITIAL ENCOUNTER: ICD-10-CM

## 2018-03-19 DIAGNOSIS — E87.6 HYPOKALEMIA: ICD-10-CM

## 2018-03-19 DIAGNOSIS — I10 ESSENTIAL (PRIMARY) HYPERTENSION: ICD-10-CM

## 2018-04-26 ENCOUNTER — EMERGENCY (EMERGENCY)
Facility: HOSPITAL | Age: 53
LOS: 0 days | Discharge: HOME | End: 2018-04-26
Attending: EMERGENCY MEDICINE | Admitting: EMERGENCY MEDICINE
Payer: MEDICAID

## 2018-04-26 VITALS
OXYGEN SATURATION: 98 % | DIASTOLIC BLOOD PRESSURE: 88 MMHG | HEART RATE: 100 BPM | SYSTOLIC BLOOD PRESSURE: 197 MMHG | RESPIRATION RATE: 19 BRPM

## 2018-04-26 VITALS
OXYGEN SATURATION: 99 % | RESPIRATION RATE: 20 BRPM | HEART RATE: 110 BPM | TEMPERATURE: 96 F | SYSTOLIC BLOOD PRESSURE: 216 MMHG | DIASTOLIC BLOOD PRESSURE: 98 MMHG

## 2018-04-26 DIAGNOSIS — Z79.1 LONG TERM (CURRENT) USE OF NON-STEROIDAL ANTI-INFLAMMATORIES (NSAID): ICD-10-CM

## 2018-04-26 DIAGNOSIS — Z98.890 OTHER SPECIFIED POSTPROCEDURAL STATES: Chronic | ICD-10-CM

## 2018-04-26 DIAGNOSIS — I10 ESSENTIAL (PRIMARY) HYPERTENSION: ICD-10-CM

## 2018-04-26 DIAGNOSIS — M75.32 CALCIFIC TENDINITIS OF LEFT SHOULDER: ICD-10-CM

## 2018-04-26 DIAGNOSIS — Z91.041 RADIOGRAPHIC DYE ALLERGY STATUS: ICD-10-CM

## 2018-04-26 DIAGNOSIS — M79.602 PAIN IN LEFT ARM: ICD-10-CM

## 2018-04-26 DIAGNOSIS — Z79.891 LONG TERM (CURRENT) USE OF OPIATE ANALGESIC: ICD-10-CM

## 2018-04-26 DIAGNOSIS — Z79.899 OTHER LONG TERM (CURRENT) DRUG THERAPY: ICD-10-CM

## 2018-04-26 DIAGNOSIS — Z79.2 LONG TERM (CURRENT) USE OF ANTIBIOTICS: ICD-10-CM

## 2018-04-26 DIAGNOSIS — F17.210 NICOTINE DEPENDENCE, CIGARETTES, UNCOMPLICATED: ICD-10-CM

## 2018-04-26 DIAGNOSIS — Z88.0 ALLERGY STATUS TO PENICILLIN: ICD-10-CM

## 2018-04-26 DIAGNOSIS — Z96.89 PRESENCE OF OTHER SPECIFIED FUNCTIONAL IMPLANTS: Chronic | ICD-10-CM

## 2018-04-26 DIAGNOSIS — M25.512 PAIN IN LEFT SHOULDER: ICD-10-CM

## 2018-04-26 LAB
ALBUMIN SERPL ELPH-MCNC: 3.9 G/DL — SIGNIFICANT CHANGE UP (ref 3.5–5.2)
ALP SERPL-CCNC: 65 U/L — SIGNIFICANT CHANGE UP (ref 30–115)
ALT FLD-CCNC: <5 U/L — SIGNIFICANT CHANGE UP (ref 0–41)
ANION GAP SERPL CALC-SCNC: 13 MMOL/L — SIGNIFICANT CHANGE UP (ref 7–14)
APTT BLD: SIGNIFICANT CHANGE UP SEC (ref 27–39.2)
AST SERPL-CCNC: 50 U/L — HIGH (ref 0–41)
BASOPHILS # BLD AUTO: 0.04 K/UL — SIGNIFICANT CHANGE UP (ref 0–0.2)
BASOPHILS NFR BLD AUTO: 0.5 % — SIGNIFICANT CHANGE UP (ref 0–1)
BILIRUB SERPL-MCNC: 0.2 MG/DL — SIGNIFICANT CHANGE UP (ref 0.2–1.2)
BUN SERPL-MCNC: 10 MG/DL — SIGNIFICANT CHANGE UP (ref 10–20)
CALCIUM SERPL-MCNC: 9 MG/DL — SIGNIFICANT CHANGE UP (ref 8.5–10.1)
CHLORIDE SERPL-SCNC: 101 MMOL/L — SIGNIFICANT CHANGE UP (ref 98–110)
CK MB CFR SERPL CALC: 2.6 NG/ML — SIGNIFICANT CHANGE UP (ref 0.6–6.3)
CK SERPL-CCNC: 254 U/L — HIGH (ref 0–225)
CO2 SERPL-SCNC: 23 MMOL/L — SIGNIFICANT CHANGE UP (ref 17–32)
CREAT SERPL-MCNC: 0.8 MG/DL — SIGNIFICANT CHANGE UP (ref 0.7–1.5)
EOSINOPHIL # BLD AUTO: 0.14 K/UL — SIGNIFICANT CHANGE UP (ref 0–0.7)
EOSINOPHIL NFR BLD AUTO: 1.6 % — SIGNIFICANT CHANGE UP (ref 0–8)
GLUCOSE SERPL-MCNC: 175 MG/DL — HIGH (ref 70–99)
HCT VFR BLD CALC: 41.5 % — LOW (ref 42–52)
HGB BLD-MCNC: 13.4 G/DL — LOW (ref 14–18)
IMM GRANULOCYTES NFR BLD AUTO: 0.4 % — HIGH (ref 0.1–0.3)
INR BLD: SIGNIFICANT CHANGE UP RATIO (ref 0.65–1.3)
LYMPHOCYTES # BLD AUTO: 1.82 K/UL — SIGNIFICANT CHANGE UP (ref 1.2–3.4)
LYMPHOCYTES # BLD AUTO: 21.2 % — SIGNIFICANT CHANGE UP (ref 20.5–51.1)
MCHC RBC-ENTMCNC: 27.7 PG — SIGNIFICANT CHANGE UP (ref 27–31)
MCHC RBC-ENTMCNC: 32.3 G/DL — SIGNIFICANT CHANGE UP (ref 32–37)
MCV RBC AUTO: 85.7 FL — SIGNIFICANT CHANGE UP (ref 80–94)
MONOCYTES # BLD AUTO: 0.55 K/UL — SIGNIFICANT CHANGE UP (ref 0.1–0.6)
MONOCYTES NFR BLD AUTO: 6.4 % — SIGNIFICANT CHANGE UP (ref 1.7–9.3)
NEUTROPHILS # BLD AUTO: 5.99 K/UL — SIGNIFICANT CHANGE UP (ref 1.4–6.5)
NEUTROPHILS NFR BLD AUTO: 69.9 % — SIGNIFICANT CHANGE UP (ref 42.2–75.2)
NRBC # BLD: 0 /100 WBCS — SIGNIFICANT CHANGE UP (ref 0–0)
PLATELET # BLD AUTO: 314 K/UL — SIGNIFICANT CHANGE UP (ref 130–400)
POTASSIUM SERPL-MCNC: 8.1 MMOL/L — CRITICAL HIGH (ref 3.5–5)
POTASSIUM SERPL-SCNC: 8.1 MMOL/L — CRITICAL HIGH (ref 3.5–5)
PROT SERPL-MCNC: 7.3 G/DL — SIGNIFICANT CHANGE UP (ref 6–8)
PROTHROM AB SERPL-ACNC: SIGNIFICANT CHANGE UP SEC (ref 9.95–12.87)
RBC # BLD: 4.84 M/UL — SIGNIFICANT CHANGE UP (ref 4.7–6.1)
RBC # FLD: 12.7 % — SIGNIFICANT CHANGE UP (ref 11.5–14.5)
SODIUM SERPL-SCNC: 137 MMOL/L — SIGNIFICANT CHANGE UP (ref 135–146)
TROPONIN T SERPL-MCNC: <0.01 NG/ML — SIGNIFICANT CHANGE UP
WBC # BLD: 8.57 K/UL — SIGNIFICANT CHANGE UP (ref 4.8–10.8)
WBC # FLD AUTO: 8.57 K/UL — SIGNIFICANT CHANGE UP (ref 4.8–10.8)

## 2018-04-26 PROCEDURE — 93971 EXTREMITY STUDY: CPT | Mod: 26

## 2018-04-26 RX ORDER — METHOCARBAMOL 500 MG/1
1000 TABLET, FILM COATED ORAL ONCE
Qty: 0 | Refills: 0 | Status: COMPLETED | OUTPATIENT
Start: 2018-04-26 | End: 2018-04-26

## 2018-04-26 RX ORDER — LOSARTAN POTASSIUM 100 MG/1
100 TABLET, FILM COATED ORAL ONCE
Qty: 0 | Refills: 0 | Status: COMPLETED | OUTPATIENT
Start: 2018-04-26 | End: 2018-04-26

## 2018-04-26 RX ORDER — MORPHINE SULFATE 50 MG/1
4 CAPSULE, EXTENDED RELEASE ORAL ONCE
Qty: 0 | Refills: 0 | Status: DISCONTINUED | OUTPATIENT
Start: 2018-04-26 | End: 2018-04-26

## 2018-04-26 RX ORDER — MORPHINE SULFATE 50 MG/1
8 CAPSULE, EXTENDED RELEASE ORAL ONCE
Qty: 0 | Refills: 0 | Status: DISCONTINUED | OUTPATIENT
Start: 2018-04-26 | End: 2018-04-26

## 2018-04-26 RX ADMIN — MORPHINE SULFATE 8 MILLIGRAM(S): 50 CAPSULE, EXTENDED RELEASE ORAL at 19:10

## 2018-04-26 RX ADMIN — MORPHINE SULFATE 8 MILLIGRAM(S): 50 CAPSULE, EXTENDED RELEASE ORAL at 17:12

## 2018-04-26 RX ADMIN — METHOCARBAMOL 1000 MILLIGRAM(S): 500 TABLET, FILM COATED ORAL at 16:36

## 2018-04-26 RX ADMIN — LOSARTAN POTASSIUM 100 MILLIGRAM(S): 100 TABLET, FILM COATED ORAL at 18:23

## 2018-04-26 RX ADMIN — MORPHINE SULFATE 4 MILLIGRAM(S): 50 CAPSULE, EXTENDED RELEASE ORAL at 14:26

## 2018-04-26 NOTE — ED PROCEDURE NOTE - NS ED PERI VASCULAR NEG
no swelling/no paresthesia/capillary refill time < 2 seconds/fingers/toes warm to touch/no cyanosis of extremity

## 2018-04-26 NOTE — ED PROVIDER NOTE - MEDICAL DECISION MAKING DETAILS
52 male here for left arm and shoulder pain had recent PICC line. Has multiple co morbidities and takes opiates regularly. Family involved in medical decision making.  Has calcific tendinitis on xray will discharge with NSAIDs, outpatient management, recommend physical therapy and ortho follow up.

## 2018-04-26 NOTE — ED PROVIDER NOTE - MUSCULOSKELETAL, MLM
Spine appears normal, L UE: there is warmth to the medial forearm overlying the site of the PICC line, there is decreased range of motion of the shoulder and elbow secondary to pain, patient is able to make a fist, sensation is intact, patient reports pain that radiates down the arm when he places his chin to his chest and when he looks up to the ceiling, radial pulse is 2+

## 2018-04-26 NOTE — ED PROVIDER NOTE - PROGRESS NOTE DETAILS
Patient informed his imaging and duplex are unremarkable. Pt states he is in excruciating pain. Refuses to move his left arm.  Checked [place]  and patient has percocet 10 #35 filled on 4/24/18 and oxycodone 10 #28 filled on 4/16/18.  I have strong suspicion of opioid prescription abuse.  The patient became irate when informed of my plan for outpatient pain management. States he "wants to know what is wrong".  Will get shoulder imaging and likely ortho consult. Patient is very difficult to work with, has limited medical insight and is both loud and angry to me.

## 2018-04-26 NOTE — ED PROVIDER NOTE - ATTENDING CONTRIBUTION TO CARE
I personally evaluated the patient. I reviewed the Resident’s or Physician Assistant’s note (as assigned above), and agree with the findings and plan except as documented in my note.     52 male here for evaluation of left arm and shoulder pain.     PMH: morbid obesity, back pain, lumbar radiculopathy, RUSSELL    Social: chronic opiate use with multiple recent prescription fills.     Recent admission to [facility] for infection after spinal stimulator was placed with PICC line placement for long term antibiotics use. Now has worsening pain at the PICC line site.     PE: male in no distress. CHEST: normal work of breathing. CV: pulses intact. SKIN: no rash no diaphoresis. EXT: limited examination due to patient non compliance. no bony deformities. no ecchymoses. refuses to allow passive or active ROM of the left shoulder. Hand function apparently intact. PSYCH: oppositional mood. loud, accusatory to staff, easily provocable. Limited insight into illness.     Impression: left shoulder pain    Plan: IV labs imaging supportive care and reevaluation

## 2018-04-26 NOTE — ED PROVIDER NOTE - OBJECTIVE STATEMENT
53 yo M with hx of HTN, chronic pain with spinal stimulator, RUSSELL, presents for evaluation of left arm pain, onset 5 days ago, worsening, with decreased ROM, and shooting electric pain from the neck down to the hand. No fever, no chills, no headache, no nausea, no vomiting, no chest pain, no abdominal pain. Patient states that 2 weeks ago his PICC line was removed, at that time he had mild pain in the area, which worsened this past 5 days with pain going up and down the arm. Patient states he takes 5 10mg Tab of oxycodin daily which have no helped with the pain. Patient denies any trauma to the area. Denies any other symptoms. 53 yo M with hx of HTN, hx of L4-L5 and L5-S1 radiculopathy with spinal stimulator with complication of infection which he was treated with IV abx, RUSSELL, presents for evaluation of left arm pain, onset 5 days ago, worsening, with decreased ROM, and shooting electric pain from the neck down to the hand. No fever, no chills, no headache, no nausea, no vomiting, no chest pain, no abdominal pain. Patient states that 2 weeks ago his PICC line was removed, at that time he had mild pain in the area, which worsened this past 5 days with pain going up and down the arm. Patient states he takes 5 10mg Tab of oxycodin daily which have no helped with the pain. Patient denies any trauma to the area. Denies any other symptoms.

## 2018-04-26 NOTE — ED ADULT TRIAGE NOTE - NS ED NOTE AC HIGH RISK COUNTRIES
Call placed to patient: Patient states that she has an appointment on Monday with Dr. Navarrete and will recheck level at that time.   No

## 2018-04-26 NOTE — ED ADULT TRIAGE NOTE - CHIEF COMPLAINT QUOTE
Patient had a picc line removed 3 weeks ago. +left arm pain and swelling. Unable to move his left arm. Patient had a spinal stimulator places which was the cause of the picc line.

## 2018-04-26 NOTE — ED ADULT NURSE REASSESSMENT NOTE - NS ED NURSE REASSESS COMMENT FT1
Pain slightly improved with Morphine. MD at bedside to explain plan of care to patient. Sling applied to left arm. Family at bedside

## 2018-05-02 ENCOUNTER — APPOINTMENT (OUTPATIENT)
Dept: NEUROSURGERY | Facility: CLINIC | Age: 53
End: 2018-05-02
Payer: OTHER MISCELLANEOUS

## 2018-05-02 PROCEDURE — 99213 OFFICE O/P EST LOW 20 MIN: CPT

## 2018-05-09 ENCOUNTER — INPATIENT (INPATIENT)
Facility: HOSPITAL | Age: 53
LOS: 8 days | Discharge: HOME HEALTH | End: 2018-05-18
Attending: INTERNAL MEDICINE | Admitting: INTERNAL MEDICINE
Payer: OTHER MISCELLANEOUS

## 2018-05-09 VITALS
RESPIRATION RATE: 20 BRPM | SYSTOLIC BLOOD PRESSURE: 201 MMHG | TEMPERATURE: 99 F | OXYGEN SATURATION: 96 % | HEART RATE: 104 BPM | DIASTOLIC BLOOD PRESSURE: 96 MMHG

## 2018-05-09 DIAGNOSIS — Z96.89 PRESENCE OF OTHER SPECIFIED FUNCTIONAL IMPLANTS: Chronic | ICD-10-CM

## 2018-05-09 DIAGNOSIS — Z98.890 OTHER SPECIFIED POSTPROCEDURAL STATES: Chronic | ICD-10-CM

## 2018-05-09 LAB
ANION GAP SERPL CALC-SCNC: 17 MMOL/L — HIGH (ref 7–14)
BASOPHILS # BLD AUTO: 0.03 K/UL — SIGNIFICANT CHANGE UP (ref 0–0.2)
BASOPHILS NFR BLD AUTO: 0.4 % — SIGNIFICANT CHANGE UP (ref 0–1)
BLD GP AB SCN SERPL QL: SIGNIFICANT CHANGE UP
BUN SERPL-MCNC: 10 MG/DL — SIGNIFICANT CHANGE UP (ref 10–20)
CALCIUM SERPL-MCNC: 9.1 MG/DL — SIGNIFICANT CHANGE UP (ref 8.5–10.1)
CHLORIDE SERPL-SCNC: 103 MMOL/L — SIGNIFICANT CHANGE UP (ref 98–110)
CO2 SERPL-SCNC: 23 MMOL/L — SIGNIFICANT CHANGE UP (ref 17–32)
CREAT SERPL-MCNC: 0.7 MG/DL — SIGNIFICANT CHANGE UP (ref 0.7–1.5)
EOSINOPHIL # BLD AUTO: 0.13 K/UL — SIGNIFICANT CHANGE UP (ref 0–0.7)
EOSINOPHIL NFR BLD AUTO: 1.7 % — SIGNIFICANT CHANGE UP (ref 0–8)
GLUCOSE SERPL-MCNC: 113 MG/DL — HIGH (ref 70–99)
HCT VFR BLD CALC: 41 % — LOW (ref 42–52)
HGB BLD-MCNC: 13.1 G/DL — LOW (ref 14–18)
IMM GRANULOCYTES NFR BLD AUTO: 0.4 % — HIGH (ref 0.1–0.3)
LYMPHOCYTES # BLD AUTO: 2.34 K/UL — SIGNIFICANT CHANGE UP (ref 1.2–3.4)
LYMPHOCYTES # BLD AUTO: 30.6 % — SIGNIFICANT CHANGE UP (ref 20.5–51.1)
MCHC RBC-ENTMCNC: 27.3 PG — SIGNIFICANT CHANGE UP (ref 27–31)
MCHC RBC-ENTMCNC: 32 G/DL — SIGNIFICANT CHANGE UP (ref 32–37)
MCV RBC AUTO: 85.4 FL — SIGNIFICANT CHANGE UP (ref 80–94)
MONOCYTES # BLD AUTO: 0.48 K/UL — SIGNIFICANT CHANGE UP (ref 0.1–0.6)
MONOCYTES NFR BLD AUTO: 6.3 % — SIGNIFICANT CHANGE UP (ref 1.7–9.3)
NEUTROPHILS # BLD AUTO: 4.63 K/UL — SIGNIFICANT CHANGE UP (ref 1.4–6.5)
NEUTROPHILS NFR BLD AUTO: 60.6 % — SIGNIFICANT CHANGE UP (ref 42.2–75.2)
NRBC # BLD: 0 /100 WBCS — SIGNIFICANT CHANGE UP (ref 0–0)
PLATELET # BLD AUTO: 284 K/UL — SIGNIFICANT CHANGE UP (ref 130–400)
POTASSIUM SERPL-MCNC: 4.2 MMOL/L — SIGNIFICANT CHANGE UP (ref 3.5–5)
POTASSIUM SERPL-SCNC: 4.2 MMOL/L — SIGNIFICANT CHANGE UP (ref 3.5–5)
RBC # BLD: 4.8 M/UL — SIGNIFICANT CHANGE UP (ref 4.7–6.1)
RBC # FLD: 13 % — SIGNIFICANT CHANGE UP (ref 11.5–14.5)
SODIUM SERPL-SCNC: 143 MMOL/L — SIGNIFICANT CHANGE UP (ref 135–146)
TYPE + AB SCN PNL BLD: SIGNIFICANT CHANGE UP
WBC # BLD: 7.64 K/UL — SIGNIFICANT CHANGE UP (ref 4.8–10.8)
WBC # FLD AUTO: 7.64 K/UL — SIGNIFICANT CHANGE UP (ref 4.8–10.8)

## 2018-05-09 RX ORDER — VANCOMYCIN HCL 1 G
1500 VIAL (EA) INTRAVENOUS EVERY 12 HOURS
Qty: 0 | Refills: 0 | Status: DISCONTINUED | OUTPATIENT
Start: 2018-05-09 | End: 2018-05-11

## 2018-05-09 RX ORDER — ENOXAPARIN SODIUM 100 MG/ML
40 INJECTION SUBCUTANEOUS DAILY
Qty: 0 | Refills: 0 | Status: DISCONTINUED | OUTPATIENT
Start: 2018-05-09 | End: 2018-05-11

## 2018-05-09 RX ORDER — MORPHINE SULFATE 50 MG/1
4 CAPSULE, EXTENDED RELEASE ORAL ONCE
Qty: 0 | Refills: 0 | Status: DISCONTINUED | OUTPATIENT
Start: 2018-05-09 | End: 2018-05-09

## 2018-05-09 RX ORDER — DOCUSATE SODIUM 100 MG
100 CAPSULE ORAL THREE TIMES A DAY
Qty: 0 | Refills: 0 | Status: DISCONTINUED | OUTPATIENT
Start: 2018-05-09 | End: 2018-05-11

## 2018-05-09 RX ORDER — OXYCODONE AND ACETAMINOPHEN 5; 325 MG/1; MG/1
1 TABLET ORAL ONCE
Qty: 0 | Refills: 0 | Status: DISCONTINUED | OUTPATIENT
Start: 2018-05-09 | End: 2018-05-09

## 2018-05-09 RX ORDER — MORPHINE SULFATE 50 MG/1
4 CAPSULE, EXTENDED RELEASE ORAL EVERY 4 HOURS
Qty: 0 | Refills: 0 | Status: DISCONTINUED | OUTPATIENT
Start: 2018-05-09 | End: 2018-05-11

## 2018-05-09 RX ORDER — CYCLOBENZAPRINE HYDROCHLORIDE 10 MG/1
10 TABLET, FILM COATED ORAL THREE TIMES A DAY
Qty: 0 | Refills: 0 | Status: DISCONTINUED | OUTPATIENT
Start: 2018-05-09 | End: 2018-05-11

## 2018-05-09 RX ORDER — TAMSULOSIN HYDROCHLORIDE 0.4 MG/1
0.4 CAPSULE ORAL AT BEDTIME
Qty: 0 | Refills: 0 | Status: DISCONTINUED | OUTPATIENT
Start: 2018-05-09 | End: 2018-05-11

## 2018-05-09 RX ORDER — SODIUM CHLORIDE 9 MG/ML
3 INJECTION INTRAMUSCULAR; INTRAVENOUS; SUBCUTANEOUS ONCE
Qty: 0 | Refills: 0 | Status: COMPLETED | OUTPATIENT
Start: 2018-05-09 | End: 2018-05-09

## 2018-05-09 RX ORDER — LOSARTAN POTASSIUM 100 MG/1
100 TABLET, FILM COATED ORAL DAILY
Qty: 0 | Refills: 0 | Status: DISCONTINUED | OUTPATIENT
Start: 2018-05-09 | End: 2018-05-11

## 2018-05-09 RX ORDER — HYDRALAZINE HCL 50 MG
10 TABLET ORAL ONCE
Qty: 0 | Refills: 0 | Status: COMPLETED | OUTPATIENT
Start: 2018-05-09 | End: 2018-05-10

## 2018-05-09 RX ORDER — CEFAZOLIN SODIUM 1 G
2000 VIAL (EA) INJECTION ONCE
Qty: 0 | Refills: 0 | Status: COMPLETED | OUTPATIENT
Start: 2018-05-09 | End: 2018-05-09

## 2018-05-09 RX ORDER — MORPHINE SULFATE 50 MG/1
30 CAPSULE, EXTENDED RELEASE ORAL EVERY 12 HOURS
Qty: 0 | Refills: 0 | Status: DISCONTINUED | OUTPATIENT
Start: 2018-05-09 | End: 2018-05-10

## 2018-05-09 RX ORDER — SENNA PLUS 8.6 MG/1
1 TABLET ORAL AT BEDTIME
Qty: 0 | Refills: 0 | Status: DISCONTINUED | OUTPATIENT
Start: 2018-05-09 | End: 2018-05-11

## 2018-05-09 RX ORDER — PANTOPRAZOLE SODIUM 20 MG/1
40 TABLET, DELAYED RELEASE ORAL
Qty: 0 | Refills: 0 | Status: DISCONTINUED | OUTPATIENT
Start: 2018-05-09 | End: 2018-05-11

## 2018-05-09 RX ORDER — FUROSEMIDE 40 MG
40 TABLET ORAL DAILY
Qty: 0 | Refills: 0 | Status: DISCONTINUED | OUTPATIENT
Start: 2018-05-09 | End: 2018-05-11

## 2018-05-09 RX ORDER — NIFEDIPINE 30 MG
30 TABLET, EXTENDED RELEASE 24 HR ORAL DAILY
Qty: 0 | Refills: 0 | Status: DISCONTINUED | OUTPATIENT
Start: 2018-05-09 | End: 2018-05-11

## 2018-05-09 RX ADMIN — MORPHINE SULFATE 4 MILLIGRAM(S): 50 CAPSULE, EXTENDED RELEASE ORAL at 19:46

## 2018-05-09 RX ADMIN — MORPHINE SULFATE 4 MILLIGRAM(S): 50 CAPSULE, EXTENDED RELEASE ORAL at 18:51

## 2018-05-09 RX ADMIN — SODIUM CHLORIDE 3 MILLILITER(S): 9 INJECTION INTRAMUSCULAR; INTRAVENOUS; SUBCUTANEOUS at 16:25

## 2018-05-09 RX ADMIN — Medication 100 MILLIGRAM(S): at 18:41

## 2018-05-09 RX ADMIN — MORPHINE SULFATE 4 MILLIGRAM(S): 50 CAPSULE, EXTENDED RELEASE ORAL at 23:51

## 2018-05-09 RX ADMIN — MORPHINE SULFATE 4 MILLIGRAM(S): 50 CAPSULE, EXTENDED RELEASE ORAL at 19:45

## 2018-05-09 RX ADMIN — Medication 100 MILLIGRAM(S): at 23:07

## 2018-05-09 NOTE — H&P ADULT - ATTENDING COMMENTS
Pt seen and examined independently, I have read and agree with above exam and poa, still with pain and discolfrt    neurosurgical eval for removal of spinal cord stimulator, continue abx, Id eval  pain meds  continue current treatment

## 2018-05-09 NOTE — H&P ADULT - HISTORY OF PRESENT ILLNESS
52-year-old male history of RUSSELL, HTN, intervertebral disc disease, status post placement of spinal cord stimulator in july 2017, subsequently developed several episodes of infection at site of the stimulator, most recently in February to March of this year at which time he was on intravenous antibiotics via a PICC line. Patient now complains of back pain radiating to the sides of his abdomen and down his legs to his feet for several days, similar to prior episodes of infection of his stimulator. He denies fever, chills, paresthesias, focal weakness bowel or bladder dysfunction, urinary sx, LE weakness, saddle anesthesia or other associated complaints at present. He is followed by neurosurgery Dr. Solorio.

## 2018-05-09 NOTE — ED PROVIDER NOTE - PHYSICAL EXAMINATION
VSS, awake, alert in NAD, chest CTAB, +S1/S2, RRR, abdomen soft, NT, no pulsatile masses or bruits appreciated, no midline spinal tenderness, step-offs or deformities, no erythema, swelling or ecchymosis, no skin rash or lesions, able to dorsiflex b/l great toe, motor and sensation intact b/l LE, NV intact, baseline gait.

## 2018-05-09 NOTE — H&P ADULT - NSHPPHYSICALEXAM_GEN_ALL_CORE
Vital Signs Last 24 Hrs  T(C): 36.5 (09 May 2018 16:13), Max: 37.1 (09 May 2018 14:32)  T(F): 97.7 (09 May 2018 16:13), Max: 98.7 (09 May 2018 14:32)  HR: 78 (09 May 2018 16:13) (78 - 104)  BP: 140/85 (09 May 2018 16:13) (140/85 - 201/96)  BP(mean): --  RR: 18 (09 May 2018 16:13) (18 - 20)  SpO2: 96% (09 May 2018 14:32) (96% - 96%)    PHYSICAL EXAM:  GENERAL: NAD, morbidly obese  NECK: Supple, No JVD  CHEST/LUNG: Clear to auscultation bilaterally; No wheeze  HEART: Regular rate and rhythm; No murmurs, rubs, or gallops  ABDOMEN: Soft, Nontender, Nondistended; Bowel sounds present  EXTREMITIES:  2+ Peripheral Pulses, No clubbing, cyanosis, or edema  PSYCH: AAOx3  NEUROLOGY: non-focal  BACK: the stimulator wound site is healed and no signs of infection or discharge

## 2018-05-09 NOTE — ED ADULT NURSE NOTE - OBJECTIVE STATEMENT
Pt states has back chronic pain from spinal stimulater, patient states unable to lay down flat, Pt is a dr pablo client

## 2018-05-09 NOTE — ED PROVIDER NOTE - NS ED ROS FT
Constitutional: No fever, chills.  Eyes: No visual changes, eye pain or discharge.  ENMT: No hearing changes, pain, discharge or infections.  Cardiac: No chest pain, SOB or edema.  Respiratory: No cough or respiratory distress.   GI: No nausea, vomiting, diarrhea or abdominal pain.  : No dysuria, frequency or burning.  Neuro: No headache or weakness. No LOC.  Skin: No skin rash.   Except as documented in the HPI, all other systems are negative.

## 2018-05-09 NOTE — CONSULT NOTE ADULT - ATTENDING COMMENTS
Pt s/p MRSA infection. Per ID stim needs to be removed due to concerns for further indolent infection. Pt also complains of increasing back pain. The risks and benefits of removal were discussed with patient. He is at increased surgical risk due to morbid obesity. Risk of bleeding, further infection, spinal cord injury , need for further surgeries discussed. I discussed with him that relief of his current back pain is uncertain with stimulator removal. he understands and wishes to proceed.

## 2018-05-09 NOTE — H&P ADULT - NSHPLABSRESULTS_GEN_ALL_CORE
13.1   7.64  )-----------( 284      ( 09 May 2018 15:56 )             41.0     05-09    143  |  103  |  10  ----------------------------<  113<H>  4.2   |  23  |  0.7    Ca    9.1      09 May 2018 15:56

## 2018-05-09 NOTE — ED PROVIDER NOTE - OBJECTIVE STATEMENT
52-year-old male history of RUSSELL, HTN, intervertebral disc disease, status post placement of spinal cord stimulator in 2017, subsequently developed several episodes of infection at site of the stimulator, most recently in February to March of this year at which time he was on intravenous antibiotics via a PICC line. Patient now complains of back pain radiating to the sides of his abdomen and down his legs to his feet for several days, similar to prior episodes of infection of his stimulator. He denies fever, paresthesias, focal weakness bowel or bladder dysfunction, urinary sx, LE weakness, saddle anesthesia or other associated complaints at present. He is followed by neurosurgery Dr. Wen.

## 2018-05-09 NOTE — H&P ADULT - ASSESSMENT
52-year-old male history of RUSSELL, HTN, intervertebral disc disease, status post placement of spinal cord stimulator in july 2017; presented with diffuse back pain.    1) back pain  -admit to medicine  -neuro sx fu: they consider removing the stimulator  -medical clearance  -blood cx  -IV abx for possible deep infection at the stimulator site, like  what occurred before.  -ID eval  -pain control  -pt and rehab after neuro sx paln    2) HTN  -c/w home meds    3) dvt and gi ppx 52-year-old male history of RUSSELL, HTN, intervertebral disc disease, status post placement of spinal cord stimulator in july 2017; presented with diffuse back pain.    1) back pain  -admit to medicine  -neuro sx fu: they consider removing the stimulator  -medical clearance  -blood cx  -IV abx for possible deep infection at the stimulator site, like what occurred before. will consider his PCN allergy   -ID eval  -pain control  -pt and rehab after neuro sx paln    2) HTN  -c/w home meds    3) dvt and gi ppx

## 2018-05-09 NOTE — ED ADULT NURSE REASSESSMENT NOTE - NS ED NURSE REASSESS COMMENT FT1
Pt complains of pain to Iv site, heplock removed. Pt refuses to have nurse place IV, states  his IV's are usually placed via US. Pt explained that IV antibiotics are due at 2000, continues to refuse IV placement. Dr. Wright informed of pts refusal of IV placement and blood pressure.

## 2018-05-10 ENCOUNTER — TRANSCRIPTION ENCOUNTER (OUTPATIENT)
Age: 53
End: 2018-05-10

## 2018-05-10 PROCEDURE — 99221 1ST HOSP IP/OBS SF/LOW 40: CPT | Mod: 57

## 2018-05-10 RX ORDER — TRAMADOL HYDROCHLORIDE 50 MG/1
50 TABLET ORAL
Qty: 0 | Refills: 0 | Status: DISCONTINUED | OUTPATIENT
Start: 2018-05-10 | End: 2018-05-11

## 2018-05-10 RX ORDER — OXYCODONE HYDROCHLORIDE 5 MG/1
80 TABLET ORAL EVERY 12 HOURS
Qty: 0 | Refills: 0 | Status: DISCONTINUED | OUTPATIENT
Start: 2018-05-10 | End: 2018-05-11

## 2018-05-10 RX ADMIN — Medication 100 MILLIGRAM(S): at 06:08

## 2018-05-10 RX ADMIN — MORPHINE SULFATE 4 MILLIGRAM(S): 50 CAPSULE, EXTENDED RELEASE ORAL at 08:37

## 2018-05-10 RX ADMIN — MORPHINE SULFATE 4 MILLIGRAM(S): 50 CAPSULE, EXTENDED RELEASE ORAL at 00:35

## 2018-05-10 RX ADMIN — LOSARTAN POTASSIUM 100 MILLIGRAM(S): 100 TABLET, FILM COATED ORAL at 06:08

## 2018-05-10 RX ADMIN — Medication 30 MILLIGRAM(S): at 06:07

## 2018-05-10 RX ADMIN — Medication 100 MILLIGRAM(S): at 21:26

## 2018-05-10 RX ADMIN — OXYCODONE HYDROCHLORIDE 80 MILLIGRAM(S): 5 TABLET ORAL at 17:18

## 2018-05-10 RX ADMIN — MORPHINE SULFATE 4 MILLIGRAM(S): 50 CAPSULE, EXTENDED RELEASE ORAL at 15:00

## 2018-05-10 RX ADMIN — MORPHINE SULFATE 4 MILLIGRAM(S): 50 CAPSULE, EXTENDED RELEASE ORAL at 19:21

## 2018-05-10 RX ADMIN — MORPHINE SULFATE 30 MILLIGRAM(S): 50 CAPSULE, EXTENDED RELEASE ORAL at 05:53

## 2018-05-10 RX ADMIN — Medication 40 MILLIGRAM(S): at 06:08

## 2018-05-10 RX ADMIN — PANTOPRAZOLE SODIUM 40 MILLIGRAM(S): 20 TABLET, DELAYED RELEASE ORAL at 06:08

## 2018-05-10 RX ADMIN — Medication 300 MILLIGRAM(S): at 22:02

## 2018-05-10 RX ADMIN — Medication 10 MILLIGRAM(S): at 00:35

## 2018-05-10 RX ADMIN — Medication 300 MILLIGRAM(S): at 06:31

## 2018-05-10 RX ADMIN — Medication 100 MILLIGRAM(S): at 06:30

## 2018-05-10 RX ADMIN — Medication 100 MILLIGRAM(S): at 16:21

## 2018-05-10 NOTE — DISCHARGE NOTE ADULT - HOSPITAL COURSE
Patient arrived in ED for extreme back pain.  Patient has a history of abscess to the back after spinal cord stimulator placement for chronic back pain.  he was started on IV abx and neurosurgery c/s and removed his spinal cord stimulator.  A PICC was placed for long term antibiotics for which he will follow up with Dr. Londono as an outpatient.  Patient developed lower extremity edema, u/s was negative for DVT and patient improved with IV lasix, leg elevation and ambulation.

## 2018-05-10 NOTE — DISCHARGE NOTE ADULT - CARE PROVIDER_API CALL
Felix Summers), Medicine  [address] [place] [address]  Phone: [phone]  Fax: [phone]    Ayan Londono), Infectious Disease; Internal Medicine  [address] [place] [address]  Phone: [phone]  Fax: [phone]    Carlos Wen), Surgical Physicians  [address] [place] [address] [address]  [place] [address]  Phone: [phone]  Fax: [phone]    Joel Lombardo), Anesthesiology; Pain Medicine  [address] [place] [address]  Phone: [phone]  Fax: [phone]

## 2018-05-10 NOTE — DISCHARGE NOTE ADULT - NSFTFSERV1RD_GEN_ALL_CORE
teaching and training/observation and assessment/rehabilitation services/medication teaching and assessment

## 2018-05-10 NOTE — DISCHARGE NOTE ADULT - MEDICATION SUMMARY - MEDICATIONS TO TAKE
I will START or STAY ON the medications listed below when I get home from the hospital:    oxyCODONE 10 mg oral tablet  -- 1 tab(s) by mouth every 4 hours, As needed, Moderate Pain (4 - 6)  -- Indication: For Pain    oxyCODONE 20 mg oral tablet, extended release  -- 2 tab(s) by mouth every 12 hours MDD:80mg  -- Indication: For Pain    losartan 100 mg oral tablet  -- 1 tab(s) by mouth once a day  -- Indication: For HTN    tamsulosin 0.4 mg oral capsule  -- 1 cap(s) by mouth once a day (at bedtime)  -- Indication: For HTN    gabapentin 600 mg oral tablet  -- 1 tab(s) by mouth 3 times a day  -- Indication: For Pain    Procardia XL 30 mg oral tablet, extended release  -- 1 tab(s) by mouth once a day  -- Indication: For HTN    ceFAZolin  -- 2 gram(s) intravenous every 6 hours  -- Indication: For Abscess of back    miconazole 2% topical cream  -- 1 application on skin 2 times a day  -- Indication: For Rash    furosemide 20 mg oral tablet  -- 3 tab(s) by mouth 2 times a day  -- Indication: For Leg Edema    senna oral tablet  -- 1 tab(s) by mouth once a day (at bedtime)  -- Indication: For Constipation    Colace 100 mg oral capsule  -- 1 cap(s) by mouth 3 times a day  -- Indication: For Constipation    clindamycin  -- 900 milligram(s) intravenous every 8 hours  -- Indication: For Abscess of back    cyclobenzaprine 10 mg oral tablet  -- 1 tab(s) by mouth 3 times a day, As needed, Muscle Spasm  -- Indication: For Muscle Relaxant I will START or STAY ON the medications listed below when I get home from the hospital:    oxyCODONE 10 mg oral tablet  -- 1 tab(s) by mouth every 4 hours, As needed, Moderate Pain (4 - 6)  -- Indication: For Pain    oxyCODONE 20 mg oral tablet, extended release  -- 2 tab(s) by mouth every 12 hours MDD:80mg  -- Indication: For Pain    losartan 100 mg oral tablet  -- 1 tab(s) by mouth once a day  -- Indication: For HTN    tamsulosin 0.4 mg oral capsule  -- 1 cap(s) by mouth once a day (at bedtime)  -- Indication: For HTN    gabapentin 600 mg oral tablet  -- 1 tab(s) by mouth 3 times a day  -- Indication: For Pain    Procardia XL 30 mg oral tablet, extended release  -- 1 tab(s) by mouth once a day  -- Indication: For HTN    ceFAZolin  -- 2 gram(s) intravenous every 6 hours  -- Indication: For Abscess of back    miconazole 2% topical cream  -- 1 application on skin 2 times a day  -- Indication: For Rash    furosemide 20 mg oral tablet  -- 3 tab(s) by mouth 2 times a day  -- Indication: For Leg Edema    furosemide 20 mg oral tablet  -- 3 tab(s) by mouth 2 times a day  -- Indication: For Leg Edema    Colace 100 mg oral capsule  -- 1 cap(s) by mouth 3 times a day  -- Indication: For Constipation    senna oral tablet  -- 1 tab(s) by mouth once a day (at bedtime)  -- Indication: For Constipation    clindamycin  -- 900 milligram(s) intravenous every 8 hours  -- Indication: For Abscess of back    cyclobenzaprine 10 mg oral tablet  -- 1 tab(s) by mouth 3 times a day, As needed, Muscle Spasm  -- Indication: For Muscle Relaxant

## 2018-05-10 NOTE — DISCHARGE NOTE ADULT - MEDICATION SUMMARY - MEDICATIONS TO CHANGE
I will SWITCH the dose or number of times a day I take the medications listed below when I get home from the hospital:    furosemide 40 mg oral tablet  -- 1 tab(s) by mouth once a day    oxyCODONE 80 mg oral tablet, extended release  -- 1 tab(s) by mouth every 12 hours MDD:2

## 2018-05-10 NOTE — PROVIDER CONTACT NOTE (OTHER) - BACKGROUND
Unable to administer prn pain medication or IV abx at this time; MD Gonzalez aware. Suggested to give a one time PO.

## 2018-05-10 NOTE — PROVIDER CONTACT NOTE (OTHER) - ASSESSMENT
dose for pain; As per Dr. Gonzalez, can only order tylenol or toradol. No new orders at this time. Pending IV placement by US.

## 2018-05-10 NOTE — DISCHARGE NOTE ADULT - PATIENT PORTAL LINK FT
You can access the [facility] Patient Portal, offered by [facility], by registering with the following website: [email or web address]

## 2018-05-10 NOTE — PROGRESS NOTE ADULT - SUBJECTIVE AND OBJECTIVE BOX
SUBJECTIVE:    Patient is a 52y old Male who presents with a chief complaint of diffuse back pain (09 May 2018 18:15)    Currently admitted to medicine with the primary diagnosis of Back pain     Today is hospital day 1d. This morning he is resting in bed and reports no new issues or overnight events. c/o back pain, severe     PAST MEDICAL & SURGICAL HISTORY  Morbid obesity  Obstructive sleep apnea  Disc disease, degenerative, lumbar or lumbosacral  Hypertension  History of excision of pilonidal cyst  H/O arthroscopy of right knee  Spinal cord stimulator status    SOCIAL HISTORY:    ALLERGIES:  IV Contrast (Unknown)  penicillin (Unknown)    MEDICATIONS:  STANDING MEDICATIONS  clindamycin IVPB      clindamycin IVPB 900 milliGRAM(s) IV Intermittent every 8 hours  docusate sodium 100 milliGRAM(s) Oral three times a day  enoxaparin Injectable 40 milliGRAM(s) SubCutaneous daily  furosemide    Tablet 40 milliGRAM(s) Oral daily  losartan 100 milliGRAM(s) Oral daily  morphine ER Tablet 30 milliGRAM(s) Oral every 12 hours  NIFEdipine XL 30 milliGRAM(s) Oral daily  pantoprazole    Tablet 40 milliGRAM(s) Oral before breakfast  senna 1 Tablet(s) Oral at bedtime  tamsulosin Oral Tab/Cap - Peds 0.4 milliGRAM(s) Oral at bedtime  vancomycin  IVPB 1500 milliGRAM(s) IV Intermittent every 12 hours    PRN MEDICATIONS  cyclobenzaprine 10 milliGRAM(s) Oral three times a day PRN  morphine  - Injectable 4 milliGRAM(s) IV Push every 4 hours PRN    VITALS:   T(F): 96.6  HR: 84  BP: 176/79  RR: 18  SpO2: 97%    LABS:                        13.1   7.64  )-----------( 284      ( 09 May 2018 15:56 )             41.0     05-09    143  |  103  |  10  ----------------------------<  113<H>  4.2   |  23  |  0.7    Ca    9.1      09 May 2018 15:56      PHYSICAL EXAM:  GEN: No acute distress  LUNGS: Clear to auscultation bilaterally   HEART: S1/S2 present. RRR.   ABD: Soft, non-tender, non-distended. Bowel sounds present  EXT: NC/NC/NE/2+PP/NICHOLSON  NEURO: AAOX3    HOME MEDICATIONS:  Home Medications:  losartan 100 mg oral tablet (02-23-18)  naproxen 500 mg oral delayed release tablet (02-23-18)

## 2018-05-10 NOTE — DISCHARGE NOTE ADULT - PLAN OF CARE
resolution of symptoms Please follow up with Dr. Wen within two weeks for removal of sutures.  Please follow up with the infectious disease doctor (Dr. Londono) 2-3 weeks after discharge.  Please follow up with Dr. Lombardo for pain management.  Please follow up with Dr. Summers 2-3 weeks after discharge.  Please take your medications as prescribed and participate in physical therapy. Please follow up with Dr. Wen within two weeks for removal of sutures.  Please follow up with the infectious disease doctor (Dr. Londono) 2-3 weeks after discharge.  Please follow up with Dr. Lombardo for pain management.  Please follow up with Dr. Summers 2-3 weeks after discharge.  Please take your medications as prescribed and participate in physical therapy.  Please take the increased dose of lasix (60mg twice a day) for one week.   After that please take your usual home dose and follow up with your primary care doctor.

## 2018-05-10 NOTE — DISCHARGE NOTE ADULT - CARE PLAN
Principal Discharge DX:	Abscess of back  Goal:	resolution of symptoms  Assessment and plan of treatment:	Please follow up with Dr. Wen within two weeks for removal of sutures.  Please follow up with the infectious disease doctor (Dr. Londono) 2-3 weeks after discharge.  Please follow up with Dr. Lombardo for pain management.  Please follow up with Dr. Summers 2-3 weeks after discharge.  Please take your medications as prescribed and participate in physical therapy. Principal Discharge DX:	Abscess of back  Goal:	resolution of symptoms  Assessment and plan of treatment:	Please follow up with Dr. Wen within two weeks for removal of sutures.  Please follow up with the infectious disease doctor (Dr. Londono) 2-3 weeks after discharge.  Please follow up with Dr. Lombardo for pain management.  Please follow up with Dr. Summers 2-3 weeks after discharge.  Please take your medications as prescribed and participate in physical therapy.  Please take the increased dose of lasix (60mg twice a day) for one week.   After that please take your usual home dose and follow up with your primary care doctor.

## 2018-05-10 NOTE — DISCHARGE NOTE ADULT - MEDICATION SUMMARY - MEDICATIONS TO STOP TAKING
I will STOP taking the medications listed below when I get home from the hospital:    ceFAZolin 2 g intravenous injection  -- 2 gram(s) intravenous every 8 hours

## 2018-05-11 ENCOUNTER — RESULT REVIEW (OUTPATIENT)
Age: 53
End: 2018-05-11

## 2018-05-11 LAB
APTT BLD: 27.7 SEC — SIGNIFICANT CHANGE UP (ref 27–39.2)
INR BLD: 1.1 RATIO — SIGNIFICANT CHANGE UP (ref 0.65–1.3)
PROTHROM AB SERPL-ACNC: 11.9 SEC — SIGNIFICANT CHANGE UP (ref 9.95–12.87)

## 2018-05-11 PROCEDURE — 63688 REV/RMV IMP SP NPG/R DTCH CN: CPT

## 2018-05-11 PROCEDURE — 63662 REMOVE SPINE ELTRD PLATE: CPT

## 2018-05-11 RX ORDER — MORPHINE SULFATE 50 MG/1
4 CAPSULE, EXTENDED RELEASE ORAL
Qty: 0 | Refills: 0 | Status: DISCONTINUED | OUTPATIENT
Start: 2018-05-11 | End: 2018-05-11

## 2018-05-11 RX ORDER — DOCUSATE SODIUM 100 MG
100 CAPSULE ORAL THREE TIMES A DAY
Qty: 0 | Refills: 0 | Status: DISCONTINUED | OUTPATIENT
Start: 2018-05-11 | End: 2018-05-18

## 2018-05-11 RX ORDER — PANTOPRAZOLE SODIUM 20 MG/1
40 TABLET, DELAYED RELEASE ORAL
Qty: 0 | Refills: 0 | Status: DISCONTINUED | OUTPATIENT
Start: 2018-05-11 | End: 2018-05-18

## 2018-05-11 RX ORDER — NIFEDIPINE 30 MG
30 TABLET, EXTENDED RELEASE 24 HR ORAL DAILY
Qty: 0 | Refills: 0 | Status: DISCONTINUED | OUTPATIENT
Start: 2018-05-11 | End: 2018-05-18

## 2018-05-11 RX ORDER — ONDANSETRON 8 MG/1
4 TABLET, FILM COATED ORAL ONCE
Qty: 0 | Refills: 0 | Status: DISCONTINUED | OUTPATIENT
Start: 2018-05-11 | End: 2018-05-11

## 2018-05-11 RX ORDER — SENNA PLUS 8.6 MG/1
1 TABLET ORAL AT BEDTIME
Qty: 0 | Refills: 0 | Status: DISCONTINUED | OUTPATIENT
Start: 2018-05-11 | End: 2018-05-18

## 2018-05-11 RX ORDER — VANCOMYCIN HCL 1 G
1000 VIAL (EA) INTRAVENOUS EVERY 12 HOURS
Qty: 0 | Refills: 0 | Status: DISCONTINUED | OUTPATIENT
Start: 2018-05-11 | End: 2018-05-12

## 2018-05-11 RX ORDER — HYDROMORPHONE HYDROCHLORIDE 2 MG/ML
1 INJECTION INTRAMUSCULAR; INTRAVENOUS; SUBCUTANEOUS
Qty: 0 | Refills: 0 | Status: DISCONTINUED | OUTPATIENT
Start: 2018-05-11 | End: 2018-05-12

## 2018-05-11 RX ORDER — MORPHINE SULFATE 50 MG/1
4 CAPSULE, EXTENDED RELEASE ORAL EVERY 4 HOURS
Qty: 0 | Refills: 0 | Status: DISCONTINUED | OUTPATIENT
Start: 2018-05-11 | End: 2018-05-12

## 2018-05-11 RX ORDER — HYDROMORPHONE HYDROCHLORIDE 2 MG/ML
1 INJECTION INTRAMUSCULAR; INTRAVENOUS; SUBCUTANEOUS ONCE
Qty: 0 | Refills: 0 | Status: DISCONTINUED | OUTPATIENT
Start: 2018-05-11 | End: 2018-05-11

## 2018-05-11 RX ORDER — MORPHINE SULFATE 50 MG/1
2 CAPSULE, EXTENDED RELEASE ORAL
Qty: 0 | Refills: 0 | Status: DISCONTINUED | OUTPATIENT
Start: 2018-05-11 | End: 2018-05-11

## 2018-05-11 RX ORDER — FUROSEMIDE 40 MG
40 TABLET ORAL DAILY
Qty: 0 | Refills: 0 | Status: DISCONTINUED | OUTPATIENT
Start: 2018-05-11 | End: 2018-05-13

## 2018-05-11 RX ORDER — OXYCODONE HYDROCHLORIDE 5 MG/1
100 TABLET ORAL EVERY 12 HOURS
Qty: 0 | Refills: 0 | Status: DISCONTINUED | OUTPATIENT
Start: 2018-05-11 | End: 2018-05-13

## 2018-05-11 RX ORDER — LOSARTAN POTASSIUM 100 MG/1
100 TABLET, FILM COATED ORAL DAILY
Qty: 0 | Refills: 0 | Status: DISCONTINUED | OUTPATIENT
Start: 2018-05-11 | End: 2018-05-18

## 2018-05-11 RX ORDER — SODIUM CHLORIDE 9 MG/ML
1000 INJECTION, SOLUTION INTRAVENOUS
Qty: 0 | Refills: 0 | Status: DISCONTINUED | OUTPATIENT
Start: 2018-05-11 | End: 2018-05-11

## 2018-05-11 RX ORDER — OXYCODONE HYDROCHLORIDE 5 MG/1
100 TABLET ORAL EVERY 12 HOURS
Qty: 0 | Refills: 0 | Status: DISCONTINUED | OUTPATIENT
Start: 2018-05-11 | End: 2018-05-11

## 2018-05-11 RX ORDER — CYCLOBENZAPRINE HYDROCHLORIDE 10 MG/1
10 TABLET, FILM COATED ORAL THREE TIMES A DAY
Qty: 0 | Refills: 0 | Status: DISCONTINUED | OUTPATIENT
Start: 2018-05-11 | End: 2018-05-18

## 2018-05-11 RX ORDER — MEPERIDINE HYDROCHLORIDE 50 MG/ML
12.5 INJECTION INTRAMUSCULAR; INTRAVENOUS; SUBCUTANEOUS
Qty: 0 | Refills: 0 | Status: DISCONTINUED | OUTPATIENT
Start: 2018-05-11 | End: 2018-05-11

## 2018-05-11 RX ADMIN — HYDROMORPHONE HYDROCHLORIDE 1 MILLIGRAM(S): 2 INJECTION INTRAMUSCULAR; INTRAVENOUS; SUBCUTANEOUS at 21:54

## 2018-05-11 RX ADMIN — TRAMADOL HYDROCHLORIDE 50 MILLIGRAM(S): 50 TABLET ORAL at 09:35

## 2018-05-11 RX ADMIN — OXYCODONE HYDROCHLORIDE 100 MILLIGRAM(S): 5 TABLET ORAL at 23:25

## 2018-05-11 RX ADMIN — Medication 100 MILLIGRAM(S): at 05:51

## 2018-05-11 RX ADMIN — OXYCODONE HYDROCHLORIDE 80 MILLIGRAM(S): 5 TABLET ORAL at 05:56

## 2018-05-11 RX ADMIN — Medication 300 MILLIGRAM(S): at 09:08

## 2018-05-11 RX ADMIN — LOSARTAN POTASSIUM 100 MILLIGRAM(S): 100 TABLET, FILM COATED ORAL at 05:51

## 2018-05-11 RX ADMIN — HYDROMORPHONE HYDROCHLORIDE 1 MILLIGRAM(S): 2 INJECTION INTRAMUSCULAR; INTRAVENOUS; SUBCUTANEOUS at 23:25

## 2018-05-11 RX ADMIN — Medication 30 MILLIGRAM(S): at 05:51

## 2018-05-11 RX ADMIN — MORPHINE SULFATE 4 MILLIGRAM(S): 50 CAPSULE, EXTENDED RELEASE ORAL at 06:40

## 2018-05-11 RX ADMIN — Medication 100 MILLIGRAM(S): at 13:23

## 2018-05-11 RX ADMIN — HYDROMORPHONE HYDROCHLORIDE 1 MILLIGRAM(S): 2 INJECTION INTRAMUSCULAR; INTRAVENOUS; SUBCUTANEOUS at 22:35

## 2018-05-11 RX ADMIN — MORPHINE SULFATE 4 MILLIGRAM(S): 50 CAPSULE, EXTENDED RELEASE ORAL at 21:30

## 2018-05-11 RX ADMIN — OXYCODONE HYDROCHLORIDE 80 MILLIGRAM(S): 5 TABLET ORAL at 05:52

## 2018-05-11 RX ADMIN — HYDROMORPHONE HYDROCHLORIDE 1 MILLIGRAM(S): 2 INJECTION INTRAMUSCULAR; INTRAVENOUS; SUBCUTANEOUS at 22:05

## 2018-05-11 RX ADMIN — MORPHINE SULFATE 4 MILLIGRAM(S): 50 CAPSULE, EXTENDED RELEASE ORAL at 12:00

## 2018-05-11 RX ADMIN — TRAMADOL HYDROCHLORIDE 50 MILLIGRAM(S): 50 TABLET ORAL at 09:06

## 2018-05-11 RX ADMIN — MORPHINE SULFATE 4 MILLIGRAM(S): 50 CAPSULE, EXTENDED RELEASE ORAL at 21:44

## 2018-05-11 RX ADMIN — SODIUM CHLORIDE 120 MILLILITER(S): 9 INJECTION, SOLUTION INTRAVENOUS at 21:20

## 2018-05-11 RX ADMIN — MORPHINE SULFATE 4 MILLIGRAM(S): 50 CAPSULE, EXTENDED RELEASE ORAL at 06:09

## 2018-05-11 RX ADMIN — MORPHINE SULFATE 4 MILLIGRAM(S): 50 CAPSULE, EXTENDED RELEASE ORAL at 01:34

## 2018-05-11 RX ADMIN — MORPHINE SULFATE 4 MILLIGRAM(S): 50 CAPSULE, EXTENDED RELEASE ORAL at 06:10

## 2018-05-11 RX ADMIN — MORPHINE SULFATE 4 MILLIGRAM(S): 50 CAPSULE, EXTENDED RELEASE ORAL at 11:22

## 2018-05-11 RX ADMIN — PANTOPRAZOLE SODIUM 40 MILLIGRAM(S): 20 TABLET, DELAYED RELEASE ORAL at 09:07

## 2018-05-11 RX ADMIN — MORPHINE SULFATE 4 MILLIGRAM(S): 50 CAPSULE, EXTENDED RELEASE ORAL at 21:20

## 2018-05-11 NOTE — PROGRESS NOTE ADULT - SUBJECTIVE AND OBJECTIVE BOX
SUBJECTIVE:    Patient is a 52y old Male who presents with a chief complaint of diffuse back pain (10 May 2018 14:55)    Currently admitted to medicine with the primary diagnosis of Back pain     Today is hospital day 2d. This morning he is resting comfortably in bed and reports no new issues or overnight events. c/o pain    PAST MEDICAL & SURGICAL HISTORY  Morbid obesity  Obstructive sleep apnea  Disc disease, degenerative, lumbar or lumbosacral  Hypertension  History of excision of pilonidal cyst  H/O arthroscopy of right knee  Spinal cord stimulator status    ALLERGIES:  IV Contrast (Unknown)  penicillin (Unknown)    MEDICATIONS:  STANDING MEDICATIONS  clindamycin IVPB      clindamycin IVPB 900 milliGRAM(s) IV Intermittent every 8 hours  docusate sodium 100 milliGRAM(s) Oral three times a day  enoxaparin Injectable 40 milliGRAM(s) SubCutaneous daily  furosemide    Tablet 40 milliGRAM(s) Oral daily  losartan 100 milliGRAM(s) Oral daily  NIFEdipine XL 30 milliGRAM(s) Oral daily  oxyCODONE  ER Tablet 80 milliGRAM(s) Oral every 12 hours  pantoprazole    Tablet 40 milliGRAM(s) Oral before breakfast  senna 1 Tablet(s) Oral at bedtime  tamsulosin Oral Tab/Cap - Peds 0.4 milliGRAM(s) Oral at bedtime  vancomycin  IVPB 1500 milliGRAM(s) IV Intermittent every 12 hours    PRN MEDICATIONS  cyclobenzaprine 10 milliGRAM(s) Oral three times a day PRN  morphine  - Injectable 4 milliGRAM(s) IV Push every 4 hours PRN    VITALS:   T(F): 96.5  HR: 73  BP: 181/87  RR: 18  SpO2: --    LABS:                        13.1   7.64  )-----------( 284      ( 09 May 2018 15:56 )             41.0     05-09    143  |  103  |  10  ----------------------------<  113<H>  4.2   |  23  |  0.7    Ca    9.1      09 May 2018 15:56      PT/INR - ( 11 May 2018 06:02 )   PT: 11.90 sec;   INR: 1.10 ratio         PTT - ( 11 May 2018 06:02 )  PTT:27.7 sec      PHYSICAL EXAM:  GEN: No acute distress  LUNGS: Clear to auscultation bilaterally   HEART: S1/S2 present. RRR.   ABD: Soft, non-tender, non-distended. Bowel sounds present  EXT: NC/NC/NE/2+PP/NICHOLSON  NEURO: AAOX3    HOME MEDICATIONS:  Home Medications:  losartan 100 mg oral tablet (02-23-18)  naproxen 500 mg oral delayed release tablet (02-23-18)

## 2018-05-11 NOTE — PROGRESS NOTE ADULT - SUBJECTIVE AND OBJECTIVE BOX
Patient was seen and examined. Spoke with RN. Chart reviewed.  No events overnight.  Vital Signs Last 24 Hrs  T(F): 96.5 (11 May 2018 02:04), Max: 96.6 (10 May 2018 14:26)  HR: 68 (11 May 2018 06:40) (68 - 80)  BP: 180/88 (11 May 2018 06:40) (139/82 - 182/89)  SpO2: 97% (10 May 2018 08:19) (97% - 97%)  MEDICATIONS  (STANDING):  clindamycin IVPB      clindamycin IVPB 900 milliGRAM(s) IV Intermittent every 8 hours  docusate sodium 100 milliGRAM(s) Oral three times a day  enoxaparin Injectable 40 milliGRAM(s) SubCutaneous daily  furosemide    Tablet 40 milliGRAM(s) Oral daily  losartan 100 milliGRAM(s) Oral daily  NIFEdipine XL 30 milliGRAM(s) Oral daily  oxyCODONE  ER Tablet 80 milliGRAM(s) Oral every 12 hours  pantoprazole    Tablet 40 milliGRAM(s) Oral before breakfast  senna 1 Tablet(s) Oral at bedtime  tamsulosin Oral Tab/Cap - Peds 0.4 milliGRAM(s) Oral at bedtime  vancomycin  IVPB 1500 milliGRAM(s) IV Intermittent every 12 hours    MEDICATIONS  (PRN):  cyclobenzaprine 10 milliGRAM(s) Oral three times a day PRN Muscle Spasm  morphine  - Injectable 4 milliGRAM(s) IV Push every 4 hours PRN Severe Pain (7 - 10)  traMADol 50 milliGRAM(s) Oral four times a day PRN Moderate Pain (4 - 6)    Labs:                        13.1   7.64  )-----------( 284      ( 09 May 2018 15:56 )             41.0     09 May 2018 15:56    143    |  103    |  10     ----------------------------<  113    4.2     |  23     |  0.7      Ca    9.1        09 May 2018 15:56            General: comfortable, NAD  Neurology: A&Ox3, nonfocal  Head:  Normocephalic, atraumatic  ENT:  Mucosa moist, no ulcerations  Neck:  Supple, no JVD,   Skin: no breakdowns (as per RN)  Resp: CTA B/L  CV: RRR, S1S2,   GI: Soft, NT, bowel sounds, obese  MS: No edema, + peripheral pulses, FROM all 4 extremity      A/P:  52-year-old male history of RUSSELL, HTN, intervertebral disc disease, status post placement of spinal cord stimulator in july 2017; presented with diffuse back pain.    1) acute on chronic back pain 2/2 possible spinal stimulator site infection  -IV abx for possible deep infection at the stimulator site, pt has PCN allergy  -ID eval, blood cultures pending  -pain control, continue home meds with IV morphine for breakthrough  NS f/u- ?OR for removal of SCS  pain management eval    pulmonary eval if going to [place]      DVT prophylaxis  Decubitus prevention- all measures as per RN protocol  Please call or text me with any questions or updates

## 2018-05-11 NOTE — CHART NOTE - NSCHARTNOTEFT_GEN_A_CORE
Anesthesia PACU Admission    Maintaining patent airway, arousable, pain controlled with current regimen (see orders), see IVF orders, no Nausea/vomiting, discharge from PACU when criteria are met.  /92 P 96 SpO2 100% R 16

## 2018-05-12 LAB
ANION GAP SERPL CALC-SCNC: 13 MMOL/L — SIGNIFICANT CHANGE UP (ref 7–14)
BASOPHILS # BLD AUTO: 0.03 K/UL — SIGNIFICANT CHANGE UP (ref 0–0.2)
BASOPHILS NFR BLD AUTO: 0.4 % — SIGNIFICANT CHANGE UP (ref 0–1)
BUN SERPL-MCNC: 9 MG/DL — LOW (ref 10–20)
CALCIUM SERPL-MCNC: 8.9 MG/DL — SIGNIFICANT CHANGE UP (ref 8.5–10.1)
CHLORIDE SERPL-SCNC: 99 MMOL/L — SIGNIFICANT CHANGE UP (ref 98–110)
CO2 SERPL-SCNC: 28 MMOL/L — SIGNIFICANT CHANGE UP (ref 17–32)
CREAT SERPL-MCNC: 0.8 MG/DL — SIGNIFICANT CHANGE UP (ref 0.7–1.5)
EOSINOPHIL # BLD AUTO: 0.18 K/UL — SIGNIFICANT CHANGE UP (ref 0–0.7)
EOSINOPHIL NFR BLD AUTO: 2.2 % — SIGNIFICANT CHANGE UP (ref 0–8)
GLUCOSE SERPL-MCNC: 112 MG/DL — HIGH (ref 70–99)
HCT VFR BLD CALC: 40.2 % — LOW (ref 42–52)
HGB BLD-MCNC: 12.4 G/DL — LOW (ref 14–18)
IMM GRANULOCYTES NFR BLD AUTO: 0.4 % — HIGH (ref 0.1–0.3)
LYMPHOCYTES # BLD AUTO: 1.89 K/UL — SIGNIFICANT CHANGE UP (ref 1.2–3.4)
LYMPHOCYTES # BLD AUTO: 22.9 % — SIGNIFICANT CHANGE UP (ref 20.5–51.1)
MCHC RBC-ENTMCNC: 26.5 PG — LOW (ref 27–31)
MCHC RBC-ENTMCNC: 30.8 G/DL — LOW (ref 32–37)
MCV RBC AUTO: 85.9 FL — SIGNIFICANT CHANGE UP (ref 80–94)
MONOCYTES # BLD AUTO: 0.56 K/UL — SIGNIFICANT CHANGE UP (ref 0.1–0.6)
MONOCYTES NFR BLD AUTO: 6.8 % — SIGNIFICANT CHANGE UP (ref 1.7–9.3)
NEUTROPHILS # BLD AUTO: 5.56 K/UL — SIGNIFICANT CHANGE UP (ref 1.4–6.5)
NEUTROPHILS NFR BLD AUTO: 67.3 % — SIGNIFICANT CHANGE UP (ref 42.2–75.2)
NRBC # BLD: 0 /100 WBCS — SIGNIFICANT CHANGE UP (ref 0–0)
PLATELET # BLD AUTO: 298 K/UL — SIGNIFICANT CHANGE UP (ref 130–400)
POTASSIUM SERPL-MCNC: 4.2 MMOL/L — SIGNIFICANT CHANGE UP (ref 3.5–5)
POTASSIUM SERPL-SCNC: 4.2 MMOL/L — SIGNIFICANT CHANGE UP (ref 3.5–5)
RBC # BLD: 4.68 M/UL — LOW (ref 4.7–6.1)
RBC # FLD: 13.1 % — SIGNIFICANT CHANGE UP (ref 11.5–14.5)
SODIUM SERPL-SCNC: 140 MMOL/L — SIGNIFICANT CHANGE UP (ref 135–146)
WBC # BLD: 8.25 K/UL — SIGNIFICANT CHANGE UP (ref 4.8–10.8)
WBC # FLD AUTO: 8.25 K/UL — SIGNIFICANT CHANGE UP (ref 4.8–10.8)

## 2018-05-12 RX ORDER — CEFAZOLIN SODIUM 1 G
2000 VIAL (EA) INJECTION ONCE
Qty: 0 | Refills: 0 | Status: COMPLETED | OUTPATIENT
Start: 2018-05-12 | End: 2018-05-12

## 2018-05-12 RX ORDER — CEFAZOLIN SODIUM 1 G
2000 VIAL (EA) INJECTION EVERY 6 HOURS
Qty: 0 | Refills: 0 | Status: DISCONTINUED | OUTPATIENT
Start: 2018-05-12 | End: 2018-05-18

## 2018-05-12 RX ORDER — HYDROMORPHONE HYDROCHLORIDE 2 MG/ML
0.5 INJECTION INTRAMUSCULAR; INTRAVENOUS; SUBCUTANEOUS
Qty: 0 | Refills: 0 | Status: DISCONTINUED | OUTPATIENT
Start: 2018-05-12 | End: 2018-05-13

## 2018-05-12 RX ORDER — CEFAZOLIN SODIUM 1 G
2000 VIAL (EA) INJECTION ONCE
Qty: 0 | Refills: 0 | Status: DISCONTINUED | OUTPATIENT
Start: 2018-05-12 | End: 2018-05-12

## 2018-05-12 RX ORDER — HYDROMORPHONE HYDROCHLORIDE 2 MG/ML
1 INJECTION INTRAMUSCULAR; INTRAVENOUS; SUBCUTANEOUS ONCE
Qty: 0 | Refills: 0 | Status: DISCONTINUED | OUTPATIENT
Start: 2018-05-12 | End: 2018-05-12

## 2018-05-12 RX ORDER — CEFAZOLIN SODIUM 1 G
VIAL (EA) INJECTION
Qty: 0 | Refills: 0 | Status: DISCONTINUED | OUTPATIENT
Start: 2018-05-12 | End: 2018-05-18

## 2018-05-12 RX ORDER — CEFAZOLIN SODIUM 1 G
VIAL (EA) INJECTION
Qty: 0 | Refills: 0 | Status: DISCONTINUED | OUTPATIENT
Start: 2018-05-12 | End: 2018-05-12

## 2018-05-12 RX ORDER — ENOXAPARIN SODIUM 100 MG/ML
40 INJECTION SUBCUTANEOUS DAILY
Qty: 0 | Refills: 0 | Status: DISCONTINUED | OUTPATIENT
Start: 2018-05-12 | End: 2018-05-18

## 2018-05-12 RX ADMIN — MORPHINE SULFATE 4 MILLIGRAM(S): 50 CAPSULE, EXTENDED RELEASE ORAL at 21:30

## 2018-05-12 RX ADMIN — Medication 100 MILLIGRAM(S): at 13:55

## 2018-05-12 RX ADMIN — HYDROMORPHONE HYDROCHLORIDE 0.5 MILLIGRAM(S): 2 INJECTION INTRAMUSCULAR; INTRAVENOUS; SUBCUTANEOUS at 23:46

## 2018-05-12 RX ADMIN — Medication 100 MILLIGRAM(S): at 13:57

## 2018-05-12 RX ADMIN — OXYCODONE HYDROCHLORIDE 100 MILLIGRAM(S): 5 TABLET ORAL at 05:16

## 2018-05-12 RX ADMIN — Medication 40 MILLIGRAM(S): at 05:16

## 2018-05-12 RX ADMIN — Medication 100 MILLIGRAM(S): at 21:04

## 2018-05-12 RX ADMIN — Medication 100 MILLIGRAM(S): at 10:35

## 2018-05-12 RX ADMIN — MORPHINE SULFATE 4 MILLIGRAM(S): 50 CAPSULE, EXTENDED RELEASE ORAL at 15:40

## 2018-05-12 RX ADMIN — Medication 100 MILLIGRAM(S): at 21:05

## 2018-05-12 RX ADMIN — ENOXAPARIN SODIUM 40 MILLIGRAM(S): 100 INJECTION SUBCUTANEOUS at 13:54

## 2018-05-12 RX ADMIN — Medication 100 MILLIGRAM(S): at 23:48

## 2018-05-12 RX ADMIN — Medication 100 MILLIGRAM(S): at 19:12

## 2018-05-12 RX ADMIN — Medication 30 MILLIGRAM(S): at 05:17

## 2018-05-12 RX ADMIN — HYDROMORPHONE HYDROCHLORIDE 1 MILLIGRAM(S): 2 INJECTION INTRAMUSCULAR; INTRAVENOUS; SUBCUTANEOUS at 05:14

## 2018-05-12 RX ADMIN — LOSARTAN POTASSIUM 100 MILLIGRAM(S): 100 TABLET, FILM COATED ORAL at 05:17

## 2018-05-12 RX ADMIN — PANTOPRAZOLE SODIUM 40 MILLIGRAM(S): 20 TABLET, DELAYED RELEASE ORAL at 10:29

## 2018-05-12 RX ADMIN — MORPHINE SULFATE 4 MILLIGRAM(S): 50 CAPSULE, EXTENDED RELEASE ORAL at 11:10

## 2018-05-12 RX ADMIN — SENNA PLUS 1 TABLET(S): 8.6 TABLET ORAL at 21:04

## 2018-05-12 RX ADMIN — Medication 250 MILLIGRAM(S): at 05:20

## 2018-05-12 RX ADMIN — MORPHINE SULFATE 4 MILLIGRAM(S): 50 CAPSULE, EXTENDED RELEASE ORAL at 21:01

## 2018-05-12 RX ADMIN — Medication 100 MILLIGRAM(S): at 05:16

## 2018-05-12 RX ADMIN — MORPHINE SULFATE 4 MILLIGRAM(S): 50 CAPSULE, EXTENDED RELEASE ORAL at 10:36

## 2018-05-12 RX ADMIN — MORPHINE SULFATE 4 MILLIGRAM(S): 50 CAPSULE, EXTENDED RELEASE ORAL at 15:19

## 2018-05-12 RX ADMIN — OXYCODONE HYDROCHLORIDE 100 MILLIGRAM(S): 5 TABLET ORAL at 19:12

## 2018-05-12 RX ADMIN — Medication 250 MILLIGRAM(S): at 00:09

## 2018-05-12 NOTE — PROGRESS NOTE ADULT - SUBJECTIVE AND OBJECTIVE BOX
Subjective: 52yMale with a pmhx of BACK PAIN  BCAK PAIN  ^SPINAL INFECTION  No h/o HF  Family history of diabetes mellitus in mother (Mother)  Family history of early CAD (Father)  Handoff  MEWS Score  Morbid obesity  Obstructive sleep apnea  Disc disease, degenerative, lumbar or lumbosacral  Depression  Hypertension  Back pain  History of excision of pilonidal cyst  H/O arthroscopy of right knee  Spinal cord stimulator status  SPINAL INFECTION  13    POD#1 s/p removal of spinal cord stimulator    Pt seen and examined at bedside. Pt admits to some incisional pain at this time and some soreness with moving around.     T(C): 37 (05-12-18 @ 04:46), Max: 37 (05-12-18 @ 04:46)  HR: 80 (05-12-18 @ 04:46) (80 - 106)  BP: 164/89 (05-12-18 @ 04:46) (140/73 - 211/108)  RR: 20 (05-12-18 @ 04:46) (13 - 25)  SpO2: 100% (05-11-18 @ 22:35) (92% - 100%)    Exam:  AAOX3. Verbal function intact  tongue midline, facial motions symmetric  PERRLA, EOMI  moves all extremities well with good strength  sensation intact    MEDICATIONS  (STANDING):  clindamycin IVPB      clindamycin IVPB 900 milliGRAM(s) IV Intermittent every 8 hours  docusate sodium 100 milliGRAM(s) Oral three times a day  enoxaparin Injectable 40 milliGRAM(s) SubCutaneous daily  furosemide    Tablet 40 milliGRAM(s) Oral daily  losartan 100 milliGRAM(s) Oral daily  NIFEdipine XL 30 milliGRAM(s) Oral daily  oxyCODONE  ER Tablet 100 milliGRAM(s) Oral every 12 hours  pantoprazole    Tablet 40 milliGRAM(s) Oral before breakfast  senna 1 Tablet(s) Oral at bedtime  vancomycin  IVPB 1000 milliGRAM(s) IV Intermittent every 12 hours    MEDICATIONS  (PRN):  ceFAZolin   IVPB 2000 milliGRAM(s) IV Intermittent once PRN dr paulson  ceFAZolin   IVPB     PRN dr paulson  cyclobenzaprine 10 milliGRAM(s) Oral three times a day PRN Muscle Spasm  HYDROmorphone  Injectable 1 milliGRAM(s) IV Push every 10 minutes PRN Severe Pain (7 - 10)  morphine  - Injectable 4 milliGRAM(s) IV Push every 4 hours PRN Severe Pain (7 - 10)      CBC Full  -  ( 12 May 2018 09:33 )  WBC Count : 8.25 K/uL  Hemoglobin : 12.4 g/dL  Hematocrit : 40.2 %  Platelet Count - Automated : 298 K/uL  Mean Cell Volume : 85.9 fL  Mean Cell Hemoglobin : 26.5 pg  Mean Cell Hemoglobin Concentration : 30.8 g/dL  Auto Neutrophil # : 5.56 K/uL  Auto Lymphocyte # : 1.89 K/uL  Auto Monocyte # : 0.56 K/uL  Auto Eosinophil # : 0.18 K/uL  Auto Basophil # : 0.03 K/uL  Auto Neutrophil % : 67.3 %  Auto Lymphocyte % : 22.9 %  Auto Monocyte % : 6.8 %  Auto Eosinophil % : 2.2 %  Auto Basophil % : 0.4 %    05-12    140  |  99  |  9<L>  ----------------------------<  112<H>  4.2   |  28  |  0.8    Ca    8.9      12 May 2018 09:33      PT/INR - ( 11 May 2018 06:02 )   PT: 11.90 sec;   INR: 1.10 ratio         PTT - ( 11 May 2018 06:02 )  PTT:27.7 sec      Wound:  incision clean, dry and intact, 2 hemovacs      Assessment/Plan: as above  Pt/rehab  Cont medical care per primary team  d'cd hemovac L2 due to no output  will likely d/c hemovac L1 tomorrow if less than 30cc  restarted lovenox dvt prophylaxis  agree with ID recs  will follow for management of vac  d/w attending

## 2018-05-12 NOTE — PROGRESS NOTE ADULT - SUBJECTIVE AND OBJECTIVE BOX
Patient was seen and examined. Spoke with RN. Chart reviewed.    No events overnight.  Vital Signs Last 24 Hrs  T(F): 98.6 (12 May 2018 04:46), Max: 98.6 (12 May 2018 04:46)  HR: 80 (12 May 2018 04:46) (80 - 106)  BP: 164/89 (12 May 2018 04:46) (140/73 - 211/108)  SpO2: 100% (11 May 2018 22:35) (92% - 100%)  MEDICATIONS  (STANDING):  ceFAZolin   IVPB      ceFAZolin   IVPB 2000 milliGRAM(s) IV Intermittent once  ceFAZolin   IVPB 2000 milliGRAM(s) IV Intermittent every 6 hours  clindamycin IVPB      clindamycin IVPB 900 milliGRAM(s) IV Intermittent every 8 hours  docusate sodium 100 milliGRAM(s) Oral three times a day  enoxaparin Injectable 40 milliGRAM(s) SubCutaneous daily  furosemide    Tablet 40 milliGRAM(s) Oral daily  losartan 100 milliGRAM(s) Oral daily  NIFEdipine XL 30 milliGRAM(s) Oral daily  oxyCODONE  ER Tablet 100 milliGRAM(s) Oral every 12 hours  pantoprazole    Tablet 40 milliGRAM(s) Oral before breakfast  senna 1 Tablet(s) Oral at bedtime    MEDICATIONS  (PRN):  cyclobenzaprine 10 milliGRAM(s) Oral three times a day PRN Muscle Spasm  HYDROmorphone  Injectable 1 milliGRAM(s) IV Push every 10 minutes PRN Severe Pain (7 - 10)  morphine  - Injectable 4 milliGRAM(s) IV Push every 4 hours PRN Severe Pain (7 - 10)    Labs:                        12.4   8.25  )-----------( 298      ( 12 May 2018 09:33 )             40.2     12 May 2018 09:33    140    |  99     |  9      ----------------------------<  112    4.2     |  28     |  0.8      Ca    8.9        12 May 2018 09:33      PT/INR - ( 11 May 2018 06:02 )   PT: 11.90 sec;   INR: 1.10 ratio         PTT - ( 11 May 2018 06:02 )  PTT:27.7 sec      Culture - Blood (collected 10 May 2018 07:35)  Source: .Blood None  Preliminary Report (11 May 2018 12:02):    No growth to date.        Radiology:    General: comfortable, NAD  Neurology: A&Ox3, nonfocal  Head:  Normocephalic, atraumatic  ENT:  Mucosa moist, no ulcerations  Neck:  Supple, no JVD,   Skin: no breakdowns (as per RN)  Resp: CTA B/L  CV: RRR, S1S2,   GI: Soft, NT, bowel sounds  MS: No edema, + peripheral pulses, FROM all 4 extremity

## 2018-05-13 RX ORDER — OXYCODONE HYDROCHLORIDE 5 MG/1
10 TABLET ORAL EVERY 4 HOURS
Qty: 0 | Refills: 0 | Status: DISCONTINUED | OUTPATIENT
Start: 2018-05-13 | End: 2018-05-18

## 2018-05-13 RX ORDER — FUROSEMIDE 40 MG
40 TABLET ORAL
Qty: 0 | Refills: 0 | Status: DISCONTINUED | OUTPATIENT
Start: 2018-05-13 | End: 2018-05-16

## 2018-05-13 RX ORDER — OXYCODONE HYDROCHLORIDE 5 MG/1
110 TABLET ORAL EVERY 12 HOURS
Qty: 0 | Refills: 0 | Status: DISCONTINUED | OUTPATIENT
Start: 2018-05-13 | End: 2018-05-15

## 2018-05-13 RX ORDER — GABAPENTIN 400 MG/1
300 CAPSULE ORAL THREE TIMES A DAY
Qty: 0 | Refills: 0 | Status: DISCONTINUED | OUTPATIENT
Start: 2018-05-13 | End: 2018-05-15

## 2018-05-13 RX ORDER — HYDROMORPHONE HYDROCHLORIDE 2 MG/ML
0.5 INJECTION INTRAMUSCULAR; INTRAVENOUS; SUBCUTANEOUS EVERY 4 HOURS
Qty: 0 | Refills: 0 | Status: DISCONTINUED | OUTPATIENT
Start: 2018-05-13 | End: 2018-05-16

## 2018-05-13 RX ADMIN — HYDROMORPHONE HYDROCHLORIDE 0.5 MILLIGRAM(S): 2 INJECTION INTRAMUSCULAR; INTRAVENOUS; SUBCUTANEOUS at 07:00

## 2018-05-13 RX ADMIN — Medication 100 MILLIGRAM(S): at 06:13

## 2018-05-13 RX ADMIN — OXYCODONE HYDROCHLORIDE 100 MILLIGRAM(S): 5 TABLET ORAL at 07:05

## 2018-05-13 RX ADMIN — Medication 40 MILLIGRAM(S): at 18:32

## 2018-05-13 RX ADMIN — Medication 100 MILLIGRAM(S): at 18:29

## 2018-05-13 RX ADMIN — Medication 100 MILLIGRAM(S): at 06:11

## 2018-05-13 RX ADMIN — HYDROMORPHONE HYDROCHLORIDE 0.5 MILLIGRAM(S): 2 INJECTION INTRAMUSCULAR; INTRAVENOUS; SUBCUTANEOUS at 20:32

## 2018-05-13 RX ADMIN — Medication 100 MILLIGRAM(S): at 22:18

## 2018-05-13 RX ADMIN — HYDROMORPHONE HYDROCHLORIDE 0.5 MILLIGRAM(S): 2 INJECTION INTRAMUSCULAR; INTRAVENOUS; SUBCUTANEOUS at 15:45

## 2018-05-13 RX ADMIN — SENNA PLUS 1 TABLET(S): 8.6 TABLET ORAL at 22:18

## 2018-05-13 RX ADMIN — OXYCODONE HYDROCHLORIDE 100 MILLIGRAM(S): 5 TABLET ORAL at 06:32

## 2018-05-13 RX ADMIN — Medication 100 MILLIGRAM(S): at 11:44

## 2018-05-13 RX ADMIN — ENOXAPARIN SODIUM 40 MILLIGRAM(S): 100 INJECTION SUBCUTANEOUS at 11:46

## 2018-05-13 RX ADMIN — Medication 100 MILLIGRAM(S): at 15:41

## 2018-05-13 RX ADMIN — HYDROMORPHONE HYDROCHLORIDE 0.5 MILLIGRAM(S): 2 INJECTION INTRAMUSCULAR; INTRAVENOUS; SUBCUTANEOUS at 01:45

## 2018-05-13 RX ADMIN — OXYCODONE HYDROCHLORIDE 110 MILLIGRAM(S): 5 TABLET ORAL at 18:33

## 2018-05-13 RX ADMIN — HYDROMORPHONE HYDROCHLORIDE 0.5 MILLIGRAM(S): 2 INJECTION INTRAMUSCULAR; INTRAVENOUS; SUBCUTANEOUS at 02:15

## 2018-05-13 RX ADMIN — HYDROMORPHONE HYDROCHLORIDE 0.5 MILLIGRAM(S): 2 INJECTION INTRAMUSCULAR; INTRAVENOUS; SUBCUTANEOUS at 11:41

## 2018-05-13 RX ADMIN — OXYCODONE HYDROCHLORIDE 110 MILLIGRAM(S): 5 TABLET ORAL at 19:35

## 2018-05-13 RX ADMIN — LOSARTAN POTASSIUM 100 MILLIGRAM(S): 100 TABLET, FILM COATED ORAL at 06:13

## 2018-05-13 RX ADMIN — PANTOPRAZOLE SODIUM 40 MILLIGRAM(S): 20 TABLET, DELAYED RELEASE ORAL at 06:12

## 2018-05-13 RX ADMIN — HYDROMORPHONE HYDROCHLORIDE 0.5 MILLIGRAM(S): 2 INJECTION INTRAMUSCULAR; INTRAVENOUS; SUBCUTANEOUS at 21:30

## 2018-05-13 RX ADMIN — HYDROMORPHONE HYDROCHLORIDE 0.5 MILLIGRAM(S): 2 INJECTION INTRAMUSCULAR; INTRAVENOUS; SUBCUTANEOUS at 06:29

## 2018-05-13 RX ADMIN — HYDROMORPHONE HYDROCHLORIDE 0.5 MILLIGRAM(S): 2 INJECTION INTRAMUSCULAR; INTRAVENOUS; SUBCUTANEOUS at 00:37

## 2018-05-13 RX ADMIN — GABAPENTIN 300 MILLIGRAM(S): 400 CAPSULE ORAL at 22:18

## 2018-05-13 RX ADMIN — Medication 40 MILLIGRAM(S): at 06:13

## 2018-05-13 RX ADMIN — Medication 30 MILLIGRAM(S): at 06:12

## 2018-05-13 NOTE — PROGRESS NOTE ADULT - SUBJECTIVE AND OBJECTIVE BOX
ONEAL JOSE ALBERTO  52y, Male      OVERNIGHT EVENTS:    No fevers, no overt pain at surgical site. Drain sero-sanguinous.    VITALS:  T(F): 97.9, Max: 97.9 (05-13-18 @ 04:57)  HR: 89  BP: 143/64  RR: 19Vital Signs Last 24 Hrs  T(C): 36.6 (13 May 2018 04:57), Max: 36.6 (13 May 2018 04:57)  T(F): 97.9 (13 May 2018 04:57), Max: 97.9 (13 May 2018 04:57)  HR: 89 (13 May 2018 04:57) (89 - 91)  BP: 143/64 (13 May 2018 04:57) (113/63 - 168/81)  BP(mean): --  RR: 19 (13 May 2018 04:57) (18 - 20)  SpO2: --    TESTS & MEASUREMENTS:                        12.4   8.25  )-----------( 298      ( 12 May 2018 09:33 )             40.2     05-12    140  |  99  |  9<L>  ----------------------------<  112<H>  4.2   |  28  |  0.8    Ca    8.9      12 May 2018 09:33          Culture - Blood (collected 05-10-18 @ 07:35)  Source: .Blood None  Preliminary Report (05-11-18 @ 12:02):    No growth to date.            RADIOLOGY & ADDITIONAL TESTS:    ANTIBIOTICS:  ceFAZolin   IVPB      ceFAZolin   IVPB 2000 milliGRAM(s) IV Intermittent every 6 hours  clindamycin IVPB 900 milliGRAM(s) IV Intermittent every 8 hours  clindamycin IVPB

## 2018-05-13 NOTE — PROGRESS NOTE ADULT - SUBJECTIVE AND OBJECTIVE BOX
Patient was seen and examined. Spoke with RN. Chart reviewed.  No events overnight- but has edema in legs after sitting up all nigth  Vital Signs Last 24 Hrs  T(F): 97.9 (13 May 2018 04:57), Max: 97.9 (13 May 2018 04:57)  HR: 89 (13 May 2018 04:57) (89 - 91)  BP: 143/64 (13 May 2018 04:57) (113/63 - 168/81)  SpO2: --  MEDICATIONS  (STANDING):  ceFAZolin   IVPB      ceFAZolin   IVPB 2000 milliGRAM(s) IV Intermittent every 6 hours  clindamycin IVPB 900 milliGRAM(s) IV Intermittent every 8 hours  clindamycin IVPB      docusate sodium 100 milliGRAM(s) Oral three times a day  enoxaparin Injectable 40 milliGRAM(s) SubCutaneous daily  furosemide    Tablet 40 milliGRAM(s) Oral daily  HYDROmorphone  Injectable 0.5 milliGRAM(s) IV Push four times a day  losartan 100 milliGRAM(s) Oral daily  NIFEdipine XL 30 milliGRAM(s) Oral daily  oxyCODONE  ER Tablet 100 milliGRAM(s) Oral every 12 hours  pantoprazole    Tablet 40 milliGRAM(s) Oral before breakfast  senna 1 Tablet(s) Oral at bedtime    MEDICATIONS  (PRN):  cyclobenzaprine 10 milliGRAM(s) Oral three times a day PRN Muscle Spasm    Labs:                        12.4   8.25  )-----------( 298      ( 12 May 2018 09:33 )             40.2     12 May 2018 09:33    140    |  99     |  9      ----------------------------<  112    4.2     |  28     |  0.8      Ca    8.9        12 May 2018 09:33            General: comfortable, NAD; no dyspnea  Neurology: A&Ox3, nonfocal  Head:  Normocephalic, atraumatic  ENT:  Mucosa moist, no ulcerations  Neck:  Supple, no JVD,   Skin: no breakdowns (as per RN); wound dressing with drain in back  Resp: CTA B/L  CV: RRR, S1S2,   GI: Soft, NT, bowel sounds, morbidly obese  MS: b/ledema, + peripheral pulses, FROM all 4 extremity      A/P:  52-year-old male history of RUSSELL, HTN, intervertebral disc disease, status post placement of spinal cord stimulator in july 2017; presented with diffuse back pain.    1) acute on chronic back pain 2/2 possible spinal stimulator site infection  -IV abx for possible deep infection at the stimulator site, pt has PCN allergy  -ID eval, blood cultures NGTD  -pain control, c/w home meds with IV morphine for breakthrough, pain c/s  -PICC line when cleared by ID  -Neurosurgery f/u daily  -monitor edema closely- given lasix  -s/p for spinal stimulator removal  DVT prophylaxis  Decubitus prevention- all measures as per RN protocol  Please call or text me with any questions or updates

## 2018-05-13 NOTE — PROGRESS NOTE ADULT - SUBJECTIVE AND OBJECTIVE BOX
Subjective: 52yMale with a pmhx of BACK PAIN  BCAK PAIN  ^SPINAL INFECTION  No h/o HF  Family history of diabetes mellitus in mother (Mother)  Family history of early CAD (Father)  Handoff  MEWS Score  Morbid obesity  Obstructive sleep apnea  Disc disease, degenerative, lumbar or lumbosacral  Depression  Hypertension  Back pain  History of excision of pilonidal cyst  H/O arthroscopy of right knee  Spinal cord stimulator status  SPINAL INFECTION  13    POD#2 s/p removal of spinal cord stimulator    Pt seen and examined at bedside. Pt admits to some incisional pain at this time and some soreness with moving around. States that he sat up too much yesterday and his ankles became more swollen so he is having more difficulty walking today.    T(C): 36.6 (05-13-18 @ 04:57), Max: 36.6 (05-13-18 @ 04:57)  HR: 89 (05-13-18 @ 04:57) (89 - 91)  BP: 143/64 (05-13-18 @ 04:57) (113/63 - 168/81)  RR: 19 (05-13-18 @ 04:57) (18 - 20)      MEDICATIONS  (STANDING):  ceFAZolin   IVPB      ceFAZolin   IVPB 2000 milliGRAM(s) IV Intermittent every 6 hours  clindamycin IVPB 900 milliGRAM(s) IV Intermittent every 8 hours  clindamycin IVPB      docusate sodium 100 milliGRAM(s) Oral three times a day  enoxaparin Injectable 40 milliGRAM(s) SubCutaneous daily  furosemide    Tablet 40 milliGRAM(s) Oral daily  losartan 100 milliGRAM(s) Oral daily  NIFEdipine XL 30 milliGRAM(s) Oral daily  oxyCODONE  ER Tablet 110 milliGRAM(s) Oral every 12 hours  pantoprazole    Tablet 40 milliGRAM(s) Oral before breakfast  senna 1 Tablet(s) Oral at bedtime    MEDICATIONS  (PRN):  cyclobenzaprine 10 milliGRAM(s) Oral three times a day PRN Muscle Spasm  HYDROmorphone  Injectable 0.5 milliGRAM(s) IV Push every 4 hours PRN Severe Pain (7 - 10)      Exam:  AAOX3. Verbal function intact  tongue midline, facial motions symmetric  PERRLA, EOMI  moves all extremities well with good strength  sensation intact      CBC Full  -  ( 12 May 2018 09:33 )  WBC Count : 8.25 K/uL  Hemoglobin : 12.4 g/dL  Hematocrit : 40.2 %  Platelet Count - Automated : 298 K/uL  Mean Cell Volume : 85.9 fL  Mean Cell Hemoglobin : 26.5 pg  Mean Cell Hemoglobin Concentration : 30.8 g/dL  Auto Neutrophil # : 5.56 K/uL  Auto Lymphocyte # : 1.89 K/uL  Auto Monocyte # : 0.56 K/uL  Auto Eosinophil # : 0.18 K/uL  Auto Basophil # : 0.03 K/uL  Auto Neutrophil % : 67.3 %  Auto Lymphocyte % : 22.9 %  Auto Monocyte % : 6.8 %  Auto Eosinophil % : 2.2 %  Auto Basophil % : 0.4 %    05-12    140  |  99  |  9<L>  ----------------------------<  112<H>  4.2   |  28  |  0.8    Ca    8.9      12 May 2018 09:33        Wound:  incision clean, dry and intact, 1 hemovac      Assessment/Plan: as above  Pt/rehab  Cont medical care per primary team  agree with ID recs  will follow for management of vac  d/w attending

## 2018-05-13 NOTE — PROGRESS NOTE ADULT - SUBJECTIVE AND OBJECTIVE BOX
SUBJECTIVE:    Patient is a 52y old Male who presents with a chief complaint of diffuse back pain (10 May 2018 14:55)    Currently admitted to medicine with the primary diagnosis of Back pain     Today is hospital day 4d. This morning he is resting comfortably in bed and reports no new issues or overnight events. c/o back pain    PAST MEDICAL & SURGICAL HISTORY  Morbid obesity  Obstructive sleep apnea  Disc disease, degenerative, lumbar or lumbosacral  Hypertension  History of excision of pilonidal cyst  H/O arthroscopy of right knee  Spinal cord stimulator status    SOCIAL HISTORY:    ALLERGIES:  IV Contrast (Unknown)  penicillin (Unknown)    MEDICATIONS:  STANDING MEDICATIONS  ceFAZolin   IVPB      ceFAZolin   IVPB 2000 milliGRAM(s) IV Intermittent every 6 hours  clindamycin IVPB 900 milliGRAM(s) IV Intermittent every 8 hours  clindamycin IVPB      docusate sodium 100 milliGRAM(s) Oral three times a day  enoxaparin Injectable 40 milliGRAM(s) SubCutaneous daily  furosemide    Tablet 40 milliGRAM(s) Oral daily  HYDROmorphone  Injectable 0.5 milliGRAM(s) IV Push four times a day  losartan 100 milliGRAM(s) Oral daily  NIFEdipine XL 30 milliGRAM(s) Oral daily  oxyCODONE  ER Tablet 110 milliGRAM(s) Oral every 12 hours  pantoprazole    Tablet 40 milliGRAM(s) Oral before breakfast  senna 1 Tablet(s) Oral at bedtime    PRN MEDICATIONS  cyclobenzaprine 10 milliGRAM(s) Oral three times a day PRN    VITALS:   T(F): 97.9  HR: 89  BP: 143/64  RR: 19  SpO2: --    LABS:                        12.4   8.25  )-----------( 298      ( 12 May 2018 09:33 )             40.2     05-12    140  |  99  |  9<L>  ----------------------------<  112<H>  4.2   |  28  |  0.8    Ca    8.9      12 May 2018 09:33          PHYSICAL EXAM:  GEN: No acute distress  LUNGS: Clear to auscultation bilaterally   HEART: S1/S2 present. RRR.   ABD: Soft, non-tender, non-distended. Bowel sounds present  EXT: NC/NC/NE/2+PP/NICHOLSON, drain with blood  NEURO: AAOX3    HOME MEDICATIONS:  Home Medications:  losartan 100 mg oral tablet (02-23-18)  naproxen 500 mg oral delayed release tablet (02-23-18)

## 2018-05-14 PROCEDURE — 93970 EXTREMITY STUDY: CPT | Mod: 26

## 2018-05-14 RX ADMIN — HYDROMORPHONE HYDROCHLORIDE 0.5 MILLIGRAM(S): 2 INJECTION INTRAMUSCULAR; INTRAVENOUS; SUBCUTANEOUS at 06:28

## 2018-05-14 RX ADMIN — Medication 100 MILLIGRAM(S): at 06:24

## 2018-05-14 RX ADMIN — Medication 100 MILLIGRAM(S): at 00:59

## 2018-05-14 RX ADMIN — HYDROMORPHONE HYDROCHLORIDE 0.5 MILLIGRAM(S): 2 INJECTION INTRAMUSCULAR; INTRAVENOUS; SUBCUTANEOUS at 14:16

## 2018-05-14 RX ADMIN — Medication 100 MILLIGRAM(S): at 18:37

## 2018-05-14 RX ADMIN — HYDROMORPHONE HYDROCHLORIDE 0.5 MILLIGRAM(S): 2 INJECTION INTRAMUSCULAR; INTRAVENOUS; SUBCUTANEOUS at 20:45

## 2018-05-14 RX ADMIN — Medication 100 MILLIGRAM(S): at 15:02

## 2018-05-14 RX ADMIN — OXYCODONE HYDROCHLORIDE 10 MILLIGRAM(S): 5 TABLET ORAL at 06:49

## 2018-05-14 RX ADMIN — ENOXAPARIN SODIUM 40 MILLIGRAM(S): 100 INJECTION SUBCUTANEOUS at 15:01

## 2018-05-14 RX ADMIN — GABAPENTIN 300 MILLIGRAM(S): 400 CAPSULE ORAL at 15:02

## 2018-05-14 RX ADMIN — PANTOPRAZOLE SODIUM 40 MILLIGRAM(S): 20 TABLET, DELAYED RELEASE ORAL at 06:25

## 2018-05-14 RX ADMIN — Medication 30 MILLIGRAM(S): at 06:24

## 2018-05-14 RX ADMIN — GABAPENTIN 300 MILLIGRAM(S): 400 CAPSULE ORAL at 06:24

## 2018-05-14 RX ADMIN — OXYCODONE HYDROCHLORIDE 110 MILLIGRAM(S): 5 TABLET ORAL at 19:15

## 2018-05-14 RX ADMIN — Medication 100 MILLIGRAM(S): at 21:14

## 2018-05-14 RX ADMIN — HYDROMORPHONE HYDROCHLORIDE 0.5 MILLIGRAM(S): 2 INJECTION INTRAMUSCULAR; INTRAVENOUS; SUBCUTANEOUS at 16:05

## 2018-05-14 RX ADMIN — HYDROMORPHONE HYDROCHLORIDE 0.5 MILLIGRAM(S): 2 INJECTION INTRAMUSCULAR; INTRAVENOUS; SUBCUTANEOUS at 02:06

## 2018-05-14 RX ADMIN — LOSARTAN POTASSIUM 100 MILLIGRAM(S): 100 TABLET, FILM COATED ORAL at 06:24

## 2018-05-14 RX ADMIN — OXYCODONE HYDROCHLORIDE 10 MILLIGRAM(S): 5 TABLET ORAL at 07:05

## 2018-05-14 RX ADMIN — GABAPENTIN 300 MILLIGRAM(S): 400 CAPSULE ORAL at 21:15

## 2018-05-14 RX ADMIN — SENNA PLUS 1 TABLET(S): 8.6 TABLET ORAL at 21:15

## 2018-05-14 RX ADMIN — OXYCODONE HYDROCHLORIDE 110 MILLIGRAM(S): 5 TABLET ORAL at 06:26

## 2018-05-14 RX ADMIN — OXYCODONE HYDROCHLORIDE 110 MILLIGRAM(S): 5 TABLET ORAL at 18:44

## 2018-05-14 RX ADMIN — Medication 40 MILLIGRAM(S): at 06:25

## 2018-05-14 RX ADMIN — HYDROMORPHONE HYDROCHLORIDE 0.5 MILLIGRAM(S): 2 INJECTION INTRAMUSCULAR; INTRAVENOUS; SUBCUTANEOUS at 07:05

## 2018-05-14 RX ADMIN — Medication 40 MILLIGRAM(S): at 19:41

## 2018-05-14 RX ADMIN — HYDROMORPHONE HYDROCHLORIDE 0.5 MILLIGRAM(S): 2 INJECTION INTRAMUSCULAR; INTRAVENOUS; SUBCUTANEOUS at 01:33

## 2018-05-14 RX ADMIN — Medication 100 MILLIGRAM(S): at 15:00

## 2018-05-14 RX ADMIN — HYDROMORPHONE HYDROCHLORIDE 0.5 MILLIGRAM(S): 2 INJECTION INTRAMUSCULAR; INTRAVENOUS; SUBCUTANEOUS at 14:35

## 2018-05-14 RX ADMIN — Medication 100 MILLIGRAM(S): at 21:16

## 2018-05-14 RX ADMIN — HYDROMORPHONE HYDROCHLORIDE 0.5 MILLIGRAM(S): 2 INJECTION INTRAMUSCULAR; INTRAVENOUS; SUBCUTANEOUS at 12:00

## 2018-05-14 RX ADMIN — Medication 100 MILLIGRAM(S): at 15:01

## 2018-05-14 RX ADMIN — OXYCODONE HYDROCHLORIDE 110 MILLIGRAM(S): 5 TABLET ORAL at 07:05

## 2018-05-14 NOTE — PROCEDURE NOTE - NSPROCDETAILS_GEN_ALL_CORE
sterile technique, catheter placed/location identified, draped/prepped, sterile technique used/sterile dressing applied/supine position

## 2018-05-14 NOTE — PROGRESS NOTE ADULT - SUBJECTIVE AND OBJECTIVE BOX
POD # 4    S/P Ventricular pleural shunt        Patient seen and examined at bedside patient alert , states his headaches have resolved.       Vital Signs Last 24 Hrs  T(C): 36.4 (14 May 2018 05:25), Max: 36.4 (14 May 2018 05:25)  T(F): 97.6 (14 May 2018 05:25), Max: 97.6 (14 May 2018 05:25)  HR: 87 (14 May 2018 05:25) (82 - 87)  BP: 160/74 (14 May 2018 05:25) (153/72 - 160/74)  BP(mean): --  RR: 17 (14 May 2018 05:25) (17 - 18)  SpO2: --    PHYSICAL EXAM:  Alert, PERRL  MS 5/5 bilateral UE's     incision clean dry intact               dressing clean dry intact       MEDICATIONS:  Antibiotics:  ceFAZolin   IVPB      ceFAZolin   IVPB 2000 milliGRAM(s) IV Intermittent every 6 hours  clindamycin IVPB 900 milliGRAM(s) IV Intermittent every 8 hours  clindamycin IVPB        Neuro:  cyclobenzaprine 10 milliGRAM(s) Oral three times a day PRN  gabapentin 300 milliGRAM(s) Oral three times a day  HYDROmorphone  Injectable 0.5 milliGRAM(s) IV Push every 4 hours PRN  oxyCODONE    IR 10 milliGRAM(s) Oral every 4 hours PRN  oxyCODONE  ER Tablet 110 milliGRAM(s) Oral every 12 hours    Anticoagulation:  enoxaparin Injectable 40 milliGRAM(s) SubCutaneous daily    OTHER:  docusate sodium 100 milliGRAM(s) Oral three times a day  furosemide   Injectable 40 milliGRAM(s) IV Push two times a day  losartan 100 milliGRAM(s) Oral daily  NIFEdipine XL 30 milliGRAM(s) Oral daily  pantoprazole    Tablet 40 milliGRAM(s) Oral before breakfast  senna 1 Tablet(s) Oral at bedtime          A/P      S/P Ventricular pleural shunt            continue care per general surgery Note erroneous.

## 2018-05-14 NOTE — PATIENT PROFILE ADULT. - NS PRO CONTRA FLU 1
out of season (available sept 1 thru apr 2 only)
no chest pain, no cough, and no shortness of breath.

## 2018-05-14 NOTE — PROGRESS NOTE ADULT - SUBJECTIVE AND OBJECTIVE BOX
POD # 3    S/P  Removal of Spinal cord stimulator      Patient seen and examined at bedside patient without any complaints at this time.      Vital Signs Last 24 Hrs  T(C): 36.4 (14 May 2018 05:25), Max: 36.4 (14 May 2018 05:25)  T(F): 97.6 (14 May 2018 05:25), Max: 97.6 (14 May 2018 05:25)  HR: 87 (14 May 2018 05:25) (82 - 87)  BP: 160/74 (14 May 2018 05:25) (153/72 - 160/74)  BP(mean): --  RR: 17 (14 May 2018 05:25) (17 - 18)  SpO2: --    PHYSICAL EXAM:  Alert, MAEX4   MS equal throughout     Incision site clean dry intact                     Hemovac removed      MEDICATIONS:  Antibiotics:  ceFAZolin   IVPB      ceFAZolin   IVPB 2000 milliGRAM(s) IV Intermittent every 6 hours  clindamycin IVPB 900 milliGRAM(s) IV Intermittent every 8 hours  clindamycin IVPB        Neuro:  cyclobenzaprine 10 milliGRAM(s) Oral three times a day PRN  gabapentin 300 milliGRAM(s) Oral three times a day  HYDROmorphone  Injectable 0.5 milliGRAM(s) IV Push every 4 hours PRN  oxyCODONE    IR 10 milliGRAM(s) Oral every 4 hours PRN  oxyCODONE  ER Tablet 110 milliGRAM(s) Oral every 12 hours    Anticoagulation:  enoxaparin Injectable 40 milliGRAM(s) SubCutaneous daily    OTHER:  docusate sodium 100 milliGRAM(s) Oral three times a day  furosemide   Injectable 40 milliGRAM(s) IV Push two times a day  losartan 100 milliGRAM(s) Oral daily  NIFEdipine XL 30 milliGRAM(s) Oral daily  pantoprazole    Tablet 40 milliGRAM(s) Oral before breakfast  senna 1 Tablet(s) Oral at bedtime    A/ p         S/p   Removal of spinal cord stimulator                 Antibiotics per ID

## 2018-05-14 NOTE — PROGRESS NOTE ADULT - SUBJECTIVE AND OBJECTIVE BOX
Patient was seen and examined. Spoke with RN- reports patient to be the only patient on unit with repeated belligerence and accusatory tone. Chart reviewed.  No events overnight. Answered all questions; addressed all concerns.  Vital Signs Last 24 Hrs  T(F): 97.6 (14 May 2018 05:25), Max: 97.6 (14 May 2018 05:25)  HR: 87 (14 May 2018 05:25) (82 - 87)  BP: 160/74 (14 May 2018 05:25) (153/72 - 160/74)  SpO2: --  MEDICATIONS  (STANDING):  ceFAZolin   IVPB      ceFAZolin   IVPB 2000 milliGRAM(s) IV Intermittent every 6 hours  clindamycin IVPB 900 milliGRAM(s) IV Intermittent every 8 hours  clindamycin IVPB      docusate sodium 100 milliGRAM(s) Oral three times a day  enoxaparin Injectable 40 milliGRAM(s) SubCutaneous daily  furosemide   Injectable 40 milliGRAM(s) IV Push two times a day  gabapentin 300 milliGRAM(s) Oral three times a day  losartan 100 milliGRAM(s) Oral daily  NIFEdipine XL 30 milliGRAM(s) Oral daily  oxyCODONE  ER Tablet 110 milliGRAM(s) Oral every 12 hours  pantoprazole    Tablet 40 milliGRAM(s) Oral before breakfast  senna 1 Tablet(s) Oral at bedtime    MEDICATIONS  (PRN):  cyclobenzaprine 10 milliGRAM(s) Oral three times a day PRN Muscle Spasm  HYDROmorphone  Injectable 0.5 milliGRAM(s) IV Push every 4 hours PRN Severe Pain (7 - 10)  oxyCODONE    IR 10 milliGRAM(s) Oral every 4 hours PRN Moderate Pain (4 - 6)    Labs:                        12.4   8.25  )-----------( 298      ( 12 May 2018 09:33 )             40.2     12 May 2018 09:33    140    |  99     |  9      ----------------------------<  112    4.2     |  28     |  0.8      Ca    8.9        12 May 2018 09:33            General: uncomfortable, NAD  Neurology: A&Ox3, nonfocal  Head:  Normocephalic, atraumatic  ENT:  Mucosa moist, no ulcerations  Neck:  Supple, no JVD,   Skin: no breakdowns (as per RN); back bandaged  Resp: CTA B/L  CV: RRR, S1S2,   GI: Soft, NT, bowel sounds, obese  MS: edema, + peripheral pulses, FROM all 4 extremity      A/P:  52-year-old male history of RUSSELL, HTN, intervertebral disc disease, status post placement of spinal cord stimulator in july 2017; presented with diffuse back pain 2/2 possible spinal stimulator site infection    -IV abx for possible deep infection at the stimulator site as per ID  -ID f/u, blood cultures NGTD  -pain control, c/w home meds with IV morphine for breakthrough, pain c/s Dr Lombardo  -PICC line when cleared by ID  -Neurosurgery f/u daily- drain, wound care  -monitor edema closely- given lasix    DVT prophylaxis  Decubitus prevention- all measures as per RN protocol  Please call or text me with any questions or updates

## 2018-05-14 NOTE — PROGRESS NOTE ADULT - SUBJECTIVE AND OBJECTIVE BOX
SUBJECTIVE:    Patient is a 52y old Male who presents with a chief complaint of diffuse back pain (10 May 2018 14:55)    Currently admitted to medicine with the primary diagnosis of Back pain     Today is hospital day 5d. This morning he is resting comfortably in bed and reports no new issues or overnight events.     PAST MEDICAL & SURGICAL HISTORY  Morbid obesity  Obstructive sleep apnea  Disc disease, degenerative, lumbar or lumbosacral  Hypertension  History of excision of pilonidal cyst  H/O arthroscopy of right knee  Spinal cord stimulator status    SOCIAL HISTORY:    ALLERGIES:  IV Contrast (Unknown)  penicillin (Unknown)    MEDICATIONS:  STANDING MEDICATIONS  ceFAZolin   IVPB      ceFAZolin   IVPB 2000 milliGRAM(s) IV Intermittent every 6 hours  clindamycin IVPB 900 milliGRAM(s) IV Intermittent every 8 hours  clindamycin IVPB      docusate sodium 100 milliGRAM(s) Oral three times a day  enoxaparin Injectable 40 milliGRAM(s) SubCutaneous daily  furosemide   Injectable 40 milliGRAM(s) IV Push two times a day  gabapentin 300 milliGRAM(s) Oral three times a day  losartan 100 milliGRAM(s) Oral daily  NIFEdipine XL 30 milliGRAM(s) Oral daily  oxyCODONE  ER Tablet 110 milliGRAM(s) Oral every 12 hours  pantoprazole    Tablet 40 milliGRAM(s) Oral before breakfast  senna 1 Tablet(s) Oral at bedtime    PRN MEDICATIONS  cyclobenzaprine 10 milliGRAM(s) Oral three times a day PRN  HYDROmorphone  Injectable 0.5 milliGRAM(s) IV Push every 4 hours PRN  oxyCODONE    IR 10 milliGRAM(s) Oral every 4 hours PRN    VITALS:   T(F): 97.6  HR: 87  BP: 160/74  RR: 17  SpO2: --    LABS:                        12.4   8.25  )-----------( 298      ( 12 May 2018 09:33 )             40.2     05-12    140  |  99  |  9<L>  ----------------------------<  112<H>  4.2   |  28  |  0.8    Ca    8.9      12 May 2018 09:33      Culture - Surgical Swab (collected 11 May 2018 18:45)  Source: .Surgical Swab None  Preliminary Report (14 May 2018 09:06):    No growth      PHYSICAL EXAM:  GEN: No acute distress  LUNGS: Clear to auscultation bilaterally   HEART: S1/S2 present. RRR.   ABD: Soft, non-tender, non-distended. Bowel sounds present  EXT: NC/NC/NE/2+PP/NICHOLSON  NEURO: AAOX3    HOME MEDICATIONS:  Home Medications:  losartan 100 mg oral tablet (02-23-18)  naproxen 500 mg oral delayed release tablet (02-23-18)

## 2018-05-15 LAB
-  AMPICILLIN/SULBACTAM: SIGNIFICANT CHANGE UP
-  CEFAZOLIN: SIGNIFICANT CHANGE UP
-  CIPROFLOXACIN: SIGNIFICANT CHANGE UP
-  CLINDAMYCIN: SIGNIFICANT CHANGE UP
-  ERYTHROMYCIN: SIGNIFICANT CHANGE UP
-  GENTAMICIN: SIGNIFICANT CHANGE UP
-  LEVOFLOXACIN: SIGNIFICANT CHANGE UP
-  MOXIFLOXACIN(AEROBIC): SIGNIFICANT CHANGE UP
-  OXACILLIN: SIGNIFICANT CHANGE UP
-  PENICILLIN: SIGNIFICANT CHANGE UP
-  RIFAMPIN: SIGNIFICANT CHANGE UP
-  TETRACYCLINE: SIGNIFICANT CHANGE UP
-  TRIMETHOPRIM/SULFAMETHOXAZOLE: SIGNIFICANT CHANGE UP
-  VANCOMYCIN: SIGNIFICANT CHANGE UP
CULTURE RESULTS: SIGNIFICANT CHANGE UP
CULTURE RESULTS: SIGNIFICANT CHANGE UP
METHOD TYPE: SIGNIFICANT CHANGE UP
ORGANISM # SPEC MICROSCOPIC CNT: SIGNIFICANT CHANGE UP
ORGANISM # SPEC MICROSCOPIC CNT: SIGNIFICANT CHANGE UP
SPECIMEN SOURCE: SIGNIFICANT CHANGE UP
SPECIMEN SOURCE: SIGNIFICANT CHANGE UP

## 2018-05-15 RX ORDER — OXYCODONE HYDROCHLORIDE 5 MG/1
140 TABLET ORAL EVERY 12 HOURS
Qty: 0 | Refills: 0 | Status: DISCONTINUED | OUTPATIENT
Start: 2018-05-15 | End: 2018-05-16

## 2018-05-15 RX ORDER — GABAPENTIN 400 MG/1
600 CAPSULE ORAL THREE TIMES A DAY
Qty: 0 | Refills: 0 | Status: DISCONTINUED | OUTPATIENT
Start: 2018-05-15 | End: 2018-05-18

## 2018-05-15 RX ADMIN — HYDROMORPHONE HYDROCHLORIDE 0.5 MILLIGRAM(S): 2 INJECTION INTRAMUSCULAR; INTRAVENOUS; SUBCUTANEOUS at 23:48

## 2018-05-15 RX ADMIN — Medication 100 MILLIGRAM(S): at 06:17

## 2018-05-15 RX ADMIN — Medication 40 MILLIGRAM(S): at 06:29

## 2018-05-15 RX ADMIN — Medication 100 MILLIGRAM(S): at 13:29

## 2018-05-15 RX ADMIN — OXYCODONE HYDROCHLORIDE 110 MILLIGRAM(S): 5 TABLET ORAL at 06:37

## 2018-05-15 RX ADMIN — HYDROMORPHONE HYDROCHLORIDE 0.5 MILLIGRAM(S): 2 INJECTION INTRAMUSCULAR; INTRAVENOUS; SUBCUTANEOUS at 12:19

## 2018-05-15 RX ADMIN — HYDROMORPHONE HYDROCHLORIDE 0.5 MILLIGRAM(S): 2 INJECTION INTRAMUSCULAR; INTRAVENOUS; SUBCUTANEOUS at 09:14

## 2018-05-15 RX ADMIN — LOSARTAN POTASSIUM 100 MILLIGRAM(S): 100 TABLET, FILM COATED ORAL at 06:30

## 2018-05-15 RX ADMIN — Medication 100 MILLIGRAM(S): at 23:51

## 2018-05-15 RX ADMIN — HYDROMORPHONE HYDROCHLORIDE 0.5 MILLIGRAM(S): 2 INJECTION INTRAMUSCULAR; INTRAVENOUS; SUBCUTANEOUS at 17:55

## 2018-05-15 RX ADMIN — Medication 30 MILLIGRAM(S): at 06:30

## 2018-05-15 RX ADMIN — Medication 100 MILLIGRAM(S): at 00:26

## 2018-05-15 RX ADMIN — Medication 40 MILLIGRAM(S): at 17:55

## 2018-05-15 RX ADMIN — Medication 100 MILLIGRAM(S): at 11:25

## 2018-05-15 RX ADMIN — Medication 100 MILLIGRAM(S): at 06:29

## 2018-05-15 RX ADMIN — PANTOPRAZOLE SODIUM 40 MILLIGRAM(S): 20 TABLET, DELAYED RELEASE ORAL at 06:31

## 2018-05-15 RX ADMIN — GABAPENTIN 300 MILLIGRAM(S): 400 CAPSULE ORAL at 06:29

## 2018-05-15 RX ADMIN — Medication 100 MILLIGRAM(S): at 21:31

## 2018-05-15 RX ADMIN — HYDROMORPHONE HYDROCHLORIDE 0.5 MILLIGRAM(S): 2 INJECTION INTRAMUSCULAR; INTRAVENOUS; SUBCUTANEOUS at 03:50

## 2018-05-15 RX ADMIN — ENOXAPARIN SODIUM 40 MILLIGRAM(S): 100 INJECTION SUBCUTANEOUS at 11:28

## 2018-05-15 RX ADMIN — Medication 100 MILLIGRAM(S): at 17:54

## 2018-05-15 RX ADMIN — GABAPENTIN 600 MILLIGRAM(S): 400 CAPSULE ORAL at 21:30

## 2018-05-15 RX ADMIN — HYDROMORPHONE HYDROCHLORIDE 0.5 MILLIGRAM(S): 2 INJECTION INTRAMUSCULAR; INTRAVENOUS; SUBCUTANEOUS at 22:15

## 2018-05-15 RX ADMIN — GABAPENTIN 300 MILLIGRAM(S): 400 CAPSULE ORAL at 13:29

## 2018-05-15 NOTE — PROGRESS NOTE ADULT - SUBJECTIVE AND OBJECTIVE BOX
Patient was seen and examined. Spoke with RN. Chart reviewed.    No events overnight.  Vital Signs Last 24 Hrs  T(F): 98.5 (15 May 2018 05:37), Max: 98.5 (15 May 2018 05:37)  HR: 84 (15 May 2018 05:37) (84 - 94)  BP: 166/82 (15 May 2018 05:37) (120/57 - 176/78)  SpO2: --  MEDICATIONS  (STANDING):  ceFAZolin   IVPB      ceFAZolin   IVPB 2000 milliGRAM(s) IV Intermittent every 6 hours  clindamycin IVPB 900 milliGRAM(s) IV Intermittent every 8 hours  clindamycin IVPB      docusate sodium 100 milliGRAM(s) Oral three times a day  enoxaparin Injectable 40 milliGRAM(s) SubCutaneous daily  furosemide   Injectable 40 milliGRAM(s) IV Push two times a day  gabapentin 300 milliGRAM(s) Oral three times a day  losartan 100 milliGRAM(s) Oral daily  NIFEdipine XL 30 milliGRAM(s) Oral daily  oxyCODONE  ER Tablet 110 milliGRAM(s) Oral every 12 hours  pantoprazole    Tablet 40 milliGRAM(s) Oral before breakfast  senna 1 Tablet(s) Oral at bedtime    MEDICATIONS  (PRN):  cyclobenzaprine 10 milliGRAM(s) Oral three times a day PRN Muscle Spasm  HYDROmorphone  Injectable 0.5 milliGRAM(s) IV Push every 4 hours PRN Severe Pain (7 - 10)  oxyCODONE    IR 10 milliGRAM(s) Oral every 4 hours PRN Moderate Pain (4 - 6)    Labs:                  Radiology:    General: comfortable, NAD  Neurology: A&Ox3, nonfocal  Head:  Normocephalic, atraumatic  ENT:  Mucosa moist, no ulcerations  Neck:  Supple, no JVD,   Skin: no breakdowns (as per RN)  Resp: CTA B/L  CV: RRR, S1S2,   GI: Soft, NT, bowel sounds  MS: No edema, + peripheral pulses, FROM all 4 extremity

## 2018-05-15 NOTE — PROVIDER CONTACT NOTE (OTHER) - SITUATION
MD Centeno aware pt c/o "redness" and discomfort in b/l LE.
Dr. Gonzalez made aware pt is requesting pain medication, but unable to administer to pt since pt has no IV; pending Dr. Gonzalez to insert IV via US. As per Dr. Gonzalez, can only offer pt tylenol
Dr. Gonzalez made aware pt still has no IV and IV needs to be inserted by US. Multiple attempts made to insert IV, attempts unsuccessful. Previous IV inserted by US as well.
IV flushed to administer IV pain medication, but pt c/o pain and IV not functioning. Dr. Gonzalez made aware that pt has no IV access at this time and previous IV was put in via US. As per

## 2018-05-15 NOTE — PROGRESS NOTE ADULT - SUBJECTIVE AND OBJECTIVE BOX
Subjective: difficulty standing    T(C): 36.9 (05-15-18 @ 05:37), Max: 36.9 (05-15-18 @ 05:37)  HR: 84 (05-15-18 @ 05:37) (84 - 94)  BP: 166/82 (05-15-18 @ 05:37) (120/57 - 176/78)  RR: 18 (05-15-18 @ 05:37) (18 - 22)  SpO2: --  Wt(kg): --    Exam:  patient with therapist, having difficulty standing and walking, pain, dressings changed, wounds clean and dry, no drainage, recovered with dry sterile dressing    Assessment/Plan: wounds clean and dry, continue as per ID, PT/ rehab

## 2018-05-15 NOTE — OCCUPATIONAL THERAPY INITIAL EVALUATION ADULT - IMPAIRED TRANSFERS: BED/CHAIR, REHAB EVAL
decreased ROM/pain/decreased flexibility/impaired balance/impaired postural control/decreased strength

## 2018-05-15 NOTE — PROGRESS NOTE ADULT - SUBJECTIVE AND OBJECTIVE BOX
SUBJECTIVE:    Patient is a 52y old Male who presents with a chief complaint of diffuse back pain (10 May 2018 14:55)    Currently admitted to medicine with the primary diagnosis of Back pain     Today is hospital day 6d. This morning he is resting comfortably in bed and reports no new issues or overnight events. c/o pain    PAST MEDICAL & SURGICAL HISTORY  Morbid obesity  Obstructive sleep apnea  Disc disease, degenerative, lumbar or lumbosacral  Hypertension  History of excision of pilonidal cyst  H/O arthroscopy of right knee  Spinal cord stimulator status    SOCIAL HISTORY:    ALLERGIES:  IV Contrast (Unknown)  penicillin (Unknown)    MEDICATIONS:  STANDING MEDICATIONS  ceFAZolin   IVPB      ceFAZolin   IVPB 2000 milliGRAM(s) IV Intermittent every 6 hours  clindamycin IVPB 900 milliGRAM(s) IV Intermittent every 8 hours  clindamycin IVPB      docusate sodium 100 milliGRAM(s) Oral three times a day  enoxaparin Injectable 40 milliGRAM(s) SubCutaneous daily  furosemide   Injectable 40 milliGRAM(s) IV Push two times a day  gabapentin 300 milliGRAM(s) Oral three times a day  losartan 100 milliGRAM(s) Oral daily  NIFEdipine XL 30 milliGRAM(s) Oral daily  oxyCODONE  ER Tablet 110 milliGRAM(s) Oral every 12 hours  pantoprazole    Tablet 40 milliGRAM(s) Oral before breakfast  senna 1 Tablet(s) Oral at bedtime    PRN MEDICATIONS  cyclobenzaprine 10 milliGRAM(s) Oral three times a day PRN  HYDROmorphone  Injectable 0.5 milliGRAM(s) IV Push every 4 hours PRN  oxyCODONE    IR 10 milliGRAM(s) Oral every 4 hours PRN    VITALS:   T(F): 98.5  HR: 84  BP: 166/82  RR: 18  SpO2: --      PHYSICAL EXAM:  GEN: No acute distress  LUNGS: Clear to auscultation bilaterally   HEART: S1/S2 present. RRR.   ABD: Soft, non-tender, non-distended. Bowel sounds present  EXT: NC/NC/NE/2+PP/NICHOLSON  NEURO: AAOX3    HOME MEDICATIONS:  Home Medications:  losartan 100 mg oral tablet (02-23-18)  naproxen 500 mg oral delayed release tablet (02-23-18)

## 2018-05-15 NOTE — PROGRESS NOTE ADULT - SUBJECTIVE AND OBJECTIVE BOX
ONEALJOSE ALBERTO  52y, Male      OVERNIGHT EVENTS:    pain left thumb    VITALS:  T(F): 98.5, Max: 98.5 (05-15-18 @ 05:37)  HR: 84  BP: 166/82  RR: 18Vital Signs Last 24 Hrs  T(C): 36.9 (15 May 2018 05:37), Max: 36.9 (15 May 2018 05:37)  T(F): 98.5 (15 May 2018 05:37), Max: 98.5 (15 May 2018 05:37)  HR: 84 (15 May 2018 05:37) (84 - 94)  BP: 166/82 (15 May 2018 05:37) (120/57 - 176/78)  BP(mean): --  RR: 18 (15 May 2018 05:37) (18 - 22)  SpO2: --    TESTS & MEASUREMENTS:              Culture - Surgical Swab (collected 05-11-18 @ 18:45)  Source: .Surgical Swab None  Preliminary Report (05-14-18 @ 09:06):    No growth    Culture - Surgical Swab (collected 05-11-18 @ 18:45)  Source: .Surgical Swab None  Preliminary Report (05-14-18 @ 10:00):    Few Staphylococcus species    Culture - Surgical Swab (collected 05-11-18 @ 18:45)  Source: .Surgical Swab None  Preliminary Report (05-14-18 @ 09:31):    Few Staphylococcus species    Culture - Other (collected 05-11-18 @ 18:45)  Source: .Other None  Preliminary Report (05-14-18 @ 13:00):    Few Staphylococcus aureus    Culture - Blood (collected 05-10-18 @ 07:35)  Source: .Blood None  Preliminary Report (05-11-18 @ 12:02):    No growth to date.            RADIOLOGY & ADDITIONAL TESTS:    ANTIBIOTICS:  ceFAZolin   IVPB      ceFAZolin   IVPB 2000 milliGRAM(s) IV Intermittent every 6 hours  clindamycin IVPB 900 milliGRAM(s) IV Intermittent every 8 hours  clindamycin IVPB

## 2018-05-15 NOTE — OCCUPATIONAL THERAPY INITIAL EVALUATION ADULT - PLANNED THERAPY INTERVENTIONS, OT EVAL
strengthening/transfer training/ROM/ADL retraining/bed mobility training/balance training/IADL retraining/neuromuscular re-education

## 2018-05-15 NOTE — OCCUPATIONAL THERAPY INITIAL EVALUATION ADULT - IMPAIRED TRANSFERS: SIT/STAND, REHAB EVAL
impaired postural control/decreased strength/decreased flexibility/impaired balance/pain/decreased ROM

## 2018-05-15 NOTE — PHYSICAL THERAPY INITIAL EVALUATION ADULT - ADDITIONAL COMMENTS
pt lives with his family in , [address], none inside, pt stays on 1st floor, pt reported that he DID NOT fall at home

## 2018-05-15 NOTE — PROGRESS NOTE ADULT - ATTENDING COMMENTS
Pt doing well. Plan for abx/PICC per ID. Following cultures. Wounds healing well.
Wounds healing well. Pt encouraged to ambulate with PT. Likely 4A candidate pending reccs. Pt has PICC. IV abx per ID.
Note erroneous

## 2018-05-16 LAB
-  AMPICILLIN/SULBACTAM: SIGNIFICANT CHANGE UP
-  AMPICILLIN/SULBACTAM: SIGNIFICANT CHANGE UP
-  CEFAZOLIN: SIGNIFICANT CHANGE UP
-  CEFAZOLIN: SIGNIFICANT CHANGE UP
-  CIPROFLOXACIN: SIGNIFICANT CHANGE UP
-  CIPROFLOXACIN: SIGNIFICANT CHANGE UP
-  CLINDAMYCIN: SIGNIFICANT CHANGE UP
-  CLINDAMYCIN: SIGNIFICANT CHANGE UP
-  ERYTHROMYCIN: SIGNIFICANT CHANGE UP
-  ERYTHROMYCIN: SIGNIFICANT CHANGE UP
-  GENTAMICIN: SIGNIFICANT CHANGE UP
-  GENTAMICIN: SIGNIFICANT CHANGE UP
-  LEVOFLOXACIN: SIGNIFICANT CHANGE UP
-  LEVOFLOXACIN: SIGNIFICANT CHANGE UP
-  MOXIFLOXACIN(AEROBIC): SIGNIFICANT CHANGE UP
-  MOXIFLOXACIN(AEROBIC): SIGNIFICANT CHANGE UP
-  OXACILLIN: SIGNIFICANT CHANGE UP
-  OXACILLIN: SIGNIFICANT CHANGE UP
-  PENICILLIN: SIGNIFICANT CHANGE UP
-  PENICILLIN: SIGNIFICANT CHANGE UP
-  RIFAMPIN: SIGNIFICANT CHANGE UP
-  RIFAMPIN: SIGNIFICANT CHANGE UP
-  TETRACYCLINE: SIGNIFICANT CHANGE UP
-  TETRACYCLINE: SIGNIFICANT CHANGE UP
-  TRIMETHOPRIM/SULFAMETHOXAZOLE: SIGNIFICANT CHANGE UP
-  TRIMETHOPRIM/SULFAMETHOXAZOLE: SIGNIFICANT CHANGE UP
-  VANCOMYCIN: SIGNIFICANT CHANGE UP
-  VANCOMYCIN: SIGNIFICANT CHANGE UP
ANION GAP SERPL CALC-SCNC: 12 MMOL/L — SIGNIFICANT CHANGE UP (ref 7–14)
BUN SERPL-MCNC: 10 MG/DL — SIGNIFICANT CHANGE UP (ref 10–20)
CALCIUM SERPL-MCNC: 8.8 MG/DL — SIGNIFICANT CHANGE UP (ref 8.5–10.1)
CHLORIDE SERPL-SCNC: 94 MMOL/L — LOW (ref 98–110)
CO2 SERPL-SCNC: 34 MMOL/L — HIGH (ref 17–32)
CREAT SERPL-MCNC: 0.9 MG/DL — SIGNIFICANT CHANGE UP (ref 0.7–1.5)
ERYTHROCYTE [SEDIMENTATION RATE] IN BLOOD: 35 MM/HR — HIGH (ref 0–10)
GLUCOSE SERPL-MCNC: 118 MG/DL — HIGH (ref 70–99)
METHOD TYPE: SIGNIFICANT CHANGE UP
METHOD TYPE: SIGNIFICANT CHANGE UP
POTASSIUM SERPL-MCNC: 3.7 MMOL/L — SIGNIFICANT CHANGE UP (ref 3.5–5)
POTASSIUM SERPL-SCNC: 3.7 MMOL/L — SIGNIFICANT CHANGE UP (ref 3.5–5)
SODIUM SERPL-SCNC: 140 MMOL/L — SIGNIFICANT CHANGE UP (ref 135–146)
SURGICAL PATHOLOGY STUDY: SIGNIFICANT CHANGE UP

## 2018-05-16 RX ORDER — OXYCODONE HYDROCHLORIDE 5 MG/1
140 TABLET ORAL EVERY 12 HOURS
Qty: 0 | Refills: 0 | Status: DISCONTINUED | OUTPATIENT
Start: 2018-05-16 | End: 2018-05-18

## 2018-05-16 RX ORDER — FUROSEMIDE 40 MG
60 TABLET ORAL
Qty: 0 | Refills: 0 | Status: DISCONTINUED | OUTPATIENT
Start: 2018-05-16 | End: 2018-05-18

## 2018-05-16 RX ADMIN — ENOXAPARIN SODIUM 40 MILLIGRAM(S): 100 INJECTION SUBCUTANEOUS at 14:54

## 2018-05-16 RX ADMIN — Medication 100 MILLIGRAM(S): at 15:00

## 2018-05-16 RX ADMIN — HYDROMORPHONE HYDROCHLORIDE 0.5 MILLIGRAM(S): 2 INJECTION INTRAMUSCULAR; INTRAVENOUS; SUBCUTANEOUS at 10:37

## 2018-05-16 RX ADMIN — OXYCODONE HYDROCHLORIDE 140 MILLIGRAM(S): 5 TABLET ORAL at 10:12

## 2018-05-16 RX ADMIN — GABAPENTIN 600 MILLIGRAM(S): 400 CAPSULE ORAL at 15:00

## 2018-05-16 RX ADMIN — Medication 100 MILLIGRAM(S): at 23:45

## 2018-05-16 RX ADMIN — OXYCODONE HYDROCHLORIDE 10 MILLIGRAM(S): 5 TABLET ORAL at 15:29

## 2018-05-16 RX ADMIN — Medication 100 MILLIGRAM(S): at 06:17

## 2018-05-16 RX ADMIN — GABAPENTIN 600 MILLIGRAM(S): 400 CAPSULE ORAL at 21:18

## 2018-05-16 RX ADMIN — Medication 40 MILLIGRAM(S): at 06:11

## 2018-05-16 RX ADMIN — GABAPENTIN 600 MILLIGRAM(S): 400 CAPSULE ORAL at 06:12

## 2018-05-16 RX ADMIN — LOSARTAN POTASSIUM 100 MILLIGRAM(S): 100 TABLET, FILM COATED ORAL at 06:12

## 2018-05-16 RX ADMIN — OXYCODONE HYDROCHLORIDE 140 MILLIGRAM(S): 5 TABLET ORAL at 23:33

## 2018-05-16 RX ADMIN — OXYCODONE HYDROCHLORIDE 10 MILLIGRAM(S): 5 TABLET ORAL at 23:46

## 2018-05-16 RX ADMIN — OXYCODONE HYDROCHLORIDE 10 MILLIGRAM(S): 5 TABLET ORAL at 20:39

## 2018-05-16 RX ADMIN — Medication 30 MILLIGRAM(S): at 06:12

## 2018-05-16 RX ADMIN — Medication 100 MILLIGRAM(S): at 21:18

## 2018-05-16 RX ADMIN — Medication 100 MILLIGRAM(S): at 17:31

## 2018-05-16 RX ADMIN — OXYCODONE HYDROCHLORIDE 140 MILLIGRAM(S): 5 TABLET ORAL at 21:26

## 2018-05-16 RX ADMIN — HYDROMORPHONE HYDROCHLORIDE 0.5 MILLIGRAM(S): 2 INJECTION INTRAMUSCULAR; INTRAVENOUS; SUBCUTANEOUS at 06:34

## 2018-05-16 RX ADMIN — Medication 100 MILLIGRAM(S): at 14:54

## 2018-05-16 RX ADMIN — Medication 100 MILLIGRAM(S): at 06:16

## 2018-05-16 RX ADMIN — OXYCODONE HYDROCHLORIDE 10 MILLIGRAM(S): 5 TABLET ORAL at 19:30

## 2018-05-16 RX ADMIN — PANTOPRAZOLE SODIUM 40 MILLIGRAM(S): 20 TABLET, DELAYED RELEASE ORAL at 06:12

## 2018-05-16 RX ADMIN — HYDROMORPHONE HYDROCHLORIDE 0.5 MILLIGRAM(S): 2 INJECTION INTRAMUSCULAR; INTRAVENOUS; SUBCUTANEOUS at 03:50

## 2018-05-16 RX ADMIN — Medication 60 MILLIGRAM(S): at 17:31

## 2018-05-16 RX ADMIN — HYDROMORPHONE HYDROCHLORIDE 0.5 MILLIGRAM(S): 2 INJECTION INTRAMUSCULAR; INTRAVENOUS; SUBCUTANEOUS at 02:17

## 2018-05-16 NOTE — PROGRESS NOTE ADULT - SUBJECTIVE AND OBJECTIVE BOX
JOSE ALBERTO NO  MRN-[id number]    52yMalePatient is a 52y old  Male who presents with a chief complaint of diffuse back pain (10 May 2018 14:55)    POD # 5  S/P Removal of spinal cord stimulator    Current issues:  patient doing well.  states his feet swelling is improving which made   it difficult to get OOB last couple of days.   pain is still present over incision sites.   no leg pain reported.       Exam:  Vital Signs Last 24 Hrs  T(C): 36.3 (16 May 2018 04:56), Max: 37.8 (15 May 2018 20:52)  T(F): 97.3 (16 May 2018 04:56), Max: 100.1 (15 May 2018 20:52)  HR: 76 (16 May 2018 04:56) (76 - 85)  BP: 147/76 (16 May 2018 04:56) (117/57 - 147/76)  RR: 20 (16 May 2018 04:56) (18 - 20)  SpO2: 100% (15 May 2018 14:31) (100% - 100%)    Alert and Awake, follows commands  Motors:    minimal swelling ,  and discoloration of shin  mobile with equal strength    sensory intact    Plan:  PICC line intact  OOB/ PT/Rehab  ID to finalize abx plan      Allergies    IV Contrast (Unknown)  penicillin (Unknown)    Intolerances      Home Medications:  losartan 100 mg oral tablet: 1 tab(s) orally once a day (23 Feb 2018 17:31)  naproxen 500 mg oral delayed release tablet: 1 tab(s) orally 2 times a day (23 Feb 2018 17:31)    PAST MEDICAL & SURGICAL HISTORY:  Morbid obesity  Obstructive sleep apnea  Disc disease, degenerative, lumbar or lumbosacral  Hypertension  History of excision of pilonidal cyst  H/O arthroscopy of right knee  Spinal cord stimulator status      MEDICATIONS  (STANDING):  ceFAZolin   IVPB      ceFAZolin   IVPB 2000 milliGRAM(s) IV Intermittent every 6 hours  clindamycin IVPB 900 milliGRAM(s) IV Intermittent every 8 hours  clindamycin IVPB      docusate sodium 100 milliGRAM(s) Oral three times a day  enoxaparin Injectable 40 milliGRAM(s) SubCutaneous daily  furosemide   Injectable 40 milliGRAM(s) IV Push two times a day  gabapentin 600 milliGRAM(s) Oral three times a day  losartan 100 milliGRAM(s) Oral daily  NIFEdipine XL 30 milliGRAM(s) Oral daily  pantoprazole    Tablet 40 milliGRAM(s) Oral before breakfast  senna 1 Tablet(s) Oral at bedtime    MEDICATIONS  (PRN):  cyclobenzaprine 10 milliGRAM(s) Oral three times a day PRN Muscle Spasm  HYDROmorphone  Injectable 0.5 milliGRAM(s) IV Push every 4 hours PRN Severe Pain (7 - 10)  oxyCODONE    IR 10 milliGRAM(s) Oral every 4 hours PRN Moderate Pain (4 - 6)  oxyCODONE  ER Tablet 140 milliGRAM(s) Oral every 12 hours PRN dose verified      I&O's Summary    15 May 2018 07:01  -  16 May 2018 07:00  --------------------------------------------------------  IN: 0 mL / OUT: 1200 mL / NET: -1200 mL

## 2018-05-16 NOTE — CONSULT NOTE ADULT - CONSULT REASON
Follow up
Chronic back pain s/p SCS removal
abscess.
back pain
pt who needs clearance for removal of spinal cord stimulator

## 2018-05-16 NOTE — PHYSICAL THERAPY INITIAL EVALUATION ADULT - LIVES WITH, PROFILE
spouse
spouse/Pt lives with wife in pvt home with stairs to enter, stays on first floor once inside.

## 2018-05-16 NOTE — PHYSICAL THERAPY INITIAL EVALUATION ADULT - PLANNED THERAPY INTERVENTIONS, PT EVAL
strengthening/transfer training/balance training/bed mobility training/gait training/ROM/manual therapy techniques

## 2018-05-16 NOTE — PHYSICAL THERAPY INITIAL EVALUATION ADULT - IMPAIRMENTS FOUND, PT EVAL
joint integrity and mobility/aerobic capacity/endurance/integumentary integrity/muscle strength/gait, locomotion, and balance

## 2018-05-16 NOTE — DIETITIAN INITIAL EVALUATION ADULT. - OTHER INFO
Pt presented with c/o back pain radiating to abdomen and legs. Hospital course complicated by  acute on chronic back pain 2/2 spinal stimulator site infection s/p removal. PICC line placed 5/14. HTN. Acute on chronic back pain 2/2 spinal stimulator site infection s/p removal. DVT and GI ppx. Reason for assessment: LOS.

## 2018-05-16 NOTE — CONSULT NOTE ADULT - ASSESSMENT
Assessment and Recommendation:   · Assessment		  52 year old man with chronic back pain s/p lumbar SCS removal complaining of worsening back and new leg pain left worse than right.    1.  At this time no changes to pain medications.  No need to write for medications on discharge.  Patient to follow up as out patient with Dr. Lombardo
52 year old man with chronic back pain s/p lumbar SCS removal complaining of worsening back and new leg pain left worse than right.    1. Patient was previously on oxycodone-acetaminophen 10-325mg PO q 6hr/prn as an outpatient. Due to his post-operate pain I agree with increasing his total MME.  Consider decreasing oxycodone ER to 60mg PO BID and start oxycodone 10mg q 4hr/prn.    2. Hydromorphone 0.5mg IV TID/prn severe pain.    3. Continue cyclobenzaprine 10mg PO TID/prn spasm.    4. Start gabapentin 300mg PO TID/prn.    5. Consider starting NSAIDs as per neurosurgery.    6. Treatment/workup of LE swelling as per team.    7. PT.    8. Follow up with Dr. Lombardo as outpatient.
IMPRESSION: Rehab of back pain    PRECAUTIONS: [  ] Cardiac  [  ] Respiratory  [  ] Seizures [  ] Contact Isolation  [  ] Droplet Isolation  [  ] Other    Weight Bearing Status:     RECOMMENDATION:    Out of Bed to Chair     DVT/Decubiti Prophylaxis    REHAB PLAN:     [ x  ] Bedside P/T 3-5 times a week   [ x  ]   Bedside O/T  2-3 times a week             [   ] No Rehab Therapy Indicated                   [   ]  Speech Therapy   Conditioning/ROM                                    ADL  Bed Mobility                                               Conditioning/ROM  Transfers                                                     Bed Mobility  Sitting /Standing Balance                         Transfers                                        Gait Training                                               Sitting/Standing Balance  Stair Training [   ]Applicable                    Home equipment Eval                                                                        Splinting  [   ] Only      GOALS:   ADL   [ x  ]   Independent                    Transfers  [ x  ] Independent                          Ambulation  [ x  ] Independent     [  x  ] With device                            [   ]  CG                                                         [   ]  CG                                                                  [   ] CG                            [    ] Min A                                                   [   ] Min A                                                              [   ] Min  A          DISCHARGE PLAN:   [   ]  Good candidate for Intensive Rehabilitation/Hospital based-4A [facility]                                             Will tolerate 3hrs Intensive Rehab Daily                                       [  x  ]  Short Term Rehab in Skilled Nursing Facility                              vs         [ x   ]  Home with Outpatient or VN services                                         [    ]  Possible Candidate for Intensive Hospital based Rehab
IMPRESSION:  S/P removal of hardware.  Abscess at site.    RECOMMENDATIONS:  Ancef 2gm iv q6h  Clindamycin 900 mg iv q8h  F/U OR cultures.

## 2018-05-16 NOTE — CONSULT NOTE ADULT - SUBJECTIVE AND OBJECTIVE BOX
The patient was seen and examined.  He notes that the pain is currently controlled with his medications.  He notes inability to walk secondary to swelling in the LEs.  Otherwise no pain management complaints.  Furthermore, the patient notes he already has pain medications at for discharge.    PAST MEDICAL & SURGICAL HISTORY:  Morbid obesity  Obstructive sleep apnea  Disc disease, degenerative, lumbar or lumbosacral  Hypertension  History of excision of pilonidal cyst  H/O arthroscopy of right knee  Spinal cord stimulator status post removal      FAMILY HISTORY:  Family history of diabetes mellitus in mother (Mother)  Family history of early CAD (Father)      SOCIAL HISTORY:  Lives at home with wife, denies drug and alcohol abuse    Allergies    IV Contrast (Unknown)  penicillin (Unknown)    Intolerances        PAIN MEDICATIONS:  cyclobenzaprine 10 milliGRAM(s) Oral three times a day PRN  HYDROmorphone  Injectable 0.5 milliGRAM(s) IV Push every 4 hours PRN  oxyCODONE  ER Tablet 110 milliGRAM(s) Oral every 12 hours    Heme:  enoxaparin Injectable 40 milliGRAM(s) SubCutaneous daily    Antibiotics:  ceFAZolin   IVPB      ceFAZolin   IVPB 2000 milliGRAM(s) IV Intermittent every 6 hours  clindamycin IVPB 900 milliGRAM(s) IV Intermittent every 8 hours  clindamycin IVPB        Cardiovascular:  furosemide   Injectable 40 milliGRAM(s) IV Push two times a day  losartan 100 milliGRAM(s) Oral daily  NIFEdipine XL 30 milliGRAM(s) Oral daily    GI:  docusate sodium 100 milliGRAM(s) Oral three times a day  pantoprazole    Tablet 40 milliGRAM(s) Oral before breakfast  senna 1 Tablet(s) Oral at bedtime    Endocrine:    All Other Medications:      REVIEW OF SYSTEMS:    CONSTITUTIONAL: No fever, weight loss, or fatigue  EYES: No eye pain, visual disturbances, or discharge  ENMT:  No difficulty hearing, tinnitus, vertigo; No sinus or throat pain  NECK: No pain or stiffness  RESPIRATORY: No cough, wheezing, chills or hemoptysis; No shortness of breath  CARDIOVASCULAR: No chest pain, palpitations, dizziness, + leg swelling  GASTROINTESTINAL: No abdominal or epigastric pain. No nausea, vomiting, or hematemesis; No diarrhea or constipation. No melena or hematochezia.  GENITOURINARY: No dysuria, frequency, hematuria, or incontinence  NEUROLOGICAL: No headaches, memory loss, loss of strength, or tremors, + numbness  SKIN: No itching, burning, rashes, or lesions   LYMPH NODES: No enlarged glands  ENDOCRINE: No heat or cold intolerance; No hair loss  MUSCULOSKELETAL: + B/L LE pain, swelling, and numbness  PSYCHIATRIC: No depression, anxiety, mood swings  HEME/LYMPH: No easy bruising, or bleeding gums  ALLERY AND IMMUNOLOGIC: No hives or eczema      Vital Signs Last 24 Hrs  T(C): 36.1 (13 May 2018 14:02), Max: 36.6 (13 May 2018 04:57)  T(F): 97 (13 May 2018 14:02), Max: 97.9 (13 May 2018 04:57)  HR: 82 (13 May 2018 14:02) (82 - 91)  BP: 153/72 (13 May 2018 14:02) (143/64 - 168/81)  BP(mean): --  RR: 18 (13 May 2018 14:02) (18 - 20)  SpO2: --    PAIN SCORE:   10      SCALE USED: (1-10 VNRS)             PHYSICAL EXAM:    GENERAL: NAD, well-groomed, well-developed, wearing nasal Cpap  HEAD:  Atraumatic, Normocephalic  EYES: EOMI,  sclera clear  ENMT: No tonsillar erythema, exudates, or enlargement; Moist mucous membranes  NECK: Supple  NERVOUS SYSTEM:  Alert & Oriented X3, Good concentration; Strength testing limited by pain, moves all extremities spontaneously  BACK: drain in place, dressings c/d/i, sutures intact, TTP over dressings  CHEST/LUNG: Clear to percussion bilaterally; No rales, rhonchi, wheezing, or rubs  HEART: Regular rate and rhythm; No murmurs, rubs, or gallops  ABDOMEN: Soft, Nontender, Nondistended; Bowel sounds present  EXTREMITIES:  2+ Peripheral Pulses, No clubbing, cyanosis, + pitting edema B/L, not wearing compression devices or stockings   SKIN: shiny B/L LE
Chief Complaint: Back pain    HPI:  52-year-old male history of RUSSELL, HTN, intervertebral disc disease, status post placement of spinal cord stimulator in July 2017. He has had several episodes of infection at the site of the stimulator for which he was hospitalized and treated with antibiotics. He was admitted for another infection and the stimulator was removed on Friday 5/11/18. He is complaining of pain in the back, at the surgical site, and pain across his chest which he attributes to lying on his stomach during surgery. He is also complaining of swelling in his legs and throbbing numbness in the bilateral feet with pain radiating down the left leg from the groin to the foot. He has some pain in the right leg as well. He feels that he is unable to ambulate due to the painful swelling in his lower legs and feet. He denies fever, chills, focal weakness, bowel or bladder dysfunction, or saddle anesthesia.     PAST MEDICAL & SURGICAL HISTORY:  Morbid obesity  Obstructive sleep apnea  Disc disease, degenerative, lumbar or lumbosacral  Hypertension  History of excision of pilonidal cyst  H/O arthroscopy of right knee  Spinal cord stimulator status post removal      FAMILY HISTORY:  Family history of diabetes mellitus in mother (Mother)  Family history of early CAD (Father)      SOCIAL HISTORY:  Lives at home with wife, denies drug and alcohol abuse    Allergies    IV Contrast (Unknown)  penicillin (Unknown)    Intolerances        PAIN MEDICATIONS:  cyclobenzaprine 10 milliGRAM(s) Oral three times a day PRN  HYDROmorphone  Injectable 0.5 milliGRAM(s) IV Push every 4 hours PRN  oxyCODONE  ER Tablet 110 milliGRAM(s) Oral every 12 hours    Heme:  enoxaparin Injectable 40 milliGRAM(s) SubCutaneous daily    Antibiotics:  ceFAZolin   IVPB      ceFAZolin   IVPB 2000 milliGRAM(s) IV Intermittent every 6 hours  clindamycin IVPB 900 milliGRAM(s) IV Intermittent every 8 hours  clindamycin IVPB        Cardiovascular:  furosemide   Injectable 40 milliGRAM(s) IV Push two times a day  losartan 100 milliGRAM(s) Oral daily  NIFEdipine XL 30 milliGRAM(s) Oral daily    GI:  docusate sodium 100 milliGRAM(s) Oral three times a day  pantoprazole    Tablet 40 milliGRAM(s) Oral before breakfast  senna 1 Tablet(s) Oral at bedtime    Endocrine:    All Other Medications:      REVIEW OF SYSTEMS:    CONSTITUTIONAL: No fever, weight loss, or fatigue  EYES: No eye pain, visual disturbances, or discharge  ENMT:  No difficulty hearing, tinnitus, vertigo; No sinus or throat pain  NECK: No pain or stiffness  RESPIRATORY: No cough, wheezing, chills or hemoptysis; No shortness of breath  CARDIOVASCULAR: No chest pain, palpitations, dizziness, + leg swelling  GASTROINTESTINAL: No abdominal or epigastric pain. No nausea, vomiting, or hematemesis; No diarrhea or constipation. No melena or hematochezia.  GENITOURINARY: No dysuria, frequency, hematuria, or incontinence  NEUROLOGICAL: No headaches, memory loss, loss of strength, or tremors, + numbness  SKIN: No itching, burning, rashes, or lesions   LYMPH NODES: No enlarged glands  ENDOCRINE: No heat or cold intolerance; No hair loss  MUSCULOSKELETAL: + B/L LE pain, swelling, and numbness  PSYCHIATRIC: No depression, anxiety, mood swings  HEME/LYMPH: No easy bruising, or bleeding gums  ALLERY AND IMMUNOLOGIC: No hives or eczema      Vital Signs Last 24 Hrs  T(C): 36.1 (13 May 2018 14:02), Max: 36.6 (13 May 2018 04:57)  T(F): 97 (13 May 2018 14:02), Max: 97.9 (13 May 2018 04:57)  HR: 82 (13 May 2018 14:02) (82 - 91)  BP: 153/72 (13 May 2018 14:02) (143/64 - 168/81)  BP(mean): --  RR: 18 (13 May 2018 14:02) (18 - 20)  SpO2: --    PAIN SCORE:   10      SCALE USED: (1-10 VNRS)             PHYSICAL EXAM:    GENERAL: NAD, well-groomed, well-developed, wearing nasal Cpap  HEAD:  Atraumatic, Normocephalic  EYES: EOMI,  sclera clear  ENMT: No tonsillar erythema, exudates, or enlargement; Moist mucous membranes  NECK: Supple  NERVOUS SYSTEM:  Alert & Oriented X3, Good concentration; Strength testing limited by pain, moves all extremities spontaneously  BACK: drain in place, dressings c/d/i, sutures intact, TTP over dressings  CHEST/LUNG: Clear to percussion bilaterally; No rales, rhonchi, wheezing, or rubs  HEART: Regular rate and rhythm; No murmurs, rubs, or gallops  ABDOMEN: Soft, Nontender, Nondistended; Bowel sounds present  EXTREMITIES:  2+ Peripheral Pulses, No clubbing, cyanosis, + pitting edema B/L, not wearing compression devices or stockings   SKIN: shiny B/L LE        LABS:                          12.4   8.25  )-----------( 298      ( 12 May 2018 09:33 )             40.2     05-12    140  |  99  |  9<L>  ----------------------------<  112<H>  4.2   |  28  |  0.8    Ca    8.9      12 May 2018 09:33            [x ]  KELLEY  Reviewed
HISTORY OF PRESENT ILLNESS:     52 year old male who had a spinal cord stimulator placed in the past he had an infection. Pt now is here for removal of stimuator pt needs to be seen by ID Dr Limon and medical clearance     PAST MEDICAL & SURGICAL HISTORY:  Morbid obesity  Obstructive sleep apnea  Disc disease, degenerative, lumbar or lumbosacral  Hypertension  History of excision of pilonidal cyst  H/O arthroscopy of right knee  Spinal cord stimulator status    FAMILY HISTORY:  Family history of diabetes mellitus in mother (Mother)  Family history of early CAD (Father)      SOCIAL HISTORY:  Tobacco Use: denies   EtOH use:        denies   Substance:       denies     Allergies    IV Contrast (Unknown)  penicillin (Unknown)    Vital Signs Last 24 Hrs  T(C): 36.5 (09 May 2018 16:13), Max: 37.1 (09 May 2018 14:32)  T(F): 97.7 (09 May 2018 16:13), Max: 98.7 (09 May 2018 14:32)  HR: 78 (09 May 2018 16:13) (78 - 104)  BP: 140/85 (09 May 2018 16:13) (140/85 - 201/96)  BP(mean): --  RR: 18 (09 May 2018 16:13) (18 - 20)  SpO2: 96% (09 May 2018 14:32) (96% - 96%)    PHYSICAL EXAM:    ALert, MAEX4  MS Bilateral UE's 5/5        Bilateral LE's  5/5     Incision site no active drainage ( pt states its tender to touch )       LABS:                        13.1   7.64  )-----------( 284      ( 09 May 2018 15:56 )             41.0   A/P           Here for spinal cord stimulator removal           Medical clearance for OR
HPI:  52-year-old male history of RUSSELL, HTN, intervertebral disc disease, status post placement of spinal cord stimulator in july 2017, subsequently developed several episodes of infection at site of the stimulator, most recently in February to March of this year at which time he was on intravenous antibiotics via a PICC line. Patient now complains of back pain radiating to the sides of his abdomen and down his legs to his feet for several days, similar to prior episodes of infection of his stimulator. He denies fever, chills, paresthesias, focal weakness bowel or bladder dysfunction, urinary sx, LE weakness, saddle anesthesia or other associated complaints at present. He is followed by neurosurgery Dr. Solorio. (09 May 2018 18:15)      PAST MEDICAL & SURGICAL HISTORY:  Morbid obesity  Obstructive sleep apnea  Disc disease, degenerative, lumbar or lumbosacral  Hypertension  History of excision of pilonidal cyst  H/O arthroscopy of right knee  Spinal cord stimulator status      Hospital Course: s/p removal of spinal stimulator on 5/11    TODAY'S SUBJECTIVE & REVIEW OF SYMPTOMS:     Constitutional WNL   Cardio WNL   Resp WNL   GI WNL  Heme WNL  Endo WNL  Skin WNL  MSK back pain  Neuro WNL  Cognitive WNL  Psych WNL      MEDICATIONS  (STANDING):  ceFAZolin   IVPB      ceFAZolin   IVPB 2000 milliGRAM(s) IV Intermittent every 6 hours  clindamycin IVPB 900 milliGRAM(s) IV Intermittent every 8 hours  clindamycin IVPB      docusate sodium 100 milliGRAM(s) Oral three times a day  enoxaparin Injectable 40 milliGRAM(s) SubCutaneous daily  furosemide   Injectable 40 milliGRAM(s) IV Push two times a day  gabapentin 300 milliGRAM(s) Oral three times a day  losartan 100 milliGRAM(s) Oral daily  NIFEdipine XL 30 milliGRAM(s) Oral daily  oxyCODONE  ER Tablet 110 milliGRAM(s) Oral every 12 hours  pantoprazole    Tablet 40 milliGRAM(s) Oral before breakfast  senna 1 Tablet(s) Oral at bedtime    MEDICATIONS  (PRN):  cyclobenzaprine 10 milliGRAM(s) Oral three times a day PRN Muscle Spasm  HYDROmorphone  Injectable 0.5 milliGRAM(s) IV Push every 4 hours PRN Severe Pain (7 - 10)  oxyCODONE    IR 10 milliGRAM(s) Oral every 4 hours PRN Moderate Pain (4 - 6)      FAMILY HISTORY:  Family history of diabetes mellitus in mother (Mother)  Family history of early CAD (Father)      Allergies    IV Contrast (Unknown)  penicillin (Unknown)    Intolerances        SOCIAL HISTORY:    [  ] Etoh  [  ] Smoking  [  ] Substance abuse     Home Environment:  [  ] Home Alone  [ x ] Lives with Family  [  ] Home Health Aid    Dwelling:  [  ] Apartment  [ x ] Private House  [  ] Adult Home  [  ] Skilled Nursing Facility      [  ] Short Term  [  ] Long Term  [x  ] Stairs       Elevator [  ]    FUNCTIONAL STATUS PTA: (Check all that apply)  Ambulation: [ x  ]Independent    [  ] Dependent     [  ] Non-Ambulatory  Assistive Device: [x  ] SA Cane  [  ]  Q Cane  [  ] Walker  [  ]  Wheelchair  ADL : [x  ] Independent  [  ]  Dependent       Vital Signs Last 24 Hrs  T(C): 36.6 (14 May 2018 13:41), Max: 36.6 (14 May 2018 13:41)  T(F): 97.8 (14 May 2018 13:41), Max: 97.8 (14 May 2018 13:41)  HR: 91 (14 May 2018 13:41) (87 - 91)  BP: 176/78 (14 May 2018 13:41) (160/74 - 176/78)  BP(mean): --  RR: 22 (14 May 2018 13:41) (17 - 22)  SpO2: --      PHYSICAL EXAM: Alert & Oriented X3  GENERAL: NAD, obese  HEAD:  Atraumatic, Normocephalic  CHEST/LUNG: Clear  HEART: S1S2+  ABDOMEN: Soft, Nontender   EXTREMITIES:  + michael le edema    NERVOUS SYSTEM:  Cranial Nerves 2-12 intact [  ] Abnormal  [  ]  ROM: WFL all extremities [  ]  Abnormal [ x ]limited michael les  Motor Strength: WFL all extremities  [  ]  Abnormal [ x ]limited michael les  Sensation: intact to light touch [  ] Abnormal [  ]  Reflexes: Symmetric [  ]  Abnormal [  ]    FUNCTIONAL STATUS:  Bed Mobility: Independent [  ]  Supervision [  ]  Needs Assistance [x  ]  N/A [  ]  Transfers: Independent [  ]  Supervision [  ]  Needs Assistance [x  ]  N/A [  ]   Ambulation: Independent [  ]  Supervision [  ]  Needs Assistance [  ]  N/A [  ]  ADL: Independent [  ] Requires Assistance [  ] N/A [  ]      LABS:                RADIOLOGY & ADDITIONAL STUDIES:    Assesment:
ONEALJOSE ALBERTO  52y, Male  Allergy: IV Contrast (Unknown)  penicillin (Unknown)      HPI:  52-year-old male history of RUSSELL, HTN, intervertebral disc disease, status post placement of spinal cord stimulator in july 2017, subsequently developed several episodes of infection at site of the stimulator, most recently in February to March of this year at which time he was on intravenous antibiotics via a PICC line. Patient now complains of back pain radiating to the sides of his abdomen and down his legs to his feet for several days, similar to prior episodes of infection of his stimulator. He denies fever, chills, paresthesias, focal weakness bowel or bladder dysfunction, urinary sx, LE weakness, saddle anesthesia or other associated complaints at present. He is followed by neurosurgery Dr. Solorio. (09 May 2018 18:15)    S/P removal of hardware 5/11.    FAMILY HISTORY:  Family history of diabetes mellitus in mother (Mother)  Family history of early CAD (Father)    PAST MEDICAL & SURGICAL HISTORY:  Morbid obesity  Obstructive sleep apnea  Disc disease, degenerative, lumbar or lumbosacral  Hypertension  History of excision of pilonidal cyst  H/O arthroscopy of right knee  Spinal cord stimulator status        VITALS:  T(F): 98.6, Max: 98.6 (05-12-18 @ 04:46)  HR: 80  BP: 164/89  RR: 20Vital Signs Last 24 Hrs  T(C): 37 (12 May 2018 04:46), Max: 37 (12 May 2018 04:46)  T(F): 98.6 (12 May 2018 04:46), Max: 98.6 (12 May 2018 04:46)  HR: 80 (12 May 2018 04:46) (69 - 106)  BP: 164/89 (12 May 2018 04:46) (140/73 - 211/108)  BP(mean): --  RR: 20 (12 May 2018 04:46) (13 - 25)  SpO2: 100% (11 May 2018 22:35) (92% - 100%)    TESTS & MEASUREMENTS:              Culture - Blood (collected 05-10-18 @ 07:35)  Source: .Blood None  Preliminary Report (05-11-18 @ 12:02):    No growth to date.            RADIOLOGY & ADDITIONAL TESTS:    ANTIBIOTICS:  vancomycin  IVPB 1000 milliGRAM(s) IV Intermittent every 12 hours

## 2018-05-16 NOTE — PHYSICAL THERAPY INITIAL EVALUATION ADULT - GENERAL OBSERVATIONS, REHAB EVAL
11:14-11:30 16 min pt rec sitting at the EOB in NAD, pt declined PT for now stating he recently finished with OT and would like to lay down now, social hx obtained, PT educated pt on the importance of OOB/amb and on UE/LE therex, pt stated he would like to try tomorrow, RN aware, pt left as found in NAD, will f/u for IE
1400 - 1452 Pt rec semifowler in bariatric bed in NAD. Pt agreeable to b/s PT.

## 2018-05-16 NOTE — PHYSICAL THERAPY INITIAL EVALUATION ADULT - GAIT DEVIATIONS NOTED, PT EVAL
decreased stride length/decreased weight-shifting ability/decreased phylicia/Wide Jarett, minimal knee flexion, poor ankle mobility due to edema/decreased step length

## 2018-05-16 NOTE — DIETITIAN INITIAL EVALUATION ADULT. - ENERGY NEEDS
total kcal = 0315-8103 kcal/day (25-30 kcal/kg IBW d/t morbid obesity).  total protein = 100-120 g/day (1.0-1.2 g/kg IBW)  total fluid = per LIP d/t edema

## 2018-05-16 NOTE — PROGRESS NOTE ADULT - SUBJECTIVE AND OBJECTIVE BOX
Patient was seen and examined. Spoke with RN. Chart reviewed.    No events overnight.  Vital Signs Last 24 Hrs  T(F): 97.3 (16 May 2018 04:56), Max: 100.1 (15 May 2018 20:52)  HR: 76 (16 May 2018 04:56) (76 - 85)  BP: 147/76 (16 May 2018 04:56) (117/57 - 147/76)  SpO2: 100% (15 May 2018 14:31) (100% - 100%)  MEDICATIONS  (STANDING):  ceFAZolin   IVPB      ceFAZolin   IVPB 2000 milliGRAM(s) IV Intermittent every 6 hours  clindamycin IVPB 900 milliGRAM(s) IV Intermittent every 8 hours  clindamycin IVPB      docusate sodium 100 milliGRAM(s) Oral three times a day  enoxaparin Injectable 40 milliGRAM(s) SubCutaneous daily  furosemide    Tablet 60 milliGRAM(s) Oral two times a day  gabapentin 600 milliGRAM(s) Oral three times a day  losartan 100 milliGRAM(s) Oral daily  NIFEdipine XL 30 milliGRAM(s) Oral daily  oxyCODONE  ER Tablet 140 milliGRAM(s) Oral every 12 hours  pantoprazole    Tablet 40 milliGRAM(s) Oral before breakfast  senna 1 Tablet(s) Oral at bedtime    MEDICATIONS  (PRN):  cyclobenzaprine 10 milliGRAM(s) Oral three times a day PRN Muscle Spasm  oxyCODONE    IR 10 milliGRAM(s) Oral every 4 hours PRN Moderate Pain (4 - 6)    Labs:    16 May 2018 11:02    140    |  94     |  10     ----------------------------<  118    3.7     |  34     |  0.9      Ca    8.8        16 May 2018 11:02              Radiology:    General: comfortable, NAD  Neurology: A&Ox3, nonfocal  Head:  Normocephalic, atraumatic  ENT:  Mucosa moist, no ulcerations  Neck:  Supple, no JVD,   Skin: no breakdowns (as per RN) back wound  Resp: CTA B/L  CV: RRR, S1S2,   GI: Soft, NT, bowel sounds  MS: +1 edema, + peripheral pulses, FROM all 4 extremity

## 2018-05-16 NOTE — PROGRESS NOTE ADULT - SUBJECTIVE AND OBJECTIVE BOX
SUBJECTIVE:    Patient is a 52y old Male who presents with a chief complaint of diffuse back pain (10 May 2018 14:55)    Currently admitted to medicine with the primary diagnosis of Abscess of back     Today is hospital day 7d. This morning he is resting comfortably in bed and reports no new issues or overnight events. c/o back pain    PAST MEDICAL & SURGICAL HISTORY  Morbid obesity  Obstructive sleep apnea  Disc disease, degenerative, lumbar or lumbosacral  Hypertension  History of excision of pilonidal cyst  H/O arthroscopy of right knee  Spinal cord stimulator status    SOCIAL HISTORY:    ALLERGIES:  IV Contrast (Unknown)  penicillin (Unknown)    MEDICATIONS:  STANDING MEDICATIONS  ceFAZolin   IVPB      ceFAZolin   IVPB 2000 milliGRAM(s) IV Intermittent every 6 hours  clindamycin IVPB 900 milliGRAM(s) IV Intermittent every 8 hours  clindamycin IVPB      docusate sodium 100 milliGRAM(s) Oral three times a day  enoxaparin Injectable 40 milliGRAM(s) SubCutaneous daily  furosemide   Injectable 40 milliGRAM(s) IV Push two times a day  gabapentin 600 milliGRAM(s) Oral three times a day  losartan 100 milliGRAM(s) Oral daily  NIFEdipine XL 30 milliGRAM(s) Oral daily  pantoprazole    Tablet 40 milliGRAM(s) Oral before breakfast  senna 1 Tablet(s) Oral at bedtime    PRN MEDICATIONS  cyclobenzaprine 10 milliGRAM(s) Oral three times a day PRN  HYDROmorphone  Injectable 0.5 milliGRAM(s) IV Push every 4 hours PRN  oxyCODONE    IR 10 milliGRAM(s) Oral every 4 hours PRN  oxyCODONE  ER Tablet 140 milliGRAM(s) Oral every 12 hours PRN    VITALS:   T(F): 97.3  HR: 76  BP: 147/76  RR: 20  SpO2: 100%    LABS:    crp and esr sent and pending    PHYSICAL EXAM:  GEN: No acute distress  LUNGS: Clear to auscultation bilaterally   HEART: S1/S2 present. RRR.   ABD: Soft, non-tender, non-distended. Bowel sounds present  EXT: NC/NC/NE/2+PP/NICHOLSON  NEURO: AAOX3    HOME MEDICATIONS:  Home Medications:  losartan 100 mg oral tablet (02-23-18)  naproxen 500 mg oral delayed release tablet (02-23-18)

## 2018-05-16 NOTE — PHYSICAL THERAPY INITIAL EVALUATION ADULT - PERTINENT HX OF CURRENT PROBLEM, REHAB EVAL
52-year-old male history of RUSSELL, HTN, intervertebral disc disease, status post placement of spinal cord stimulator in july 2017, subsequently developed several episodes of infection at site of the stimulator, most recently in February to March of this year at which time he was on intravenous antibiotics via a PICC line.

## 2018-05-16 NOTE — PROGRESS NOTE ADULT - SUBJECTIVE AND OBJECTIVE BOX
KAYLAJOSE ALBERTO GARZA  52y, Male      OVERNIGHT EVENTS:    no fevers, pain left hand resolved. No overt lower back pain. Can ambulate.    VITALS:  T(F): 97.3, Max: 100.1 (05-15-18 @ 20:52)  HR: 76  BP: 147/76  RR: 20Vital Signs Last 24 Hrs  T(C): 36.3 (16 May 2018 04:56), Max: 37.8 (15 May 2018 20:52)  T(F): 97.3 (16 May 2018 04:56), Max: 100.1 (15 May 2018 20:52)  HR: 76 (16 May 2018 04:56) (76 - 85)  BP: 147/76 (16 May 2018 04:56) (117/57 - 147/76)  BP(mean): --  RR: 20 (16 May 2018 04:56) (18 - 20)  SpO2: 100% (15 May 2018 14:31) (100% - 100%)    TESTS & MEASUREMENTS:              Culture - Surgical Swab (collected 05-11-18 @ 18:45)  Source: .Surgical Swab None  Preliminary Report (05-14-18 @ 09:06):    No growth    Culture - Surgical Swab (collected 05-11-18 @ 18:45)  Source: .Surgical Swab None  Preliminary Report (05-15-18 @ 11:42):    Few Staphylococcus aureus    Culture - Surgical Swab (collected 05-11-18 @ 18:45)  Source: .Surgical Swab None  Preliminary Report (05-15-18 @ 11:39):    Few Staphylococcus aureus    Culture - Other (collected 05-11-18 @ 18:45)  Source: .Other None  Final Report (05-15-18 @ 09:17):    Few Staphylococcus aureus  Organism: Staphylococcus aureus (05-15-18 @ 09:17)  Organism: Staphylococcus aureus (05-15-18 @ 09:17)      -  Ampicillin/Sulbactam: S <=8/4      -  Cefazolin: S <=4      -  Ciprofloxacin: S <=1      -  Clindamycin: R >4      -  Erythromycin: S <=0.25      -  Gentamicin: S <=1      -  Levofloxacin: S <=0.5      -  Moxifloxacin(Aerobic): S <=0.5      -  Oxacillin: S <=0.25      -  Penicillin: R 4      -  RIF- Rifampin: S <=1      -  Tetra/Doxy: S <=1      -  Trimethoprim/Sulfamethoxazole: S <=0.5/9.5      -  Vancomycin: S 2      Method Type: JAYCOB    Culture - Blood (collected 05-10-18 @ 07:35)  Source: .Blood None  Final Report (05-15-18 @ 12:00):    No growth at 5 days.            RADIOLOGY & ADDITIONAL TESTS:    ANTIBIOTICS:  ceFAZolin   IVPB      ceFAZolin   IVPB 2000 milliGRAM(s) IV Intermittent every 6 hours  clindamycin IVPB 900 milliGRAM(s) IV Intermittent every 8 hours  clindamycin IVPB

## 2018-05-16 NOTE — DIETITIAN INITIAL EVALUATION ADULT. - PHYSICAL APPEARANCE
BMI 54.6; AAO; bedbound at this time; 3+ pitting edema R foot and ankle. surgical incision/BS 18./obese

## 2018-05-16 NOTE — DIETITIAN INITIAL EVALUATION ADULT. - INDICATOR
RD will monitor diet order, energy intake, body composition (wt), NFPF(appetite, fluid accumulation)

## 2018-05-16 NOTE — DIETITIAN INITIAL EVALUATION ADULT. - ORAL INTAKE PTA
Pt reports varying PO intake d/t varying pain level. Pt reports significant back pain causing him to be bedbound and caused variations in appetite./fair

## 2018-05-17 RX ORDER — FUROSEMIDE 40 MG
1 TABLET ORAL
Qty: 0 | Refills: 0 | DISCHARGE
Start: 2018-05-17

## 2018-05-17 RX ORDER — FUROSEMIDE 40 MG
3 TABLET ORAL
Qty: 0 | Refills: 0 | DISCHARGE
Start: 2018-05-17

## 2018-05-17 RX ORDER — GABAPENTIN 400 MG/1
1 CAPSULE ORAL
Qty: 30 | Refills: 0
Start: 2018-05-17 | End: 2018-05-26

## 2018-05-17 RX ORDER — OXYCODONE HYDROCHLORIDE 5 MG/1
1 TABLET ORAL
Qty: 0 | Refills: 0 | DISCHARGE
Start: 2018-05-17

## 2018-05-17 RX ORDER — OXYCODONE HYDROCHLORIDE 5 MG/1
1 TABLET ORAL
Qty: 0 | Refills: 0 | COMMUNITY
Start: 2018-05-17

## 2018-05-17 RX ORDER — OXYCODONE HYDROCHLORIDE 5 MG/1
7 TABLET ORAL
Qty: 0 | Refills: 0 | COMMUNITY
Start: 2018-05-17

## 2018-05-17 RX ORDER — CYCLOBENZAPRINE HYDROCHLORIDE 10 MG/1
1 TABLET, FILM COATED ORAL
Qty: 0 | Refills: 0 | COMMUNITY
Start: 2018-05-17

## 2018-05-17 RX ADMIN — Medication 100 MILLIGRAM(S): at 21:52

## 2018-05-17 RX ADMIN — GABAPENTIN 600 MILLIGRAM(S): 400 CAPSULE ORAL at 15:12

## 2018-05-17 RX ADMIN — GABAPENTIN 600 MILLIGRAM(S): 400 CAPSULE ORAL at 21:51

## 2018-05-17 RX ADMIN — PANTOPRAZOLE SODIUM 40 MILLIGRAM(S): 20 TABLET, DELAYED RELEASE ORAL at 08:38

## 2018-05-17 RX ADMIN — GABAPENTIN 600 MILLIGRAM(S): 400 CAPSULE ORAL at 05:10

## 2018-05-17 RX ADMIN — OXYCODONE HYDROCHLORIDE 140 MILLIGRAM(S): 5 TABLET ORAL at 21:52

## 2018-05-17 RX ADMIN — SENNA PLUS 1 TABLET(S): 8.6 TABLET ORAL at 21:52

## 2018-05-17 RX ADMIN — OXYCODONE HYDROCHLORIDE 140 MILLIGRAM(S): 5 TABLET ORAL at 10:53

## 2018-05-17 RX ADMIN — Medication 100 MILLIGRAM(S): at 18:14

## 2018-05-17 RX ADMIN — Medication 60 MILLIGRAM(S): at 18:19

## 2018-05-17 RX ADMIN — OXYCODONE HYDROCHLORIDE 10 MILLIGRAM(S): 5 TABLET ORAL at 15:09

## 2018-05-17 RX ADMIN — ENOXAPARIN SODIUM 40 MILLIGRAM(S): 100 INJECTION SUBCUTANEOUS at 11:55

## 2018-05-17 RX ADMIN — LOSARTAN POTASSIUM 100 MILLIGRAM(S): 100 TABLET, FILM COATED ORAL at 05:13

## 2018-05-17 RX ADMIN — Medication 100 MILLIGRAM(S): at 15:12

## 2018-05-17 RX ADMIN — Medication 60 MILLIGRAM(S): at 05:10

## 2018-05-17 RX ADMIN — Medication 100 MILLIGRAM(S): at 11:54

## 2018-05-17 RX ADMIN — Medication 100 MILLIGRAM(S): at 05:11

## 2018-05-17 RX ADMIN — Medication 100 MILLIGRAM(S): at 15:14

## 2018-05-17 RX ADMIN — Medication 30 MILLIGRAM(S): at 05:10

## 2018-05-17 RX ADMIN — OXYCODONE HYDROCHLORIDE 10 MILLIGRAM(S): 5 TABLET ORAL at 18:21

## 2018-05-17 RX ADMIN — OXYCODONE HYDROCHLORIDE 10 MILLIGRAM(S): 5 TABLET ORAL at 00:09

## 2018-05-17 RX ADMIN — OXYCODONE HYDROCHLORIDE 10 MILLIGRAM(S): 5 TABLET ORAL at 05:23

## 2018-05-17 RX ADMIN — OXYCODONE HYDROCHLORIDE 140 MILLIGRAM(S): 5 TABLET ORAL at 10:49

## 2018-05-17 RX ADMIN — Medication 100 MILLIGRAM(S): at 06:13

## 2018-05-17 NOTE — PROGRESS NOTE ADULT - SUBJECTIVE AND OBJECTIVE BOX
SUBJECTIVE:    Patient is a 52y old Male who presents with a chief complaint of diffuse back pain (10 May 2018 14:55)    Currently admitted to medicine with the primary diagnosis of Abscess of back     Today is hospital day 8d. This morning he is resting comfortably in bed and reports no new issues or overnight events.     PAST MEDICAL & SURGICAL HISTORY  Morbid obesity  Obstructive sleep apnea  Disc disease, degenerative, lumbar or lumbosacral  Hypertension  History of excision of pilonidal cyst  H/O arthroscopy of right knee  Spinal cord stimulator status    SOCIAL HISTORY:    ALLERGIES:  IV Contrast (Unknown)  penicillin (Unknown)    MEDICATIONS:  STANDING MEDICATIONS  ceFAZolin   IVPB      ceFAZolin   IVPB 2000 milliGRAM(s) IV Intermittent every 6 hours  clindamycin IVPB 900 milliGRAM(s) IV Intermittent every 8 hours  clindamycin IVPB      docusate sodium 100 milliGRAM(s) Oral three times a day  enoxaparin Injectable 40 milliGRAM(s) SubCutaneous daily  furosemide    Tablet 60 milliGRAM(s) Oral two times a day  gabapentin 600 milliGRAM(s) Oral three times a day  losartan 100 milliGRAM(s) Oral daily  NIFEdipine XL 30 milliGRAM(s) Oral daily  oxyCODONE  ER Tablet 140 milliGRAM(s) Oral every 12 hours  pantoprazole    Tablet 40 milliGRAM(s) Oral before breakfast  senna 1 Tablet(s) Oral at bedtime    PRN MEDICATIONS  cyclobenzaprine 10 milliGRAM(s) Oral three times a day PRN  oxyCODONE    IR 10 milliGRAM(s) Oral every 4 hours PRN    VITALS:   T(F): 97.6  HR: 73  BP: 116/57  RR: 20  SpO2: --    LABS:    05-16    140  |  94<L>  |  10  ----------------------------<  118<H>  3.7   |  34<H>  |  0.9    Ca    8.8      16 May 2018 11:02      Sedimentation Rate, Erythrocyte: 35 mm/Hr <H> (05-16-18 @ 11:02)      PHYSICAL EXAM:  GEN: No acute distress  LUNGS: Clear to auscultation bilaterally   HEART: S1/S2 present. RRR.   ABD: Soft, non-tender, non-distended. Bowel sounds present  EXT: minimal pitting edema bilaterally  NEURO: AAOX3    HOME MEDICATIONS:  Home Medications:  losartan 100 mg oral tablet (02-23-18)  naproxen 500 mg oral delayed release tablet (02-23-18)

## 2018-05-17 NOTE — PROGRESS NOTE ADULT - ADDITIONAL PE
Left thumb with edema/no suppuration/ no palpable cord. Palpation of wrist is unremarkable.
incision is unremarkable
Surgical site without dehiscence, drainage, erythema.  Left hand now unremarkable.

## 2018-05-17 NOTE — PROGRESS NOTE ADULT - SUBJECTIVE AND OBJECTIVE BOX
ONEAL JOSE ALBERTO  52y, Male      OVERNIGHT EVENTS:    none    VITALS:  T(F): 97.6, Max: 97.6 (05-17-18 @ 06:35)  HR: 73  BP: 116/57  RR: 20Vital Signs Last 24 Hrs  T(C): 36.4 (17 May 2018 06:35), Max: 36.4 (17 May 2018 06:35)  T(F): 97.6 (17 May 2018 06:35), Max: 97.6 (17 May 2018 06:35)  HR: 73 (17 May 2018 06:35) (73 - 88)  BP: 116/57 (17 May 2018 06:35) (116/57 - 139/62)  BP(mean): --  RR: 20 (17 May 2018 06:35) (20 - 20)  SpO2: --    TESTS & MEASUREMENTS:    05-16    140  |  94<L>  |  10  ----------------------------<  118<H>  3.7   |  34<H>  |  0.9    Ca    8.8      16 May 2018 11:02          Culture - Surgical Swab (collected 05-11-18 @ 18:45)  Source: .Surgical Swab None  Preliminary Report (05-14-18 @ 09:06):    No growth    Culture - Surgical Swab (collected 05-11-18 @ 18:45)  Source: .Surgical Swab None  Preliminary Report (05-15-18 @ 11:42):    Few Staphylococcus aureus  Organism: Staphylococcus aureus (05-16-18 @ 12:34)  Organism: Staphylococcus aureus (05-16-18 @ 12:34)      -  Ampicillin/Sulbactam: S <=8/4      -  Cefazolin: S <=4      -  Ciprofloxacin: S <=1      -  Clindamycin: R >4      -  Erythromycin: R >4      -  Gentamicin: S <=1      -  Levofloxacin: S <=0.5      -  Moxifloxacin(Aerobic): S <=0.5      -  Oxacillin: S 0.5      -  Penicillin: R >8      -  RIF- Rifampin: S <=1      -  Tetra/Doxy: S <=1      -  Trimethoprim/Sulfamethoxazole: S <=0.5/9.5      -  Vancomycin: S 2      Method Type: JAYCOB    Culture - Surgical Swab (collected 05-11-18 @ 18:45)  Source: .Surgical Swab None  Preliminary Report (05-15-18 @ 11:39):    Few Staphylococcus aureus  Organism: Staphylococcus aureus (05-16-18 @ 12:40)  Organism: Staphylococcus aureus (05-16-18 @ 12:40)      -  Ampicillin/Sulbactam: S <=8/4      -  Cefazolin: S <=4      -  Ciprofloxacin: S <=1      -  Clindamycin: R >4      -  Erythromycin: R >4      -  Gentamicin: S <=1      -  Levofloxacin: S <=0.5      -  Moxifloxacin(Aerobic): S <=0.5      -  Oxacillin: S 0.5      -  Penicillin: R >8      -  RIF- Rifampin: S <=1      -  Tetra/Doxy: S <=1      -  Trimethoprim/Sulfamethoxazole: S <=0.5/9.5      -  Vancomycin: S 2      Method Type: JAYCOB    Culture - Other (collected 05-11-18 @ 18:45)  Source: .Other None  Final Report (05-15-18 @ 09:17):    Few Staphylococcus aureus  Organism: Staphylococcus aureus (05-15-18 @ 09:17)  Organism: Staphylococcus aureus (05-15-18 @ 09:17)      -  Ampicillin/Sulbactam: S <=8/4      -  Cefazolin: S <=4      -  Ciprofloxacin: S <=1      -  Clindamycin: R >4      -  Erythromycin: S <=0.25      -  Gentamicin: S <=1      -  Levofloxacin: S <=0.5      -  Moxifloxacin(Aerobic): S <=0.5      -  Oxacillin: S <=0.25      -  Penicillin: R 4      -  RIF- Rifampin: S <=1      -  Tetra/Doxy: S <=1      -  Trimethoprim/Sulfamethoxazole: S <=0.5/9.5      -  Vancomycin: S 2      Method Type: JAYCOB            RADIOLOGY & ADDITIONAL TESTS:    ANTIBIOTICS:  ceFAZolin   IVPB      ceFAZolin   IVPB 2000 milliGRAM(s) IV Intermittent every 6 hours  clindamycin IVPB 900 milliGRAM(s) IV Intermittent every 8 hours  clindamycin IVPB

## 2018-05-17 NOTE — PROGRESS NOTE ADULT - SUBJECTIVE AND OBJECTIVE BOX
Patient was seen and examined. Spoke with RN. Chart reviewed.    No events overnight.  Vital Signs Last 24 Hrs  T(F): 97.6 (17 May 2018 06:35), Max: 97.6 (17 May 2018 06:35)  HR: 73 (17 May 2018 06:35) (73 - 80)  BP: 116/57 (17 May 2018 06:35) (116/57 - 139/62)  SpO2: --  MEDICATIONS  (STANDING):  ceFAZolin   IVPB      ceFAZolin   IVPB 2000 milliGRAM(s) IV Intermittent every 6 hours  clindamycin IVPB 900 milliGRAM(s) IV Intermittent every 8 hours  clindamycin IVPB      docusate sodium 100 milliGRAM(s) Oral three times a day  enoxaparin Injectable 40 milliGRAM(s) SubCutaneous daily  furosemide    Tablet 60 milliGRAM(s) Oral two times a day  gabapentin 600 milliGRAM(s) Oral three times a day  losartan 100 milliGRAM(s) Oral daily  NIFEdipine XL 30 milliGRAM(s) Oral daily  oxyCODONE  ER Tablet 140 milliGRAM(s) Oral every 12 hours  pantoprazole    Tablet 40 milliGRAM(s) Oral before breakfast  senna 1 Tablet(s) Oral at bedtime    MEDICATIONS  (PRN):  cyclobenzaprine 10 milliGRAM(s) Oral three times a day PRN Muscle Spasm  oxyCODONE    IR 10 milliGRAM(s) Oral every 4 hours PRN Moderate Pain (4 - 6)    Labs:    16 May 2018 11:02    140    |  94     |  10     ----------------------------<  118    3.7     |  34     |  0.9      Ca    8.8        16 May 2018 11:02              Radiology:    General: comfortable, NAD  Neurology: A&Ox3, nonfocal  Head:  Normocephalic, atraumatic  ENT:  Mucosa moist, no ulcerations  Neck:  Supple, no JVD,   Skin: no breakdowns (as per RN)  Resp: CTA B/L  CV: RRR, S1S2,   GI: Soft, NT, bowel sounds  MS: No edema, + peripheral pulses, FROM all 4 extremity

## 2018-05-18 VITALS
DIASTOLIC BLOOD PRESSURE: 77 MMHG | RESPIRATION RATE: 22 BRPM | TEMPERATURE: 97 F | SYSTOLIC BLOOD PRESSURE: 144 MMHG | HEART RATE: 78 BPM

## 2018-05-18 LAB
CRP SERPL-MCNC: 13.5 MG/DL — HIGH (ref 0–0.4)
CULTURE RESULTS: SIGNIFICANT CHANGE UP
ORGANISM # SPEC MICROSCOPIC CNT: SIGNIFICANT CHANGE UP
SPECIMEN SOURCE: SIGNIFICANT CHANGE UP

## 2018-05-18 RX ORDER — CYCLOBENZAPRINE HYDROCHLORIDE 10 MG/1
1 TABLET, FILM COATED ORAL
Qty: 90 | Refills: 0
Start: 2018-05-18

## 2018-05-18 RX ORDER — OXYCODONE HYDROCHLORIDE 5 MG/1
2 TABLET ORAL
Qty: 14 | Refills: 0
Start: 2018-05-18

## 2018-05-18 RX ORDER — SENNA PLUS 8.6 MG/1
1 TABLET ORAL
Qty: 30 | Refills: 0
Start: 2018-05-18 | End: 2018-06-16

## 2018-05-18 RX ORDER — CEFAZOLIN SODIUM 1 G
2 VIAL (EA) INJECTION
Qty: 0 | Refills: 0 | DISCHARGE
Start: 2018-05-18

## 2018-05-18 RX ORDER — TAMSULOSIN HYDROCHLORIDE 0.4 MG/1
1 CAPSULE ORAL
Qty: 30 | Refills: 0
Start: 2018-05-18 | End: 2018-06-16

## 2018-05-18 RX ORDER — MICONAZOLE NITRATE 2 %
1 CREAM (GRAM) TOPICAL
Qty: 1 | Refills: 0
Start: 2018-05-18 | End: 2018-05-27

## 2018-05-18 RX ORDER — DOCUSATE SODIUM 100 MG
1 CAPSULE ORAL
Qty: 90 | Refills: 0
Start: 2018-05-18 | End: 2018-06-16

## 2018-05-18 RX ORDER — NIFEDIPINE 30 MG
1 TABLET, EXTENDED RELEASE 24 HR ORAL
Qty: 30 | Refills: 0
Start: 2018-05-18 | End: 2018-06-16

## 2018-05-18 RX ORDER — FUROSEMIDE 40 MG
3 TABLET ORAL
Qty: 120 | Refills: 0
Start: 2018-05-18

## 2018-05-18 RX ADMIN — Medication 60 MILLIGRAM(S): at 05:33

## 2018-05-18 RX ADMIN — Medication 100 MILLIGRAM(S): at 00:54

## 2018-05-18 RX ADMIN — OXYCODONE HYDROCHLORIDE 10 MILLIGRAM(S): 5 TABLET ORAL at 00:54

## 2018-05-18 RX ADMIN — OXYCODONE HYDROCHLORIDE 10 MILLIGRAM(S): 5 TABLET ORAL at 05:36

## 2018-05-18 RX ADMIN — Medication 100 MILLIGRAM(S): at 05:33

## 2018-05-18 RX ADMIN — Medication 100 MILLIGRAM(S): at 11:21

## 2018-05-18 RX ADMIN — GABAPENTIN 600 MILLIGRAM(S): 400 CAPSULE ORAL at 13:11

## 2018-05-18 RX ADMIN — GABAPENTIN 600 MILLIGRAM(S): 400 CAPSULE ORAL at 05:36

## 2018-05-18 RX ADMIN — LOSARTAN POTASSIUM 100 MILLIGRAM(S): 100 TABLET, FILM COATED ORAL at 05:33

## 2018-05-18 RX ADMIN — Medication 100 MILLIGRAM(S): at 05:28

## 2018-05-18 RX ADMIN — Medication 100 MILLIGRAM(S): at 13:13

## 2018-05-18 RX ADMIN — OXYCODONE HYDROCHLORIDE 10 MILLIGRAM(S): 5 TABLET ORAL at 01:24

## 2018-05-18 RX ADMIN — PANTOPRAZOLE SODIUM 40 MILLIGRAM(S): 20 TABLET, DELAYED RELEASE ORAL at 05:32

## 2018-05-18 RX ADMIN — Medication 30 MILLIGRAM(S): at 05:33

## 2018-05-18 RX ADMIN — Medication 100 MILLIGRAM(S): at 05:29

## 2018-05-18 RX ADMIN — ENOXAPARIN SODIUM 40 MILLIGRAM(S): 100 INJECTION SUBCUTANEOUS at 11:29

## 2018-05-18 NOTE — PROGRESS NOTE ADULT - SUBJECTIVE AND OBJECTIVE BOX
SUBJECTIVE:    Patient is a 52y old Male who presents with a chief complaint of diffuse back pain (10 May 2018 14:55)    Currently admitted to medicine with the primary diagnosis of Abscess of back     Today is hospital day 9d. This morning he is resting comfortably in bed and reports no new issues or overnight events.     PAST MEDICAL & SURGICAL HISTORY  Morbid obesity  Obstructive sleep apnea  Disc disease, degenerative, lumbar or lumbosacral  Hypertension  History of excision of pilonidal cyst  H/O arthroscopy of right knee  Spinal cord stimulator status    SOCIAL HISTORY:    ALLERGIES:  IV Contrast (Unknown)  penicillin (Unknown)    MEDICATIONS:  STANDING MEDICATIONS  ceFAZolin   IVPB      ceFAZolin   IVPB 2000 milliGRAM(s) IV Intermittent every 6 hours  clindamycin IVPB 900 milliGRAM(s) IV Intermittent every 8 hours  clindamycin IVPB      docusate sodium 100 milliGRAM(s) Oral three times a day  enoxaparin Injectable 40 milliGRAM(s) SubCutaneous daily  furosemide    Tablet 60 milliGRAM(s) Oral two times a day  gabapentin 600 milliGRAM(s) Oral three times a day  losartan 100 milliGRAM(s) Oral daily  NIFEdipine XL 30 milliGRAM(s) Oral daily  oxyCODONE  ER Tablet 140 milliGRAM(s) Oral every 12 hours  pantoprazole    Tablet 40 milliGRAM(s) Oral before breakfast  senna 1 Tablet(s) Oral at bedtime    PRN MEDICATIONS  cyclobenzaprine 10 milliGRAM(s) Oral three times a day PRN  oxyCODONE    IR 10 milliGRAM(s) Oral every 4 hours PRN    VITALS:   T(F): 96.3  HR: 75  BP: 133/64  RR: 18  SpO2: 95%    LABS:    05-16    140  |  94<L>  |  10  ----------------------------<  118<H>  3.7   |  34<H>  |  0.9    Ca    8.8      16 May 2018 11:02      PHYSICAL EXAM:  GEN: No acute distress  LUNGS: Clear to auscultation bilaterally   HEART: S1/S2 present. RRR.   ABD: Soft, non-tender, non-distended. Bowel sounds present  EXT: minimal edema bilaterally  NEURO: AAOX3    HOME MEDICATIONS:  Home Medications:  cyclobenzaprine 10 mg oral tablet (05-17-18)  furosemide 20 mg oral tablet (05-17-18)  losartan 100 mg oral tablet (02-23-18)  oxyCODONE 10 mg oral tablet (05-17-18)  oxyCODONE 20 mg oral tablet, extended release (05-17-18)

## 2018-05-18 NOTE — PROGRESS NOTE ADULT - PROVIDER SPECIALTY LIST ADULT
Infectious Disease
Internal Medicine
Neurosurgery
Internal Medicine
Neurosurgery

## 2018-05-18 NOTE — PROGRESS NOTE ADULT - SUBJECTIVE AND OBJECTIVE BOX
Patient was seen and examined. Spoke with RN. Chart reviewed.    No events overnight.  Vital Signs Last 24 Hrs  T(F): 96.3 (18 May 2018 05:04), Max: 99.6 (17 May 2018 21:06)  HR: 75 (18 May 2018 05:04) (75 - 88)  BP: 133/64 (18 May 2018 05:04) (111/59 - 133/64)  SpO2: 95% (17 May 2018 20:08) (95% - 95%)  MEDICATIONS  (STANDING):  ceFAZolin   IVPB      ceFAZolin   IVPB 2000 milliGRAM(s) IV Intermittent every 6 hours  clindamycin IVPB 900 milliGRAM(s) IV Intermittent every 8 hours  clindamycin IVPB      docusate sodium 100 milliGRAM(s) Oral three times a day  enoxaparin Injectable 40 milliGRAM(s) SubCutaneous daily  furosemide    Tablet 60 milliGRAM(s) Oral two times a day  gabapentin 600 milliGRAM(s) Oral three times a day  losartan 100 milliGRAM(s) Oral daily  NIFEdipine XL 30 milliGRAM(s) Oral daily  oxyCODONE  ER Tablet 140 milliGRAM(s) Oral every 12 hours  pantoprazole    Tablet 40 milliGRAM(s) Oral before breakfast  senna 1 Tablet(s) Oral at bedtime    MEDICATIONS  (PRN):  cyclobenzaprine 10 milliGRAM(s) Oral three times a day PRN Muscle Spasm  oxyCODONE    IR 10 milliGRAM(s) Oral every 4 hours PRN Moderate Pain (4 - 6)    Labs:                  Radiology:    General: comfortable, NAD  Neurology: A&Ox3, nonfocal  Head:  Normocephalic, atraumatic  ENT:  Mucosa moist, no ulcerations  Neck:  Supple, no JVD,   Skin: no breakdowns (as per RN)  Resp: CTA B/L  CV: RRR, S1S2,   GI: Soft, NT, bowel sounds  MS: No edema, + peripheral pulses, FROM all 4 extremity

## 2018-05-18 NOTE — PROGRESS NOTE ADULT - ASSESSMENT
52-year-old male history of RUSSELL, HTN, intervertebral disc disease, status post placement of spinal cord stimulator in july 2017; presented with diffuse back pain.    1) acute on chronic back pain 2/2 spinal stimulator site infection - s/p removal  -local wound care, f/u with Dr Wen in 2 weeks for suture removal  -ID following, f/u abscess cultures growing staph aureus, c/w IV abx, PICC line placed 5/14, will need long term abx at least 3 months, will f/u with ID as outpatient  -pain control, continue home meds with IV morphine for breakthrough  -pain management c/s for Dr. Lombardo done, started on gabapentin  -PT rehab extremely important    2) HTN  -c/w home meds    3)LE pain/swelling 2/2 fluid overload and inability to ambulate - c/w IV lasix 40mg BID, keep legs elevated when in bed, encourage ambulation    dvt and gi ppx  FULL CODE  Dispo - PT Rehab and pain control
52-year-old male history of RUSSELL, HTN, intervertebral disc disease, status post placement of spinal cord stimulator in july 2017; presented with diffuse back pain.    1) acute on chronic back pain 2/2 possible spinal stimulator site infection  -IV abx for possible deep infection at the stimulator site, pt has PCN allergy  -ID eval, blood cultures NGTD  -pain control, c/w home meds with IV morphine for breakthrough, pain c/s  -OR today for spinal stimulator removal    2) HTN  -c/w home meds    dvt and gi ppx  FULL CODE  Dispo - OR for spinal stimulator removal
52-year-old male history of RUSSELL, HTN, intervertebral disc disease, status post placement of spinal cord stimulator in july 2017; presented with diffuse back pain.    1) acute on chronic back pain 2/2 possible spinal stimulator site infection  -IV abx for possible deep infection at the stimulator site, pt has PCN allergy  -ID eval, blood cultures pending  -pain control, continue home meds with IV morphine for breakthrough    2) HTN  -c/w home meds    dvt and gi ppx  FULL CODE  Dispo - OR for spinal stimulator removal
52-year-old male history of RUSSELL, HTN, intervertebral disc disease, status post placement of spinal cord stimulator in july 2017; presented with diffuse back pain.    1) acute on chronic back pain 2/2 spinal stimulator site infection - s/p removal  -ID following, f/u abscess cultures - NGTD, c/w IV abx  -pain control, continue home meds with IV morphine for breakthrough  -pain management c/s for Dr. Munira melendez, started on gabapentin    2) HTN  -c/w home meds    3)LE pain/swelling - r/o DVT, f/u doppler  -started on IV lasix 40mg BID    dvt and gi ppx  FULL CODE  Dispo - pending cultures and possible PICC
52-year-old male history of RUSSELL, HTN, intervertebral disc disease, status post placement of spinal cord stimulator in july 2017; presented with diffuse back pain.    1) acute on chronic back pain 2/2 spinal stimulator site infection - s/p removal  -ID following, f/u abscess cultures, c/w IV abx  -pain control, continue home meds with IV morphine for breakthrough  -pain management c/s for Dr. Lombardo    2) HTN  -c/w home meds    dvt and gi ppx  FULL CODE  Dispo - pending cultures and possible PICC
52-year-old male history of RUSSELL, HTN, intervertebral disc disease, status post placement of spinal cord stimulator in july 2017; presented with diffuse back pain.    1) acute on chronic back pain 2/2 spinal stimulator site infection - s/p removal  -local wound care, f/u with Dr Wen in 2 weeks for suture removal  -ID following, f/u abscess cultures growing staph aureus, c/w IV abx, PICC line placed 5/14, will need long term abx at least 3 months, will f/u with ID as outpatient  -pain control, continue home meds with IV morphine for breakthrough  -pain management c/s for Dr. Lombardo done, started on gabapentin  -PT rehab extremely important    2) HTN  -c/w home meds    3)LE pain/swelling - c/w PO lasix 60mg BID, keep legs elevated when in bed, encourage ambulation    4)Morbidly Obese    dvt and gi ppx  FULL CODE  Dispo - medically cleared for discharge
52-year-old male history of RUSSELL, HTN, intervertebral disc disease, status post placement of spinal cord stimulator in july 2017; presented with diffuse back pain.    1) acute on chronic back pain 2/2 spinal stimulator site infection - s/p removal  -local wound care, f/u with Dr Wen in 2 weeks for suture removal  -ID following, f/u abscess cultures growing staph aureus, c/w IV abx, PICC line placed 5/14, will need long term abx at least 3 months, will f/u with ID as outpatient  -pain control, continue home meds with IV morphine for breakthrough  -pain management c/s for Dr. Lombardo done, started on gabapentin, recalled  -PT rehab extremely important    2) HTN  -c/w home meds    3)LE pain/swelling 2/2 fluid overload and inability to ambulate - c/w IV lasix 40mg BID, keep legs elevated when in bed, encourage ambulation    4)Morbidly Obese    dvt and gi ppx  FULL CODE  Dispo - possible d/c today or tomorrow
52-year-old male history of RUSSELL, HTN, intervertebral disc disease, status post placement of spinal cord stimulator in july 2017; presented with diffuse back pain.    1) acute on chronic back pain 2/2 spinal stimulator site infection - s/p removal  -local wound care, f/u with Dr Wen in 2 weeks for suture removal  -ID following, f/u abscess cultures growing staph aureus, c/w IV abx, PICC line placed 5/14, will need long term abx at least 3 months, will f/u with ID as outpatient  -pain control, continue home meds with IV morphine for breakthrough  -pain management c/s for Dr. Lombardo done, started on gabapentin, recalled  -PT rehab extremely important    2) HTN  -c/w home meds    3)LE pain/swelling 2/2 fluid overload and inability to ambulate - c/w PO lasix 60mg BID, keep legs elevated when in bed, encourage ambulation    4)Morbidly Obese    dvt and gi ppx  FULL CODE  Dispo - medically cleared for discharge
Assessment and Plan:   · Assessment		  52-year-old male history of RUSSELL, HTN, intervertebral disc disease, status post placement of spinal cord stimulator in july 2017; presented with diffuse back pain.    1) acute on chronic back pain 2/2 spinal stimulator site infection - s/p removal  -local wound care, f/u with Dr Wen in 2 weeks for suture removal  -ID following, f/u abscess cultures growing staph aureus, c/w IV abx, PICC line placed 5/14, will need long term abx at least 3 months, will f/u with ID as outpatient  -pain control, continue home meds with IV morphine for breakthrough  -pain management c/s for Dr. Munira melendez, started on gabapentin  -PT rehab extremely important    2) HTN  -c/w home meds    3)LE pain/swelling - c/w PO lasix 60mg BID, keep legs elevated when in bed, encourage ambulation    4)Morbidly Obese    dvt and gi ppx  FULL CODE  Dispo - medically cleared for discharge
Assessment and Plan:   · Assessment		  52-year-old male history of RUSSELL, HTN, intervertebral disc disease, status post placement of spinal cord stimulator in july 2017; presented with diffuse back pain.    1) acute on chronic back pain 2/2 spinal stimulator site infection - s/p removal  -local wound care, f/u with Dr Wen in 2 weeks for suture removal  -ID following, f/u abscess cultures growing staph aureus, c/w IV abx, PICC line placed 5/14, will need long term abx at least 3 months, will f/u with ID as outpatient  -pain control, continue home meds with IV morphine for breakthrough  -pain management c/s for Dr. Munira melendez, started on gabapentin  -PT rehab extremely important    2) HTN  -c/w home meds    3)LE pain/swelling 2/2 fluid overload and inability to ambulate - c/w IV lasix 40mg BID, keep legs elevated when in bed, encourage ambulation    dvt and gi ppx  FULL CODE  Dispo - PT Rehab and pain control
· Assessment		  52-year-old male history of RUSSELL, HTN, intervertebral disc disease, status post placement of spinal cord stimulator in july 2017; presented with diffuse back pain.    1) acute on chronic back pain 2/2 possible spinal stimulator site infection  -IV abx for possible deep infection at the stimulator site, pt has PCN allergy  -ID eval, blood cultures NGTD  -pain control, c/w home meds with IV morphine for breakthrough, pain c/s  -s/p for spinal stimulator removal    2) HTN  -c/w home meds    dvt and gi ppx  FULL CODE  Dispo - s/p spinal stimulator removal
· Assessment		  52-year-old male history of RUSSELL, HTN, intervertebral disc disease, status post placement of spinal cord stimulator in july 2017; presented with diffuse back pain.    1) acute on chronic back pain 2/2 spinal stimulator site infection - s/p removal  -local wound care, f/u with Dr Wen in 2 weeks for suture removal  -ID following, f/u abscess cultures growing staph aureus, c/w IV abx, PICC line placed 5/14, will need long term abx at least 3 months, will f/u with ID as outpatient  -pain control, continue home meds with IV morphine for breakthrough  -pain management c/s for Dr. Lombardo done, started on gabapentin, recalled  -PT rehab extremely important    2) HTN  -c/w home meds    3)LE pain/swelling 2/2 fluid overload and inability to ambulate - c/w IV lasix 40mg BID, keep legs elevated when in bed, encourage ambulation    4)Morbidly Obese    dvt and gi ppx  FULL CODE  Dispo - possible d/c today or tomorrow      Electronic Signatures:
· Assessment		  52-year-old male history of RUSSELL, HTN, intervertebral disc disease, status post placement of spinal cord stimulator in july 2017; presented with diffuse back pain.    1) acute on chronic back pain 2/2 spinal stimulator site infection - s/p removal  -local wound care, f/u with Dr Wen in 2 weeks for suture removal  -ID following, f/u abscess cultures growing staph aureus, c/w IV abx, PICC line placed 5/14, will need long term abx at least 3 months, will f/u with ID as outpatient  -pain control, continue home meds with IV morphine for breakthrough  -pain management c/s for Dr. Lombardo done, started on gabapentin, recalled  -PT rehab extremely important    2) HTN  -c/w home meds    3)LE pain/swelling 2/2 fluid overload and inability to ambulate - c/w PO lasix 60mg BID, keep legs elevated when in bed, encourage ambulation    4)Morbidly Obese    dvt and gi ppx  FULL CODE  Dispo - medically cleared for discharge
IMPRESSION:  S/P removal of hardware.  Abscess at site.  Blood cultures are negative to date    RECOMMENDATIONS:  Ancef 2gm iv q6h  Clindamycin 900 mg iv q8h.  Will need a PICC line
IMPRESSION:  S/P removal of hardware.  Abscess at site.  Blood cultures are negative to date.  PICC in place.    RECOMMENDATIONS:  Ancef 2gm iv q6h  Clindamycin 900 mg iv q8h.  Check ESR/CRP.  Duration few months.    F/U with Dr Meeks [id number] at [address] [place] [address].
IMPRESSION:  S/P removal of hardware.  S/P debridement of abscess at site.  Blood cultures are negative to date.  PICC in place.  Inflammation left hand resolved.    RECOMMENDATIONS:  Ancef 2gm iv q6h  Clindamycin 900 mg iv q8h.  Clindamycin for 2 more weeks.  Ancef for 6 weeks in all.  F/U with Dr Enriquez [id number] at [address].    Recall prn please.
IMPRESSION:  S/P removal of hardware.  S/P debridement of abscess at site.  Blood cultures are negative to date.  PICC in place.  Inflammation left hand resolved.    RECOMMENDATIONS:  Ancef 2gm iv q6h  Clindamycin 900 mg iv q8h.  Check ESR/CRP.  Duration few months.

## 2018-05-22 DIAGNOSIS — T85.733A INFECTION AND INFLAMMATORY REACTION DUE TO IMPLANTED ELECTRONIC NEUROSTIMULATOR OF SPINAL CORD, ELECTRODE (LEAD), INITIAL ENCOUNTER: ICD-10-CM

## 2018-05-22 DIAGNOSIS — L02.212 CUTANEOUS ABSCESS OF BACK [ANY PART, EXCEPT BUTTOCK]: ICD-10-CM

## 2018-05-22 DIAGNOSIS — M51.36 OTHER INTERVERTEBRAL DISC DEGENERATION, LUMBAR REGION: ICD-10-CM

## 2018-05-22 DIAGNOSIS — Z88.0 ALLERGY STATUS TO PENICILLIN: ICD-10-CM
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
## 2018-05-22 DIAGNOSIS — L03.312 CELLULITIS OF BACK [ANY PART EXCEPT BUTTOCK]: ICD-10-CM

## 2018-05-22 DIAGNOSIS — E87.70 FLUID OVERLOAD, UNSPECIFIED: ICD-10-CM

## 2018-05-22 DIAGNOSIS — G89.29 OTHER CHRONIC PAIN: ICD-10-CM

## 2018-05-22 DIAGNOSIS — G47.33 OBSTRUCTIVE SLEEP APNEA (ADULT) (PEDIATRIC): ICD-10-CM

## 2018-05-22 DIAGNOSIS — I10 ESSENTIAL (PRIMARY) HYPERTENSION: ICD-10-CM

## 2018-05-22 DIAGNOSIS — B95.61 METHICILLIN SUSCEPTIBLE STAPHYLOCOCCUS AUREUS INFECTION AS THE CAUSE OF DISEASES CLASSIFIED ELSEWHERE: ICD-10-CM

## 2018-05-22 DIAGNOSIS — E66.01 MORBID (SEVERE) OBESITY DUE TO EXCESS CALORIES: ICD-10-CM

## 2018-05-22 DIAGNOSIS — K59.00 CONSTIPATION, UNSPECIFIED: ICD-10-CM

## 2018-05-24 ENCOUNTER — EMERGENCY (EMERGENCY)
Facility: HOSPITAL | Age: 53
LOS: 0 days | Discharge: HOME | End: 2018-05-24
Attending: EMERGENCY MEDICINE | Admitting: EMERGENCY MEDICINE

## 2018-05-24 VITALS
RESPIRATION RATE: 20 BRPM | TEMPERATURE: 97 F | SYSTOLIC BLOOD PRESSURE: 151 MMHG | DIASTOLIC BLOOD PRESSURE: 81 MMHG | HEART RATE: 68 BPM | OXYGEN SATURATION: 98 %

## 2018-05-24 VITALS
OXYGEN SATURATION: 99 % | DIASTOLIC BLOOD PRESSURE: 80 MMHG | HEART RATE: 77 BPM | RESPIRATION RATE: 20 BRPM | TEMPERATURE: 98 F | SYSTOLIC BLOOD PRESSURE: 175 MMHG

## 2018-05-24 DIAGNOSIS — M54.9 DORSALGIA, UNSPECIFIED: ICD-10-CM

## 2018-05-24 DIAGNOSIS — I10 ESSENTIAL (PRIMARY) HYPERTENSION: ICD-10-CM

## 2018-05-24 DIAGNOSIS — R11.2 NAUSEA WITH VOMITING, UNSPECIFIED: ICD-10-CM

## 2018-05-24 DIAGNOSIS — R53.1 WEAKNESS: ICD-10-CM

## 2018-05-24 DIAGNOSIS — Z91.018 ALLERGY TO OTHER FOODS: ICD-10-CM

## 2018-05-24 DIAGNOSIS — Z96.89 PRESENCE OF OTHER SPECIFIED FUNCTIONAL IMPLANTS: Chronic | ICD-10-CM

## 2018-05-24 DIAGNOSIS — Z98.890 OTHER SPECIFIED POSTPROCEDURAL STATES: Chronic | ICD-10-CM

## 2018-05-24 DIAGNOSIS — Z88.0 ALLERGY STATUS TO PENICILLIN: ICD-10-CM

## 2018-05-24 DIAGNOSIS — Z79.899 OTHER LONG TERM (CURRENT) DRUG THERAPY: ICD-10-CM

## 2018-05-24 DIAGNOSIS — Z79.2 LONG TERM (CURRENT) USE OF ANTIBIOTICS: ICD-10-CM

## 2018-05-24 LAB
ALBUMIN SERPL ELPH-MCNC: 3.9 G/DL — SIGNIFICANT CHANGE UP (ref 3.5–5.2)
ALP SERPL-CCNC: 68 U/L — SIGNIFICANT CHANGE UP (ref 30–115)
ALT FLD-CCNC: 8 U/L — SIGNIFICANT CHANGE UP (ref 0–41)
ANION GAP SERPL CALC-SCNC: 14 MMOL/L — SIGNIFICANT CHANGE UP (ref 7–14)
AST SERPL-CCNC: 10 U/L — SIGNIFICANT CHANGE UP (ref 0–41)
BASOPHILS # BLD AUTO: 0.04 K/UL — SIGNIFICANT CHANGE UP (ref 0–0.2)
BASOPHILS NFR BLD AUTO: 0.5 % — SIGNIFICANT CHANGE UP (ref 0–1)
BILIRUB SERPL-MCNC: 0.3 MG/DL — SIGNIFICANT CHANGE UP (ref 0.2–1.2)
BUN SERPL-MCNC: 10 MG/DL — SIGNIFICANT CHANGE UP (ref 10–20)
CALCIUM SERPL-MCNC: 8.7 MG/DL — SIGNIFICANT CHANGE UP (ref 8.5–10.1)
CHLORIDE SERPL-SCNC: 101 MMOL/L — SIGNIFICANT CHANGE UP (ref 98–110)
CO2 SERPL-SCNC: 28 MMOL/L — SIGNIFICANT CHANGE UP (ref 17–32)
CREAT SERPL-MCNC: 0.7 MG/DL — SIGNIFICANT CHANGE UP (ref 0.7–1.5)
EOSINOPHIL # BLD AUTO: 0.1 K/UL — SIGNIFICANT CHANGE UP (ref 0–0.7)
EOSINOPHIL NFR BLD AUTO: 1.3 % — SIGNIFICANT CHANGE UP (ref 0–8)
GLUCOSE SERPL-MCNC: 140 MG/DL — HIGH (ref 70–99)
HCT VFR BLD CALC: 33.4 % — LOW (ref 42–52)
HGB BLD-MCNC: 10.8 G/DL — LOW (ref 14–18)
IMM GRANULOCYTES NFR BLD AUTO: 0.5 % — HIGH (ref 0.1–0.3)
LACTATE SERPL-SCNC: 1.3 MMOL/L — SIGNIFICANT CHANGE UP (ref 0.5–2.2)
LIDOCAIN IGE QN: 42 U/L — SIGNIFICANT CHANGE UP (ref 7–60)
LYMPHOCYTES # BLD AUTO: 2.07 K/UL — SIGNIFICANT CHANGE UP (ref 1.2–3.4)
LYMPHOCYTES # BLD AUTO: 26.2 % — SIGNIFICANT CHANGE UP (ref 20.5–51.1)
MCHC RBC-ENTMCNC: 27.5 PG — SIGNIFICANT CHANGE UP (ref 27–31)
MCHC RBC-ENTMCNC: 32.3 G/DL — SIGNIFICANT CHANGE UP (ref 32–37)
MCV RBC AUTO: 85 FL — SIGNIFICANT CHANGE UP (ref 80–94)
MONOCYTES # BLD AUTO: 0.59 K/UL — SIGNIFICANT CHANGE UP (ref 0.1–0.6)
MONOCYTES NFR BLD AUTO: 7.5 % — SIGNIFICANT CHANGE UP (ref 1.7–9.3)
NEUTROPHILS # BLD AUTO: 5.05 K/UL — SIGNIFICANT CHANGE UP (ref 1.4–6.5)
NEUTROPHILS NFR BLD AUTO: 64 % — SIGNIFICANT CHANGE UP (ref 42.2–75.2)
PLATELET # BLD AUTO: 401 K/UL — HIGH (ref 130–400)
POTASSIUM SERPL-MCNC: 3.6 MMOL/L — SIGNIFICANT CHANGE UP (ref 3.5–5)
POTASSIUM SERPL-SCNC: 3.6 MMOL/L — SIGNIFICANT CHANGE UP (ref 3.5–5)
PROT SERPL-MCNC: 6.4 G/DL — SIGNIFICANT CHANGE UP (ref 6–8)
RBC # BLD: 3.93 M/UL — LOW (ref 4.7–6.1)
RBC # FLD: 13.2 % — SIGNIFICANT CHANGE UP (ref 11.5–14.5)
SODIUM SERPL-SCNC: 143 MMOL/L — SIGNIFICANT CHANGE UP (ref 135–146)
TROPONIN T SERPL-MCNC: <0.01 NG/ML — SIGNIFICANT CHANGE UP
WBC # BLD: 7.89 K/UL — SIGNIFICANT CHANGE UP (ref 4.8–10.8)
WBC # FLD AUTO: 7.89 K/UL — SIGNIFICANT CHANGE UP (ref 4.8–10.8)

## 2018-05-24 RX ORDER — DIPHENHYDRAMINE HYDROCHLORIDE AND LIDOCAINE HYDROCHLORIDE AND ALUMINUM HYDROXIDE AND MAGNESIUM HYDRO
30 KIT ONCE
Qty: 0 | Refills: 0 | Status: COMPLETED | OUTPATIENT
Start: 2018-05-24 | End: 2018-05-24

## 2018-05-24 RX ORDER — SODIUM CHLORIDE 9 MG/ML
1000 INJECTION, SOLUTION INTRAVENOUS ONCE
Qty: 0 | Refills: 0 | Status: COMPLETED | OUTPATIENT
Start: 2018-05-24 | End: 2018-05-24

## 2018-05-24 RX ORDER — ONDANSETRON 8 MG/1
4 TABLET, FILM COATED ORAL ONCE
Qty: 0 | Refills: 0 | Status: COMPLETED | OUTPATIENT
Start: 2018-05-24 | End: 2018-05-24

## 2018-05-24 RX ORDER — ONDANSETRON 8 MG/1
1 TABLET, FILM COATED ORAL
Qty: 15 | Refills: 0
Start: 2018-05-24 | End: 2018-05-28

## 2018-05-24 RX ADMIN — DIPHENHYDRAMINE HYDROCHLORIDE AND LIDOCAINE HYDROCHLORIDE AND ALUMINUM HYDROXIDE AND MAGNESIUM HYDRO 30 MILLILITER(S): KIT at 06:48

## 2018-05-24 RX ADMIN — ONDANSETRON 104 MILLIGRAM(S): 8 TABLET, FILM COATED ORAL at 05:31

## 2018-05-24 RX ADMIN — Medication 0.2 MILLIGRAM(S): at 05:31

## 2018-05-24 RX ADMIN — SODIUM CHLORIDE 1000 MILLILITER(S): 9 INJECTION, SOLUTION INTRAVENOUS at 05:31

## 2018-05-24 NOTE — ED ADULT TRIAGE NOTE - CHIEF COMPLAINT QUOTE
PT had back surgery 5/11 on high dose oxycodone, c/o generalized weakness, nausea, and back pain. PT had back surgery 5/11 on high dose oxycodone. Now pt  c/o generalized weakness, nausea, and back pain since DC with low dose of pain meds.

## 2018-05-24 NOTE — ED PROVIDER NOTE - PHYSICAL EXAMINATION
CONSTITUTIONAL: Well-developed; well-nourished; in no acute distress.   SKIN: warm, dry  HEAD: Normocephalic; atraumatic.  EYES: PERRL, no conjunctival erythema  ENT: No nasal discharge; airway clear.  NECK: Supple; non tender.  CARD: S1, S2 normal; Regular rate and rhythm.   RESP: No wheezes, rales or rhonchi.  ABD: soft ntnd  EXT: Normal ROM. + clean and dry bandage over the medial thoracic back, clean bandage over the left lumbar back.   LYMPH: No acute cervical adenopathy.  NEURO: Alert, oriented, grossly unremarkable, moving all 4 extremities, equal sensation bilaterally

## 2018-05-24 NOTE — CONSULT NOTE ADULT - ASSESSMENT
52y old Male s/p removal of infected spinal cord stimulator 5/11, currently w PICC line on IV abx, c/o nausea/vomiting, abd pain -- may be 2* IV abx?    PLAN:  1.	No acute neurosurgical intervention  2.	Medical w/u for chief complaint of N/V, abd pain  3.	IVF hydration  4.	Antiemetics

## 2018-05-24 NOTE — CONSULT NOTE ADULT - SUBJECTIVE AND OBJECTIVE BOX
Patient is a 52y old Male s/p removal of infected spinal cord stimulator 5/11, currently w PICC line on IV abx. Pt was d/c about 5 days ago. Returns to ER today c/o abdominal pain with nausea/retching, vomiting (non-bloody, "bile"), and generalized weakness. Pt denies diarrhea or constipation, no melena. Pain states chronic back pain is controlled/unchanged at this time. No numbness or tingling to b/l LE. No bowel/bladder incontinence. No fever or chills.    PAST MEDICAL & SURGICAL HISTORY:  Morbid obesity  Obstructive sleep apnea  Disc disease, degenerative, lumbar or lumbosacral  Hypertension  History of excision of pilonidal cyst  H/O arthroscopy of right knee  Spinal cord stimulator status  MEDS: oxyCODONE 10 mg oral tablet  oxyCODONE 20 mg oral tablet, extended release  losartan 100 mg oral tablet  tamsulosin 0.4 mg oral capsule  gabapentin 600 mg oral tablet  Procardia XL 30 mg oral tablet, extended release  ceFAZolin  miconazole 2% topical cream  furosemide 20 mg oral tablet  olace 100 mg oral capsule  senna oral tablet  clindamycin  cyclobenzaprine 10 mg oral tablet  ALLERGIES: IV Contrast (Unknown)  penicillin (Unknown)    VS: T(F): 97.6, Max: 97.6 (05-24 @ 04:09)  HR: 77 (77 - 77)  BP: 175/80 (175/80 - 175/80)  RR: 20  SpO2: 99% (99% - 99%)  GEN: Awake, actively dry heaving throughout exam. Non-toxic. Obese  NEURO: A&Ox3, speech clear, MAEx4, back dressings c/d/i x2, recently changed 2 days ago per pt, minimal TTP, no surrounding edema or erythema    LABS/IMAGING:                        10.8   7.89  )-----------( 401      ( 24 May 2018 04:40 )             33.4     05-24  143  |  101  |  10  ----------------------------<  140<H>  3.6   |  28  |  0.7    Ca    8.7      24 May 2018 04:40    TPro  6.4  /  Alb  3.9  /  TBili  0.3  /  DBili  x   /  AST  10  /  ALT  8   /  AlkPhos  68  05-24

## 2018-05-24 NOTE — ED ADULT NURSE NOTE - OBJECTIVE STATEMENT
Patient c/o nausea and weakness. Recent admission for spinal cord stimulator infection--has PICC line.

## 2018-05-24 NOTE — ED ADULT NURSE NOTE - CHIEF COMPLAINT QUOTE
PT had back surgery 5/11 on high dose oxycodone. Now pt  c/o generalized weakness, nausea, and back pain since DC with low dose of pain meds.

## 2018-05-24 NOTE — ED PROVIDER NOTE - PROGRESS NOTE DETAILS
neurosurgery aware of patient's visit to ed nausea improved, now only mid dyspepsia. Labs without signs of dehydration, pancreatitis, biliary disease. Patient seen by neurosurgery, from their standpoint is cleared.     No signs of bowel obstruction. Will likely dc with continued zofran prn, PMD follow up.    Patient counseled about need for antibiotics and that he cannot skip them despite his suspicion that they are causing these sx.

## 2018-05-24 NOTE — ED PROVIDER NOTE - OBJECTIVE STATEMENT
51 yo M pmh of HTN, RUSSELL and recent surgery for spinal cord stimulator removal due to infection. Currently has picc line being treated with clindamycin and cefazolin. States that since discharge few days ago he has been feeling week and has had continuous N/V, non bilious. no diarrhea, no abdominal pain, no fevers, no cp, no sob. Has been taking the antibiotics at home. Surgery was done by Dr. key. 53 yo M pmh of HTN, RUSSELL and recent surgery for spinal cord stimulator removal due to infection. Currently has picc line being treated with clindamycin and cefazolin. States that since discharge few days ago he has been feeling week and has had continuous N/V, non bilious. no diarrhea, no abdominal pain, no fevers, no cp, no sob. Has been taking the antibiotics at home. Surgery was done by Dr. Wen.

## 2018-05-24 NOTE — ED PROVIDER NOTE - ATTENDING CONTRIBUTION TO CARE
51 yo M morbidly obese, HTN, RUSSELL, sp removal of infected spinal stimulator for chronic back pain, here for assessment of vomiting and general malaise. Patient states he was on high dose pain medication after surgery, but was sent home on low dose meds, which he only filled on Monday and by the time he took it he was already feeling nauseous and therefore only took 1 tablet. He also reports holding his antibiotics because he felt too unwell to have them given via PICC line.    He has no fever, chills, back pain, states he is having regular, well formed bowel movements.     In ED has elevated BP but otherwise normal VS, has obese but non tender abdomen, no rebound or guarding, MMM.     Will check labs, give zofran, fluids, have neurosurgery assess patient since he has such recent surgery and reassess.    Will also give low dose clonidine as sx could be related to opiate withdrawal to some degree.

## 2018-05-24 NOTE — ED PROVIDER NOTE - NS ED ROS FT
Eyes:  No visual changes, eye pain or discharge.  ENMT:  no sore throat or runny nose, no difficulty swallowing  Cardiac:  No chest pain, SOB  Respiratory:  No cough or respiratory distress.   GI:  + N/V, non bilious, non bloody, no diarrhea, no abdominal pain.   :  No dysuria, frequency or burning.  MS:  + back pain from surgery done to remove spinal cord stimulator that was infected.   Neuro:  No headache, + weakness.   Skin:  No skin rash.   Endocrine: No history of thyroid disease or diabetes.

## 2018-05-29 ENCOUNTER — APPOINTMENT (OUTPATIENT)
Dept: NEUROSURGERY | Facility: CLINIC | Age: 53
End: 2018-05-29
Payer: OTHER MISCELLANEOUS

## 2018-05-29 PROCEDURE — 99024 POSTOP FOLLOW-UP VISIT: CPT

## 2018-06-27 ENCOUNTER — APPOINTMENT (OUTPATIENT)
Dept: NEUROSURGERY | Facility: CLINIC | Age: 53
End: 2018-06-27
Payer: OTHER MISCELLANEOUS

## 2018-06-27 PROCEDURE — 99024 POSTOP FOLLOW-UP VISIT: CPT

## 2018-07-17 PROBLEM — G47.33 OBSTRUCTIVE SLEEP APNEA (ADULT) (PEDIATRIC): Chronic | Status: ACTIVE | Noted: 2018-02-23

## 2018-07-17 PROBLEM — E66.01 MORBID (SEVERE) OBESITY DUE TO EXCESS CALORIES: Chronic | Status: ACTIVE | Noted: 2018-02-23

## 2018-07-17 PROBLEM — M51.37 OTHER INTERVERTEBRAL DISC DEGENERATION, LUMBOSACRAL REGION: Chronic | Status: ACTIVE | Noted: 2018-02-23

## 2018-08-07 ENCOUNTER — APPOINTMENT (OUTPATIENT)
Dept: VASCULAR SURGERY | Facility: CLINIC | Age: 53
End: 2018-08-07
Payer: MEDICAID

## 2018-08-07 VITALS
SYSTOLIC BLOOD PRESSURE: 138 MMHG | DIASTOLIC BLOOD PRESSURE: 82 MMHG | WEIGHT: 315 LBS | HEIGHT: 78 IN | BODY MASS INDEX: 36.45 KG/M2

## 2018-08-07 DIAGNOSIS — T85.733A INFECTION AND INFLAMMATORY REACTION DUE TO IMPLANTED ELECTRONIC NEUROSTIMULATOR OF SPINAL CORD, ELECTRODE (LEAD), INITIAL ENCOUNTER: ICD-10-CM

## 2018-08-07 PROCEDURE — 99203 OFFICE O/P NEW LOW 30 MIN: CPT

## 2018-08-07 PROCEDURE — 93970 EXTREMITY STUDY: CPT

## 2018-08-07 RX ORDER — NIFEDIPINE 20 MG/1
CAPSULE ORAL
Refills: 0 | Status: ACTIVE | COMMUNITY

## 2018-08-07 RX ORDER — OXYCODONE 10 MG/1
10 TABLET ORAL
Refills: 0 | Status: ACTIVE | COMMUNITY

## 2018-08-07 RX ORDER — FUROSEMIDE 40 MG/1
40 TABLET ORAL
Refills: 0 | Status: ACTIVE | COMMUNITY

## 2018-08-07 RX ORDER — LOSARTAN POTASSIUM 100 MG/1
TABLET, FILM COATED ORAL
Refills: 0 | Status: ACTIVE | COMMUNITY

## 2018-08-07 RX ORDER — CLINDAMYCIN PHOSPHATE 150 MG/ML
300 INJECTION, SOLUTION INTRAVENOUS
Refills: 0 | Status: ACTIVE | COMMUNITY

## 2018-08-07 RX ORDER — CEFAZOLIN SODIUM IN SODIUM CHLORIDE 0.9% IV SOLN 2 GM/100ML 2-0.9/1 GM/ML-%
2-0.9 SOLUTION INTRAVENOUS
Refills: 0 | Status: ACTIVE | COMMUNITY

## 2018-08-07 RX ORDER — GABAPENTIN 800 MG/1
800 TABLET, COATED ORAL
Refills: 0 | Status: ACTIVE | COMMUNITY

## 2018-08-22 ENCOUNTER — EMERGENCY (EMERGENCY)
Facility: HOSPITAL | Age: 53
LOS: 0 days | Discharge: HOME | End: 2018-08-22
Attending: EMERGENCY MEDICINE | Admitting: EMERGENCY MEDICINE
Payer: MEDICAID

## 2018-08-22 VITALS
DIASTOLIC BLOOD PRESSURE: 79 MMHG | HEIGHT: 66.93 IN | RESPIRATION RATE: 18 BRPM | OXYGEN SATURATION: 98 % | HEART RATE: 100 BPM | TEMPERATURE: 98 F | SYSTOLIC BLOOD PRESSURE: 177 MMHG

## 2018-08-22 DIAGNOSIS — M79.605 PAIN IN LEFT LEG: ICD-10-CM

## 2018-08-22 DIAGNOSIS — I10 ESSENTIAL (PRIMARY) HYPERTENSION: ICD-10-CM

## 2018-08-22 DIAGNOSIS — Z79.2 LONG TERM (CURRENT) USE OF ANTIBIOTICS: ICD-10-CM

## 2018-08-22 DIAGNOSIS — Z88.0 ALLERGY STATUS TO PENICILLIN: ICD-10-CM

## 2018-08-22 DIAGNOSIS — Z98.890 OTHER SPECIFIED POSTPROCEDURAL STATES: Chronic | ICD-10-CM

## 2018-08-22 DIAGNOSIS — Z79.899 OTHER LONG TERM (CURRENT) DRUG THERAPY: ICD-10-CM

## 2018-08-22 DIAGNOSIS — Z96.89 PRESENCE OF OTHER SPECIFIED FUNCTIONAL IMPLANTS: Chronic | ICD-10-CM

## 2018-08-22 DIAGNOSIS — Z91.041 RADIOGRAPHIC DYE ALLERGY STATUS: ICD-10-CM

## 2018-08-22 DIAGNOSIS — Z79.811 LONG TERM (CURRENT) USE OF AROMATASE INHIBITORS: ICD-10-CM

## 2018-08-22 DIAGNOSIS — G47.33 OBSTRUCTIVE SLEEP APNEA (ADULT) (PEDIATRIC): ICD-10-CM

## 2018-08-22 PROCEDURE — 93971 EXTREMITY STUDY: CPT | Mod: 26

## 2018-08-22 NOTE — ED PROVIDER NOTE - NS ED ROS FT
Review of Systems:  	•	CONSTITUTIONAL - no fever, no diaphoresis, no chills  	•	SKIN - no rash  	•	EYES - no eye pain, no blurry vision  	•	ENT - no change in hearing, no sore throat, no ear pain or tinnitus  	•	RESPIRATORY - no shortness of breath, no cough  	•	CARDIAC - no chest pain, no palpitations  	•	GI - no abd pain, no nausea, no vomiting, no diarrhea, no constipation  	•	GENITO-URINARY - no discharge, no dysuria; no hematuria, no increased urinary frequency  	•	MUSCULOSKELETAL - + left calf pain and swelling   	•	NEUROLOGIC - no weakness, no headache, no paresthesias, no LOC

## 2018-08-22 NOTE — ED PROVIDER NOTE - MEDICAL DECISION MAKING DETAILS
pt with pmhx as noted, in ER with c/o LLE swelling x 4 days with no CP or SOB. LE duplex neg for DVT.  pt to f/u with his vascular doctor and his PMD, told to return to ER for any new/concerning symptoms.  pt understands and agrees with plan.

## 2018-08-22 NOTE — ED PROVIDER NOTE - ATTENDING CONTRIBUTION TO CARE
53 y/o male with h/o DDD, htn, morbid obesity, RUSSELL, in ER with c/o L calf pain/swelling for past ~ 4 days.  Pt with recent admissions to [facility] for infected spinal cord stimulator, was being treated with IV abx through PICC line which was removed last week.  Pt denies any f/c.  no CP/sob.  no abd pain.  no ha/dizziness/loc.  pt states he has had problem with leg swelling in past, was seen by vascular 2 weeks ago and had neg LE duplex in office, pt was told to wear compression socks which he has not been doing.  pe - nad, nc/at, eomi, perrl, op - clear, cta b/l, no w/r/r, rrr, abd- obese, soft, nt, LLE - + calf swelling with venous stasis changes, foot warm, + dp pulse, no focal neuro deficits.  -check le duplex.

## 2018-08-22 NOTE — ED PROVIDER NOTE - OBJECTIVE STATEMENT
52 year old male with pmhx with recent admission for spinal infection, discharged with PICC line for axbx. presents with left calf pain x 4 days. + pain and swelling noted. no trauma or injury, paresthesias, erythema, fever, CP, or SOB.

## 2018-08-22 NOTE — ED ADULT NURSE NOTE - NSIMPLEMENTINTERV_GEN_ALL_ED
Implemented All Universal Safety Interventions:  [place] to call system. Call bell, personal items and telephone within reach. Instruct patient to call for assistance. Room bathroom lighting operational. Non-slip footwear when patient is off stretcher. Physically safe environment: no spills, clutter or unnecessary equipment. Stretcher in lowest position, wheels locked, appropriate side rails in place.

## 2018-08-22 NOTE — ED PROVIDER NOTE - PHYSICAL EXAMINATION
CONST: Well appearing in NAD  EYES: PERRL, EOMI, Sclera and conjunctiva clear.   ENT: No nasal discharge. TM's clear B/L without drainage. Oropharynx normal appearing, no erythema or exudates. Uvula midline.  NECK: Non-tender, no meningeal signs  CARD: Normal S1 S2; Normal rate and rhythm  RESP: Equal BS B/L, No wheezes, rhonchi or rales. No distress  GI: Soft, non-tender, non-distended.  MS: + tenderness and mild swelling to left calf, pulses 2 +, Normal ROM in all extremities. No midline spinal tenderness.  SKIN: Warm, dry, no acute rashes. Good turgor  NEURO: A&Ox3, No focal deficits. Strength 5/5 with no sensory deficits. Steady gait

## 2019-05-28 NOTE — PATIENT PROFILE ADULT. - PRO INTERPRETER NEED 2
Patient's wife, Yvette, called to see if we could do a remote pacemaker check as he is in a nursing home in [place]. She was made aware per Anne that patient could schedule office visit and have PPM check on the same day so that it would only be one trip. She states that she will check with nursing home and will call back if needed.    English

## 2019-09-01 ENCOUNTER — OUTPATIENT (OUTPATIENT)
Dept: OUTPATIENT SERVICES | Facility: HOSPITAL | Age: 54
LOS: 1 days | End: 2019-09-01

## 2019-09-01 DIAGNOSIS — Z96.89 PRESENCE OF OTHER SPECIFIED FUNCTIONAL IMPLANTS: Chronic | ICD-10-CM

## 2019-09-01 DIAGNOSIS — Z98.890 OTHER SPECIFIED POSTPROCEDURAL STATES: Chronic | ICD-10-CM

## 2019-09-12 ENCOUNTER — EMERGENCY (EMERGENCY)
Facility: HOSPITAL | Age: 54
LOS: 0 days | Discharge: HOME | End: 2019-09-12
Attending: STUDENT IN AN ORGANIZED HEALTH CARE EDUCATION/TRAINING PROGRAM | Admitting: STUDENT IN AN ORGANIZED HEALTH CARE EDUCATION/TRAINING PROGRAM
Payer: MEDICAID

## 2019-09-12 VITALS
OXYGEN SATURATION: 99 % | RESPIRATION RATE: 20 BRPM | TEMPERATURE: 98 F | SYSTOLIC BLOOD PRESSURE: 174 MMHG | HEART RATE: 65 BPM | DIASTOLIC BLOOD PRESSURE: 73 MMHG

## 2019-09-12 VITALS
OXYGEN SATURATION: 100 % | SYSTOLIC BLOOD PRESSURE: 165 MMHG | RESPIRATION RATE: 20 BRPM | DIASTOLIC BLOOD PRESSURE: 71 MMHG | HEART RATE: 67 BPM | TEMPERATURE: 98 F

## 2019-09-12 DIAGNOSIS — Z98.890 OTHER SPECIFIED POSTPROCEDURAL STATES: Chronic | ICD-10-CM

## 2019-09-12 DIAGNOSIS — Z88.0 ALLERGY STATUS TO PENICILLIN: ICD-10-CM
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
## 2019-09-12 DIAGNOSIS — S81.801A UNSPECIFIED OPEN WOUND, RIGHT LOWER LEG, INITIAL ENCOUNTER: ICD-10-CM

## 2019-09-12 DIAGNOSIS — Z96.89 PRESENCE OF OTHER SPECIFIED FUNCTIONAL IMPLANTS: Chronic | ICD-10-CM

## 2019-09-12 DIAGNOSIS — F17.210 NICOTINE DEPENDENCE, CIGARETTES, UNCOMPLICATED: ICD-10-CM
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
## 2019-09-12 DIAGNOSIS — Z91.041 RADIOGRAPHIC DYE ALLERGY STATUS: ICD-10-CM
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
## 2019-09-12 DIAGNOSIS — M79.669 PAIN IN UNSPECIFIED LOWER LEG: ICD-10-CM

## 2019-09-12 PROCEDURE — 99283 EMERGENCY DEPT VISIT LOW MDM: CPT

## 2019-09-12 NOTE — ED PROVIDER NOTE - PATIENT PORTAL LINK FT
You can access the FollowMyHealth Patient Portal offered by [facility] by registering at the following website: [email or web address]. By joining [facility]’s FollowMyHealth portal, you will also be able to view your health information using other applications (apps) compatible with our system.

## 2019-09-12 NOTE — ED ADULT NURSE NOTE - OBJECTIVE STATEMENT
Pt AOx4, ambulatory, h/o DM, c/o right lower leg, lateral side, non-healing wound x 1 month. Pt states 1 month ago he cut himself with a metal from his bed, went to PCP who prescribed antbx Monocycline. No improvement in wound. Pt endorses discharge, denies fever.

## 2019-09-12 NOTE — ED PROVIDER NOTE - ATTENDING CONTRIBUTION TO CARE
54 yo m pmh htn, DM2, RUSSELL, obese, chronic back pain  pt here for cut to R leg above ankle x1 month on metal bedframe. pt cleaned it out at home. 2 weeks ago, pt went to see pcp for failure to improve and thin-yellow fluid discharge. pmd started pt on minocycline and mupirocin. wound not healing. no fever or chills.     vss  gen- NAD, aaox3  card-rrr  lungs-ctab, no wheezing or rhonchi  abd-sntnd, no guarding or rebound  neuro- full str/sensation, cn ii-xii grossly intact, normal coordination and gait  LE b/l venous stasis dermatitis, wound w/ area of ulceration. no active bleeding. no cellulitis, no crepitus 54 yo m pmh htn, DM2, RUSSELL, obese, chronic back pain  pt here for cut to R leg above ankle x1 month on metal bedframe. pt cleaned it out at home. 2 weeks ago, pt went to see pcp for failure to improve and thin-yellow fluid discharge. pmd started pt on minocycline and mupirocin. wound not healing. no fever or chills.     vss  gen- NAD, aaox3  card-rrr  lungs-ctab, no wheezing or rhonchi  abd-sntnd, no guarding or rebound  neuro- full str/sensation, cn ii-xii grossly intact, normal coordination and gait  LE b/l venous stasis dermatitis, wound w/ area of ulceration. no active bleeding. no cellulitis, no crepitus, no pain out of proportion    ulceration, poor wound healing  no systemic signs of infection  will rx silver sulfadiazine, clinda, ref to wound care, vascular for possible PVD  ed FS as pt instructed not instructed to take at home

## 2019-09-12 NOTE — ED PROVIDER NOTE - SKIN, MLM
chronic venous stasis dermatitis on b/l legs, non pitting edema 2+, 5 cm open wound/ulcer draining scant yellow fluid on RLE

## 2019-09-12 NOTE — ED PROVIDER NOTE - OBJECTIVE STATEMENT
53 year old male with PMH of HTN, RUSSELL, DM type 2, RUSSELL, morbid obesity, and chronic back pain who presents with non healing wound on RLE for one month.  One month ago, the patient reports scraping his right leg on his bed frame.  The wound continued to bleed and then began draining a yellow fluid so the patient went to his PMD for further eval.  His doctor started him on minocycline and mupirocin cream.  The patient completed his abx and has been applying the topical cream daily with no improvement.  No fevers/chills.

## 2019-09-12 NOTE — ED PROVIDER NOTE - CLINICAL SUMMARY MEDICAL DECISION MAKING FREE TEXT BOX
pt here for poorly healing LE ulcer. no evidence of systemic infection. FS normal. no need for labs. will rx oral abx, topical care, wound ref and vasc ref

## 2019-09-12 NOTE — ED PROVIDER NOTE - NSFOLLOWUPCLINICS_GEN_ALL_ED_FT
[facility] [place]  [facility]  [address], [address]  [place] [address]  Phone: [phone]  Fax:   Follow Up Time: 4-6 Days

## 2019-09-12 NOTE — ED PROVIDER NOTE - NSFOLLOWUPINSTRUCTIONS_ED_ALL_ED_FT
cotton ball left ear, pt now at baseline s/p removal, no foreign body Wound Care: Right lower extremity open, non healing wound.    Taking care of your wound properly can help to prevent pain and infection. It can also help your wound to heal more quickly.     HOW TO CARE FOR YOUR WOUND   Take or apply over-the-counter and prescription medicines only as told by your health care provider.  If you were prescribed antibiotic medicine, take or apply it as told by your health care provider. Do not stop using the antibiotic even if your condition improves.  Clean the wound each day or as told by your health care provider.  Wash the wound with mild soap and water.  Rinse the wound with water to remove all soap.  Pat the wound dry with a clean towel. Do not rub it.  Cover your wound with gauze and change dressing daily.    Check your wound every day for signs of infection. Watch for:  Redness, swelling, pain, fluid, blood, or pus.    Raise (elevate) the injured area above the level of your heart while you are sitting or lying down.  Do not scratch or pick at the wound.  Keep all follow-up visits as told by your health care provider. This is important.    SEEK MEDICAL CARE IF:  You have a fever.  Your pain is not controlled with medicine.  You have increased redness, swelling, or pain at the site of your wound.  You have fluid, blood, or pus coming from your wound.  You notice a bad smell coming from your wound or your dressing.    SEEK IMMEDIATE MEDICAL CARE IF:  You have a red streak going away from your wound.      Please follow up with the wound care doctor and vascular surgeon.

## 2019-09-12 NOTE — ED PROVIDER NOTE - CARE PROVIDER_API CALL
Lucas Foster)  Surgery; Vascular Surgery  [address] [place] [address]  Phone: [phone]  Fax: [phone]  Follow Up Time: 4-6 Days

## 2019-09-12 NOTE — ED ADULT NURSE NOTE - NSIMPLEMENTINTERV_GEN_ALL_ED
Implemented All Universal Safety Interventions:  [place] to call system. Call bell, personal items and telephone within reach. Instruct patient to call for assistance. Room bathroom lighting operational. Non-slip footwear when patient is off stretcher. Physically safe environment: no spills, clutter or unnecessary equipment. Stretcher in lowest position, wheels locked, appropriate side rails in place.

## 2019-09-13 DIAGNOSIS — Z71.89 OTHER SPECIFIED COUNSELING: ICD-10-CM

## 2019-09-17 ENCOUNTER — INBOUND DOCUMENT (OUTPATIENT)
Age: 54
End: 2019-09-17

## 2019-09-24 ENCOUNTER — APPOINTMENT (OUTPATIENT)
Dept: BURN CARE | Facility: CLINIC | Age: 54
End: 2019-09-24

## 2019-10-01 ENCOUNTER — OUTPATIENT (OUTPATIENT)
Dept: OUTPATIENT SERVICES | Facility: HOSPITAL | Age: 54
LOS: 1 days | End: 2019-10-01

## 2019-10-01 DIAGNOSIS — Z98.890 OTHER SPECIFIED POSTPROCEDURAL STATES: Chronic | ICD-10-CM

## 2019-10-01 DIAGNOSIS — Z96.89 PRESENCE OF OTHER SPECIFIED FUNCTIONAL IMPLANTS: Chronic | ICD-10-CM

## 2019-10-16 ENCOUNTER — APPOINTMENT (OUTPATIENT)
Dept: VASCULAR SURGERY | Facility: CLINIC | Age: 54
End: 2019-10-16
Payer: MEDICAID

## 2019-10-16 DIAGNOSIS — Z71.89 OTHER SPECIFIED COUNSELING: ICD-10-CM

## 2019-10-16 PROCEDURE — 29580 STRAPPING UNNA BOOT: CPT | Mod: RT

## 2019-10-23 ENCOUNTER — APPOINTMENT (OUTPATIENT)
Dept: VASCULAR SURGERY | Facility: CLINIC | Age: 54
End: 2019-10-23
Payer: MEDICAID

## 2019-10-23 PROCEDURE — 29580 STRAPPING UNNA BOOT: CPT | Mod: RT

## 2019-10-31 ENCOUNTER — APPOINTMENT (OUTPATIENT)
Dept: VASCULAR SURGERY | Facility: CLINIC | Age: 54
End: 2019-10-31
Payer: MEDICAID

## 2019-10-31 PROCEDURE — 29580 STRAPPING UNNA BOOT: CPT | Mod: 50

## 2019-11-13 ENCOUNTER — APPOINTMENT (OUTPATIENT)
Dept: VASCULAR SURGERY | Facility: CLINIC | Age: 54
End: 2019-11-13
Payer: MEDICAID

## 2019-11-13 PROCEDURE — 29580 STRAPPING UNNA BOOT: CPT | Mod: 50

## 2019-11-20 ENCOUNTER — APPOINTMENT (OUTPATIENT)
Dept: VASCULAR SURGERY | Facility: CLINIC | Age: 54
End: 2019-11-20
Payer: MEDICAID

## 2019-11-20 PROCEDURE — 29580 STRAPPING UNNA BOOT: CPT | Mod: 50

## 2019-11-26 ENCOUNTER — APPOINTMENT (OUTPATIENT)
Dept: VASCULAR SURGERY | Facility: CLINIC | Age: 54
End: 2019-11-26
Payer: MEDICAID

## 2019-11-26 PROCEDURE — 29580 STRAPPING UNNA BOOT: CPT | Mod: 50

## 2019-12-04 ENCOUNTER — APPOINTMENT (OUTPATIENT)
Dept: VASCULAR SURGERY | Facility: CLINIC | Age: 54
End: 2019-12-04
Payer: MEDICAID

## 2019-12-04 PROCEDURE — 29580 STRAPPING UNNA BOOT: CPT | Mod: 50

## 2019-12-04 RX ORDER — EMPAGLIFLOZIN 25 MG/1
25 TABLET, FILM COATED ORAL
Qty: 30 | Refills: 0 | Status: ACTIVE | COMMUNITY
Start: 2019-11-25

## 2019-12-04 RX ORDER — OXYCODONE AND ACETAMINOPHEN 10; 325 MG/1; MG/1
10-325 TABLET ORAL
Qty: 120 | Refills: 0 | Status: ACTIVE | COMMUNITY
Start: 2019-11-16

## 2019-12-04 RX ORDER — MORPHINE SULFATE 15 MG/1
15 TABLET, FILM COATED, EXTENDED RELEASE ORAL
Qty: 60 | Refills: 0 | Status: ACTIVE | COMMUNITY
Start: 2019-11-24

## 2019-12-04 RX ORDER — NAPROXEN SODIUM 550 MG/1
550 TABLET ORAL
Qty: 60 | Refills: 0 | Status: ACTIVE | COMMUNITY
Start: 2019-08-20

## 2019-12-04 RX ORDER — METFORMIN HYDROCHLORIDE 500 MG/1
500 TABLET, COATED ORAL
Qty: 60 | Refills: 0 | Status: ACTIVE | COMMUNITY
Start: 2019-11-16

## 2019-12-11 ENCOUNTER — APPOINTMENT (OUTPATIENT)
Dept: VASCULAR SURGERY | Facility: CLINIC | Age: 54
End: 2019-12-11
Payer: MEDICAID

## 2019-12-11 PROCEDURE — 29580 STRAPPING UNNA BOOT: CPT | Mod: 50

## 2019-12-18 ENCOUNTER — APPOINTMENT (OUTPATIENT)
Dept: VASCULAR SURGERY | Facility: CLINIC | Age: 54
End: 2019-12-18
Payer: MEDICAID

## 2019-12-18 PROCEDURE — 29580 STRAPPING UNNA BOOT: CPT | Mod: 50

## 2019-12-24 ENCOUNTER — APPOINTMENT (OUTPATIENT)
Dept: VASCULAR SURGERY | Facility: CLINIC | Age: 54
End: 2019-12-24
Payer: MEDICAID

## 2019-12-24 PROCEDURE — 29580 STRAPPING UNNA BOOT: CPT | Mod: 50

## 2019-12-31 ENCOUNTER — APPOINTMENT (OUTPATIENT)
Dept: VASCULAR SURGERY | Facility: CLINIC | Age: 54
End: 2019-12-31
Payer: MEDICAID

## 2019-12-31 PROCEDURE — 29580 STRAPPING UNNA BOOT: CPT | Mod: 50

## 2020-01-08 ENCOUNTER — APPOINTMENT (OUTPATIENT)
Dept: VASCULAR SURGERY | Facility: CLINIC | Age: 55
End: 2020-01-08
Payer: MEDICAID

## 2020-01-08 PROCEDURE — 29580 STRAPPING UNNA BOOT: CPT | Mod: 50

## 2020-01-08 RX ORDER — SULFAMETHOXAZOLE AND TRIMETHOPRIM 400; 80 MG/1; MG/1
400-80 TABLET ORAL TWICE DAILY
Qty: 14 | Refills: 0 | Status: ACTIVE | COMMUNITY
Start: 2020-01-08 | End: 1900-01-01

## 2020-01-15 ENCOUNTER — APPOINTMENT (OUTPATIENT)
Dept: VASCULAR SURGERY | Facility: CLINIC | Age: 55
End: 2020-01-15
Payer: MEDICAID

## 2020-01-15 PROCEDURE — 29580 STRAPPING UNNA BOOT: CPT | Mod: 50

## 2020-01-22 ENCOUNTER — APPOINTMENT (OUTPATIENT)
Dept: VASCULAR SURGERY | Facility: CLINIC | Age: 55
End: 2020-01-22
Payer: MEDICAID

## 2020-01-22 PROCEDURE — 29580 STRAPPING UNNA BOOT: CPT | Mod: 50

## 2020-01-29 ENCOUNTER — APPOINTMENT (OUTPATIENT)
Dept: VASCULAR SURGERY | Facility: CLINIC | Age: 55
End: 2020-01-29
Payer: MEDICAID

## 2020-01-29 PROCEDURE — 29580 STRAPPING UNNA BOOT: CPT | Mod: 50

## 2020-02-05 ENCOUNTER — APPOINTMENT (OUTPATIENT)
Dept: VASCULAR SURGERY | Facility: CLINIC | Age: 55
End: 2020-02-05
Payer: MEDICAID

## 2020-02-05 PROCEDURE — 29580 STRAPPING UNNA BOOT: CPT | Mod: 50

## 2020-02-12 ENCOUNTER — APPOINTMENT (OUTPATIENT)
Dept: VASCULAR SURGERY | Facility: CLINIC | Age: 55
End: 2020-02-12
Payer: MEDICAID

## 2020-02-12 PROCEDURE — 29580 STRAPPING UNNA BOOT: CPT | Mod: 50

## 2020-02-19 ENCOUNTER — APPOINTMENT (OUTPATIENT)
Dept: VASCULAR SURGERY | Facility: CLINIC | Age: 55
End: 2020-02-19
Payer: MEDICAID

## 2020-02-19 PROCEDURE — 29580 STRAPPING UNNA BOOT: CPT | Mod: 50

## 2020-03-02 ENCOUNTER — APPOINTMENT (OUTPATIENT)
Dept: VASCULAR SURGERY | Facility: CLINIC | Age: 55
End: 2020-03-02
Payer: MEDICAID

## 2020-03-02 PROCEDURE — 29580 STRAPPING UNNA BOOT: CPT | Mod: 50

## 2020-03-09 ENCOUNTER — APPOINTMENT (OUTPATIENT)
Dept: VASCULAR SURGERY | Facility: CLINIC | Age: 55
End: 2020-03-09
Payer: MEDICAID

## 2020-03-09 PROCEDURE — 29580 STRAPPING UNNA BOOT: CPT | Mod: 50

## 2020-03-18 ENCOUNTER — APPOINTMENT (OUTPATIENT)
Dept: VASCULAR SURGERY | Facility: CLINIC | Age: 55
End: 2020-03-18
Payer: MEDICAID

## 2020-03-18 PROCEDURE — 99213 OFFICE O/P EST LOW 20 MIN: CPT

## 2020-03-18 RX ORDER — SILVER SULFADIAZINE 10 MG/G
1 CREAM TOPICAL TWICE DAILY
Qty: 1 | Refills: 0 | Status: ACTIVE | COMMUNITY
Start: 2020-03-18 | End: 1900-01-01

## 2020-03-18 RX ORDER — GAUZE BANDAGE 4" X 4"
4"X4" BANDAGE TOPICAL
Qty: 60 | Refills: 0 | Status: ACTIVE | COMMUNITY
Start: 2020-03-18 | End: 1900-01-01

## 2020-03-18 NOTE — HISTORY OF PRESENT ILLNESS
[FreeTextEntry1] : the patient is a 54-year-old male with bilateral venous stasis ulcers on the lateral malleolar regions of his legs.The patient has been wearing with therapy however the wounds have become saturated. The patient has been changing his wound care will frequently at home. His wounds overall improving

## 2020-03-18 NOTE — ASSESSMENT
[FreeTextEntry1] : I would like to change the patient's wound care to daily local wound care. I am prescribing Ace wraps Silvadene and Kerlix. I believe the patient's having a good benefit from multiple local wound care his wound overall improving. I would see him back in my office in 2 weeks time or sooner if his would worsen

## 2020-03-24 NOTE — DISCHARGE NOTE ADULT - DO YOU HAVE DIFFICULTY CLIMBING STAIRS
Medication refill check list    Correct patient? yes   Correct medication name, dose, and pill size? yes   Correct provider? yes   Last and Next appt  scheduled? Yes, last date 10/09/19 & next date 04/08/19   Right pharmacy listed? yes   Correct quantity for 30 or 90 days? yes   Is the patient out of refills? When was it last prescribed? Yes, last date 10/09/19   Directions match what the patient says they are taking?  yes   Enough refills? (none for controlled substances, 1 year for routine medications) yes       carBAMazepine (TEGretol) 200 mg tablet Yes

## 2020-04-08 ENCOUNTER — APPOINTMENT (OUTPATIENT)
Dept: VASCULAR SURGERY | Facility: CLINIC | Age: 55
End: 2020-04-08
Payer: MEDICAID

## 2020-04-08 PROCEDURE — 99212 OFFICE O/P EST SF 10 MIN: CPT

## 2020-04-08 NOTE — ASSESSMENT
[FreeTextEntry1] : 53 y/o gentleman with bilateral lower extremity ulcerations, improving, advised to continue with Xeroform dressings and ace bandage for compression. Follow up in 4 weeks.

## 2020-04-08 NOTE — HISTORY OF PRESENT ILLNESS
[FreeTextEntry1] :  54 year-old male with bilateral venous stasis ulcers on the lateral malleolar regions of his legs. His wounds overall improving with daily wound care with Xeroform.

## 2020-04-21 ENCOUNTER — INPATIENT (INPATIENT)
Facility: HOSPITAL | Age: 55
LOS: 6 days | Discharge: HOME | End: 2020-04-28
Attending: STUDENT IN AN ORGANIZED HEALTH CARE EDUCATION/TRAINING PROGRAM | Admitting: STUDENT IN AN ORGANIZED HEALTH CARE EDUCATION/TRAINING PROGRAM
Payer: MEDICAID

## 2020-04-21 VITALS
OXYGEN SATURATION: 97 % | SYSTOLIC BLOOD PRESSURE: 152 MMHG | DIASTOLIC BLOOD PRESSURE: 73 MMHG | RESPIRATION RATE: 22 BRPM | HEART RATE: 134 BPM | TEMPERATURE: 100 F

## 2020-04-21 DIAGNOSIS — Z96.89 PRESENCE OF OTHER SPECIFIED FUNCTIONAL IMPLANTS: Chronic | ICD-10-CM

## 2020-04-21 DIAGNOSIS — R09.89 OTHER SPECIFIED SYMPTOMS AND SIGNS INVOLVING THE CIRCULATORY AND RESPIRATORY SYSTEMS: ICD-10-CM

## 2020-04-21 DIAGNOSIS — Z98.890 OTHER SPECIFIED POSTPROCEDURAL STATES: Chronic | ICD-10-CM

## 2020-04-21 LAB
ALBUMIN SERPL ELPH-MCNC: 4.4 G/DL — SIGNIFICANT CHANGE UP (ref 3.5–5.2)
ALP SERPL-CCNC: 80 U/L — SIGNIFICANT CHANGE UP (ref 30–115)
ALT FLD-CCNC: 16 U/L — SIGNIFICANT CHANGE UP (ref 0–41)
ANION GAP SERPL CALC-SCNC: 12 MMOL/L — SIGNIFICANT CHANGE UP (ref 7–14)
APTT BLD: 34.3 SEC — SIGNIFICANT CHANGE UP (ref 27–39.2)
AST SERPL-CCNC: 15 U/L — SIGNIFICANT CHANGE UP (ref 0–41)
BASOPHILS # BLD AUTO: 0.06 K/UL — SIGNIFICANT CHANGE UP (ref 0–0.2)
BASOPHILS NFR BLD AUTO: 0.3 % — SIGNIFICANT CHANGE UP (ref 0–1)
BILIRUB SERPL-MCNC: 0.7 MG/DL — SIGNIFICANT CHANGE UP (ref 0.2–1.2)
BUN SERPL-MCNC: 16 MG/DL — SIGNIFICANT CHANGE UP (ref 10–20)
CALCIUM SERPL-MCNC: 9.7 MG/DL — SIGNIFICANT CHANGE UP (ref 8.5–10.1)
CHLORIDE SERPL-SCNC: 97 MMOL/L — LOW (ref 98–110)
CO2 SERPL-SCNC: 28 MMOL/L — SIGNIFICANT CHANGE UP (ref 17–32)
CREAT SERPL-MCNC: 1 MG/DL — SIGNIFICANT CHANGE UP (ref 0.7–1.5)
D DIMER BLD IA.RAPID-MCNC: 368 NG/ML DDU — HIGH (ref 0–230)
EOSINOPHIL # BLD AUTO: 0 K/UL — SIGNIFICANT CHANGE UP (ref 0–0.7)
EOSINOPHIL NFR BLD AUTO: 0 % — SIGNIFICANT CHANGE UP (ref 0–8)
GLUCOSE BLDC GLUCOMTR-MCNC: 91 MG/DL — SIGNIFICANT CHANGE UP (ref 70–99)
GLUCOSE SERPL-MCNC: 126 MG/DL — HIGH (ref 70–99)
HCT VFR BLD CALC: 46.2 % — SIGNIFICANT CHANGE UP (ref 42–52)
HGB BLD-MCNC: 15.1 G/DL — SIGNIFICANT CHANGE UP (ref 14–18)
IMM GRANULOCYTES NFR BLD AUTO: 0.6 % — HIGH (ref 0.1–0.3)
INR BLD: 1.24 RATIO — SIGNIFICANT CHANGE UP (ref 0.65–1.3)
LACTATE SERPL-SCNC: 2 MMOL/L — SIGNIFICANT CHANGE UP (ref 0.7–2)
LDH SERPL L TO P-CCNC: 176 U/L — SIGNIFICANT CHANGE UP (ref 50–242)
LYMPHOCYTES # BLD AUTO: 0.54 K/UL — LOW (ref 1.2–3.4)
LYMPHOCYTES # BLD AUTO: 2.6 % — LOW (ref 20.5–51.1)
MAGNESIUM SERPL-MCNC: 2.1 MG/DL — SIGNIFICANT CHANGE UP (ref 1.8–2.4)
MCHC RBC-ENTMCNC: 27.4 PG — SIGNIFICANT CHANGE UP (ref 27–31)
MCHC RBC-ENTMCNC: 32.7 G/DL — SIGNIFICANT CHANGE UP (ref 32–37)
MCV RBC AUTO: 83.7 FL — SIGNIFICANT CHANGE UP (ref 80–94)
MONOCYTES # BLD AUTO: 0.29 K/UL — SIGNIFICANT CHANGE UP (ref 0.1–0.6)
MONOCYTES NFR BLD AUTO: 1.4 % — LOW (ref 1.7–9.3)
NEUTROPHILS # BLD AUTO: 19.88 K/UL — HIGH (ref 1.4–6.5)
NEUTROPHILS NFR BLD AUTO: 95.1 % — HIGH (ref 42.2–75.2)
NRBC # BLD: 0 /100 WBCS — SIGNIFICANT CHANGE UP (ref 0–0)
NT-PROBNP SERPL-SCNC: 135 PG/ML — SIGNIFICANT CHANGE UP (ref 0–300)
PLATELET # BLD AUTO: 230 K/UL — SIGNIFICANT CHANGE UP (ref 130–400)
POTASSIUM SERPL-MCNC: 3.8 MMOL/L — SIGNIFICANT CHANGE UP (ref 3.5–5)
POTASSIUM SERPL-SCNC: 3.8 MMOL/L — SIGNIFICANT CHANGE UP (ref 3.5–5)
PROT SERPL-MCNC: 7.3 G/DL — SIGNIFICANT CHANGE UP (ref 6–8)
PROTHROM AB SERPL-ACNC: 14.3 SEC — HIGH (ref 9.95–12.87)
RBC # BLD: 5.52 M/UL — SIGNIFICANT CHANGE UP (ref 4.7–6.1)
RBC # FLD: 14.8 % — HIGH (ref 11.5–14.5)
SODIUM SERPL-SCNC: 137 MMOL/L — SIGNIFICANT CHANGE UP (ref 135–146)
TROPONIN T SERPL-MCNC: <0.01 NG/ML — SIGNIFICANT CHANGE UP
WBC # BLD: 20.9 K/UL — HIGH (ref 4.8–10.8)
WBC # FLD AUTO: 20.9 K/UL — HIGH (ref 4.8–10.8)

## 2020-04-21 PROCEDURE — 71045 X-RAY EXAM CHEST 1 VIEW: CPT | Mod: 26

## 2020-04-21 PROCEDURE — 93010 ELECTROCARDIOGRAM REPORT: CPT

## 2020-04-21 PROCEDURE — 71275 CT ANGIOGRAPHY CHEST: CPT | Mod: 26

## 2020-04-21 PROCEDURE — 99291 CRITICAL CARE FIRST HOUR: CPT

## 2020-04-21 RX ORDER — ACETAMINOPHEN 500 MG
650 TABLET ORAL ONCE
Refills: 0 | Status: COMPLETED | OUTPATIENT
Start: 2020-04-21 | End: 2020-04-21

## 2020-04-21 RX ORDER — SODIUM CHLORIDE 9 MG/ML
1000 INJECTION, SOLUTION INTRAVENOUS
Refills: 0 | Status: DISCONTINUED | OUTPATIENT
Start: 2020-04-21 | End: 2020-04-28

## 2020-04-21 RX ORDER — ENOXAPARIN SODIUM 100 MG/ML
40 INJECTION SUBCUTANEOUS AT BEDTIME
Refills: 0 | Status: DISCONTINUED | OUTPATIENT
Start: 2020-04-21 | End: 2020-04-28

## 2020-04-21 RX ORDER — OXYCODONE HYDROCHLORIDE 5 MG/1
10 TABLET ORAL EVERY 4 HOURS
Refills: 0 | Status: DISCONTINUED | OUTPATIENT
Start: 2020-04-21 | End: 2020-04-24

## 2020-04-21 RX ORDER — FUROSEMIDE 40 MG
40 TABLET ORAL DAILY
Refills: 0 | Status: DISCONTINUED | OUTPATIENT
Start: 2020-04-21 | End: 2020-04-23

## 2020-04-21 RX ORDER — VANCOMYCIN HCL 1 G
VIAL (EA) INTRAVENOUS
Refills: 0 | Status: DISCONTINUED | OUTPATIENT
Start: 2020-04-21 | End: 2020-04-22

## 2020-04-21 RX ORDER — ENOXAPARIN SODIUM 100 MG/ML
100 INJECTION SUBCUTANEOUS ONCE
Refills: 0 | Status: COMPLETED | OUTPATIENT
Start: 2020-04-21 | End: 2020-04-21

## 2020-04-21 RX ORDER — METHOCARBAMOL 500 MG/1
750 TABLET, FILM COATED ORAL ONCE
Refills: 0 | Status: COMPLETED | OUTPATIENT
Start: 2020-04-21 | End: 2020-04-21

## 2020-04-21 RX ORDER — DEXTROSE 50 % IN WATER 50 %
15 SYRINGE (ML) INTRAVENOUS ONCE
Refills: 0 | Status: DISCONTINUED | OUTPATIENT
Start: 2020-04-21 | End: 2020-04-28

## 2020-04-21 RX ORDER — MORPHINE SULFATE 50 MG/1
15 CAPSULE, EXTENDED RELEASE ORAL ONCE
Refills: 0 | Status: DISCONTINUED | OUTPATIENT
Start: 2020-04-21 | End: 2020-04-21

## 2020-04-21 RX ORDER — VANCOMYCIN HCL 1 G
1500 VIAL (EA) INTRAVENOUS ONCE
Refills: 0 | Status: COMPLETED | OUTPATIENT
Start: 2020-04-21 | End: 2020-04-21

## 2020-04-21 RX ORDER — INSULIN LISPRO 100/ML
VIAL (ML) SUBCUTANEOUS
Refills: 0 | Status: DISCONTINUED | OUTPATIENT
Start: 2020-04-21 | End: 2020-04-27

## 2020-04-21 RX ORDER — METHOCARBAMOL 500 MG/1
750 TABLET, FILM COATED ORAL
Refills: 0 | Status: DISCONTINUED | OUTPATIENT
Start: 2020-04-22 | End: 2020-04-24

## 2020-04-21 RX ORDER — CEFEPIME 1 G/1
1000 INJECTION, POWDER, FOR SOLUTION INTRAMUSCULAR; INTRAVENOUS ONCE
Refills: 0 | Status: COMPLETED | OUTPATIENT
Start: 2020-04-21 | End: 2020-04-21

## 2020-04-21 RX ORDER — CHLORHEXIDINE GLUCONATE 213 G/1000ML
1 SOLUTION TOPICAL
Refills: 0 | Status: DISCONTINUED | OUTPATIENT
Start: 2020-04-21 | End: 2020-04-28

## 2020-04-21 RX ORDER — DEXTROSE 50 % IN WATER 50 %
12.5 SYRINGE (ML) INTRAVENOUS ONCE
Refills: 0 | Status: DISCONTINUED | OUTPATIENT
Start: 2020-04-21 | End: 2020-04-28

## 2020-04-21 RX ORDER — POLYETHYLENE GLYCOL 3350 17 G/17G
17 POWDER, FOR SOLUTION ORAL DAILY
Refills: 0 | Status: DISCONTINUED | OUTPATIENT
Start: 2020-04-21 | End: 2020-04-28

## 2020-04-21 RX ORDER — SENNA PLUS 8.6 MG/1
1 TABLET ORAL AT BEDTIME
Refills: 0 | Status: DISCONTINUED | OUTPATIENT
Start: 2020-04-21 | End: 2020-04-28

## 2020-04-21 RX ORDER — DIPHENHYDRAMINE HCL 50 MG
50 CAPSULE ORAL ONCE
Refills: 0 | Status: COMPLETED | OUTPATIENT
Start: 2020-04-21 | End: 2020-04-21

## 2020-04-21 RX ORDER — INSULIN GLARGINE 100 [IU]/ML
15 INJECTION, SOLUTION SUBCUTANEOUS AT BEDTIME
Refills: 0 | Status: DISCONTINUED | OUTPATIENT
Start: 2020-04-21 | End: 2020-04-23

## 2020-04-21 RX ORDER — DEXTROSE 50 % IN WATER 50 %
25 SYRINGE (ML) INTRAVENOUS ONCE
Refills: 0 | Status: DISCONTINUED | OUTPATIENT
Start: 2020-04-21 | End: 2020-04-28

## 2020-04-21 RX ORDER — VANCOMYCIN HCL 1 G
1250 VIAL (EA) INTRAVENOUS ONCE
Refills: 0 | Status: COMPLETED | OUTPATIENT
Start: 2020-04-21 | End: 2020-04-21

## 2020-04-21 RX ORDER — MORPHINE SULFATE 50 MG/1
4 CAPSULE, EXTENDED RELEASE ORAL ONCE
Refills: 0 | Status: DISCONTINUED | OUTPATIENT
Start: 2020-04-21 | End: 2020-04-21

## 2020-04-21 RX ORDER — MORPHINE SULFATE 50 MG/1
0 CAPSULE, EXTENDED RELEASE ORAL
Qty: 0 | Refills: 0 | DISCHARGE

## 2020-04-21 RX ORDER — MORPHINE SULFATE 50 MG/1
15 CAPSULE, EXTENDED RELEASE ORAL
Refills: 0 | Status: DISCONTINUED | OUTPATIENT
Start: 2020-04-21 | End: 2020-04-21

## 2020-04-21 RX ORDER — OXYCODONE HYDROCHLORIDE 5 MG/1
15 TABLET ORAL EVERY 12 HOURS
Refills: 0 | Status: DISCONTINUED | OUTPATIENT
Start: 2020-04-21 | End: 2020-04-22

## 2020-04-21 RX ORDER — LOSARTAN POTASSIUM 100 MG/1
1 TABLET, FILM COATED ORAL
Qty: 0 | Refills: 0 | DISCHARGE

## 2020-04-21 RX ORDER — VANCOMYCIN HCL 1 G
1250 VIAL (EA) INTRAVENOUS EVERY 12 HOURS
Refills: 0 | Status: DISCONTINUED | OUTPATIENT
Start: 2020-04-22 | End: 2020-04-22

## 2020-04-21 RX ORDER — METRONIDAZOLE 500 MG
500 TABLET ORAL EVERY 8 HOURS
Refills: 0 | Status: DISCONTINUED | OUTPATIENT
Start: 2020-04-21 | End: 2020-04-22

## 2020-04-21 RX ORDER — GLUCAGON INJECTION, SOLUTION 0.5 MG/.1ML
1 INJECTION, SOLUTION SUBCUTANEOUS ONCE
Refills: 0 | Status: DISCONTINUED | OUTPATIENT
Start: 2020-04-21 | End: 2020-04-28

## 2020-04-21 RX ORDER — OXYCODONE HYDROCHLORIDE 5 MG/1
15 TABLET ORAL EVERY 12 HOURS
Refills: 0 | Status: DISCONTINUED | OUTPATIENT
Start: 2020-04-21 | End: 2020-04-21

## 2020-04-21 RX ORDER — MORPHINE SULFATE 50 MG/1
3 CAPSULE, EXTENDED RELEASE ORAL ONCE
Refills: 0 | Status: DISCONTINUED | OUTPATIENT
Start: 2020-04-21 | End: 2020-04-21

## 2020-04-21 RX ORDER — CEFEPIME 1 G/1
1000 INJECTION, POWDER, FOR SOLUTION INTRAMUSCULAR; INTRAVENOUS EVERY 8 HOURS
Refills: 0 | Status: DISCONTINUED | OUTPATIENT
Start: 2020-04-22 | End: 2020-04-22

## 2020-04-21 RX ORDER — CEFEPIME 1 G/1
INJECTION, POWDER, FOR SOLUTION INTRAMUSCULAR; INTRAVENOUS
Refills: 0 | Status: DISCONTINUED | OUTPATIENT
Start: 2020-04-21 | End: 2020-04-22

## 2020-04-21 RX ORDER — CEFEPIME 1 G/1
2000 INJECTION, POWDER, FOR SOLUTION INTRAMUSCULAR; INTRAVENOUS ONCE
Refills: 0 | Status: COMPLETED | OUTPATIENT
Start: 2020-04-21 | End: 2020-04-21

## 2020-04-21 RX ADMIN — ENOXAPARIN SODIUM 100 MILLIGRAM(S): 100 INJECTION SUBCUTANEOUS at 17:43

## 2020-04-21 RX ADMIN — Medication 166.67 MILLIGRAM(S): at 22:16

## 2020-04-21 RX ADMIN — Medication 650 MILLIGRAM(S): at 16:23

## 2020-04-21 RX ADMIN — CEFEPIME 100 MILLIGRAM(S): 1 INJECTION, POWDER, FOR SOLUTION INTRAMUSCULAR; INTRAVENOUS at 21:47

## 2020-04-21 RX ADMIN — Medication 50 MILLIGRAM(S): at 17:34

## 2020-04-21 RX ADMIN — Medication 100 MILLIGRAM(S): at 21:47

## 2020-04-21 RX ADMIN — OXYCODONE HYDROCHLORIDE 10 MILLIGRAM(S): 5 TABLET ORAL at 20:40

## 2020-04-21 RX ADMIN — SENNA PLUS 1 TABLET(S): 8.6 TABLET ORAL at 21:47

## 2020-04-21 RX ADMIN — Medication 300 MILLIGRAM(S): at 16:49

## 2020-04-21 RX ADMIN — MORPHINE SULFATE 4 MILLIGRAM(S): 50 CAPSULE, EXTENDED RELEASE ORAL at 17:44

## 2020-04-21 RX ADMIN — CEFEPIME 100 MILLIGRAM(S): 1 INJECTION, POWDER, FOR SOLUTION INTRAMUSCULAR; INTRAVENOUS at 16:14

## 2020-04-21 RX ADMIN — CEFEPIME 2000 MILLIGRAM(S): 1 INJECTION, POWDER, FOR SOLUTION INTRAMUSCULAR; INTRAVENOUS at 16:49

## 2020-04-21 NOTE — H&P ADULT - NSICDXPASTSURGICALHX_GEN_ALL_CORE_FT
PAST SURGICAL HISTORY:  H/O arthroscopy of right knee     History of excision of pilonidal cyst     Spinal cord stimulator status

## 2020-04-21 NOTE — H&P ADULT - HISTORY OF PRESENT ILLNESS
54 year-old male history of RUSSELL, HTN, intervertebral disc disease, status post placement of spinal cord stimulator in july 2017 subsequently developed several episodes of infection at site of the stimulator and removal of spinal stimulator and discharged on intravenous antibiotics via a PICC line presents to the ED with complains of worsening right sided leg pain. As per the patient he had injured his right calf when getting off the bed and developed non healing wound couple of months ago. The wife has been helping him changing his dressing on both legs for lower extremity stasis ulcers for both legs. He follows podiatry at [facility]. He says the pain in his left leg is getting worse to the point that he is unable to ambulate. He has chronic back pain that is also getting worse mostly because he has been laying in bed. He recognizes that his swelling in his right leg specially has gotten worse even though he has been taking his water pill. He noted clear discharge with foul smell from both of his lower extremity wounds. The pain is severe in his right calf, upper medial thigh specially when he extends and flexes the led. He noted low grade fevers at home, worsening shortness of breath.   He denies any recent contact with sick people, has been taking precautions for COVID and had limited his exposure to outside.

## 2020-04-21 NOTE — H&P ADULT - NSICDXFAMILYHX_GEN_ALL_CORE_FT
FAMILY HISTORY:  Father  Still living? Unknown  Family history of early CAD, Age at diagnosis: Age Unknown    Mother  Still living? Unknown  Family history of diabetes mellitus in mother, Age at diagnosis: Age Unknown

## 2020-04-21 NOTE — H&P ADULT - NSHPPHYSICALEXAM_GEN_ALL_CORE
GENERAL: In distress due to pain, Alert oriented X4, Morbidly obese  HEENT: On nasal Canula  CHEST: Mild atelectasis bilaterally  HEART: Normal S1 and S2  ABDOMEN: Distended, Nontender  EXTREMITIES: Right lower extremity wrapped in gauzes, tender to touch in right medial thigh and right calf. open stasis ulcers noted bilaterally. +3 edema bilaterally. Right leg red and inflamed as compared to left.

## 2020-04-21 NOTE — ED PROVIDER NOTE - CLINICAL SUMMARY MEDICAL DECISION MAKING FREE TEXT BOX
pw leg pain and swelling, tachycardia, shortness of breath. Severe sepsis. Assessed for COVID. Started on anticoagulation for possible associated DVT. Cellulitis. Abx started. CT chest PE protocol negative. Patient to be admitted. Case discussed with and care endorsed to medical admitting resident. Admitting physician notified.

## 2020-04-21 NOTE — ED PROVIDER NOTE - OBJECTIVE STATEMENT
54 y.o. M with PMH of DM HTN chronic low back pain with shortness of breath, fever and right sided leg pain since this morning. Pt noted that last week the patient had a cut that has since healed. He denies any recent sick contacts no travel no urinary symptoms. Pt denies a history of DVTs or PEs.

## 2020-04-21 NOTE — ED PROVIDER NOTE - IMAGING STUDIES ADDITIONAL INFORMATION FREE TEXT
CXR: Lung fields b/l clear, excessive soft tissue obscuring lung markings, cardiac and mediastinal structure silhouettes nl, bones wnl.

## 2020-04-21 NOTE — H&P ADULT - ASSESSMENT
# Right lower extremity Diabetic non healing ulcer vs Stasis Ulcer  -Rule out Clot. VA duplex ordered. Follow up with results. If positive for DVT, put patient on therapeutic anticoagulation.   -More likely worsening chronic stasis ulcer with superimposed infection.   -Will keep on IV lasix for now to reduce fluid. Low suspicion for fascitis for now.   -Initiate IV antibiotics, collect Blood cultures, use tylenol PRN for fever.   -Podiatry consult. If necessary Vascular surgery consult. # Right lower extremity Diabetic non healing ulcer vs Stasis Ulcer  -Rule out Clot. VA duplex ordered. Follow up with results. If positive for DVT, put patient on therapeutic anticoagulation.   -More likely worsening chronic stasis ulcer with superimposed infection.   -Will keep on IV lasix for now to reduce fluid. Low suspicion for fascitis for now.   -Initiate IV antibiotics, collect Blood cultures, use tylenol PRN for fever.   -Podiatry consult. If necessary Vascular surgery consult.     # SOB, mild fevers- Likely due to morbid obesity  -Obesity hyperventilation syndrome vs Sleep apnea.  -uses CPAP at home. Follow Dr. Eastman outpatient  -Follow up with COVID PCR. Low suspicion.   -CT agio negative for PE, no ground glass opacities.     # Diabetes  -Will keep on Insulin Regimen     # History of HTN  -patient not taking anti hypertensives  re-evaluate if elevated , start on losartan    # DVT prophylaxis  -On lovenox

## 2020-04-21 NOTE — H&P ADULT - NSICDXPASTMEDICALHX_GEN_ALL_CORE_FT
PAST MEDICAL HISTORY:  Disc disease, degenerative, lumbar or lumbosacral     DM (diabetes mellitus)     Hypertension     Morbid obesity     Obstructive sleep apnea

## 2020-04-21 NOTE — ED PROVIDER NOTE - ATTENDING CONTRIBUTION TO CARE
54 y M PMH DM, HTN, morbid obesity, chronic low back pain s/p surgical placement of spinal stimulators complicated by infection x 2 now controlled with pain medications, RUSSELL chronic lower ext wounds right worse than left, pw worsening right LE swelling and pain x1 day, increasing SOB since then. + fever. No known COVID+ contacts.  Exam: acutely uncomfortable appearing, NCAT, HEENT: mmm, EOMI, PERRLA, Neck: supple, nontender, nl ROM, Heart: tachy RR, no murmur, Lungs: Bl decreased breath sounds, tachypnea, no pursed lip breathing, retractions, or tripodding, Abd: obese, NTND, no guarding or rebound, no hernia palpated, no CVAT. MSK: b/l LE edema, right > left, with erythema and warmth extending up through mid thigh, chest, back, and ext otherwise nontender, nl rom, no deformity. Neuro: A&Ox3, normal strength, nl sensation throughout, normal speech.  A/P: Concern for lower ext DVT with possible PE vs, In the context of public health outbreak, COVID-19 associated respiratory complications concerning for decompensation. Also possible is right lower ext acute worsening of cellulitis. Abx, fluids, labs, imaging, monitor in cc, reassess.

## 2020-04-21 NOTE — H&P ADULT - ATTENDING COMMENTS
I saw and evaluated the patient. I have reviewed and agree with the findings and plan of care as documented above in the resident’s note (unless indicated differently below). Any necessary changes were made in the body of the text.    55 yo M pt w/ hx of obesity, vertebral disc disease (hx of spinal cord stimulator placement complicated by device infection necessitating device removal, a prolonged course of antibiotics and multiple hospitalizations), chronic back pain and diabetic foot ulcers. P/w RLE pain. Onset few months ago. Course: persistent and progressive w/ sudden worsening on the day of presentation. Severity 10/10. Alleviated only partially by analgesics (he is on oxycodone 10 mg q6hrly at home with morphine sulfate 15 mg bid as well as naproxen/methocarbamol – says that this is the only regimen that has helped him and it gets the pain down to 5-7/10). Exacerbated by movement and exertion. No recent sick contacts. No travel history. Endorses fevers (up to 100). Denies SOB and coughing. No chest pain.     ROS:  Constitutional: +fevers; no chills; +generalized weakness  Eyes: no conjunctivitis; no itching  ENT: no dysphagia; no odynophagia  CVS: +LE swelling; no PND  Resp: no SOB; no coughing  GI: no nausea; no vomiting; no diarrhea; +anorexia; +abd pain (occasionally radiating from back)  : no dysuria; no hematuria  MSK: +back pain; +LE pain  Neuro: +headache (frontal b/l throbbing); no focal weakness  Skin: no skin rashes  All other systems reviewed and are negative    PMHx, PSurHx, FHx, home meds and Social hx as documented above in the appropriate sections of the note    Exam:  Vitals: BP = 145/73; P = 98; T = 97; RR = 20; SpO2 100 on 2 L/min via NC  General: in bed sitting upright; uncomfortable 2/2 pain; obese male pt  Eyes: anicteric sclerae; moist conjunctiva  HENT: AT/NC; clear oropharynx w/ moist mucous membranes  Neck: supple; trachea midline  Lungs: lungs cta b/l w/ no wheezing, rales or rhonci; not tachypneic; no accessory muscle use  CVS: RRR; S1 and S2 w/o MRG’s  Abd: BS+; distended but soft; non tender to palpation; no masses or HSM  Extremities: RLE w/ greater calf diameter than left; peripheral pulses 2+ b/l  Psych: appropriate affect; alert and oriented to person, place, time and situation  Skin ulcers on b/l LE’s; with some drainage – serous and malodor; tender; macerated skin    Labs significant for WBC 20.9, d-dimer 368, PT 14.3, , procal 7; CRP 20.78  CT: no PE; lungs unremarkable  CXR: atelectasis  EKG: sinus tach; qTC 435; 1st degree AV block    Assessment:  (1) Infection of foot ulcer (suspect that it is a venous stasis ulcer although the patient is also diabetic)  (2) Chronic back pain 2/2 degenerative disc disease  (3) RLE swelling w/ an increase in calf diameter – r/o DVT  (4) Morbid obesity – counseled (however, pt has limited mobility 2/2 lower extremity pain/discomfort, back pain and body habitus)  (5) Atelectasis at lung bases; doubt (but r/o SARS-CoV-2 infection)  (6) DM w/ hyperglycemia  (7) HTN – pt on losartan at home (controlled)    Plan:  (1) F/u blood cultures; send MRSA swab  (2) Trend procalcitonin daily (can consider stopping abx if significant reduction in procal level)  (3) Agree with broad spectrum antibiotics at this time  (4) Podiatry and ID evaluation   (5) Ensure adequate analgesia   ---- I gave the patient a dose of IV morphine 3 mg x 1  ---- Dosed long acting oxycodone at 15 mg ER bid  ---- Short acting oxycodone at 10 mg q4hrly (titrate dose to control pain)  ---- Added methacarbamol 750 mg x47nbpo (gave stat dose)  ---- Can use toradol PRN  ---- Pt takes both oxycodone and morphine at home (controlling his pain may be complicated given his chronic pain hx (can consider pain management evaluation and assistance if necessary).  (6) DVT study  (7) DVT ppx  (8) Incentive spirometer  (9) Low likelihood of SARS-CoV-2 infection (lungs normal on chest CT on my eval)  ---- However f/u covid-19 PCR and isolation precautions until ruled out  (10) BG insulin coverage to maintain level < 180 mg/dL  (11) Hypoglycemia protocol PRN  (12) Medications as dosed  (13) CPAP (f/u with respiratory once covid-19 ruled out)    Code status: full code I saw and evaluated the patient. I have reviewed and agree with the findings and plan of care as documented above in the resident’s note (unless indicated differently below). Any necessary changes were made in the body of the text.    53 yo M pt w/ hx of obesity, vertebral disc disease (hx of spinal cord stimulator placement complicated by device infection necessitating device removal, a prolonged course of antibiotics and multiple hospitalizations), chronic back pain and diabetic foot ulcers. P/w RLE pain. Onset few months ago. Course: persistent and progressive w/ sudden worsening on the day of presentation. Severity 10/10. Alleviated only partially by analgesics (he is on oxycodone 10 mg q6hrly at home with morphine sulfate 15 mg bid as well as naproxen/methocarbamol – says that this is the only regimen that has helped him and it gets the pain down to 5-7/10). Exacerbated by movement and exertion. No recent sick contacts. No travel history. Endorses fevers (up to 100). Denies SOB and coughing. No chest pain.     ROS:  Constitutional: +fevers; no chills; +generalized weakness  Eyes: no conjunctivitis; no itching  ENT: no dysphagia; no odynophagia  CVS: +LE swelling; no PND  Resp: no SOB; no coughing  GI: no nausea; no vomiting; no diarrhea; +anorexia; +abd pain (occasionally radiating from back)  : no dysuria; no hematuria  MSK: +back pain; +LE pain  Neuro: +headache (frontal b/l throbbing); no focal weakness  Skin: no skin rashes  All other systems reviewed and are negative    PMHx, PSurHx, FHx, home meds and Social hx as documented above in the appropriate sections of the note    Exam:  Vitals: BP = 145/73; P = 98; T = 97; RR = 20; SpO2 100 on 2 L/min via NC  General: in bed sitting upright; uncomfortable 2/2 pain; obese male pt  Eyes: anicteric sclerae; moist conjunctiva  HENT: AT/NC; clear oropharynx w/ moist mucous membranes  Neck: supple; trachea midline  Lungs: lungs cta b/l w/ no wheezing, rales or rhonci; not tachypneic; no accessory muscle use  CVS: RRR; S1 and S2 w/o MRG’s  Abd: BS+; distended but soft; non tender to palpation; no masses or HSM  Extremities: RLE w/ greater calf diameter than left; peripheral pulses 2+ b/l  Psych: appropriate affect; alert and oriented to person, place, time and situation  Skin ulcers on b/l LE’s; with some drainage – serous and malodor; tender; macerated skin; some superficial inflammation    Labs significant for WBC 20.9, d-dimer 368, PT 14.3, , procal 7; CRP 20.78  CT: no PE; lungs unremarkable  CXR: atelectasis  EKG: sinus tach; qTC 435; 1st degree AV block    Assessment:  (1) Infection of venous stasis associated foot ulcer / cellulitis  (2) Chronic back pain 2/2 degenerative disc disease  (3) RLE swelling w/ an increase in calf diameter – r/o DVT  (4) Morbid obesity – counseled (however, pt has limited mobility 2/2 lower extremity pain/discomfort, back pain and body habitus)  (5) Atelectasis at lung bases; doubt (but r/o SARS-CoV-2 infection)  (6) DM w/ hyperglycemia  (7) HTN – pt on losartan at home (controlled)    Plan:  (1) F/u blood cultures; send MRSA swab  (2) Trend procalcitonin daily (can consider stopping abx if significant reduction in procal level)  (3) Agree with broad spectrum antibiotics at this time  (4) Podiatry and ID evaluation   (5) Ensure adequate analgesia   ---- I gave the patient a dose of IV morphine 3 mg x 1  ---- Dosed long acting oxycodone at 15 mg ER bid  ---- Short acting oxycodone at 10 mg q4hrly (titrate dose to control pain)  ---- Added methacarbamol 750 mg [id number] (gave stat dose)  ---- Can use toradol PRN  ---- Pt takes both oxycodone and morphine at home (controlling his pain may be complicated given his chronic pain hx (can consider pain management evaluation and assistance if necessary).  (6) DVT study  (7) DVT ppx  (8) Incentive spirometer  (9) Low likelihood of SARS-CoV-2 infection (lungs normal on chest CT on my eval)  ---- However f/u covid-19 PCR and isolation precautions until ruled out  (10) BG insulin coverage to maintain level < 180 mg/dL  (11) Hypoglycemia protocol PRN  (12) Medications as dosed  (13) CPAP (f/u with respiratory once covid-19 ruled out)    Code status: full code

## 2020-04-21 NOTE — ED PROVIDER NOTE - PMH
Disc disease, degenerative, lumbar or lumbosacral    DM (diabetes mellitus)    Hypertension    Morbid obesity    Obstructive sleep apnea

## 2020-04-21 NOTE — ED PROVIDER NOTE - NS ED ROS FT
Constitutional:  See HPI.   Eyes:  No visual changes, eye pain or discharge.  ENMT:  No hearing changes, pain, discharge or infections. No neck pain or stiffness.  Cardiac:  No chest pain, +SOB without edema. No chest pain with exertion.  Respiratory:  No cough or respiratory distress. No hemoptysis.  GI:  No nausea, vomiting, diarrhea, abdominal pain.  :  No dysuria, frequency, hematuria  MS:  + left sided leg swelling, laceration, No joint pain or back pain.  Neuro:  No LOC. No headache or weakness.    Skin:  No skin rash.  Except as in HPI, all other review of systems is negative

## 2020-04-21 NOTE — H&P ADULT - NSHPLABSRESULTS_GEN_ALL_CORE
Complete Blood Count + Automated Diff (04.21.20 @ 15:30)    WBC Count: 20.90 K/uL    RBC Count: 5.52 M/uL    Hemoglobin: 15.1 g/dL    Hematocrit: 46.2 %    Mean Cell Volume: 83.7 fL    Mean Cell Hemoglobin: 27.4 pg    Mean Cell Hemoglobin Conc: 32.7 g/dL    Red Cell Distrib Width: 14.8 %    Platelet Count - Automated: 230 K/uL    Auto Neutrophil #: 19.88 K/uL    Auto Lymphocyte #: 0.54 K/uL    Auto Monocyte #: 0.29 K/uL    Auto Eosinophil #: 0.00 K/uL    Auto Basophil #: 0.06 K/uL    Auto Neutrophil %: 95.1: Differential percentages must be correlated with absolute numbers for  clinical significance. %    Auto Lymphocyte %: 2.6 %    Auto Monocyte %: 1.4 %    Auto Eosinophil %: 0.0 %    Auto Basophil %: 0.3 %    Auto Immature Granulocyte %: 0.6 %    Nucleated RBC: 0 /100 WBCs  Comprehensive Metabolic Panel (04.21.20 @ 15:30)    Sodium, Serum: 137 mmol/L    Potassium, Serum: 3.8 mmol/L    Chloride, Serum: 97 mmol/L    Carbon Dioxide, Serum: 28 mmol/L    Anion Gap, Serum: 12 mmol/L    Blood Urea Nitrogen, Serum: 16 mg/dL    Creatinine, Serum: 1.0 mg/dL    Glucose, Serum: 126 mg/dL    Calcium, Total Serum: 9.7 mg/dL    Protein Total, Serum: 7.3 g/dL    Albumin, Serum: 4.4 g/dL    Bilirubin Total, Serum: 0.7 mg/dL    Alkaline Phosphatase, Serum: 80 U/L    Aspartate Aminotransferase (AST/SGOT): 15 U/L    Alanine Aminotransferase (ALT/SGPT): 16 U/L    eGFR if Non : 86: Interpretative comment  The units for eGFR are mL/min/1.73M2 (normalized body surface area). The  eGFR is calculated from a serum creatinine using the CKD-EPI equation.  Other variables required for calculation are race, age and sex. Among  patients with chronic kidney disease (CKD), the eGFR is useful in  determining the stage of disease according to KDOQI CKD classification.  All eGFR results are reported numerically with the following  interpretation.          GFR                    With                 Without     (ml/min/1.73 m2)    Kidney Damage       Kidney Damage        >= 90                    Stage 1                     Normal        60-89                    Stage 2                     Decreased GFR        30-59     Stage 3                     Stage 3        15-29                    Stage 4                     Stage 4        < 15                      Stage 5                     Stage 5  Each stage of CKD assumes that the associated GFR level has been in  effect for at least 3 months. Determination of stages one and two (with  eGFR > 59 ml/min/m2) requires estimation of kidney damage for at least 3  months as defined by structural or functional abnormalities.  Limitations: All estimates of GFR will be less accurate for patients at  extremes of muscle mass (including but not limited to frail elderly,  critically ill, or cancer patients), those with unusual diets, and those  with conditions associated with reduced secretion or extrarenal  elimination of creatinine. The eGFR equation is not recommended for use  in patients with unstable creatinine levels. mL/min/1.73M2    eGFR if African American: 100 mL/min/1.73M2  < from: CT Angio Chest w/ IV Cont (04.21.20 @ 17:28) >    IMPRESSION:    No definite evidence of a pulmonary embolism.    < end of copied text >

## 2020-04-21 NOTE — ED ADULT TRIAGE NOTE - CHIEF COMPLAINT QUOTE
right leg pain that started last night, no known contact with positive covid, sent by pmd for r/o dvt

## 2020-04-21 NOTE — ED PROVIDER NOTE - PHYSICAL EXAMINATION
CONSTITUTIONAL: Well-developed; well-nourished; in no acute distress.   SKIN: warm, dry  HEAD: Normocephalic; atraumatic.  EYES: PERRL, EOMI, no conjunctival erythema  ENT: No nasal discharge; airway clear.  NECK: Supple; non tender.  CARD: S1, S2 normal; no murmurs, gallops, or rubs. Regular rate and rhythm.   RESP: + tachypneic, No wheezes, rales or rhonchi.  ABD: soft ntnd  EXT: + left sided leg swelling, tenderness, >right leg, + laceration of the left leg, redness, swelling, Normal ROM.  No clubbing, cyanosis or edema.   LYMPH: No acute cervical adenopathy.  NEURO: Alert, oriented, grossly unremarkable  PSYCH: Cooperative, appropriate.

## 2020-04-21 NOTE — ED ADULT TRIAGE NOTE - RESPIRATORY RATE (BREATHS/MIN)
Assessment  DMT2: 62y Male with DM T2 with hyperglycemia, on basal bolus insulin, , c/o foot pain, blood sugars in good range   121-123 today, on lantus 22 units and lisipro 4 units pre-brakfast  , no new hypoglycemic episode, eating meals.  Foot wound: On wound care, meds, stable.  CAD: on medications, no chest pain, stable, monitored.  HTN: Controlled,  on antihypertensive medications.  CKD: Monitor labs/BMP,     conner Burden  Cell: [phone] 22

## 2020-04-22 LAB
ALBUMIN SERPL ELPH-MCNC: 3.8 G/DL — SIGNIFICANT CHANGE UP (ref 3.5–5.2)
ALP SERPL-CCNC: 77 U/L — SIGNIFICANT CHANGE UP (ref 30–115)
ALT FLD-CCNC: 15 U/L — SIGNIFICANT CHANGE UP (ref 0–41)
ANION GAP SERPL CALC-SCNC: 17 MMOL/L — HIGH (ref 7–14)
AST SERPL-CCNC: 22 U/L — SIGNIFICANT CHANGE UP (ref 0–41)
BILIRUB SERPL-MCNC: 0.6 MG/DL — SIGNIFICANT CHANGE UP (ref 0.2–1.2)
BUN SERPL-MCNC: 11 MG/DL — SIGNIFICANT CHANGE UP (ref 10–20)
CALCIUM SERPL-MCNC: 9.1 MG/DL — SIGNIFICANT CHANGE UP (ref 8.5–10.1)
CHLORIDE SERPL-SCNC: 99 MMOL/L — SIGNIFICANT CHANGE UP (ref 98–110)
CO2 SERPL-SCNC: 22 MMOL/L — SIGNIFICANT CHANGE UP (ref 17–32)
CREAT SERPL-MCNC: 0.9 MG/DL — SIGNIFICANT CHANGE UP (ref 0.7–1.5)
CRP SERPL-MCNC: 20.78 MG/DL — HIGH (ref 0–0.4)
CRP SERPL-MCNC: 33.52 MG/DL — HIGH (ref 0–0.4)
FERRITIN SERPL-MCNC: 296 NG/ML — SIGNIFICANT CHANGE UP (ref 30–400)
GLUCOSE BLDC GLUCOMTR-MCNC: 122 MG/DL — HIGH (ref 70–99)
GLUCOSE BLDC GLUCOMTR-MCNC: 93 MG/DL — SIGNIFICANT CHANGE UP (ref 70–99)
GLUCOSE SERPL-MCNC: 106 MG/DL — HIGH (ref 70–99)
POTASSIUM SERPL-MCNC: 4.5 MMOL/L — SIGNIFICANT CHANGE UP (ref 3.5–5)
POTASSIUM SERPL-SCNC: 4.5 MMOL/L — SIGNIFICANT CHANGE UP (ref 3.5–5)
PROCALCITONIN SERPL-MCNC: 7.05 NG/ML — HIGH (ref 0.02–0.1)
PROT SERPL-MCNC: 6.9 G/DL — SIGNIFICANT CHANGE UP (ref 6–8)
SARS-COV-2 RNA SPEC QL NAA+PROBE: SIGNIFICANT CHANGE UP
SODIUM SERPL-SCNC: 138 MMOL/L — SIGNIFICANT CHANGE UP (ref 135–146)

## 2020-04-22 PROCEDURE — 99223 1ST HOSP IP/OBS HIGH 75: CPT

## 2020-04-22 PROCEDURE — 93010 ELECTROCARDIOGRAM REPORT: CPT

## 2020-04-22 PROCEDURE — 93970 EXTREMITY STUDY: CPT | Mod: 26

## 2020-04-22 PROCEDURE — 99222 1ST HOSP IP/OBS MODERATE 55: CPT

## 2020-04-22 RX ORDER — MORPHINE SULFATE 50 MG/1
3 CAPSULE, EXTENDED RELEASE ORAL EVERY 4 HOURS
Refills: 0 | Status: DISCONTINUED | OUTPATIENT
Start: 2020-04-22 | End: 2020-04-24

## 2020-04-22 RX ORDER — CEFAZOLIN SODIUM 1 G
2000 VIAL (EA) INJECTION EVERY 6 HOURS
Refills: 0 | Status: DISCONTINUED | OUTPATIENT
Start: 2020-04-22 | End: 2020-04-24

## 2020-04-22 RX ORDER — COLLAGENASE CLOSTRIDIUM HIST. 250 UNIT/G
1 OINTMENT (GRAM) TOPICAL
Refills: 0 | Status: DISCONTINUED | OUTPATIENT
Start: 2020-04-22 | End: 2020-04-23

## 2020-04-22 RX ORDER — OXYCODONE HYDROCHLORIDE 5 MG/1
10 TABLET ORAL EVERY 12 HOURS
Refills: 0 | Status: DISCONTINUED | OUTPATIENT
Start: 2020-04-22 | End: 2020-04-27

## 2020-04-22 RX ORDER — ACETAMINOPHEN 500 MG
650 TABLET ORAL ONCE
Refills: 0 | Status: COMPLETED | OUTPATIENT
Start: 2020-04-22 | End: 2020-04-22

## 2020-04-22 RX ORDER — LOSARTAN POTASSIUM 100 MG/1
100 TABLET, FILM COATED ORAL DAILY
Refills: 0 | Status: DISCONTINUED | OUTPATIENT
Start: 2020-04-22 | End: 2020-04-28

## 2020-04-22 RX ADMIN — MORPHINE SULFATE 3 MILLIGRAM(S): 50 CAPSULE, EXTENDED RELEASE ORAL at 12:52

## 2020-04-22 RX ADMIN — ENOXAPARIN SODIUM 40 MILLIGRAM(S): 100 INJECTION SUBCUTANEOUS at 21:20

## 2020-04-22 RX ADMIN — Medication 100 MILLIGRAM(S): at 17:38

## 2020-04-22 RX ADMIN — Medication 1 APPLICATION(S): at 04:50

## 2020-04-22 RX ADMIN — METHOCARBAMOL 750 MILLIGRAM(S): 500 TABLET, FILM COATED ORAL at 01:45

## 2020-04-22 RX ADMIN — OXYCODONE HYDROCHLORIDE 10 MILLIGRAM(S): 5 TABLET ORAL at 16:23

## 2020-04-22 RX ADMIN — SENNA PLUS 1 TABLET(S): 8.6 TABLET ORAL at 21:21

## 2020-04-22 RX ADMIN — POLYETHYLENE GLYCOL 3350 17 GRAM(S): 17 POWDER, FOR SOLUTION ORAL at 11:46

## 2020-04-22 RX ADMIN — Medication 166.67 MILLIGRAM(S): at 04:40

## 2020-04-22 RX ADMIN — OXYCODONE HYDROCHLORIDE 10 MILLIGRAM(S): 5 TABLET ORAL at 10:44

## 2020-04-22 RX ADMIN — LOSARTAN POTASSIUM 100 MILLIGRAM(S): 100 TABLET, FILM COATED ORAL at 09:49

## 2020-04-22 RX ADMIN — OXYCODONE HYDROCHLORIDE 10 MILLIGRAM(S): 5 TABLET ORAL at 21:22

## 2020-04-22 RX ADMIN — METHOCARBAMOL 750 MILLIGRAM(S): 500 TABLET, FILM COATED ORAL at 09:52

## 2020-04-22 RX ADMIN — Medication 100 MILLIGRAM(S): at 04:39

## 2020-04-22 RX ADMIN — Medication 40 MILLIGRAM(S): at 04:49

## 2020-04-22 RX ADMIN — Medication 650 MILLIGRAM(S): at 20:55

## 2020-04-22 RX ADMIN — METHOCARBAMOL 750 MILLIGRAM(S): 500 TABLET, FILM COATED ORAL at 16:26

## 2020-04-22 RX ADMIN — Medication 650 MILLIGRAM(S): at 04:38

## 2020-04-22 RX ADMIN — MORPHINE SULFATE 3 MILLIGRAM(S): 50 CAPSULE, EXTENDED RELEASE ORAL at 01:45

## 2020-04-22 RX ADMIN — CEFEPIME 100 MILLIGRAM(S): 1 INJECTION, POWDER, FOR SOLUTION INTRAMUSCULAR; INTRAVENOUS at 04:40

## 2020-04-22 RX ADMIN — Medication 100 MILLIGRAM(S): at 11:46

## 2020-04-22 RX ADMIN — CHLORHEXIDINE GLUCONATE 1 APPLICATION(S): 213 SOLUTION TOPICAL at 06:15

## 2020-04-22 NOTE — CONSULT NOTE ADULT - SUBJECTIVE AND OBJECTIVE BOX
PODIATRY CONSULT   JOSE ALBERTO NO is a pleasant well-nourished, well developed 54y Male in no acute distress, alert awake, and oriented to person, place and time.   Patient is a 54y old  Male who presents with a chief complaint of   HPI:  54 year-old male history of RUSSELL, HTN, intervertebral disc disease, status post placement of spinal cord stimulator in july 2017 subsequently developed several episodes of infection at site of the stimulator and removal of spinal stimulator and discharged on intravenous antibiotics via a PICC line presents to the ED with complains of worsening right sided leg pain. As per the patient he had injured his right calf when getting off the bed and developed non healing wound couple of months ago. The wife has been helping him changing his dressing on both legs for lower extremity stasis ulcers for both legs. He follows podiatry at [facility]. He says the pain in his left leg is getting worse to the point that he is unable to ambulate. He has chronic back pain that is also getting worse mostly because he has been laying in bed. He recognizes that his swelling in his right leg specially has gotten worse even though he has been taking his water pill. He noted clear discharge with foul smell from both of his lower extremity wounds. The pain is severe in his right calf, upper medial thigh specially when he extends and flexes the led. He noted low grade fevers at home, worsening shortness of breath.   He denies any recent contact with sick people, has been taking precautions for COVID and had limited his exposure to outside. (21 Apr 2020 19:15)    pt seen and assessed with attending Dr. Carmen at bedside.    CELLULITIS  DM (diabetes mellitus)  Morbid obesity  Obstructive sleep apnea  Disc disease, degenerative, lumbar or lumbosacral  Depression  Hypertension      PMH: CELLULITIS  DM (diabetes mellitus)  Morbid obesity  Obstructive sleep apnea  Disc disease, degenerative, lumbar or lumbosacral  Depression  Hypertension    PSH: History of excision of pilonidal cyst  H/O arthroscopy of right knee  Spinal cord stimulator status    Medication ceFAZolin   IVPB 2000 milliGRAM(s) IV Intermittent every 6 hours    Allergy: IV Contrast (Unknown)  penicillin (Unknown)        Labs:                        15.1   20.90 )-----------( 230      ( 21 Apr 2020 15:30 )             46.2     PT/INR - ( 21 Apr 2020 15:30 )   PT: 14.30 sec;   INR: 1.24 ratio         PTT - ( 21 Apr 2020 15:30 )  PTT:34.3 sec  04-22    138  |  99  |  11  ----------------------------<  106<H>  4.5   |  22  |  0.9    Ca    9.1      22 Apr 2020 07:19  Mg     2.1     04-21    TPro  6.9  /  Alb  3.8  /  TBili  0.6  /  DBili  x   /  AST  22  /  ALT  15  /  AlkPhos  77  04-22    CARDIAC MARKERS ( 21 Apr 2020 15:30 )  x     / <0.01 ng/mL / x     / x     / x              O:   Derm: Ulceration right lateral LE + hyperkeratotic border, wound base Fibrogranular patches, + edema, + sarabjit-wound erythema, - purulence, - fluctuance, - tracking/tunneling, - probe to bone. - streaking erythema. + xerosis, + hemosiderin deposits, - malodor.  ulceration left lateral LE, fibrogranular wound base, mild drainage, erythema, edema.   Vascular: Dorsalis Pedis and Posterior Tibial pulses 0/4.  Capillary re-fill time less then 3 seconds digits 1-5 bilateral.  B/L feet warm to touch  Neuro: Protective sensation diminished to the level of the digits bilateral.  MSK: pain on palpation R > L         Assessment and Plan:   venous/arterial stasis ulceration b/l LE  DM   Morbid obesity  HTN    Chart reviewed and Patient evaluated  Discussed diagnosis and treatment with patient  Wound flush with normal saline  Applied xeroform  with dry sterile dressing  local wound care: wash with soap and water, apply santyl wet to dry kerlix ace wrap b/l LE daily  Offloading to bilateral Heels to prevent ulcerations  cont iv abx as per id  f/u venous duplex b/l LE  no sx intervention  WBAT b/l LE  reconsult as needed  f/u with attending    spectra [id number]

## 2020-04-22 NOTE — CONSULT NOTE ADULT - EXTREMITIES COMMENTS
ulcer along RLE distal/ laterally with drainage. Has erythema RLE along superior calf. Faint distal pulses

## 2020-04-22 NOTE — CHART NOTE - NSCHARTNOTEFT_GEN_A_CORE
I made rounds today with the treatment team including the hospitalist, residents,  nurses and discussed the patient's current medical status and discharge  planning needs, and reviewed the chart.    T(C): 36.6 (04-22-20 @ 11:30), Max: 38 (04-21-20 @ 14:15)  HR: 96 (04-22-20 @ 11:30) (96 - 134)  BP: 149/66 (04-22-20 @ 11:30) (144/65 - 152/73)  RR: 20 (04-22-20 @ 11:30) (20 - 22)  SpO2: 100% (04-22-20 @ 11:30) (97% - 100%)          I reached out to the patient's health care proxy/ responsible family member-           [  X   ]  I reached    spouse, Naif        and discussed the patient's medical condition,                   family concerns, and discharge planning           [     ]  I left a message with family               [     ]  I personally participated in rounds with the medical team and my resident and discussed the case. My resident reached                   family member/ HCP                                under my direction and supervision  and we reviewed the conversation.          [     ]  My resident left a message with family under my direction and supervision      The following was discussed:     medical condition and labs and meds reviewed. Pulse ox is 100% on 2 liters O2. ID recommendations reviewed. Had CTA to r/o PE. WBC 20K. COVID pcr results pending.     Wife states patient reports severe pain at ankle with dressing change. She reports he was not on diabetic meds at home.          [     ] I spent 5-10 minutes on the above discussing medical issues with team members and family and/ or my resident    [   X  ] I spent 11-20 minutes on the above discussing medical issues with team members and family and/ or my resident    [     ] I spent 21-30 minutes on the above discussing medical issues with team members and family and/ or my resident I made rounds today with the treatment team including the hospitalist, residents,  nurses and discussed the patient's current medical status and discharge  planning needs, and reviewed the chart.    T(C): 36.6 (04-22-20 @ 11:30), Max: 38 (04-21-20 @ 14:15)  HR: 96 (04-22-20 @ 11:30) (96 - 134)  BP: 149/66 (04-22-20 @ 11:30) (144/65 - 152/73)  RR: 20 (04-22-20 @ 11:30) (20 - 22)  SpO2: 100% (04-22-20 @ 11:30) (97% - 100%)          I reached out to the patient's health care proxy/ responsible family member-           [     ]  I reached          and discussed the patient's medical condition,                   family concerns, and discharge planning           [     ]  I left a message with family               [   X  ]  I personally participated in rounds with the medical team and my resident and discussed the case. My resident reached                   family member/ HCP         spouse, Naif                         under my direction and supervision  and we reviewed the conversation.          [     ]  My resident left a message with family under my direction and supervision      The following was discussed:     medical condition and labs and meds reviewed. Pulse ox is 100% on 2 liters O2. ID recommendations reviewed. Had CTA to r/o PE. WBC 20K. COVID pcr results pending.     Wife states patient reports severe pain at ankle with dressing change. She reports he was not on diabetic meds at home.          [     ] I spent 5-10 minutes on the above discussing medical issues with team members and family and/ or my resident    [   X  ] I spent 11-20 minutes on the above discussing medical issues with team members and family and/ or my resident    [     ] I spent 21-30 minutes on the above discussing medical issues with team members and family and/ or my resident

## 2020-04-22 NOTE — CONSULT NOTE ADULT - SUBJECTIVE AND OBJECTIVE BOX
ONEALJOSE ALBERTO  54y, Male  Allergy: IV Contrast (Unknown)  penicillin (Unknown)      All historical available data reviewed.    HPI:  54 year-old male history of RUSSELL, HTN, intervertebral disc disease, status post placement of spinal cord stimulator in july 2017 subsequently developed several episodes of infection at site of the stimulator and removal of spinal stimulator and discharged on intravenous antibiotics via a PICC line presents to the ED with complains of worsening right sided leg pain. As per the patient he had injured his right calf when getting off the bed and developed non healing wound couple of months ago. The wife has been helping him changing his dressing on both legs for lower extremity stasis ulcers for both legs. He follows podiatry at [facility] clinic. He says the pain in his left leg is getting worse to the point that he is unable to ambulate. He has chronic back pain that is also getting worse mostly because he has been laying in bed. He recognizes that his swelling in his right leg specially has gotten worse even though he has been taking his water pill. He noted clear discharge with foul smell from both of his lower extremity wounds. The pain is severe in his right calf, upper medial thigh specially when he extends and flexes the led. He noted low grade fevers at home, worsening shortness of breath.   He denies any recent contact with sick people, has been taking precautions for COVID and had limited his exposure to outside. (21 Apr 2020 19:15)    FAMILY HISTORY:  Family history of diabetes mellitus in mother (Mother)  Family history of early CAD (Father)    PAST MEDICAL & SURGICAL HISTORY:  DM (diabetes mellitus)  Morbid obesity  Obstructive sleep apnea  Disc disease, degenerative, lumbar or lumbosacral  Hypertension  History of excision of pilonidal cyst  H/O arthroscopy of right knee  Spinal cord stimulator status        VITALS:  T(F): 97, Max: 100.4 (04-21-20 @ 14:15)  HR: 98  BP: 145/73  RR: 20Vital Signs Last 24 Hrs  T(C): 36.1 (22 Apr 2020 07:30), Max: 38 (21 Apr 2020 14:15)  T(F): 97 (22 Apr 2020 07:30), Max: 100.4 (21 Apr 2020 14:15)  HR: 98 (22 Apr 2020 07:30) (98 - 134)  BP: 145/73 (22 Apr 2020 07:30) (144/65 - 152/73)  BP(mean): --  RR: 20 (22 Apr 2020 07:30) (20 - 22)  SpO2: 100% (22 Apr 2020 07:30) (97% - 100%)    TESTS & MEASUREMENTS:                        15.1   20.90 )-----------( 230      ( 21 Apr 2020 15:30 )             46.2     04-22    138  |  99  |  11  ----------------------------<  106<H>  4.5   |  22  |  0.9    Ca    9.1      22 Apr 2020 07:19  Mg     2.1     04-21    TPro  6.9  /  Alb  3.8  /  TBili  0.6  /  DBili  x   /  AST  22  /  ALT  15  /  AlkPhos  77  04-22    LIVER FUNCTIONS - ( 22 Apr 2020 07:19 )  Alb: 3.8 g/dL / Pro: 6.9 g/dL / ALK PHOS: 77 U/L / ALT: 15 U/L / AST: 22 U/L / GGT: x                   RADIOLOGY & ADDITIONAL TESTS:  Personal review of radiological diagnostics performed  Echo and EKG results noted when applicable.     MEDICATIONS:  aluminum hydroxide/magnesium hydroxide/simethicone Suspension 30 milliLiter(s) Oral every 4 hours PRN  cefepime   IVPB 1000 milliGRAM(s) IV Intermittent every 8 hours  cefepime   IVPB      chlorhexidine 4% Liquid 1 Application(s) Topical <User Schedule>  dextrose 40% Gel 15 Gram(s) Oral once PRN  dextrose 5%. 1000 milliLiter(s) IV Continuous <Continuous>  dextrose 50% Injectable 12.5 Gram(s) IV Push once  dextrose 50% Injectable 25 Gram(s) IV Push once  dextrose 50% Injectable 25 Gram(s) IV Push once  enoxaparin Injectable 40 milliGRAM(s) SubCutaneous at bedtime  furosemide   Injectable 40 milliGRAM(s) IV Push daily  glucagon  Injectable 1 milliGRAM(s) IntraMuscular once PRN  insulin glargine Injectable (LANTUS) 15 Unit(s) SubCutaneous at bedtime  insulin lispro (HumaLOG) corrective regimen sliding scale   SubCutaneous three times a day before meals  losartan 100 milliGRAM(s) Oral daily  methocarbamol 750 milliGRAM(s) Oral two times a day  metroNIDAZOLE  IVPB 500 milliGRAM(s) IV Intermittent every 8 hours  oxyCODONE    IR 10 milliGRAM(s) Oral every 4 hours PRN  oxyCODONE  ER Tablet 15 milliGRAM(s) Oral every 12 hours  polyethylene glycol 3350 17 Gram(s) Oral daily  senna 1 Tablet(s) Oral at bedtime  silver sulfADIAZINE 1% Cream 1 Application(s) Topical two times a day  vancomycin  IVPB      vancomycin  IVPB 1250 milliGRAM(s) IV Intermittent every 12 hours      ANTIBIOTICS:  cefepime   IVPB 1000 milliGRAM(s) IV Intermittent every 8 hours  cefepime   IVPB      metroNIDAZOLE  IVPB 500 milliGRAM(s) IV Intermittent every 8 hours  vancomycin  IVPB      vancomycin  IVPB 1250 milliGRAM(s) IV Intermittent every 12 hours

## 2020-04-22 NOTE — PROGRESS NOTE ADULT - SUBJECTIVE AND OBJECTIVE BOX
Medicine Progress Note    Patient is a 54y old  Male who presents with a chief complaint of right foot exacerbating shooting pain of several days duration associated with low grade fever  he was not able to ambulate because of severe pain. so he decides to come to the ER  ct angio chest was negative for PE and no GGO     SUBJECTIVE / OVERNIGHT EVENTS: no acute events overnight. no fever or chills,  no sob    ADDITIONAL REVIEW OF SYSTEMS:    MEDICATIONS  (STANDING):  ceFAZolin   IVPB 2000 milliGRAM(s) IV Intermittent every 6 hours  chlorhexidine 4% Liquid 1 Application(s) Topical <User Schedule>  dextrose 5%. 1000 milliLiter(s) (50 mL/Hr) IV Continuous <Continuous>  dextrose 50% Injectable 12.5 Gram(s) IV Push once  dextrose 50% Injectable 25 Gram(s) IV Push once  dextrose 50% Injectable 25 Gram(s) IV Push once  enoxaparin Injectable 40 milliGRAM(s) SubCutaneous at bedtime  furosemide   Injectable 40 milliGRAM(s) IV Push daily  insulin glargine Injectable (LANTUS) 15 Unit(s) SubCutaneous at bedtime  insulin lispro (HumaLOG) corrective regimen sliding scale   SubCutaneous three times a day before meals  losartan 100 milliGRAM(s) Oral daily  methocarbamol 750 milliGRAM(s) Oral two times a day  oxyCODONE  ER Tablet 15 milliGRAM(s) Oral every 12 hours  polyethylene glycol 3350 17 Gram(s) Oral daily  senna 1 Tablet(s) Oral at bedtime  silver sulfADIAZINE 1% Cream 1 Application(s) Topical two times a day    MEDICATIONS  (PRN):  aluminum hydroxide/magnesium hydroxide/simethicone Suspension 30 milliLiter(s) Oral every 4 hours PRN Dyspepsia  dextrose 40% Gel 15 Gram(s) Oral once PRN Blood Glucose LESS THAN 70 milliGRAM(s)/deciliter  glucagon  Injectable 1 milliGRAM(s) IntraMuscular once PRN Glucose LESS THAN 70 milligrams/deciliter  oxyCODONE    IR 10 milliGRAM(s) Oral every 4 hours PRN Moderate Pain (4 - 6)    CAPILLARY BLOOD GLUCOSE      POCT Blood Glucose.: 122 mg/dL (22 Apr 2020 07:44)  POCT Blood Glucose.: 91 mg/dL (21 Apr 2020 21:11)  POCT Blood Glucose.: 124 mg/dL (21 Apr 2020 17:38)    I&O's Summary    21 Apr 2020 07:01  -  22 Apr 2020 07:00  --------------------------------------------------------  IN: 0 mL / OUT: 3200 mL / NET: -3200 mL        PHYSICAL EXAM:  Vital Signs Last 24 Hrs  T(C): 36.1 (22 Apr 2020 07:30), Max: 38 (21 Apr 2020 14:15)  T(F): 97 (22 Apr 2020 07:30), Max: 100.4 (21 Apr 2020 14:15)  HR: 98 (22 Apr 2020 07:30) (98 - 134)  BP: 145/73 (22 Apr 2020 07:30) (144/65 - 152/73)  BP(mean): --  RR: 20 (22 Apr 2020 07:30) (20 - 22)  SpO2: 100% (22 Apr 2020 07:30) (97% - 100%)  CONSTITUTIONAL: NAD, well-developed, well-groomed  ENMT: Moist oral mucosa, no pharyngeal injection or exudates; normal dentition  RESPIRATORY: Normal respiratory effort; lungs are clear to auscultation bilaterally  CARDIOVASCULAR: Regular rate and rhythm, normal S1 and S2, no murmur/rub/gallop; No lower extremity edema; Peripheral pulses are 2+ bilaterally  ABDOMEN: Nontender to palpation, normoactive bowel sounds, no rebound/guarding; No hepatosplenomegaly  PSYCH: A+O to person, place, and time; affect appropriate  NEUROLOGY: CN 2-12 are intact and symmetric; no gross sensory deficits   SKIN: No rashes; no palpable lesions    LABS:                        15.1   20.90 )-----------( 230      ( 21 Apr 2020 15:30 )             46.2     04-22    138  |  99  |  11  ----------------------------<  106<H>  4.5   |  22  |  0.9    Ca    9.1      22 Apr 2020 07:19  Mg     2.1     04-21    TPro  6.9  /  Alb  3.8  /  TBili  0.6  /  DBili  x   /  AST  22  /  ALT  15  /  AlkPhos  77  04-22    PT/INR - ( 21 Apr 2020 15:30 )   PT: 14.30 sec;   INR: 1.24 ratio         PTT - ( 21 Apr 2020 15:30 )  PTT:34.3 sec  CARDIAC MARKERS ( 21 Apr 2020 15:30 )  x     / <0.01 ng/mL / x     / x     / x                  RADIOLOGY & ADDITIONAL TESTS:  Imaging from Last 24 Hours:    Electrocardiogram/QTc Interval:    COORDINATION OF CARE:  Care Discussed with Consultants/Other Providers:

## 2020-04-22 NOTE — PROGRESS NOTE ADULT - ASSESSMENT
54 y.o man with hx of RUSSELL, htn comes in for right shooting foot pain caused by non healing wound. symptoms were also associated with low grade fever and some shortness of breath. was admitted to r/o COVID     # Right foot cellulitis  - non healing right wound on the external malleolus  - with superimposed lymphedema  - ID recommend to switch him to cefazolin + silvadene cream  - Will keep on IV lasix for now   - f/u blood cx  - will trend crp and procal in 48 hrs  - tylenol PRN for fever   - Podiatry consult. If necessary Vascular surgery consult.  - [facility] duplex venous was ordered pending COVID PCR result    # RUSSELL:  -Obesity hyperventilation syndrome vs Sleep apnea.  -uses CPAP at home. Follow Dr. Eastman outpatient  -Follow up with COVID PCR even though Low suspicion  -CT angio negative for PE, no ground glass opacities.     # Diabetes  -Will keep on Insulin Regimen   -basal / bolus / pre meals insulin     # HTN  - will resume losartan today     # DVT prophylaxis  -On lovenox    # PPI prophylasxis:  - not needed    FULL CODE  diet: DASH / TLC / consistent carb diet   dispo: acute  pending podiatry consult and COVID PCR

## 2020-04-22 NOTE — PROVIDER CONTACT NOTE (MEDICATION) - NAME OF MD/NP/PA/DO NOTIFIED:
Dear Ciera Boogie     We would like to welcome you to our practice and wish you a happy and healthy pregnancy and birth experience! We believe that this is a special time in your life and this is why we offer our patients and their families a variety of prenatal options. Our practice consists of 7 OB/GYN physicians, 4 Certified Nurse Midwives along with 3 Nurse Practitioners.     Your health and pregnancy history will be reviewed so that your care can be coordinated with the physicians and midwives. The physicians and midwives at [facility] will work together to provide you with the highest quality of care. All providers are on staff and will deliver your baby at the [facility].     During your pregnancy, you may also receive care from one of our nurse practitioners. They are educated in the care of pregnant as well as non-pregnant women, and are happy to answer any questions you may have about pregnancy and the birth experience.    Billing for your pregnancy is a global fee, which includes all routine prenatal visits, the delivery and postpartum visit. Lab tests, ultrasounds, Non Stress Tests, non-routine pregnancy visits and your hospital stay are not included in the global fee. We recommend that you check with your insurance company for your maternity benefits.     If you have any questions or concerns, please do not hesitate to contact us at either of our locations, [place] [phone] or [place] [phone]. Once again congratulations! We hope to make your pregnancy and birth of your child a happy and satisfying experience.    Sincerely,    Dr. Josh Morris, GEE Allan, GEE Matos Dr., Dr., CNM Dr. Rebecca Mokrohisky Donna Grabski, NP Dr. Lauren Tran Ebony Mitchell, NP Dr. Ursula Star-Adamczyk Patricia Sazama NP        MD Darryn Baig [phone]

## 2020-04-22 NOTE — CHART NOTE - NSCHARTNOTEFT_GEN_A_CORE
Spoke with Naif Mobley as per [phone] and updated her about 's condition. As per wife he is complaining as he is not receiving his pain medications though I reassured them that they are ordered and should receive them as prescribed.

## 2020-04-23 LAB
ANION GAP SERPL CALC-SCNC: 18 MMOL/L — HIGH (ref 7–14)
BASOPHILS # BLD AUTO: 0.05 K/UL — SIGNIFICANT CHANGE UP (ref 0–0.2)
BASOPHILS NFR BLD AUTO: 0.4 % — SIGNIFICANT CHANGE UP (ref 0–1)
BUN SERPL-MCNC: 14 MG/DL — SIGNIFICANT CHANGE UP (ref 10–20)
CALCIUM SERPL-MCNC: 9.2 MG/DL — SIGNIFICANT CHANGE UP (ref 8.5–10.1)
CHLORIDE SERPL-SCNC: 96 MMOL/L — LOW (ref 98–110)
CO2 SERPL-SCNC: 23 MMOL/L — SIGNIFICANT CHANGE UP (ref 17–32)
CREAT SERPL-MCNC: 0.9 MG/DL — SIGNIFICANT CHANGE UP (ref 0.7–1.5)
EOSINOPHIL # BLD AUTO: 0.04 K/UL — SIGNIFICANT CHANGE UP (ref 0–0.7)
EOSINOPHIL NFR BLD AUTO: 0.3 % — SIGNIFICANT CHANGE UP (ref 0–8)
GLUCOSE BLDC GLUCOMTR-MCNC: 101 MG/DL — HIGH (ref 70–99)
GLUCOSE BLDC GLUCOMTR-MCNC: 104 MG/DL — HIGH (ref 70–99)
GLUCOSE BLDC GLUCOMTR-MCNC: 115 MG/DL — HIGH (ref 70–99)
GLUCOSE BLDC GLUCOMTR-MCNC: 122 MG/DL — HIGH (ref 70–99)
GLUCOSE SERPL-MCNC: 106 MG/DL — HIGH (ref 70–99)
HCT VFR BLD CALC: 46.3 % — SIGNIFICANT CHANGE UP (ref 42–52)
HGB BLD-MCNC: 14.6 G/DL — SIGNIFICANT CHANGE UP (ref 14–18)
IMM GRANULOCYTES NFR BLD AUTO: 0.7 % — HIGH (ref 0.1–0.3)
LYMPHOCYTES # BLD AUTO: 1.11 K/UL — LOW (ref 1.2–3.4)
LYMPHOCYTES # BLD AUTO: 8.5 % — LOW (ref 20.5–51.1)
MCHC RBC-ENTMCNC: 27.2 PG — SIGNIFICANT CHANGE UP (ref 27–31)
MCHC RBC-ENTMCNC: 31.5 G/DL — LOW (ref 32–37)
MCV RBC AUTO: 86.2 FL — SIGNIFICANT CHANGE UP (ref 80–94)
MONOCYTES # BLD AUTO: 0.57 K/UL — SIGNIFICANT CHANGE UP (ref 0.1–0.6)
MONOCYTES NFR BLD AUTO: 4.4 % — SIGNIFICANT CHANGE UP (ref 1.7–9.3)
NEUTROPHILS # BLD AUTO: 11.18 K/UL — HIGH (ref 1.4–6.5)
NEUTROPHILS NFR BLD AUTO: 85.7 % — HIGH (ref 42.2–75.2)
NRBC # BLD: 0 /100 WBCS — SIGNIFICANT CHANGE UP (ref 0–0)
PLATELET # BLD AUTO: 201 K/UL — SIGNIFICANT CHANGE UP (ref 130–400)
POTASSIUM SERPL-MCNC: 4 MMOL/L — SIGNIFICANT CHANGE UP (ref 3.5–5)
POTASSIUM SERPL-SCNC: 4 MMOL/L — SIGNIFICANT CHANGE UP (ref 3.5–5)
PROCALCITONIN SERPL-MCNC: 2.76 NG/ML — HIGH (ref 0.02–0.1)
RBC # BLD: 5.37 M/UL — SIGNIFICANT CHANGE UP (ref 4.7–6.1)
RBC # FLD: 15 % — HIGH (ref 11.5–14.5)
SODIUM SERPL-SCNC: 137 MMOL/L — SIGNIFICANT CHANGE UP (ref 135–146)
WBC # BLD: 13.04 K/UL — HIGH (ref 4.8–10.8)
WBC # FLD AUTO: 13.04 K/UL — HIGH (ref 4.8–10.8)

## 2020-04-23 PROCEDURE — 70551 MRI BRAIN STEM W/O DYE: CPT | Mod: 26

## 2020-04-23 PROCEDURE — 99233 SBSQ HOSP IP/OBS HIGH 50: CPT

## 2020-04-23 PROCEDURE — 70450 CT HEAD/BRAIN W/O DYE: CPT | Mod: 26

## 2020-04-23 RX ORDER — ACETAMINOPHEN 500 MG
650 TABLET ORAL ONCE
Refills: 0 | Status: COMPLETED | OUTPATIENT
Start: 2020-04-23 | End: 2020-04-23

## 2020-04-23 RX ORDER — ACETAMINOPHEN 500 MG
650 TABLET ORAL EVERY 6 HOURS
Refills: 0 | Status: DISCONTINUED | OUTPATIENT
Start: 2020-04-23 | End: 2020-04-23

## 2020-04-23 RX ORDER — ACETAMINOPHEN 500 MG
650 TABLET ORAL EVERY 6 HOURS
Refills: 0 | Status: DISCONTINUED | OUTPATIENT
Start: 2020-04-23 | End: 2020-04-25

## 2020-04-23 RX ORDER — FUROSEMIDE 40 MG
40 TABLET ORAL EVERY 12 HOURS
Refills: 0 | Status: DISCONTINUED | OUTPATIENT
Start: 2020-04-23 | End: 2020-04-26

## 2020-04-23 RX ORDER — IBUPROFEN 200 MG
400 TABLET ORAL EVERY 6 HOURS
Refills: 0 | Status: DISCONTINUED | OUTPATIENT
Start: 2020-04-23 | End: 2020-04-27

## 2020-04-23 RX ORDER — COLLAGENASE CLOSTRIDIUM HIST. 250 UNIT/G
1 OINTMENT (GRAM) TOPICAL DAILY
Refills: 0 | Status: DISCONTINUED | OUTPATIENT
Start: 2020-04-23 | End: 2020-04-28

## 2020-04-23 RX ORDER — PANTOPRAZOLE SODIUM 20 MG/1
40 TABLET, DELAYED RELEASE ORAL
Refills: 0 | Status: DISCONTINUED | OUTPATIENT
Start: 2020-04-23 | End: 2020-04-28

## 2020-04-23 RX ADMIN — Medication 1 APPLICATION(S): at 06:06

## 2020-04-23 RX ADMIN — ENOXAPARIN SODIUM 40 MILLIGRAM(S): 100 INJECTION SUBCUTANEOUS at 21:45

## 2020-04-23 RX ADMIN — OXYCODONE HYDROCHLORIDE 10 MILLIGRAM(S): 5 TABLET ORAL at 16:46

## 2020-04-23 RX ADMIN — OXYCODONE HYDROCHLORIDE 10 MILLIGRAM(S): 5 TABLET ORAL at 04:31

## 2020-04-23 RX ADMIN — Medication 100 MILLIGRAM(S): at 06:04

## 2020-04-23 RX ADMIN — MORPHINE SULFATE 3 MILLIGRAM(S): 50 CAPSULE, EXTENDED RELEASE ORAL at 14:35

## 2020-04-23 RX ADMIN — METHOCARBAMOL 750 MILLIGRAM(S): 500 TABLET, FILM COATED ORAL at 06:06

## 2020-04-23 RX ADMIN — OXYCODONE HYDROCHLORIDE 10 MILLIGRAM(S): 5 TABLET ORAL at 17:59

## 2020-04-23 RX ADMIN — Medication 100 MILLIGRAM(S): at 23:38

## 2020-04-23 RX ADMIN — MORPHINE SULFATE 3 MILLIGRAM(S): 50 CAPSULE, EXTENDED RELEASE ORAL at 00:16

## 2020-04-23 RX ADMIN — Medication 40 MILLIGRAM(S): at 17:59

## 2020-04-23 RX ADMIN — METHOCARBAMOL 750 MILLIGRAM(S): 500 TABLET, FILM COATED ORAL at 17:59

## 2020-04-23 RX ADMIN — Medication 100 MILLIGRAM(S): at 12:02

## 2020-04-23 RX ADMIN — LOSARTAN POTASSIUM 100 MILLIGRAM(S): 100 TABLET, FILM COATED ORAL at 06:05

## 2020-04-23 RX ADMIN — Medication 100 MILLIGRAM(S): at 18:00

## 2020-04-23 RX ADMIN — CHLORHEXIDINE GLUCONATE 1 APPLICATION(S): 213 SOLUTION TOPICAL at 06:08

## 2020-04-23 RX ADMIN — PANTOPRAZOLE SODIUM 40 MILLIGRAM(S): 20 TABLET, DELAYED RELEASE ORAL at 16:46

## 2020-04-23 RX ADMIN — OXYCODONE HYDROCHLORIDE 10 MILLIGRAM(S): 5 TABLET ORAL at 07:26

## 2020-04-23 RX ADMIN — SENNA PLUS 1 TABLET(S): 8.6 TABLET ORAL at 21:45

## 2020-04-23 RX ADMIN — OXYCODONE HYDROCHLORIDE 10 MILLIGRAM(S): 5 TABLET ORAL at 21:51

## 2020-04-23 RX ADMIN — OXYCODONE HYDROCHLORIDE 10 MILLIGRAM(S): 5 TABLET ORAL at 11:27

## 2020-04-23 RX ADMIN — Medication 40 MILLIGRAM(S): at 06:06

## 2020-04-23 RX ADMIN — Medication 100 MILLIGRAM(S): at 00:00

## 2020-04-23 RX ADMIN — Medication 650 MILLIGRAM(S): at 21:51

## 2020-04-23 NOTE — PROGRESS NOTE ADULT - SUBJECTIVE AND OBJECTIVE BOX
HPI:  54 year-old male history of RUSSELL, HTN, intervertebral disc disease, status post placement of spinal cord stimulator in july 2017 subsequently developed several episodes of infection at site of the stimulator and removal of spinal stimulator and discharged on intravenous antibiotics via a PICC line presents to the ED with complains of worsening right sided leg pain. As per the patient he had injured his right calf when getting off the bed and developed non healing wound couple of months ago. The wife has been helping him changing his dressing on both legs for lower extremity stasis ulcers for both legs. He follows podiatry at [facility] clinic. He says the pain in his left leg is getting worse to the point that he is unable to ambulate. He has chronic back pain that is also getting worse mostly because he has been laying in bed. He recognizes that his swelling in his right leg specially has gotten worse even though he has been taking his water pill. He noted clear discharge with foul smell from both of his lower extremity wounds. The pain is severe in his right calf, upper medial thigh specially when he extends and flexes the led. He noted low grade fevers at home, worsening shortness of breath.   He denies any recent contact with sick people, has been taking precautions for COVID and had limited his exposure to outside. (21 Apr 2020 19:15)    Currently admitted to medicine with the primary diagnosis of Cellulitis     Today is hospital day 2d.     INTERVAL HPI / OVERNIGHT EVENTS:  Patient was examined and seen at bedside. This morning he is resting comfortably in bed. Reports that he has continued leg pain as well as headache that began when he was in the ED. Reports headache improved minimally with pain medications, but continues to bother him, worsens when speaking and when moving his leg. Denies any associated photophobia.     ROS: Otherwise unremarkable     PAST MEDICAL & SURGICAL HISTORY  DM (diabetes mellitus)  Morbid obesity  Obstructive sleep apnea  Disc disease, degenerative, lumbar or lumbosacral  Hypertension  History of excision of pilonidal cyst  H/O arthroscopy of right knee  Spinal cord stimulator status    ALLERGIES  IV Contrast (Unknown)  penicillin (Unknown)    MEDICATIONS  STANDING MEDICATIONS  ceFAZolin   IVPB 2000 milliGRAM(s) IV Intermittent every 6 hours  chlorhexidine 4% Liquid 1 Application(s) Topical <User Schedule>  collagenase Ointment 1 Application(s) Topical daily  dextrose 5%. 1000 milliLiter(s) IV Continuous <Continuous>  dextrose 50% Injectable 12.5 Gram(s) IV Push once  dextrose 50% Injectable 25 Gram(s) IV Push once  dextrose 50% Injectable 25 Gram(s) IV Push once  enoxaparin Injectable 40 milliGRAM(s) SubCutaneous at bedtime  furosemide   Injectable 40 milliGRAM(s) IV Push daily  insulin lispro (HumaLOG) corrective regimen sliding scale   SubCutaneous three times a day before meals  losartan 100 milliGRAM(s) Oral daily  methocarbamol 750 milliGRAM(s) Oral two times a day  oxyCODONE  ER Tablet 10 milliGRAM(s) Oral every 12 hours  pantoprazole    Tablet 40 milliGRAM(s) Oral before breakfast  polyethylene glycol 3350 17 Gram(s) Oral daily  senna 1 Tablet(s) Oral at bedtime  silver sulfADIAZINE 1% Cream 1 Application(s) Topical two times a day    PRN MEDICATIONS  acetaminophen   Tablet .. 650 milliGRAM(s) Oral every 6 hours PRN  aluminum hydroxide/magnesium hydroxide/simethicone Suspension 30 milliLiter(s) Oral every 4 hours PRN  dextrose 40% Gel 15 Gram(s) Oral once PRN  glucagon  Injectable 1 milliGRAM(s) IntraMuscular once PRN  ibuprofen  Tablet. 400 milliGRAM(s) Oral every 6 hours PRN  morphine  - Injectable 3 milliGRAM(s) IV Push every 4 hours PRN  oxyCODONE    IR 10 milliGRAM(s) Oral every 4 hours PRN    VITALS:  T(F): 96.5  HR: 100  BP: 142/69  RR: 18  SpO2: 99%    PHYSICAL EXAM  GEN: NAD, Resting comfortably in bed  PULM: Clear to auscultation bilaterally, No wheezes  CVS: Regular rate and rhythm, S1-S2, no murmurs  ABD: Soft, non-tender, non-distended, no guarding  EXT: RLE erythematous up to level of R knee  NEURO: A&Ox3, no focal deficits    LABS                        14.6   13.04 )-----------( 201      ( 23 Apr 2020 06:14 )             46.3     04-23    137  |  96<L>  |  14  ----------------------------<  106<H>  4.0   |  23  |  0.9    Ca    9.2      23 Apr 2020 06:14  Mg     2.1     04-21    TPro  6.9  /  Alb  3.8  /  TBili  0.6  /  DBili  x   /  AST  22  /  ALT  15  /  AlkPhos  77  04-22    PT/INR - ( 21 Apr 2020 15:30 )   PT: 14.30 sec;   INR: 1.24 ratio         PTT - ( 21 Apr 2020 15:30 )  PTT:34.3 sec          Culture - Blood (collected 21 Apr 2020 15:53)  Source: .Blood Blood-Peripheral  Preliminary Report (22 Apr 2020 22:01):    No growth to date.    Culture - Blood (collected 21 Apr 2020 15:30)  Source: .Blood Blood-Peripheral  Preliminary Report (22 Apr 2020 22:01):    No growth to date.      CARDIAC MARKERS ( 21 Apr 2020 15:30 )  x     / <0.01 ng/mL / x     / x     / x          RADIOLOGY    < from: CT Head No Cont (04.23.20 @ 13:20) >    EXAM:  CT BRAIN            PROCEDURE DATE:  04/23/2020            INTERPRETATION:  Clinical History / Reason for exam: Worsening headache.    Technique: Noncontrast head CT.  Contiguous unenhanced CT axial images of the head from the base to the vertex with coronal and sagittal reformats.    Comparison: None available    Findings:    There is overall paucity of sulcal demarcation and small size of the lateral ventricles.    There is no evidence of hydrocephalus. There is no acute intracranial hemorrhage, extra-axial fluid collection or midline shift.  Gray-white matter differentiation is maintained.    Vascular calcifications are noted.    The visualized paranasal sinuses and mastoids are well-aerated.    IMPRESSION:     Overall paucity of sulcal demarcation and small size of the lateral ventricles can reflect cerebral edema versus normal variation. If symptoms persist consider MRI for further evaluation.          ATIYA MUNGUIA M.D., ATTENDING RADIOLOGIST  This document has been electronically signed. Apr 23 2020  1:33PM    < end of copied text >    < from: VA Duplex Lower Ext Vein Scan, Bilat (04.22.20 @ 14:13) >  EXAM:  DUPLEX SCAN EXT VEINS LOWER BI            PROCEDURE DATE:  04/22/2020            INTERPRETATION:  Clinical History / Reason for exam: The patient is a 54-year-old male with leg swelling. A venous duplex examination was performed to evaluate the patient for deep venous thrombosis of the lower extremities.    The bilateral common femoral, greater saphenous, superficial femoral, popliteal and lesser saphenous veins were visualized with no evidence of deep venous thrombosis    All veins werefully compressible.  There was presence of spontaneous flow, augmentation with distal compression and phasicity.    The anterior tibial and peroneal veins were not visualized posterior tibial vein are patent      Impression:    No evidence of deep venous thrombosis in the bilateral lower extremities.    ICD-10: M79.89        RADHA MCDANIEL M.D., ATTENDING VASCULAR  This document has been electronically signed. Apr 23 2020  9:31AM   < end of copied text >

## 2020-04-23 NOTE — PROGRESS NOTE ADULT - SUBJECTIVE AND OBJECTIVE BOX
Progress Note:  Provider Speciality                            Hospitalist      JOSE ALBERTO NO [id number] 54y Male     CHIEF PRESENTING COMPLAINT:  Patient is a 54y old  Male who presents with a chief complaint of       SUBJECTIVE:  Patient was seen and examined at bedside.   on Bipap in the morning/c/o severe headache.   No significant overnight events reported.     HISTORY OF PRESENTING ILLNESS:  HPI:  54 year-old male history of RUSSELL, HTN, intervertebral disc disease, status post placement of spinal cord stimulator in july 2017 subsequently developed several episodes of infection at site of the stimulator and removal of spinal stimulator and discharged on intravenous antibiotics via a PICC line presents to the ED with complains of worsening right sided leg pain. As per the patient he had injured his right calf when getting off the bed and developed non healing wound couple of months ago. The wife has been helping him changing his dressing on both legs for lower extremity stasis ulcers for both legs. He follows podiatry at [facility]. He says the pain in his left leg is getting worse to the point that he is unable to ambulate. He has chronic back pain that is also getting worse mostly because he has been laying in bed. He recognizes that his swelling in his right leg specially has gotten worse even though he has been taking his water pill. He noted clear discharge with foul smell from both of his lower extremity wounds. The pain is severe in his right calf, upper medial thigh specially when he extends and flexes the led. He noted low grade fevers at home, worsening shortness of breath.   He denies any recent contact with sick people, has been taking precautions for COVID and had limited his exposure to outside. (21 Apr 2020 19:15)        REVIEW OF SYSTEMS:    At least 10 systems were reviewed in ROS. All systems reviewed  are within normal limits except for the complaints as described in Subjective.    PAST MEDICAL & SURGICAL HISTORY:  PAST MEDICAL & SURGICAL HISTORY:  DM (diabetes mellitus)  Morbid obesity  Obstructive sleep apnea  Disc disease, degenerative, lumbar or lumbosacral  Hypertension  History of excision of pilonidal cyst  H/O arthroscopy of right knee  Spinal cord stimulator status          VITAL SIGNS:  Vital Signs Last 24 Hrs  T(C): 35.8 (23 Apr 2020 05:58), Max: 38.4 (22 Apr 2020 19:53)  T(F): 96.5 (23 Apr 2020 05:58), Max: 101.1 (22 Apr 2020 19:53)  HR: 100 (23 Apr 2020 05:58) (94 - 102)  BP: 142/69 (23 Apr 2020 05:58) (124/68 - 142/69)  BP(mean): --  RR: 18 (23 Apr 2020 05:58) (18 - 20)  SpO2: 99% (22 Apr 2020 19:53) (98% - 99%)          PHYSICAL EXAMINATION:    General: AAO, NAD.  HEENT:   EOMI, atraumatic  Heart: S1+S2 audible, no murmur  Lungs: bilateral  fair air entry, no wheezing, no crepitations.  Abdomen: Soft, non-tender, non-distended (obese)  CNS: AAO  , no focal deficits.  Extremities: b/l LE edema with chronic lymphedema/ venous stasis ulcer RLE.         CONSULTS:  Consultant(s) Notes Reviewed by me.   Care Discussed with Consultants/Other Providers where required.        MEDICATIONS:  MEDICATIONS  (STANDING):  ceFAZolin   IVPB 2000 milliGRAM(s) IV Intermittent every 6 hours  chlorhexidine 4% Liquid 1 Application(s) Topical <User Schedule>  collagenase Ointment 1 Application(s) Topical daily  dextrose 5%. 1000 milliLiter(s) (50 mL/Hr) IV Continuous <Continuous>  dextrose 50% Injectable 12.5 Gram(s) IV Push once  dextrose 50% Injectable 25 Gram(s) IV Push once  dextrose 50% Injectable 25 Gram(s) IV Push once  enoxaparin Injectable 40 milliGRAM(s) SubCutaneous at bedtime  furosemide   Injectable 40 milliGRAM(s) IV Push daily  insulin glargine Injectable (LANTUS) 15 Unit(s) SubCutaneous at bedtime  insulin lispro (HumaLOG) corrective regimen sliding scale   SubCutaneous three times a day before meals  losartan 100 milliGRAM(s) Oral daily  methocarbamol 750 milliGRAM(s) Oral two times a day  oxyCODONE  ER Tablet 10 milliGRAM(s) Oral every 12 hours  pantoprazole    Tablet 40 milliGRAM(s) Oral before breakfast  polyethylene glycol 3350 17 Gram(s) Oral daily  senna 1 Tablet(s) Oral at bedtime  silver sulfADIAZINE 1% Cream 1 Application(s) Topical two times a day    MEDICATIONS  (PRN):  acetaminophen   Tablet .. 650 milliGRAM(s) Oral every 6 hours PRN Temp greater or equal to 38C (100.4F), Mild Pain (1 - 3)  aluminum hydroxide/magnesium hydroxide/simethicone Suspension 30 milliLiter(s) Oral every 4 hours PRN Dyspepsia  dextrose 40% Gel 15 Gram(s) Oral once PRN Blood Glucose LESS THAN 70 milliGRAM(s)/deciliter  glucagon  Injectable 1 milliGRAM(s) IntraMuscular once PRN Glucose LESS THAN 70 milligrams/deciliter  ibuprofen  Tablet. 400 milliGRAM(s) Oral every 6 hours PRN Mild Pain (1 - 3)  morphine  - Injectable 3 milliGRAM(s) IV Push every 4 hours PRN Severe Pain (7 - 10)  oxyCODONE    IR 10 milliGRAM(s) Oral every 4 hours PRN Moderate Pain (4 - 6)      LABOROTORY DATA/MICROBIOLOGY/I & O's:                        14.6   13.04 )-----------( 201      ( 23 Apr 2020 06:14 )             46.3     04-23    137  |  96<L>  |  14  ----------------------------<  106<H>  4.0   |  23  |  0.9    Ca    9.2      23 Apr 2020 06:14  Mg     2.1     04-21    TPro  6.9  /  Alb  3.8  /  TBili  0.6  /  DBili  x   /  AST  22  /  ALT  15  /  AlkPhos  77  04-22    PT/INR - ( 21 Apr 2020 15:30 )   PT: 14.30 sec;   INR: 1.24 ratio         PTT - ( 21 Apr 2020 15:30 )  PTT:34.3 sec    CAPILLARY BLOOD GLUCOSE      POCT Blood Glucose.: 104 mg/dL (23 Apr 2020 10:53)  POCT Blood Glucose.: 115 mg/dL (23 Apr 2020 07:32)                04-22-20 @ 07:01  -  04-23-20 @ 07:00  --------------------------------------------------------  IN: 0 mL / OUT: 1525 mL / NET: -1525 mL              ASSESSMENT/Plan:  54 y.o man with hx of RUSSELL, htn comes in for right shooting foot pain caused by non healing wound. Symptoms were also associated with low grade fever and some shortness of breath admitted to r/o COVID.      RLE venous stasis ulcer with cellulitis:   febrile/ elevated WBC/ CRP: 33.  ID eval appreciated - continue IV ancef.  follow up BCX.  podiatry eval appreciated- no surgical intervention. local wound care/IV ABX .   LE doppler shows no DVT.  pain control with IV morphine/percocet prn.      dyspnea possibly related to OHS vs RUSSELL with suspicion of fluid overload with LVS dysfunction/ ruled out Covid-19 pneumonia/ ruled out PE:  continue BIPAP prn and HS/ NC O2 day time to maintain SPO2 92-94%.  BNP low but could be falsely low due to obesity/CXR with increased interstitial markings fro admission  repeat CXR.  increase lasix to 40mg ivp bid/ monitor I&O's/monitor electrolytes/ keep K>4, mag >2.   check TTE.   sleep study as OP and pulm eval as OP      Diabetes mellitus 2:   BS controlled with range of 90's to 124.   continue monitoring/ ISS/DC insulin lantus.     HTN  BP controlled.   continue losartan.    DVT prophylaxis  -On lovenox      Progress note handoff:   pending: clinical improvement of dyspnea/RLE ulcer on IV ABX  disposition: unknown at this time  discussion: plan of care with patient- satisfied

## 2020-04-23 NOTE — PROGRESS NOTE ADULT - ASSESSMENT
54 y.o man with hx of RUSSELL, htn comes in for right shooting foot pain caused by non healing wound. Symptoms were also associated with low grade fever and some shortness of breath. Was admitted for cellulitis and to r/o COVID, PCR was negative.     # Right foot cellulitis  - non healing right wound on the external malleolus  - with superimposed lymphedema  - ID recommend to switch him to cefazolin + silvadene cream  - Will keep on IV lasix for now   - f/u blood cx - no growth to date  - f/u 48-hr trend in crp and procal  - tylenol 650 po mg PRN for pain/fever   - Podiatry consult appreciated  - If necessary Vascular surgery consult  - [facility] duplex venous: no DVTs  - COVID PCR was negative    #New headache  -CTH: Overall paucity of sulcal demarcation and small size of the lateral ventricles can reflect cerebral edema versus normal variation. If symptoms persist consider MRI for further evaluation.    # RUSSELL:  -Obesity hyperventilation syndrome vs Sleep apnea.  -uses CPAP at home. Follow Dr. Eastman outpatient  -COVID PCR negative  -CT angio negative for PE, no ground glass opacities    # Diabetes  -Will keep on Insulin Regimen   -basal / bolus / pre meals insulin     # HTN  - will resume losartan today     # DVT prophylaxis  -On lovenox    # PPI prophylasxis:  - not needed    FULL CODE  diet: DASH / TLC / consistent carb diet   dispo: acute 54 y.o man with hx of RUSSELL, htn comes in for right shooting foot pain caused by non healing wound. Symptoms were also associated with low grade fever and some shortness of breath. Was admitted for cellulitis and to r/o COVID, PCR was negative.     # Right foot cellulitis  - non healing right wound on the external malleolus  - with superimposed lymphedema  - ID recommend to switch him to cefazolin + silvadene cream  - Will keep on IV lasix for now   - f/u blood cx - no growth to date  - f/u 48-hr trend in crp and procal  - tylenol 650 po mg PRN for pain/fever   - Podiatry consult appreciated  - If necessary Vascular surgery consult  - VA duplex venous: no DVTs  - COVID PCR was negative    #New headache  -CTH: Overall paucity of sulcal demarcation and small size of the lateral ventricles can reflect cerebral edema versus normal variation. If symptoms persist consider MRI for further evaluation.  - f/u neuro consult  - f/u MR head NC    # RUSSELL:  -Obesity hyperventilation syndrome vs Sleep apnea.  -uses CPAP at home. Follow Dr. Eastman outpatient  -COVID PCR negative  -CT angio negative for PE, no ground glass opacities    # Diabetes  -Will keep on Insulin Regimen   -basal / bolus / pre meals insulin     # HTN  - will resume losartan today   - lasix 40 mg IV bid    # DVT prophylaxis  -On lovenox    # PPI prophylasxis:  - not needed    FULL CODE  diet: DASH / TLC / consistent carb diet   dispo: acute

## 2020-04-24 LAB
ALBUMIN SERPL ELPH-MCNC: 3.8 G/DL — SIGNIFICANT CHANGE UP (ref 3.5–5.2)
ALP SERPL-CCNC: 85 U/L — SIGNIFICANT CHANGE UP (ref 30–115)
ALT FLD-CCNC: 13 U/L — SIGNIFICANT CHANGE UP (ref 0–41)
ANION GAP SERPL CALC-SCNC: 16 MMOL/L — HIGH (ref 7–14)
AST SERPL-CCNC: 13 U/L — SIGNIFICANT CHANGE UP (ref 0–41)
BASOPHILS # BLD AUTO: 0.06 K/UL — SIGNIFICANT CHANGE UP (ref 0–0.2)
BASOPHILS NFR BLD AUTO: 0.5 % — SIGNIFICANT CHANGE UP (ref 0–1)
BILIRUB SERPL-MCNC: 0.3 MG/DL — SIGNIFICANT CHANGE UP (ref 0.2–1.2)
BUN SERPL-MCNC: 11 MG/DL — SIGNIFICANT CHANGE UP (ref 10–20)
CALCIUM SERPL-MCNC: 8.5 MG/DL — SIGNIFICANT CHANGE UP (ref 8.5–10.1)
CHLORIDE SERPL-SCNC: 98 MMOL/L — SIGNIFICANT CHANGE UP (ref 98–110)
CO2 SERPL-SCNC: 26 MMOL/L — SIGNIFICANT CHANGE UP (ref 17–32)
CREAT SERPL-MCNC: 0.8 MG/DL — SIGNIFICANT CHANGE UP (ref 0.7–1.5)
CRP SERPL-MCNC: 34.38 MG/DL — HIGH (ref 0–0.4)
EOSINOPHIL # BLD AUTO: 0.07 K/UL — SIGNIFICANT CHANGE UP (ref 0–0.7)
EOSINOPHIL NFR BLD AUTO: 0.6 % — SIGNIFICANT CHANGE UP (ref 0–8)
GLUCOSE SERPL-MCNC: 144 MG/DL — HIGH (ref 70–99)
HCT VFR BLD CALC: 46.7 % — SIGNIFICANT CHANGE UP (ref 42–52)
HGB BLD-MCNC: 15.3 G/DL — SIGNIFICANT CHANGE UP (ref 14–18)
IMM GRANULOCYTES NFR BLD AUTO: 0.6 % — HIGH (ref 0.1–0.3)
LYMPHOCYTES # BLD AUTO: 1.32 K/UL — SIGNIFICANT CHANGE UP (ref 1.2–3.4)
LYMPHOCYTES # BLD AUTO: 10.5 % — LOW (ref 20.5–51.1)
MAGNESIUM SERPL-MCNC: 2.5 MG/DL — HIGH (ref 1.8–2.4)
MCHC RBC-ENTMCNC: 27.7 PG — SIGNIFICANT CHANGE UP (ref 27–31)
MCHC RBC-ENTMCNC: 32.8 G/DL — SIGNIFICANT CHANGE UP (ref 32–37)
MCV RBC AUTO: 84.4 FL — SIGNIFICANT CHANGE UP (ref 80–94)
MONOCYTES # BLD AUTO: 0.86 K/UL — HIGH (ref 0.1–0.6)
MONOCYTES NFR BLD AUTO: 6.9 % — SIGNIFICANT CHANGE UP (ref 1.7–9.3)
NEUTROPHILS # BLD AUTO: 10.14 K/UL — HIGH (ref 1.4–6.5)
NEUTROPHILS NFR BLD AUTO: 80.9 % — HIGH (ref 42.2–75.2)
NRBC # BLD: 0 /100 WBCS — SIGNIFICANT CHANGE UP (ref 0–0)
PLATELET # BLD AUTO: 229 K/UL — SIGNIFICANT CHANGE UP (ref 130–400)
POTASSIUM SERPL-MCNC: 3.9 MMOL/L — SIGNIFICANT CHANGE UP (ref 3.5–5)
POTASSIUM SERPL-SCNC: 3.9 MMOL/L — SIGNIFICANT CHANGE UP (ref 3.5–5)
PROT SERPL-MCNC: 6.7 G/DL — SIGNIFICANT CHANGE UP (ref 6–8)
RBC # BLD: 5.53 M/UL — SIGNIFICANT CHANGE UP (ref 4.7–6.1)
RBC # FLD: 15 % — HIGH (ref 11.5–14.5)
SODIUM SERPL-SCNC: 140 MMOL/L — SIGNIFICANT CHANGE UP (ref 135–146)
WBC # BLD: 12.52 K/UL — HIGH (ref 4.8–10.8)
WBC # FLD AUTO: 12.52 K/UL — HIGH (ref 4.8–10.8)

## 2020-04-24 PROCEDURE — 72141 MRI NECK SPINE W/O DYE: CPT | Mod: 26

## 2020-04-24 PROCEDURE — 73701 CT LOWER EXTREMITY W/DYE: CPT | Mod: 26,RT

## 2020-04-24 PROCEDURE — 99233 SBSQ HOSP IP/OBS HIGH 50: CPT

## 2020-04-24 PROCEDURE — 99222 1ST HOSP IP/OBS MODERATE 55: CPT

## 2020-04-24 RX ORDER — DIPHENHYDRAMINE HCL 50 MG
50 CAPSULE ORAL ONCE
Refills: 0 | Status: DISCONTINUED | OUTPATIENT
Start: 2020-04-24 | End: 2020-04-24

## 2020-04-24 RX ORDER — DIPHENHYDRAMINE HCL 50 MG
50 CAPSULE ORAL ONCE
Refills: 0 | Status: COMPLETED | OUTPATIENT
Start: 2020-04-24 | End: 2020-04-24

## 2020-04-24 RX ORDER — METHOCARBAMOL 500 MG/1
1500 TABLET, FILM COATED ORAL
Refills: 0 | Status: DISCONTINUED | OUTPATIENT
Start: 2020-04-24 | End: 2020-04-28

## 2020-04-24 RX ORDER — VANCOMYCIN HCL 1 G
1000 VIAL (EA) INTRAVENOUS ONCE
Refills: 0 | Status: DISCONTINUED | OUTPATIENT
Start: 2020-04-24 | End: 2020-04-24

## 2020-04-24 RX ORDER — LINEZOLID 600 MG/300ML
600 INJECTION, SOLUTION INTRAVENOUS EVERY 12 HOURS
Refills: 0 | Status: DISCONTINUED | OUTPATIENT
Start: 2020-04-24 | End: 2020-04-28

## 2020-04-24 RX ORDER — CEFEPIME 1 G/1
2000 INJECTION, POWDER, FOR SOLUTION INTRAMUSCULAR; INTRAVENOUS EVERY 12 HOURS
Refills: 0 | Status: DISCONTINUED | OUTPATIENT
Start: 2020-04-24 | End: 2020-04-25

## 2020-04-24 RX ORDER — CARBAMAZEPINE 200 MG
200 TABLET ORAL
Refills: 0 | Status: DISCONTINUED | OUTPATIENT
Start: 2020-04-24 | End: 2020-04-24

## 2020-04-24 RX ORDER — OXYCODONE HYDROCHLORIDE 5 MG/1
15 TABLET ORAL EVERY 4 HOURS
Refills: 0 | Status: DISCONTINUED | OUTPATIENT
Start: 2020-04-24 | End: 2020-04-25

## 2020-04-24 RX ORDER — METRONIDAZOLE 500 MG
500 TABLET ORAL EVERY 8 HOURS
Refills: 0 | Status: DISCONTINUED | OUTPATIENT
Start: 2020-04-24 | End: 2020-04-25

## 2020-04-24 RX ORDER — VANCOMYCIN HCL 1 G
2000 VIAL (EA) INTRAVENOUS ONCE
Refills: 0 | Status: COMPLETED | OUTPATIENT
Start: 2020-04-24 | End: 2020-04-24

## 2020-04-24 RX ADMIN — Medication 650 MILLIGRAM(S): at 22:38

## 2020-04-24 RX ADMIN — Medication 100 MILLIGRAM(S): at 12:43

## 2020-04-24 RX ADMIN — Medication 650 MILLIGRAM(S): at 04:08

## 2020-04-24 RX ADMIN — Medication 50 MILLIGRAM(S): at 15:03

## 2020-04-24 RX ADMIN — LINEZOLID 300 MILLIGRAM(S): 600 INJECTION, SOLUTION INTRAVENOUS at 19:33

## 2020-04-24 RX ADMIN — Medication 650 MILLIGRAM(S): at 10:44

## 2020-04-24 RX ADMIN — OXYCODONE HYDROCHLORIDE 10 MILLIGRAM(S): 5 TABLET ORAL at 19:33

## 2020-04-24 RX ADMIN — METHOCARBAMOL 750 MILLIGRAM(S): 500 TABLET, FILM COATED ORAL at 05:36

## 2020-04-24 RX ADMIN — OXYCODONE HYDROCHLORIDE 15 MILLIGRAM(S): 5 TABLET ORAL at 20:19

## 2020-04-24 RX ADMIN — LOSARTAN POTASSIUM 100 MILLIGRAM(S): 100 TABLET, FILM COATED ORAL at 05:36

## 2020-04-24 RX ADMIN — OXYCODONE HYDROCHLORIDE 10 MILLIGRAM(S): 5 TABLET ORAL at 10:43

## 2020-04-24 RX ADMIN — METHOCARBAMOL 1500 MILLIGRAM(S): 500 TABLET, FILM COATED ORAL at 23:16

## 2020-04-24 RX ADMIN — Medication 1 APPLICATION(S): at 16:29

## 2020-04-24 RX ADMIN — PANTOPRAZOLE SODIUM 40 MILLIGRAM(S): 20 TABLET, DELAYED RELEASE ORAL at 05:38

## 2020-04-24 RX ADMIN — Medication 400 MILLIGRAM(S): at 22:30

## 2020-04-24 RX ADMIN — CHLORHEXIDINE GLUCONATE 1 APPLICATION(S): 213 SOLUTION TOPICAL at 05:35

## 2020-04-24 RX ADMIN — OXYCODONE HYDROCHLORIDE 10 MILLIGRAM(S): 5 TABLET ORAL at 05:38

## 2020-04-24 RX ADMIN — METHOCARBAMOL 1500 MILLIGRAM(S): 500 TABLET, FILM COATED ORAL at 17:17

## 2020-04-24 RX ADMIN — SENNA PLUS 1 TABLET(S): 8.6 TABLET ORAL at 22:16

## 2020-04-24 RX ADMIN — Medication 100 MILLIGRAM(S): at 22:16

## 2020-04-24 RX ADMIN — Medication 100 MILLIGRAM(S): at 05:36

## 2020-04-24 RX ADMIN — Medication 40 MILLIGRAM(S): at 19:33

## 2020-04-24 RX ADMIN — Medication 650 MILLIGRAM(S): at 16:22

## 2020-04-24 RX ADMIN — OXYCODONE HYDROCHLORIDE 10 MILLIGRAM(S): 5 TABLET ORAL at 04:12

## 2020-04-24 RX ADMIN — CEFEPIME 100 MILLIGRAM(S): 1 INJECTION, POWDER, FOR SOLUTION INTRAMUSCULAR; INTRAVENOUS at 19:34

## 2020-04-24 RX ADMIN — ENOXAPARIN SODIUM 40 MILLIGRAM(S): 100 INJECTION SUBCUTANEOUS at 22:16

## 2020-04-24 RX ADMIN — Medication 40 MILLIGRAM(S): at 05:36

## 2020-04-24 RX ADMIN — OXYCODONE HYDROCHLORIDE 15 MILLIGRAM(S): 5 TABLET ORAL at 15:27

## 2020-04-24 RX ADMIN — Medication 250 MILLIGRAM(S): at 13:00

## 2020-04-24 NOTE — PHYSICAL THERAPY INITIAL EVALUATION ADULT - SPECIFY REASON(S)
Patient refused to participate in PT evaluation despite max encouragement. State his RLE is very swollen and painful. States  "I will not get up until swelling goes down".

## 2020-04-24 NOTE — PROGRESS NOTE ADULT - SUBJECTIVE AND OBJECTIVE BOX
JOSE ALBERTO NO  54y, Male    All available historical data reviewed    OVERNIGHT EVENTS:    no fevers  has pain RLE  ROS:  General: Denies rigors, nightsweats  HEENT: Denies headache, rhinorrhea, sore throat, eye pain  CV: Denies CP, palpitations  PULM: Denies wheezing, hemoptysis  GI: Denies hematemesis, hematochezia, melena  : Denies discharge, hematuria  MSK: pain RLE  SKIN: Denies rash, lesions  NEURO: + weakness  PSYCH: Denies depression, anxiety    VITALS:  T(F): 98.2, Max: 98.2 (04-24-20 @ 13:32)  HR: 102  BP: 115/58  RR: 18Vital Signs Last 24 Hrs  T(C): 36.8 (24 Apr 2020 13:32), Max: 36.8 (24 Apr 2020 13:32)  T(F): 98.2 (24 Apr 2020 13:32), Max: 98.2 (24 Apr 2020 13:32)  HR: 102 (24 Apr 2020 13:32) (97 - 111)  BP: 115/58 (24 Apr 2020 13:32) (115/58 - 158/88)  BP(mean): --  RR: 18 (24 Apr 2020 13:32) (18 - 18)  SpO2: --    TESTS & MEASUREMENTS:                        15.3   12.52 )-----------( 229      ( 24 Apr 2020 06:11 )             46.7     04-24    140  |  98  |  11  ----------------------------<  144<H>  3.9   |  26  |  0.8    Ca    8.5      24 Apr 2020 06:11  Mg     2.5     04-24    TPro  6.7  /  Alb  3.8  /  TBili  0.3  /  DBili  x   /  AST  13  /  ALT  13  /  AlkPhos  85  04-24    LIVER FUNCTIONS - ( 24 Apr 2020 06:11 )  Alb: 3.8 g/dL / Pro: 6.7 g/dL / ALK PHOS: 85 U/L / ALT: 13 U/L / AST: 13 U/L / GGT: x             Culture - Blood (collected 04-22-20 @ 07:19)  Source: .Blood None  Preliminary Report (04-23-20 @ 17:02):    No growth to date.    Culture - Blood (collected 04-21-20 @ 15:53)  Source: .Blood Blood-Peripheral  Preliminary Report (04-22-20 @ 22:01):    No growth to date.    Culture - Blood (collected 04-21-20 @ 15:30)  Source: .Blood Blood-Peripheral  Preliminary Report (04-22-20 @ 22:01):    No growth to date.            RADIOLOGY & ADDITIONAL TESTS:  Personal review of radiological diagnostics performed  Echo and EKG results noted when applicable.     MEDICATIONS:  acetaminophen   Tablet .. 650 milliGRAM(s) Oral every 6 hours PRN  aluminum hydroxide/magnesium hydroxide/simethicone Suspension 30 milliLiter(s) Oral every 4 hours PRN  cefepime   IVPB 2000 milliGRAM(s) IV Intermittent every 12 hours  chlorhexidine 4% Liquid 1 Application(s) Topical <User Schedule>  collagenase Ointment 1 Application(s) Topical daily  dextrose 40% Gel 15 Gram(s) Oral once PRN  dextrose 5%. 1000 milliLiter(s) IV Continuous <Continuous>  dextrose 50% Injectable 12.5 Gram(s) IV Push once  dextrose 50% Injectable 25 Gram(s) IV Push once  dextrose 50% Injectable 25 Gram(s) IV Push once  enoxaparin Injectable 40 milliGRAM(s) SubCutaneous at bedtime  furosemide   Injectable 40 milliGRAM(s) IV Push every 12 hours  glucagon  Injectable 1 milliGRAM(s) IntraMuscular once PRN  ibuprofen  Tablet. 400 milliGRAM(s) Oral every 6 hours PRN  insulin lispro (HumaLOG) corrective regimen sliding scale   SubCutaneous three times a day before meals  linezolid  IVPB 600 milliGRAM(s) IV Intermittent every 12 hours  losartan 100 milliGRAM(s) Oral daily  methocarbamol 1500 milliGRAM(s) Oral four times a day  metroNIDAZOLE  IVPB 500 milliGRAM(s) IV Intermittent every 8 hours  oxyCODONE    IR 15 milliGRAM(s) Oral every 4 hours PRN  oxyCODONE  ER Tablet 10 milliGRAM(s) Oral every 12 hours  pantoprazole    Tablet 40 milliGRAM(s) Oral before breakfast  polyethylene glycol 3350 17 Gram(s) Oral daily  senna 1 Tablet(s) Oral at bedtime      ANTIBIOTICS:  cefepime   IVPB 2000 milliGRAM(s) IV Intermittent every 12 hours  linezolid  IVPB 600 milliGRAM(s) IV Intermittent every 12 hours  metroNIDAZOLE  IVPB 500 milliGRAM(s) IV Intermittent every 8 hours

## 2020-04-24 NOTE — PROGRESS NOTE ADULT - ASSESSMENT
54 y.o man with hx of RUSSELL, htn comes in for right shooting foot pain caused by non healing wound. Symptoms were also associated with low grade fever and some shortness of breath. Was admitted for cellulitis and to r/o COVID, PCR was negative.     # Right foot cellulitis, R/o underlying collection/abscess ?necrotizing fasciitis (less likely)  - non healing right wound on the external malleolus  - with superimposed lymphedema  - CT RLE to r/o abscess, Burn c/s  -gave pt Vanco 2gm, d/w ID: Zyvox IV + Cefepime IV (PCN allergy) + Flagyl IV   - f/u blood cx - no growth to date  - tylenol 650 po mg PRN for pain/fever   - Podiatry consult appreciated  - VA duplex venous: no DVTs  - COVID PCR was negative  - f/u ID recs    #Headache ?cervicalgia  -CTH: Overall paucity of sulcal demarcation and small size of the lateral ventricles can reflect cerebral edema versus normal variation. If symptoms persist consider MRI for further evaluation.  - neuro recs appreciated: MR C-spine NC, carbamazepine 200mg bid  - MR head NC: unremarkable  - f/u MR C-spine  -cont Robaxin, may give Motrin PRN    # RUSSELL:  -uses CPAP at home. Follow Dr. Eastman outpatient  -COVID PCR negative  -CT angio negative for PE, no ground glass opacities    # Diabetes  -Will keep on Insulin Regimen   -basal / bolus / pre meals insulin     # HTN  - will resume losartan today   - lasix 40 mg IV bid    #Morbid Obesity  -counselled    # DVT prophylaxis  -On lovenox    # PPI prophylasxis:  - not needed    FULL CODE  diet: DASH / TLC / consistent carb diet   dispo: acute    #Progress Note Handoff  Pending (specify):  Consults__Burn, ID__Clinical improvement and stability_____Tests___CT RLE_____PT___x_____  Pt/Family discussion: Pt informed and agrees with the current plan  Disposition: Home______/SNF_______/To be determined________

## 2020-04-24 NOTE — PROGRESS NOTE ADULT - ASSESSMENT
· Assessment		  IMPRESSION;   Progressive RLE cellulitis superimposed on chronic lymphedema b/l  chronic venous stasis ulcer RLE distally with no evidence of localized abscess, fascitis  BCx 4/21,22 NG  COVID-19 : NG     RECOMMENDATIONS;   local silverdene cream to LEs b/l  Zyvox 600 mg iv q12h  cefepime 2 gm iv q8h  d/c Ancef

## 2020-04-24 NOTE — PROGRESS NOTE ADULT - SUBJECTIVE AND OBJECTIVE BOX
HPI:  54 year-old male history of RUSSELL, HTN, intervertebral disc disease, status post placement of spinal cord stimulator in july 2017 subsequently developed several episodes of infection at site of the stimulator and removal of spinal stimulator and discharged on intravenous antibiotics via a PICC line presents to the ED with complains of worsening right sided leg pain. As per the patient he had injured his right calf when getting off the bed and developed non healing wound couple of months ago. The wife has been helping him changing his dressing on both legs for lower extremity stasis ulcers for both legs. He follows podiatry at [facility] clinic. He says the pain in his left leg is getting worse to the point that he is unable to ambulate. He has chronic back pain that is also getting worse mostly because he has been laying in bed. He recognizes that his swelling in his right leg specially has gotten worse even though he has been taking his water pill. He noted clear discharge with foul smell from both of his lower extremity wounds. The pain is severe in his right calf, upper medial thigh specially when he extends and flexes the led. He noted low grade fevers at home, worsening shortness of breath.   He denies any recent contact with sick people, has been taking precautions for COVID and had limited his exposure to outside. (21 Apr 2020 19:15)    Currently admitted to medicine with the primary diagnosis of Cellulitis     Today is hospital day 3d.     INTERVAL HPI / OVERNIGHT EVENTS:  Patient was examined and seen at bedside. This morning he is resting comfortably in bed and reports no new issues or overnight events.     ROS: Otherwise unremarkable     PAST MEDICAL & SURGICAL HISTORY  DM (diabetes mellitus)  Morbid obesity  Obstructive sleep apnea  Disc disease, degenerative, lumbar or lumbosacral  Hypertension  History of excision of pilonidal cyst  H/O arthroscopy of right knee  Spinal cord stimulator status    ALLERGIES  IV Contrast (Unknown)  penicillin (Unknown)    MEDICATIONS  STANDING MEDICATIONS  carBAMazepine 200 milliGRAM(s) Oral two times a day  chlorhexidine 4% Liquid 1 Application(s) Topical <User Schedule>  clindamycin IVPB      clindamycin IVPB 600 milliGRAM(s) IV Intermittent every 8 hours  collagenase Ointment 1 Application(s) Topical daily  dextrose 5%. 1000 milliLiter(s) IV Continuous <Continuous>  dextrose 50% Injectable 12.5 Gram(s) IV Push once  dextrose 50% Injectable 25 Gram(s) IV Push once  dextrose 50% Injectable 25 Gram(s) IV Push once  diphenhydrAMINE   Injectable 50 milliGRAM(s) IV Push once  enoxaparin Injectable 40 milliGRAM(s) SubCutaneous at bedtime  furosemide   Injectable 40 milliGRAM(s) IV Push every 12 hours  insulin lispro (HumaLOG) corrective regimen sliding scale   SubCutaneous three times a day before meals  losartan 100 milliGRAM(s) Oral daily  methocarbamol 750 milliGRAM(s) Oral two times a day  oxyCODONE  ER Tablet 10 milliGRAM(s) Oral every 12 hours  pantoprazole    Tablet 40 milliGRAM(s) Oral before breakfast  polyethylene glycol 3350 17 Gram(s) Oral daily  senna 1 Tablet(s) Oral at bedtime    PRN MEDICATIONS  acetaminophen   Tablet .. 650 milliGRAM(s) Oral every 6 hours PRN  aluminum hydroxide/magnesium hydroxide/simethicone Suspension 30 milliLiter(s) Oral every 4 hours PRN  dextrose 40% Gel 15 Gram(s) Oral once PRN  glucagon  Injectable 1 milliGRAM(s) IntraMuscular once PRN  ibuprofen  Tablet. 400 milliGRAM(s) Oral every 6 hours PRN  oxyCODONE    IR 15 milliGRAM(s) Oral every 4 hours PRN    VITALS:  T(F): 98.2  HR: 102  BP: 115/58  RR: 18  SpO2: --    PHYSICAL EXAM  GEN: NAD, Resting comfortably in bed  PULM: Clear to auscultation bilaterally, No wheezes  CVS: Regular rate and rhythm, S1-S2, no murmurs  ABD: Soft, non-tender, non-distended, no guarding  EXT: b/l edema, erythema, tenderness on RLE extending up to thigh  NEURO: A&Ox3, no focal deficits    LABS                        15.3   12.52 )-----------( 229      ( 24 Apr 2020 06:11 )             46.7     04-24    140  |  98  |  11  ----------------------------<  144<H>  3.9   |  26  |  0.8    Ca    8.5      24 Apr 2020 06:11  Mg     2.5     04-24    TPro  6.7  /  Alb  3.8  /  TBili  0.3  /  DBili  x   /  AST  13  /  ALT  13  /  AlkPhos  85  04-24              Culture - Blood (collected 22 Apr 2020 07:19)  Source: .Blood None  Preliminary Report (23 Apr 2020 17:02):    No growth to date.    Culture - Blood (collected 21 Apr 2020 15:53)  Source: .Blood Blood-Peripheral  Preliminary Report (22 Apr 2020 22:01):    No growth to date.    Culture - Blood (collected 21 Apr 2020 15:30)  Source: .Blood Blood-Peripheral  Preliminary Report (22 Apr 2020 22:01):    No growth to date.          RADIOLOGY  < from: MR Head No Cont (04.23.20 @ 16:24) >    EXAM:  MR BRAIN            PROCEDURE DATE:  04/23/2020            INTERPRETATION:  Clinical History / Reason for exam: New worsening headache.    TECHNIQUE: MRI brain without contrast. Multiplanar multisequential MRI of the brain without intravenous contrast administration on the 3 Aileen magnet.     Correlation with CT head performed earlier the same day.    FINDINGS:    There is small size of the ventricles and cortical sulci without evidence for mass effect.     There are scattered small fociof T2/FLAIR signal hyperintensity within the cerebral hemispheric white matter, nonspecific and likely on the basis of chronic microvascular change.    There is no evidence of acute intracranial hemorrhage, extra-axial fluid collection or midline shift.       There is no diffusion abnormality to suggest acute/subacute infarct. There is normal flow void present within the major vascular structures.      There is opacification of the right mastoid air cells and a left maxillary sinus polyp or retention cyst.    IMPRESSION:     1.  No evidence of recent infarct or acute intracranial hemorrhage.    2.  Mild chronic microvascular changes.    3.  Small size of the ventricles and cortical sulci without evidence of mass effect likely on the basis of normal variation.    4.  Right mastoid effusion, correlate clinically.        ATIYA MUNGUIA M.D., ATTENDING RADIOLOGIST  This document has been electronically signed. Apr 23 2020  4:31PM    < end of copied text > HPI:  54 year-old male history of RUSSELL, HTN, intervertebral disc disease, status post placement of spinal cord stimulator in july 2017 subsequently developed several episodes of infection at site of the stimulator and removal of spinal stimulator and discharged on intravenous antibiotics via a PICC line presents to the ED with complains of worsening right sided leg pain. As per the patient he had injured his right calf when getting off the bed and developed non healing wound couple of months ago. The wife has been helping him changing his dressing on both legs for lower extremity stasis ulcers for both legs. He follows podiatry at [facility]. He says the pain in his left leg is getting worse to the point that he is unable to ambulate. He has chronic back pain that is also getting worse mostly because he has been laying in bed. He recognizes that his swelling in his right leg specially has gotten worse even though he has been taking his water pill. He noted clear discharge with foul smell from both of his lower extremity wounds. The pain is severe in his right calf, upper medial thigh specially when he extends and flexes the led. He noted low grade fevers at home, worsening shortness of breath.   He denies any recent contact with sick people, has been taking precautions for COVID and had limited his exposure to outside. (21 Apr 2020 19:15)    Currently admitted to medicine with the primary diagnosis of Cellulitis     Today is hospital day 3d.     INTERVAL HPI / OVERNIGHT EVENTS:  Patient was examined and seen at bedside. This morning he is resting comfortably in bed and reports no new issues or overnight events.     ROS: Otherwise unremarkable     PAST MEDICAL & SURGICAL HISTORY  DM (diabetes mellitus)  Morbid obesity  Obstructive sleep apnea  Disc disease, degenerative, lumbar or lumbosacral  Hypertension  History of excision of pilonidal cyst  H/O arthroscopy of right knee  Spinal cord stimulator status    ALLERGIES  IV Contrast (Unknown)  penicillin (Unknown)    MEDICATIONS  STANDING MEDICATIONS  carBAMazepine 200 milliGRAM(s) Oral two times a day  chlorhexidine 4% Liquid 1 Application(s) Topical <User Schedule>  clindamycin IVPB      clindamycin IVPB 600 milliGRAM(s) IV Intermittent every 8 hours  collagenase Ointment 1 Application(s) Topical daily  dextrose 5%. 1000 milliLiter(s) IV Continuous <Continuous>  dextrose 50% Injectable 12.5 Gram(s) IV Push once  dextrose 50% Injectable 25 Gram(s) IV Push once  dextrose 50% Injectable 25 Gram(s) IV Push once  diphenhydrAMINE   Injectable 50 milliGRAM(s) IV Push once  enoxaparin Injectable 40 milliGRAM(s) SubCutaneous at bedtime  furosemide   Injectable 40 milliGRAM(s) IV Push every 12 hours  insulin lispro (HumaLOG) corrective regimen sliding scale   SubCutaneous three times a day before meals  losartan 100 milliGRAM(s) Oral daily  methocarbamol 750 milliGRAM(s) Oral two times a day  oxyCODONE  ER Tablet 10 milliGRAM(s) Oral every 12 hours  pantoprazole    Tablet 40 milliGRAM(s) Oral before breakfast  polyethylene glycol 3350 17 Gram(s) Oral daily  senna 1 Tablet(s) Oral at bedtime    PRN MEDICATIONS  acetaminophen   Tablet .. 650 milliGRAM(s) Oral every 6 hours PRN  aluminum hydroxide/magnesium hydroxide/simethicone Suspension 30 milliLiter(s) Oral every 4 hours PRN  dextrose 40% Gel 15 Gram(s) Oral once PRN  glucagon  Injectable 1 milliGRAM(s) IntraMuscular once PRN  ibuprofen  Tablet. 400 milliGRAM(s) Oral every 6 hours PRN  oxyCODONE    IR 15 milliGRAM(s) Oral every 4 hours PRN    VITALS:  T(F): 98.2  HR: 102  BP: 115/58  RR: 18  SpO2: --    PHYSICAL EXAM  GEN: NAD, Resting comfortably in bed  PULM: Clear to auscultation bilaterally, No wheezes  CVS: Regular rate and rhythm, S1-S2, no murmurs  ABD: Soft, non-tender, non-distended, no guarding  EXT: b/l edema, erythema, tenderness on RLE extending up to thigh  NEURO: A&Ox3, no focal deficits    LABS                        15.3   12.52 )-----------( 229      ( 24 Apr 2020 06:11 )             46.7     04-24    140  |  98  |  11  ----------------------------<  144<H>  3.9   |  26  |  0.8    Ca    8.5      24 Apr 2020 06:11  Mg     2.5     04-24    TPro  6.7  /  Alb  3.8  /  TBili  0.3  /  DBili  x   /  AST  13  /  ALT  13  /  AlkPhos  85  04-24          Culture - Blood (collected 22 Apr 2020 07:19)  Source: .Blood None  Preliminary Report (23 Apr 2020 17:02):    No growth to date.    Culture - Blood (collected 21 Apr 2020 15:53)  Source: .Blood Blood-Peripheral  Preliminary Report (22 Apr 2020 22:01):    No growth to date.    Culture - Blood (collected 21 Apr 2020 15:30)  Source: .Blood Blood-Peripheral  Preliminary Report (22 Apr 2020 22:01):    No growth to date.      RADIOLOGY  < from: MR Head No Cont (04.23.20 @ 16:24) >    EXAM:  MR BRAIN            PROCEDURE DATE:  04/23/2020            INTERPRETATION:  Clinical History / Reason for exam: New worsening headache.    TECHNIQUE: MRI brain without contrast. Multiplanar multisequential MRI of the brain without intravenous contrast administration on the 3 Aileen magnet.     Correlation with CT head performed earlier the same day.    FINDINGS:    There is small size of the ventricles and cortical sulci without evidence for mass effect.     There are scattered small fociof T2/FLAIR signal hyperintensity within the cerebral hemispheric white matter, nonspecific and likely on the basis of chronic microvascular change.    There is no evidence of acute intracranial hemorrhage, extra-axial fluid collection or midline shift.       There is no diffusion abnormality to suggest acute/subacute infarct. There is normal flow void present within the major vascular structures.      There is opacification of the right mastoid air cells and a left maxillary sinus polyp or retention cyst.    IMPRESSION:     1.  No evidence of recent infarct or acute intracranial hemorrhage.    2.  Mild chronic microvascular changes.    3.  Small size of the ventricles and cortical sulci without evidence of mass effect likely on the basis of normal variation.    4.  Right mastoid effusion, correlate clinically.        ATIYA MUNGUIA M.D., ATTENDING RADIOLOGIST  This document has been electronically signed. Apr 23 2020  4:31PM    < end of copied text >

## 2020-04-24 NOTE — CONSULT NOTE ADULT - SUBJECTIVE AND OBJECTIVE BOX
JOSE ALBERTO NO     54y     Male    MRN-[id number]                                                           CC:Patient is a 54y old  Male who presents with a chief complaint of cellulitis (23 Apr 2020 13:52)      HPI:  54 year-old male history of RUSSELL, HTN, intervertebral disc disease, status post placement of spinal cord stimulator in july 2017 subsequently developed several episodes of infection at site of the stimulator and removal of spinal stimulator and discharged on intravenous antibiotics via a PICC line presents to the ED with complains of worsening right sided leg pain. As per the patient he had injured his right calf when getting off the bed and developed non healing wound couple of months ago. The wife has been helping him changing his dressing on both legs for lower extremity stasis ulcers for both legs. He follows podiatry at [facility] clinic. He says the pain in his left leg is getting worse to the point that he is unable to ambulate. He has chronic back pain that is also getting worse mostly because he has been laying in bed. He recognizes that his swelling in his right leg specially has gotten worse even though he has been taking his water pill. He noted clear discharge with foul smell from both of his lower extremity wounds. The pain is severe in his right calf, upper medial thigh specially when he extends and flexes the led. He noted low grade fevers at home, worsening shortness of breath.   He denies any recent contact with sick people, has been taking precautions for COVID and had limited his exposure to outside. (21 Apr 2020 19:15)    Patient seen and examined and history reviewed with the patient.  Says the pain he is getting is on the top of his head along the midline b/l and fluctuating from side to side.  Descirbes as lightening stabbing pain lasting few seconds and then disappearing.  Increasing in frequency over last 2 days.    ROS:  Constitutional, Neurological, Psychiatric, Eyes, ENT, Cardiovascular, Respiratory, Gastrointestinal, Genitourinary, Musculoskeletal, Integumentary, Endocrine and Heme/Lymph are otherwise negative. Except for      FAMILY HISTORY:  Family history of diabetes mellitus in mother (Mother)  Family history of early CAD (Father)      HEALTH ISSUES - PROBLEM Dx:  Suspected deep vein thrombosis (DVT)          Vital Signs Last 24 Hrs  T(C): 35.5 (24 Apr 2020 05:39), Max: 36 (23 Apr 2020 21:30)  T(F): 95.9 (24 Apr 2020 05:39), Max: 96.8 (23 Apr 2020 21:30)  HR: 111 (24 Apr 2020 05:39) (97 - 111)  BP: 128/68 (24 Apr 2020 05:39) (128/68 - 158/88)  BP(mean): --  RR: 18 (24 Apr 2020 05:39) (18 - 19)  SpO2: --    Physical Exam:  Constitutional: alert and in no acute distress.  Eyes: the sclera and conjunctiva were normal, pupils were equal in size, round, reactive to light, with normal accommodation and extraocular movements were intact.   Back: no costovertebral angle tenderness and positive spinal tenderness, edema and excoriations in L>R LE      Neuro Exam:  Orientation: oriented to person, oriented to place and oriented to time.   Attention: normal concentrating ability and visual attention was not decreased.   Language: no difficulty naming common objects, no difficulty repeating a phrase, no difficulty writing a sentence, fluency intact, comprehension intact and reading intact.   Fund of knowledge: displays adequate knowledge of personal past history.   Cranial Nerves: visual acuity intact bilaterally, visual fields full to confrontation, pupils equal round and reactive to light, extraocular motion intact, facial sensation intact symmetrically, face symmetrical, hearing was intact bilaterally, tongue and palate midline, head turning and shoulder shrug symmetric and there was no tongue deviation with protrusion.   Motor: muscle tone was normal in all four extremities, muscle strength was normal in upper extremities and proximal strength in LE diminished L>R.   Sensory exam: light touch, temp, Vib was intact in UE and diminshed LT in distal LE and decreased R-L3,L4, L5  DTR 1+ in UE and absent in LE  FTN NL  Gait deferred    NO pain on lateral head rotation and neck flexion  Multiple episodes of pain causing patient to grimace and turn his head to the left      NIHSS: N/A    Allergies    IV Contrast (Unknown)  penicillin (Unknown)    Intolerances       Home Medications:  furosemide 20 mg oral tablet: 1 tab(s) orally once a day (at bedtime) (21 Apr 2020 19:14)  Jardiance 25 mg oral tablet: 1 tab(s) orally once a day (in the morning) (21 Apr 2020 19:15)  metFORMIN 500 mg oral tablet: 1 tab(s) orally 2 times a day (21 Apr 2020 14:53)  Morphine IR 15 mg oral tablet: orally 2 times a day (21 Apr 2020 19:12)  naproxen 500 mg oral delayed release tablet: 1 tab(s) orally 2 times a day (21 Apr 2020 14:53)  oxyCODONE 10 mg oral tablet: 1 tab(s) orally every 4 hours, As needed, Moderate Pain (4 - 6) (18 May 2018 13:02)      MEDICATIONS  (STANDING):  chlorhexidine 4% Liquid 1 Application(s) Topical <User Schedule>  clindamycin IVPB      collagenase Ointment 1 Application(s) Topical daily  dextrose 5%. 1000 milliLiter(s) (50 mL/Hr) IV Continuous <Continuous>  dextrose 50% Injectable 12.5 Gram(s) IV Push once  dextrose 50% Injectable 25 Gram(s) IV Push once  dextrose 50% Injectable 25 Gram(s) IV Push once  diphenhydrAMINE   Injectable 50 milliGRAM(s) IV Push once  enoxaparin Injectable 40 milliGRAM(s) SubCutaneous at bedtime  furosemide   Injectable 40 milliGRAM(s) IV Push every 12 hours  insulin lispro (HumaLOG) corrective regimen sliding scale   SubCutaneous three times a day before meals  losartan 100 milliGRAM(s) Oral daily  methocarbamol 750 milliGRAM(s) Oral two times a day  oxyCODONE  ER Tablet 10 milliGRAM(s) Oral every 12 hours  pantoprazole    Tablet 40 milliGRAM(s) Oral before breakfast  polyethylene glycol 3350 17 Gram(s) Oral daily  senna 1 Tablet(s) Oral at bedtime  vancomycin  IVPB 2000 milliGRAM(s) IV Intermittent once    MEDICATIONS  (PRN):  acetaminophen   Tablet .. 650 milliGRAM(s) Oral every 6 hours PRN Temp greater or equal to 38C (100.4F), Mild Pain (1 - 3)  aluminum hydroxide/magnesium hydroxide/simethicone Suspension 30 milliLiter(s) Oral every 4 hours PRN Dyspepsia  dextrose 40% Gel 15 Gram(s) Oral once PRN Blood Glucose LESS THAN 70 milliGRAM(s)/deciliter  glucagon  Injectable 1 milliGRAM(s) IntraMuscular once PRN Glucose LESS THAN 70 milligrams/deciliter  ibuprofen  Tablet. 400 milliGRAM(s) Oral every 6 hours PRN Mild Pain (1 - 3)  oxyCODONE    IR 15 milliGRAM(s) Oral every 4 hours PRN Severe Pain (7 - 10)      LABS:                        15.3   12.52 )-----------( 229      ( 24 Apr 2020 06:11 )             46.7     04-24    140  |  98  |  11  ----------------------------<  144<H>  3.9   |  26  |  0.8    Ca    8.5      24 Apr 2020 06:11  Mg     2.5     04-24    TPro  6.7  /  Alb  3.8  /  TBili  0.3  /  DBili  x   /  AST  13  /  ALT  13  /  AlkPhos  85  04-24            [facility]   < from: MR Head No Cont (04.23.20 @ 16:24) >  IMPRESSION:     1.  No evidence of recent infarct or acute intracranial hemorrhage.    2.  Mild chronic microvascular changes.    3.  Small size of the ventricles and cortical sulci without evidence of mass effect likely on the basis of normal variation.    4.  Right mastoid effusion, correlate clinically.    < end of copied text >    Assessment / Plan: Patient presenting with LE pain now with severe fast shooting pains in the top of the head (paroxysmal).  Possibly trigeminal neuralgia vs cervical pathology related.  1. MRI cervical spine w/o REGINA  2. Start carbamazepine 200mg BID can increase second dose to 300-400mg if no effect on 200mg (due too body size)  3. Continue robaxin and pain medication  4. Continue medical management

## 2020-04-24 NOTE — PROGRESS NOTE ADULT - SUBJECTIVE AND OBJECTIVE BOX
HPI:  54 year-old male history of RUSSELL, HTN, intervertebral disc disease, status post placement of spinal cord stimulator in july 2017 subsequently developed several episodes of infection at site of the stimulator and removal of spinal stimulator and discharged on intravenous antibiotics via a PICC line presents to the ED with complains of worsening right sided leg pain. As per the patient he had injured his right calf when getting off the bed and developed non healing wound couple of months ago. The wife has been helping him changing his dressing on both legs for lower extremity stasis ulcers for both legs. He follows podiatry at [facility]. He says the pain in his left leg is getting worse to the point that he is unable to ambulate. He has chronic back pain that is also getting worse mostly because he has been laying in bed. He recognizes that his swelling in his right leg specially has gotten worse even though he has been taking his water pill. He noted clear discharge with foul smell from both of his lower extremity wounds. The pain is severe in his right calf, upper medial thigh specially when he extends and flexes the led. He noted low grade fevers at home, worsening shortness of breath.   He denies any recent contact with sick people, has been taking precautions for COVID and had limited his exposure to outside. (21 Apr 2020 19:15)    Currently admitted to medicine with the primary diagnosis of Cellulitis       INTERVAL HPI / OVERNIGHT EVENTS:  Patient was examined and seen at bedside. Reports persistent pain in RLE on the inner surface and HA. Denies CP, SOB, neuro deficits.    ROS: Otherwise unremarkable     PAST MEDICAL & SURGICAL HISTORY  DM (diabetes mellitus)  Morbid obesity  Obstructive sleep apnea  Disc disease, degenerative, lumbar or lumbosacral  Hypertension  History of excision of pilonidal cyst  H/O arthroscopy of right knee  Spinal cord stimulator status    ALLERGIES  IV Contrast (Unknown)  penicillin (Unknown)    MEDICATIONS  STANDING MEDICATIONS  carBAMazepine 200 milliGRAM(s) Oral two times a day  chlorhexidine 4% Liquid 1 Application(s) Topical <User Schedule>  clindamycin IVPB      clindamycin IVPB 600 milliGRAM(s) IV Intermittent every 8 hours  collagenase Ointment 1 Application(s) Topical daily  dextrose 5%. 1000 milliLiter(s) IV Continuous <Continuous>  dextrose 50% Injectable 12.5 Gram(s) IV Push once  dextrose 50% Injectable 25 Gram(s) IV Push once  dextrose 50% Injectable 25 Gram(s) IV Push once  diphenhydrAMINE   Injectable 50 milliGRAM(s) IV Push once  enoxaparin Injectable 40 milliGRAM(s) SubCutaneous at bedtime  furosemide   Injectable 40 milliGRAM(s) IV Push every 12 hours  insulin lispro (HumaLOG) corrective regimen sliding scale   SubCutaneous three times a day before meals  losartan 100 milliGRAM(s) Oral daily  methocarbamol 750 milliGRAM(s) Oral two times a day  oxyCODONE  ER Tablet 10 milliGRAM(s) Oral every 12 hours  pantoprazole    Tablet 40 milliGRAM(s) Oral before breakfast  polyethylene glycol 3350 17 Gram(s) Oral daily  senna 1 Tablet(s) Oral at bedtime    PRN MEDICATIONS  acetaminophen   Tablet .. 650 milliGRAM(s) Oral every 6 hours PRN  aluminum hydroxide/magnesium hydroxide/simethicone Suspension 30 milliLiter(s) Oral every 4 hours PRN  dextrose 40% Gel 15 Gram(s) Oral once PRN  glucagon  Injectable 1 milliGRAM(s) IntraMuscular once PRN  ibuprofen  Tablet. 400 milliGRAM(s) Oral every 6 hours PRN  oxyCODONE    IR 15 milliGRAM(s) Oral every 4 hours PRN    VITALS:  T(F): 98.2  HR: 102  BP: 115/58  RR: 18  SpO2: --    PHYSICAL EXAM  GEN: NAD, Resting comfortably in bed  PULM: Decreased BS bilaterally, No wheezes  CVS: Regular rate and rhythm, S1-S2, no murmurs  ABD: Soft, non-tender, non-distended, no guarding, obese  EXT: b/l tenderness, redness, swelling on Rt inner surface extending up to thigh, Rt ankle c/d/i dsg  NEURO: A&Ox3, NICHOLSON    LABS                        15.3   12.52 )-----------( 229      ( 24 Apr 2020 06:11 )             46.7     04-24    140  |  98  |  11  ----------------------------<  144<H>  3.9   |  26  |  0.8    Ca    8.5      24 Apr 2020 06:11  Mg     2.5     04-24    TPro  6.7  /  Alb  3.8  /  TBili  0.3  /  DBili  x   /  AST  13  /  ALT  13  /  AlkPhos  85  04-24              Culture - Blood (collected 22 Apr 2020 07:19)  Source: .Blood None  Preliminary Report (23 Apr 2020 17:02):    No growth to date.    Culture - Blood (collected 21 Apr 2020 15:53)  Source: .Blood Blood-Peripheral  Preliminary Report (22 Apr 2020 22:01):    No growth to date.    Culture - Blood (collected 21 Apr 2020 15:30)  Source: .Blood Blood-Peripheral  Preliminary Report (22 Apr 2020 22:01):    No growth to date.          RADIOLOGY  < from: MR Head No Cont (04.23.20 @ 16:24) >    EXAM:  MR BRAIN            PROCEDURE DATE:  04/23/2020            INTERPRETATION:  Clinical History / Reason for exam: New worsening headache.    TECHNIQUE: MRI brain without contrast. Multiplanar multisequential MRI of the brain without intravenous contrast administration on the 3 Aileen magnet.     Correlation with CT head performed earlier the same day.    FINDINGS:    There is small size of the ventricles and cortical sulci without evidence for mass effect.     There are scattered small fociof T2/FLAIR signal hyperintensity within the cerebral hemispheric white matter, nonspecific and likely on the basis of chronic microvascular change.    There is no evidence of acute intracranial hemorrhage, extra-axial fluid collection or midline shift.       There is no diffusion abnormality to suggest acute/subacute infarct. There is normal flow void present within the major vascular structures.      There is opacification of the right mastoid air cells and a left maxillary sinus polyp or retention cyst.    IMPRESSION:     1.  No evidence of recent infarct or acute intracranial hemorrhage.    2.  Mild chronic microvascular changes.    3.  Small size of the ventricles and cortical sulci without evidence of mass effect likely on the basis of normal variation.    4.  Right mastoid effusion, correlate clinically.        ATIYA MUNGUIA M.D., ATTENDING RADIOLOGIST  This document has been electronically signed. Apr 23 2020  4:31PM    < end of copied text >

## 2020-04-24 NOTE — PROGRESS NOTE ADULT - ASSESSMENT
54 y.o man with hx of RUSSELL, htn comes in for right shooting foot pain caused by non healing wound. Symptoms were also associated with low grade fever and some shortness of breath. Was admitted for cellulitis and to r/o COVID, PCR was negative.     # Right foot cellulitis  - non healing right wound on the external malleolus  - with superimposed lymphedema  - ID recommend to switch him to cefazolin + silvadene cream  - Will keep on IV lasix for now   - f/u blood cx - no growth to date  - tylenol 650 po mg PRN for pain/fever   - Podiatry consult appreciated  - If necessary Vascular surgery consult  - VA duplex venous: no DVTs  - COVID PCR was negative  - placed on vancomycin and clindamycin  - f/u ID recs    #New headache  -CTH: Overall paucity of sulcal demarcation and small size of the lateral ventricles can reflect cerebral edema versus normal variation. If symptoms persist consider MRI for further evaluation.  - neuro recs appreciated: MR C-spine NC, carbamazepine 200mg bid  - MR head NC: unremarkable  - f/u MR C-spine    # RUSSELL:  -Obesity hyperventilation syndrome vs Sleep apnea.  -uses CPAP at home. Follow Dr. Eastman outpatient  -COVID PCR negative  -CT angio negative for PE, no ground glass opacities    # Diabetes  -Will keep on Insulin Regimen   -basal / bolus / pre meals insulin     # HTN  - will resume losartan today   - lasix 40 mg IV bid    # DVT prophylaxis  -On lovenox    # PPI prophylasxis:  - not needed    FULL CODE  diet: DASH / TLC / consistent carb diet   dispo: acute 54 y.o man with hx of RUSSELL, htn comes in for right shooting foot pain caused by non healing wound. Symptoms were also associated with low grade fever and some shortness of breath. Was admitted for cellulitis and to r/o COVID, PCR was negative.     # Right foot cellulitis, concern for necrotizing fasciitis  - non healing right wound on the external malleolus  - with superimposed lymphedema  - ID recommend to switch him to cefazolin + silvadene cream  - Will keep on IV lasix for now   - f/u blood cx - no growth to date  - tylenol 650 po mg PRN for pain/fever   - Podiatry consult appreciated  - If necessary Vascular surgery consult  - VA duplex venous: no DVTs  - COVID PCR was negative  - per ID on zyvox 600mg IV q12h, cefepime 2g q12h, flagyl 500mg Iv q8h  - f/u burn consult    #New headache  -CTH: Overall paucity of sulcal demarcation and small size of the lateral ventricles can reflect cerebral edema versus normal variation. If symptoms persist consider MRI for further evaluation.  - neuro recs appreciated: MR C-spine NC, carbamazepine 200mg bid  - MR head NC: unremarkable  - f/u MR C-spine    # RUSSELL:  -Obesity hyperventilation syndrome vs Sleep apnea.  -uses CPAP at home. Follow Dr. Eastman outpatient  -COVID PCR negative  -CT angio negative for PE, no ground glass opacities    # Diabetes  -Will keep on Insulin Regimen   -basal / bolus / pre meals insulin     # HTN  - will resume losartan today   - lasix 40 mg IV bid  - f/u TTE    # DVT prophylaxis  -On lovenox    # PPI prophylasxis:  - not needed    FULL CODE  diet: DASH / TLC / consistent carb diet   dispo: acute

## 2020-04-25 LAB
ALBUMIN SERPL ELPH-MCNC: 3.6 G/DL — SIGNIFICANT CHANGE UP (ref 3.5–5.2)
ALP SERPL-CCNC: 96 U/L — SIGNIFICANT CHANGE UP (ref 30–115)
ALT FLD-CCNC: 17 U/L — SIGNIFICANT CHANGE UP (ref 0–41)
ANION GAP SERPL CALC-SCNC: 20 MMOL/L — HIGH (ref 7–14)
AST SERPL-CCNC: 19 U/L — SIGNIFICANT CHANGE UP (ref 0–41)
BILIRUB SERPL-MCNC: 0.4 MG/DL — SIGNIFICANT CHANGE UP (ref 0.2–1.2)
BUN SERPL-MCNC: 15 MG/DL — SIGNIFICANT CHANGE UP (ref 10–20)
CALCIUM SERPL-MCNC: 8.9 MG/DL — SIGNIFICANT CHANGE UP (ref 8.5–10.1)
CHLORIDE SERPL-SCNC: 97 MMOL/L — LOW (ref 98–110)
CO2 SERPL-SCNC: 23 MMOL/L — SIGNIFICANT CHANGE UP (ref 17–32)
CREAT SERPL-MCNC: 0.9 MG/DL — SIGNIFICANT CHANGE UP (ref 0.7–1.5)
GLUCOSE SERPL-MCNC: 140 MG/DL — HIGH (ref 70–99)
HCT VFR BLD CALC: 48.9 % — SIGNIFICANT CHANGE UP (ref 42–52)
HGB BLD-MCNC: 15.8 G/DL — SIGNIFICANT CHANGE UP (ref 14–18)
MCHC RBC-ENTMCNC: 27.2 PG — SIGNIFICANT CHANGE UP (ref 27–31)
MCHC RBC-ENTMCNC: 32.3 G/DL — SIGNIFICANT CHANGE UP (ref 32–37)
MCV RBC AUTO: 84.2 FL — SIGNIFICANT CHANGE UP (ref 80–94)
NRBC # BLD: 0 /100 WBCS — SIGNIFICANT CHANGE UP (ref 0–0)
PLATELET # BLD AUTO: 260 K/UL — SIGNIFICANT CHANGE UP (ref 130–400)
POTASSIUM SERPL-MCNC: 3.9 MMOL/L — SIGNIFICANT CHANGE UP (ref 3.5–5)
POTASSIUM SERPL-SCNC: 3.9 MMOL/L — SIGNIFICANT CHANGE UP (ref 3.5–5)
PROT SERPL-MCNC: 7 G/DL — SIGNIFICANT CHANGE UP (ref 6–8)
RBC # BLD: 5.81 M/UL — SIGNIFICANT CHANGE UP (ref 4.7–6.1)
RBC # FLD: 15.2 % — HIGH (ref 11.5–14.5)
SODIUM SERPL-SCNC: 140 MMOL/L — SIGNIFICANT CHANGE UP (ref 135–146)
WBC # BLD: 12.31 K/UL — HIGH (ref 4.8–10.8)
WBC # FLD AUTO: 12.31 K/UL — HIGH (ref 4.8–10.8)

## 2020-04-25 PROCEDURE — 93306 TTE W/DOPPLER COMPLETE: CPT | Mod: 26

## 2020-04-25 PROCEDURE — 99233 SBSQ HOSP IP/OBS HIGH 50: CPT

## 2020-04-25 PROCEDURE — 99231 SBSQ HOSP IP/OBS SF/LOW 25: CPT

## 2020-04-25 RX ORDER — COLLAGENASE CLOSTRIDIUM HIST. 250 UNIT/G
1 OINTMENT (GRAM) TOPICAL
Refills: 0 | Status: DISCONTINUED | OUTPATIENT
Start: 2020-04-25 | End: 2020-04-28

## 2020-04-25 RX ORDER — CEFEPIME 1 G/1
2000 INJECTION, POWDER, FOR SOLUTION INTRAMUSCULAR; INTRAVENOUS EVERY 8 HOURS
Refills: 0 | Status: DISCONTINUED | OUTPATIENT
Start: 2020-04-25 | End: 2020-04-28

## 2020-04-25 RX ORDER — SACCHAROMYCES BOULARDII 250 MG
250 POWDER IN PACKET (EA) ORAL
Refills: 0 | Status: DISCONTINUED | OUTPATIENT
Start: 2020-04-25 | End: 2020-04-28

## 2020-04-25 RX ORDER — OXYCODONE HYDROCHLORIDE 5 MG/1
15 TABLET ORAL EVERY 4 HOURS
Refills: 0 | Status: DISCONTINUED | OUTPATIENT
Start: 2020-04-25 | End: 2020-04-27

## 2020-04-25 RX ORDER — SODIUM HYPOCHLORITE 0.125 %
1 SOLUTION, NON-ORAL MISCELLANEOUS
Refills: 0 | Status: DISCONTINUED | OUTPATIENT
Start: 2020-04-25 | End: 2020-04-28

## 2020-04-25 RX ORDER — GABAPENTIN 400 MG/1
300 CAPSULE ORAL EVERY 8 HOURS
Refills: 0 | Status: DISCONTINUED | OUTPATIENT
Start: 2020-04-25 | End: 2020-04-26

## 2020-04-25 RX ORDER — ACETAMINOPHEN 500 MG
650 TABLET ORAL EVERY 6 HOURS
Refills: 0 | Status: DISCONTINUED | OUTPATIENT
Start: 2020-04-25 | End: 2020-04-28

## 2020-04-25 RX ADMIN — Medication 650 MILLIGRAM(S): at 18:02

## 2020-04-25 RX ADMIN — CHLORHEXIDINE GLUCONATE 1 APPLICATION(S): 213 SOLUTION TOPICAL at 05:48

## 2020-04-25 RX ADMIN — Medication 650 MILLIGRAM(S): at 10:01

## 2020-04-25 RX ADMIN — OXYCODONE HYDROCHLORIDE 10 MILLIGRAM(S): 5 TABLET ORAL at 17:57

## 2020-04-25 RX ADMIN — OXYCODONE HYDROCHLORIDE 15 MILLIGRAM(S): 5 TABLET ORAL at 18:07

## 2020-04-25 RX ADMIN — Medication 250 MILLIGRAM(S): at 17:58

## 2020-04-25 RX ADMIN — METHOCARBAMOL 1500 MILLIGRAM(S): 500 TABLET, FILM COATED ORAL at 17:58

## 2020-04-25 RX ADMIN — OXYCODONE HYDROCHLORIDE 15 MILLIGRAM(S): 5 TABLET ORAL at 07:38

## 2020-04-25 RX ADMIN — SENNA PLUS 1 TABLET(S): 8.6 TABLET ORAL at 22:09

## 2020-04-25 RX ADMIN — OXYCODONE HYDROCHLORIDE 10 MILLIGRAM(S): 5 TABLET ORAL at 05:50

## 2020-04-25 RX ADMIN — LOSARTAN POTASSIUM 100 MILLIGRAM(S): 100 TABLET, FILM COATED ORAL at 05:47

## 2020-04-25 RX ADMIN — LINEZOLID 300 MILLIGRAM(S): 600 INJECTION, SOLUTION INTRAVENOUS at 05:47

## 2020-04-25 RX ADMIN — Medication 1 APPLICATION(S): at 17:48

## 2020-04-25 RX ADMIN — CEFEPIME 100 MILLIGRAM(S): 1 INJECTION, POWDER, FOR SOLUTION INTRAMUSCULAR; INTRAVENOUS at 22:08

## 2020-04-25 RX ADMIN — PANTOPRAZOLE SODIUM 40 MILLIGRAM(S): 20 TABLET, DELAYED RELEASE ORAL at 06:38

## 2020-04-25 RX ADMIN — Medication 100 MILLIGRAM(S): at 05:46

## 2020-04-25 RX ADMIN — METHOCARBAMOL 1500 MILLIGRAM(S): 500 TABLET, FILM COATED ORAL at 23:09

## 2020-04-25 RX ADMIN — LINEZOLID 300 MILLIGRAM(S): 600 INJECTION, SOLUTION INTRAVENOUS at 17:58

## 2020-04-25 RX ADMIN — CEFEPIME 100 MILLIGRAM(S): 1 INJECTION, POWDER, FOR SOLUTION INTRAMUSCULAR; INTRAVENOUS at 05:45

## 2020-04-25 RX ADMIN — ENOXAPARIN SODIUM 40 MILLIGRAM(S): 100 INJECTION SUBCUTANEOUS at 22:09

## 2020-04-25 RX ADMIN — OXYCODONE HYDROCHLORIDE 15 MILLIGRAM(S): 5 TABLET ORAL at 03:43

## 2020-04-25 RX ADMIN — Medication 1 APPLICATION(S): at 17:50

## 2020-04-25 RX ADMIN — Medication 40 MILLIGRAM(S): at 17:57

## 2020-04-25 RX ADMIN — Medication 650 MILLIGRAM(S): at 15:46

## 2020-04-25 RX ADMIN — Medication 1 APPLICATION(S): at 12:08

## 2020-04-25 RX ADMIN — Medication 1 APPLICATION(S): at 17:49

## 2020-04-25 RX ADMIN — OXYCODONE HYDROCHLORIDE 15 MILLIGRAM(S): 5 TABLET ORAL at 14:41

## 2020-04-25 RX ADMIN — METHOCARBAMOL 1500 MILLIGRAM(S): 500 TABLET, FILM COATED ORAL at 05:47

## 2020-04-25 RX ADMIN — Medication 40 MILLIGRAM(S): at 05:47

## 2020-04-25 RX ADMIN — GABAPENTIN 300 MILLIGRAM(S): 400 CAPSULE ORAL at 22:09

## 2020-04-25 RX ADMIN — OXYCODONE HYDROCHLORIDE 15 MILLIGRAM(S): 5 TABLET ORAL at 22:09

## 2020-04-25 RX ADMIN — METHOCARBAMOL 1500 MILLIGRAM(S): 500 TABLET, FILM COATED ORAL at 12:07

## 2020-04-25 RX ADMIN — CEFEPIME 100 MILLIGRAM(S): 1 INJECTION, POWDER, FOR SOLUTION INTRAMUSCULAR; INTRAVENOUS at 13:35

## 2020-04-25 RX ADMIN — GABAPENTIN 300 MILLIGRAM(S): 400 CAPSULE ORAL at 13:35

## 2020-04-25 NOTE — PHYSICAL THERAPY INITIAL EVALUATION ADULT - SPECIFY REASON(S)
pt declined despite max encouragement. Pt was educated about the importance/benefits of participating in PT and being OOB.  pt continued to decline. Therex in bed reviewed with pt. .

## 2020-04-25 NOTE — PROGRESS NOTE ADULT - SUBJECTIVE AND OBJECTIVE BOX
Progress Note:  Provider Speciality                            Hospitalist      JOSE ALBERTO NO MRN-[id number] 54y Male     CHIEF PRESENTING COMPLAINT:  Patient is a 54y old  Male who presents with a chief complaint of       SUBJECTIVE:  Patient was seen and examined at bedside.   awake, alert/ c/o headaches- reports [place] improved a little last night with meds but is having it again now.   reports LE swelling little better / was able to stand up for the first time today morning.  No significant overnight events reported.     HISTORY OF PRESENTING ILLNESS:  HPI:  54 year-old male history of RUSSELL, HTN, intervertebral disc disease, status post placement of spinal cord stimulator in july 2017 subsequently developed several episodes of infection at site of the stimulator and removal of spinal stimulator and discharged on intravenous antibiotics via a PICC line presents to the ED with complains of worsening right sided leg pain. As per the patient he had injured his right calf when getting off the bed and developed non healing wound couple of months ago. The wife has been helping him changing his dressing on both legs for lower extremity stasis ulcers for both legs. He follows podiatry at [facility]. He says the pain in his left leg is getting worse to the point that he is unable to ambulate. He has chronic back pain that is also getting worse mostly because he has been laying in bed. He recognizes that his swelling in his right leg specially has gotten worse even though he has been taking his water pill. He noted clear discharge with foul smell from both of his lower extremity wounds. The pain is severe in his right calf, upper medial thigh specially when he extends and flexes the led. He noted low grade fevers at home, worsening shortness of breath.   He denies any recent contact with sick people, has been taking precautions for COVID and had limited his exposure to outside. (21 Apr 2020 19:15)        REVIEW OF SYSTEMS:    At least 10 systems were reviewed in ROS. All systems reviewed  are within normal limits except for the complaints as described in Subjective.    PAST MEDICAL & SURGICAL HISTORY:  PAST MEDICAL & SURGICAL HISTORY:  DM (diabetes mellitus)  Morbid obesity  Obstructive sleep apnea  Disc disease, degenerative, lumbar or lumbosacral  Hypertension  History of excision of pilonidal cyst  H/O arthroscopy of right knee  Spinal cord stimulator status          VITAL SIGNS:  Vital Signs Last 24 Hrs  T(C): 36.2 (25 Apr 2020 05:11), Max: 36.8 (24 Apr 2020 13:32)  T(F): 97.1 (25 Apr 2020 05:11), Max: 98.2 (24 Apr 2020 13:32)  HR: 101 (25 Apr 2020 05:11) (100 - 102)  BP: 134/73 (25 Apr 2020 05:11) (110/77 - 134/73)  BP(mean): --  RR: 20 (25 Apr 2020 05:11) (18 - 20)  SpO2: 99% (24 Apr 2020 22:45) (99% - 99%)        PHYSICAL EXAMINATION:    General: AAO morbidly obese male,  NAD.  HEENT:   EOMI, atraumatic  Heart: S1+S2 audible, no murmur  Lungs: diminished BS b/l.  Abdomen: Soft, non-tender, non-distended (obese)  CNS: AAO  , no focal deficits.  Extremities: b/l LE edema with chronic lymphedema/ venous stasis ulcer RLE with erythema /tenderness.         CONSULTS:  Consultant(s) Notes Reviewed by me.   Care Discussed with Consultants/Other Providers where required.        MEDICATIONS:  MEDICATIONS  (STANDING):  ceFAZolin   IVPB 2000 milliGRAM(s) IV Intermittent every 6 hours  chlorhexidine 4% Liquid 1 Application(s) Topical <User Schedule>  collagenase Ointment 1 Application(s) Topical daily  dextrose 5%. 1000 milliLiter(s) (50 mL/Hr) IV Continuous <Continuous>  dextrose 50% Injectable 12.5 Gram(s) IV Push once  dextrose 50% Injectable 25 Gram(s) IV Push once  dextrose 50% Injectable 25 Gram(s) IV Push once  enoxaparin Injectable 40 milliGRAM(s) SubCutaneous at bedtime  furosemide   Injectable 40 milliGRAM(s) IV Push daily  insulin glargine Injectable (LANTUS) 15 Unit(s) SubCutaneous at bedtime  insulin lispro (HumaLOG) corrective regimen sliding scale   SubCutaneous three times a day before meals  losartan 100 milliGRAM(s) Oral daily  methocarbamol 750 milliGRAM(s) Oral two times a day  oxyCODONE  ER Tablet 10 milliGRAM(s) Oral every 12 hours  pantoprazole    Tablet 40 milliGRAM(s) Oral before breakfast  polyethylene glycol 3350 17 Gram(s) Oral daily  senna 1 Tablet(s) Oral at bedtime  silver sulfADIAZINE 1% Cream 1 Application(s) Topical two times a day    MEDICATIONS  (PRN):  acetaminophen   Tablet .. 650 milliGRAM(s) Oral every 6 hours PRN Temp greater or equal to 38C (100.4F), Mild Pain (1 - 3)  aluminum hydroxide/magnesium hydroxide/simethicone Suspension 30 milliLiter(s) Oral every 4 hours PRN Dyspepsia  dextrose 40% Gel 15 Gram(s) Oral once PRN Blood Glucose LESS THAN 70 milliGRAM(s)/deciliter  glucagon  Injectable 1 milliGRAM(s) IntraMuscular once PRN Glucose LESS THAN 70 milligrams/deciliter  ibuprofen  Tablet. 400 milliGRAM(s) Oral every 6 hours PRN Mild Pain (1 - 3)  morphine  - Injectable 3 milliGRAM(s) IV Push every 4 hours PRN Severe Pain (7 - 10)  oxyCODONE    IR 10 milliGRAM(s) Oral every 4 hours PRN Moderate Pain (4 - 6)      [facility]CRO[facility]OLOG[facility]/I & O's:                        14.6   13.04 )-----------( 201      ( 23 Apr 2020 06:14 )             46.3     04-23    137  |  96<L>  |  14  ----------------------------<  106<H>  4.0   |  23  |  0.9    Ca    9.2      23 Apr 2020 06:14  Mg     2.1     04-21    TPro  6.9  /  Alb  3.8  /  TBili  0.6  /  DBili  x   /  AST  22  /  ALT  15  /  AlkPhos  77  04-22    PT/INR - ( 21 Apr 2020 15:30 )   PT: 14.30 sec;   INR: 1.24 ratio         PTT - ( 21 Apr 2020 15:30 )  PTT:34.3 sec    CAPILLARY BLOOD GLUCOSE      POCT Blood Glucose.: 104 mg/dL (23 Apr 2020 10:53)  POCT Blood Glucose.: 115 mg/dL (23 Apr 2020 07:32)                04-22-20 @ 07:01  -  04-23-20 @ 07:00  --------------------------------------------------------  IN: 0 mL / OUT: 1525 mL / NET: -1525 mL              ASSESSMENT/Plan:  54 y.o man with hx of RUSSELL, htn comes in for right shooting foot pain caused by non healing wound. Symptoms were also associated with low grade fever and some shortness of breath admitted to r/o COVID.      RLE venous stasis ulcer with cellulitis:   inflammatory markers repeat still elevated.   ID followed up and ABX changed yesterday from ancef to cefipime and zyvox.  BCX : NGTD.   podiatry evaluated - no surgical intervention. local wound care/IV ABX .   Burn eval appreciated- no need for debridement/ continue local wound care/ABx.   LE doppler: no DVT.  pain control with IV morphine/percocet prn.    Headaches unclear etiology possibly cervical neuropathy:   no improvement in HA.  CTH/MRI brain NC: normal.   Neuro evaluated- suggest MRI C spine and carbamezapine/ fu neuro for alternative neuropathic pain med as it has interaction wit zyvox which patient need for now.  MRI C spine noted: Mild degenerative changes of the cervical spine  with no significant central canal or foraminal narrowing. No abnormal spinal cord signal or cord compression.Disc protrusion on sagittal imaging at T2-3 without significant central canal narrowing.  continue current pain meds.       dyspnea possibly related to OHS vs RUSSELL with suspicion of fluid overload with LVS dysfunction/ ruled out Covid-19 pneumonia/ ruled out PE:  continue BIPAP prn and HS/ NC O2 day time to maintain SPO2 92-94%.  repeat CXR.  continue lasix to 40mg ivp bid/ monitor I&O's/monitor electrolytes/ keep K>4, mag >2.   fu TTE.   sleep study as OP and pulm eval as OP      Diabetes mellitus 2:   BS controlled with range of 90's to 124.   continue monitoring/ ISS/DC insulin lantus.     HTN  BP controlled.   continue losartan.    DVT prophylaxis  -On lovenox      Progress note handoff:   pending: clinical improvement of Headache/RLE cellulits/swelling/on iv abx/TTE.  disposition: unknown at this time  discussion: plan of care with patient- satisfied

## 2020-04-25 NOTE — PROGRESS NOTE ADULT - SUBJECTIVE AND OBJECTIVE BOX
HPI:  54 year-old male history of RUSSELL, HTN, intervertebral disc disease, status post placement of spinal cord stimulator in july 2017 subsequently developed several episodes of infection at site of the stimulator and removal of spinal stimulator and discharged on intravenous antibiotics via a PICC line presents to the ED with complains of worsening right sided leg pain. As per the patient he had injured his right calf when getting off the bed and developed non healing wound couple of months ago. The wife has been helping him changing his dressing on both legs for lower extremity stasis ulcers for both legs. He follows podiatry at [facility] clinic. He says the pain in his left leg is getting worse to the point that he is unable to ambulate. He has chronic back pain that is also getting worse mostly because he has been laying in bed. He recognizes that his swelling in his right leg specially has gotten worse even though he has been taking his water pill. He noted clear discharge with foul smell from both of his lower extremity wounds. The pain is severe in his right calf, upper medial thigh specially when he extends and flexes the led. He noted low grade fevers at home, worsening shortness of breath.   He denies any recent contact with sick people, has been taking precautions for COVID and had limited his exposure to outside. (21 Apr 2020 19:15)    Currently admitted to medicine with the primary diagnosis of Cellulitis     Today is hospital day 4d.     INTERVAL HPI / OVERNIGHT EVENTS:  Patient was examined and seen at bedside. This morning he is resting comfortably in bed. Reports his leg feels less painful than yesterday, since starting new antibiotics, however headaches are continuing. Reports they do improve somewhat with pain medications but continue to return afterwards.    ROS: Otherwise unremarkable     PAST MEDICAL & SURGICAL HISTORY  DM (diabetes mellitus)  Morbid obesity  Obstructive sleep apnea  Disc disease, degenerative, lumbar or lumbosacral  Hypertension  History of excision of pilonidal cyst  H/O arthroscopy of right knee  Spinal cord stimulator status    ALLERGIES  IV Contrast (Unknown)  penicillin (Unknown)    MEDICATIONS  STANDING MEDICATIONS  cefepime   IVPB 2000 milliGRAM(s) IV Intermittent every 8 hours  chlorhexidine 4% Liquid 1 Application(s) Topical <User Schedule>  collagenase Ointment 1 Application(s) Topical daily  dextrose 5%. 1000 milliLiter(s) IV Continuous <Continuous>  dextrose 50% Injectable 12.5 Gram(s) IV Push once  dextrose 50% Injectable 25 Gram(s) IV Push once  dextrose 50% Injectable 25 Gram(s) IV Push once  enoxaparin Injectable 40 milliGRAM(s) SubCutaneous at bedtime  furosemide   Injectable 40 milliGRAM(s) IV Push every 12 hours  insulin lispro (HumaLOG) corrective regimen sliding scale   SubCutaneous three times a day before meals  linezolid  IVPB 600 milliGRAM(s) IV Intermittent every 12 hours  losartan 100 milliGRAM(s) Oral daily  methocarbamol 1500 milliGRAM(s) Oral four times a day  oxyCODONE  ER Tablet 10 milliGRAM(s) Oral every 12 hours  pantoprazole    Tablet 40 milliGRAM(s) Oral before breakfast  polyethylene glycol 3350 17 Gram(s) Oral daily  saccharomyces boulardii 250 milliGRAM(s) Oral two times a day  senna 1 Tablet(s) Oral at bedtime    PRN MEDICATIONS  acetaminophen   Tablet .. 650 milliGRAM(s) Oral every 6 hours PRN  aluminum hydroxide/magnesium hydroxide/simethicone Suspension 30 milliLiter(s) Oral every 4 hours PRN  dextrose 40% Gel 15 Gram(s) Oral once PRN  glucagon  Injectable 1 milliGRAM(s) IntraMuscular once PRN  ibuprofen  Tablet. 400 milliGRAM(s) Oral every 6 hours PRN  oxyCODONE    IR 15 milliGRAM(s) Oral every 4 hours PRN    VITALS:  T(F): 97.1  HR: 101  BP: 134/73  RR: 20  SpO2: 99%    PHYSICAL EXAM  GEN: NAD, Resting comfortably in bed  PULM: Clear to auscultation bilaterally, No wheezes  CVS: Regular rate and rhythm, S1-S2, no murmurs  ABD: Soft, non-tender, non-distended, no guarding  EXT: b/l edema, red and tender RLE going up to R thigh  NEURO: A&Ox3, no focal deficits    LABS                        15.8   12.31 )-----------( 260      ( 25 Apr 2020 05:58 )             48.9     04-25    140  |  97<L>  |  15  ----------------------------<  140<H>  3.9   |  23  |  0.9    Ca    8.9      25 Apr 2020 05:58  Mg     2.5     04-24    TPro  7.0  /  Alb  3.6  /  TBili  0.4  /  DBili  x   /  AST  19  /  ALT  17  /  AlkPhos  96  04-25            RADIOLOGY    < from: CT Lower Extremity w/ IV Cont, Right (04.24.20 @ 18:37) >    EXAM:  CT LWR EXT IC RT            PROCEDURE DATE:  04/24/2020            INTERPRETATION:  CLINICAL HISTORY / REASON FOR EXAM: Rapidly worsening cellulitis, clinical suspicion for necrotizing fasciitis.    TECHNIQUE: Multiple helical CT images wereobtained of the right lower extremity following administration of intravenous contrast. Coronal and sagittal reformatted images were also submitted for review.    COMPARISON: No studies available for direct comparison.    FINDINGS:    BONES/JOINTS: No acute fractures or dislocations.  Moderate degenerative changes of the right hip joint.  Severe degenerative change of the medial tibiofemoral, and moderate degenerative changes of the lateral tibiofemoral and patellofemoral compartments.  Small suprapatellar joint effusion.    SOFT TISSUES: Skin thickening and subcutaneous edema in the right calf and lateral right thigh, consistent with given history of cellulitis.   No evidence of focal fluid collections. No evidence of abnormal fascial enhancement or locules of air within the fascia or deep compartments.    OTHER: Extensive atherosclerotic vascular calcifications.  Enlarged right femoral and bilateral inguinal lymph nodes largest measuring 4.2 x 2.2 cm in the right femoral region, nonspecific.        IMPRESSION:    1.  Skin thickening and subcutaneous edema in the right calf and lateral right thigh, consistent with cellulitis. No focal drainable fluid collection or CT evidence of necrotizing fasciitis.    2.  Right femoral and bilateral inguinal lymphadenopathy, nonspecific.          MEGHAN JOHNSON M.D., RESIDENT RADIOLOGIST  This document has been electronically signed.  REY ALICIA M.D., ATTENDING RADIOLOGIST  This document has been electronically signed. Apr 24 2020  7:15PM    < end of copied text >      < from: MR Cervical Spine No Cont (04.24.20 @ 18:59) >    EXAM:  MR SPINE CERVICAL            PROCEDURE DATE:  04/24/2020          INTERPRETATION:  EXAM TYPE: MR CERVICAL SPINE      EXAM DATE AND TIME: 4/24/2020 6:59 PM  INDICATION: headaches   COMPARISON: None available.    TECHNIQUE: MR imaging of the cervical spine was performed without contrast.    FINDINGS: There is straightening a normal cervical lordosis.  There is preservation of vertebral alignment.  Vertebral body heights are maintained.  Bone marrow signal is unremarkable.   No prevertebral soft tissue swelling. There is no edema in the interspinous regions or posterior paraspinal soft tissues.       There is a focally prominent central canal at the C1 level, a normal variation. The cervical spinal cord is normal in size and signal characteristics.     The cerebellar tonsils are normal in position.     C2-C3: There is a tiny central disc protrusion. No central canal or foraminal narrowing.      C3-C4: There is a tiny central disc protrusion. Minimal right uncovertebral hypertrophy. Mild flattening of the ventral thecal sac. No central canal or foraminal narrowing.      C4-C5: There is a small central disc protrusion and annular fissure. Mild flattening of the ventral thecal sac. No central canal or foraminal narrowing.      C5-C6: There is mild disc bulge. No central canal or foraminal narrowing.      C6-C7: There is a mild disc bulge mild bilateral uncovertebral hypertrophy. Mild left foraminal narrowing. No significant central canal or right foraminal narrowing. No central canal or foraminal narrowing.      C7-T1: There is a mild disc bulge. No central canal or foraminal narrowing.    On the sagittal images there is a disc protrusion at T2-T3 which mildly flattens ventral thecal sac.    IMPRESSION:     Mild degenerative changes of the cervical spine as described above with no significant central canal or foraminal narrowing. No abnormal spinal cord signal or cord compression.    Disc protrusion on sagittal imaging at T2-3 without significant central canal narrowing.        JOLENE RAMEY M.D., ATTENDING RADIOLOGIST  This document has been electronically signed. Apr 25 2020  9:11AM    < end of copied text >

## 2020-04-25 NOTE — CONSULT NOTE ADULT - SUBJECTIVE AND OBJECTIVE BOX
54y  Male  HPI:  54 year-old male history of RUSSELL, HTN, intervertebral disc disease, status post placement of spinal cord stimulator in july 2017 subsequently developed several episodes of infection at site of the stimulator and removal of spinal stimulator and discharged on intravenous antibiotics via a PICC line presents to the ED with complains of worsening right sided leg pain. As per the patient he had injured his right calf when getting off the bed and developed non healing wound couple of months ago. The wife has been helping him changing his dressing on both legs for lower extremity stasis ulcers for both legs. He follows podiatry at [facility]. He says the pain in his left leg is getting worse to the point that he is unable to ambulate. He has chronic back pain that is also getting worse mostly because he has been laying in bed. He recognizes that his swelling in his right leg specially has gotten worse even though he has been taking his water pill. He noted clear discharge with foul smell from both of his lower extremity wounds. The pain is severe in his right calf, upper medial thigh specially when he extends and flexes the led. He noted low grade fevers at home, worsening shortness of breath.   He denies any recent contact with sick people, has been taking precautions for COVID and had limited his exposure to outside. (21 Apr 2020 19:15)    Hospital course***  Allergies    IV Contrast (Unknown)  penicillin (Unknown)    Intolerances      PAST MEDICAL & SURGICAL HISTORY:  DM (diabetes mellitus)  Morbid obesity  Obstructive sleep apnea  Disc disease, degenerative, lumbar or lumbosacral  Hypertension  History of excision of pilonidal cyst  H/O arthroscopy of right knee  Spinal cord stimulator status      Labs:                        15.8   12.31 )-----------( 260      ( 25 Apr 2020 05:58 )             48.9     04-25    140  |  97<L>  |  15  ----------------------------<  140<H>  3.9   |  23  |  0.9    Ca    8.9      25 Apr 2020 05:58  Mg     2.5     04-24    TPro  7.0  /  Alb  3.6  /  TBili  0.4  /  DBili  x   /  AST  19  /  ALT  17  /  AlkPhos  96  04-25        PE:  PHYSICAL EXAM: AAO x 3  Full thickness wounds to bb/l LE  RLE edema and erythema

## 2020-04-25 NOTE — PROGRESS NOTE ADULT - ASSESSMENT
54 y.o man with hx of RUSSELL, htn comes in for right shooting foot pain caused by non healing wound. Symptoms were also associated with low grade fever and some shortness of breath. Was admitted for cellulitis and to r/o COVID, PCR was negative.     # Right foot cellulitis, concern for necrotizing fasciitis  - non healing right wound on the external malleolus  - with superimposed lymphedema  - ID recommend to switch him to cefazolin + silvadene cream  - Will keep on IV lasix for now   - f/u blood cx - no growth to date  - tylenol 650 po mg PRN for pain/fever   - Podiatry consult appreciated  - If necessary Vascular surgery consult  - VA duplex venous: no DVTs  - COVID PCR was negative  - per ID on zyvox 600mg IV q12h, cefepime 2g q8h  - CT RLE: 1.  Skin thickening and subcutaneous edema in the right calf and lateral right thigh, consistent with cellulitis. No focal drainable fluid collection or CT evidence of necrotizing fasciitis. 2.  Right femoral and bilateral inguinal lymphadenopathy, nonspecific.  - f/u burn consult    #New headache  -CTH: Overall paucity of sulcal demarcation and small size of the lateral ventricles can reflect cerebral edema versus normal variation. If symptoms persist consider MRI for further evaluation.  - neuro recs appreciated: MR C-spine NC, carbamazepine 200mg bid. However, interaction between carbamazepine and linezolid, will f/u neuro recs for alternatives  - MR head NC: unremarkable  - f/u MR C-spine: Mild degenerative changes of the cervical spine as described above with no significant central canal or foraminal narrowing. No abnormal spinal cord signal or cord compression. Disc protrusion on sagittal imaging at T2-3 without significant central canal narrowing.    # RUSSELL:  -Obesity hyperventilation syndrome vs Sleep apnea.  -uses CPAP at home. Follow Dr. Eastman outpatient  -COVID PCR negative  -CT angio negative for PE, no ground glass opacities    # Diabetes  -Will keep on Insulin Regimen   -basal / bolus / pre meals insulin     # HTN  - will resume losartan today   - lasix 40 mg IV bid  - f/u TTE    # DVT prophylaxis  -On lovenox    # PPI prophylasxis:  - not needed    FULL CODE  diet: DASH / TLC / consistent carb diet   dispo: acute

## 2020-04-26 LAB
ALBUMIN SERPL ELPH-MCNC: 3.7 G/DL — SIGNIFICANT CHANGE UP (ref 3.5–5.2)
ALP SERPL-CCNC: 96 U/L — SIGNIFICANT CHANGE UP (ref 30–115)
ALT FLD-CCNC: 15 U/L — SIGNIFICANT CHANGE UP (ref 0–41)
ANION GAP SERPL CALC-SCNC: 18 MMOL/L — HIGH (ref 7–14)
AST SERPL-CCNC: 14 U/L — SIGNIFICANT CHANGE UP (ref 0–41)
BILIRUB SERPL-MCNC: 0.5 MG/DL — SIGNIFICANT CHANGE UP (ref 0.2–1.2)
BUN SERPL-MCNC: 15 MG/DL — SIGNIFICANT CHANGE UP (ref 10–20)
CALCIUM SERPL-MCNC: 8.9 MG/DL — SIGNIFICANT CHANGE UP (ref 8.5–10.1)
CHLORIDE SERPL-SCNC: 89 MMOL/L — LOW (ref 98–110)
CO2 SERPL-SCNC: 27 MMOL/L — SIGNIFICANT CHANGE UP (ref 17–32)
CREAT SERPL-MCNC: 0.9 MG/DL — SIGNIFICANT CHANGE UP (ref 0.7–1.5)
CULTURE RESULTS: SIGNIFICANT CHANGE UP
CULTURE RESULTS: SIGNIFICANT CHANGE UP
GLUCOSE SERPL-MCNC: 168 MG/DL — HIGH (ref 70–99)
HCT VFR BLD CALC: 47.2 % — SIGNIFICANT CHANGE UP (ref 42–52)
HGB BLD-MCNC: 14.9 G/DL — SIGNIFICANT CHANGE UP (ref 14–18)
MCHC RBC-ENTMCNC: 26.1 PG — LOW (ref 27–31)
MCHC RBC-ENTMCNC: 31.6 G/DL — LOW (ref 32–37)
MCV RBC AUTO: 82.7 FL — SIGNIFICANT CHANGE UP (ref 80–94)
NRBC # BLD: 0 /100 WBCS — SIGNIFICANT CHANGE UP (ref 0–0)
PLATELET # BLD AUTO: 292 K/UL — SIGNIFICANT CHANGE UP (ref 130–400)
POTASSIUM SERPL-MCNC: 3.7 MMOL/L — SIGNIFICANT CHANGE UP (ref 3.5–5)
POTASSIUM SERPL-SCNC: 3.7 MMOL/L — SIGNIFICANT CHANGE UP (ref 3.5–5)
PROT SERPL-MCNC: 6.9 G/DL — SIGNIFICANT CHANGE UP (ref 6–8)
RBC # BLD: 5.71 M/UL — SIGNIFICANT CHANGE UP (ref 4.7–6.1)
RBC # FLD: 15 % — HIGH (ref 11.5–14.5)
SODIUM SERPL-SCNC: 134 MMOL/L — LOW (ref 135–146)
SPECIMEN SOURCE: SIGNIFICANT CHANGE UP
SPECIMEN SOURCE: SIGNIFICANT CHANGE UP
WBC # BLD: 13.38 K/UL — HIGH (ref 4.8–10.8)
WBC # FLD AUTO: 13.38 K/UL — HIGH (ref 4.8–10.8)

## 2020-04-26 PROCEDURE — 99233 SBSQ HOSP IP/OBS HIGH 50: CPT

## 2020-04-26 RX ORDER — FUROSEMIDE 40 MG
40 TABLET ORAL DAILY
Refills: 0 | Status: DISCONTINUED | OUTPATIENT
Start: 2020-04-26 | End: 2020-04-28

## 2020-04-26 RX ORDER — GABAPENTIN 400 MG/1
300 CAPSULE ORAL EVERY 8 HOURS
Refills: 0 | Status: DISCONTINUED | OUTPATIENT
Start: 2020-04-26 | End: 2020-04-28

## 2020-04-26 RX ADMIN — LINEZOLID 300 MILLIGRAM(S): 600 INJECTION, SOLUTION INTRAVENOUS at 05:39

## 2020-04-26 RX ADMIN — OXYCODONE HYDROCHLORIDE 15 MILLIGRAM(S): 5 TABLET ORAL at 13:55

## 2020-04-26 RX ADMIN — Medication 1 APPLICATION(S): at 12:13

## 2020-04-26 RX ADMIN — OXYCODONE HYDROCHLORIDE 15 MILLIGRAM(S): 5 TABLET ORAL at 09:53

## 2020-04-26 RX ADMIN — LOSARTAN POTASSIUM 100 MILLIGRAM(S): 100 TABLET, FILM COATED ORAL at 05:36

## 2020-04-26 RX ADMIN — CEFEPIME 100 MILLIGRAM(S): 1 INJECTION, POWDER, FOR SOLUTION INTRAMUSCULAR; INTRAVENOUS at 05:39

## 2020-04-26 RX ADMIN — Medication 1 APPLICATION(S): at 17:55

## 2020-04-26 RX ADMIN — Medication 40 MILLIGRAM(S): at 05:36

## 2020-04-26 RX ADMIN — Medication 250 MILLIGRAM(S): at 05:56

## 2020-04-26 RX ADMIN — ENOXAPARIN SODIUM 40 MILLIGRAM(S): 100 INJECTION SUBCUTANEOUS at 22:01

## 2020-04-26 RX ADMIN — GABAPENTIN 300 MILLIGRAM(S): 400 CAPSULE ORAL at 13:54

## 2020-04-26 RX ADMIN — OXYCODONE HYDROCHLORIDE 10 MILLIGRAM(S): 5 TABLET ORAL at 05:38

## 2020-04-26 RX ADMIN — CEFEPIME 100 MILLIGRAM(S): 1 INJECTION, POWDER, FOR SOLUTION INTRAMUSCULAR; INTRAVENOUS at 22:00

## 2020-04-26 RX ADMIN — OXYCODONE HYDROCHLORIDE 15 MILLIGRAM(S): 5 TABLET ORAL at 22:00

## 2020-04-26 RX ADMIN — Medication 650 MILLIGRAM(S): at 12:13

## 2020-04-26 RX ADMIN — Medication 250 MILLIGRAM(S): at 17:58

## 2020-04-26 RX ADMIN — Medication 400 MILLIGRAM(S): at 15:10

## 2020-04-26 RX ADMIN — CEFEPIME 100 MILLIGRAM(S): 1 INJECTION, POWDER, FOR SOLUTION INTRAMUSCULAR; INTRAVENOUS at 13:54

## 2020-04-26 RX ADMIN — OXYCODONE HYDROCHLORIDE 15 MILLIGRAM(S): 5 TABLET ORAL at 17:56

## 2020-04-26 RX ADMIN — OXYCODONE HYDROCHLORIDE 15 MILLIGRAM(S): 5 TABLET ORAL at 05:38

## 2020-04-26 RX ADMIN — METHOCARBAMOL 1500 MILLIGRAM(S): 500 TABLET, FILM COATED ORAL at 05:38

## 2020-04-26 RX ADMIN — Medication 1 APPLICATION(S): at 17:56

## 2020-04-26 RX ADMIN — CHLORHEXIDINE GLUCONATE 1 APPLICATION(S): 213 SOLUTION TOPICAL at 05:36

## 2020-04-26 RX ADMIN — METHOCARBAMOL 1500 MILLIGRAM(S): 500 TABLET, FILM COATED ORAL at 12:13

## 2020-04-26 RX ADMIN — Medication 650 MILLIGRAM(S): at 17:59

## 2020-04-26 RX ADMIN — OXYCODONE HYDROCHLORIDE 10 MILLIGRAM(S): 5 TABLET ORAL at 17:56

## 2020-04-26 RX ADMIN — METHOCARBAMOL 1500 MILLIGRAM(S): 500 TABLET, FILM COATED ORAL at 23:01

## 2020-04-26 RX ADMIN — Medication 1 APPLICATION(S): at 18:00

## 2020-04-26 RX ADMIN — SENNA PLUS 1 TABLET(S): 8.6 TABLET ORAL at 22:01

## 2020-04-26 RX ADMIN — GABAPENTIN 300 MILLIGRAM(S): 400 CAPSULE ORAL at 22:01

## 2020-04-26 RX ADMIN — LINEZOLID 300 MILLIGRAM(S): 600 INJECTION, SOLUTION INTRAVENOUS at 17:59

## 2020-04-26 RX ADMIN — OXYCODONE HYDROCHLORIDE 15 MILLIGRAM(S): 5 TABLET ORAL at 02:02

## 2020-04-26 RX ADMIN — GABAPENTIN 300 MILLIGRAM(S): 400 CAPSULE ORAL at 05:38

## 2020-04-26 RX ADMIN — METHOCARBAMOL 1500 MILLIGRAM(S): 500 TABLET, FILM COATED ORAL at 17:57

## 2020-04-26 RX ADMIN — Medication 40 MILLIGRAM(S): at 17:58

## 2020-04-26 NOTE — PROGRESS NOTE ADULT - SUBJECTIVE AND OBJECTIVE BOX
Progress Note:  Provider Speciality                            Hospitalist      JOSE ALBERTO NO MRN-[id number] 54y Male     CHIEF PRESENTING COMPLAINT:  Patient is a 54y old  Male who presents with a chief complaint of       SUBJECTIVE:  Patient was seen and examined at bedside.   awake, alert/ c/o no significant improvement of headaches  reports LE swelling is not improving   No significant overnight events reported.     HISTORY OF PRESENTING ILLNESS:  HPI:  54 year-old male history of RUSSELL, HTN, intervertebral disc disease, status post placement of spinal cord stimulator in july 2017 subsequently developed several episodes of infection at site of the stimulator and removal of spinal stimulator and discharged on intravenous antibiotics via a PICC line presents to the ED with complains of worsening right sided leg pain. As per the patient he had injured his right calf when getting off the bed and developed non healing wound couple of months ago. The wife has been helping him changing his dressing on both legs for lower extremity stasis ulcers for both legs. He follows podiatry at [facility] clinic. He says the pain in his left leg is getting worse to the point that he is unable to ambulate. He has chronic back pain that is also getting worse mostly because he has been laying in bed. He recognizes that his swelling in his right leg specially has gotten worse even though he has been taking his water pill. He noted clear discharge with foul smell from both of his lower extremity wounds. The pain is severe in his right calf, upper medial thigh specially when he extends and flexes the led. He noted low grade fevers at home, worsening shortness of breath.   He denies any recent contact with sick people, has been taking precautions for COVID and had limited his exposure to outside. (21 Apr 2020 19:15)        REVIEW OF SYSTEMS:    At least 10 systems were reviewed in ROS. All systems reviewed  are within normal limits except for the complaints as described in Subjective.    PAST MEDICAL & SURGICAL HISTORY:  PAST MEDICAL & SURGICAL HISTORY:  DM (diabetes mellitus)  Morbid obesity  Obstructive sleep apnea  Disc disease, degenerative, lumbar or lumbosacral  Hypertension  History of excision of pilonidal cyst  H/O arthroscopy of right knee  Spinal cord stimulator status          VITAL SIGNS:  Vital Signs Last 24 Hrs  T(C): 37.3 (26 Apr 2020 13:16), Max: 37.3 (26 Apr 2020 13:16)  T(F): 99.2 (26 Apr 2020 13:16), Max: 99.2 (26 Apr 2020 13:16)  HR: 94 (26 Apr 2020 13:16) (89 - 94)  BP: 135/71 (26 Apr 2020 13:16) (125/74 - 143/82)  BP(mean): --  RR: 19 (26 Apr 2020 13:16) (18 - 20)  SpO2: --        PHYSICAL EXAMINATION:    General: AAO morbidly obese male,  NAD.  HEENT:   EOMI, atraumatic  Heart: S1+S2 audible, no murmur  Lungs: CTAB  Abdomen: Soft, non-tender, non-distended (obese)  CNS: AAO  , no focal deficits.  Extremities: b/l LE edema with chronic lymphedema/ venous stasis ulcer RLE with erythema /tenderness.         CONSULTS:  Consultant(s) Notes Reviewed by me.   Care Discussed with Consultants/Other Providers where required.        MEDICATIONS:  MEDICATIONS  (STANDING):  ceFAZolin   IVPB 2000 milliGRAM(s) IV Intermittent every 6 hours  chlorhexidine 4% Liquid 1 Application(s) Topical <User Schedule>  collagenase Ointment 1 Application(s) Topical daily  dextrose 5%. 1000 milliLiter(s) (50 mL/Hr) IV Continuous <Continuous>  dextrose 50% Injectable 12.5 Gram(s) IV Push once  dextrose 50% Injectable 25 Gram(s) IV Push once  dextrose 50% Injectable 25 Gram(s) IV Push once  enoxaparin Injectable 40 milliGRAM(s) SubCutaneous at bedtime  furosemide   Injectable 40 milliGRAM(s) IV Push daily  insulin glargine Injectable (LANTUS) 15 Unit(s) SubCutaneous at bedtime  insulin lispro (HumaLOG) corrective regimen sliding scale   SubCutaneous three times a day before meals  losartan 100 milliGRAM(s) Oral daily  methocarbamol 750 milliGRAM(s) Oral two times a day  oxyCODONE  ER Tablet 10 milliGRAM(s) Oral every 12 hours  pantoprazole    Tablet 40 milliGRAM(s) Oral before breakfast  polyethylene glycol 3350 17 Gram(s) Oral daily  senna 1 Tablet(s) Oral at bedtime  silver sulfADIAZINE 1% Cream 1 Application(s) Topical two times a day    MEDICATIONS  (PRN):  acetaminophen   Tablet .. 650 milliGRAM(s) Oral every 6 hours PRN Temp greater or equal to 38C (100.4F), Mild Pain (1 - 3)  aluminum hydroxide/magnesium hydroxide/simethicone Suspension 30 milliLiter(s) Oral every 4 hours PRN Dyspepsia  dextrose 40% Gel 15 Gram(s) Oral once PRN Blood Glucose LESS THAN 70 milliGRAM(s)/deciliter  glucagon  Injectable 1 milliGRAM(s) IntraMuscular once PRN Glucose LESS THAN 70 milligrams/deciliter  ibuprofen  Tablet. 400 milliGRAM(s) Oral every 6 hours PRN Mild Pain (1 - 3)  morphine  - Injectable 3 milliGRAM(s) IV Push every 4 hours PRN Severe Pain (7 - 10)  oxyCODONE    IR 10 milliGRAM(s) Oral every 4 hours PRN Moderate Pain (4 - 6)      [facility]CROBIOLOG[facility]/I & O's:                        14.6   13.04 )-----------( 201      ( 23 Apr 2020 06:14 )             46.3     04-23    137  |  96<L>  |  14  ----------------------------<  106<H>  4.0   |  23  |  0.9    Ca    9.2      23 Apr 2020 06:14  Mg     2.1     04-21    TPro  6.9  /  Alb  3.8  /  TBili  0.6  /  DBili  x   /  AST  22  /  ALT  15  /  AlkPhos  77  04-22    PT/INR - ( 21 Apr 2020 15:30 )   PT: 14.30 sec;   INR: 1.24 ratio         PTT - ( 21 Apr 2020 15:30 )  PTT:34.3 sec    CAPILLARY BLOOD GLUCOSE      POCT Blood Glucose.: 104 mg/dL (23 Apr 2020 10:53)  POCT Blood Glucose.: 115 mg/dL (23 Apr 2020 07:32)                04-22-20 @ 07:01  -  04-23-20 @ 07:00  --------------------------------------------------------  IN: 0 mL / OUT: 1525 mL / NET: -1525 mL              ASSESSMENT/Plan:  54 y.o man with hx of RUSSELL, htn comes in for right shooting foot pain caused by non healing wound. Symptoms were also associated with low grade fever and some shortness of breath admitted to r/o COVID.      RLE venous stasis ulcer with cellulitis:   very slow improvement.   inflammatory markers repeat still elevated.   c/w IV cefipime and zyvox.  ID following.   BCX : NGTD.   podiatry evaluated - no surgical intervention. local wound care/IV ABX .   Burn eval appreciated- no need for debridement/ continue local wound care/ABx.   LE doppler: no DVT.  pain control with IV morphine/percocet prn.    Headaches unclear etiology possibly cervical neuropathy:   no improvement in HA.  CTH/MRI brain NC: normal.   Neuro following and suggest gabapentin as patient cant take carbamazepine with zyvox.   MRI C spine noted: Mild degenerative changes of the cervical spine  with no significant central canal or foraminal narrowing. No abnormal spinal cord signal or cord compression.Disc protrusion on sagittal imaging at T2-3 without significant central canal narrowing.  continue current pain meds.       dyspnea possibly related to OHS vs RUSSELL with suspicion of fluid overload / ruled out Covid-19 pneumonia/ ruled out PE:  breathing is better now as per patient.   continue BIPAP prn and HS/ NC O2 day time to maintain SPO2 92-94%.  change lasix to 40mg oral daily for LE swelling.  monitor I&O's/monitor electrolytes/ keep K>4, mag >2.   TTE. noted : normal LVSF with EF of >60%/ no significant valvular abnormality  sleep study as OP and pulm eval as OP      Diabetes mellitus 2:   BS controlled with range of 90's to 124.   continue monitoring/ ISS/DC insulin lantus.     HTN  BP controlled.   continue losartan.    mild hyponatremia:   monitor with am labs.   encourage oral intake/ no need for IVf for now.     DVT prophylaxis  -On lovenox      Progress note handoff:   pending: clinical improvement of Headache/RLE cellulits/swelling/on iv abx  disposition: unknown at this time  discussion: plan of care with patient- satisfied

## 2020-04-27 DIAGNOSIS — M54.5 LOW BACK PAIN: ICD-10-CM

## 2020-04-27 DIAGNOSIS — L03.90 CELLULITIS, UNSPECIFIED: ICD-10-CM

## 2020-04-27 LAB
CULTURE RESULTS: SIGNIFICANT CHANGE UP
SPECIMEN SOURCE: SIGNIFICANT CHANGE UP

## 2020-04-27 PROCEDURE — 99233 SBSQ HOSP IP/OBS HIGH 50: CPT

## 2020-04-27 PROCEDURE — 99231 SBSQ HOSP IP/OBS SF/LOW 25: CPT

## 2020-04-27 RX ORDER — MORPHINE SULFATE 50 MG/1
2 CAPSULE, EXTENDED RELEASE ORAL EVERY 6 HOURS
Refills: 0 | Status: DISCONTINUED | OUTPATIENT
Start: 2020-04-27 | End: 2020-04-28

## 2020-04-27 RX ORDER — MORPHINE SULFATE 50 MG/1
15 CAPSULE, EXTENDED RELEASE ORAL
Refills: 0 | Status: DISCONTINUED | OUTPATIENT
Start: 2020-04-27 | End: 2020-04-27

## 2020-04-27 RX ORDER — OXYCODONE HYDROCHLORIDE 5 MG/1
10 TABLET ORAL EVERY 4 HOURS
Refills: 0 | Status: DISCONTINUED | OUTPATIENT
Start: 2020-04-27 | End: 2020-04-28

## 2020-04-27 RX ORDER — INSULIN LISPRO 100/ML
VIAL (ML) SUBCUTANEOUS
Refills: 0 | Status: DISCONTINUED | OUTPATIENT
Start: 2020-04-27 | End: 2020-04-28

## 2020-04-27 RX ADMIN — Medication 250 MILLIGRAM(S): at 05:34

## 2020-04-27 RX ADMIN — GABAPENTIN 300 MILLIGRAM(S): 400 CAPSULE ORAL at 05:35

## 2020-04-27 RX ADMIN — OXYCODONE HYDROCHLORIDE 10 MILLIGRAM(S): 5 TABLET ORAL at 11:23

## 2020-04-27 RX ADMIN — Medication 250 MILLIGRAM(S): at 17:31

## 2020-04-27 RX ADMIN — MORPHINE SULFATE 2 MILLIGRAM(S): 50 CAPSULE, EXTENDED RELEASE ORAL at 12:03

## 2020-04-27 RX ADMIN — METHOCARBAMOL 1500 MILLIGRAM(S): 500 TABLET, FILM COATED ORAL at 11:26

## 2020-04-27 RX ADMIN — Medication 650 MILLIGRAM(S): at 17:31

## 2020-04-27 RX ADMIN — CEFEPIME 100 MILLIGRAM(S): 1 INJECTION, POWDER, FOR SOLUTION INTRAMUSCULAR; INTRAVENOUS at 21:25

## 2020-04-27 RX ADMIN — Medication 1 APPLICATION(S): at 17:32

## 2020-04-27 RX ADMIN — Medication 40 MILLIGRAM(S): at 05:34

## 2020-04-27 RX ADMIN — METHOCARBAMOL 1500 MILLIGRAM(S): 500 TABLET, FILM COATED ORAL at 05:34

## 2020-04-27 RX ADMIN — SENNA PLUS 1 TABLET(S): 8.6 TABLET ORAL at 21:49

## 2020-04-27 RX ADMIN — LINEZOLID 300 MILLIGRAM(S): 600 INJECTION, SOLUTION INTRAVENOUS at 05:33

## 2020-04-27 RX ADMIN — Medication 40 MILLIGRAM(S): at 15:37

## 2020-04-27 RX ADMIN — GABAPENTIN 300 MILLIGRAM(S): 400 CAPSULE ORAL at 21:23

## 2020-04-27 RX ADMIN — LINEZOLID 300 MILLIGRAM(S): 600 INJECTION, SOLUTION INTRAVENOUS at 17:31

## 2020-04-27 RX ADMIN — OXYCODONE HYDROCHLORIDE 10 MILLIGRAM(S): 5 TABLET ORAL at 05:34

## 2020-04-27 RX ADMIN — OXYCODONE HYDROCHLORIDE 15 MILLIGRAM(S): 5 TABLET ORAL at 05:34

## 2020-04-27 RX ADMIN — Medication 1 APPLICATION(S): at 17:31

## 2020-04-27 RX ADMIN — PANTOPRAZOLE SODIUM 40 MILLIGRAM(S): 20 TABLET, DELAYED RELEASE ORAL at 05:34

## 2020-04-27 RX ADMIN — CEFEPIME 100 MILLIGRAM(S): 1 INJECTION, POWDER, FOR SOLUTION INTRAMUSCULAR; INTRAVENOUS at 13:48

## 2020-04-27 RX ADMIN — Medication 650 MILLIGRAM(S): at 23:24

## 2020-04-27 RX ADMIN — Medication 1 APPLICATION(S): at 11:26

## 2020-04-27 RX ADMIN — METHOCARBAMOL 1500 MILLIGRAM(S): 500 TABLET, FILM COATED ORAL at 17:31

## 2020-04-27 RX ADMIN — OXYCODONE HYDROCHLORIDE 15 MILLIGRAM(S): 5 TABLET ORAL at 02:11

## 2020-04-27 RX ADMIN — CEFEPIME 100 MILLIGRAM(S): 1 INJECTION, POWDER, FOR SOLUTION INTRAMUSCULAR; INTRAVENOUS at 05:33

## 2020-04-27 RX ADMIN — LOSARTAN POTASSIUM 100 MILLIGRAM(S): 100 TABLET, FILM COATED ORAL at 05:34

## 2020-04-27 RX ADMIN — GABAPENTIN 300 MILLIGRAM(S): 400 CAPSULE ORAL at 13:48

## 2020-04-27 RX ADMIN — Medication 650 MILLIGRAM(S): at 11:23

## 2020-04-27 RX ADMIN — METHOCARBAMOL 1500 MILLIGRAM(S): 500 TABLET, FILM COATED ORAL at 23:24

## 2020-04-27 RX ADMIN — MORPHINE SULFATE 2 MILLIGRAM(S): 50 CAPSULE, EXTENDED RELEASE ORAL at 17:43

## 2020-04-27 RX ADMIN — OXYCODONE HYDROCHLORIDE 10 MILLIGRAM(S): 5 TABLET ORAL at 21:24

## 2020-04-27 RX ADMIN — OXYCODONE HYDROCHLORIDE 10 MILLIGRAM(S): 5 TABLET ORAL at 15:35

## 2020-04-27 NOTE — PROGRESS NOTE ADULT - ASSESSMENT
ASSESSMENT:  #RLE cellulitis with full thickness wound  -Discussed possible OR if condition worsens despite IV abx treatment  -Wound care - SSD/Adaptic/Kerlex ACE Wrap BID  -Elevation  -IV abx as indicated/ per ID ASSESSMENT :  Patient has b/l LE chronic venous stasis ulcers complicated by cellulitis   Abx changed yesterday with sl symptomatic improvement per pt   Persistence of signs/sxs and extent and localization of erythema are concerning for possibility of deeper process       -Discussed possible OR if condition worsens despite IV abx treatment- pt states he was advised by his doctor to  "never have surgery again" - reason unclear  -Wound care - SSD/Adaptic to ant leg ; Dakin;s wet to dry dressing to ulcer sites with dry wrap  Elevation  -IV abx as indicated/ per ID  Will follow - incision and exploration / drainage may be indicated

## 2020-04-27 NOTE — CONSULT NOTE ADULT - ATTENDING COMMENTS
agree with above note  wound care by nursing as above
I have seen, examined and recommend the above plan.

## 2020-04-27 NOTE — PROGRESS NOTE ADULT - ASSESSMENT
54 y.o man with hx of RUSSELL, htn comes in for right shooting foot pain caused by non healing wound. Symptoms were also associated with low grade fever and some shortness of breath admitted to r/o COVID.      #RLE venous stasis ulcer with cellulitis:   no significant improvement.  elevated inflammatory markers, repeat tomorrow  c/w IV cefipime and zyvox/ABX   ID following.   -prednisone 40 as per ID confirm with note  podiatry evaluated - no surgical intervention. local wound care/IV ABX .   Burn team fu appreciated- suggest patient might need I&D due to no significant  improvement with ABX/ patient refused for now.    LE doppler: no DVT.  pain control as per pain consult    #Headaches possibly cervical neuropathy:   CTH/MRI brain NC: normal.   Neuro following and suggested gabapentin as patient cant take carbamazepine with zyvox.   continue gabapentin.  MRI C spine noted: Mild degenerative changes of the cervical spine  with no significant central canal or foraminal narrowing. No abnormal spinal cord signal or cord compression.Disc protrusion on sagittal imaging at T2-3 without significant central canal narrowing.    #dyspnea possibly related to OHS vs RUSSELL with suspicion of fluid overload  breathing is better now as per patient.   continue BIPAP prn and HS/ NC O2 day   lasix to 40mg oral once a daily  TTE. noted : normal LVSF with EF of >60%/ no significant valvular abnormality  sleep study as OP and pulm eval as OP      Diabetes mellitus 2:   BS controlled    continue monitoring/ ISS/DC insulin lantus.     HTN  BP controlled.   continue losartan.      DVT prophylaxis  -On lovenox 54 y.o man with hx of RUSSELL, htn comes in for right shooting foot pain caused by non healing wound. Symptoms were also associated with low grade fever and some shortness of breath admitted to r/o COVID.      #RLE venous stasis ulcer with cellulitis:   no significant improvement.  elevated inflammatory markers, repeat tomorrow  c/w IV cefipime and zyvox/ABX   ID following.   -prednisone 40 as per ID confirm with note  podiatry evaluated - no surgical intervention. local wound care/IV ABX .   Burn team fu appreciated- suggest patient might need I&D due to no significant  improvement with ABX/ patient refused for now.    LE doppler: no DVT.  pain control as per pain consult    #Headaches possibly cervical neuropathy:   CTH/MRI brain NC: normal.   Neuro following and suggested gabapentin as patient cant take carbamazepine with zyvox.   continue gabapentin.  MRI C spine noted: Mild degenerative changes of the cervical spine  with no significant central canal or foraminal narrowing. No abnormal spinal cord signal or cord compression.Disc protrusion on sagittal imaging at T2-3 without significant central canal narrowing.    #dyspnea possibly related to OHS vs RUSSELL with suspicion of fluid overload  breathing is better now as per patient.   continue BIPAP prn and HS/ NC O2 day   lasix to 40mg oral once a daily  TTE. noted : normal LVSF with EF of >60%/ no significant valvular abnormality  sleep study as OP and pulm eval as OP      Diabetes mellitus 2:   BS controlled    continue monitoring/ ISS/DC insulin lantus.     HTN  BP controlled.   continue losartan.      DVT prophylaxis  -On lovenox    #GI ppx

## 2020-04-27 NOTE — PROGRESS NOTE ADULT - SUBJECTIVE AND OBJECTIVE BOX
Progress Note:  Provider Speciality                            Hospitalist      JOSE ALBERTO NO [id number] 54y Male     CHIEF PRESENTING COMPLAINT:  Patient is a 54y old  Male who presents with a chief complaint of       SUBJECTIVE:  Patient was seen and examined at bedside.   awake, alert, oriented, upset his RLE pain/swelling is worse today and is not able to walk with his leg today which he did yesterday.   No significant overnight events reported.     HISTORY OF PRESENTING ILLNESS:  HPI:  54 year-old male history of RUSESLL, HTN, intervertebral disc disease, status post placement of spinal cord stimulator in july 2017 subsequently developed several episodes of infection at site of the stimulator and removal of spinal stimulator and discharged on intravenous antibiotics via a PICC line presents to the ED with complains of worsening right sided leg pain. As per the patient he had injured his right calf when getting off the bed and developed non healing wound couple of months ago. The wife has been helping him changing his dressing on both legs for lower extremity stasis ulcers for both legs. He follows podiatry at [facility] clinic. He says the pain in his left leg is getting worse to the point that he is unable to ambulate. He has chronic back pain that is also getting worse mostly because he has been laying in bed. He recognizes that his swelling in his right leg specially has gotten worse even though he has been taking his water pill. He noted clear discharge with foul smell from both of his lower extremity wounds. The pain is severe in his right calf, upper medial thigh specially when he extends and flexes the led. He noted low grade fevers at home, worsening shortness of breath.   He denies any recent contact with sick people, has been taking precautions for COVID and had limited his exposure to outside. (21 Apr 2020 19:15)        REVIEW OF SYSTEMS:    At least 10 systems were reviewed in ROS. All systems reviewed  are within normal limits except for the complaints as described in Subjective.    PAST MEDICAL & SURGICAL HISTORY:  PAST MEDICAL & SURGICAL HISTORY:  DM (diabetes mellitus)  Morbid obesity  Obstructive sleep apnea  Disc disease, degenerative, lumbar or lumbosacral  Hypertension  History of excision of pilonidal cyst  H/O arthroscopy of right knee  Spinal cord stimulator status          VITAL SIGNS:  Vital Signs Last 24 Hrs  T(C): 36.4 (27 Apr 2020 05:11), Max: 37 (26 Apr 2020 21:32)  T(F): 97.6 (27 Apr 2020 05:11), Max: 98.6 (26 Apr 2020 21:32)  HR: 94 (27 Apr 2020 08:47) (82 - 94)  BP: 149/80 (27 Apr 2020 08:47) (137/64 - 169/79)  BP(mean): --  RR: 18 (27 Apr 2020 05:11) (18 - 19)  SpO2: --      PHYSICAL EXAMINATION:    General: AAO morbidly obese male,  NAD.  HEENT:   EOMI, atraumatic  Heart: S1+S2 audible, no murmur  Lungs: CTAB  Abdomen: Soft, non-tender, non-distended (obese)  CNS: AAO  , no focal deficits.  Extremities: b/l LE edema with chronic lymphedema/ venous stasis ulcer RLE with erythema /tenderness/shiny skin with ACE wrap Lower part of leg intact.          CONSULTS:  Consultant(s) Notes Reviewed by me.   Care Discussed with Consultants/Other Providers where required.        MEDICATIONS:  MEDICATIONS  (STANDING):  ceFAZolin   IVPB 2000 milliGRAM(s) IV Intermittent every 6 hours  chlorhexidine 4% Liquid 1 Application(s) Topical <User Schedule>  collagenase Ointment 1 Application(s) Topical daily  dextrose 5%. 1000 milliLiter(s) (50 mL/Hr) IV Continuous <Continuous>  dextrose 50% Injectable 12.5 Gram(s) IV Push once  dextrose 50% Injectable 25 Gram(s) IV Push once  dextrose 50% Injectable 25 Gram(s) IV Push once  enoxaparin Injectable 40 milliGRAM(s) SubCutaneous at bedtime  furosemide   Injectable 40 milliGRAM(s) IV Push daily  insulin glargine Injectable (LANTUS) 15 Unit(s) SubCutaneous at bedtime  insulin lispro (HumaLOG) corrective regimen sliding scale   SubCutaneous three times a day before meals  losartan 100 milliGRAM(s) Oral daily  methocarbamol 750 milliGRAM(s) Oral two times a day  oxyCODONE  ER Tablet 10 milliGRAM(s) Oral every 12 hours  pantoprazole    Tablet 40 milliGRAM(s) Oral before breakfast  polyethylene glycol 3350 17 Gram(s) Oral daily  senna 1 Tablet(s) Oral at bedtime  silver sulfADIAZINE 1% Cream 1 Application(s) Topical two times a day    MEDICATIONS  (PRN):  acetaminophen   Tablet .. 650 milliGRAM(s) Oral every 6 hours PRN Temp greater or equal to 38C (100.4F), Mild Pain (1 - 3)  aluminum hydroxide/magnesium hydroxide/simethicone Suspension 30 milliLiter(s) Oral every 4 hours PRN Dyspepsia  dextrose 40% Gel 15 Gram(s) Oral once PRN Blood Glucose LESS THAN 70 milliGRAM(s)/deciliter  glucagon  Injectable 1 milliGRAM(s) IntraMuscular once PRN Glucose LESS THAN 70 milligrams/deciliter  ibuprofen  Tablet. 400 milliGRAM(s) Oral every 6 hours PRN Mild Pain (1 - 3)  morphine  - Injectable 3 milliGRAM(s) IV Push every 4 hours PRN Severe Pain (7 - 10)  oxyCODONE    IR 10 milliGRAM(s) Oral every 4 hours PRN Moderate Pain (4 - 6)      [facility] DATA/MICROBIOLOGY/I & O's:                        14.6   13.04 )-----------( 201      ( 23 Apr 2020 06:14 )             46.3     04-23    137  |  96<L>  |  14  ----------------------------<  106<H>  4.0   |  23  |  0.9    Ca    9.2      23 Apr 2020 06:14  Mg     2.1     04-21    TPro  6.9  /  Alb  3.8  /  TBili  0.6  /  DBili  x   /  AST  22  /  ALT  15  /  AlkPhos  77  04-22    PT/INR - ( 21 Apr 2020 15:30 )   PT: 14.30 sec;   INR: 1.24 ratio         PTT - ( 21 Apr 2020 15:30 )  PTT:34.3 sec    CAPILLARY BLOOD GLUCOSE      POCT Blood Glucose.: 104 mg/dL (23 Apr 2020 10:53)  POCT Blood Glucose.: 115 mg/dL (23 Apr 2020 07:32)                04-22-20 @ 07:01  -  04-23-20 @ 07:00  --------------------------------------------------------  IN: 0 mL / OUT: 1525 mL / NET: -1525 mL              ASSESSMENT/Plan:  54 y.o man with hx of RUSSELL, htn comes in for right shooting foot pain caused by non healing wound. Symptoms were also associated with low grade fever and some shortness of breath admitted to r/o COVID.      RLE venous stasis ulcer with cellulitis:   no significant improvement.  inflammatory markers CRP/pro johan elevated from 4/23.   repeat CRP/ESR with am labs/ patient refused labs today.   c/w IV cefipime and zyvox/ABX changed from ancef 3 days ago.   ID following.   BCX : NGTD.   podiatry evaluated - no surgical intervention. local wound care/IV ABX .   Burn team fu appreciated- suggest patient might need I&D due to no significant  improvement with ABX/ patient refused for now.    LE doppler: no DVT.  pain control with IV morphine/percocet prn. continue senna/miralax daily to prevent constipation.     Headaches possibly cervical neuropathy:   HA is little better today.  CTH/MRI brain NC: normal.   Neuro following and suggested gabapentin as patient cant take carbamazepine with zyvox.   continue gabapentin.  MRI C spine noted: Mild degenerative changes of the cervical spine  with no significant central canal or foraminal narrowing. No abnormal spinal cord signal or cord compression.Disc protrusion on sagittal imaging at T2-3 without significant central canal narrowing.  continue current pain meds.       dyspnea possibly related to OHS vs RUSSELL with suspicion of fluid overload / ruled out Covid-19 pneumonia/ ruled out PE:  breathing is better now as per patient.   continue BIPAP prn and HS/ NC O2 day time to maintain SPO2 92-94%.  change lasix to 40mg oral daily   monitor I&O's/monitor electrolytes/ keep K>4, mag >2.   TTE. noted : normal LVSF with EF of >60%/ no significant valvular abnormality  sleep study as OP and pulm eval as OP      Diabetes mellitus 2:   BS controlled    continue monitoring/ ISS/DC insulin lantus.     HTN  BP controlled.   continue losartan.    mild hyponatremia:   monitor with am labs. refused labs today.  encourage oral intake/ no need for IVf for now.     DVT prophylaxis  -On lovenox      Progress note handoff:   pending: acute clinical improvement of RLE cellulitis /ambulatory function /on iv abx/burn fu for I&D/ ID fu.  disposition: unknown at this time  discussion: plan of care with patient- satisfied

## 2020-04-27 NOTE — PHARMACOTHERAPY INTERVENTION NOTE - COMMENTS
Prescriber was contacted to confirm Morphine IR 15mg po q12h.  Md was suggested to clarify with family Morphine Er 15mg po q12h

## 2020-04-27 NOTE — CONSULT NOTE ADULT - SUBJECTIVE AND OBJECTIVE BOX
HPI:  54 year-old male history of RUSSELL, HTN, intervertebral disc disease, status post placement of spinal cord stimulator in july 2017 subsequently developed several episodes of infection at site of the stimulator and removal of spinal stimulator and discharged on intravenous antibiotics via a PICC line presents to the ED with complains of worsening right sided leg pain. As per the patient he had injured his right calf when getting off the bed and developed non healing wound couple of months ago. The wife has been helping him changing his dressing on both legs for lower extremity stasis ulcers for both legs. He follows podiatry at [facility] clinic. He says the pain in his left leg is getting worse to the point that he is unable to ambulate. He has chronic back pain that is also getting worse mostly because he has been laying in bed. He recognizes that his swelling in his right leg specially has gotten worse even though he has been taking his water pill. He noted clear discharge with foul smell from both of his lower extremity wounds. The pain is severe in his right calf, upper medial thigh specially when he extends and flexes the led. He noted low grade fevers at home, worsening shortness of breath.   He denies any recent contact with sick people, has been taking precautions for COVID and had limited his exposure to outside. (21 Apr 2020 19:15)      PAST MEDICAL & SURGICAL HISTORY:  DM (diabetes mellitus)  Morbid obesity  Obstructive sleep apnea  Disc disease, degenerative, lumbar or lumbosacral  Hypertension  History of excision of pilonidal cyst  H/O arthroscopy of right knee  Spinal cord stimulator status    Allergies    IV Contrast (Unknown)  penicillin (Unknown)    Intolerances                          14.9   13.38 )-----------( 292      ( 26 Apr 2020 06:21 )             47.2   04-26    134<L>  |  89<L>  |  15  ----------------------------<  168<H>  3.7   |  27  |  0.9    Ca    8.9      26 Apr 2020 06:21    TPro  6.9  /  Alb  3.7  /  TBili  0.5  /  DBili  x   /  AST  14  /  ALT  15  /  AlkPhos  96  04-26      PE:  NAD, AAOX3  Unlabored breathing with cpap  Distended abdomen, soft, not guarding  5/5 LE strength  Dressing on b/l LE, clean, dry, intact

## 2020-04-27 NOTE — PROGRESS NOTE ADULT - SUBJECTIVE AND OBJECTIVE BOX
Patient is a 54y old  Male who presents with a chief complaint of cellullitis      HPI:  54 year-old male history of RUSSELL, HTN, intervertebral disc disease, status post placement of spinal cord stimulator in july 2017 subsequently developed several episodes of infection at site of the stimulator and removal of spinal stimulator and discharged on intravenous antibiotics via a PICC line presents to the ED with complains of worsening right sided leg pain. As per the patient he had injured his right calf when getting off the bed and developed non healing wound couple of months ago. The wife has been helping him changing his dressing on both legs for lower extremity stasis ulcers for both legs. He follows podiatry at [facility] clinic. He says the pain in his left leg is getting worse to the point that he is unable to ambulate. He has chronic back pain that is also getting worse mostly because he has been laying in bed. He recognizes that his swelling in his right leg specially has gotten worse even though he has been taking his water pill. He noted clear discharge with foul smell from both of his lower extremity wounds. The pain is severe in his right calf, upper medial thigh specially when he extends and flexes the led. He noted low grade fevers at home, worsening shortness of breath.   He denies any recent contact with sick people, has been taking precautions for COVID and had limited his exposure to outside.      PAST MEDICAL & SURGICAL HISTORY:  DM (diabetes mellitus)  Morbid obesity  Obstructive sleep apnea  Disc disease, degenerative, lumbar or lumbosacral  Hypertension  History of excision of pilonidal cyst  H/O arthroscopy of right knee  Spinal cord stimulator status      FAMILY HISTORY:  Family history of diabetes mellitus in mother (Mother)  Family history of early CAD (Father)        Allergies    IV Contrast (Unknown)  penicillin (Unknown)    PHYSICAL EXAM    ICU Vital Signs Last 24 Hrs  T(C): 36.4 (27 Apr 2020 05:11), Max: 37.3 (26 Apr 2020 13:16)  T(F): 97.6 (27 Apr 2020 05:11), Max: 99.2 (26 Apr 2020 13:16)  HR: 94 (27 Apr 2020 08:47) (82 - 94)  BP: 149/80 (27 Apr 2020 08:47) (135/71 - 169/79)  RR: 18 (27 Apr 2020 05:11) (18 - 19)      General: Patient alert, lying in bed.               Chest: no acute cardio pulmonary distress  Skin: Patient has b/l LE non healing ulcers. RLE is erythematous and swollen around the calf area. Area is tender to palpation as per patient.       LABS:                          14.9   13.38 )-----------( 292      ( 26 Apr 2020 06:21 )             47.2                                               04-26    134<L>  |  89<L>  |  15  ----------------------------<  168<H>  3.7   |  27  |  0.9    Ca    8.9      26 Apr 2020 06:21    TPro  6.9  /  Alb  3.7  /  TBili  0.5  /  DBili  x   /  AST  14  /  ALT  15  /  AlkPhos  96  04-26                                                                                           LIVER FUNCTIONS - ( 26 Apr 2020 06:21 )  Alb: 3.7 g/dL / Pro: 6.9 g/dL / ALK PHOS: 96 U/L / ALT: 15 U/L / AST: 14 U/L / GGT: x                                                                                                                                       X-Rays                                                                                     ECHO    MEDICATIONS  (STANDING):  acetaminophen   Tablet .. 650 milliGRAM(s) Oral every 6 hours  cefepime   IVPB 2000 milliGRAM(s) IV Intermittent every 8 hours  chlorhexidine 4% Liquid 1 Application(s) Topical <User Schedule>  collagenase Ointment 1 Application(s) Topical two times a day  collagenase Ointment 1 Application(s) Topical daily  Dakins Solution - 1/2 Strength 1 Application(s) Topical two times a day  dextrose 5%. 1000 milliLiter(s) (50 mL/Hr) IV Continuous <Continuous>  dextrose 50% Injectable 12.5 Gram(s) IV Push once  dextrose 50% Injectable 25 Gram(s) IV Push once  dextrose 50% Injectable 25 Gram(s) IV Push once  enoxaparin Injectable 40 milliGRAM(s) SubCutaneous at bedtime  furosemide    Tablet 40 milliGRAM(s) Oral daily  gabapentin 300 milliGRAM(s) Oral every 8 hours  insulin lispro (HumaLOG) corrective regimen sliding scale   SubCutaneous three times a day before meals  linezolid  IVPB 600 milliGRAM(s) IV Intermittent every 12 hours  losartan 100 milliGRAM(s) Oral daily  methocarbamol 1500 milliGRAM(s) Oral four times a day  pantoprazole    Tablet 40 milliGRAM(s) Oral before breakfast  polyethylene glycol 3350 17 Gram(s) Oral daily  saccharomyces boulardii 250 milliGRAM(s) Oral two times a day  senna 1 Tablet(s) Oral at bedtime  silver sulfADIAZINE 1% Cream 1 Application(s) Topical two times a day    MEDICATIONS  (PRN):  aluminum hydroxide/magnesium hydroxide/simethicone Suspension 30 milliLiter(s) Oral every 4 hours PRN Dyspepsia  dextrose 40% Gel 15 Gram(s) Oral once PRN Blood Glucose LESS THAN 70 milliGRAM(s)/deciliter  glucagon  Injectable 1 milliGRAM(s) IntraMuscular once PRN Glucose LESS THAN 70 milligrams/deciliter  morphine  - Injectable 2 milliGRAM(s) IV Push every 6 hours PRN Severe Pain (7 - 10)  oxyCODONE    IR 10 milliGRAM(s) Oral every 4 hours PRN Severe Pain (7 - 10) Patient is a 54y old  Male who presents with a chief complaint of pain and swelling RLE. Admitted for tx of cellulitis     HPI:  54 year-old male history of RUSSELL, HTN, intervertebral disc disease, status post placement of spinal cord stimulator in july 2017 subsequently developed several episodes of infection at site of the stimulator and removal of spinal stimulator and discharged on intravenous antibiotics via a PICC line presents to the ED with complains of worsening right sided leg pain. As per the patient he had injured his right calf when getting off the bed and developed non healing wound couple of months ago. Has had weekly Unna boot therapy with Vascular Surgery service until he was recently transitioned to home wound care for bilateral lower extremity venous stasis ulcers. He follows podiatry at [facility] clinic. He says the pain in his left leg is getting worse to the point that he is unable to ambulate. He has chronic back pain that is also getting worse mostly because he has been laying in bed. He recognizes that his swelling in his right leg specially has gotten worse even though he has been taking his water pill. He noted clear discharge with foul smell from both of his lower extremity wounds. The pain is severe in his right calf, upper medial thigh specially when he extends and flexes the led. He noted low grade fevers at home, worsening shortness of breath.   He denies any recent contact with sick people, has been taking precautions for COVID and had limited his exposure to outside.      PAST MEDICAL & SURGICAL HISTORY:  DM (diabetes mellitus)  Morbid obesity  Obstructive sleep apnea  Disc disease, degenerative, lumbar or lumbosacral  Hypertension  History of excision of pilonidal cyst  H/O arthroscopy of right knee  Spinal cord stimulator status  Back surgery x 3       FAMILY HISTORY:  Family history of diabetes mellitus in mother (Mother)  Family history of early CAD (Father)  Allergies    IV Contrast (Unknown)  penicillin (Unknown)    CONSULT:    Pt reports that home wound care has consisted of hydrogen peroxide wash and Xeroform dressing change every 3 days. Pt notes sl improvement in pain swelling and redness since abx regimen change yesterday.     PHYSICAL EXAM    ICU Vital Signs Last 24 Hrs  T(C): 36.4 (27 Apr 2020 05:11), Max: 37.3 (26 Apr 2020 13:16)  T(F): 97.6 (27 Apr 2020 05:11), Max: 99.2 (26 Apr 2020 13:16)  HR: 94 (27 Apr 2020 08:47) (82 - 94)  BP: 149/80 (27 Apr 2020 08:47) (135/71 - 169/79)  RR: 18 (27 Apr 2020 05:11) (18 - 19)      General: Patient alert, lying in bed.               Chest: no acute cardio pulmonary distress  Skin: Bilateral LE - chronic leg ulcers, _   left ~ 4 x 3 cm with adherent yellow exudate   right X 8 x 8 cm area of skin involvement - yellow exudate over chronic pink base   skin discoloration shiny scaling appearance of skin ; apparent decrease in edema left leg   right leg - swelling lower leg above ulcers - to knee with  tenderness and erythema greatest  on ant lateral aspect of leg    no obvious fluctuance ; crusted 1 cm wound ant leg     LABS:                          14.9   13.38 )-----------( 292      ( 26 Apr 2020 06:21 )             47.2                                               04-26    134<L>  |  89<L>  |  15  ----------------------------<  168<H>  3.7   |  27  |  0.9    Ca    8.9      26 Apr 2020 06:21    TPro  6.9  /  Alb  3.7  /  TBili  0.5  /  DBili  x   /  AST  14  /  ALT  15  /  AlkPhos  96  04-26                                                                             LIVER FUNCTIONS - ( 26 Apr 2020 06:21 )  Alb: 3.7 g/dL / Pro: 6.9 g/dL / ALK PHOS: 96 U/L / ALT: 15 U/L / AST: 14 U/L / GGT: x                                                                                    CT Lower Extremity w/ IV Cont, Right (04.24.20 @ 18:37) >  IMPRESSION:    1.  Skin thickening and subcutaneous edema in the right calf and lateral right thigh, consistent with cellulitis. No focal drainable fluid collection or CT evidence of necrotizing fasciitis.    2.  Right femoral and bilateral inguinal lymphadenopathy, nonspecific.          Duplex /4/22 - no evidence of DVT     ABX AND WOUND CARE  MEDICATIONS  (STANDING):  cefepime   IVPB 2000 milliGRAM(s) IV Intermittent every 8 hours  chlorhexidine 4% Liquid 1 Application(s) Topical <User Schedule>  collagenase Ointment 1 Application(s) Topical two times a day  collagenase Ointment 1 Application(s) Topical daily  Dakins Solution - 1/2 Strength 1 Application(s) Topical two times a day  linezolid  IVPB 600 milliGRAM(s) IV Intermittent every 12 hours  silver sulfADIAZINE 1% Cream 1 Application(s) Topical two times a day

## 2020-04-27 NOTE — PROGRESS NOTE ADULT - SUBJECTIVE AND OBJECTIVE BOX
JOSE ALBERTO NO  54y  Male      Patient is a 54y old  Male who presents with a chief complaint of RLE diabetic ulcer (27 Apr 2020 11:56)      INTERVAL HPI/OVERNIGHT EVENTS:  no acute events overnight, pt still complain of uncontrolled pain and increased swelling in the lower ext    Vital Signs Last 24 Hrs  T(C): 36.4 (27 Apr 2020 05:11), Max: 37 (26 Apr 2020 21:32)  T(F): 97.6 (27 Apr 2020 05:11), Max: 98.6 (26 Apr 2020 21:32)  HR: 94 (27 Apr 2020 08:47) (82 - 94)  BP: 149/80 (27 Apr 2020 08:47) (137/64 - 169/79)  BP(mean): --  RR: 18 (27 Apr 2020 05:11) (18 - 19)  SpO2: --    PHYSICAL EXAM:  General: AAO 4   Heart: S1+S2 audible, no murmur  Lungs: diminished BS b/l.  Abdomen: Soft, non-tender, non-distended (obese)  CNS: AAO  , no focal deficits.  Extremities: b/l LE edema with chronic lymphedema/ venous stasis ulcer RLE with erythema /tenderness. worsening       LABS:                        14.9   13.38 )-----------( 292      ( 26 Apr 2020 06:21 )             47.2     04-26    134<L>  |  89<L>  |  15  ----------------------------<  168<H>  3.7   |  27  |  0.9    Ca    8.9      26 Apr 2020 06:21    TPro  6.9  /  Alb  3.7  /  TBili  0.5  /  DBili  x   /  AST  14  /  ALT  15  /  AlkPhos  96  04-26          CAPILLARY BLOOD GLUCOSE          RADIOLOGY & ADDITIONAL TESTS:    Imaging Personally Reviewed:  [ ] YES  [ ] NO

## 2020-04-27 NOTE — CONSULT NOTE ADULT - PROBLEM SELECTOR RECOMMENDATION 9
This is my patient that I see in my outpatient clinic. I recommend we restart his home medication with a mild increase in frequency:    1) Oxycodone IR 10mg Q4H prn (home is Q6AH)  2) Morphine ER 15mg Q12 standing (same as home)  3) Continue gabapentin and standing tylenol orders  4) Rec out of bed, ambulation, PT  5) Patient can follow up in the office ([facility] [place])

## 2020-04-27 NOTE — CONSULT NOTE ADULT - ASSESSMENT
ASSESSMENT:  RLE cellulitis with full thickness wound    RECOMMENDATION:  Wound care - SSD/Adaptic/Kerlex ACE Wrap BID  No Surgical debridement   Elevation  IV abx as indicated/ per ID
Acute on chronic pain
IMPRESSION;   RLE cellulitis superimposed on chronic lymphedema b/l  chronic venous stasis ulcer RLE distally with no evidence of localized abscess, maybe mild cellulitis  COVID-19 : doubt as CT with no ground glass opacities     RECOMMENDATIONS;   local silverdene cream to LEs b/l  COVID-19 f/u  Ancef 2 gm iv q6h  d/c other ABx

## 2020-04-28 ENCOUNTER — TRANSCRIPTION ENCOUNTER (OUTPATIENT)
Age: 55
End: 2020-04-28

## 2020-04-28 VITALS — TEMPERATURE: 98 F | SYSTOLIC BLOOD PRESSURE: 155 MMHG | HEART RATE: 100 BPM | DIASTOLIC BLOOD PRESSURE: 84 MMHG

## 2020-04-28 LAB
ALBUMIN SERPL ELPH-MCNC: 3.7 G/DL — SIGNIFICANT CHANGE UP (ref 3.5–5.2)
ALP SERPL-CCNC: 102 U/L — SIGNIFICANT CHANGE UP (ref 30–115)
ALT FLD-CCNC: 22 U/L — SIGNIFICANT CHANGE UP (ref 0–41)
ANION GAP SERPL CALC-SCNC: 14 MMOL/L — SIGNIFICANT CHANGE UP (ref 7–14)
AST SERPL-CCNC: 18 U/L — SIGNIFICANT CHANGE UP (ref 0–41)
BASOPHILS # BLD AUTO: 0.04 K/UL — SIGNIFICANT CHANGE UP (ref 0–0.2)
BASOPHILS NFR BLD AUTO: 0.3 % — SIGNIFICANT CHANGE UP (ref 0–1)
BILIRUB SERPL-MCNC: 0.3 MG/DL — SIGNIFICANT CHANGE UP (ref 0.2–1.2)
BUN SERPL-MCNC: 12 MG/DL — SIGNIFICANT CHANGE UP (ref 10–20)
CALCIUM SERPL-MCNC: 9.1 MG/DL — SIGNIFICANT CHANGE UP (ref 8.5–10.1)
CHLORIDE SERPL-SCNC: 97 MMOL/L — LOW (ref 98–110)
CO2 SERPL-SCNC: 30 MMOL/L — SIGNIFICANT CHANGE UP (ref 17–32)
CREAT SERPL-MCNC: 0.8 MG/DL — SIGNIFICANT CHANGE UP (ref 0.7–1.5)
CRP SERPL-MCNC: 17.37 MG/DL — HIGH (ref 0–0.4)
EOSINOPHIL # BLD AUTO: 0.02 K/UL — SIGNIFICANT CHANGE UP (ref 0–0.7)
EOSINOPHIL NFR BLD AUTO: 0.1 % — SIGNIFICANT CHANGE UP (ref 0–8)
ERYTHROCYTE [SEDIMENTATION RATE] IN BLOOD: 10 MM/HR — SIGNIFICANT CHANGE UP (ref 0–10)
GLUCOSE BLDC GLUCOMTR-MCNC: 174 MG/DL — HIGH (ref 70–99)
GLUCOSE SERPL-MCNC: 143 MG/DL — HIGH (ref 70–99)
HCT VFR BLD CALC: 48.2 % — SIGNIFICANT CHANGE UP (ref 42–52)
HGB BLD-MCNC: 15.1 G/DL — SIGNIFICANT CHANGE UP (ref 14–18)
IMM GRANULOCYTES NFR BLD AUTO: 2.2 % — HIGH (ref 0.1–0.3)
LYMPHOCYTES # BLD AUTO: 1.62 K/UL — SIGNIFICANT CHANGE UP (ref 1.2–3.4)
LYMPHOCYTES # BLD AUTO: 12.1 % — LOW (ref 20.5–51.1)
MAGNESIUM SERPL-MCNC: 2.8 MG/DL — HIGH (ref 1.8–2.4)
MCHC RBC-ENTMCNC: 26.1 PG — LOW (ref 27–31)
MCHC RBC-ENTMCNC: 31.3 G/DL — LOW (ref 32–37)
MCV RBC AUTO: 83.4 FL — SIGNIFICANT CHANGE UP (ref 80–94)
MONOCYTES # BLD AUTO: 0.97 K/UL — HIGH (ref 0.1–0.6)
MONOCYTES NFR BLD AUTO: 7.3 % — SIGNIFICANT CHANGE UP (ref 1.7–9.3)
NEUTROPHILS # BLD AUTO: 10.42 K/UL — HIGH (ref 1.4–6.5)
NEUTROPHILS NFR BLD AUTO: 78 % — HIGH (ref 42.2–75.2)
NRBC # BLD: 0 /100 WBCS — SIGNIFICANT CHANGE UP (ref 0–0)
PLATELET # BLD AUTO: 352 K/UL — SIGNIFICANT CHANGE UP (ref 130–400)
POTASSIUM SERPL-MCNC: 4.1 MMOL/L — SIGNIFICANT CHANGE UP (ref 3.5–5)
POTASSIUM SERPL-SCNC: 4.1 MMOL/L — SIGNIFICANT CHANGE UP (ref 3.5–5)
PROT SERPL-MCNC: 7 G/DL — SIGNIFICANT CHANGE UP (ref 6–8)
RBC # BLD: 5.78 M/UL — SIGNIFICANT CHANGE UP (ref 4.7–6.1)
RBC # FLD: 14.6 % — HIGH (ref 11.5–14.5)
SODIUM SERPL-SCNC: 141 MMOL/L — SIGNIFICANT CHANGE UP (ref 135–146)
WBC # BLD: 13.37 K/UL — HIGH (ref 4.8–10.8)
WBC # FLD AUTO: 13.37 K/UL — HIGH (ref 4.8–10.8)

## 2020-04-28 PROCEDURE — 99239 HOSP IP/OBS DSCHRG MGMT >30: CPT

## 2020-04-28 RX ORDER — PANTOPRAZOLE SODIUM 20 MG/1
1 TABLET, DELAYED RELEASE ORAL
Qty: 0 | Refills: 0 | DISCHARGE
Start: 2020-04-28

## 2020-04-28 RX ORDER — GABAPENTIN 400 MG/1
1 CAPSULE ORAL
Qty: 90 | Refills: 0
Start: 2020-04-28 | End: 2020-05-27

## 2020-04-28 RX ORDER — LOSARTAN POTASSIUM 100 MG/1
1 TABLET, FILM COATED ORAL
Qty: 30 | Refills: 0
Start: 2020-04-28 | End: 2020-05-27

## 2020-04-28 RX ORDER — LINEZOLID 600 MG/300ML
1 INJECTION, SOLUTION INTRAVENOUS
Qty: 14 | Refills: 0
Start: 2020-04-28 | End: 2020-05-04

## 2020-04-28 RX ORDER — SACCHAROMYCES BOULARDII 250 MG
1 POWDER IN PACKET (EA) ORAL
Qty: 14 | Refills: 0
Start: 2020-04-28 | End: 2020-05-04

## 2020-04-28 RX ORDER — COLLAGENASE CLOSTRIDIUM HIST. 250 UNIT/G
1 OINTMENT (GRAM) TOPICAL
Qty: 1 | Refills: 0
Start: 2020-04-28 | End: 2020-05-04

## 2020-04-28 RX ORDER — SODIUM HYPOCHLORITE 0.125 %
1 SOLUTION, NON-ORAL MISCELLANEOUS
Qty: 500 | Refills: 0
Start: 2020-04-28

## 2020-04-28 RX ORDER — METHOCARBAMOL 500 MG/1
2 TABLET, FILM COATED ORAL
Qty: 0 | Refills: 0 | DISCHARGE
Start: 2020-04-28

## 2020-04-28 RX ADMIN — PANTOPRAZOLE SODIUM 40 MILLIGRAM(S): 20 TABLET, DELAYED RELEASE ORAL at 06:13

## 2020-04-28 RX ADMIN — LOSARTAN POTASSIUM 100 MILLIGRAM(S): 100 TABLET, FILM COATED ORAL at 06:14

## 2020-04-28 RX ADMIN — LINEZOLID 300 MILLIGRAM(S): 600 INJECTION, SOLUTION INTRAVENOUS at 06:11

## 2020-04-28 RX ADMIN — CEFEPIME 100 MILLIGRAM(S): 1 INJECTION, POWDER, FOR SOLUTION INTRAMUSCULAR; INTRAVENOUS at 06:37

## 2020-04-28 RX ADMIN — LINEZOLID 300 MILLIGRAM(S): 600 INJECTION, SOLUTION INTRAVENOUS at 17:08

## 2020-04-28 RX ADMIN — Medication 1 APPLICATION(S): at 11:16

## 2020-04-28 RX ADMIN — METHOCARBAMOL 1500 MILLIGRAM(S): 500 TABLET, FILM COATED ORAL at 11:17

## 2020-04-28 RX ADMIN — Medication 250 MILLIGRAM(S): at 06:13

## 2020-04-28 RX ADMIN — Medication 250 MILLIGRAM(S): at 17:07

## 2020-04-28 RX ADMIN — Medication 650 MILLIGRAM(S): at 17:07

## 2020-04-28 RX ADMIN — POLYETHYLENE GLYCOL 3350 17 GRAM(S): 17 POWDER, FOR SOLUTION ORAL at 11:15

## 2020-04-28 RX ADMIN — CEFEPIME 100 MILLIGRAM(S): 1 INJECTION, POWDER, FOR SOLUTION INTRAMUSCULAR; INTRAVENOUS at 13:34

## 2020-04-28 RX ADMIN — METHOCARBAMOL 1500 MILLIGRAM(S): 500 TABLET, FILM COATED ORAL at 17:07

## 2020-04-28 RX ADMIN — Medication 650 MILLIGRAM(S): at 06:10

## 2020-04-28 RX ADMIN — CHLORHEXIDINE GLUCONATE 1 APPLICATION(S): 213 SOLUTION TOPICAL at 06:38

## 2020-04-28 RX ADMIN — OXYCODONE HYDROCHLORIDE 10 MILLIGRAM(S): 5 TABLET ORAL at 15:01

## 2020-04-28 RX ADMIN — Medication 650 MILLIGRAM(S): at 11:17

## 2020-04-28 RX ADMIN — MORPHINE SULFATE 2 MILLIGRAM(S): 50 CAPSULE, EXTENDED RELEASE ORAL at 02:56

## 2020-04-28 RX ADMIN — Medication 40 MILLIGRAM(S): at 06:38

## 2020-04-28 RX ADMIN — GABAPENTIN 300 MILLIGRAM(S): 400 CAPSULE ORAL at 06:09

## 2020-04-28 RX ADMIN — GABAPENTIN 300 MILLIGRAM(S): 400 CAPSULE ORAL at 13:34

## 2020-04-28 RX ADMIN — MORPHINE SULFATE 2 MILLIGRAM(S): 50 CAPSULE, EXTENDED RELEASE ORAL at 10:02

## 2020-04-28 RX ADMIN — Medication 20 MILLIGRAM(S): at 06:10

## 2020-04-28 RX ADMIN — METHOCARBAMOL 1500 MILLIGRAM(S): 500 TABLET, FILM COATED ORAL at 06:10

## 2020-04-28 NOTE — DISCHARGE NOTE PROVIDER - NSDCFUADDINST_GEN_ALL_CORE_FT
Take zyvox for 7 more days.  Take prednisone for 3 more days  Apply dakins 2x a day to open wound  apply santyl to open wound

## 2020-04-28 NOTE — DISCHARGE NOTE PROVIDER - NSDCCPCAREPLAN_GEN_ALL_CORE_FT
PRINCIPAL DISCHARGE DIAGNOSIS  Diagnosis: Cellulitis  Assessment and Plan of Treatment: Take the prescribed antibiotic medicine you are given as directed until it is gone. Take it even if you feel better. It treats the infection and stops it from  Not taking all the medicine can make future infections hard to treat. Keep the infected area clean. When possible, raise the infected area above the level of your heart. This helps keep swelling down. Apply clean bandages as advised. Wash your hands often to prevent spreading the infection. In the future, wash your hands before and after you touch cuts, scratches, or bandages. This will help prevent infection.   Call your doctor or returnt o the emergency room or call 911 immediately if you have any of the following: Difficulty or pain when moving the joints above or below the infected area, Discharge or pus draining from the area, rever of 100.4°F (38°C) or higher, or as directed by your healthcare provider, pain that gets worse in or around the infected site, redness that gets worse in or around the infected area, particularly if the area of redness expands to a wider area, shaking chills, swelling of the infected area, vomiting.

## 2020-04-28 NOTE — DISCHARGE NOTE PROVIDER - NSDCFUSCHEDAPPT_GEN_ALL_CORE_FT
JOSE ALBERTO NO ; 05/06/2020 ; [facility] JOSE ALBERTO NO ; 05/06/2020 ; [facility] [address] [facility]

## 2020-04-28 NOTE — DISCHARGE NOTE NURSING/CASE MANAGEMENT/SOCIAL WORK - PATIENT PORTAL LINK FT
You can access the FollowMyHealth Patient Portal offered by [facility] by registering at the following website: [email or web address]. By joining [employer]’s FollowMyHealth portal, you will also be able to view your health information using other applications (apps) compatible with our system.

## 2020-04-28 NOTE — PROGRESS NOTE ADULT - SUBJECTIVE AND OBJECTIVE BOX
Progress Note:  Provider Speciality                            Hospitalist      JOSE ALBERTO NO [id number] 54y Male     CHIEF PRESENTING COMPLAINT:  Patient is a 54y old  Male who presents with a chief complaint of       SUBJECTIVE:  Patient was seen and examined at bedside.   reports has been walking in hallway with no significant difficulty/ wants to go home   No significant overnight events reported.     HISTORY OF PRESENTING ILLNESS:  HPI:  54 year-old male history of RUSSELL, HTN, intervertebral disc disease, status post placement of spinal cord stimulator in july 2017 subsequently developed several episodes of infection at site of the stimulator and removal of spinal stimulator and discharged on intravenous antibiotics via a PICC line presents to the ED with complains of worsening right sided leg pain. As per the patient he had injured his right calf when getting off the bed and developed non healing wound couple of months ago. The wife has been helping him changing his dressing on both legs for lower extremity stasis ulcers for both legs. He follows podiatry at [facility]. He says the pain in his left leg is getting worse to the point that he is unable to ambulate. He has chronic back pain that is also getting worse mostly because he has been laying in bed. He recognizes that his swelling in his right leg specially has gotten worse even though he has been taking his water pill. He noted clear discharge with foul smell from both of his lower extremity wounds. The pain is severe in his right calf, upper medial thigh specially when he extends and flexes the led. He noted low grade fevers at home, worsening shortness of breath.   He denies any recent contact with sick people, has been taking precautions for COVID and had limited his exposure to outside. (21 Apr 2020 19:15)        REVIEW OF SYSTEMS:    At least 10 systems were reviewed in ROS. All systems reviewed  are within normal limits except for the complaints as described in Subjective.    PAST MEDICAL & SURGICAL HISTORY:  PAST MEDICAL & SURGICAL HISTORY:  DM (diabetes mellitus)  Morbid obesity  Obstructive sleep apnea  Disc disease, degenerative, lumbar or lumbosacral  Hypertension  History of excision of pilonidal cyst  H/O arthroscopy of right knee  Spinal cord stimulator status          VITAL SIGNS:  ICU Vital Signs Last 24 Hrs  T(C): 36.6 (28 Apr 2020 13:38), Max: 36.8 (28 Apr 2020 05:23)  T(F): 97.8 (28 Apr 2020 13:38), Max: 98.3 (28 Apr 2020 05:23)  HR: 100 (28 Apr 2020 13:38) (84 - 100)  BP: 155/84 (28 Apr 2020 13:38) (138/79 - 155/84)  BP(mean): --  ABP: --  ABP(mean): --  RR: 18 (28 Apr 2020 05:23) (18 - 18)  SpO2: --        PHYSICAL EXAMINATION:    General: AAO morbidly obese male,  NAD.  HEENT:   EOMI, atraumatic  Heart: S1+S2 audible, no murmur  Lungs: CTAB  Abdomen: Soft, non-tender, non-distended (obese)  CNS: AAO  , no focal deficits.  Extremities: b/l LE edema with chronic lymphedema/ venous stasis ulcer RLE with erythema /tenderness- slowly improving.         CONSULTS:  Consultant(s) Notes Reviewed by me.   Care Discussed with Consultants/Other Providers where required.        MEDICATIONS:  MEDICATIONS  (STANDING):  ceFAZolin   IVPB 2000 milliGRAM(s) IV Intermittent every 6 hours  chlorhexidine 4% Liquid 1 Application(s) Topical <User Schedule>  collagenase Ointment 1 Application(s) Topical daily  dextrose 5%. 1000 milliLiter(s) (50 mL/Hr) IV Continuous <Continuous>  dextrose 50% Injectable 12.5 Gram(s) IV Push once  dextrose 50% Injectable 25 Gram(s) IV Push once  dextrose 50% Injectable 25 Gram(s) IV Push once  enoxaparin Injectable 40 milliGRAM(s) SubCutaneous at bedtime  furosemide   Injectable 40 milliGRAM(s) IV Push daily  insulin glargine Injectable (LANTUS) 15 Unit(s) SubCutaneous at bedtime  insulin lispro (HumaLOG) corrective regimen sliding scale   SubCutaneous three times a day before meals  losartan 100 milliGRAM(s) Oral daily  methocarbamol 750 milliGRAM(s) Oral two times a day  oxyCODONE  ER Tablet 10 milliGRAM(s) Oral every 12 hours  pantoprazole    Tablet 40 milliGRAM(s) Oral before breakfast  polyethylene glycol 3350 17 Gram(s) Oral daily  senna 1 Tablet(s) Oral at bedtime  silver sulfADIAZINE 1% Cream 1 Application(s) Topical two times a day    MEDICATIONS  (PRN):  acetaminophen   Tablet .. 650 milliGRAM(s) Oral every 6 hours PRN Temp greater or equal to 38C (100.4F), Mild Pain (1 - 3)  aluminum hydroxide/magnesium hydroxide/simethicone Suspension 30 milliLiter(s) Oral every 4 hours PRN Dyspepsia  dextrose 40% Gel 15 Gram(s) Oral once PRN Blood Glucose LESS THAN 70 milliGRAM(s)/deciliter  glucagon  Injectable 1 milliGRAM(s) IntraMuscular once PRN Glucose LESS THAN 70 milligrams/deciliter  ibuprofen  Tablet. 400 milliGRAM(s) Oral every 6 hours PRN Mild Pain (1 - 3)  morphine  - Injectable 3 milliGRAM(s) IV Push every 4 hours PRN Severe Pain (7 - 10)  oxyCODONE    IR 10 milliGRAM(s) Oral every 4 hours PRN Moderate Pain (4 - 6)      LABOROTORY DATA/MICROBIOLOGY/I & O's:                        14.6   13.04 )-----------( 201      ( 23 Apr 2020 06:14 )             46.3     04-23    137  |  96<L>  |  14  ----------------------------<  106<H>  4.0   |  23  |  0.9    Ca    9.2      23 Apr 2020 06:14  Mg     2.1     04-21    TPro  6.9  /  Alb  3.8  /  TBili  0.6  /  DBili  x   /  AST  22  /  ALT  15  /  AlkPhos  77  04-22    PT/INR - ( 21 Apr 2020 15:30 )   PT: 14.30 sec;   INR: 1.24 ratio         PTT - ( 21 Apr 2020 15:30 )  PTT:34.3 sec    CAPILLARY BLOOD GLUCOSE      POCT Blood Glucose.: 104 mg/dL (23 Apr 2020 10:53)  POCT Blood Glucose.: 115 mg/dL (23 Apr 2020 07:32)                04-22-20 @ 07:01  -  04-23-20 @ 07:00  --------------------------------------------------------  IN: 0 mL / OUT: 1525 mL / NET: -1525 mL              ASSESSMENT/Plan:  54 y.o man with hx of RUSSELL, htn comes in for right shooting foot pain caused by non healing wound. Symptoms were also associated with low grade fever and some shortness of breath admitted to r/o COVID.      RLE venous stasis ulcer with cellulitis:   improving slowly.   followed up with ID/ can be dced home on oral zyvox.   patient reports he does local wound care by himself with help of his wife.       Headaches possibly cervical neuropathy:  improved.    enciso neg.   continue gabapentin.  fu neuro as OP if HA persist.       dyspnea possibly related to OHS vs RUSSELL with suspicion of fluid overload / ruled out Covid-19 pneumonia/ ruled out PE:  improved/ c/w oral lasix.       Diabetes mellitus 2:   BS controlled    continue home meds.     HTN  BP controlled.   continue losartan.    mild hyponatremia:   improved.     stable for DC home       Progress note handoff:   pending: none / home today .   disposition: home.   discussion: plan of care with patient- satisfied

## 2020-04-28 NOTE — DISCHARGE NOTE PROVIDER - NSDCMRMEDTOKEN_GEN_ALL_CORE_FT
Colace 100 mg oral capsule: 1 cap(s) orally 3 times a day  furosemide 20 mg oral tablet: 1 tab(s) orally once a day (at bedtime)  gabapentin 300 mg oral capsule: 1 cap(s) orally every 8 hours  Jardiance 25 mg oral tablet: 1 tab(s) orally once a day (in the morning)  losartan 100 mg oral tablet: 1 tab(s) orally once a day  metFORMIN 500 mg oral tablet: 1 tab(s) orally 2 times a day  methocarbamol 750 mg oral tablet: 2 tab(s) orally 4 times a day  Morphine IR 15 mg oral tablet: orally 2 times a day  naproxen 500 mg oral delayed release tablet: 1 tab(s) orally 2 times a day  oxyCODONE 10 mg oral tablet: 1 tab(s) orally every 4 hours, As needed, Moderate Pain (4 - 6)  pantoprazole 40 mg oral delayed release tablet: 1 tab(s) orally once a day (before a meal)  predniSONE 20 mg oral tablet: 2 tab(s) orally once a day  saccharomyces boulardii lyo 250 mg oral capsule: 1 cap(s) orally 2 times a day  Santyl 250 units/g topical ointment: 1 application topically 2 times a day  senna oral tablet: 1 tab(s) orally once a day (at bedtime)  silver sulfADIAZINE 1% topical cream: 1 application topically 2 times a day  sodium hypochlorite 0.25% topical solution: 1 application topically 2 times a day  Zyvox 600 mg oral tablet: 1 tab(s) orally 2 times a day

## 2020-04-28 NOTE — DISCHARGE NOTE PROVIDER - HOSPITAL COURSE
Admitted for worsening LLE cellulitis.    Did not respond to ancef    ID consulted and started prednisone 40mg qdaily for 5 days, zyvox and cefepime    Burn consulted and recommended surveiling in house with possible debridement    Patient is adamant about discharge. Will refuse any intervention by burn    ID recommends discharging on zyvox 600mg bid for 7 more days and prednisone 40 for total of 5 days.    Patient aware that he must return to the hospital for any worsening of his condition

## 2020-04-28 NOTE — DISCHARGE NOTE PROVIDER - CARE PROVIDER_API CALL
Felix Summers)  Medicine  [address] [address]  Phone: [phone]  Fax: [phone]  Follow Up Time: 1 week

## 2020-04-30 DIAGNOSIS — Z91.041 RADIOGRAPHIC DYE ALLERGY STATUS: ICD-10-CM

## 2020-04-30 DIAGNOSIS — L97.829 NON-PRESSURE CHRONIC ULCER OF OTHER PART OF LEFT LOWER LEG WITH UNSPECIFIED SEVERITY: ICD-10-CM

## 2020-04-30 DIAGNOSIS — G89.29 OTHER CHRONIC PAIN: ICD-10-CM

## 2020-04-30 DIAGNOSIS — J98.11 ATELECTASIS: ICD-10-CM

## 2020-04-30 DIAGNOSIS — I89.0 LYMPHEDEMA, NOT ELSEWHERE CLASSIFIED: ICD-10-CM

## 2020-04-30 DIAGNOSIS — Z79.84 LONG TERM (CURRENT) USE OF ORAL HYPOGLYCEMIC DRUGS: ICD-10-CM

## 2020-04-30 DIAGNOSIS — I10 ESSENTIAL (PRIMARY) HYPERTENSION: ICD-10-CM

## 2020-04-30 DIAGNOSIS — E87.1 HYPO-OSMOLALITY AND HYPONATREMIA: ICD-10-CM

## 2020-04-30 DIAGNOSIS — E11.65 TYPE 2 DIABETES MELLITUS WITH HYPERGLYCEMIA: ICD-10-CM

## 2020-04-30 DIAGNOSIS — M51.37 OTHER INTERVERTEBRAL DISC DEGENERATION, LUMBOSACRAL REGION: ICD-10-CM

## 2020-04-30 DIAGNOSIS — I87.2 VENOUS INSUFFICIENCY (CHRONIC) (PERIPHERAL): ICD-10-CM

## 2020-04-30 DIAGNOSIS — L97.819 NON-PRESSURE CHRONIC ULCER OF OTHER PART OF RIGHT LOWER LEG WITH UNSPECIFIED SEVERITY: ICD-10-CM

## 2020-04-30 DIAGNOSIS — L03.115 CELLULITIS OF RIGHT LOWER LIMB: ICD-10-CM

## 2020-04-30 DIAGNOSIS — E66.2 MORBID (SEVERE) OBESITY WITH ALVEOLAR HYPOVENTILATION: ICD-10-CM

## 2020-04-30 DIAGNOSIS — R06.02 SHORTNESS OF BREATH: ICD-10-CM

## 2020-04-30 DIAGNOSIS — Z88.0 ALLERGY STATUS TO PENICILLIN: ICD-10-CM

## 2020-04-30 DIAGNOSIS — L03.116 CELLULITIS OF LEFT LOWER LIMB: ICD-10-CM

## 2020-05-06 ENCOUNTER — APPOINTMENT (OUTPATIENT)
Dept: VASCULAR SURGERY | Facility: CLINIC | Age: 55
End: 2020-05-06
Payer: MEDICAID

## 2020-05-06 PROBLEM — E11.9 TYPE 2 DIABETES MELLITUS WITHOUT COMPLICATIONS: Chronic | Status: ACTIVE | Noted: 2020-04-21

## 2020-05-06 PROCEDURE — 99212 OFFICE O/P EST SF 10 MIN: CPT

## 2020-05-06 NOTE — ASSESSMENT
[FreeTextEntry1] : Pt refusing bilateral UNNA boots.  Says wounds are improving.  Reapplied ACE wraps to LEs.  FU in 1 month

## 2020-06-10 ENCOUNTER — APPOINTMENT (OUTPATIENT)
Dept: VASCULAR SURGERY | Facility: CLINIC | Age: 55
End: 2020-06-10

## 2020-06-28 ENCOUNTER — INPATIENT (INPATIENT)
Facility: HOSPITAL | Age: 55
LOS: 0 days | Discharge: LEFT BEFORE DISCHARGE | End: 2020-06-28
Attending: INTERNAL MEDICINE | Admitting: INTERNAL MEDICINE
Payer: MEDICAID

## 2020-06-28 VITALS
TEMPERATURE: 98 F | DIASTOLIC BLOOD PRESSURE: 96 MMHG | HEART RATE: 105 BPM | SYSTOLIC BLOOD PRESSURE: 181 MMHG | RESPIRATION RATE: 20 BRPM | OXYGEN SATURATION: 96 %

## 2020-06-28 VITALS
HEART RATE: 78 BPM | OXYGEN SATURATION: 100 % | TEMPERATURE: 98 F | DIASTOLIC BLOOD PRESSURE: 63 MMHG | RESPIRATION RATE: 20 BRPM | SYSTOLIC BLOOD PRESSURE: 145 MMHG

## 2020-06-28 DIAGNOSIS — Z98.890 OTHER SPECIFIED POSTPROCEDURAL STATES: Chronic | ICD-10-CM

## 2020-06-28 DIAGNOSIS — Z96.89 PRESENCE OF OTHER SPECIFIED FUNCTIONAL IMPLANTS: Chronic | ICD-10-CM

## 2020-06-28 LAB
ALBUMIN SERPL ELPH-MCNC: 4 G/DL — SIGNIFICANT CHANGE UP (ref 3.5–5.2)
ALP SERPL-CCNC: 94 U/L — SIGNIFICANT CHANGE UP (ref 30–115)
ALT FLD-CCNC: 17 U/L — SIGNIFICANT CHANGE UP (ref 0–41)
ANION GAP SERPL CALC-SCNC: 11 MMOL/L — SIGNIFICANT CHANGE UP (ref 7–14)
AST SERPL-CCNC: 38 U/L — SIGNIFICANT CHANGE UP (ref 0–41)
BASE EXCESS BLDV CALC-SCNC: 4.4 MMOL/L — HIGH (ref -2–2)
BASOPHILS # BLD AUTO: 0.05 K/UL — SIGNIFICANT CHANGE UP (ref 0–0.2)
BASOPHILS NFR BLD AUTO: 0.6 % — SIGNIFICANT CHANGE UP (ref 0–1)
BILIRUB SERPL-MCNC: <0.2 MG/DL — SIGNIFICANT CHANGE UP (ref 0.2–1.2)
BUN SERPL-MCNC: 16 MG/DL — SIGNIFICANT CHANGE UP (ref 10–20)
CA-I SERPL-SCNC: 1.19 MMOL/L — SIGNIFICANT CHANGE UP (ref 1.12–1.3)
CALCIUM SERPL-MCNC: 8.8 MG/DL — SIGNIFICANT CHANGE UP (ref 8.5–10.1)
CHLORIDE SERPL-SCNC: 101 MMOL/L — SIGNIFICANT CHANGE UP (ref 98–110)
CO2 SERPL-SCNC: 25 MMOL/L — SIGNIFICANT CHANGE UP (ref 17–32)
CREAT SERPL-MCNC: 0.9 MG/DL — SIGNIFICANT CHANGE UP (ref 0.7–1.5)
EOSINOPHIL # BLD AUTO: 0.25 K/UL — SIGNIFICANT CHANGE UP (ref 0–0.7)
EOSINOPHIL NFR BLD AUTO: 3 % — SIGNIFICANT CHANGE UP (ref 0–8)
GAS PNL BLDV: 142 MMOL/L — SIGNIFICANT CHANGE UP (ref 136–145)
GAS PNL BLDV: SIGNIFICANT CHANGE UP
GLUCOSE SERPL-MCNC: 109 MG/DL — HIGH (ref 70–99)
HCO3 BLDV-SCNC: 29 MMOL/L — SIGNIFICANT CHANGE UP (ref 22–29)
HCT VFR BLD CALC: 43 % — SIGNIFICANT CHANGE UP (ref 42–52)
HCT VFR BLDA CALC: 41.7 % — SIGNIFICANT CHANGE UP (ref 34–44)
HGB BLD CALC-MCNC: 13.6 G/DL — LOW (ref 14–18)
HGB BLD-MCNC: 13.9 G/DL — LOW (ref 14–18)
IMM GRANULOCYTES NFR BLD AUTO: 0.6 % — HIGH (ref 0.1–0.3)
LACTATE BLDV-MCNC: 1.3 MMOL/L — SIGNIFICANT CHANGE UP (ref 0.5–1.6)
LACTATE SERPL-SCNC: 1.2 MMOL/L — SIGNIFICANT CHANGE UP (ref 0.7–2)
LYMPHOCYTES # BLD AUTO: 2.28 K/UL — SIGNIFICANT CHANGE UP (ref 1.2–3.4)
LYMPHOCYTES # BLD AUTO: 27.6 % — SIGNIFICANT CHANGE UP (ref 20.5–51.1)
MCHC RBC-ENTMCNC: 27.4 PG — SIGNIFICANT CHANGE UP (ref 27–31)
MCHC RBC-ENTMCNC: 32.3 G/DL — SIGNIFICANT CHANGE UP (ref 32–37)
MCV RBC AUTO: 84.8 FL — SIGNIFICANT CHANGE UP (ref 80–94)
MONOCYTES # BLD AUTO: 0.51 K/UL — SIGNIFICANT CHANGE UP (ref 0.1–0.6)
MONOCYTES NFR BLD AUTO: 6.2 % — SIGNIFICANT CHANGE UP (ref 1.7–9.3)
NEUTROPHILS # BLD AUTO: 5.12 K/UL — SIGNIFICANT CHANGE UP (ref 1.4–6.5)
NEUTROPHILS NFR BLD AUTO: 62 % — SIGNIFICANT CHANGE UP (ref 42.2–75.2)
NRBC # BLD: 0 /100 WBCS — SIGNIFICANT CHANGE UP (ref 0–0)
PCO2 BLDV: 41 MMHG — SIGNIFICANT CHANGE UP (ref 41–51)
PH BLDV: 7.45 — HIGH (ref 7.26–7.43)
PLATELET # BLD AUTO: 376 K/UL — SIGNIFICANT CHANGE UP (ref 130–400)
PO2 BLDV: 51 MMHG — HIGH (ref 20–40)
POTASSIUM BLDV-SCNC: 3.7 MMOL/L — SIGNIFICANT CHANGE UP (ref 3.3–5.6)
POTASSIUM SERPL-MCNC: SIGNIFICANT CHANGE UP MMOL/L (ref 3.5–5)
POTASSIUM SERPL-SCNC: SIGNIFICANT CHANGE UP MMOL/L (ref 3.5–5)
PROT SERPL-MCNC: 8 G/DL — SIGNIFICANT CHANGE UP (ref 6–8)
RBC # BLD: 5.07 M/UL — SIGNIFICANT CHANGE UP (ref 4.7–6.1)
RBC # FLD: 15.2 % — HIGH (ref 11.5–14.5)
SAO2 % BLDV: 88 % — SIGNIFICANT CHANGE UP
SARS-COV-2 RNA SPEC QL NAA+PROBE: SIGNIFICANT CHANGE UP
SODIUM SERPL-SCNC: 137 MMOL/L — SIGNIFICANT CHANGE UP (ref 135–146)
WBC # BLD: 8.26 K/UL — SIGNIFICANT CHANGE UP (ref 4.8–10.8)
WBC # FLD AUTO: 8.26 K/UL — SIGNIFICANT CHANGE UP (ref 4.8–10.8)

## 2020-06-28 PROCEDURE — 73701 CT LOWER EXTREMITY W/DYE: CPT | Mod: 26,LT

## 2020-06-28 PROCEDURE — 93010 ELECTROCARDIOGRAM REPORT: CPT

## 2020-06-28 PROCEDURE — 73590 X-RAY EXAM OF LOWER LEG: CPT | Mod: 26,LT

## 2020-06-28 PROCEDURE — 99222 1ST HOSP IP/OBS MODERATE 55: CPT

## 2020-06-28 PROCEDURE — 93970 EXTREMITY STUDY: CPT | Mod: 26

## 2020-06-28 PROCEDURE — 99285 EMERGENCY DEPT VISIT HI MDM: CPT

## 2020-06-28 PROCEDURE — 71045 X-RAY EXAM CHEST 1 VIEW: CPT | Mod: 26

## 2020-06-28 RX ORDER — MORPHINE SULFATE 50 MG/1
15 CAPSULE, EXTENDED RELEASE ORAL
Refills: 0 | Status: DISCONTINUED | OUTPATIENT
Start: 2020-06-28 | End: 2020-06-28

## 2020-06-28 RX ORDER — PANTOPRAZOLE SODIUM 20 MG/1
40 TABLET, DELAYED RELEASE ORAL
Refills: 0 | Status: DISCONTINUED | OUTPATIENT
Start: 2020-06-28 | End: 2020-06-28

## 2020-06-28 RX ORDER — LOSARTAN POTASSIUM 100 MG/1
100 TABLET, FILM COATED ORAL DAILY
Refills: 0 | Status: DISCONTINUED | OUTPATIENT
Start: 2020-06-28 | End: 2020-06-28

## 2020-06-28 RX ORDER — FUROSEMIDE 40 MG
20 TABLET ORAL AT BEDTIME
Refills: 0 | Status: DISCONTINUED | OUTPATIENT
Start: 2020-06-28 | End: 2020-06-28

## 2020-06-28 RX ORDER — HEPARIN SODIUM 5000 [USP'U]/ML
5000 INJECTION INTRAVENOUS; SUBCUTANEOUS EVERY 8 HOURS
Refills: 0 | Status: DISCONTINUED | OUTPATIENT
Start: 2020-06-28 | End: 2020-06-28

## 2020-06-28 RX ORDER — GABAPENTIN 400 MG/1
300 CAPSULE ORAL EVERY 8 HOURS
Refills: 0 | Status: DISCONTINUED | OUTPATIENT
Start: 2020-06-28 | End: 2020-06-28

## 2020-06-28 RX ORDER — OXYCODONE HYDROCHLORIDE 5 MG/1
15 TABLET ORAL ONCE
Refills: 0 | Status: DISCONTINUED | OUTPATIENT
Start: 2020-06-28 | End: 2020-06-28

## 2020-06-28 RX ORDER — DIPHENHYDRAMINE HCL 50 MG
50 CAPSULE ORAL ONCE
Refills: 0 | Status: COMPLETED | OUTPATIENT
Start: 2020-06-28 | End: 2020-06-28

## 2020-06-28 RX ORDER — MORPHINE SULFATE 50 MG/1
6 CAPSULE, EXTENDED RELEASE ORAL ONCE
Refills: 0 | Status: DISCONTINUED | OUTPATIENT
Start: 2020-06-28 | End: 2020-06-28

## 2020-06-28 RX ORDER — SENNA PLUS 8.6 MG/1
2 TABLET ORAL AT BEDTIME
Refills: 0 | Status: DISCONTINUED | OUTPATIENT
Start: 2020-06-28 | End: 2020-06-28

## 2020-06-28 RX ORDER — OXYCODONE HYDROCHLORIDE 5 MG/1
10 TABLET ORAL EVERY 4 HOURS
Refills: 0 | Status: DISCONTINUED | OUTPATIENT
Start: 2020-06-28 | End: 2020-06-28

## 2020-06-28 RX ORDER — VANCOMYCIN HCL 1 G
2500 VIAL (EA) INTRAVENOUS ONCE
Refills: 0 | Status: DISCONTINUED | OUTPATIENT
Start: 2020-06-28 | End: 2020-06-28

## 2020-06-28 RX ORDER — VANCOMYCIN HCL 1 G
1500 VIAL (EA) INTRAVENOUS ONCE
Refills: 0 | Status: COMPLETED | OUTPATIENT
Start: 2020-06-28 | End: 2020-06-28

## 2020-06-28 RX ORDER — AZTREONAM 2 G
2000 VIAL (EA) INJECTION ONCE
Refills: 0 | Status: COMPLETED | OUTPATIENT
Start: 2020-06-28 | End: 2020-06-28

## 2020-06-28 RX ORDER — COLLAGENASE CLOSTRIDIUM HIST. 250 UNIT/G
1 OINTMENT (GRAM) TOPICAL
Refills: 0 | Status: DISCONTINUED | OUTPATIENT
Start: 2020-06-28 | End: 2020-06-28

## 2020-06-28 RX ADMIN — Medication 100 MILLIGRAM(S): at 16:35

## 2020-06-28 RX ADMIN — MORPHINE SULFATE 6 MILLIGRAM(S): 50 CAPSULE, EXTENDED RELEASE ORAL at 16:49

## 2020-06-28 RX ADMIN — Medication 300 MILLIGRAM(S): at 17:20

## 2020-06-28 RX ADMIN — Medication 2000 MILLIGRAM(S): at 17:20

## 2020-06-28 RX ADMIN — OXYCODONE HYDROCHLORIDE 15 MILLIGRAM(S): 5 TABLET ORAL at 13:09

## 2020-06-28 RX ADMIN — Medication 50 MILLIGRAM(S): at 14:55

## 2020-06-28 RX ADMIN — Medication 100 MILLIGRAM(S): at 14:06

## 2020-06-28 NOTE — ED PROVIDER NOTE - CLINICAL SUMMARY MEDICAL DECISION MAKING FREE TEXT BOX
55 yo man morbidly obese presents with signs and symptoms of venous stasis dermatitis which appears to have become infected and is worsening despite taking oral antibiotics.  Will need admission for IV antibiotics.  Despite initial concern for compartment syndrome, there was no trauma and patient himself states pain and paresthesias resolved on their own.  Floor admission.

## 2020-06-28 NOTE — ED ADULT TRIAGE NOTE - CHIEF COMPLAINT QUOTE
"my left leg is bigger than the other". left lower extremity appears to be reddened, swollen   pt denies fever at home

## 2020-06-28 NOTE — CONSULT NOTE ADULT - ASSESSMENT
54 year old male with a past medical history of chronic venous stasis, LE cellulitis, morbid obesity, RUSSELL, HTN, DM, and HTN consulted to vascular surgery to r/o LLE compartment syndrome. On exam, patient is tachy to 105, /96, he has b/l venous stasis ulcers, soft compartments, and easily dopplerable DP and PT. WBC 8. Lactate 1.2.     Plan:   - f/u CT LLE   - IV fluids   - antibiotics   - rowena pressure measurements     discussed with vascular fellow 54 year old male with a past medical history of chronic venous stasis, LE cellulitis, morbid obesity, RUSSELL, HTN, DM, and HTN consulted to vascular surgery to r/o LLE compartment syndrome. On exam, patient is tachy to 105, /96, he has b/l venous stasis ulcers, soft compartments, and easily dopplerable DP and PT. WBC 8. Lactate 1.2. Low suspicion for compartment syndrome.     Plan:   - f/u CT LLE   - IV fluids   - antibiotics   - rowena pressure measurements     discussed with vascular fellow

## 2020-06-28 NOTE — H&P ADULT - ASSESSMENT
54 year-old male history of RUSSELL, HTN, DM, bilateral chronic venous stasis ulcers, morbid obesity, intervertebral disc disease s/p spinal stimulator insertion (2017) infection and extraction 2020 presenting for LLE pain.    LLE pain/ swelling/ redness in the setting of failed PO treatment with linezolid-  no sepsis concern (no elevated WBC, lactate normal and no tachycardia or hemodynamic instability)  DDx: likely etiology worsening Cellulitis + possible underlying abscess secondary to chronic venous stasis   vs less likely doubt arterial disease etiology as distal arteries palpable   allergy to penicillin s/p aztreonam and clindamycin in ED   plan :  started on   fu ID consult,  fu blood cultures,  fu ESR and CRP , monitor for increase in WBC and fever   fu official CT scan result to rule out necrotizing fascitis although low suspicion   fu burn consult and need for abscess drainage depending on official CT scan result     HTN   blood pressure not controlled   will resume home meds for now   if still non controlled adjust regimen     Diabetes mellitus 2:   start basal bolus if FS >180    HTN  BP controlled.   continue losartan.    Intervertebral disc disease   pain control   outpatient neurology follow up     RUSSELL     GI ppx: Po protonix 40 mg QD  DVT ppx: Heparin SC   Activity : ambulate as tolerated  Diet : DASH/TLC  Dispo: Home   Full Code

## 2020-06-28 NOTE — H&P ADULT - HISTORY OF PRESENT ILLNESS
54 year-old male history of RUSSELL, HTN, DM, PVD with bilateral chronic venous stasis ulcers, morbid obesity, intervertebral disc disease, spinal stimulator insertion (2017) infection and extraction 2020 presenting for LLE pain.  Patient's history goes back to 1 week ago; patient saw his PCP for progressively worsening LLE pain, swelling or redness . He was started on linezolid but with no improvement. 1 day ago he started having blistering anfd rupturing of the lesions and worsening symptoms. So his PMD sent him patient sent to ED for iv antibiotics as considered to have failed PO. He denies fever, chills, nausea or chest pain     in ED vitals 181/96 mmHg - /min - temp 36.6 C RR 20 O2 96%   labs revealed 8000 WBC and lactate 1.2  CT done in ED unofficially revealed abscess but no concern for nec fasc  in ED he received aztreonam/clindamycin/ and was evaluated by vascular team

## 2020-06-28 NOTE — ED ADULT NURSE NOTE - NS ED NOTE  TALK SOMEONE YN
46f pmhx asthma, CAD, HTN, HLD, DM, PE, carcinoid Ca s/p resection p/w L flank pain, urgency. pt states began having urgency and mild dysuria 5 days ago, then developed fever and L flank pain a/w nausea over weekend. L flank, constant, severe, radiating to LLQ at times. endorses diarrhea and gas this AM. No

## 2020-06-28 NOTE — ED PROVIDER NOTE - OBJECTIVE STATEMENT
54 y.o male w/ hx of RUSSELL, HTN, venous stasis, DM, HTN, chronic leg wounds presents to the ED for evaluation of LLE pain. States for past week worsening pain L leg and swelling.  Noted redness of leg, saw pmd on monday and was started on linezolid.  No relief of pain or swelling prompting visit to the ED.  Follows w/ burn for chronic wounds and states they are healing well.  NO chest pain, dyspnea, fever, chills, hx of DVT, abd pain.

## 2020-06-28 NOTE — ED PROVIDER NOTE - ATTENDING CONTRIBUTION TO CARE
54 y.o male w/ hx of RUSSELL, HTN, venous stasis, DM, HTN, chronic leg wounds presents with worsening LLE pain/swelling. sx started last week. no trauma or inciting event as far as patient knows. pt saw pcp for progressively worsening pain/swelling/redness. pcp started pt on oral linezolid. no improvement so pt sent to ED. pt states last night, leg started blistering and ruptured spontaneously. no cp, sob, ap, n,v,d.     vss  gen- NAD, aaox3  card-rrr  lungs-ctab, no wheezing or rhonchi  abd-sntnd, no guarding or rebound  neuro- full str/sensation, cn ii-xii grossly intact, normal coordination and gait  LLE- swollen compared to R, diffusely tender, erythematous, blisters covered in silvadene and gauze, sensation intact throughout, dopplerable PT and DP    c/f cellulitis, dvt, no fevers, will start treatment for nec fasc given edema/erythema/dm/pain, CT LLE  basic labs, ctx, lactate, abx, duplex, ct lle w/ ivc

## 2020-06-28 NOTE — ED PROVIDER NOTE - NS ED ROS FT
Constitutional: See HPI.  Eyes: No visual changes, eye pain or discharge.   ENMT: No hearing changes, pain, discharge or infections.   Cardiac: No SOB or edema. No chest pain with exertion.  Respiratory: No cough or respiratory distress.   GI: No nausea, vomiting, diarrhea or abdominal pain.  : No dysuria, frequency or burning. No Discharge  MS: LLE extremity pain.  No myalgia, muscle weakness, joint pain or back pain.  Neuro: No headache or weakness.   Skin: + redness of leg  Except as documented in the HPI, all other systems are negative.

## 2020-06-28 NOTE — CONSULT NOTE ADULT - SUBJECTIVE AND OBJECTIVE BOX
JOSE ALBERTO NO [id number]  54y Male      HPI:  54 year old male with a past medical history of chronic venous stasis, LE cellulitis, morbid obesity, RUSSELL, HTN, DM, and HTN consulted to vascular surgery to r/o LLE compartment syndrome. Patient is presenting to the ED with left lower extremity swelling and anterior shin pain for one week. He visited his PCP on this past Monday and was given PO linezolid for treatment of cellulitis. Since then, his symptoms have failed to improve prompting his presentation to the ED. Patient has chronic leg wounds that he manages with local wound care. He was admitted in April of this year for RLE cellulitis. Patient has developed some tingling in the toes of the LLE. He has not lost any sensation.     PAST MEDICAL & SURGICAL HISTORY:  DM (diabetes mellitus)  Morbid obesity  Obstructive sleep apnea  Disc disease, degenerative, lumbar or lumbosacral  Hypertension  History of excision of pilonidal cyst  H/O arthroscopy of right knee  Spinal cord stimulator status        MEDICATIONS  (STANDING):  aztreonam  IVPB 2000 milliGRAM(s) IV Intermittent once  vancomycin  IVPB 1500 milliGRAM(s) IV Intermittent once    MEDICATIONS  (PRN):      Allergies    IV Contrast (Sneezing (Mod to Severe))  penicillin (Unknown)    Intolerances        REVIEW OF SYSTEMS    [ ] A ten-point review of systems was otherwise negative except as noted.  [ ] Due to altered mental status/intubation, subjective information were not able to be obtained from the patient. History was obtained, to the extent possible, from review of the chart and collateral sources of information.      Vital Signs Last 24 Hrs  T(C): 36.6 (28 Jun 2020 12:41), Max: 36.6 (28 Jun 2020 12:41)  T(F): 97.9 (28 Jun 2020 12:41), Max: 97.9 (28 Jun 2020 12:41)  HR: 105 (28 Jun 2020 12:41) (105 - 105)  BP: 181/96 (28 Jun 2020 12:41) (181/96 - 181/96)  BP(mean): --  RR: 20 (28 Jun 2020 12:41) (20 - 20)  SpO2: 96% (28 Jun 2020 12:41) (96% - 96%)    PHYSICAL EXAM:  GENERAL: morbidly obese  CHEST/LUNG: Clear to auscultation bilaterally  HEART: Regular rate and rhythm  ABDOMEN: Soft, Nontender, Nondistended;   EXTREMITIES:  On a    LABS:  Labs:  CAPILLARY BLOOD GLUCOSE                              13.9   8.26  )-----------( 376      ( 28 Jun 2020 13:17 )             43.0       Auto Neutrophil %: 62.0 % (06-28-20 @ 13:17)  Auto Immature Granulocyte %: 0.6 % (06-28-20 @ 13:17)    06-28    137  |  101  |  16  ----------------------------<  109<H>  TNP   |  25  |  0.9      Calcium, Total Serum: 8.8 mg/dL (06-28-20 @ 13:17)      LFTs:             8.0  | <0.2 | 38       ------------------[94      ( 28 Jun 2020 13:17 )  4.0  | x    | 17          Lipase:x      Amylase:x         Blood Gas Venous - Lactate: 1.3 mmoL/L (06-28-20 @ 14:13)  Lactate, Blood: 1.2 mmol/L (06-28-20 @ 13:17)        RADIOLOGY & ADDITIONAL STUDIES:

## 2020-06-28 NOTE — H&P ADULT - NSHPLABSRESULTS_GEN_ALL_CORE
13.9   8.26  )-----------( 376      ( 28 Jun 2020 13:17 )             43.0       06-28    137  |  101  |  16  ----------------------------<  109<H>  TNP   |  25  |  0.9    Ca    8.8      28 Jun 2020 13:17    TPro  8.0  /  Alb  4.0  /  TBili  <0.2  /  DBili  x   /  AST  38  /  ALT  17  /  AlkPhos  94  06-28      LIVER FUNCTIONS - ( 28 Jun 2020 13:17 )  Alb: 4.0 g/dL / Pro: 8.0 g/dL / ALK PHOS: 94 U/L / ALT: 17 U/L / AST: 38 U/L / GGT: x

## 2020-06-28 NOTE — ED PROVIDER NOTE - PHYSICAL EXAMINATION
CONST: Well appearing in NAD  EYES: Sclera and conjunctiva clear.  CARD: Normal S1 S2; Normal rate and rhythm  RESP: Equal BS B/L, No wheezes, rhonchi or rales. No distress  GI: Soft, non-tender, non-distended.  MS: Normal ROM in all extremities, 3+ edema LLE> RLE, + L calf pain, mild warmth and erythema L lower leg, chronic ulcerations healing well w/ no redness/drainage/discharge, radial pulses 2+ bilaterally  SKIN: see msk exam  NEURO: A&Ox3, No focal deficits. Strength 5/5 with no sensory deficits

## 2020-06-28 NOTE — CONSULT NOTE ADULT - ATTENDING COMMENTS
54 year old with leg swelling    pt has doppler signals in foot  no evidence of compartment syndrome  cont care per medicine

## 2020-06-28 NOTE — ED PROVIDER NOTE - PROGRESS NOTE DETAILS
prelim vascular study negative. ortho consulted for possible early compartment syndrome. ortho pa states if no trauma/no fx on xr, this consult should go to vascular surgery. vascular consulted and spoke to resident Gonzalo, case discussed and he will see pt Pt endorsed to Dr. Elizabeth- pending [facility] eval for possible early compartment syndrome, CT, reassess, dispo Pt refused compartment pressures. states " give me the antibiotics and admit me." no longer has paresthesias.  aware of risks of not getting compartment pressures.

## 2020-07-01 ENCOUNTER — APPOINTMENT (OUTPATIENT)
Dept: VASCULAR SURGERY | Facility: CLINIC | Age: 55
End: 2020-07-01
Payer: MEDICAID

## 2020-07-01 DIAGNOSIS — Z88.0 ALLERGY STATUS TO PENICILLIN: ICD-10-CM

## 2020-07-01 DIAGNOSIS — G47.33 OBSTRUCTIVE SLEEP APNEA (ADULT) (PEDIATRIC): ICD-10-CM

## 2020-07-01 DIAGNOSIS — I83.029 VARICOSE VEINS OF LEFT LOWER EXTREMITY WITH ULCER OF UNSPECIFIED SITE: ICD-10-CM

## 2020-07-01 DIAGNOSIS — E66.01 MORBID (SEVERE) OBESITY DUE TO EXCESS CALORIES: ICD-10-CM

## 2020-07-01 DIAGNOSIS — Z96.82 PRESENCE OF NEUROSTIMULATOR: ICD-10-CM

## 2020-07-01 DIAGNOSIS — Z79.899 OTHER LONG TERM (CURRENT) DRUG THERAPY: ICD-10-CM

## 2020-07-01 DIAGNOSIS — Z91.041 RADIOGRAPHIC DYE ALLERGY STATUS: ICD-10-CM

## 2020-07-01 DIAGNOSIS — L97.919 NON-PRESSURE CHRONIC ULCER OF UNSPECIFIED PART OF RIGHT LOWER LEG WITH UNSPECIFIED SEVERITY: ICD-10-CM

## 2020-07-01 DIAGNOSIS — L97.929 NON-PRESSURE CHRONIC ULCER OF UNSPECIFIED PART OF LEFT LOWER LEG WITH UNSPECIFIED SEVERITY: ICD-10-CM

## 2020-07-01 DIAGNOSIS — I10 ESSENTIAL (PRIMARY) HYPERTENSION: ICD-10-CM

## 2020-07-01 DIAGNOSIS — Z79.52 LONG TERM (CURRENT) USE OF SYSTEMIC STEROIDS: ICD-10-CM

## 2020-07-01 DIAGNOSIS — I83.019 VARICOSE VEINS OF RIGHT LOWER EXTREMITY WITH ULCER OF UNSPECIFIED SITE: ICD-10-CM

## 2020-07-01 DIAGNOSIS — Z79.1 LONG TERM (CURRENT) USE OF NON-STEROIDAL ANTI-INFLAMMATORIES (NSAID): ICD-10-CM

## 2020-07-01 DIAGNOSIS — Z79.84 LONG TERM (CURRENT) USE OF ORAL HYPOGLYCEMIC DRUGS: ICD-10-CM

## 2020-07-01 DIAGNOSIS — L03.116 CELLULITIS OF LEFT LOWER LIMB: ICD-10-CM

## 2020-07-01 DIAGNOSIS — M51.9 UNSPECIFIED THORACIC, THORACOLUMBAR AND LUMBOSACRAL INTERVERTEBRAL DISC DISORDER: ICD-10-CM

## 2020-07-01 DIAGNOSIS — L02.416 CUTANEOUS ABSCESS OF LEFT LOWER LIMB: ICD-10-CM

## 2020-07-01 DIAGNOSIS — E11.51 TYPE 2 DIABETES MELLITUS WITH DIABETIC PERIPHERAL ANGIOPATHY WITHOUT GANGRENE: ICD-10-CM

## 2020-07-01 PROCEDURE — 99212 OFFICE O/P EST SF 10 MIN: CPT

## 2020-07-01 RX ORDER — SILVER SULFADIAZINE 10 MG/G
1 CREAM TOPICAL DAILY
Qty: 1 | Refills: 1 | Status: ACTIVE | COMMUNITY
Start: 2020-07-01 | End: 1900-01-01

## 2020-07-01 NOTE — HISTORY OF PRESENT ILLNESS
[FreeTextEntry1] :  54 year-old male with bilateral venous stasis ulcers on the lateral malleolar regions of his legs. His wounds overall improving with daily wound care with Silvadene.

## 2020-07-01 NOTE — ASSESSMENT
[FreeTextEntry1] : 55 y/o gentleman with bilateral lower extremity ulcerations, improving, advised to continue with daily dressing changes with Silvadene and ace bandage for compression. Follow up in 4 weeks.

## 2020-07-03 LAB
CULTURE RESULTS: SIGNIFICANT CHANGE UP
SPECIMEN SOURCE: SIGNIFICANT CHANGE UP

## 2020-07-19 ENCOUNTER — INPATIENT (INPATIENT)
Facility: HOSPITAL | Age: 55
LOS: 5 days | Discharge: HOME HEALTH | End: 2020-07-25
Attending: HOSPITALIST | Admitting: HOSPITALIST
Payer: MEDICARE

## 2020-07-19 VITALS
HEART RATE: 98 BPM | HEIGHT: 78 IN | SYSTOLIC BLOOD PRESSURE: 200 MMHG | OXYGEN SATURATION: 97 % | WEIGHT: 315 LBS | DIASTOLIC BLOOD PRESSURE: 117 MMHG | RESPIRATION RATE: 20 BRPM

## 2020-07-19 DIAGNOSIS — Z96.89 PRESENCE OF OTHER SPECIFIED FUNCTIONAL IMPLANTS: Chronic | ICD-10-CM

## 2020-07-19 DIAGNOSIS — Z98.890 OTHER SPECIFIED POSTPROCEDURAL STATES: Chronic | ICD-10-CM

## 2020-07-19 LAB
ALBUMIN SERPL ELPH-MCNC: 4.1 G/DL — SIGNIFICANT CHANGE UP (ref 3.5–5.2)
ALP SERPL-CCNC: 80 U/L — SIGNIFICANT CHANGE UP (ref 30–115)
ALT FLD-CCNC: 14 U/L — SIGNIFICANT CHANGE UP (ref 0–41)
ANION GAP SERPL CALC-SCNC: 10 MMOL/L — SIGNIFICANT CHANGE UP (ref 7–14)
APTT BLD: 32.3 SEC — SIGNIFICANT CHANGE UP (ref 27–39.2)
AST SERPL-CCNC: 18 U/L — SIGNIFICANT CHANGE UP (ref 0–41)
BASOPHILS # BLD AUTO: 0.06 K/UL — SIGNIFICANT CHANGE UP (ref 0–0.2)
BASOPHILS NFR BLD AUTO: 0.7 % — SIGNIFICANT CHANGE UP (ref 0–1)
BILIRUB SERPL-MCNC: 0.3 MG/DL — SIGNIFICANT CHANGE UP (ref 0.2–1.2)
BUN SERPL-MCNC: 14 MG/DL — SIGNIFICANT CHANGE UP (ref 10–20)
CALCIUM SERPL-MCNC: 9.8 MG/DL — SIGNIFICANT CHANGE UP (ref 8.5–10.1)
CHLORIDE SERPL-SCNC: 103 MMOL/L — SIGNIFICANT CHANGE UP (ref 98–110)
CO2 SERPL-SCNC: 27 MMOL/L — SIGNIFICANT CHANGE UP (ref 17–32)
CREAT SERPL-MCNC: 0.8 MG/DL — SIGNIFICANT CHANGE UP (ref 0.7–1.5)
EOSINOPHIL # BLD AUTO: 0.21 K/UL — SIGNIFICANT CHANGE UP (ref 0–0.7)
EOSINOPHIL NFR BLD AUTO: 2.4 % — SIGNIFICANT CHANGE UP (ref 0–8)
GLUCOSE BLDC GLUCOMTR-MCNC: 118 MG/DL — HIGH (ref 70–99)
GLUCOSE SERPL-MCNC: 114 MG/DL — HIGH (ref 70–99)
HCT VFR BLD CALC: 43.7 % — SIGNIFICANT CHANGE UP (ref 42–52)
HGB BLD-MCNC: 13.6 G/DL — LOW (ref 14–18)
IMM GRANULOCYTES NFR BLD AUTO: 0.6 % — HIGH (ref 0.1–0.3)
INR BLD: 1.1 RATIO — SIGNIFICANT CHANGE UP (ref 0.65–1.3)
LYMPHOCYTES # BLD AUTO: 2.11 K/UL — SIGNIFICANT CHANGE UP (ref 1.2–3.4)
LYMPHOCYTES # BLD AUTO: 24.4 % — SIGNIFICANT CHANGE UP (ref 20.5–51.1)
MCHC RBC-ENTMCNC: 25.8 PG — LOW (ref 27–31)
MCHC RBC-ENTMCNC: 31.1 G/DL — LOW (ref 32–37)
MCV RBC AUTO: 82.9 FL — SIGNIFICANT CHANGE UP (ref 80–94)
MONOCYTES # BLD AUTO: 0.51 K/UL — SIGNIFICANT CHANGE UP (ref 0.1–0.6)
MONOCYTES NFR BLD AUTO: 5.9 % — SIGNIFICANT CHANGE UP (ref 1.7–9.3)
NEUTROPHILS # BLD AUTO: 5.71 K/UL — SIGNIFICANT CHANGE UP (ref 1.4–6.5)
NEUTROPHILS NFR BLD AUTO: 66 % — SIGNIFICANT CHANGE UP (ref 42.2–75.2)
NRBC # BLD: 0 /100 WBCS — SIGNIFICANT CHANGE UP (ref 0–0)
PLATELET # BLD AUTO: 330 K/UL — SIGNIFICANT CHANGE UP (ref 130–400)
POTASSIUM SERPL-MCNC: 4.5 MMOL/L — SIGNIFICANT CHANGE UP (ref 3.5–5)
POTASSIUM SERPL-SCNC: 4.5 MMOL/L — SIGNIFICANT CHANGE UP (ref 3.5–5)
PROT SERPL-MCNC: 7.4 G/DL — SIGNIFICANT CHANGE UP (ref 6–8)
PROTHROM AB SERPL-ACNC: 12.6 SEC — SIGNIFICANT CHANGE UP (ref 9.95–12.87)
RBC # BLD: 5.27 M/UL — SIGNIFICANT CHANGE UP (ref 4.7–6.1)
RBC # FLD: 15.9 % — HIGH (ref 11.5–14.5)
SODIUM SERPL-SCNC: 140 MMOL/L — SIGNIFICANT CHANGE UP (ref 135–146)
WBC # BLD: 8.65 K/UL — SIGNIFICANT CHANGE UP (ref 4.8–10.8)
WBC # FLD AUTO: 8.65 K/UL — SIGNIFICANT CHANGE UP (ref 4.8–10.8)

## 2020-07-19 PROCEDURE — 99285 EMERGENCY DEPT VISIT HI MDM: CPT | Mod: CS

## 2020-07-19 PROCEDURE — 99233 SBSQ HOSP IP/OBS HIGH 50: CPT

## 2020-07-19 RX ORDER — DEXTROSE 50 % IN WATER 50 %
12.5 SYRINGE (ML) INTRAVENOUS ONCE
Refills: 0 | Status: DISCONTINUED | OUTPATIENT
Start: 2020-07-19 | End: 2020-07-25

## 2020-07-19 RX ORDER — SENNA PLUS 8.6 MG/1
2 TABLET ORAL AT BEDTIME
Refills: 0 | Status: DISCONTINUED | OUTPATIENT
Start: 2020-07-19 | End: 2020-07-25

## 2020-07-19 RX ORDER — PANTOPRAZOLE SODIUM 20 MG/1
40 TABLET, DELAYED RELEASE ORAL
Refills: 0 | Status: DISCONTINUED | OUTPATIENT
Start: 2020-07-19 | End: 2020-07-25

## 2020-07-19 RX ORDER — GLUCAGON INJECTION, SOLUTION 0.5 MG/.1ML
1 INJECTION, SOLUTION SUBCUTANEOUS ONCE
Refills: 0 | Status: DISCONTINUED | OUTPATIENT
Start: 2020-07-19 | End: 2020-07-25

## 2020-07-19 RX ORDER — VANCOMYCIN HCL 1 G
1000 VIAL (EA) INTRAVENOUS ONCE
Refills: 0 | Status: DISCONTINUED | OUTPATIENT
Start: 2020-07-19 | End: 2020-07-19

## 2020-07-19 RX ORDER — MORPHINE SULFATE 50 MG/1
15 CAPSULE, EXTENDED RELEASE ORAL
Refills: 0 | Status: DISCONTINUED | OUTPATIENT
Start: 2020-07-19 | End: 2020-07-20

## 2020-07-19 RX ORDER — DEXTROSE 50 % IN WATER 50 %
25 SYRINGE (ML) INTRAVENOUS ONCE
Refills: 0 | Status: DISCONTINUED | OUTPATIENT
Start: 2020-07-19 | End: 2020-07-25

## 2020-07-19 RX ORDER — VANCOMYCIN HCL 1 G
1500 VIAL (EA) INTRAVENOUS EVERY 12 HOURS
Refills: 0 | Status: DISCONTINUED | OUTPATIENT
Start: 2020-07-19 | End: 2020-07-21

## 2020-07-19 RX ORDER — SACCHAROMYCES BOULARDII 250 MG
250 POWDER IN PACKET (EA) ORAL
Refills: 0 | Status: DISCONTINUED | OUTPATIENT
Start: 2020-07-19 | End: 2020-07-19

## 2020-07-19 RX ORDER — NEBIVOLOL HYDROCHLORIDE 5 MG/1
1 TABLET ORAL
Qty: 0 | Refills: 0 | DISCHARGE

## 2020-07-19 RX ORDER — SODIUM CHLORIDE 9 MG/ML
1000 INJECTION, SOLUTION INTRAVENOUS
Refills: 0 | Status: DISCONTINUED | OUTPATIENT
Start: 2020-07-19 | End: 2020-07-25

## 2020-07-19 RX ORDER — INSULIN LISPRO 100/ML
VIAL (ML) SUBCUTANEOUS
Refills: 0 | Status: DISCONTINUED | OUTPATIENT
Start: 2020-07-19 | End: 2020-07-21

## 2020-07-19 RX ORDER — LOSARTAN POTASSIUM 100 MG/1
100 TABLET, FILM COATED ORAL DAILY
Refills: 0 | Status: DISCONTINUED | OUTPATIENT
Start: 2020-07-19 | End: 2020-07-25

## 2020-07-19 RX ORDER — METHOCARBAMOL 500 MG/1
750 TABLET, FILM COATED ORAL
Refills: 0 | Status: DISCONTINUED | OUTPATIENT
Start: 2020-07-19 | End: 2020-07-25

## 2020-07-19 RX ORDER — ENOXAPARIN SODIUM 100 MG/ML
40 INJECTION SUBCUTANEOUS DAILY
Refills: 0 | Status: DISCONTINUED | OUTPATIENT
Start: 2020-07-19 | End: 2020-07-19

## 2020-07-19 RX ORDER — DEXTROSE 50 % IN WATER 50 %
15 SYRINGE (ML) INTRAVENOUS ONCE
Refills: 0 | Status: DISCONTINUED | OUTPATIENT
Start: 2020-07-19 | End: 2020-07-25

## 2020-07-19 RX ORDER — FUROSEMIDE 40 MG
20 TABLET ORAL DAILY
Refills: 0 | Status: DISCONTINUED | OUTPATIENT
Start: 2020-07-19 | End: 2020-07-25

## 2020-07-19 RX ORDER — MORPHINE SULFATE 50 MG/1
4 CAPSULE, EXTENDED RELEASE ORAL ONCE
Refills: 0 | Status: DISCONTINUED | OUTPATIENT
Start: 2020-07-19 | End: 2020-07-19

## 2020-07-19 RX ORDER — MORPHINE SULFATE 50 MG/1
8 CAPSULE, EXTENDED RELEASE ORAL ONCE
Refills: 0 | Status: DISCONTINUED | OUTPATIENT
Start: 2020-07-19 | End: 2020-07-19

## 2020-07-19 RX ORDER — HYDROMORPHONE HYDROCHLORIDE 2 MG/ML
1 INJECTION INTRAMUSCULAR; INTRAVENOUS; SUBCUTANEOUS ONCE
Refills: 0 | Status: DISCONTINUED | OUTPATIENT
Start: 2020-07-19 | End: 2020-07-19

## 2020-07-19 RX ORDER — FUROSEMIDE 40 MG
20 TABLET ORAL AT BEDTIME
Refills: 0 | Status: DISCONTINUED | OUTPATIENT
Start: 2020-07-19 | End: 2020-07-19

## 2020-07-19 RX ORDER — HEPARIN SODIUM 5000 [USP'U]/ML
5000 INJECTION INTRAVENOUS; SUBCUTANEOUS EVERY 8 HOURS
Refills: 0 | Status: DISCONTINUED | OUTPATIENT
Start: 2020-07-19 | End: 2020-07-25

## 2020-07-19 RX ORDER — HYDROXYZINE HCL 10 MG
50 TABLET ORAL ONCE
Refills: 0 | Status: COMPLETED | OUTPATIENT
Start: 2020-07-19 | End: 2020-07-19

## 2020-07-19 RX ORDER — OXYCODONE HYDROCHLORIDE 5 MG/1
10 TABLET ORAL EVERY 4 HOURS
Refills: 0 | Status: DISCONTINUED | OUTPATIENT
Start: 2020-07-19 | End: 2020-07-20

## 2020-07-19 RX ORDER — GABAPENTIN 400 MG/1
300 CAPSULE ORAL EVERY 8 HOURS
Refills: 0 | Status: DISCONTINUED | OUTPATIENT
Start: 2020-07-19 | End: 2020-07-25

## 2020-07-19 RX ORDER — ACETAMINOPHEN 500 MG
650 TABLET ORAL EVERY 6 HOURS
Refills: 0 | Status: DISCONTINUED | OUTPATIENT
Start: 2020-07-19 | End: 2020-07-20

## 2020-07-19 RX ORDER — VANCOMYCIN HCL 1 G
2000 VIAL (EA) INTRAVENOUS ONCE
Refills: 0 | Status: COMPLETED | OUTPATIENT
Start: 2020-07-19 | End: 2020-07-19

## 2020-07-19 RX ADMIN — METHOCARBAMOL 750 MILLIGRAM(S): 500 TABLET, FILM COATED ORAL at 17:14

## 2020-07-19 RX ADMIN — OXYCODONE HYDROCHLORIDE 10 MILLIGRAM(S): 5 TABLET ORAL at 17:18

## 2020-07-19 RX ADMIN — HYDROMORPHONE HYDROCHLORIDE 1 MILLIGRAM(S): 2 INJECTION INTRAMUSCULAR; INTRAVENOUS; SUBCUTANEOUS at 14:11

## 2020-07-19 RX ADMIN — MORPHINE SULFATE 15 MILLIGRAM(S): 50 CAPSULE, EXTENDED RELEASE ORAL at 17:14

## 2020-07-19 RX ADMIN — MORPHINE SULFATE 8 MILLIGRAM(S): 50 CAPSULE, EXTENDED RELEASE ORAL at 13:40

## 2020-07-19 RX ADMIN — OXYCODONE HYDROCHLORIDE 10 MILLIGRAM(S): 5 TABLET ORAL at 22:48

## 2020-07-19 RX ADMIN — MORPHINE SULFATE 8 MILLIGRAM(S): 50 CAPSULE, EXTENDED RELEASE ORAL at 14:00

## 2020-07-19 RX ADMIN — HYDROMORPHONE HYDROCHLORIDE 1 MILLIGRAM(S): 2 INJECTION INTRAMUSCULAR; INTRAVENOUS; SUBCUTANEOUS at 14:45

## 2020-07-19 RX ADMIN — Medication 250 MILLIGRAM(S): at 14:11

## 2020-07-19 RX ADMIN — OXYCODONE HYDROCHLORIDE 10 MILLIGRAM(S): 5 TABLET ORAL at 22:18

## 2020-07-19 RX ADMIN — Medication 500 MILLIGRAM(S): at 17:14

## 2020-07-19 NOTE — PATIENT PROFILE ADULT - STATED REASON FOR ADMISSION
pt recently was admitted for cellulitis of right LE, needed IV antibiotics, pt now has cellulitis in LLE, requires IV antibiotics

## 2020-07-19 NOTE — ED ADULT TRIAGE NOTE - STATUS:
Tuesday had hair permanent  Had mask on with towel over her face for long period of time. Wednesday onset swelling of lips  Swelling, peeling red lips and pink anterior cheeks. Pt wearing surgical mask  No respiratory symptoms.
Intact

## 2020-07-19 NOTE — H&P ADULT - HISTORY OF PRESENT ILLNESS
Patient is a 54 year old male with PMH of DM2, HTN, obesity, RUSSELL, previous MRSA infection, spinal surgery presenting to ED with 3 weeks of increasing LLE erythema and edema. He failed outpatient Zyvox and Clinda PO and states he has had a few episodes of chills the last 5 days. Patient has an ulcer from where the erythema and edema originate that began last Summer and has not healed. His RLE ulcer is healing. Denies fever, CP, SOB, abd pain, n/v/d/c. Denies sick contacts. Patient is a 54 year old male with PMH of DM2, HTN, obesity, RUSSELL, previous MRSA infection 2/2 spinal surgery presenting to ED with 3 weeks of increasing LLE erythema and edema. He failed outpatient Zyvox and Clinda PO and states he has had a few episodes of chills the last 5 days. Patient has a superficial ulcer from where the erythema and edema originate that began last Summer and has not healed. His RLE ulcer is healing. Denies fever, CP, SOB, abd pain, n/v/d/c. Denies sick contacts.

## 2020-07-19 NOTE — ED PROVIDER NOTE - PROGRESS NOTE DETAILS
I personally evaluated the patient. I reviewed the Resident’s or Physician Assistant’s note (as assigned above), and agree with the findings and plan except as documented in my note.  53 y/o M with chronic b/l lower extremity swelling presents sent in by PMD for IV ABX to treat lower extremity swelling and lower left leg cellulitis. No fever, chills, CP, SOB, n/v/d or abdominal pain. On exam: WDWN; in NADs. Sitting up and providing appropriate history and physical examination SKIN: warm and dry, no acute rash. HEAD: NCAT. EYES: PERRL, 3 mm bilateral, no nystagmus, EOMI; conjunctiva and sclera clear. ENT: No nasal discharge; airway clear. NECK: Supple; non tender. + full passive ROM in all directions. No JVD. CARD: S1S2, no m/g/r. RRR + Symmetric Strong Pulses. RESP: No wheezes, rales or rhonchi. Good air movement bilaterally. ABD: soft; NDNT No rebound or guarding, No signs of peritonitis, No CVAT. No pulsatile abdominal mass. + Strong and Symmetric Pulses. EXT: (+) b/l lower extremity swelling, with left lower leg cellulitis. Normal ROM. No clubbing, cyanosis. Dp and Pt Pulses intact. Cap refill less than 3 seconds. NEURO: CN 2-12 intact, stable gait, no sensory or motor deficits, Alert, oriented, grossly unremarkable. No Focal deficits. GCS 15. NIH 0. PSYCH: Cooperative, appropriate. Dr Rivas accepts

## 2020-07-19 NOTE — H&P ADULT - NSHPPHYSICALEXAM_GEN_ALL_CORE
Vital Signs (24 Hrs):  T(C): 36.9 (07-19-20 @ 14:18), Max: 36.9 (07-19-20 @ 14:18)  HR: 82 (07-19-20 @ 14:18) (82 - 98)  BP: 151/89 (07-19-20 @ 14:18) (151/89 - 200/117)  RR: 20 (07-19-20 @ 14:18) (20 - 20)  SpO2: 97% (07-19-20 @ 14:18) (97% - 97%)  Wt(kg): --  Daily Height in cm: 200.66 (19 Jul 2020 11:52)    Daily     I&O's Summary    PHYSICAL EXAM:  GENERAL: NAD, morbidly obese  HEAD:  Atraumatic, Normocephalic  EYES: EOMI, PERRLA, conjunctiva and sclera clear  NECK: Supple, No JVD  CHEST/LUNG: Clear to auscultation bilaterally; No wheeze  HEART: Regular rate and rhythm; No murmurs, rubs, or gallops. distal pulses 2+ and equal bilateral  ABDOMEN: Soft, Nontender, Nondistended; Bowel sounds present  EXTREMITIES:  LLE erythematous, 4+ edematous with ulcer wrapped in ace bandage, mild streaking up medial thigh, RLE wrapped in ace without edema or erythema.  CNS: AAOx3 Vital Signs (24 Hrs):  T(C): 36.9 (07-19-20 @ 14:18), Max: 36.9 (07-19-20 @ 14:18)  HR: 82 (07-19-20 @ 14:18) (82 - 98)  BP: 151/89 (07-19-20 @ 14:18) (151/89 - 200/117)  RR: 20 (07-19-20 @ 14:18) (20 - 20)  SpO2: 97% (07-19-20 @ 14:18) (97% - 97%)  Wt(kg): --  Daily Height in cm: 200.66 (19 Jul 2020 11:52)    Daily     I&O's Summary    PHYSICAL EXAM:  GENERAL: NAD, morbidly obese  HEAD:  Atraumatic, Normocephalic  EYES: EOMI, PERRLA, conjunctiva and sclera clear  NECK: Supple, No JVD  CHEST/LUNG: distant breath sounds, but otherwise clear; No wheeze  HEART: Regular rate and rhythm; No murmurs, rubs, or gallops. distal pulses 2+ and equal bilateral  ABDOMEN: Soft, Nontender, Nondistended; Bowel sounds present  EXTREMITIES:  LLE erythematous, 4+ edematous with superifical ulcer at distal shin wrapped in ace bandage, mild streaking up medial thigh, RLE wrapped in ace without edema or erythema.  CNS: AAOx3

## 2020-07-19 NOTE — H&P ADULT - NSICDXPASTSURGICALHX_GEN_ALL_CORE_FT
PAST SURGICAL HISTORY:  H/O arthroscopy of right knee     History of excision of pilonidal cyst     Spinal cord stimulator status Universal Safety Interventions

## 2020-07-19 NOTE — ED PROVIDER NOTE - CLINICAL SUMMARY MEDICAL DECISION MAKING FREE TEXT BOX
I personally evaluated the patient. I reviewed the Resident’s or Physician Assistant’s note (as assigned above), and agree with the findings and plan except as documented in my note. Patient evaluated and treated for sepsis, cellulitltis, correction from lab, K is 6 and is hemolyzed, not lactate

## 2020-07-19 NOTE — PATIENT PROFILE ADULT - NSPROCHRONICPAINRELIEVE_GEN_A_NUR
Spoke with patient's wife about EGD/UEUS scheduled 8/10/18 at 0900.  Instructions mailed.  
medications

## 2020-07-19 NOTE — H&P ADULT - ATTENDING COMMENTS
Patient is a 54 year old male with PMH of DM2, HTN, obesity, RUSSELL, previous MRSA infection 2/2 spinal surgery presenting to ED with 3 weeks of increasing LLE erythema and edema. Patient was trialed on Zyvox and Clindamycin outpatient without significant improvement and now with new chills in the last few days. Patient has superficial ulceration of the LLE, no concern for deeper infection. Will start IV Vancomycin for Severe Cellulitus pending ID consult. Will consult Wound Care team to manage b/l LE wounds. Agree with management of DM, HTN, and DJD c/b chronic pain as above.    Cande Mcdonald, DO

## 2020-07-19 NOTE — H&P ADULT - ASSESSMENT
Patient is a 54 year old male with PMH of DM2, HTN, obesity, RUSSELL, previous MRSA infection, spinal surgery presenting to ED with 3 weeks of increasing LLE erythema and edema. He failed outpatient Zyvox and Clinda PO and states he has had a few episodes of chills the last 5 days. Patient has an ulcer from where the erythema and edema originate that began last Summer and has not healed. His RLE ulcer is healing. Denies fever, CP, SOB, abd pain, n/v/d/c. Denies sick contacts.    1. cellulitis of LLE  - admit to medicine  - IV vancomycin  - ID consult  - podiatry consult  - wound care consult  - AM labs, vanco trough prior to 4th dose  - DASH and carb consistent diet    2. DM2  - convert to insulin  - fingerstick glucose    3. HTN  - continue losartan and furosemide    4. degenerative disc disease  - continue home pain management  - will iSTOP        case discussed with Dr. Mcdonald Patient is a 54 year old male with PMH of DM2, HTN, obesity, RUSSELL, previous MRSA infection, spinal surgery presenting to ED with 3 weeks of increasing LLE erythema and edema. He failed outpatient Zyvox and Clinda PO and states he has had a few episodes of chills the last 5 days. Patient has an ulcer from where the erythema and edema originate that began last Summer and has not healed. His RLE ulcer is healing. Denies fever, CP, SOB, abd pain, n/v/d/c. Denies sick contacts.    1. Cellulitis of LLE  - admit to medicine  - IV vancomycin  - ID consult  - wound care consult  - AM labs, vanco trough prior to 4th dose  - DASH and carb consistent diet    2. DM2  - convert to insulin  - fingerstick glucose    3. HTN  - continue losartan and furosemide    4. degenerative disc disease  - continue home pain management  - will iSTOP        case discussed with Dr. Mcdonald

## 2020-07-19 NOTE — ED PROVIDER NOTE - OBJECTIVE STATEMENT
Patient c/o worsening pain swelling  infection to left lower leg, over past 3 weeks, Taking ABX for 2 weeks No improvement, PMD wants patient admitted for IV ABX. no fever, no chest pain, no SOB,.

## 2020-07-19 NOTE — H&P ADULT - NSHPLABSRESULTS_GEN_ALL_CORE
13.6   8.65  )-----------( 330      ( 19 Jul 2020 12:24 )             43.7       07-19    140  |  103  |  14  ----------------------------<  114<H>  4.5   |  27  |  0.8    Ca    9.8      19 Jul 2020 12:24    TPro  7.4  /  Alb  4.1  /  TBili  0.3  /  DBili  x   /  AST  18  /  ALT  14  /  AlkPhos  80  07-19        PT/INR - ( 19 Jul 2020 12:24 )   PT: 12.60 sec;   INR: 1.10 ratio         PTT - ( 19 Jul 2020 12:24 )  PTT:32.3 sec    Lactate Trend      Culture Results:   No Growth Final (06-28 @ 14:18)    Radiology: none new

## 2020-07-20 LAB
GLUCOSE BLDC GLUCOMTR-MCNC: 114 MG/DL — HIGH (ref 70–99)
GLUCOSE BLDC GLUCOMTR-MCNC: 115 MG/DL — HIGH (ref 70–99)
SARS-COV-2 RNA SPEC QL NAA+PROBE: SIGNIFICANT CHANGE UP

## 2020-07-20 PROCEDURE — 99232 SBSQ HOSP IP/OBS MODERATE 35: CPT

## 2020-07-20 RX ORDER — OXYCODONE AND ACETAMINOPHEN 5; 325 MG/1; MG/1
2 TABLET ORAL EVERY 6 HOURS
Refills: 0 | Status: DISCONTINUED | OUTPATIENT
Start: 2020-07-20 | End: 2020-07-20

## 2020-07-20 RX ORDER — MORPHINE SULFATE 50 MG/1
15 CAPSULE, EXTENDED RELEASE ORAL
Refills: 0 | Status: DISCONTINUED | OUTPATIENT
Start: 2020-07-20 | End: 2020-07-21

## 2020-07-20 RX ORDER — OXYCODONE HYDROCHLORIDE 5 MG/1
10 TABLET ORAL EVERY 6 HOURS
Refills: 0 | Status: DISCONTINUED | OUTPATIENT
Start: 2020-07-20 | End: 2020-07-20

## 2020-07-20 RX ORDER — MORPHINE SULFATE 50 MG/1
4 CAPSULE, EXTENDED RELEASE ORAL ONCE
Refills: 0 | Status: DISCONTINUED | OUTPATIENT
Start: 2020-07-20 | End: 2020-07-20

## 2020-07-20 RX ORDER — OXYCODONE HYDROCHLORIDE 5 MG/1
10 TABLET ORAL EVERY 4 HOURS
Refills: 0 | Status: DISCONTINUED | OUTPATIENT
Start: 2020-07-20 | End: 2020-07-25

## 2020-07-20 RX ADMIN — Medication 20 MILLIGRAM(S): at 05:18

## 2020-07-20 RX ADMIN — OXYCODONE HYDROCHLORIDE 10 MILLIGRAM(S): 5 TABLET ORAL at 12:30

## 2020-07-20 RX ADMIN — Medication 500 MILLIGRAM(S): at 18:40

## 2020-07-20 RX ADMIN — OXYCODONE HYDROCHLORIDE 10 MILLIGRAM(S): 5 TABLET ORAL at 05:47

## 2020-07-20 RX ADMIN — OXYCODONE HYDROCHLORIDE 10 MILLIGRAM(S): 5 TABLET ORAL at 05:17

## 2020-07-20 RX ADMIN — SENNA PLUS 2 TABLET(S): 8.6 TABLET ORAL at 21:08

## 2020-07-20 RX ADMIN — Medication 500 MILLIGRAM(S): at 05:18

## 2020-07-20 RX ADMIN — OXYCODONE HYDROCHLORIDE 10 MILLIGRAM(S): 5 TABLET ORAL at 11:52

## 2020-07-20 RX ADMIN — MORPHINE SULFATE 4 MILLIGRAM(S): 50 CAPSULE, EXTENDED RELEASE ORAL at 00:06

## 2020-07-20 RX ADMIN — MORPHINE SULFATE 4 MILLIGRAM(S): 50 CAPSULE, EXTENDED RELEASE ORAL at 07:00

## 2020-07-20 RX ADMIN — Medication 500 MILLIGRAM(S): at 18:00

## 2020-07-20 RX ADMIN — OXYCODONE HYDROCHLORIDE 10 MILLIGRAM(S): 5 TABLET ORAL at 22:47

## 2020-07-20 RX ADMIN — OXYCODONE AND ACETAMINOPHEN 2 TABLET(S): 5; 325 TABLET ORAL at 18:40

## 2020-07-20 RX ADMIN — OXYCODONE AND ACETAMINOPHEN 2 TABLET(S): 5; 325 TABLET ORAL at 18:00

## 2020-07-20 RX ADMIN — LOSARTAN POTASSIUM 100 MILLIGRAM(S): 100 TABLET, FILM COATED ORAL at 05:18

## 2020-07-20 RX ADMIN — Medication 50 MILLIGRAM(S): at 00:06

## 2020-07-20 RX ADMIN — MORPHINE SULFATE 4 MILLIGRAM(S): 50 CAPSULE, EXTENDED RELEASE ORAL at 00:38

## 2020-07-20 RX ADMIN — Medication 500 MILLIGRAM(S): at 05:47

## 2020-07-20 RX ADMIN — METHOCARBAMOL 750 MILLIGRAM(S): 500 TABLET, FILM COATED ORAL at 05:18

## 2020-07-20 RX ADMIN — HEPARIN SODIUM 5000 UNIT(S): 5000 INJECTION INTRAVENOUS; SUBCUTANEOUS at 05:18

## 2020-07-20 RX ADMIN — MORPHINE SULFATE 15 MILLIGRAM(S): 50 CAPSULE, EXTENDED RELEASE ORAL at 08:34

## 2020-07-20 RX ADMIN — Medication 300 MILLIGRAM(S): at 07:04

## 2020-07-20 RX ADMIN — MORPHINE SULFATE 15 MILLIGRAM(S): 50 CAPSULE, EXTENDED RELEASE ORAL at 08:04

## 2020-07-20 RX ADMIN — HEPARIN SODIUM 5000 UNIT(S): 5000 INJECTION INTRAVENOUS; SUBCUTANEOUS at 13:12

## 2020-07-20 RX ADMIN — OXYCODONE HYDROCHLORIDE 10 MILLIGRAM(S): 5 TABLET ORAL at 22:17

## 2020-07-20 RX ADMIN — Medication 300 MILLIGRAM(S): at 18:00

## 2020-07-20 NOTE — PROGRESS NOTE ADULT - ASSESSMENT
Patient is a 54 year old male with PMH of DM2, HTN, obesity, RUSSELL, previous MRSA infection 2/2 spinal surgery presenting to ED with 3 weeks of increasing LLE erythema and edema.     #Cellulitis of LLE  - sepsis ruled out  - trialed on zyvox and clinda as an outpatient   - IV vancomycin  - ID consult  - wound care consult  -  vanco trough prior to 4th dose    # DM2  - holding home metformin and jardiance   - ISS     # HTN  - continue losartan and furosemide    # degenerative disc disease  - home pain regimen is percocet 10mg q6 and Morphine ER 15mg BID per iSTOP  - will continue here     #Progress Note Handoff:  Pending (specify): ID consult  Family discussion:  Disposition: Home___/SNF___/Other________/Unknown at this time________      Cande Mcdonald DO

## 2020-07-20 NOTE — PROGRESS NOTE ADULT - SUBJECTIVE AND OBJECTIVE BOX
ONEALJOSE ALBERTO  54y, Male  Allergy: IV Contrast (Sneezing (Mod to Severe))  penicillin (Unknown)    Hospital Day: 1d    Patient seen and examined. Patient is displeased with the pain medication regimen as it is different from his home regimen. Patient is also displeased with his diet order since he does not beleive he needs carbohydrate restriction. EFFIE.     PMH/PSH:  PAST MEDICAL & SURGICAL HISTORY:  DM (diabetes mellitus)  Morbid obesity  Obstructive sleep apnea  Disc disease, degenerative, lumbar or lumbosacral  Hypertension  History of excision of pilonidal cyst  H/O arthroscopy of right knee  Spinal cord stimulator status      LAST 24-Hr EVENTS:    VITALS:  T(F): 96.5 (07-20-20 @ 14:09), Max: 97.2 (07-19-20 @ 15:45)  HR: 78 (07-20-20 @ 14:09)  BP: 144/69 (07-20-20 @ 14:09) (132/62 - 165/80)  RR: 18 (07-20-20 @ 14:09)  SpO2: 97% (07-19-20 @ 21:31)        TESTS & MEASUREMENTS:  Weight (Kg): 235 (07-20-20 @ 06:38)  BMI (kg/m2): 58.3 (07-20)                          13.6   8.65  )-----------( 330      ( 19 Jul 2020 12:24 )             43.7     PT/INR - ( 19 Jul 2020 12:24 )   PT: 12.60 sec;   INR: 1.10 ratio         PTT - ( 19 Jul 2020 12:24 )  PTT:32.3 sec  07-19    140  |  103  |  14  ----------------------------<  114<H>  4.5   |  27  |  0.8    Ca    9.8      19 Jul 2020 12:24    TPro  7.4  /  Alb  4.1  /  TBili  0.3  /  DBili  x   /  AST  18  /  ALT  14  /  AlkPhos  80  07-19    LIVER FUNCTIONS - ( 19 Jul 2020 12:24 )  Alb: 4.1 g/dL / Pro: 7.4 g/dL / ALK PHOS: 80 U/L / ALT: 14 U/L / AST: 18 U/L / GGT: x                           COVID-19 PCR: NotDetec (07-19-20 @ 13:01)      RADIOLOGY, ECG, & ADDITIONAL TESTS:  12 Lead ECG:   Ventricular Rate 84 BPM    Atrial Rate 84 BPM    P-R Interval 238 ms    QRS Duration 80 ms    Q-T Interval 376 ms    QTC Calculation(Bezet) 444 ms    P Axis 55 degrees    R Axis 52 degrees    T Axis 31 degrees    Diagnosis Line Sinus rhythm with 1st degree A-V block  Septal infarct , age undetermined  Abnormal ECG    Confirmed by CHARISMA MORTON MD ([id number]) on 7/20/2020 11:15:41 AM (07-19-20 @ 12:55)      RECENT DIAGNOSTIC ORDERS:  Diet, Regular:   Consistent Carbohydrate Evening Snack  DASH/TLC Sodium & Cholesterol Restricted (07-19-20 @ 15:49)  Vancomycin Level, Trough: Routine (07-19-20 @ 15:53)  Complete Blood Count: AM Sched. Collection: 21-Jul-2020 04:30 (07-20-20 @ 15:36)  Basic Metabolic Panel: AM Sched. Collection: 21-Jul-2020 04:30 (07-20-20 @ 15:36)      MEDICATIONS:  MEDICATIONS  (STANDING):  dextrose 5%. 1000 milliLiter(s) (50 mL/Hr) IV Continuous <Continuous>  dextrose 50% Injectable 12.5 Gram(s) IV Push once  dextrose 50% Injectable 25 Gram(s) IV Push once  dextrose 50% Injectable 25 Gram(s) IV Push once  furosemide    Tablet 20 milliGRAM(s) Oral daily  gabapentin 300 milliGRAM(s) Oral every 8 hours  heparin   Injectable 5000 Unit(s) SubCutaneous every 8 hours  insulin lispro (HumaLOG) corrective regimen sliding scale   SubCutaneous three times a day before meals  losartan 100 milliGRAM(s) Oral daily  methocarbamol 750 milliGRAM(s) Oral four times a day  morphine  IR 15 milliGRAM(s) Oral two times a day  naproxen 500 milliGRAM(s) Oral two times a day  pantoprazole    Tablet 40 milliGRAM(s) Oral before breakfast  senna 2 Tablet(s) Oral at bedtime  vancomycin  IVPB 1500 milliGRAM(s) IV Intermittent every 12 hours    MEDICATIONS  (PRN):  acetaminophen   Tablet .. 650 milliGRAM(s) Oral every 6 hours PRN Temp greater or equal to 38C (100.4F)  dextrose 40% Gel 15 Gram(s) Oral once PRN Blood Glucose LESS THAN 70 milliGRAM(s)/deciliter  glucagon  Injectable 1 milliGRAM(s) IntraMuscular once PRN Glucose LESS THAN 70 milligrams/deciliter  oxyCODONE    IR 10 milliGRAM(s) Oral every 6 hours PRN Moderate Pain (4 - 6)      HOME MEDICATIONS:  furosemide 20 mg oral tablet (07-19)  Jardiance 25 mg oral tablet (07-19)  metFORMIN 500 mg oral tablet (07-19)  methocarbamol 750 mg oral tablet (07-19)  Morphine IR 15 mg oral tablet (07-19)  naproxen 500 mg oral delayed release tablet (07-19)  oxyCODONE 10 mg oral tablet (07-19)  pantoprazole 40 mg oral delayed release tablet (07-19)      PHYSICAL EXAM:     PHYSICAL EXAM:  GENERAL: NAD, morbidly obese  HEAD:  Atraumatic, Normocephalic  EYES: EOMI, PERRLA, conjunctiva and sclera clear  NECK: Supple, No JVD  CHEST/LUNG: distant breath sounds, but otherwise clear; No wheeze  HEART: Regular rate and rhythm; No murmurs, rubs, or gallops. distal pulses 2+ and equal bilateral  ABDOMEN: Soft, Nontender, Nondistended; Bowel sounds present  EXTREMITIES:  LLE erythematous, 4+ edematous with superifical ulcer at distal shin wrapped in ace bandage, mild streaking up medial thigh ( improving ), RLE wrapped in ace without edema or erythema. CNS: AAOx3

## 2020-07-20 NOTE — CONSULT NOTE ADULT - SUBJECTIVE AND OBJECTIVE BOX
Patient is a 54y old  Male who presents with a chief complaint of LLE cellulitis (19 Jul 2020 15:31)        REVIEW OF SYSTEMS: Total of twelve systems have been reviewed with patient and found to be negative unless mentioned in HPI        PAST MEDICAL & SURGICAL HISTORY:  DM (diabetes mellitus)  Morbid obesity  Obstructive sleep apnea  Disc disease, degenerative, lumbar or lumbosacral  Hypertension  History of excision of pilonidal cyst  H/O arthroscopy of right knee  Spinal cord stimulator status        SOCIAL HISTORY  Alcohol: Does not drink  Tobacco: Does not smoke  Illicit substance use: None      FAMILY HISTORY: Non contributory to the present illness        ALLERGIES: IV Contrast (Sneezing (Mod to Severe))  penicillin (Unknown)        Vital Signs Last 24 Hrs  T(C): 36 (20 Jul 2020 06:38), Max: 36.9 (19 Jul 2020 14:18)  T(F): 96.8 (20 Jul 2020 06:38), Max: 98.4 (19 Jul 2020 14:18)  HR: 73 (20 Jul 2020 06:38) (63 - 98)  BP: 147/76 (20 Jul 2020 06:38) (132/62 - 200/117)  BP(mean): --  RR: 18 (20 Jul 2020 06:38) (18 - 20)  SpO2: 97% (19 Jul 2020 21:31) (97% - 99%)          PHYSICAL EXAM:  GENERAL: Not in distress   CHEST/LUNG:  Aire ntry bilaterally  HEART: s1 and s2 present  ABDOMEN:  Nontender and  Nondistended  EXTREMITIES: No pedal  edema  CNS: Awake and Alert      LABS:                        13.6   8.65  )-----------( 330      ( 19 Jul 2020 12:24 )             43.7           07-19    140  |  103  |  14  ----------------------------<  114<H>  4.5   |  27  |  0.8    Ca    9.8      19 Jul 2020 12:24    TPro  7.4  /  Alb  4.1  /  TBili  0.3  /  DBili  x   /  AST  18  /  ALT  14  /  AlkPhos  80  07-19    PT/INR - ( 19 Jul 2020 12:24 )   PT: 12.60 sec;   INR: 1.10 ratio     PTT - ( 19 Jul 2020 12:24 )  PTT:32.3 sec        CAPILLARY BLOOD GLUCOSE  POCT Blood Glucose.: 114 mg/dL (20 Jul 2020 07:48)  POCT Blood Glucose.: 118 mg/dL (19 Jul 2020 21:21)            MEDICATIONS  (STANDING):  dextrose 5%. 1000 milliLiter(s) (50 mL/Hr) IV Continuous <Continuous>  dextrose 50% Injectable 12.5 Gram(s) IV Push once  dextrose 50% Injectable 25 Gram(s) IV Push once  dextrose 50% Injectable 25 Gram(s) IV Push once  furosemide    Tablet 20 milliGRAM(s) Oral daily  gabapentin 300 milliGRAM(s) Oral every 8 hours  heparin   Injectable 5000 Unit(s) SubCutaneous every 8 hours  insulin lispro (HumaLOG) corrective regimen sliding scale   SubCutaneous three times a day before meals  losartan 100 milliGRAM(s) Oral daily  methocarbamol 750 milliGRAM(s) Oral four times a day  morphine  IR 15 milliGRAM(s) Oral two times a day  naproxen 500 milliGRAM(s) Oral two times a day  pantoprazole    Tablet 40 milliGRAM(s) Oral before breakfast  senna 2 Tablet(s) Oral at bedtime  vancomycin  IVPB 1500 milliGRAM(s) IV Intermittent every 12 hours    MEDICATIONS  (PRN):  acetaminophen   Tablet .. 650 milliGRAM(s) Oral every 6 hours PRN Temp greater or equal to 38C (100.4F)  dextrose 40% Gel 15 Gram(s) Oral once PRN Blood Glucose LESS THAN 70 milliGRAM(s)/deciliter  glucagon  Injectable 1 milliGRAM(s) IntraMuscular once PRN Glucose LESS THAN 70 milligrams/deciliter  oxyCODONE    IR 10 milliGRAM(s) Oral every 4 hours PRN Severe Pain (7 - 10)          RADIOLOGY & ADDITIONAL TESTS:      None yet        MICROBIOLOGY DATA:      COVID-19 PCR . (07.19.20 @ 13:01)    COVID-19 PCR: NotDetec: Methodology:  The Abbott Real Time SARS-CoV-2 assay is a real time reverse  transcriptase (RT) Polymerase Chain Reaction (PCR), test intended for the  qualitative detection of RNA from the SARS-CoV-2 in nasopharyngeal and  oropharyngeal swabs from patients suspected of COVID-19 infection.  The SARS-CoV-2 assay is performed on the Abbott m2000 system.  Reference Range:  Not Detected  This test has been validated by [facility]  Molecular lab. This assay is for in Vitro diagnostic testing under FDA  Emergency Use Authorization only.  This Test Was Performed At [facility], Molecular Lab,  [place], [place] [address] Patient is a 54y old  Male  with PMH of DM2, HTN, obesity, RUSSELL, previous MRSA infection 2/2 spinal surgery presenting to ED with 3 weeks of increasing LLE erythema and edema. He failed outpatient Zyvox and Clinda PO and states he has had a few episodes of chills the last 5 days. Patient has a superficial ulcer from where the erythema and edema originate that began last Summer and has not healed. His RLE ulcer is healing. Denies fever, CP, SOB, abd pain, n/v/d/c.       REVIEW OF SYSTEMS: Total of twelve systems have been reviewed with patient and found to be negative unless mentioned in HPI        PAST MEDICAL & SURGICAL HISTORY:  DM (diabetes mellitus)  Morbid obesity  Obstructive sleep apnea  Disc disease, degenerative, lumbar or lumbosacral  Hypertension  History of excision of pilonidal cyst  H/O arthroscopy of right knee  Spinal cord stimulator status        SOCIAL HISTORY  Alcohol: Does not drink  Tobacco: Does not smoke  Illicit substance use: None      FAMILY HISTORY: Non contributory to the present illness        ALLERGIES: IV Contrast (Sneezing (Mod to Severe))  penicillin (Unknown)        Vital Signs Last 24 Hrs  T(C): 36 (20 Jul 2020 06:38), Max: 36.9 (19 Jul 2020 14:18)  T(F): 96.8 (20 Jul 2020 06:38), Max: 98.4 (19 Jul 2020 14:18)  HR: 73 (20 Jul 2020 06:38) (63 - 98)  BP: 147/76 (20 Jul 2020 06:38) (132/62 - 200/117)  BP(mean): --  RR: 18 (20 Jul 2020 06:38) (18 - 20)  SpO2: 97% (19 Jul 2020 21:31) (97% - 99%)          PHYSICAL EXAM:  GENERAL: Not in distress   CHEST/LUNG:  Aire ntry bilaterally  HEART: s1 and s2 present  ABDOMEN:  Nontender and  Nondistended  EXTREMITIES: No pedal  edema  CNS: Awake and Alert      LABS:                        13.6   8.65  )-----------( 330      ( 19 Jul 2020 12:24 )             43.7           07-19    140  |  103  |  14  ----------------------------<  114<H>  4.5   |  27  |  0.8    Ca    9.8      19 Jul 2020 12:24    TPro  7.4  /  Alb  4.1  /  TBili  0.3  /  DBili  x   /  AST  18  /  ALT  14  /  AlkPhos  80  07-19    PT/INR - ( 19 Jul 2020 12:24 )   PT: 12.60 sec;   INR: 1.10 ratio     PTT - ( 19 Jul 2020 12:24 )  PTT:32.3 sec        CAPILLARY BLOOD GLUCOSE  POCT Blood Glucose.: 114 mg/dL (20 Jul 2020 07:48)  POCT Blood Glucose.: 118 mg/dL (19 Jul 2020 21:21)            MEDICATIONS  (STANDING):  dextrose 5%. 1000 milliLiter(s) (50 mL/Hr) IV Continuous <Continuous>  dextrose 50% Injectable 12.5 Gram(s) IV Push once  dextrose 50% Injectable 25 Gram(s) IV Push once  dextrose 50% Injectable 25 Gram(s) IV Push once  furosemide    Tablet 20 milliGRAM(s) Oral daily  gabapentin 300 milliGRAM(s) Oral every 8 hours  heparin   Injectable 5000 Unit(s) SubCutaneous every 8 hours  insulin lispro (HumaLOG) corrective regimen sliding scale   SubCutaneous three times a day before meals  losartan 100 milliGRAM(s) Oral daily  methocarbamol 750 milliGRAM(s) Oral four times a day  morphine  IR 15 milliGRAM(s) Oral two times a day  naproxen 500 milliGRAM(s) Oral two times a day  pantoprazole    Tablet 40 milliGRAM(s) Oral before breakfast  senna 2 Tablet(s) Oral at bedtime  vancomycin  IVPB 1500 milliGRAM(s) IV Intermittent every 12 hours    MEDICATIONS  (PRN):  acetaminophen   Tablet .. 650 milliGRAM(s) Oral every 6 hours PRN Temp greater or equal to 38C (100.4F)  dextrose 40% Gel 15 Gram(s) Oral once PRN Blood Glucose LESS THAN 70 milliGRAM(s)/deciliter  glucagon  Injectable 1 milliGRAM(s) IntraMuscular once PRN Glucose LESS THAN 70 milligrams/deciliter  oxyCODONE    IR 10 milliGRAM(s) Oral every 4 hours PRN Severe Pain (7 - 10)          RADIOLOGY & ADDITIONAL TESTS:      None yet        MICROBIOLOGY DATA:      COVID-19 PCR . (07.19.20 @ 13:01)    COVID-19 PCR: NotDetec: Methodology:  The Abbott Real Time SARS-CoV-2 assay is a real time reverse  transcriptase (RT) Polymerase Chain Reaction (PCR), test intended for the  qualitative detection of RNA from the SARS-CoV-2 in nasopharyngeal and  oropharyngeal swabs from patients suspected of COVID-19 infection.  The SARS-CoV-2 assay is performed on the Abbott m2000 system.  Reference Range:  Not Detected  This test has been validated by [facility]  Molecular lab. This assay is for in Vitro diagnostic testing under FDA  Emergency Use Authorization only.  This Test Was Performed At [facility], Molecular Lab,  [place], [place] [address] Patient is a 54y old  Male  with PMH of DM2, HTN, Morbidly obese,  RUSSLEL, previous MRSA infection  of pain stimulator, s/p  spinal surgery, now  presents to the ER for evaluation of 3 weeks of increasing LLE erythema and edema. He failed outpatient oral Zyvox and Clindamycin.  He has a superficial ulcer from where the erythema and edema originate that began last Summer and has not healed. He has no fever and chills. On admission, he found to have no fever or Leukocytosis. He has started on IV Vancomycin and the ID consult requested to assist with further evaluation and antibiotic management.         REVIEW OF SYSTEMS: Total of twelve systems have been reviewed with patient and found to be negative unless mentioned in HPI        PAST MEDICAL & SURGICAL HISTORY:  DM (diabetes mellitus)  Morbid obesity  Obstructive sleep apnea  Disc disease, degenerative, lumbar or lumbosacral  Hypertension  History of excision of pilonidal cyst  H/O arthroscopy of right knee  Spinal cord stimulator status        SOCIAL HISTORY  Alcohol: Does not drink  Tobacco: Does not smoke  Illicit substance use: None        FAMILY HISTORY: Non contributory to the present illness        ALLERGIES: IV Contrast (Sneezing (Mod to Severe))  penicillin (Unknown)        Vital Signs Last 24 Hrs  T(C): 36 (20 Jul 2020 06:38), Max: 36.9 (19 Jul 2020 14:18)  T(F): 96.8 (20 Jul 2020 06:38), Max: 98.4 (19 Jul 2020 14:18)  HR: 73 (20 Jul 2020 06:38) (63 - 98)  BP: 147/76 (20 Jul 2020 06:38) (132/62 - 200/117)  BP(mean): --  RR: 18 (20 Jul 2020 06:38) (18 - 20)  SpO2: 97% (19 Jul 2020 21:31) (97% - 99%)          PHYSICAL EXAM:  GENERAL: Not in distress   CHEST/LUNG:  Not using accessory muscles   HEART: s1 and s2 present  ABDOMEN:  Nontender and  Nondistended  EXTREMITIES: Left LE swollen, tender and erythematous with a purulent ulcer  CNS: Awake and Alert        LABS:                        13.6   8.65  )-----------( 330      ( 19 Jul 2020 12:24 )             43.7           07-19    140  |  103  |  14  ----------------------------<  114<H>  4.5   |  27  |  0.8    Ca    9.8      19 Jul 2020 12:24    TPro  7.4  /  Alb  4.1  /  TBili  0.3  /  DBili  x   /  AST  18  /  ALT  14  /  AlkPhos  80  07-19    PT/INR - ( 19 Jul 2020 12:24 )   PT: 12.60 sec;   INR: 1.10 ratio     PTT - ( 19 Jul 2020 12:24 )  PTT:32.3 sec        CAPILLARY BLOOD GLUCOSE  POCT Blood Glucose.: 114 mg/dL (20 Jul 2020 07:48)  POCT Blood Glucose.: 118 mg/dL (19 Jul 2020 21:21)        MEDICATIONS  (STANDING):    furosemide    Tablet 20 milliGRAM(s) Oral daily  gabapentin 300 milliGRAM(s) Oral every 8 hours  heparin   Injectable 5000 Unit(s) SubCutaneous every 8 hours  insulin lispro (HumaLOG) corrective regimen sliding scale   SubCutaneous three times a day before meals  losartan 100 milliGRAM(s) Oral daily  methocarbamol 750 milliGRAM(s) Oral four times a day  morphine  IR 15 milliGRAM(s) Oral two times a day  naproxen 500 milliGRAM(s) Oral two times a day  pantoprazole    Tablet 40 milliGRAM(s) Oral before breakfast  senna 2 Tablet(s) Oral at bedtime  vancomycin  IVPB 1500 milliGRAM(s) IV Intermittent every 12 hours    MEDICATIONS  (PRN):  acetaminophen   Tablet .. 650 milliGRAM(s) Oral every 6 hours PRN Temp greater or equal to 38C (100.4F)  dextrose 40% Gel 15 Gram(s) Oral once PRN Blood Glucose LESS THAN 70 milliGRAM(s)/deciliter  glucagon  Injectable 1 milliGRAM(s) IntraMuscular once PRN Glucose LESS THAN 70 milligrams/deciliter  oxyCODONE    IR 10 milliGRAM(s) Oral every 4 hours PRN Severe Pain (7 - 10)          RADIOLOGY & ADDITIONAL TESTS:      None yet        MICROBIOLOGY DATA:      COVID-19 PCR . (07.19.20 @ 13:01)    COVID-19 PCR: NotDetec: Methodology:  The Abbott Real Time SARS-CoV-2 assay is a real time reverse  transcriptase (RT) Polymerase Chain Reaction (PCR), test intended for the  qualitative detection of RNA from the SARS-CoV-2 in nasopharyngeal and  oropharyngeal swabs from patients suspected of COVID-19 infection.  The SARS-CoV-2 assay is performed on the Abbott m2000 system.  Reference Range:  Not Detected  This test has been validated by [facility]  Molecular lab. This assay is for in Vitro diagnostic testing under FDA  Emergency Use Authorization only.  This Test Was Performed At [facility], Molecular Lab,  [place] [address]

## 2020-07-21 LAB
-  AMIKACIN: SIGNIFICANT CHANGE UP
-  AZTREONAM: SIGNIFICANT CHANGE UP
-  CEFEPIME: SIGNIFICANT CHANGE UP
-  CEFTAZIDIME: SIGNIFICANT CHANGE UP
-  CIPROFLOXACIN: SIGNIFICANT CHANGE UP
-  GENTAMICIN: SIGNIFICANT CHANGE UP
-  IMIPENEM: SIGNIFICANT CHANGE UP
-  LEVOFLOXACIN: SIGNIFICANT CHANGE UP
-  MEROPENEM: SIGNIFICANT CHANGE UP
-  PIPERACILLIN/TAZOBACTAM: SIGNIFICANT CHANGE UP
-  TOBRAMYCIN: SIGNIFICANT CHANGE UP
ANION GAP SERPL CALC-SCNC: 11 MMOL/L — SIGNIFICANT CHANGE UP (ref 7–14)
BUN SERPL-MCNC: 12 MG/DL — SIGNIFICANT CHANGE UP (ref 10–20)
CALCIUM SERPL-MCNC: 9.4 MG/DL — SIGNIFICANT CHANGE UP (ref 8.5–10.1)
CHLORIDE SERPL-SCNC: 102 MMOL/L — SIGNIFICANT CHANGE UP (ref 98–110)
CO2 SERPL-SCNC: 27 MMOL/L — SIGNIFICANT CHANGE UP (ref 17–32)
CREAT SERPL-MCNC: 0.8 MG/DL — SIGNIFICANT CHANGE UP (ref 0.7–1.5)
CULTURE RESULTS: SIGNIFICANT CHANGE UP
GLUCOSE BLDC GLUCOMTR-MCNC: 101 MG/DL — HIGH (ref 70–99)
GLUCOSE BLDC GLUCOMTR-MCNC: 104 MG/DL — HIGH (ref 70–99)
GLUCOSE SERPL-MCNC: 109 MG/DL — HIGH (ref 70–99)
HCT VFR BLD CALC: 45.2 % — SIGNIFICANT CHANGE UP (ref 42–52)
HGB BLD-MCNC: 13.7 G/DL — LOW (ref 14–18)
MCHC RBC-ENTMCNC: 25.3 PG — LOW (ref 27–31)
MCHC RBC-ENTMCNC: 30.3 G/DL — LOW (ref 32–37)
MCV RBC AUTO: 83.4 FL — SIGNIFICANT CHANGE UP (ref 80–94)
METHOD TYPE: SIGNIFICANT CHANGE UP
NRBC # BLD: 0 /100 WBCS — SIGNIFICANT CHANGE UP (ref 0–0)
ORGANISM # SPEC MICROSCOPIC CNT: SIGNIFICANT CHANGE UP
ORGANISM # SPEC MICROSCOPIC CNT: SIGNIFICANT CHANGE UP
PLATELET # BLD AUTO: 298 K/UL — SIGNIFICANT CHANGE UP (ref 130–400)
POTASSIUM SERPL-MCNC: 4.1 MMOL/L — SIGNIFICANT CHANGE UP (ref 3.5–5)
POTASSIUM SERPL-SCNC: 4.1 MMOL/L — SIGNIFICANT CHANGE UP (ref 3.5–5)
RBC # BLD: 5.42 M/UL — SIGNIFICANT CHANGE UP (ref 4.7–6.1)
RBC # FLD: 15.9 % — HIGH (ref 11.5–14.5)
SODIUM SERPL-SCNC: 140 MMOL/L — SIGNIFICANT CHANGE UP (ref 135–146)
SPECIMEN SOURCE: SIGNIFICANT CHANGE UP
VANCOMYCIN TROUGH SERPL-MCNC: 5.4 UG/ML — SIGNIFICANT CHANGE UP (ref 5–10)
WBC # BLD: 6.97 K/UL — SIGNIFICANT CHANGE UP (ref 4.8–10.8)
WBC # FLD AUTO: 6.97 K/UL — SIGNIFICANT CHANGE UP (ref 4.8–10.8)

## 2020-07-21 PROCEDURE — 99232 SBSQ HOSP IP/OBS MODERATE 35: CPT

## 2020-07-21 RX ORDER — LINEZOLID 600 MG/300ML
INJECTION, SOLUTION INTRAVENOUS
Refills: 0 | Status: DISCONTINUED | OUTPATIENT
Start: 2020-07-21 | End: 2020-07-24

## 2020-07-21 RX ORDER — LINEZOLID 600 MG/300ML
600 INJECTION, SOLUTION INTRAVENOUS ONCE
Refills: 0 | Status: COMPLETED | OUTPATIENT
Start: 2020-07-21 | End: 2020-07-21

## 2020-07-21 RX ORDER — MORPHINE SULFATE 50 MG/1
30 CAPSULE, EXTENDED RELEASE ORAL
Refills: 0 | Status: DISCONTINUED | OUTPATIENT
Start: 2020-07-21 | End: 2020-07-25

## 2020-07-21 RX ORDER — LINEZOLID 600 MG/300ML
600 INJECTION, SOLUTION INTRAVENOUS EVERY 12 HOURS
Refills: 0 | Status: DISCONTINUED | OUTPATIENT
Start: 2020-07-21 | End: 2020-07-24

## 2020-07-21 RX ORDER — DIPHENHYDRAMINE HCL 50 MG
50 CAPSULE ORAL ONCE
Refills: 0 | Status: DISCONTINUED | OUTPATIENT
Start: 2020-07-21 | End: 2020-07-25

## 2020-07-21 RX ORDER — CEFEPIME 1 G/1
2000 INJECTION, POWDER, FOR SOLUTION INTRAMUSCULAR; INTRAVENOUS EVERY 12 HOURS
Refills: 0 | Status: DISCONTINUED | OUTPATIENT
Start: 2020-07-21 | End: 2020-07-25

## 2020-07-21 RX ADMIN — MORPHINE SULFATE 15 MILLIGRAM(S): 50 CAPSULE, EXTENDED RELEASE ORAL at 05:47

## 2020-07-21 RX ADMIN — CEFEPIME 100 MILLIGRAM(S): 1 INJECTION, POWDER, FOR SOLUTION INTRAMUSCULAR; INTRAVENOUS at 22:57

## 2020-07-21 RX ADMIN — LOSARTAN POTASSIUM 100 MILLIGRAM(S): 100 TABLET, FILM COATED ORAL at 05:45

## 2020-07-21 RX ADMIN — OXYCODONE HYDROCHLORIDE 10 MILLIGRAM(S): 5 TABLET ORAL at 06:23

## 2020-07-21 RX ADMIN — OXYCODONE HYDROCHLORIDE 10 MILLIGRAM(S): 5 TABLET ORAL at 21:53

## 2020-07-21 RX ADMIN — Medication 1 APPLICATION(S): at 21:47

## 2020-07-21 RX ADMIN — OXYCODONE HYDROCHLORIDE 10 MILLIGRAM(S): 5 TABLET ORAL at 14:04

## 2020-07-21 RX ADMIN — OXYCODONE HYDROCHLORIDE 10 MILLIGRAM(S): 5 TABLET ORAL at 02:20

## 2020-07-21 RX ADMIN — Medication 500 MILLIGRAM(S): at 05:45

## 2020-07-21 RX ADMIN — OXYCODONE HYDROCHLORIDE 10 MILLIGRAM(S): 5 TABLET ORAL at 11:32

## 2020-07-21 RX ADMIN — MORPHINE SULFATE 15 MILLIGRAM(S): 50 CAPSULE, EXTENDED RELEASE ORAL at 06:25

## 2020-07-21 RX ADMIN — Medication 500 MILLIGRAM(S): at 06:25

## 2020-07-21 RX ADMIN — LINEZOLID 300 MILLIGRAM(S): 600 INJECTION, SOLUTION INTRAVENOUS at 09:33

## 2020-07-21 RX ADMIN — OXYCODONE HYDROCHLORIDE 10 MILLIGRAM(S): 5 TABLET ORAL at 15:37

## 2020-07-21 RX ADMIN — OXYCODONE HYDROCHLORIDE 10 MILLIGRAM(S): 5 TABLET ORAL at 02:50

## 2020-07-21 RX ADMIN — MORPHINE SULFATE 30 MILLIGRAM(S): 50 CAPSULE, EXTENDED RELEASE ORAL at 17:51

## 2020-07-21 RX ADMIN — CEFEPIME 100 MILLIGRAM(S): 1 INJECTION, POWDER, FOR SOLUTION INTRAMUSCULAR; INTRAVENOUS at 09:33

## 2020-07-21 RX ADMIN — LINEZOLID 300 MILLIGRAM(S): 600 INJECTION, SOLUTION INTRAVENOUS at 22:56

## 2020-07-21 RX ADMIN — OXYCODONE HYDROCHLORIDE 10 MILLIGRAM(S): 5 TABLET ORAL at 10:36

## 2020-07-21 RX ADMIN — OXYCODONE HYDROCHLORIDE 10 MILLIGRAM(S): 5 TABLET ORAL at 20:04

## 2020-07-21 RX ADMIN — Medication 20 MILLIGRAM(S): at 05:45

## 2020-07-21 NOTE — PHARMACOTHERAPY INTERVENTION NOTE - COMMENTS
As per previous admission, patient received cefepime in the past and no reports of reaction per PCN allergy. Provider aware of allergy and recommendation was made by ID. Will monitor the patient for any signs of reaction.

## 2020-07-21 NOTE — PROGRESS NOTE ADULT - ASSESSMENT
Patient is a 54 year old male with PMH of DM2, HTN, obesity, RUSSELL, previous MRSA infection 2/2 spinal surgery presenting to ED with 3 weeks of increasing LLE erythema and edema.     #Cellulitis of LLE  - sepsis ruled out  - trialed on zyvox and clinda as an outpatient   - s/p IV vancomycin  - per ID consult will switch to Cefepime and Linezolid   - Will order CT LLE w/ IV contrast to follow up small abscess previously seen on imaging ( premedication w/ PO benadryl for listed allergy )   - wound care consult    # DM2  - holding home metformin and jardiance   - d/c ISS per patient request, BG has been well controlled ~100-110     # HTN  - continue losartan and furosemide    # degenerative disc disease  - home pain regimen is percocet 10mg q6 and Morphine ER 15mg BID per iSTOP  - pt with increasing pain and discomfort in hospital   - Oxycodone 10mg q4   - Morphine ER 30mg BID     #Progress Note Handoff:  Pending (specify): ID consult  Family discussion:  Disposition: Home___/SNF___/Other________/Unknown at this time________      Cande Mcdonald,

## 2020-07-21 NOTE — PROGRESS NOTE ADULT - SUBJECTIVE AND OBJECTIVE BOX
ONEALJOSE ALBERTO  54y, Male  Allergy: IV Contrast (Sneezing (Mod to Severe))  penicillin (Unknown)    Hospital Day: 2d    Patient seen and examined. Frustrated by his increasing pain here in the hospital, as well as the diet that he has ordered. Otherwise no acute events.     PMH/PSH:  PAST MEDICAL & SURGICAL HISTORY:  DM (diabetes mellitus)  Morbid obesity  Obstructive sleep apnea  Disc disease, degenerative, lumbar or lumbosacral  Hypertension  History of excision of pilonidal cyst  H/O arthroscopy of right knee  Spinal cord stimulator status      LAST 24-Hr EVENTS:    VITALS:  T(F): 98.8 (07-21-20 @ 14:04), Max: 98.8 (07-21-20 @ 14:04)  HR: 82 (07-21-20 @ 14:04)  BP: 133/84 (07-21-20 @ 14:04) (133/84 - 145/72)  RR: 18 (07-21-20 @ 05:10)  SpO2: --        TESTS & MEASUREMENTS:  Weight (Kg): 235 (07-20-20 @ 06:38)  BMI (kg/m2): 58.3 (07-20)                          13.7   6.97  )-----------( 298      ( 21 Jul 2020 09:12 )             45.2       07-21    140  |  102  |  12  ----------------------------<  109<H>  4.1   |  27  |  0.8    Ca    9.4      21 Jul 2020 09:12              Culture - Other (collected 07-19-20 @ 14:28)  Source: .Other left leg wound  Final Report (07-21-20 @ 13:49):    Numerous Pseudomonas aeruginosa  Organism: Pseudomonas aeruginosa (07-21-20 @ 13:49)  Organism: Pseudomonas aeruginosa (07-21-20 @ 13:49)      -  Amikacin: S <=16      -  Aztreonam: S <=4      -  Cefepime: S <=2      -  Ceftazidime: S <=1      -  Ciprofloxacin: I 1      -  Gentamicin: S <=2      -  Imipenem: S <=1      -  Levofloxacin: S 2      -  Meropenem: S <=1      -  Piperacillin/Tazobactam: S <=8      -  Tobramycin: S <=2      Method Type: JAYCOB                COVID-19 PCR: NotDetec (07-19-20 @ 13:01)      RADIOLOGY, ECG, & ADDITIONAL TESTS:      RECENT DIAGNOSTIC ORDERS:  Complete Blood Count: AM Sched. Collection: 22-Jul-2020 04:30 (07-21-20 @ 15:43)      MEDICATIONS:  MEDICATIONS  (STANDING):  cefepime   IVPB 2000 milliGRAM(s) IV Intermittent every 12 hours  dextrose 5%. 1000 milliLiter(s) (50 mL/Hr) IV Continuous <Continuous>  dextrose 50% Injectable 12.5 Gram(s) IV Push once  dextrose 50% Injectable 25 Gram(s) IV Push once  dextrose 50% Injectable 25 Gram(s) IV Push once  furosemide    Tablet 20 milliGRAM(s) Oral daily  gabapentin 300 milliGRAM(s) Oral every 8 hours  heparin   Injectable 5000 Unit(s) SubCutaneous every 8 hours  linezolid  IVPB      linezolid  IVPB 600 milliGRAM(s) IV Intermittent every 12 hours  losartan 100 milliGRAM(s) Oral daily  methocarbamol 750 milliGRAM(s) Oral four times a day  morphine ER Tablet 30 milliGRAM(s) Oral two times a day  naproxen 500 milliGRAM(s) Oral two times a day  pantoprazole    Tablet 40 milliGRAM(s) Oral before breakfast  senna 2 Tablet(s) Oral at bedtime    MEDICATIONS  (PRN):  dextrose 40% Gel 15 Gram(s) Oral once PRN Blood Glucose LESS THAN 70 milliGRAM(s)/deciliter  glucagon  Injectable 1 milliGRAM(s) IntraMuscular once PRN Glucose LESS THAN 70 milligrams/deciliter  oxyCODONE    IR 10 milliGRAM(s) Oral every 4 hours PRN Moderate Pain (4 - 6)      HOME MEDICATIONS:  furosemide 20 mg oral tablet (07-19)  Jardiance 25 mg oral tablet (07-19)  metFORMIN 500 mg oral tablet (07-19)  methocarbamol 750 mg oral tablet (07-19)  Morphine IR 15 mg oral tablet (07-19)  naproxen 500 mg oral delayed release tablet (07-19)  oxyCODONE 10 mg oral tablet (07-19)  pantoprazole 40 mg oral delayed release tablet (07-19)      PHYSICAL EXAM:     PHYSICAL EXAM:  GENERAL: NAD, morbidly obese  HEAD:  Atraumatic, Normocephalic  EYES: EOMI, PERRLA, conjunctiva and sclera clear  NECK: Supple, No JVD  CHEST/LUNG: distant breath sounds, but otherwise clear; No wheeze  HEART: Regular rate and rhythm; No murmurs, rubs, or gallops. distal pulses 2+ and equal bilateral  ABDOMEN: Soft, Nontender, Nondistended; Bowel sounds present  EXTREMITIES:  LLE erythematous, 4+ edematous with superifical ulcer at distal shin wrapped in ace bandage,  RLE wrapped in ace without edema or erythema. CNS: AAOx3

## 2020-07-21 NOTE — PROGRESS NOTE ADULT - ASSESSMENT
Patient is a 54y old  Male  with PMH of DM2, HTN, Morbidly obese,  RUSSELL, previous MRSA infection  of pain stimulator, s/p  spinal surgery, now  presents to the ER for evaluation of 3 weeks of increasing LLE erythema and edema. He failed outpatient oral Zyvox and Clindamycin.  He has a superficial ulcer from where the erythema and edema originate that began last Summer and has not healed. He has no fever and chills. On admission, he found to have no fever or Leukocytosis. He has started on IV Vancomycin and the ID consult requested to assist with further evaluation and antibiotic management.     # LLE cellulitis  - would culture growing Pseudomonas aeruginosa  # Morbidly obese  # H/O spinal pain stimulator infection with OM    would recommend:    - follow-up wound culture  - continue cefepime for now  - continue linezolid   - Keep LLE elevated  - Wound care    This is a preliminary incomplete pended note, all final recommendations to follow after interview and examination of the patient.    Please call if with any questions.  Spectra [id number] Patient is a 54y old  Male  with PMH of DM2, HTN, Morbidly obese,  RUSSELL, previous MRSA infection  of pain stimulator, s/p  spinal surgery, now  presents to the ER for evaluation of 3 weeks of increasing LLE erythema and edema. He failed outpatient oral Zyvox and Clindamycin.  He has a superficial ulcer from where the erythema and edema originate that began last Summer and has not healed. He has no fever and chills. On admission, he found to have no fever or Leukocytosis. He has started on IV Vancomycin and the ID consult requested to assist with further evaluation and antibiotic management.     # LLE cellulitis  - superficial would culture growing Pseudomonas aeruginosa  # Morbidly obese  # H/O spinal pain stimulator infection with OM    would recommend:    - follow-up wound culture  - continue cefepime for now  - continue linezolid   - if not much improvement, will consider CT leg as he received adequate coverage for MRSA/Strep  - Keep LLE elevated  - Wound care    Please call if with any questions.  Spectra [id number] Patient is a 54y old  Male  with PMH of DM2, HTN, Morbidly obese,  RUSSELL, previous MRSA infection  of pain stimulator, s/p  spinal surgery, now  presents to the ER for evaluation of 3 weeks of increasing LLE erythema and edema. He failed outpatient oral Zyvox and Clindamycin.  He has a superficial ulcer from where the erythema and edema originate that began last Summer and has not healed. He has no fever and chills. On admission, he found to have no fever or Leukocytosis. He has started on IV Vancomycin and the ID consult requested to assist with further evaluation and antibiotic management.     # LLE cellulitis  - superficial would culture growing Pseudomonas aeruginosa  - CT Left Leg 6/28 with diffuse subcutaneous edema including 2.1 x 1.3 cm rim-enhancing cystic lesion at distal left anterior shin in subcutnaeous fat, possibly small abscess     # Morbidly obese  # H/O spinal pain stimulator infection with OM    Creatinine, Serum: 0.8 mg/dL (07.21.20 @ 09:12)    - as previous CT 6/28/2020 showing possible small abscess; would repeat CT Left Lower extremity with IV contrast to see if worsening abscess  - follow-up wound culture  - continue cefepime for now  - continue linezolid   - Keep LLE elevated  - Wound care    Please call if with any questions.  Spectra [id number]

## 2020-07-21 NOTE — PROGRESS NOTE ADULT - SUBJECTIVE AND OBJECTIVE BOX
ONEALJOSE ALBERTO  54y, Male  Allergy: IV Contrast (Sneezing (Mod to Severe))  penicillin (Unknown)      LOS  2d    CHIEF COMPLAINT: LLE cellulitis (20 Jul 2020 15:36)      INTERVAL EVENTS/HPI  - No acute events overnight  - T(F): , Max: 97.8 (07-20-20 @ 21:15)  - Denies any worsening symptoms  - Tolerating medication  - WBC Count: 6.97 (07-21-20 @ 09:12)  WBC Count: 8.65 (07-19-20 @ 12:24)     - Creatinine, Serum: 0.8 (07-21-20 @ 09:12)  Creatinine, Serum: 0.8 (07-19-20 @ 12:24)       ROS  General: Denies rigors, nightsweats  HEENT: Denies headache, rhinorrhea, sore throat, eye pain  CV: Denies CP, palpitations  PULM: Denies wheezing, hemoptysis  GI: Denies hematemesis, hematochezia, melena  : Denies discharge, hematuria  MSK: Denies arthralgias, myalgias  SKIN: Denies rash, lesions  NEURO: Denies paresthesias, weakness  PSYCH: Denies depression, anxiety    VITALS:  T(F): 96.2, Max: 97.8 (07-20-20 @ 21:15)  HR: 65  BP: 142/77  RR: 18Vital Signs Last 24 Hrs  T(C): 35.7 (21 Jul 2020 05:10), Max: 36.6 (20 Jul 2020 21:15)  T(F): 96.2 (21 Jul 2020 05:10), Max: 97.8 (20 Jul 2020 21:15)  HR: 65 (21 Jul 2020 05:10) (65 - 78)  BP: 142/77 (21 Jul 2020 05:10) (142/77 - 145/72)  BP(mean): --  RR: 18 (21 Jul 2020 05:10) (18 - 18)  SpO2: --    PHYSICAL EXAM:  Gen: NAD, resting in bed  HEENT: Normocephalic, atraumatic  Neck: supple, no lymphadenopathy  CV: Regular rate & regular rhythm  Lungs: decreased BS at bases, no fremitus  Abdomen: Soft, BS present  Ext: Warm, well perfused  Neuro: non focal, awake  Skin: no rash, no erythema  Lines: no phlebitis    FH: Non-contributory  Social Hx: Non-contributory    TESTS & MEASUREMENTS:                        13.7   6.97  )-----------( 298      ( 21 Jul 2020 09:12 )             45.2     07-21    140  |  102  |  12  ----------------------------<  109<H>  4.1   |  27  |  0.8    Ca    9.4      21 Jul 2020 09:12    TPro  7.4  /  Alb  4.1  /  TBili  0.3  /  DBili  x   /  AST  18  /  ALT  14  /  AlkPhos  80  07-19    eGFR if Non African American: 103 mL/min/1.73M2 (07-21-20 @ 09:12)  eGFR if African American: 119 mL/min/1.73M2 (07-21-20 @ 09:12)    LIVER FUNCTIONS - ( 19 Jul 2020 12:24 )  Alb: 4.1 g/dL / Pro: 7.4 g/dL / ALK PHOS: 80 U/L / ALT: 14 U/L / AST: 18 U/L / GGT: x               Culture - Other (collected 07-19-20 @ 14:28)  Source: .Other left leg wound  Preliminary Report (07-20-20 @ 17:48):    Numerous Pseudomonas aeruginosa    Culture - Blood (collected 06-28-20 @ 14:18)  Source: .Blood Blood-Peripheral  Final Report (07-03-20 @ 20:00):    No Growth Final            INFECTIOUS DISEASES TESTING  COVID-19 PCR: NotDetec (07-19-20 @ 13:01)  COVID-19 PCR: NotDetec (06-28-20 @ 17:53)  Procalcitonin, Serum: 2.76 (04-23-20 @ 06:14)  COVID-19 PCR: NotDetec (04-21-20 @ 16:30)  Procalcitonin, Serum: 7.05 (04-21-20 @ 15:30)      INFLAMMATORY MARKERS  C-Reactive Protein, Serum: 17.37 mg/dL (04-28-20 @ 05:14)  Sedimentation Rate, Erythrocyte: 10 mm/Hr (04-28-20 @ 05:14)  C-Reactive Protein, Serum: 34.38 mg/dL (04-23-20 @ 06:14)  C-Reactive Protein, Serum: 33.52 mg/dL (04-22-20 @ 07:19)      RADIOLOGY & ADDITIONAL TESTS:  I have personally reviewed the last available Chest xray  CXR      CT      CARDIOLOGY TESTING  12 Lead ECG:   Ventricular Rate 84 BPM    Atrial Rate 84 BPM    P-R Interval 238 ms    QRS Duration 80 ms    Q-T Interval 376 ms    QTC Calculation(Bezet) 444 ms    P Axis 55 degrees    R Axis 52 degrees    T Axis 31 degrees    Diagnosis Line Sinus rhythm with 1st degree A-V block  Septal infarct , age undetermined  Abnormal ECG    Confirmed by CHARISMA MORTON MD ([id number]) on 7/20/2020 11:15:41 AM (07-19-20 @ 12:55)      MEDICATIONS  cefepime   IVPB 2000 IV Intermittent every 12 hours  dextrose 5%. 1000 IV Continuous <Continuous>  dextrose 50% Injectable 12.5 IV Push once  dextrose 50% Injectable 25 IV Push once  dextrose 50% Injectable 25 IV Push once  furosemide    Tablet 20 Oral daily  gabapentin 300 Oral every 8 hours  heparin   Injectable 5000 SubCutaneous every 8 hours  insulin lispro (HumaLOG) corrective regimen sliding scale  SubCutaneous three times a day before meals  linezolid  IVPB     linezolid  IVPB 600 IV Intermittent every 12 hours  losartan 100 Oral daily  methocarbamol 750 Oral four times a day  morphine ER Tablet 15 Oral two times a day  naproxen 500 Oral two times a day  pantoprazole    Tablet 40 Oral before breakfast  senna 2 Oral at bedtime      WEIGHT  Weight (kg): 235 (07-20-20 @ 06:38)  Creatinine, Serum: 0.8 mg/dL (07-21-20 @ 09:12)      ANTIBIOTICS:  cefepime   IVPB 2000 milliGRAM(s) IV Intermittent every 12 hours  linezolid  IVPB      linezolid  IVPB 600 milliGRAM(s) IV Intermittent every 12 hours      All available historical records have been reviewed JOSE ALBERTO NO  54y, Male  Allergy: IV Contrast (Sneezing (Mod to Severe))  penicillin (Unknown)      LOS  2d    CHIEF COMPLAINT: LLE cellulitis (20 Jul 2020 15:36)      INTERVAL EVENTS/HPI  - No acute events overnight; continued drainage coming from ulcer and previous pimple area of LLE  - tender to palpation   - T(F): , Max: 97.8 (07-20-20 @ 21:15)  - Denies any worsening symptoms  - Tolerating medication  - WBC Count: 6.97 (07-21-20 @ 09:12)  WBC Count: 8.65 (07-19-20 @ 12:24)     - Creatinine, Serum: 0.8 (07-21-20 @ 09:12)  Creatinine, Serum: 0.8 (07-19-20 @ 12:24)       ROS  General: Denies rigors, nightsweats  HEENT: Denies headache, rhinorrhea, sore throat, eye pain  CV: Denies CP, palpitations  PULM: Denies wheezing, hemoptysis  GI: Denies hematemesis, hematochezia, melena  : Denies discharge, hematuria  MSK: Denies arthralgias, myalgias  SKIN: Denies rash, lesions  NEURO: Denies paresthesias, weakness  PSYCH: Denies depression, anxiety    VITALS:  T(F): 96.2, Max: 97.8 (07-20-20 @ 21:15)  HR: 65  BP: 142/77  RR: 18Vital Signs Last 24 Hrs  T(C): 35.7 (21 Jul 2020 05:10), Max: 36.6 (20 Jul 2020 21:15)  T(F): 96.2 (21 Jul 2020 05:10), Max: 97.8 (20 Jul 2020 21:15)  HR: 65 (21 Jul 2020 05:10) (65 - 78)  BP: 142/77 (21 Jul 2020 05:10) (142/77 - 145/72)  BP(mean): --  RR: 18 (21 Jul 2020 05:10) (18 - 18)  SpO2: --    PHYSICAL EXAM:  Gen: NAD, resting in bed  HEENT: Normocephalic, atraumatic  Neck: supple, no lymphadenopathy  CV: Regular rate & regular rhythm  Lungs: decreased BS at bases, no fremitus  Abdomen: Soft, BS present  Ext: left lower extremity with ulcer; wound base red with areas of yellow fibrinous tissue; no surrounding erythema; 8 oclock from large wound is weeping papule   Neuro: non focal, awake  Skin: no rash, no erythema  Lines: no phlebitis    FH: Non-contributory  Social Hx: Non-contributory    TESTS & MEASUREMENTS:                        13.7   6.97  )-----------( 298      ( 21 Jul 2020 09:12 )             45.2     07-21    140  |  102  |  12  ----------------------------<  109<H>  4.1   |  27  |  0.8    Ca    9.4      21 Jul 2020 09:12    TPro  7.4  /  Alb  4.1  /  TBili  0.3  /  DBili  x   /  AST  18  /  ALT  14  /  AlkPhos  80  07-19    eGFR if Non African American: 103 mL/min/1.73M2 (07-21-20 @ 09:12)  eGFR if African American: 119 mL/min/1.73M2 (07-21-20 @ 09:12)    LIVER FUNCTIONS - ( 19 Jul 2020 12:24 )  Alb: 4.1 g/dL / Pro: 7.4 g/dL / ALK PHOS: 80 U/L / ALT: 14 U/L / AST: 18 U/L / GGT: x               Culture - Other (collected 07-19-20 @ 14:28)  Source: .Other left leg wound  Preliminary Report (07-20-20 @ 17:48):    Numerous Pseudomonas aeruginosa    Culture - Blood (collected 06-28-20 @ 14:18)  Source: .Blood Blood-Peripheral  Final Report (07-03-20 @ 20:00):    No Growth Final            INFECTIOUS DISEASES TESTING  COVID-19 PCR: NotDetec (07-19-20 @ 13:01)  COVID-19 PCR: NotDetec (06-28-20 @ 17:53)  Procalcitonin, Serum: 2.76 (04-23-20 @ 06:14)  COVID-19 PCR: NotDetec (04-21-20 @ 16:30)  Procalcitonin, Serum: 7.05 (04-21-20 @ 15:30)      INFLAMMATORY MARKERS  C-Reactive Protein, Serum: 17.37 mg/dL (04-28-20 @ 05:14)  Sedimentation Rate, Erythrocyte: 10 mm/Hr (04-28-20 @ 05:14)  C-Reactive Protein, Serum: 34.38 mg/dL (04-23-20 @ 06:14)  C-Reactive Protein, Serum: 33.52 mg/dL (04-22-20 @ 07:19)      RADIOLOGY & ADDITIONAL TESTS:  I have personally reviewed the last available Chest xray  CXR      CT      CARDIOLOGY TESTING  12 Lead ECG:   Ventricular Rate 84 BPM    Atrial Rate 84 BPM    P-R Interval 238 ms    QRS Duration 80 ms    Q-T Interval 376 ms    QTC Calculation(Bezet) 444 ms    P Axis 55 degrees    R Axis 52 degrees    T Axis 31 degrees    Diagnosis Line Sinus rhythm with 1st degree A-V block  Septal infarct , age undetermined  Abnormal ECG    Confirmed by CHARISMA MORTON MD ([id number]) on 7/20/2020 11:15:41 AM (07-19-20 @ 12:55)      MEDICATIONS  cefepime   IVPB 2000 IV Intermittent every 12 hours  dextrose 5%. 1000 IV Continuous <Continuous>  dextrose 50% Injectable 12.5 IV Push once  dextrose 50% Injectable 25 IV Push once  dextrose 50% Injectable 25 IV Push once  furosemide    Tablet 20 Oral daily  gabapentin 300 Oral every 8 hours  heparin   Injectable 5000 SubCutaneous every 8 hours  insulin lispro (HumaLOG) corrective regimen sliding scale  SubCutaneous three times a day before meals  linezolid  IVPB     linezolid  IVPB 600 IV Intermittent every 12 hours  losartan 100 Oral daily  methocarbamol 750 Oral four times a day  morphine ER Tablet 15 Oral two times a day  naproxen 500 Oral two times a day  pantoprazole    Tablet 40 Oral before breakfast  senna 2 Oral at bedtime      WEIGHT  Weight (kg): 235 (07-20-20 @ 06:38)  Creatinine, Serum: 0.8 mg/dL (07-21-20 @ 09:12)      ANTIBIOTICS:  cefepime   IVPB 2000 milliGRAM(s) IV Intermittent every 12 hours  linezolid  IVPB      linezolid  IVPB 600 milliGRAM(s) IV Intermittent every 12 hours      All available historical records have been reviewed

## 2020-07-22 DIAGNOSIS — I87.8 OTHER SPECIFIED DISORDERS OF VEINS: ICD-10-CM

## 2020-07-22 LAB
GLUCOSE BLDC GLUCOMTR-MCNC: 106 MG/DL — HIGH (ref 70–99)
HCT VFR BLD CALC: 43.3 % — SIGNIFICANT CHANGE UP (ref 42–52)
HGB BLD-MCNC: 13.3 G/DL — LOW (ref 14–18)
MCHC RBC-ENTMCNC: 25.9 PG — LOW (ref 27–31)
MCHC RBC-ENTMCNC: 30.7 G/DL — LOW (ref 32–37)
MCV RBC AUTO: 84.2 FL — SIGNIFICANT CHANGE UP (ref 80–94)
NRBC # BLD: 0 /100 WBCS — SIGNIFICANT CHANGE UP (ref 0–0)
PLATELET # BLD AUTO: 294 K/UL — SIGNIFICANT CHANGE UP (ref 130–400)
RBC # BLD: 5.14 M/UL — SIGNIFICANT CHANGE UP (ref 4.7–6.1)
RBC # FLD: 15.9 % — HIGH (ref 11.5–14.5)
WBC # BLD: 6.21 K/UL — SIGNIFICANT CHANGE UP (ref 4.8–10.8)
WBC # FLD AUTO: 6.21 K/UL — SIGNIFICANT CHANGE UP (ref 4.8–10.8)

## 2020-07-22 PROCEDURE — 99233 SBSQ HOSP IP/OBS HIGH 50: CPT

## 2020-07-22 RX ORDER — SODIUM HYPOCHLORITE 0.125 %
1 SOLUTION, NON-ORAL MISCELLANEOUS
Refills: 0 | Status: DISCONTINUED | OUTPATIENT
Start: 2020-07-22 | End: 2020-07-25

## 2020-07-22 RX ORDER — MORPHINE SULFATE 50 MG/1
4 CAPSULE, EXTENDED RELEASE ORAL ONCE
Refills: 0 | Status: DISCONTINUED | OUTPATIENT
Start: 2020-07-22 | End: 2020-07-22

## 2020-07-22 RX ADMIN — OXYCODONE HYDROCHLORIDE 10 MILLIGRAM(S): 5 TABLET ORAL at 09:00

## 2020-07-22 RX ADMIN — MORPHINE SULFATE 30 MILLIGRAM(S): 50 CAPSULE, EXTENDED RELEASE ORAL at 17:44

## 2020-07-22 RX ADMIN — Medication 500 MILLIGRAM(S): at 05:17

## 2020-07-22 RX ADMIN — MORPHINE SULFATE 4 MILLIGRAM(S): 50 CAPSULE, EXTENDED RELEASE ORAL at 09:27

## 2020-07-22 RX ADMIN — METHOCARBAMOL 750 MILLIGRAM(S): 500 TABLET, FILM COATED ORAL at 00:04

## 2020-07-22 RX ADMIN — Medication 500 MILLIGRAM(S): at 18:41

## 2020-07-22 RX ADMIN — OXYCODONE HYDROCHLORIDE 10 MILLIGRAM(S): 5 TABLET ORAL at 04:05

## 2020-07-22 RX ADMIN — OXYCODONE HYDROCHLORIDE 10 MILLIGRAM(S): 5 TABLET ORAL at 12:28

## 2020-07-22 RX ADMIN — Medication 1 APPLICATION(S): at 16:37

## 2020-07-22 RX ADMIN — CEFEPIME 100 MILLIGRAM(S): 1 INJECTION, POWDER, FOR SOLUTION INTRAMUSCULAR; INTRAVENOUS at 17:44

## 2020-07-22 RX ADMIN — OXYCODONE HYDROCHLORIDE 10 MILLIGRAM(S): 5 TABLET ORAL at 17:37

## 2020-07-22 RX ADMIN — LINEZOLID 300 MILLIGRAM(S): 600 INJECTION, SOLUTION INTRAVENOUS at 05:21

## 2020-07-22 RX ADMIN — MORPHINE SULFATE 30 MILLIGRAM(S): 50 CAPSULE, EXTENDED RELEASE ORAL at 05:16

## 2020-07-22 RX ADMIN — LINEZOLID 300 MILLIGRAM(S): 600 INJECTION, SOLUTION INTRAVENOUS at 17:44

## 2020-07-22 RX ADMIN — CEFEPIME 100 MILLIGRAM(S): 1 INJECTION, POWDER, FOR SOLUTION INTRAMUSCULAR; INTRAVENOUS at 08:31

## 2020-07-22 RX ADMIN — OXYCODONE HYDROCHLORIDE 10 MILLIGRAM(S): 5 TABLET ORAL at 13:00

## 2020-07-22 RX ADMIN — Medication 1 APPLICATION(S): at 16:38

## 2020-07-22 RX ADMIN — LOSARTAN POTASSIUM 100 MILLIGRAM(S): 100 TABLET, FILM COATED ORAL at 05:18

## 2020-07-22 RX ADMIN — Medication 500 MILLIGRAM(S): at 17:44

## 2020-07-22 RX ADMIN — MORPHINE SULFATE 30 MILLIGRAM(S): 50 CAPSULE, EXTENDED RELEASE ORAL at 18:41

## 2020-07-22 RX ADMIN — MORPHINE SULFATE 30 MILLIGRAM(S): 50 CAPSULE, EXTENDED RELEASE ORAL at 05:40

## 2020-07-22 RX ADMIN — Medication 1 APPLICATION(S): at 10:39

## 2020-07-22 RX ADMIN — OXYCODONE HYDROCHLORIDE 10 MILLIGRAM(S): 5 TABLET ORAL at 08:30

## 2020-07-22 RX ADMIN — OXYCODONE HYDROCHLORIDE 10 MILLIGRAM(S): 5 TABLET ORAL at 00:04

## 2020-07-22 RX ADMIN — Medication 20 MILLIGRAM(S): at 05:18

## 2020-07-22 RX ADMIN — OXYCODONE HYDROCHLORIDE 10 MILLIGRAM(S): 5 TABLET ORAL at 21:03

## 2020-07-22 RX ADMIN — Medication 500 MILLIGRAM(S): at 05:40

## 2020-07-22 RX ADMIN — OXYCODONE HYDROCHLORIDE 10 MILLIGRAM(S): 5 TABLET ORAL at 16:37

## 2020-07-22 NOTE — PROGRESS NOTE ADULT - ASSESSMENT
Patient is a 54 year old male with PMH of DM2, HTN, obesity, RUSSELL, previous MRSA infection 2/2 spinal surgery presenting to ED with 3 weeks of increasing LLE erythema and edema.     #Cellulitis of LLE  - failed outpatient zyvox and clinda   - ID following - currently on Zyvox and cefepime  - ID recommends holding off on CT LE as pt is improving  - continue local wound care  - elevate legs when possible  - wound care consult appreciated     #RUSSELL on cpap at night    #morbid obesity  -diet and lifestyle modification    # DM II, well controlled   - holding home metformin and Jardiance   - finger sticks well controlled with diet alone     # HTN - well controlled   - continue losartan and furosemide    # degenerative disc disease  - home pain regimen is percocet 10mg q6 and Morphine ER 15mg BID per iSTOP  - pt with increasing pain and discomfort in hospital   - currently on Oxycodone 10mg q4 prn and Morphine ER 30mg BID   - patient follows with pain management - Dr. Lombardo's office   - received morphine IVP this am    Progress Note Handoff  Pending Consults: none   Pending Tests: none  Pending Results: none  Family Discussion: discussed wound care, IV abx and ID follow up with pt - in agreement with plan of care   Disposition: Home__x___/SNF______/Other_____/Unknown at this time_____    Please call me with any questions at extension [phone]

## 2020-07-22 NOTE — PROGRESS NOTE ADULT - SUBJECTIVE AND OBJECTIVE BOX
ONEAL JOSE ALBERTO  54y, Male  Allergy: IV Contrast (Sneezing (Mod to Severe))  penicillin (Unknown)      LOS  3d    CHIEF COMPLAINT: LLE cellulitis (21 Jul 2020 15:43)      INTERVAL EVENTS/HPI  - No acute events overnight  - T(F): , Max: 98.8 (07-21-20 @ 14:04)  - Denies any worsening symptoms  - Tolerating medication  - WBC Count: 6.21 (07-22-20 @ 07:19)  WBC Count: 6.97 (07-21-20 @ 09:12)     - Creatinine, Serum: 0.8 (07-21-20 @ 09:12)       ROS  General: Denies rigors, nightsweats  HEENT: Denies headache, rhinorrhea, sore throat, eye pain  CV: Denies CP, palpitations  PULM: Denies wheezing, hemoptysis  GI: Denies hematemesis, hematochezia, melena  : Denies discharge, hematuria  MSK: Denies arthralgias, myalgias  SKIN: Denies rash, lesions  NEURO: Denies paresthesias, weakness  PSYCH: Denies depression, anxiety    VITALS:  T(F): 96.7, Max: 98.8 (07-21-20 @ 14:04)  HR: 63  BP: 160/82  RR: 18Vital Signs Last 24 Hrs  T(C): 35.9 (22 Jul 2020 05:33), Max: 37.1 (21 Jul 2020 14:04)  T(F): 96.7 (22 Jul 2020 05:33), Max: 98.8 (21 Jul 2020 14:04)  HR: 63 (22 Jul 2020 05:33) (63 - 82)  BP: 160/82 (22 Jul 2020 05:33) (132/77 - 160/82)  BP(mean): --  RR: 18 (22 Jul 2020 05:33) (18 - 18)  SpO2: --    PHYSICAL EXAM:  Gen: NAD, resting in bed  HEENT: Normocephalic, atraumatic  Neck: supple, no lymphadenopathy  CV: Regular rate & regular rhythm  Lungs: decreased BS at bases, no fremitus  Abdomen: Soft, BS present  Ext: left lower extremity with ulcer; wound base red with areas of yellow fibrinous tissue; no surrounding erythema; 8 oclock from large wound is weeping papule   Neuro: non focal, awake  Skin: no rash, no erythema  Lines: no phlebitis    FH: Non-contributory  Social Hx: Non-contributory    TESTS & MEASUREMENTS:                        13.3   6.21  )-----------( 294      ( 22 Jul 2020 07:19 )             43.3     07-21    140  |  102  |  12  ----------------------------<  109<H>  4.1   |  27  |  0.8    Ca    9.4      21 Jul 2020 09:12      eGFR if Non African American: 103 mL/min/1.73M2 (07-21-20 @ 09:12)  eGFR if African American: 119 mL/min/1.73M2 (07-21-20 @ 09:12)          Culture - Other (collected 07-19-20 @ 14:28)  Source: .Other left leg wound  Final Report (07-21-20 @ 13:49):    Numerous Pseudomonas aeruginosa  Organism: Pseudomonas aeruginosa (07-21-20 @ 13:49)  Organism: Pseudomonas aeruginosa (07-21-20 @ 13:49)      -  Amikacin: S <=16      -  Aztreonam: S <=4      -  Cefepime: S <=2      -  Ceftazidime: S <=1      -  Ciprofloxacin: I 1      -  Gentamicin: S <=2      -  Imipenem: S <=1      -  Levofloxacin: S 2      -  Meropenem: S <=1      -  Piperacillin/Tazobactam: S <=8      -  Tobramycin: S <=2      Method Type: JAYCOB    Culture - Blood (collected 06-28-20 @ 14:18)  Source: .Blood Blood-Peripheral  Final Report (07-03-20 @ 20:00):    No Growth Final            INFECTIOUS DISEASES TESTING  COVID-19 PCR: NotDetec (07-19-20 @ 13:01)  COVID-19 PCR: NotDetec (06-28-20 @ 17:53)  Procalcitonin, Serum: 2.76 (04-23-20 @ 06:14)  COVID-19 PCR: NotDetec (04-21-20 @ 16:30)  Procalcitonin, Serum: 7.05 (04-21-20 @ 15:30)      INFLAMMATORY MARKERS  C-Reactive Protein, Serum: 17.37 mg/dL (04-28-20 @ 05:14)  Sedimentation Rate, Erythrocyte: 10 mm/Hr (04-28-20 @ 05:14)  C-Reactive Protein, Serum: 34.38 mg/dL (04-23-20 @ 06:14)  C-Reactive Protein, Serum: 33.52 mg/dL (04-22-20 @ 07:19)      RADIOLOGY & ADDITIONAL TESTS:  I have personally reviewed the last available Chest xray  CXR      CT      CARDIOLOGY TESTING  12 Lead ECG:   Ventricular Rate 84 BPM    Atrial Rate 84 BPM    P-R Interval 238 ms    QRS Duration 80 ms    Q-T Interval 376 ms    QTC Calculation(Bezet) 444 ms    P Axis 55 degrees    R Axis 52 degrees    T Axis 31 degrees    Diagnosis Line Sinus rhythm with 1st degree A-V block  Septal infarct , age undetermined  Abnormal ECG    Confirmed by CHARISMA MORTON MD ([id number]) on 7/20/2020 11:15:41 AM (07-19-20 @ 12:55)      MEDICATIONS  cefepime   IVPB 2000 IV Intermittent every 12 hours  dextrose 5%. 1000 IV Continuous <Continuous>  dextrose 50% Injectable 12.5 IV Push once  dextrose 50% Injectable 25 IV Push once  dextrose 50% Injectable 25 IV Push once  furosemide    Tablet 20 Oral daily  gabapentin 300 Oral every 8 hours  heparin   Injectable 5000 SubCutaneous every 8 hours  linezolid  IVPB     linezolid  IVPB 600 IV Intermittent every 12 hours  losartan 100 Oral daily  methocarbamol 750 Oral four times a day  morphine ER Tablet 30 Oral two times a day  naproxen 500 Oral two times a day  pantoprazole    Tablet 40 Oral before breakfast  senna 2 Oral at bedtime  silver sulfADIAZINE 1% Cream 1 Topical daily      WEIGHT  Weight (kg): 235 (07-20-20 @ 06:38)  Creatinine, Serum: 0.8 mg/dL (07-21-20 @ 09:12)      ANTIBIOTICS:  cefepime   IVPB 2000 milliGRAM(s) IV Intermittent every 12 hours  linezolid  IVPB      linezolid  IVPB 600 milliGRAM(s) IV Intermittent every 12 hours      All available historical records have been reviewed JSOE ALBERTO NO  54y, Male  Allergy: IV Contrast (Sneezing (Mod to Severe))  penicillin (Unknown)      LOS  3d    CHIEF COMPLAINT: LLE cellulitis (21 Jul 2020 15:43)      INTERVAL EVENTS/HPI  - No acute events overnight; seen with wound care  - feels like left leg has become less swollen and painful   - no diarrhea   - T(F): , Max: 98.8 (07-21-20 @ 14:04)  - Denies any worsening symptoms  - Tolerating medication  - WBC Count: 6.21 (07-22-20 @ 07:19)  WBC Count: 6.97 (07-21-20 @ 09:12)     - Creatinine, Serum: 0.8 (07-21-20 @ 09:12)       ROS  General: Denies rigors, nightsweats  HEENT: Denies headache, rhinorrhea, sore throat, eye pain  CV: Denies CP, palpitations  PULM: Denies wheezing, hemoptysis  GI: Denies hematemesis, hematochezia, melena  : Denies discharge, hematuria  MSK: Denies arthralgias, myalgias  SKIN: Denies rash, lesions  NEURO: Denies paresthesias, weakness  PSYCH: Denies depression, anxiety    VITALS:  T(F): 96.7, Max: 98.8 (07-21-20 @ 14:04)  HR: 63  BP: 160/82  RR: 18Vital Signs Last 24 Hrs  T(C): 35.9 (22 Jul 2020 05:33), Max: 37.1 (21 Jul 2020 14:04)  T(F): 96.7 (22 Jul 2020 05:33), Max: 98.8 (21 Jul 2020 14:04)  HR: 63 (22 Jul 2020 05:33) (63 - 82)  BP: 160/82 (22 Jul 2020 05:33) (132/77 - 160/82)  BP(mean): --  RR: 18 (22 Jul 2020 05:33) (18 - 18)  SpO2: --    PHYSICAL EXAM:  Gen: NAD, resting in bed  HEENT: Normocephalic, atraumatic  Neck: supple, no lymphadenopathy  CV: Regular rate & regular rhythm  Lungs: decreased BS at bases, no fremitus  Abdomen: Soft, BS present  Ext: left lower extremity with ulcer; wound base red with areas of yellow fibrinous tissue; no surrounding erythema; 8 oclock from large wound is weeping papule   Neuro: non focal, awake  Skin: no rash, no erythema  Lines: no phlebitis    FH: Non-contributory  Social Hx: Non-contributory    TESTS & MEASUREMENTS:                        13.3   6.21  )-----------( 294      ( 22 Jul 2020 07:19 )             43.3     07-21    140  |  102  |  12  ----------------------------<  109<H>  4.1   |  27  |  0.8    Ca    9.4      21 Jul 2020 09:12      eGFR if Non African American: 103 mL/min/1.73M2 (07-21-20 @ 09:12)  eGFR if African American: 119 mL/min/1.73M2 (07-21-20 @ 09:12)          Culture - Other (collected 07-19-20 @ 14:28)  Source: .Other left leg wound  Final Report (07-21-20 @ 13:49):    Numerous Pseudomonas aeruginosa  Organism: Pseudomonas aeruginosa (07-21-20 @ 13:49)  Organism: Pseudomonas aeruginosa (07-21-20 @ 13:49)      -  Amikacin: S <=16      -  Aztreonam: S <=4      -  Cefepime: S <=2      -  Ceftazidime: S <=1      -  Ciprofloxacin: I 1      -  Gentamicin: S <=2      -  Imipenem: S <=1      -  Levofloxacin: S 2      -  Meropenem: S <=1      -  Piperacillin/Tazobactam: S <=8      -  Tobramycin: S <=2      Method Type: JAYCOB    Culture - Blood (collected 06-28-20 @ 14:18)  Source: .Blood Blood-Peripheral  Final Report (07-03-20 @ 20:00):    No Growth Final            INFECTIOUS DISEASES TESTING  COVID-19 PCR: NotDetec (07-19-20 @ 13:01)  COVID-19 PCR: NotDetec (06-28-20 @ 17:53)  Procalcitonin, Serum: 2.76 (04-23-20 @ 06:14)  COVID-19 PCR: NotDetec (04-21-20 @ 16:30)  Procalcitonin, Serum: 7.05 (04-21-20 @ 15:30)      INFLAMMATORY MARKERS  C-Reactive Protein, Serum: 17.37 mg/dL (04-28-20 @ 05:14)  Sedimentation Rate, Erythrocyte: 10 mm/Hr (04-28-20 @ 05:14)  C-Reactive Protein, Serum: 34.38 mg/dL (04-23-20 @ 06:14)  C-Reactive Protein, Serum: 33.52 mg/dL (04-22-20 @ 07:19)      RADIOLOGY & ADDITIONAL TESTS:  I have personally reviewed the last available Chest xray  CXR      CT      CARDIOLOGY TESTING  12 Lead ECG:   Ventricular Rate 84 BPM    Atrial Rate 84 BPM    P-R Interval 238 ms    QRS Duration 80 ms    Q-T Interval 376 ms    QTC Calculation(Bezet) 444 ms    P Axis 55 degrees    R Axis 52 degrees    T Axis 31 degrees    Diagnosis Line Sinus rhythm with 1st degree A-V block  Septal infarct , age undetermined  Abnormal ECG    Confirmed by CHARISMA MORTON MD ([id number]) on 7/20/2020 11:15:41 AM (07-19-20 @ 12:55)      MEDICATIONS  cefepime   IVPB 2000 IV Intermittent every 12 hours  dextrose 5%. 1000 IV Continuous <Continuous>  dextrose 50% Injectable 12.5 IV Push once  dextrose 50% Injectable 25 IV Push once  dextrose 50% Injectable 25 IV Push once  furosemide    Tablet 20 Oral daily  gabapentin 300 Oral every 8 hours  heparin   Injectable 5000 SubCutaneous every 8 hours  linezolid  IVPB     linezolid  IVPB 600 IV Intermittent every 12 hours  losartan 100 Oral daily  methocarbamol 750 Oral four times a day  morphine ER Tablet 30 Oral two times a day  naproxen 500 Oral two times a day  pantoprazole    Tablet 40 Oral before breakfast  senna 2 Oral at bedtime  silver sulfADIAZINE 1% Cream 1 Topical daily      WEIGHT  Weight (kg): 235 (07-20-20 @ 06:38)  Creatinine, Serum: 0.8 mg/dL (07-21-20 @ 09:12)      ANTIBIOTICS:  cefepime   IVPB 2000 milliGRAM(s) IV Intermittent every 12 hours  linezolid  IVPB      linezolid  IVPB 600 milliGRAM(s) IV Intermittent every 12 hours      All available historical records have been reviewed

## 2020-07-22 NOTE — CONSULT NOTE ADULT - SUBJECTIVE AND OBJECTIVE BOX
JOSE ALBERTO [id number]    admitted with HPI:  Patient is a 54 year old male with PMH of DM2, HTN, obesity, RUSSELL, previous MRSA infection 2/2 spinal surgery presenting to ED with 3 weeks of increasing LLE erythema and edema. He failed outpatient Zyvox and Clinda PO and states he has had a few episodes of chills the last 5 days. Patient has a superficial ulcer from where the erythema and edema originate that began last Summer and has not healed. His RLE ulcer is healing. Denies fever, CP, SOB, abd pain, n/v/d/c. Denies sick contacts. (19 Jul 2020 15:31)      PAST MEDICAL & SURGICAL HISTORY:  DM (diabetes mellitus)  Morbid obesity  Obstructive sleep apnea  Disc disease, degenerative, lumbar or lumbosacral  Hypertension  History of excision of pilonidal cyst  H/O arthroscopy of right knee  Spinal cord stimulator status

## 2020-07-22 NOTE — CONSULT NOTE ADULT - ASSESSMENT
Patient presented in bed a/o , respondents appropriately to verbal command   Patient states he had wounds on legs from a scrape and it has been getting better  with treatment by him and his wife but now it looks worse.   Per patient was in hospital recently and was given  topical dressing in red tube that was working very well   Per review of previous admission  patient was seen by both burn and podiatry team, pt had refused surgical treatment at that time.   Both burn and podiatry recommended topical treatment .       B/l chronic venous stasis with yellow devitalised tissue noted on wound base.   Minimal drainage noted with dressing change.   No pus or fowl smell noted    plus 2-3 edema with skin dryness noted
Patient is a 54y old  Male  with PMH of DM2, HTN, Morbidly obese,  RUSSELL, previous MRSA infection  of pain stimulator, s/p  spinal surgery, now  presents to the ER for evaluation of 3 weeks of increasing LLE erythema and edema. He failed outpatient oral Zyvox and Clindamycin.  He has a superficial ulcer from where the erythema and edema originate that began last Summer and has not healed. He has no fever and chills. On admission, he found to have no fever or Leukocytosis. He has started on IV Vancomycin and the ID consult requested to assist with further evaluation and antibiotic management.     # LLE cellulitis  # Morbidly obese  # H/O spinal pain stimulator infection with OM    would recommend:    1. Follow up wound culture  2. Add IV Cefepime and change Vancomycin to Linezolid since it would be difficult to dose Vancomycin in an obese patient   3. Keep LLE elevated  4. Wound care    will follow the patient with you and make further recommendation based on the clinical course and Lab results  Thank you for the opportunity to participate in Mr. NO's care      Attending Attestation:    Spent more than 65 minutes on total encounter, more than 50 % of the visit was spent counseling and/or coordinating care by the Attending physician.

## 2020-07-22 NOTE — PROGRESS NOTE ADULT - ASSESSMENT
Patient is a 54y old  Male  with PMH of DM2, HTN, Morbidly obese,  RUSSELL, previous MRSA infection  of pain stimulator, s/p  spinal surgery, now  presents to the ER for evaluation of 3 weeks of increasing LLE erythema and edema. He failed outpatient oral Zyvox and Clindamycin.  He has a superficial ulcer from where the erythema and edema originate that began last Summer and has not healed. He has no fever and chills. On admission, he found to have no fever or Leukocytosis. He has started on IV Vancomycin and the ID consult requested to assist with further evaluation and antibiotic management.     # LLE cellulitis  - 7/19 superficial would culture growing Pseudomonas aeruginosa- pan-sensitive   - CT Left Leg 6/28 with diffuse subcutaneous edema including 2.1 x 1.3 cm rim-enhancing cystic lesion at distal left anterior shin in subcutnaeous fat, possibly small abscess     # Morbidly obese  # H/O spinal pain stimulator infection with OM      - follow-up CT leg  - continue cefepime for now  - continue linezolid   - Keep LLE elevated  - Wound care    Please call if with any questions.  Spectra [phone]      This is a preliminary incomplete pended note, all final recommendations to follow after interview and examination of the patient. Patient is a 54y old  Male  with PMH of DM2, HTN, Morbidly obese,  RUSSELL, previous MRSA infection  of pain stimulator, s/p  spinal surgery, now  presents to the ER for evaluation of 3 weeks of increasing LLE erythema and edema. He failed outpatient oral Zyvox and Clindamycin.  He has a superficial ulcer from where the erythema and edema originate that began last Summer and has not healed. He has no fever and chills. On admission, he found to have no fever or Leukocytosis. He has started on IV Vancomycin and the ID consult requested to assist with further evaluation and antibiotic management.     # LLE cellulitis  - 7/19 superficial would culture growing Pseudomonas aeruginosa- pan-sensitive   - CT Left Leg 6/28 with diffuse subcutaneous edema including 2.1 x 1.3 cm rim-enhancing cystic lesion at distal left anterior shin in subcutnaeous fat, possibly small abscess     # Morbidly obese  # H/O spinal pain stimulator infection with OM    Current Antibiotics   cefepime   IVPB 2000 milliGRAM(s) IV Intermittent every 12 hours  linezolid  IVPB 600 milliGRAM(s) IV Intermittent every 12 hours    - can hold off on repeat CT as exam appears to be improving  - continue cefepime 2g q 12 hours  - continue linezolid 600 mg q 12  - final antibiotic course pending clinical improvement  - Keep LLE elevated  - Wound care appreciated     Please call if with any questions.  Spectra [phone]

## 2020-07-22 NOTE — PROGRESS NOTE ADULT - SUBJECTIVE AND OBJECTIVE BOX
JOSE ALBERTO NO  54y  Male      Patient is a 54y old  Male who presents with a chief complaint of LLE cellulitis (22 Jul 2020 10:40)      INTERVAL HPI/OVERNIGHT EVENTS:  Patient seen and examined earlier this morning.  Sitting at the edge of the bed. Several complaints today regarding his IV, pain and general dislike for his hospital stay so far.        REVIEW OF SYSTEMS:  CONSTITUTIONAL: No fever, weight loss, or fatigue  EYES: No eye pain, visual disturbances, or discharge  ENMT:  No difficulty hearing, tinnitus, vertigo; No sinus or throat pain  NECK: No pain or stiffness  RESPIRATORY: No cough, wheezing, chills or hemoptysis; No shortness of breath  CARDIOVASCULAR: No chest pain, palpitations, dizziness, or leg swelling  GASTROINTESTINAL: No abdominal or epigastric pain. No nausea, vomiting, or hematemesis; No diarrhea or constipation. No melena or hematochezia.  GENITOURINARY: No dysuria, frequency, hematuria, or incontinence  NEUROLOGICAL: No headaches, memory loss, loss of strength, numbness, or tremors  SKIN: No itching, burning, rashes, or lesions   LYMPH NODES: No enlarged glands  ENDOCRINE: No heat or cold intolerance; No hair loss  MUSCULOSKELETAL: b/l LE edema; cellulitis   PSYCHIATRIC: No depression, anxiety, mood swings, or difficulty sleeping  HEME/LYMPH: No easy bruising, or bleeding gums  ALLERY AND IMMUNOLOGIC: No hives or eczema      T(C): 35.9 (07-22-20 @ 05:33), Max: 37.1 (07-21-20 @ 14:04)  HR: 63 (07-22-20 @ 05:33) (63 - 82)  BP: 160/82 (07-22-20 @ 05:33) (132/77 - 160/82)  RR: 18 (07-22-20 @ 05:33) (18 - 18)  SpO2: --      PHYSICAL EXAM:  GENERAL: NAD, well-groomed, well-developed  HEAD:  Atraumatic, Normocephalic  EYES: EOMI, PERRLA, conjunctiva and sclera clear  ENMT: No tonsillar erythema, exudates, or enlargement; Moist mucous membranes, Good dentition, No lesions  NECK: Supple, No JVD, Normal thyroid  NERVOUS SYSTEM:  Alert & Oriented X3, Good concentration; Motor Strength 5/5 B/L upper and lower extremities; DTRs 2+ intact and symmetric  CHEST/LUNG: Clear to percussion bilaterally; No rales, rhonchi, wheezing, or rubs  HEART: Regular rate and rhythm; No murmurs, rubs, or gallops  ABDOMEN: Soft, Nontender, Nondistended; Bowel sounds present; obese   EXTREMITIES:  b/l LE wrapped   LYMPH: No lymphadenopathy noted  SKIN: No rashes or lesions    Consultant(s) Notes Reviewed:  [x ] YES  [ ] NO  Care Discussed with Consultants/Other Providers [ x] YES  [ ] NO    LAB:                        13.3   6.21  )-----------( 294      ( 22 Jul 2020 07:19 )             43.3       07-21    140  |  102  |  12  ----------------------------<  109<H>  4.1   |  27  |  0.8    Ca    9.4      21 Jul 2020 09:12        Drug Dosing Weight  Height (cm): 200.7 (20 Jul 2020 06:38)  Weight (kg): 235 (20 Jul 2020 06:38)  BMI (kg/m2): 58.3 (20 Jul 2020 06:38)  BSA (m2): 3.42 (20 Jul 2020 06:38)        POCT Blood Glucose.: 106 mg/dL (22 Jul 2020 11:27)  POCT Blood Glucose.: 104 mg/dL (21 Jul 2020 21:23)        RADIOLOGY & ADDITIONAL TESTS:  Imaging Personally Reviewed:  [x] YES  [ ] NO    HEALTH ISSUES - PROBLEM Dx:  Venous stasis: Venous stasis        MEDS:  cefepime   IVPB 2000 milliGRAM(s) IV Intermittent every 12 hours  Dakins Solution - 1/2 Strength 1 Application(s) Topical two times a day  dextrose 40% Gel 15 Gram(s) Oral once PRN  dextrose 5%. 1000 milliLiter(s) IV Continuous <Continuous>  dextrose 50% Injectable 12.5 Gram(s) IV Push once  dextrose 50% Injectable 25 Gram(s) IV Push once  dextrose 50% Injectable 25 Gram(s) IV Push once  diphenhydrAMINE   Elixir 50 milliGRAM(s) Oral once PRN  furosemide    Tablet 20 milliGRAM(s) Oral daily  gabapentin 300 milliGRAM(s) Oral every 8 hours  glucagon  Injectable 1 milliGRAM(s) IntraMuscular once PRN  heparin   Injectable 5000 Unit(s) SubCutaneous every 8 hours  linezolid  IVPB      linezolid  IVPB 600 milliGRAM(s) IV Intermittent every 12 hours  losartan 100 milliGRAM(s) Oral daily  methocarbamol 750 milliGRAM(s) Oral four times a day  morphine ER Tablet 30 milliGRAM(s) Oral two times a day  naproxen 500 milliGRAM(s) Oral two times a day  oxyCODONE IR 10 milliGRAM(s) Oral every 4 hours PRN  pantoprazole Tablet 40 milliGRAM(s) Oral before breakfast  senna 2 Tablet(s) Oral at bedtime  silver sulfADIAZINE 1% Cream 1 Application(s) Topical two times a day

## 2020-07-23 LAB — GLUCOSE BLDC GLUCOMTR-MCNC: 116 MG/DL — HIGH (ref 70–99)

## 2020-07-23 PROCEDURE — 99233 SBSQ HOSP IP/OBS HIGH 50: CPT

## 2020-07-23 RX ADMIN — MORPHINE SULFATE 30 MILLIGRAM(S): 50 CAPSULE, EXTENDED RELEASE ORAL at 05:56

## 2020-07-23 RX ADMIN — LINEZOLID 300 MILLIGRAM(S): 600 INJECTION, SOLUTION INTRAVENOUS at 17:48

## 2020-07-23 RX ADMIN — OXYCODONE HYDROCHLORIDE 10 MILLIGRAM(S): 5 TABLET ORAL at 22:08

## 2020-07-23 RX ADMIN — MORPHINE SULFATE 30 MILLIGRAM(S): 50 CAPSULE, EXTENDED RELEASE ORAL at 17:48

## 2020-07-23 RX ADMIN — Medication 500 MILLIGRAM(S): at 05:18

## 2020-07-23 RX ADMIN — Medication 20 MILLIGRAM(S): at 05:18

## 2020-07-23 RX ADMIN — OXYCODONE HYDROCHLORIDE 10 MILLIGRAM(S): 5 TABLET ORAL at 07:53

## 2020-07-23 RX ADMIN — LINEZOLID 300 MILLIGRAM(S): 600 INJECTION, SOLUTION INTRAVENOUS at 05:27

## 2020-07-23 RX ADMIN — OXYCODONE HYDROCHLORIDE 10 MILLIGRAM(S): 5 TABLET ORAL at 22:38

## 2020-07-23 RX ADMIN — OXYCODONE HYDROCHLORIDE 10 MILLIGRAM(S): 5 TABLET ORAL at 01:58

## 2020-07-23 RX ADMIN — CEFEPIME 100 MILLIGRAM(S): 1 INJECTION, POWDER, FOR SOLUTION INTRAMUSCULAR; INTRAVENOUS at 05:27

## 2020-07-23 RX ADMIN — Medication 1 APPLICATION(S): at 08:53

## 2020-07-23 RX ADMIN — Medication 500 MILLIGRAM(S): at 05:56

## 2020-07-23 RX ADMIN — MORPHINE SULFATE 30 MILLIGRAM(S): 50 CAPSULE, EXTENDED RELEASE ORAL at 05:18

## 2020-07-23 RX ADMIN — METHOCARBAMOL 750 MILLIGRAM(S): 500 TABLET, FILM COATED ORAL at 12:19

## 2020-07-23 RX ADMIN — LOSARTAN POTASSIUM 100 MILLIGRAM(S): 100 TABLET, FILM COATED ORAL at 05:18

## 2020-07-23 RX ADMIN — OXYCODONE HYDROCHLORIDE 10 MILLIGRAM(S): 5 TABLET ORAL at 16:46

## 2020-07-23 RX ADMIN — CEFEPIME 100 MILLIGRAM(S): 1 INJECTION, POWDER, FOR SOLUTION INTRAMUSCULAR; INTRAVENOUS at 17:48

## 2020-07-23 RX ADMIN — OXYCODONE HYDROCHLORIDE 10 MILLIGRAM(S): 5 TABLET ORAL at 08:23

## 2020-07-23 RX ADMIN — OXYCODONE HYDROCHLORIDE 10 MILLIGRAM(S): 5 TABLET ORAL at 12:19

## 2020-07-23 RX ADMIN — OXYCODONE HYDROCHLORIDE 10 MILLIGRAM(S): 5 TABLET ORAL at 12:48

## 2020-07-23 NOTE — PROGRESS NOTE ADULT - SUBJECTIVE AND OBJECTIVE BOX
JOSE ALBERTO NO  54y  Male      Patient is a 54y old Male who presents with a chief complaint of LLE cellulitis (22 Jul 2020 10:40)      INTERVAL HPI/OVERNIGHT EVENTS:  Patient seen and examined earlier this morning.  Sitting at the edge of the bed.  Patient complains of leg pain.  Asking to take a shower.  Ambulating around the hospital.       REVIEW OF SYSTEMS:  CONSTITUTIONAL: No fever, weight loss, or fatigue  EYES: No eye pain, visual disturbances, or discharge  ENMT:  No difficulty hearing, tinnitus, vertigo; No sinus or throat pain  NECK: No pain or stiffness  RESPIRATORY: No cough, wheezing, chills or hemoptysis; No shortness of breath  CARDIOVASCULAR: No chest pain, palpitations, dizziness, or leg swelling  GASTROINTESTINAL: No abdominal or epigastric pain. No nausea, vomiting, or hematemesis; No diarrhea or constipation. No melena or hematochezia.  GENITOURINARY: No dysuria, frequency, hematuria, or incontinence  NEUROLOGICAL: No headaches, memory loss, loss of strength, numbness, or tremors  SKIN: No itching, burning, rashes, or lesions   LYMPH NODES: No enlarged glands  ENDOCRINE: No heat or cold intolerance; No hair loss  MUSCULOSKELETAL: b/l LE edema; cellulitis   PSYCHIATRIC: No depression, anxiety, mood swings, or difficulty sleeping  HEME/LYMPH: No easy bruising, or bleeding gums  ALLERY AND IMMUNOLOGIC: No hives or eczema      Vital Signs Last 24 Hrs  T(C): 35.7 (23 Jul 2020 05:48), Max: 36.4 (22 Jul 2020 14:02)  T(F): 96.3 (23 Jul 2020 05:48), Max: 97.5 (22 Jul 2020 14:02)  HR: 68 (23 Jul 2020 05:48) (68 - 100)  BP: 139/64 (23 Jul 2020 05:48) (138/75 - 143/87)  BP(mean): --  RR: 18 (23 Jul 2020 05:48) (18 - 18)  SpO2: --      PHYSICAL EXAM:  GENERAL: NAD, well-groomed, well-developed  HEAD:  Atraumatic, Normocephalic  EYES: EOMI, PERRLA, conjunctiva and sclera clear  ENMT: No tonsillar erythema, exudates, or enlargement; Moist mucous membranes, Good dentition, No lesions  NECK: Supple, No JVD, Normal thyroid  NERVOUS SYSTEM:  Alert & Oriented X3, Good concentration; Motor Strength 5/5 B/L upper and lower extremities; DTRs 2+ intact and symmetric  CHEST/LUNG: Clear to percussion bilaterally; No rales, rhonchi, wheezing, or rubs  HEART: Regular rate and rhythm; No murmurs, rubs, or gallops  ABDOMEN: Soft, Nontender, Nondistended; Bowel sounds present; obese   EXTREMITIES:  b/l LE wrapped   LYMPH: No lymphadenopathy noted  SKIN: No rashes or lesions    Consultant(s) Notes Reviewed:  [x ] YES  [ ] NO  Care Discussed with Consultants/Other Providers [ x] YES  [ ] NO    LAB:                        13.3   6.21  )-----------( 294      ( 22 Jul 2020 07:19 )             43.3       07-21    140  |  102  |  12  ----------------------------<  109<H>  4.1   |  27  |  0.8    Ca    9.4      21 Jul 2020 09:12        Drug Dosing Weight  Height (cm): 200.7 (20 Jul 2020 06:38)  Weight (kg): 235 (20 Jul 2020 06:38)  BMI (kg/m2): 58.3 (20 Jul 2020 06:38)  BSA (m2): 3.42 (20 Jul 2020 06:38)        CAPILLARY BLOOD GLUCOSE  POCT Blood Glucose.: 116 mg/dL (23 Jul 2020 07:22)  POCT Blood Glucose.: 106 mg/dL (22 Jul 2020 11:27)        RADIOLOGY & ADDITIONAL TESTS:  Imaging Personally Reviewed:  [x] YES  [ ] NO    HEALTH ISSUES - PROBLEM Dx:  Venous stasis: Venous stasis      MEDICATIONS  (STANDING):  cefepime   IVPB 2000 milliGRAM(s) IV Intermittent every 12 hours  Dakins Solution - 1/2 Strength 1 Application(s) Topical two times a day  dextrose 5%. 1000 milliLiter(s) (50 mL/Hr) IV Continuous <Continuous>  dextrose 50% Injectable 12.5 Gram(s) IV Push once  dextrose 50% Injectable 25 Gram(s) IV Push once  dextrose 50% Injectable 25 Gram(s) IV Push once  furosemide    Tablet 20 milliGRAM(s) Oral daily  gabapentin 300 milliGRAM(s) Oral every 8 hours  heparin   Injectable 5000 Unit(s) SubCutaneous every 8 hours  linezolid  IVPB      linezolid  IVPB 600 milliGRAM(s) IV Intermittent every 12 hours  losartan 100 milliGRAM(s) Oral daily  methocarbamol 750 milliGRAM(s) Oral four times a day  morphine ER Tablet 30 milliGRAM(s) Oral two times a day  naproxen 500 milliGRAM(s) Oral two times a day  pantoprazole    Tablet 40 milliGRAM(s) Oral before breakfast  senna 2 Tablet(s) Oral at bedtime  silver sulfADIAZINE 1% Cream 1 Application(s) Topical two times a day    MEDICATIONS  (PRN):  dextrose 40% Gel 15 Gram(s) Oral once PRN Blood Glucose LESS THAN 70 milliGRAM(s)/deciliter  diphenhydrAMINE   Elixir 50 milliGRAM(s) Oral once PRN Itching  glucagon  Injectable 1 milliGRAM(s) IntraMuscular once PRN Glucose LESS THAN 70 milligrams/deciliter  oxyCODONE    IR 10 milliGRAM(s) Oral every 4 hours PRN Moderate Pain (4 - 6)

## 2020-07-23 NOTE — PROGRESS NOTE ADULT - ASSESSMENT
Patient is a 54 year old male with PMH of DM2, HTN, obesity, RUSSELL, previous MRSA infection 2/2 spinal surgery presenting to ED with 3 weeks of increasing LLE erythema and edema.     #Cellulitis of LLE  - failed outpatient zyvox and clinda   - ID following - currently on Zyvox and cefepime - await follow up for oral abx regimen   - ID recommends holding off on CT LE as pt is improving  - continue local wound care  - elevate legs when possible  - wound care consult appreciated      #RUSSELL on cpap at night    #morbid obesity  -diet and lifestyle modification    # DM II, well controlled   - holding home metformin and Jardiance   - finger sticks well controlled with diet alone     # HTN - well controlled   - continue losartan and furosemide    # degenerative disc disease/history of spinal surgery   - home pain regimen is percocet 10mg q6 and Morphine ER 15mg BID per iSTOP  - pt with continued pain and discomfort in hospital   - continue Oxycodone 10mg q4 prn and Morphine ER 30mg BID   - patient follows with pain management - Dr. Lombardo's office       Progress Note Handoff  Pending Consults: none   Pending Tests: none  Pending Results: none  Family Discussion: discussed wound care, IV abx and ID follow up with pt - in agreement with plan of care   Disposition: Home__x___/SNF______/Other_____/Unknown at this time_____    Please call me with any questions at extension [phone] This is a former 38 1/7 week female infant now 3 days old with LGA, hypoglycemia, nutritional needs, and a murmur. Infant stable breathing in room air. No noted episodes of apnea, bradycardia or desaturation. 6/8 ECHO significant for PDA with left to right shunting and ASD; will need f/u outpatient. Infant euglycemic feeding EBM/Sim19 PO ad andres taking 30-40mL q3hrs, and supplemented with D15W TPN/IL via central UVC for TF of ~110mL/kg/day. Voiding and stooling.

## 2020-07-23 NOTE — PROGRESS NOTE ADULT - SUBJECTIVE AND OBJECTIVE BOX
Patient is seen and examined at the bed side, is afebrile.         REVIEW OF SYSTEMS: All other review systems are negative        ALLERGIES: IV Contrast (Sneezing (Mod to Severe))  penicillin (Unknown)        Vital Signs Last 24 Hrs  T(C): 35.7 (23 Jul 2020 05:48), Max: 36.4 (22 Jul 2020 14:02)  T(F): 96.3 (23 Jul 2020 05:48), Max: 97.5 (22 Jul 2020 14:02)  HR: 68 (23 Jul 2020 05:48) (68 - 100)  BP: 139/64 (23 Jul 2020 05:48) (138/75 - 143/87)  BP(mean): --  RR: 18 (23 Jul 2020 05:48) (18 - 18)  SpO2: --        PHYSICAL EXAM:  GENERAL: Not in distress   CHEST/LUNG:  Not using accessory muscles   HEART: s1 and s2 present  ABDOMEN:  Nontender and  Nondistended  EXTREMITIES: Left LE swollen, tender and erythematous with a purulent ulcer  CNS: Awake and Alert        LABS:                        13.3   6.21  )-----------( 294      ( 22 Jul 2020 07:19 )             43.3                           13.6   8.65  )-----------( 330      ( 19 Jul 2020 12:24 )             43.7           07-19    140  |  103  |  14  ----------------------------<  114<H>  4.5   |  27  |  0.8    Ca    9.8      19 Jul 2020 12:24    TPro  7.4  /  Alb  4.1  /  TBili  0.3  /  DBili  x   /  AST  18  /  ALT  14  /  AlkPhos  80  07-19    PT/INR - ( 19 Jul 2020 12:24 )   PT: 12.60 sec;   INR: 1.10 ratio     PTT - ( 19 Jul 2020 12:24 )  PTT:32.3 sec        CAPILLARY BLOOD GLUCOSE  POCT Blood Glucose.: 114 mg/dL (20 Jul 2020 07:48)  POCT Blood Glucose.: 118 mg/dL (19 Jul 2020 21:21)        MEDICATIONS  (STANDING):    cefepime   IVPB 2000 milliGRAM(s) IV Intermittent every 12 hours  Dakins Solution - 1/2 Strength 1 Application(s) Topical two times a day  dextrose 5%. 1000 milliLiter(s) (50 mL/Hr) IV Continuous <Continuous>  dextrose 50% Injectable 12.5 Gram(s) IV Push once  dextrose 50% Injectable 25 Gram(s) IV Push once  dextrose 50% Injectable 25 Gram(s) IV Push once  furosemide    Tablet 20 milliGRAM(s) Oral daily  gabapentin 300 milliGRAM(s) Oral every 8 hours  heparin   Injectable 5000 Unit(s) SubCutaneous every 8 hours  linezolid  IVPB      linezolid  IVPB 600 milliGRAM(s) IV Intermittent every 12 hours  losartan 100 milliGRAM(s) Oral daily  methocarbamol 750 milliGRAM(s) Oral four times a day  morphine ER Tablet 30 milliGRAM(s) Oral two times a day  naproxen 500 milliGRAM(s) Oral two times a day  pantoprazole    Tablet 40 milliGRAM(s) Oral before breakfast  senna 2 Tablet(s) Oral at bedtime  silver sulfADIAZINE 1% Cream 1 Application(s) Topical two times a day          RADIOLOGY & ADDITIONAL TESTS:      None yet        MICROBIOLOGY DATA:    Culture - Other (07.19.20 @ 14:28)    -  Gentamicin: S <=2    -  Imipenem: S <=1    -  Levofloxacin: S 2    -  Meropenem: S <=1    -  Piperacillin/Tazobactam: S <=8    -  Tobramycin: S <=2    -  Amikacin: S <=16    -  Aztreonam: S <=4    -  Cefepime: S <=2    -  Ceftazidime: S <=1    -  Ciprofloxacin: I 1    Specimen Source: .Other left leg wound    Culture Results:   Numerous Pseudomonas aeruginosa    Organism Identification: Pseudomonas aeruginosa    Organism: Pseudomonas aeruginosa    Method Type: JAYCOB      COVID-19 PCR . (07.19.20 @ 13:01)    COVID-19 PCR: NotDetec: Methodology:  The Abbott Real Time SARS-CoV-2 assay is a real time reverse  transcriptase (RT) Polymerase Chain Reaction (PCR), test intended for the  qualitative detection of RNA from the SARS-CoV-2 in nasopharyngeal and  oropharyngeal swabs from patients suspected of COVID-19 infection.  The SARS-CoV-2 assay is performed on the Abbott m2000 system.  Reference Range:  Not Detected  This test has been validated by [facility]  Molecular lab. This assay is for in Vitro diagnostic testing under FDA  Emergency Use Authorization only.  This Test Was Performed At [facility], Molecular Lab,  [place], [place] [address] Patient is seen and examined at the bed side, is afebrile.  He is doing better, The LLE swelling, redness and tenderness has improved.         REVIEW OF SYSTEMS: All other review systems are negative        ALLERGIES: IV Contrast (Sneezing (Mod to Severe))  penicillin (Unknown)        Vital Signs Last 24 Hrs  T(C): 35.7 (23 Jul 2020 05:48), Max: 36.4 (22 Jul 2020 14:02)  T(F): 96.3 (23 Jul 2020 05:48), Max: 97.5 (22 Jul 2020 14:02)  HR: 68 (23 Jul 2020 05:48) (68 - 100)  BP: 139/64 (23 Jul 2020 05:48) (138/75 - 143/87)  BP(mean): --  RR: 18 (23 Jul 2020 05:48) (18 - 18)  SpO2: --        PHYSICAL EXAM:  GENERAL: Not in distress   CHEST/LUNG:  Not using accessory muscles   HEART: s1 and s2 present  ABDOMEN:  Nontender and  Nondistended  EXTREMITIES: Left LE swelling,  tender and erythema with improvement   CNS: Awake and Alert        LABS:                        13.3   6.21  )-----------( 294      ( 22 Jul 2020 07:19 )             43.3                           13.6   8.65  )-----------( 330      ( 19 Jul 2020 12:24 )             43.7           07-19    140  |  103  |  14  ----------------------------<  114<H>  4.5   |  27  |  0.8    Ca    9.8      19 Jul 2020 12:24    TPro  7.4  /  Alb  4.1  /  TBili  0.3  /  DBili  x   /  AST  18  /  ALT  14  /  AlkPhos  80  07-19    PT/INR - ( 19 Jul 2020 12:24 )   PT: 12.60 sec;   INR: 1.10 ratio     PTT - ( 19 Jul 2020 12:24 )  PTT:32.3 sec        CAPILLARY BLOOD GLUCOSE  POCT Blood Glucose.: 114 mg/dL (20 Jul 2020 07:48)  POCT Blood Glucose.: 118 mg/dL (19 Jul 2020 21:21)        MEDICATIONS  (STANDING):    cefepime   IVPB 2000 milliGRAM(s) IV Intermittent every 12 hours  Dakins Solution - 1/2 Strength 1 Application(s) Topical two times a day  dextrose 5%. 1000 milliLiter(s) (50 mL/Hr) IV Continuous <Continuous>  dextrose 50% Injectable 12.5 Gram(s) IV Push once  dextrose 50% Injectable 25 Gram(s) IV Push once  dextrose 50% Injectable 25 Gram(s) IV Push once  furosemide    Tablet 20 milliGRAM(s) Oral daily  gabapentin 300 milliGRAM(s) Oral every 8 hours  heparin   Injectable 5000 Unit(s) SubCutaneous every 8 hours  linezolid  IVPB      linezolid  IVPB 600 milliGRAM(s) IV Intermittent every 12 hours  losartan 100 milliGRAM(s) Oral daily  methocarbamol 750 milliGRAM(s) Oral four times a day  morphine ER Tablet 30 milliGRAM(s) Oral two times a day  naproxen 500 milliGRAM(s) Oral two times a day  pantoprazole    Tablet 40 milliGRAM(s) Oral before breakfast  senna 2 Tablet(s) Oral at bedtime  silver sulfADIAZINE 1% Cream 1 Application(s) Topical two times a day          RADIOLOGY & ADDITIONAL TESTS:      None yet        MICROBIOLOGY DATA:    Culture - Other (07.19.20 @ 14:28)    -  Gentamicin: S <=2    -  Imipenem: S <=1    -  Levofloxacin: S 2    -  Meropenem: S <=1    -  Piperacillin/Tazobactam: S <=8    -  Tobramycin: S <=2    -  Amikacin: S <=16    -  Aztreonam: S <=4    -  Cefepime: S <=2    -  Ceftazidime: S <=1    -  Ciprofloxacin: I 1    Specimen Source: .Other left leg wound    Culture Results:   Numerous Pseudomonas aeruginosa    Organism Identification: Pseudomonas aeruginosa    Organism: Pseudomonas aeruginosa    Method Type: JAYCOB      COVID-19 PCR . (07.19.20 @ 13:01)    COVID-19 PCR: NotDetec: Methodology:  The Abbott Real Time SARS-CoV-2 assay is a real time reverse  transcriptase (RT) Polymerase Chain Reaction (PCR), test intended for the  qualitative detection of RNA from the SARS-CoV-2 in nasopharyngeal and  oropharyngeal swabs from patients suspected of COVID-19 infection.  The SARS-CoV-2 assay is performed on the Abbott m2000 system.  Reference Range:  Not Detected  This test has been validated by [facility]  Molecular lab. This assay is for in Vitro diagnostic testing under FDA  Emergency Use Authorization only.  This Test Was Performed At [facility], Molecular Lab,  [place], [place] [address]

## 2020-07-23 NOTE — PROGRESS NOTE ADULT - ASSESSMENT
Patient is a 54y old  Male  with PMH of DM2, HTN, Morbidly obese,  RUSSELL, previous MRSA infection  of pain stimulator, s/p  spinal surgery, now  presents to the ER for evaluation of 3 weeks of increasing LLE erythema and edema. He failed outpatient oral Zyvox and Clindamycin.  He has a superficial ulcer from where the erythema and edema originate that began last Summer and has not healed. He has no fever and chills. On admission, he found to have no fever or Leukocytosis. He has started on IV Vancomycin and the ID consult requested to assist with further evaluation and antibiotic management.     # LLE cellulitis  # Morbidly obese  # H/O spinal pain stimulator infection with OM    would recommend:    1. Continue Cefepime and  Linezolid since it would be difficult to dose Vancomycin in an obese patient   2. Keep LLE elevated  3. Wound care    Attending Attestation:    Spent more than 45 minutes on total encounter, more than 50 % of the visit was spent counseling and/or coordinating care by the Attending physician. Patient is a 54y old  Male  with PMH of DM2, HTN, Morbidly obese,  RUSSELL, previous MRSA infection  of pain stimulator, s/p  spinal surgery, now  presents to the ER for evaluation of 3 weeks of increasing LLE erythema and edema. He failed outpatient oral Zyvox and Clindamycin.  He has a superficial ulcer from where the erythema and edema originate that began last Summer and has not healed. He has no fever and chills. On admission, he found to have no fever or Leukocytosis. He has started on IV Vancomycin and the ID consult requested to assist with further evaluation and antibiotic management.     # LLE cellulitis  # Morbidly obese  # H/O spinal pain stimulator infection with OM    would recommend:    1. Continue Cefepime and  Discontinue Linezolid since No evidence of MRSA  2. Keep LLE elevated  3. Wound care    Attending Attestation:    Spent more than 45 minutes on total encounter, more than 50 % of the visit was spent counseling and/or coordinating care by the Attending physician.

## 2020-07-24 ENCOUNTER — TRANSCRIPTION ENCOUNTER (OUTPATIENT)
Age: 55
End: 2020-07-24

## 2020-07-24 LAB — GLUCOSE BLDC GLUCOMTR-MCNC: 117 MG/DL — HIGH (ref 70–99)

## 2020-07-24 PROCEDURE — 99233 SBSQ HOSP IP/OBS HIGH 50: CPT

## 2020-07-24 RX ORDER — MORPHINE SULFATE 50 MG/1
2 CAPSULE, EXTENDED RELEASE ORAL ONCE
Refills: 0 | Status: DISCONTINUED | OUTPATIENT
Start: 2020-07-24 | End: 2020-07-24

## 2020-07-24 RX ORDER — CIPROFLOXACIN LACTATE 400MG/40ML
1 VIAL (ML) INTRAVENOUS
Qty: 6 | Refills: 0
Start: 2020-07-24 | End: 2020-07-29

## 2020-07-24 RX ADMIN — Medication 500 MILLIGRAM(S): at 05:37

## 2020-07-24 RX ADMIN — OXYCODONE HYDROCHLORIDE 10 MILLIGRAM(S): 5 TABLET ORAL at 16:46

## 2020-07-24 RX ADMIN — LOSARTAN POTASSIUM 100 MILLIGRAM(S): 100 TABLET, FILM COATED ORAL at 05:36

## 2020-07-24 RX ADMIN — Medication 500 MILLIGRAM(S): at 06:07

## 2020-07-24 RX ADMIN — OXYCODONE HYDROCHLORIDE 10 MILLIGRAM(S): 5 TABLET ORAL at 21:51

## 2020-07-24 RX ADMIN — OXYCODONE HYDROCHLORIDE 10 MILLIGRAM(S): 5 TABLET ORAL at 06:12

## 2020-07-24 RX ADMIN — OXYCODONE HYDROCHLORIDE 10 MILLIGRAM(S): 5 TABLET ORAL at 13:03

## 2020-07-24 RX ADMIN — Medication 1 APPLICATION(S): at 17:32

## 2020-07-24 RX ADMIN — OXYCODONE HYDROCHLORIDE 10 MILLIGRAM(S): 5 TABLET ORAL at 02:09

## 2020-07-24 RX ADMIN — OXYCODONE HYDROCHLORIDE 10 MILLIGRAM(S): 5 TABLET ORAL at 12:33

## 2020-07-24 RX ADMIN — MORPHINE SULFATE 30 MILLIGRAM(S): 50 CAPSULE, EXTENDED RELEASE ORAL at 05:36

## 2020-07-24 RX ADMIN — OXYCODONE HYDROCHLORIDE 10 MILLIGRAM(S): 5 TABLET ORAL at 02:39

## 2020-07-24 RX ADMIN — CEFEPIME 100 MILLIGRAM(S): 1 INJECTION, POWDER, FOR SOLUTION INTRAMUSCULAR; INTRAVENOUS at 05:37

## 2020-07-24 RX ADMIN — LINEZOLID 300 MILLIGRAM(S): 600 INJECTION, SOLUTION INTRAVENOUS at 05:02

## 2020-07-24 RX ADMIN — Medication 1 APPLICATION(S): at 05:37

## 2020-07-24 RX ADMIN — Medication 1 APPLICATION(S): at 17:31

## 2020-07-24 RX ADMIN — Medication 20 MILLIGRAM(S): at 05:36

## 2020-07-24 RX ADMIN — OXYCODONE HYDROCHLORIDE 10 MILLIGRAM(S): 5 TABLET ORAL at 06:42

## 2020-07-24 RX ADMIN — MORPHINE SULFATE 30 MILLIGRAM(S): 50 CAPSULE, EXTENDED RELEASE ORAL at 18:00

## 2020-07-24 RX ADMIN — CEFEPIME 100 MILLIGRAM(S): 1 INJECTION, POWDER, FOR SOLUTION INTRAMUSCULAR; INTRAVENOUS at 17:30

## 2020-07-24 RX ADMIN — METHOCARBAMOL 750 MILLIGRAM(S): 500 TABLET, FILM COATED ORAL at 17:31

## 2020-07-24 RX ADMIN — MORPHINE SULFATE 2 MILLIGRAM(S): 50 CAPSULE, EXTENDED RELEASE ORAL at 08:25

## 2020-07-24 RX ADMIN — OXYCODONE HYDROCHLORIDE 10 MILLIGRAM(S): 5 TABLET ORAL at 21:21

## 2020-07-24 RX ADMIN — MORPHINE SULFATE 30 MILLIGRAM(S): 50 CAPSULE, EXTENDED RELEASE ORAL at 06:06

## 2020-07-24 RX ADMIN — MORPHINE SULFATE 2 MILLIGRAM(S): 50 CAPSULE, EXTENDED RELEASE ORAL at 08:10

## 2020-07-24 RX ADMIN — OXYCODONE HYDROCHLORIDE 10 MILLIGRAM(S): 5 TABLET ORAL at 17:16

## 2020-07-24 RX ADMIN — MORPHINE SULFATE 30 MILLIGRAM(S): 50 CAPSULE, EXTENDED RELEASE ORAL at 17:30

## 2020-07-24 NOTE — PROGRESS NOTE ADULT - SUBJECTIVE AND OBJECTIVE BOX
ONEALJOSE ALBERTO  54y, Male  Allergy: IV Contrast (Sneezing (Mod to Severe))  penicillin (Unknown)      LOS  5d    CHIEF COMPLAINT: LLE cellulitis (23 Jul 2020 09:45)      INTERVAL EVENTS/HPI  - No acute events overnight  - T(F): , Max: 99.3 (07-23-20 @ 14:02)  - Denies any worsening symptoms  - Tolerating medication  - WBC Count: 6.21 (07-22-20 @ 07:19)     -      ROS  General: Denies rigors, nightsweats  HEENT: Denies headache, rhinorrhea, sore throat, eye pain  CV: Denies CP, palpitations  PULM: Denies wheezing, hemoptysis  GI: Denies hematemesis, hematochezia, melena  : Denies discharge, hematuria  MSK: Denies arthralgias, myalgias  SKIN: Denies rash, lesions  NEURO: Denies paresthesias, weakness  PSYCH: Denies depression, anxiety    VITALS:  T(F): 97.1, Max: 99.3 (07-23-20 @ 14:02)  HR: 65  BP: 131/67  RR: 18Vital Signs Last 24 Hrs  T(C): 36.2 (24 Jul 2020 05:47), Max: 37.4 (23 Jul 2020 14:02)  T(F): 97.1 (24 Jul 2020 05:47), Max: 99.3 (23 Jul 2020 14:02)  HR: 65 (24 Jul 2020 05:47) (65 - 87)  BP: 131/67 (24 Jul 2020 05:47) (131/67 - 159/78)  BP(mean): --  RR: 18 (24 Jul 2020 05:47) (18 - 18)  SpO2: 99% (23 Jul 2020 19:42) (99% - 99%)    PHYSICAL EXAM:  Gen: NAD, resting in bed  HEENT: Normocephalic, atraumatic  Neck: supple, no lymphadenopathy  CV: Regular rate & regular rhythm  Lungs: decreased BS at bases, no fremitus  Abdomen: Soft, BS present  Ext: Warm, well perfused  Neuro: non focal, awake  Skin: no rash, no erythema  Lines: no phlebitis    FH: Non-contributory  Social Hx: Non-contributory    TESTS & MEASUREMENTS:                  Culture - Other (collected 07-19-20 @ 14:28)  Source: .Other left leg wound  Final Report (07-21-20 @ 13:49):    Numerous Pseudomonas aeruginosa  Organism: Pseudomonas aeruginosa (07-21-20 @ 13:49)  Organism: Pseudomonas aeruginosa (07-21-20 @ 13:49)      -  Amikacin: S <=16      -  Aztreonam: S <=4      -  Cefepime: S <=2      -  Ceftazidime: S <=1      -  Ciprofloxacin: I 1      -  Gentamicin: S <=2      -  Imipenem: S <=1      -  Levofloxacin: S 2      -  Meropenem: S <=1      -  Piperacillin/Tazobactam: S <=8      -  Tobramycin: S <=2      Method Type: JAYCOB    Culture - Blood (collected 06-28-20 @ 14:18)  Source: .Blood Blood-Peripheral  Final Report (07-03-20 @ 20:00):    No Growth Final            INFECTIOUS DISEASES TESTING  COVID-19 PCR: NotDetec (07-19-20 @ 13:01)  COVID-19 PCR: NotDetec (06-28-20 @ 17:53)  Procalcitonin, Serum: 2.76 (04-23-20 @ 06:14)  COVID-19 PCR: NotDetec (04-21-20 @ 16:30)  Procalcitonin, Serum: 7.05 (04-21-20 @ 15:30)      INFLAMMATORY MARKERS  C-Reactive Protein, Serum: 17.37 mg/dL (04-28-20 @ 05:14)  Sedimentation Rate, Erythrocyte: 10 mm/Hr (04-28-20 @ 05:14)  C-Reactive Protein, Serum: 34.38 mg/dL (04-23-20 @ 06:14)  C-Reactive Protein, Serum: 33.52 mg/dL (04-22-20 @ 07:19)      RADIOLOGY & ADDITIONAL TESTS:  I have personally reviewed the last available Chest xray  CXR      CT      CARDIOLOGY TESTING  12 Lead ECG:   Ventricular Rate 84 BPM    Atrial Rate 84 BPM    P-R Interval 238 ms    QRS Duration 80 ms    Q-T Interval 376 ms    QTC Calculation(Bezet) 444 ms    P Axis 55 degrees    R Axis 52 degrees    T Axis 31 degrees    Diagnosis Line Sinus rhythm with 1st degree A-V block  Septal infarct , age undetermined  Abnormal ECG    Confirmed by CHARISMA MORTON MD ([id number]) on 7/20/2020 11:15:41 AM (07-19-20 @ 12:55)      MEDICATIONS  cefepime   IVPB 2000 IV Intermittent every 12 hours  Dakins Solution - 1/2 Strength 1 Topical two times a day  dextrose 5%. 1000 IV Continuous <Continuous>  dextrose 50% Injectable 12.5 IV Push once  dextrose 50% Injectable 25 IV Push once  dextrose 50% Injectable 25 IV Push once  furosemide    Tablet 20 Oral daily  gabapentin 300 Oral every 8 hours  heparin   Injectable 5000 SubCutaneous every 8 hours  losartan 100 Oral daily  methocarbamol 750 Oral four times a day  morphine ER Tablet 30 Oral two times a day  naproxen 500 Oral two times a day  pantoprazole    Tablet 40 Oral before breakfast  senna 2 Oral at bedtime  silver sulfADIAZINE 1% Cream 1 Topical two times a day      WEIGHT  Weight (kg): 235 (07-20-20 @ 06:38)      ANTIBIOTICS:  cefepime   IVPB 2000 milliGRAM(s) IV Intermittent every 12 hours      All available historical records have been reviewed JOSE ALBERTO NO  54y, Male  Allergy: IV Contrast (Sneezing (Mod to Severe))  penicillin (Unknown)      LOS  5d    CHIEF COMPLAINT: LLE cellulitis (23 Jul 2020 09:45)      INTERVAL EVENTS/HPI  - No acute events overnight; feels thighs are less swollen; ambulating more easily  - some pain by anterior shin left leg, improving  - T(F): , Max: 99.3 (07-23-20 @ 14:02)  - Denies any worsening symptoms  - Tolerating medication  - WBC Count: 6.21 (07-22-20 @ 07:19)     -      ROS  General: Denies rigors, nightsweats  HEENT: Denies headache, rhinorrhea, sore throat, eye pain  CV: Denies CP, palpitations  PULM: Denies wheezing, hemoptysis  GI: Denies hematemesis, hematochezia, melena  : Denies discharge, hematuria  MSK: Denies arthralgias, myalgias  SKIN: Denies rash, lesions  NEURO: Denies paresthesias, weakness  PSYCH: Denies depression, anxiety    VITALS:  T(F): 97.1, Max: 99.3 (07-23-20 @ 14:02)  HR: 65  BP: 131/67  RR: 18Vital Signs Last 24 Hrs  T(C): 36.2 (24 Jul 2020 05:47), Max: 37.4 (23 Jul 2020 14:02)  T(F): 97.1 (24 Jul 2020 05:47), Max: 99.3 (23 Jul 2020 14:02)  HR: 65 (24 Jul 2020 05:47) (65 - 87)  BP: 131/67 (24 Jul 2020 05:47) (131/67 - 159/78)  BP(mean): --  RR: 18 (24 Jul 2020 05:47) (18 - 18)  SpO2: 99% (23 Jul 2020 19:42) (99% - 99%)    PHYSICAL EXAM:  Gen: NAD, resting in bed  HEENT: Normocephalic, atraumatic  Neck: supple, no lymphadenopathy  CV: Regular rate & regular rhythm  Lungs: decreased BS at bases, no fremitus  Abdomen: Soft, BS present  Ext: left anterior ulcer with granulating tissue, some areas of firm skin  Neuro: non focal, awake  Skin: no rash, no erythema  Lines: no phlebitis    FH: Non-contributory  Social Hx: Non-contributory    TESTS & MEASUREMENTS:                  Culture - Other (collected 07-19-20 @ 14:28)  Source: .Other left leg wound  Final Report (07-21-20 @ 13:49):    Numerous Pseudomonas aeruginosa  Organism: Pseudomonas aeruginosa (07-21-20 @ 13:49)  Organism: Pseudomonas aeruginosa (07-21-20 @ 13:49)      -  Amikacin: S <=16      -  Aztreonam: S <=4      -  Cefepime: S <=2      -  Ceftazidime: S <=1      -  Ciprofloxacin: I 1      -  Gentamicin: S <=2      -  Imipenem: S <=1      -  Levofloxacin: S 2      -  Meropenem: S <=1      -  Piperacillin/Tazobactam: S <=8      -  Tobramycin: S <=2      Method Type: JAYCOB    Culture - Blood (collected 06-28-20 @ 14:18)  Source: .Blood Blood-Peripheral  Final Report (07-03-20 @ 20:00):    No Growth Final            INFECTIOUS DISEASES TESTING  COVID-19 PCR: NotDetec (07-19-20 @ 13:01)  COVID-19 PCR: NotDetec (06-28-20 @ 17:53)  Procalcitonin, Serum: 2.76 (04-23-20 @ 06:14)  COVID-19 PCR: NotDetec (04-21-20 @ 16:30)  Procalcitonin, Serum: 7.05 (04-21-20 @ 15:30)      INFLAMMATORY MARKERS  C-Reactive Protein, Serum: 17.37 mg/dL (04-28-20 @ 05:14)  Sedimentation Rate, Erythrocyte: 10 mm/Hr (04-28-20 @ 05:14)  C-Reactive Protein, Serum: 34.38 mg/dL (04-23-20 @ 06:14)  C-Reactive Protein, Serum: 33.52 mg/dL (04-22-20 @ 07:19)      RADIOLOGY & ADDITIONAL TESTS:  I have personally reviewed the last available Chest xray  CXR      CT      CARDIOLOGY TESTING  12 Lead ECG:   Ventricular Rate 84 BPM    Atrial Rate 84 BPM    P-R Interval 238 ms    QRS Duration 80 ms    Q-T Interval 376 ms    QTC Calculation(Bezet) 444 ms    P Axis 55 degrees    R Axis 52 degrees    T Axis 31 degrees    Diagnosis Line Sinus rhythm with 1st degree A-V block  Septal infarct , age undetermined  Abnormal ECG    Confirmed by CHARISMA MORTON MD ([id number]) on 7/20/2020 11:15:41 AM (07-19-20 @ 12:55)      MEDICATIONS  cefepime   IVPB 2000 IV Intermittent every 12 hours  Dakins Solution - 1/2 Strength 1 Topical two times a day  dextrose 5%. 1000 IV Continuous <Continuous>  dextrose 50% Injectable 12.5 IV Push once  dextrose 50% Injectable 25 IV Push once  dextrose 50% Injectable 25 IV Push once  furosemide    Tablet 20 Oral daily  gabapentin 300 Oral every 8 hours  heparin   Injectable 5000 SubCutaneous every 8 hours  losartan 100 Oral daily  methocarbamol 750 Oral four times a day  morphine ER Tablet 30 Oral two times a day  naproxen 500 Oral two times a day  pantoprazole    Tablet 40 Oral before breakfast  senna 2 Oral at bedtime  silver sulfADIAZINE 1% Cream 1 Topical two times a day      WEIGHT  Weight (kg): 235 (07-20-20 @ 06:38)      ANTIBIOTICS:  cefepime   IVPB 2000 milliGRAM(s) IV Intermittent every 12 hours      All available historical records have been reviewed

## 2020-07-24 NOTE — DISCHARGE NOTE PROVIDER - CARE PROVIDER_API CALL
Felix Summers   MEDICINE  [address] [address]  Phone: [phone]  Fax: [phone]  Follow Up Time: 1 week Felix Summers [facility]  [address] [address]  Phone: [phone]  Fax: [phone]  Follow Up Time: 1 week    pain management,   Phone: (   )    -  Fax: (   )    -  Follow Up Time: 1 week

## 2020-07-24 NOTE — DISCHARGE NOTE PROVIDER - NSDCCPCAREPLAN_GEN_ALL_CORE_FT
PRINCIPAL DISCHARGE DIAGNOSIS  Diagnosis: Cellulitis  Assessment and Plan of Treatment: .  failed outpatient antibiotic therapy, was treated with IV antibiotics  antibiotics per ID:  follow up with podiatry and PMD PRINCIPAL DISCHARGE DIAGNOSIS  Diagnosis: Cellulitis  Assessment and Plan of Treatment: .  failed outpatient antibiotic therapy, was treated with IV antibiotics  antibiotics per ID:  Levaquin 750mg orally once a day for 6 days  follow up with podiatry and PMD  s/p wound care eval - Irrigate wound with Dakins solution then apply silvadine with non adhering dressing with ace wrap every 12 hours   skin care  pressure ulder preventive measures

## 2020-07-24 NOTE — PROGRESS NOTE ADULT - ASSESSMENT
Patient is a 54 year old male with PMH of DM2, HTN, obesity, RUSSELL, previous MRSA infection 2/2 spinal surgery presenting to ED with 3 weeks of increasing LLE erythema and edema.     #Cellulitis of LLE  - failed outpatient zyvox and clinda   - ID following - currently on cefepime - recommends discharging on oral cipro 750mg po q12 until 7/30  - ID recommened holding off on CT LE as pt is improving clinically   - continue local wound care; home care upon d/c  - elevate legs when possible  - wound care consult appreciated      #RUSSELL on cpap at night    #morbid obesity  -diet and lifestyle modification    # DM II, well controlled   - holding home metformin and Jardiance   - finger sticks well controlled with diet alone     # HTN - well controlled   - continue losartan and furosemide    # degenerative disc disease/history of spinal surgery   - home pain regimen is percocet 10mg q6 and Morphine ER 15mg BID per iSTOP  - pt with continued pain and discomfort in hospital   - continue Oxycodone 10mg q4 prn and Morphine ER 30mg BID   - patient follows with pain management - Dr. Lombardo's office       Progress Note Handoff  Pending Consults: none   Pending Tests: none  Pending Results: none  Family Discussion: discussed wound care, IV abx and ID follow up with pt - in agreement with plan of care - anticipate d/c in am  Disposition: Home__x___/SNF______/Other_____/Unknown at this time_____    Please call me with any questions at extension [phone]

## 2020-07-24 NOTE — PROGRESS NOTE ADULT - REASON FOR ADMISSION
LLE cellulitis

## 2020-07-24 NOTE — PROGRESS NOTE ADULT - SUBJECTIVE AND OBJECTIVE BOX
JOSE ALBERTO NO  54y  Male      Patient is a 54y old Male who presents with a chief complaint of LLE cellulitis (22 Jul 2020 10:40)      INTERVAL HPI/OVERNIGHT EVENTS:  Patient seen and examined earlier this morning.  Sitting at the edge of the bed.  Patient complains of leg pain for which he received 2mg of IV morphine this morning.  Ambulating independently around the hospital.   Anticipated for d/c in am      REVIEW OF SYSTEMS:  CONSTITUTIONAL: No fever, weight loss, or fatigue  EYES: No eye pain, visual disturbances, or discharge  ENMT:  No difficulty hearing, tinnitus, vertigo; No sinus or throat pain  NECK: No pain or stiffness  RESPIRATORY: No cough, wheezing, chills or hemoptysis; No shortness of breath  CARDIOVASCULAR: No chest pain, palpitations, dizziness, or leg swelling  GASTROINTESTINAL: No abdominal or epigastric pain. No nausea, vomiting, or hematemesis; No diarrhea or constipation. No melena or hematochezia.  GENITOURINARY: No dysuria, frequency, hematuria, or incontinence  NEUROLOGICAL: No headaches, memory loss, loss of strength, numbness, or tremors  SKIN: No itching, burning, rashes, or lesions   LYMPH NODES: No enlarged glands  ENDOCRINE: No heat or cold intolerance; No hair loss  MUSCULOSKELETAL: b/l LE edema; cellulitis (healing)  PSYCHIATRIC: No depression, anxiety, mood swings, or difficulty sleeping  HEME/LYMPH: No easy bruising, or bleeding gums  ALLERY AND IMMUNOLOGIC: No hives or eczema      Vital Signs Last 24 Hrs  T(C): 36.2 (24 Jul 2020 05:47), Max: 37.4 (23 Jul 2020 14:02)  T(F): 97.1 (24 Jul 2020 05:47), Max: 99.3 (23 Jul 2020 14:02)  HR: 65 (24 Jul 2020 05:47) (65 - 87)  BP: 131/67 (24 Jul 2020 05:47) (131/67 - 159/78)  BP(mean): --  RR: 18 (24 Jul 2020 05:47) (18 - 18)  SpO2: 99% (23 Jul 2020 19:42) (99% - 99%)      PHYSICAL EXAM:  GENERAL: NAD, well-groomed, well-developed  HEAD:  Atraumatic, Normocephalic  EYES: EOMI, PERRLA, conjunctiva and sclera clear  ENMT: No tonsillar erythema, exudates, or enlargement; Moist mucous membranes, Good dentition, No lesions  NECK: Supple, No JVD, Normal thyroid  NERVOUS SYSTEM:  Alert & Oriented X3, Good concentration; Motor Strength 5/5 B/L upper and lower extremities; DTRs 2+ intact and symmetric  CHEST/LUNG: Clear to percussion bilaterally; No rales, rhonchi, wheezing, or rubs  HEART: Regular rate and rhythm; No murmurs, rubs, or gallops  ABDOMEN: Soft, Nontender, Nondistended; Bowel sounds present; obese   EXTREMITIES:  b/l LE wrapped   LYMPH: No lymphadenopathy noted  SKIN: No rashes or lesions    Consultant(s) Notes Reviewed:  [x ] YES  [ ] NO  Care Discussed with Consultants/Other Providers [ x] YES  [ ] NO    LAB:                        13.3   6.21  )-----------( 294      ( 22 Jul 2020 07:19 )             43.3       07-21    140  |  102  |  12  ----------------------------<  109<H>  4.1   |  27  |  0.8    Ca    9.4      21 Jul 2020 09:12        Drug Dosing Weight  Height (cm): 200.7 (20 Jul 2020 06:38)  Weight (kg): 235 (20 Jul 2020 06:38)  BMI (kg/m2): 58.3 (20 Jul 2020 06:38)  BSA (m2): 3.42 (20 Jul 2020 06:38)      CAPILLARY BLOOD GLUCOSE  POCT Blood Glucose.: 117 mg/dL (24 Jul 2020 07:48)      RADIOLOGY & ADDITIONAL TESTS:  Imaging Personally Reviewed:  [x] YES  [ ] NO    HEALTH ISSUES - PROBLEM Dx:  Venous stasis: Venous stasis        MEDICATIONS  (STANDING):  cefepime   IVPB 2000 milliGRAM(s) IV Intermittent every 12 hours  Dakins Solution - 1/2 Strength 1 Application(s) Topical two times a day  dextrose 5%. 1000 milliLiter(s) (50 mL/Hr) IV Continuous <Continuous>  dextrose 50% Injectable 12.5 Gram(s) IV Push once  dextrose 50% Injectable 25 Gram(s) IV Push once  dextrose 50% Injectable 25 Gram(s) IV Push once  furosemide    Tablet 20 milliGRAM(s) Oral daily  gabapentin 300 milliGRAM(s) Oral every 8 hours  heparin   Injectable 5000 Unit(s) SubCutaneous every 8 hours  losartan 100 milliGRAM(s) Oral daily  methocarbamol 750 milliGRAM(s) Oral four times a day  morphine ER Tablet 30 milliGRAM(s) Oral two times a day  naproxen 500 milliGRAM(s) Oral two times a day  pantoprazole    Tablet 40 milliGRAM(s) Oral before breakfast  senna 2 Tablet(s) Oral at bedtime  silver sulfADIAZINE 1% Cream 1 Application(s) Topical two times a day    MEDICATIONS  (PRN):  dextrose 40% Gel 15 Gram(s) Oral once PRN Blood Glucose LESS THAN 70 milliGRAM(s)/deciliter  diphenhydrAMINE   Elixir 50 milliGRAM(s) Oral once PRN Itching  glucagon  Injectable 1 milliGRAM(s) IntraMuscular once PRN Glucose LESS THAN 70 milligrams/deciliter  oxyCODONE    IR 10 milliGRAM(s) Oral every 4 hours PRN Moderate Pain (4 - 6)

## 2020-07-24 NOTE — PROGRESS NOTE ADULT - ASSESSMENT
Patient is a 54y old  Male  with PMH of DM2, HTN, Morbidly obese,  RUSSELL, previous MRSA infection  of pain stimulator, s/p  spinal surgery, now  presents to the ER for evaluation of 3 weeks of increasing LLE erythema and edema. He failed outpatient oral Zyvox and Clindamycin.  He has a superficial ulcer from where the erythema and edema originate that began last Summer and has not healed. He has no fever and chills. On admission, he found to have no fever or Leukocytosis. He has started on IV Vancomycin and the ID consult requested to assist with further evaluation and antibiotic management.     # LLE cellulitis  - 7/19 superficial would culture growing Pseudomonas aeruginosa- pan-sensitive   - CT Left Leg 6/28 with diffuse subcutaneous edema including 2.1 x 1.3 cm rim-enhancing cystic lesion at distal left anterior shin in subcutnaeous fat, possibly small abscess   # Morbidly obese  # H/O spinal pain stimulator infection with OM        This is a preliminary incomplete pended note, all final recommendations to follow after interview and examination of the patient.    Please call if with any questions.  Spectra [phone] Patient is a 54y old  Male  with PMH of DM2, HTN, Morbidly obese,  RUSSELL, previous MRSA infection  of pain stimulator, s/p  spinal surgery, now  presents to the ER for evaluation of 3 weeks of increasing LLE erythema and edema. He failed outpatient oral Zyvox and Clindamycin.  He has a superficial ulcer from where the erythema and edema originate that began last Summer and has not healed. He has no fever and chills. On admission, he found to have no fever or Leukocytosis. He has started on IV Vancomycin and the ID consult requested to assist with further evaluation and antibiotic management.     # LLE cellulitis  - 7/19 superficial would culture growing Pseudomonas aeruginosa- pan-sensitive   - CT Left Leg 6/28 with diffuse subcutaneous edema including 2.1 x 1.3 cm rim-enhancing cystic lesion at distal left anterior shin in subcutnaeous fat, possibly small abscess   # Morbidly obese  # H/O spinal pain stimulator infection with OM    - can plan for total of 10 days of antibiotics (7/21-7/30)  - finish course with ciprofloxacin 750 mg BID on discharge     Discussed with patient    Please call if with any questions.  Spectra [phone] Patient is a 54y old  Male  with PMH of DM2, HTN, Morbidly obese,  RUSSELL, previous MRSA infection  of pain stimulator, s/p  spinal surgery, now  presents to the ER for evaluation of 3 weeks of increasing LLE erythema and edema. He failed outpatient oral Zyvox and Clindamycin.  He has a superficial ulcer from where the erythema and edema originate that began last Summer and has not healed. He has no fever and chills. On admission, he found to have no fever or Leukocytosis. He has started on IV Vancomycin and the ID consult requested to assist with further evaluation and antibiotic management.     # LLE cellulitis  - 7/19 superficial would culture growing Pseudomonas aeruginosa- pan-sensitive   - CT Left Leg 6/28 with diffuse subcutaneous edema including 2.1 x 1.3 cm rim-enhancing cystic lesion at distal left anterior shin in subcutnaeous fat, possibly small abscess   # Morbidly obese  # H/O spinal pain stimulator infection with OM    - can plan for total of 10 days of antibiotics (7/21-7/30)  - finish course with levofloxacin 750 mg daily to finish the course    Discussed with patient and PA    Please call if with any questions.  Spectra [phone]

## 2020-07-24 NOTE — DISCHARGE NOTE PROVIDER - NSDCMRMEDTOKEN_GEN_ALL_CORE_FT
Colace 100 mg oral capsule: 1 cap(s) orally 3 times a day  furosemide 20 mg oral tablet: 1 tab(s) orally once a day (at bedtime)  gabapentin 300 mg oral capsule: 1 cap(s) orally every 8 hours  Jardiance 25 mg oral tablet: 1 tab(s) orally once a day (in the morning)  losartan 100 mg oral tablet: 1 tab(s) orally once a day  metFORMIN 500 mg oral tablet: 1 tab(s) orally 2 times a day  methocarbamol 750 mg oral tablet: 2 tab(s) orally 4 times a day  Morphine IR 15 mg oral tablet: orally 2 times a day  naproxen 500 mg oral delayed release tablet: 1 tab(s) orally 2 times a day  oxyCODONE 10 mg oral tablet: 1 tab(s) orally every 4 hours, As needed, Moderate Pain (4 - 6)  pantoprazole 40 mg oral delayed release tablet: 1 tab(s) orally once a day (before a meal)  saccharomyces boulardii lyo 250 mg oral capsule: 1 cap(s) orally 2 times a day  Santyl 250 units/g topical ointment: 1 application topically 2 times a day  senna oral tablet: 1 tab(s) orally once a day (at bedtime)  silver sulfADIAZINE 1% topical cream: 1 application topically 2 times a day  sodium hypochlorite 0.25% topical solution: 1 application topically 2 times a day Colace 100 mg oral capsule: 1 cap(s) orally 3 times a day  furosemide 20 mg oral tablet: 1 tab(s) orally once a day (at bedtime)  gabapentin 300 mg oral capsule: 1 cap(s) orally every 8 hours  Jardiance 25 mg oral tablet: 1 tab(s) orally once a day (in the morning)  levoFLOXacin 750 mg oral tablet: 1 tab(s) orally once a day   losartan 100 mg oral tablet: 1 tab(s) orally once a day  metFORMIN 500 mg oral tablet: 1 tab(s) orally 2 times a day  methocarbamol 750 mg oral tablet: 2 tab(s) orally 4 times a day  Morphine IR 15 mg oral tablet: orally 2 times a day  naproxen 500 mg oral delayed release tablet: 1 tab(s) orally 2 times a day  oxyCODONE 10 mg oral tablet: 1 tab(s) orally every 4 hours, As needed, Moderate Pain (4 - 6)  pantoprazole 40 mg oral delayed release tablet: 1 tab(s) orally once a day (before a meal)  saccharomyces boulardii lyo 250 mg oral capsule: 1 cap(s) orally 2 times a day  Santyl 250 units/g topical ointment: 1 application topically 2 times a day  senna oral tablet: 1 tab(s) orally once a day (at bedtime)  silver sulfADIAZINE 1% topical cream: 1 application topically 2 times a day

## 2020-07-24 NOTE — DISCHARGE NOTE PROVIDER - HOSPITAL COURSE
HPI: Patient is a 54 year old male with PMH of DM2, HTN, obesity, RUSSELL, previous MRSA infection 2/2 spinal surgery presenting to ED with 3 weeks of increasing LLE erythema and edema. He failed outpatient Zyvox and Clinda PO and states he has had a few episodes of chills the last 5 days. Patient has a superficial ulcer from where the erythema and edema originate that began last Summer and has not healed. His RLE ulcer is healing. Denies fever, CP, SOB, abd pain, n/v/d/c. Denies sick contacts.        #Cellulitis of LLE    - failed outpatient zyvox and clindamycin     - ID consulted- patient was treated with zyvox and cefepime while inpatient, will follow antibiotic recommendations for d/c    - ID recommends holding off on CT LE as pt is improving    - continue local wound care per wound specialist    - elevate legs when possible    - follow up with PMD and podiatry upon d/c        #RUSSELL on cpap at night    - continue CPAP upon d/c        #morbid obesity    - diet and lifestyle modification    - extensive counseling provided        # DM II, well controlled     - holding home metformin and Jardiance while inpatient    - finger sticks well controlled with diet alone     - may resume home meds upon d/c        # HTN - well controlled     - continue losartan and furosemide    - may continue home meds upon d/c        # degenerative disc disease/history of spinal surgery     - home pain regimen is percocet 10mg q6 and Morphine ER 15mg BID per iSTOP    - pt with continued pain and discomfort in hospital     - continue Oxycodone 10mg q4 prn and Morphine ER 30mg BID     - patient follows with pain management - Dr. Lombardo's office     - may resume home meds upon d/c        Patient is clinically improved with IV antibiotic therapy. Hemodynamically stable for discharge with PMD and podiatry follow up. HPI: Patient is a 54 year old male with PMH of DM2, HTN, obesity, RUSSELL, previous MRSA infection 2/2 spinal surgery presenting to ED with 3 weeks of increasing LLE erythema and edema. He failed outpatient Zyvox and Clinda PO and states he has had a few episodes of chills the last 5 days. Patient has a superficial ulcer from where the erythema and edema originate that began last Summer and has not healed. His RLE ulcer is healing. Denies fever, CP, SOB, abd pain, n/v/d/c. Denies sick contacts.        #Cellulitis of LLE    - failed outpatient zyvox and clindamycin     - ID consulted- patient was treated with zyvox and cefepime while inpatient    - ID recommended oral levaquin 750mg daily unit 7/30    - ID recommends holding off on CT LE as pt is improving    - continue local wound care per wound specialist    - elevate legs when possible    - follow up with PMD and podiatry upon d/c        #RUSSELL on cpap at night    - continue CPAP upon d/c        #morbid obesity    - diet and lifestyle modification    - extensive counseling provided        # DM II, well controlled     - holding home metformin and Jardiance while inpatient    - finger sticks well controlled with diet alone     - may resume home meds upon d/c        # HTN - well controlled     - continue losartan and furosemide    - may continue home meds upon d/c        # degenerative disc disease/history of spinal surgery     - home pain regimen is percocet 10mg q6 and Morphine ER 15mg BID per iSTOP    - pt with continued pain and discomfort in hospital     - continue Oxycodone 10mg q4 prn and Morphine ER 30mg BID     - patient follows with pain management - Dr. Lombardo's office     - may resume home meds upon d/c        Patient is clinically improved with IV antibiotic therapy. Hemodynamically stable for discharge with PMD and podiatry follow up.         d/c planning took voer 50 min    d/c papers done by me HPI: Patient is a 54 year old male with PMH of DM2, HTN, obesity, RUSSELL, previous MRSA infection 2/2 spinal surgery presenting to ED with 3 weeks of increasing LLE erythema and edema. He failed outpatient Zyvox and Clinda PO and states he has had a few episodes of chills the last 5 days. Patient has a superficial ulcer from where the erythema and edema originate that began last Summer and has not healed. His RLE ulcer is healing. Denies fever, CP, SOB, abd pain, n/v/d/c. Denies sick contacts.        Patient seen and     examined this morning.  Sitting in bed, dressed and ready for d/c.    No complaints.         Vital Signs Last 24 Hrs    T(C): 36.1 (25 Jul 2020 06:02), Max: 36.8 (24 Jul 2020 14:24)    T(F): 97 (25 Jul 2020 06:02), Max: 98.3 (24 Jul 2020 14:24)    HR: 64 (25 Jul 2020 06:02) (64 - 81)    BP: 135/75 (25 Jul 2020 06:02) (135/75 - 155/72)    BP(mean): --    RR: 16 (25 Jul 2020 06:02) (16 - 18)    SpO2: --        PHYSICAL EXAM:    GENERAL: NAD, well-groomed, well-developed    HEAD:  Atraumatic, Normocephalic    EYES: EOMI, PERRLA, conjunctiva and sclera clear    NERVOUS SYSTEM:  Alert & Oriented X 4, Good concentration; Motor Strength 5/5 B/L upper and lower extremities; DTRs 2+ intact and symmetric    CHEST/LUNG: Clear to percussion bilaterally; No rales, rhonchi, wheezing, or rubs    HEART: Regular rate and rhythm; No murmurs, rubs, or gallops    ABDOMEN: Soft, Nontender, Nondistended; Bowel sounds present, obese     EXTREMITIES:  b/l LE wrapped    SKIN: No rashes or lesions            While hospitalized, the patient was treated for the following medical problems -     #Cellulitis of LLE    - failed outpatient zyvox and clindamycin     - ID consulted- patient was treated with zyvox and cefepime while inpatient    - ID recommended oral levaquin 750mg daily unit 7/30    - ID recommended holding off on repeat CT LE as pt is improving    - continue local wound care per wound specialist    - elevate legs when possible    - follow up with PMD and podiatry upon d/c - pt has faustino for next Wednesday         #RUSSELL on cpap at night    - continue CPAP upon d/c        #morbid obesity    - diet and lifestyle modification    - extensive counseling provided        # DM II, well controlled     - holding home metformin and Jardiance while inpatient    - finger sticks well controlled with diet alone     - may resume home meds upon d/c        # HTN - well controlled     - continue losartan and furosemide    - may continue home meds upon d/c        # degenerative disc disease/history of spinal surgery     - home pain regimen is percocet 10mg q6 and Morphine ER 15mg BID per iSTOP    - pt with continued pain and discomfort in hospital     - continue Oxycodone 10mg q4 prn and Morphine ER 30mg BID     - patient follows with pain management - Dr. Lombardo's office     - may resume home meds upon d/c        Patient is clinically improved with IV antibiotic therapy. Hemodynamically stable for discharge with PMD and podiatry follow up.         d/c planning took over 50 min    d/c papers done by me

## 2020-07-24 NOTE — PROGRESS NOTE ADULT - PROVIDER SPECIALTY LIST ADULT
Infectious Disease
Internal Medicine

## 2020-07-24 NOTE — DIETITIAN INITIAL EVALUATION ADULT. - ENERGY NEEDS
Energy: 3561-2620 kcal/day (25-30 kcal/kg IBW)    Protein:  g/day (0.8-1 g/kg IBW)    Fluids: 1 mL/kcal or per LIP

## 2020-07-24 NOTE — DIETITIAN INITIAL EVALUATION ADULT. - OTHER INFO
Pertinent Medical Information: Presented to ED with 3 weeks of increasing LLE erythema and edema. p/w L LE cellulitis.    Pertinent Subjective Information: Good appetite & po intake PTP. Reportedly follows a low salt diet. Has received heart healthy nutrition education by a Dietitian in the past. Demonstrates basic understanding of sources of salt, cholesterol & saturated fat, however did not appear interested in having discussion regarding Heart Healthy diet, declining further nutrition education. NKFA. No supplements. Declined to provide UBW but reported no known h/o unintentional wt loss.

## 2020-07-24 NOTE — DIETITIAN INITIAL EVALUATION ADULT. - DIET TYPE
Consistent Carbohydrate diet (evening snack) + DASH/TLC diet - however, pt has refused current diet order with diet refusal form signs; on regular diet. Good appetite & po intake reported, with at least 75% po intake at this time.

## 2020-07-24 NOTE — DIETITIAN INITIAL EVALUATION ADULT. - PERTINENT LABORATORY DATA
7/24: POCT gluc-117 (7:48); 7/23: POCT gluc-116 (7:22); 7/22: Hgb-13.3, POCT gluc-106 (11:27); 7/21: gluc-109

## 2020-07-24 NOTE — DIETITIAN INITIAL EVALUATION ADULT. - ADD RECOMMEND
Recommendation: Continue current nutrition regimen as tolerated. Will monitor pt's readiness for further nutrition education. Check HgbA1C.

## 2020-07-24 NOTE — DISCHARGE NOTE PROVIDER - NSDCFUSCHEDAPPT_GEN_ALL_CORE_FT
JOSE ALBERTO NO ; 07/29/2020 ; [facility] JOSE ALBERTO NO ; 07/29/2020 ; [facility] JOSE ALBERTO NO ; 07/29/2020 ; [facility] JOSE ALBERTO NO ; 07/29/2020 ; [facility] JOSE ALBERTO NO ; 07/29/2020 ; N[facility] Surg [facility]

## 2020-07-24 NOTE — DISCHARGE NOTE PROVIDER - PROVIDER TOKENS
PROVIDER:[TOKEN:[[id number]],FOLLOWUP:[1 week]] PROVIDER:[TOKEN:[[id number]],FOLLOWUP:[1 week]],FREE:[LAST:[pain management],PHONE:[(   )    -],FAX:[(   )    -],FOLLOWUP:[1 week]]

## 2020-07-24 NOTE — DISCHARGE NOTE PROVIDER - NSDCADMDATE_GEN_ALL_CORE_FT
Continue the pulmonary rehab and you may use the stationary bike 5-10 minutes on your days off.      Nerve glide: Lay on your back and hold behind right thigh and kick the knee toward straight for 30 seconds and pump the foot up/down.  2-3 reps.  2 times per day.        Trunk rotation: Lay on your back with knees bent.  The knees are near each other--as the feet are near each other.  Let the knees fall together to the left for 5 seconds.  5-8 slow reps.  Repeat to the right.  The back stays on the bed.   2x/day.     Bicycle: Lay on your back with abdominal muscles braced and knees bent at 90 degrees(feet in the air).  Slowly pedal the feet in both directions.  15-20 seconds each way.  2 sets.   Once daily.     Hip flexor/roitation stretch:  Stand tall in a doorway with the left  foot on a chair and shift forward/backward thereby stretching the right hip flexor and calf muscle.  30 second    2 reps on each side.      STANDING POSTURE:  Standing with the feet even or 1 foot a step forward stand tall but not leaning backward at the waist.   Brace the abdominal muscles backward to the spine and hinge the at the waist slightly forward as the head is tall and chin glides backward.  1-2 minutes.  2x/day.     Standing hamstring stretch: While standing the left foot is on the second step as the stance foot is directed forward the left knee is near straight.  Bend forward at only the hips as the trunk remains straight and the head directed forward.  The left foot should turn gently left to right to left.  Do 2 reps of 30 seconds then repeat with the other leg.  1-2x/day.    CORE STRENGTH:  Core/balance work:   Standing with the right foot a step forward with the core muscles engaged and hinged forward at the hips(no backward leaning).   Hold 2-3 pounds in the long/straight arms held forward at chest height.  Slowly rotate the arms as a unit side to side.  8-10 reps in each direction.  Repeat with the left foot a step forward.    1-2x/day.     WALKING:   Lean forward at the waist and swing the arms (extending in front of you).   If you are interested you could buy a theraball/swiss ball/exercise ball in size 55 or 65 cm.      Do the exercises as above for 2-3 weeks then do them anywhere from 3 to 7 times per wk for as long as helpful.     19-Jul-2020 14:31

## 2020-07-24 NOTE — DIETITIAN INITIAL EVALUATION ADULT. - RD TO REMAIN AVAILABLE
yes/Nutrition Goals: Pt to maintain tolerance to diet order, with at least 75% po intake + adherence to diet order over next 7 days. Nutrition Intervention: Meals & Snacks. Monitor: Energy intake, glucose profile, renal profile, nutrition focused physical findings, body composition.

## 2020-07-24 NOTE — DIETITIAN INITIAL EVALUATION ADULT. - PHYSICAL APPEARANCE
BMI: 58.3 (using 7/20 stated wt 235 kg). Previous admit wt May 2018 noted 220 kg. Alert. 1+ edema (B/L ankle, B/L foot, B/L leg). Last BM reported 7/20. No N/V/D/C reported at this time. No chewing/swallowing difficulty reported. Skin: B/L cellulitis noted.

## 2020-07-25 ENCOUNTER — TRANSCRIPTION ENCOUNTER (OUTPATIENT)
Age: 55
End: 2020-07-25

## 2020-07-25 VITALS
HEART RATE: 64 BPM | RESPIRATION RATE: 16 BRPM | SYSTOLIC BLOOD PRESSURE: 135 MMHG | TEMPERATURE: 97 F | DIASTOLIC BLOOD PRESSURE: 75 MMHG

## 2020-07-25 PROCEDURE — 99239 HOSP IP/OBS DSCHRG MGMT >30: CPT

## 2020-07-25 RX ADMIN — MORPHINE SULFATE 30 MILLIGRAM(S): 50 CAPSULE, EXTENDED RELEASE ORAL at 06:50

## 2020-07-25 RX ADMIN — Medication 1 APPLICATION(S): at 05:34

## 2020-07-25 RX ADMIN — MORPHINE SULFATE 30 MILLIGRAM(S): 50 CAPSULE, EXTENDED RELEASE ORAL at 05:41

## 2020-07-25 RX ADMIN — OXYCODONE HYDROCHLORIDE 10 MILLIGRAM(S): 5 TABLET ORAL at 01:27

## 2020-07-25 RX ADMIN — Medication 20 MILLIGRAM(S): at 05:34

## 2020-07-25 RX ADMIN — Medication 500 MILLIGRAM(S): at 06:50

## 2020-07-25 RX ADMIN — OXYCODONE HYDROCHLORIDE 10 MILLIGRAM(S): 5 TABLET ORAL at 06:50

## 2020-07-25 RX ADMIN — Medication 500 MILLIGRAM(S): at 05:33

## 2020-07-25 RX ADMIN — LOSARTAN POTASSIUM 100 MILLIGRAM(S): 100 TABLET, FILM COATED ORAL at 05:34

## 2020-07-25 RX ADMIN — Medication 1 APPLICATION(S): at 05:43

## 2020-07-25 RX ADMIN — OXYCODONE HYDROCHLORIDE 10 MILLIGRAM(S): 5 TABLET ORAL at 05:34

## 2020-07-25 RX ADMIN — CEFEPIME 100 MILLIGRAM(S): 1 INJECTION, POWDER, FOR SOLUTION INTRAMUSCULAR; INTRAVENOUS at 05:40

## 2020-07-25 NOTE — CHART NOTE - NSCHARTNOTEFT_GEN_A_CORE
Patient seen and examined this morning  stable for d/c  please refer to discharge papers for further details

## 2020-07-25 NOTE — DISCHARGE NOTE NURSING/CASE MANAGEMENT/SOCIAL WORK - PATIENT PORTAL LINK FT
You can access the FollowMyHealth Patient Portal offered by [facility] by registering at the following website: [email or web address]. By joining [employer]’s FollowMyHealth portal, you will also be able to view your health information using other applications (apps) compatible with our system.

## 2020-07-29 ENCOUNTER — APPOINTMENT (OUTPATIENT)
Dept: VASCULAR SURGERY | Facility: CLINIC | Age: 55
End: 2020-07-29
Payer: MEDICARE

## 2020-07-29 PROCEDURE — 99213 OFFICE O/P EST LOW 20 MIN: CPT

## 2020-07-29 NOTE — PHYSICAL EXAM
[FreeTextEntry1] : right leg lateral wound healing well. 3 x 4 cm. Left leg anterior tibial wound punctate lesions with some mild erythema. Lateral wound 4 x 3 cm. 2+ edemaof his left lower extremity

## 2020-07-29 NOTE — HISTORY OF PRESENT ILLNESS
[FreeTextEntry1] : The patient is a 54-year-old male with a history of bilateral venous stasis ulcers. The patient was recently hospitalized for extensive left leg cellulitis. He was treated with IV antibiotics and Ace wrap compression local wound care. Today he completes his last dose of antibiotics. He is a right anterior tibial wound and a right  lateral leg wounds. His left leg he has a lateral wound as well

## 2020-07-29 NOTE — ASSESSMENT
[FreeTextEntry1] : the patient is a 54-year-old gentleman who was recently admitted with extensive left leg cellulitis and treated with IV antibiotics. He has a history of bilateral venous stasis ulcers. He's been receiving local care with Silvadene Adaptic and Ace wrap compression.. I recommend he continue local wound care and I would like to see him back in my office in one week's time if his cellulitis is not completely resolved we'll consider restarting antibiotics at that time.

## 2020-07-30 DIAGNOSIS — L03.116 CELLULITIS OF LEFT LOWER LIMB: ICD-10-CM

## 2020-07-30 DIAGNOSIS — G47.33 OBSTRUCTIVE SLEEP APNEA (ADULT) (PEDIATRIC): ICD-10-CM

## 2020-07-30 DIAGNOSIS — E11.622 TYPE 2 DIABETES MELLITUS WITH OTHER SKIN ULCER: ICD-10-CM

## 2020-07-30 DIAGNOSIS — I10 ESSENTIAL (PRIMARY) HYPERTENSION: ICD-10-CM

## 2020-07-30 DIAGNOSIS — Z96.82 PRESENCE OF NEUROSTIMULATOR: ICD-10-CM

## 2020-07-30 DIAGNOSIS — Z86.14 PERSONAL HISTORY OF METHICILLIN RESISTANT STAPHYLOCOCCUS AUREUS INFECTION: ICD-10-CM

## 2020-07-30 DIAGNOSIS — Z79.2 LONG TERM (CURRENT) USE OF ANTIBIOTICS: ICD-10-CM

## 2020-07-30 DIAGNOSIS — Z99.89 DEPENDENCE ON OTHER ENABLING MACHINES AND DEVICES: ICD-10-CM

## 2020-07-30 DIAGNOSIS — M51.36 OTHER INTERVERTEBRAL DISC DEGENERATION, LUMBAR REGION: ICD-10-CM

## 2020-07-30 DIAGNOSIS — E66.01 MORBID (SEVERE) OBESITY DUE TO EXCESS CALORIES: ICD-10-CM

## 2020-07-30 DIAGNOSIS — L97.928 NON-PRESSURE CHRONIC ULCER OF UNSPECIFIED PART OF LEFT LOWER LEG WITH OTHER SPECIFIED SEVERITY: ICD-10-CM

## 2020-07-30 DIAGNOSIS — Z87.891 PERSONAL HISTORY OF NICOTINE DEPENDENCE: ICD-10-CM

## 2020-07-30 DIAGNOSIS — M47.9 SPONDYLOSIS, UNSPECIFIED: ICD-10-CM

## 2020-07-30 DIAGNOSIS — Z87.39 PERSONAL HISTORY OF OTHER DISEASES OF THE MUSCULOSKELETAL SYSTEM AND CONNECTIVE TISSUE: ICD-10-CM

## 2020-07-30 DIAGNOSIS — Z79.84 LONG TERM (CURRENT) USE OF ORAL HYPOGLYCEMIC DRUGS: ICD-10-CM

## 2020-07-30 DIAGNOSIS — I44.0 ATRIOVENTRICULAR BLOCK, FIRST DEGREE: ICD-10-CM

## 2020-07-30 DIAGNOSIS — L97.919 NON-PRESSURE CHRONIC ULCER OF UNSPECIFIED PART OF RIGHT LOWER LEG WITH UNSPECIFIED SEVERITY: ICD-10-CM

## 2020-08-05 ENCOUNTER — APPOINTMENT (OUTPATIENT)
Dept: VASCULAR SURGERY | Facility: CLINIC | Age: 55
End: 2020-08-05
Payer: MEDICARE

## 2020-08-05 VITALS
HEART RATE: 101 BPM | TEMPERATURE: 97.2 F | HEIGHT: 78 IN | DIASTOLIC BLOOD PRESSURE: 114 MMHG | WEIGHT: 315 LBS | BODY MASS INDEX: 36.45 KG/M2 | SYSTOLIC BLOOD PRESSURE: 176 MMHG

## 2020-08-05 PROCEDURE — 99212 OFFICE O/P EST SF 10 MIN: CPT

## 2020-08-19 ENCOUNTER — APPOINTMENT (OUTPATIENT)
Dept: VASCULAR SURGERY | Facility: CLINIC | Age: 55
End: 2020-08-19

## 2020-08-19 ENCOUNTER — APPOINTMENT (OUTPATIENT)
Dept: VASCULAR SURGERY | Facility: CLINIC | Age: 55
End: 2020-08-19
Payer: MEDICARE

## 2020-08-19 PROCEDURE — 99212 OFFICE O/P EST SF 10 MIN: CPT

## 2020-09-30 NOTE — ASSESSMENT
[FreeTextEntry1] : 54 year-old gentleman with venous insufficiency, chronic leg swelling and h/o recurrent venous ulcerations who was admitted in July 2020 with extensive right leg cellulitis and treated with IV antibiotics. He has a history of bilateral venous stasis ulcers. He's been receiving local care with Silvadene Adaptic and Ace wrap compression. I recommend he continue local wound care and I would like to see him back in my office in two months time or sooner if the wounds worsen.\par I am also making a referral for him to obtain Lymphedema pumps to be used daily in conjunction with daily wound care and ace bandage for compression. He has chronic bilateral lower extremity lymphedema with dermato liposclerosis, and this has worsened despite using compression with ace bandage and leg elevation for the past few years.\par \par Diagnosis: BLE Q82.0 Essential Lymphedema

## 2020-09-30 NOTE — HISTORY OF PRESENT ILLNESS
[FreeTextEntry1] : 54-year-old male with a history of bilateral venous stasis ulcers, hospitalized in July 2020 for extensive right leg cellulitis. He was treated with IV antibiotics and Ace wrap compression local wound care. \par Today he presents for follow up, has been doing daily wound care with Silvadene, Adaptic and Ace bandage for compression.

## 2020-10-09 ENCOUNTER — TRANSCRIPTION ENCOUNTER (OUTPATIENT)
Age: 55
End: 2020-10-09

## 2020-10-14 ENCOUNTER — APPOINTMENT (OUTPATIENT)
Dept: VASCULAR SURGERY | Facility: CLINIC | Age: 55
End: 2020-10-14
Payer: MEDICARE

## 2020-10-14 PROCEDURE — 99213 OFFICE O/P EST LOW 20 MIN: CPT

## 2020-10-14 RX ORDER — COLLAGENASE SANTYL 250 [ARB'U]/G
250 OINTMENT TOPICAL DAILY
Qty: 1 | Refills: 2 | Status: ACTIVE | COMMUNITY
Start: 2020-10-14 | End: 1900-01-01

## 2020-10-14 NOTE — ASSESSMENT
[FreeTextEntry1] : 55 year-old gentleman with venous insufficiency, chronic leg swelling and h/o recurrent venous ulcerations who has been receiving local care with Silvadene Adaptic and Ace wrap compression. \par \par He has received the Lymphedema pumps and will be starting to use it today.\par \par I recommend he switch to  local wound care with Santyl instead of Silvadene as he has fibrous exudate on all the wounds and I would like to see him back in my office in three months time or sooner if the wounds worsen.\par

## 2020-10-14 NOTE — HISTORY OF PRESENT ILLNESS
[FreeTextEntry1] : 55 year-old male with a history of bilateral venous stasis ulcers, hospitalized in July 2020 for extensive right leg cellulitis. He was treated with IV antibiotics and Ace wrap compression local wound care. \par Today he presents for follow up, has been doing daily wound care with Silvadene, Adaptic and Ace bandage for compression.

## 2021-01-20 ENCOUNTER — APPOINTMENT (OUTPATIENT)
Dept: VASCULAR SURGERY | Facility: CLINIC | Age: 56
End: 2021-01-20
Payer: MEDICAID

## 2021-01-20 VITALS
HEART RATE: 105 BPM | DIASTOLIC BLOOD PRESSURE: 95 MMHG | SYSTOLIC BLOOD PRESSURE: 170 MMHG | HEIGHT: 78 IN | BODY MASS INDEX: 36.45 KG/M2 | WEIGHT: 315 LBS | TEMPERATURE: 96.9 F

## 2021-01-20 PROCEDURE — 99072 ADDL SUPL MATRL&STAF TM PHE: CPT

## 2021-01-20 PROCEDURE — 29580 STRAPPING UNNA BOOT: CPT | Mod: 50

## 2021-01-27 ENCOUNTER — APPOINTMENT (OUTPATIENT)
Dept: VASCULAR SURGERY | Facility: CLINIC | Age: 56
End: 2021-01-27
Payer: MEDICARE

## 2021-01-27 VITALS
DIASTOLIC BLOOD PRESSURE: 95 MMHG | TEMPERATURE: 97.1 F | HEART RATE: 110 BPM | WEIGHT: 315 LBS | BODY MASS INDEX: 36.45 KG/M2 | SYSTOLIC BLOOD PRESSURE: 172 MMHG | HEIGHT: 78 IN

## 2021-01-27 PROCEDURE — 99213 OFFICE O/P EST LOW 20 MIN: CPT

## 2021-01-27 PROCEDURE — 99072 ADDL SUPL MATRL&STAF TM PHE: CPT

## 2021-02-24 ENCOUNTER — APPOINTMENT (OUTPATIENT)
Dept: VASCULAR SURGERY | Facility: CLINIC | Age: 56
End: 2021-02-24
Payer: MEDICARE

## 2021-02-24 PROCEDURE — 99213 OFFICE O/P EST LOW 20 MIN: CPT

## 2021-02-24 PROCEDURE — 93970 EXTREMITY STUDY: CPT

## 2021-02-24 PROCEDURE — 99072 ADDL SUPL MATRL&STAF TM PHE: CPT

## 2021-05-19 ENCOUNTER — APPOINTMENT (OUTPATIENT)
Dept: VASCULAR SURGERY | Facility: CLINIC | Age: 56
End: 2021-05-19

## 2021-07-13 NOTE — ED PROVIDER NOTE - PMH
Received report from outgoing RN, patient resting comfortable in cart, sitter at bedside, no signs of distress noted, will continue to monitor.    Depression    Hypertension

## 2021-08-01 ENCOUNTER — INPATIENT (INPATIENT)
Facility: HOSPITAL | Age: 56
LOS: 2 days | Discharge: HOME | End: 2021-08-04
Attending: FAMILY MEDICINE | Admitting: FAMILY MEDICINE
Payer: MEDICARE

## 2021-08-01 VITALS
SYSTOLIC BLOOD PRESSURE: 176 MMHG | HEART RATE: 102 BPM | TEMPERATURE: 98 F | OXYGEN SATURATION: 97 % | HEIGHT: 78 IN | RESPIRATION RATE: 20 BRPM | WEIGHT: 315 LBS | DIASTOLIC BLOOD PRESSURE: 86 MMHG

## 2021-08-01 DIAGNOSIS — Z98.890 OTHER SPECIFIED POSTPROCEDURAL STATES: Chronic | ICD-10-CM

## 2021-08-01 DIAGNOSIS — I10 ESSENTIAL (PRIMARY) HYPERTENSION: ICD-10-CM

## 2021-08-01 DIAGNOSIS — Z96.89 PRESENCE OF OTHER SPECIFIED FUNCTIONAL IMPLANTS: Chronic | ICD-10-CM

## 2021-08-01 DIAGNOSIS — I83.019 VARICOSE VEINS OF RIGHT LOWER EXTREMITY WITH ULCER OF UNSPECIFIED SITE: ICD-10-CM

## 2021-08-01 DIAGNOSIS — R09.89 OTHER SPECIFIED SYMPTOMS AND SIGNS INVOLVING THE CIRCULATORY AND RESPIRATORY SYSTEMS: ICD-10-CM

## 2021-08-01 DIAGNOSIS — L03.119 CELLULITIS OF UNSPECIFIED PART OF LIMB: ICD-10-CM

## 2021-08-01 DIAGNOSIS — M51.37 OTHER INTERVERTEBRAL DISC DEGENERATION, LUMBOSACRAL REGION: ICD-10-CM

## 2021-08-01 DIAGNOSIS — E11.9 TYPE 2 DIABETES MELLITUS WITHOUT COMPLICATIONS: ICD-10-CM

## 2021-08-01 LAB
ALBUMIN SERPL ELPH-MCNC: 4.4 G/DL — SIGNIFICANT CHANGE UP (ref 3.5–5.2)
ALP SERPL-CCNC: 100 U/L — SIGNIFICANT CHANGE UP (ref 30–115)
ALT FLD-CCNC: 16 U/L — SIGNIFICANT CHANGE UP (ref 0–41)
ANION GAP SERPL CALC-SCNC: 11 MMOL/L — SIGNIFICANT CHANGE UP (ref 7–14)
AST SERPL-CCNC: 13 U/L — SIGNIFICANT CHANGE UP (ref 0–41)
BASOPHILS # BLD AUTO: 0.06 K/UL — SIGNIFICANT CHANGE UP (ref 0–0.2)
BASOPHILS NFR BLD AUTO: 0.7 % — SIGNIFICANT CHANGE UP (ref 0–1)
BILIRUB SERPL-MCNC: 0.2 MG/DL — SIGNIFICANT CHANGE UP (ref 0.2–1.2)
BUN SERPL-MCNC: 17 MG/DL — SIGNIFICANT CHANGE UP (ref 10–20)
CALCIUM SERPL-MCNC: 9.8 MG/DL — SIGNIFICANT CHANGE UP (ref 8.5–10.1)
CHLORIDE SERPL-SCNC: 103 MMOL/L — SIGNIFICANT CHANGE UP (ref 98–110)
CO2 SERPL-SCNC: 30 MMOL/L — SIGNIFICANT CHANGE UP (ref 17–32)
CREAT SERPL-MCNC: 0.9 MG/DL — SIGNIFICANT CHANGE UP (ref 0.7–1.5)
EOSINOPHIL # BLD AUTO: 0.24 K/UL — SIGNIFICANT CHANGE UP (ref 0–0.7)
EOSINOPHIL NFR BLD AUTO: 2.8 % — SIGNIFICANT CHANGE UP (ref 0–8)
GLUCOSE SERPL-MCNC: 103 MG/DL — HIGH (ref 70–99)
HCT VFR BLD CALC: 42.6 % — SIGNIFICANT CHANGE UP (ref 42–52)
HGB BLD-MCNC: 13.2 G/DL — LOW (ref 14–18)
IMM GRANULOCYTES NFR BLD AUTO: 0.5 % — HIGH (ref 0.1–0.3)
LACTATE SERPL-SCNC: 1.7 MMOL/L — SIGNIFICANT CHANGE UP (ref 0.7–2)
LYMPHOCYTES # BLD AUTO: 2.19 K/UL — SIGNIFICANT CHANGE UP (ref 1.2–3.4)
LYMPHOCYTES # BLD AUTO: 25.9 % — SIGNIFICANT CHANGE UP (ref 20.5–51.1)
MCHC RBC-ENTMCNC: 26.3 PG — LOW (ref 27–31)
MCHC RBC-ENTMCNC: 31 G/DL — LOW (ref 32–37)
MCV RBC AUTO: 84.9 FL — SIGNIFICANT CHANGE UP (ref 80–94)
MONOCYTES # BLD AUTO: 0.62 K/UL — HIGH (ref 0.1–0.6)
MONOCYTES NFR BLD AUTO: 7.3 % — SIGNIFICANT CHANGE UP (ref 1.7–9.3)
NEUTROPHILS # BLD AUTO: 5.29 K/UL — SIGNIFICANT CHANGE UP (ref 1.4–6.5)
NEUTROPHILS NFR BLD AUTO: 62.8 % — SIGNIFICANT CHANGE UP (ref 42.2–75.2)
NRBC # BLD: 0 /100 WBCS — SIGNIFICANT CHANGE UP (ref 0–0)
PLATELET # BLD AUTO: 282 K/UL — SIGNIFICANT CHANGE UP (ref 130–400)
POTASSIUM SERPL-MCNC: 4.3 MMOL/L — SIGNIFICANT CHANGE UP (ref 3.5–5)
POTASSIUM SERPL-SCNC: 4.3 MMOL/L — SIGNIFICANT CHANGE UP (ref 3.5–5)
PROT SERPL-MCNC: 7.3 G/DL — SIGNIFICANT CHANGE UP (ref 6–8)
RAPID RVP RESULT: SIGNIFICANT CHANGE UP
RBC # BLD: 5.02 M/UL — SIGNIFICANT CHANGE UP (ref 4.7–6.1)
RBC # FLD: 14.7 % — HIGH (ref 11.5–14.5)
SARS-COV-2 RNA SPEC QL NAA+PROBE: SIGNIFICANT CHANGE UP
SODIUM SERPL-SCNC: 144 MMOL/L — SIGNIFICANT CHANGE UP (ref 135–146)
WBC # BLD: 8.44 K/UL — SIGNIFICANT CHANGE UP (ref 4.8–10.8)
WBC # FLD AUTO: 8.44 K/UL — SIGNIFICANT CHANGE UP (ref 4.8–10.8)

## 2021-08-01 PROCEDURE — 99283 EMERGENCY DEPT VISIT LOW MDM: CPT

## 2021-08-01 PROCEDURE — 99285 EMERGENCY DEPT VISIT HI MDM: CPT

## 2021-08-01 PROCEDURE — 99223 1ST HOSP IP/OBS HIGH 75: CPT

## 2021-08-01 PROCEDURE — 93970 EXTREMITY STUDY: CPT | Mod: 26

## 2021-08-01 RX ORDER — MORPHINE SULFATE 50 MG/1
6 CAPSULE, EXTENDED RELEASE ORAL ONCE
Refills: 0 | Status: DISCONTINUED | OUTPATIENT
Start: 2021-08-01 | End: 2021-08-01

## 2021-08-01 RX ORDER — VANCOMYCIN HCL 1 G
2000 VIAL (EA) INTRAVENOUS ONCE
Refills: 0 | Status: COMPLETED | OUTPATIENT
Start: 2021-08-01 | End: 2021-08-01

## 2021-08-01 RX ORDER — FUROSEMIDE 40 MG
40 TABLET ORAL DAILY
Refills: 0 | Status: DISCONTINUED | OUTPATIENT
Start: 2021-08-01 | End: 2021-08-04

## 2021-08-01 RX ORDER — EMPAGLIFLOZIN 10 MG/1
1 TABLET, FILM COATED ORAL
Qty: 0 | Refills: 0 | DISCHARGE

## 2021-08-01 RX ORDER — AZTREONAM 2 G
2000 VIAL (EA) INJECTION ONCE
Refills: 0 | Status: COMPLETED | OUTPATIENT
Start: 2021-08-01 | End: 2021-08-01

## 2021-08-01 RX ORDER — OXYCODONE HYDROCHLORIDE 5 MG/1
10 TABLET ORAL EVERY 4 HOURS
Refills: 0 | Status: DISCONTINUED | OUTPATIENT
Start: 2021-08-01 | End: 2021-08-04

## 2021-08-01 RX ORDER — ACETAMINOPHEN 500 MG
650 TABLET ORAL EVERY 6 HOURS
Refills: 0 | Status: DISCONTINUED | OUTPATIENT
Start: 2021-08-01 | End: 2021-08-04

## 2021-08-01 RX ORDER — MORPHINE SULFATE 50 MG/1
0 CAPSULE, EXTENDED RELEASE ORAL
Qty: 0 | Refills: 0 | DISCHARGE

## 2021-08-01 RX ORDER — HEPARIN SODIUM 5000 [USP'U]/ML
5000 INJECTION INTRAVENOUS; SUBCUTANEOUS EVERY 8 HOURS
Refills: 0 | Status: DISCONTINUED | OUTPATIENT
Start: 2021-08-01 | End: 2021-08-04

## 2021-08-01 RX ORDER — MORPHINE SULFATE 50 MG/1
2 CAPSULE, EXTENDED RELEASE ORAL ONCE
Refills: 0 | Status: DISCONTINUED | OUTPATIENT
Start: 2021-08-01 | End: 2021-08-01

## 2021-08-01 RX ORDER — MORPHINE SULFATE 50 MG/1
4 CAPSULE, EXTENDED RELEASE ORAL EVERY 6 HOURS
Refills: 0 | Status: DISCONTINUED | OUTPATIENT
Start: 2021-08-01 | End: 2021-08-02

## 2021-08-01 RX ORDER — LOSARTAN POTASSIUM 100 MG/1
100 TABLET, FILM COATED ORAL DAILY
Refills: 0 | Status: DISCONTINUED | OUTPATIENT
Start: 2021-08-01 | End: 2021-08-04

## 2021-08-01 RX ORDER — NICOTINE POLACRILEX 2 MG
1 GUM BUCCAL DAILY
Refills: 0 | Status: DISCONTINUED | OUTPATIENT
Start: 2021-08-01 | End: 2021-08-04

## 2021-08-01 RX ORDER — AZTREONAM 2 G
1000 VIAL (EA) INJECTION EVERY 8 HOURS
Refills: 0 | Status: DISCONTINUED | OUTPATIENT
Start: 2021-08-01 | End: 2021-08-04

## 2021-08-01 RX ORDER — VANCOMYCIN HCL 1 G
1000 VIAL (EA) INTRAVENOUS EVERY 12 HOURS
Refills: 0 | Status: DISCONTINUED | OUTPATIENT
Start: 2021-08-01 | End: 2021-08-02

## 2021-08-01 RX ORDER — METFORMIN HYDROCHLORIDE 850 MG/1
1 TABLET ORAL
Qty: 0 | Refills: 0 | DISCHARGE

## 2021-08-01 RX ADMIN — Medication 50 MILLIGRAM(S): at 22:08

## 2021-08-01 RX ADMIN — OXYCODONE HYDROCHLORIDE 10 MILLIGRAM(S): 5 TABLET ORAL at 22:08

## 2021-08-01 RX ADMIN — MORPHINE SULFATE 2 MILLIGRAM(S): 50 CAPSULE, EXTENDED RELEASE ORAL at 23:25

## 2021-08-01 RX ADMIN — MORPHINE SULFATE 6 MILLIGRAM(S): 50 CAPSULE, EXTENDED RELEASE ORAL at 15:28

## 2021-08-01 RX ADMIN — MORPHINE SULFATE 4 MILLIGRAM(S): 50 CAPSULE, EXTENDED RELEASE ORAL at 20:22

## 2021-08-01 RX ADMIN — Medication 230.77 MILLIGRAM(S): at 13:27

## 2021-08-01 RX ADMIN — Medication 100 MILLIGRAM(S): at 13:27

## 2021-08-01 RX ADMIN — MORPHINE SULFATE 6 MILLIGRAM(S): 50 CAPSULE, EXTENDED RELEASE ORAL at 13:49

## 2021-08-01 RX ADMIN — OXYCODONE HYDROCHLORIDE 10 MILLIGRAM(S): 5 TABLET ORAL at 23:08

## 2021-08-01 RX ADMIN — MORPHINE SULFATE 4 MILLIGRAM(S): 50 CAPSULE, EXTENDED RELEASE ORAL at 20:07

## 2021-08-01 RX ADMIN — MORPHINE SULFATE 2 MILLIGRAM(S): 50 CAPSULE, EXTENDED RELEASE ORAL at 23:10

## 2021-08-01 NOTE — H&P ADULT - HISTORY OF PRESENT ILLNESS
54yo obese M w/ PMH as below p/w ~2 weeks of b/l LE swelling and erythema. Pt reports he saw his PMD for these complaints and was started on PO clindamycin and doxycycline. However, pt began experiencing worsening pain in LE's over the last 2 days (R>L), progressing to 10/10 in intensity this AM. Pt also noted foul-smelling drainage from RLE this AM, prompting him to present to ED. States he has experienced these sx before and recognized that he probably needed to come to the hospital for IV abx. Reports current sx also accompanied by mild dyspnea x2 days, mild HA since yesterday, and intermittent nausea for several days. Denies any recent fever, chills, dizziness, CP, abdominal pain, vomiting, diarrhea, or other acute sx. States emphatically he does not have DM and does not take any DM meds (including metformin/jardiance) despite his chart saying otherwise. Also states he takes furosemide for b/l leg swelling but does not have dx of CHF.

## 2021-08-01 NOTE — ED PROVIDER NOTE - CLINICAL SUMMARY MEDICAL DECISION MAKING FREE TEXT BOX
Pt presented with infected venous stasis ulcers of the lower extremity. Failure of oral antibiotics. Will need admission for IV antibiotics. Accepted by Dr. Pham

## 2021-08-01 NOTE — H&P ADULT - NSHPLABSRESULTS_GEN_ALL_CORE
13.2   8.44  )-----------( 282      ( 01 Aug 2021 13:55 )             42.6     08-01    144  |  103  |  17  ----------------------------<  103<H>  4.3   |  30  |  0.9    Ca    9.8      01 Aug 2021 13:55    TPro  7.3  /  Alb  4.4  /  TBili  0.2  /  DBili  x   /  AST  13  /  ALT  16  /  AlkPhos  100  08-01      CAPILLARY BLOOD GLUCOSE

## 2021-08-01 NOTE — ED PROVIDER NOTE - OBJECTIVE STATEMENT
54 y/o male presents to the Ed with right lower calf swelling, redness, ulceration with foul smelling drainage unrelieved by po doxycycline and clindamycin given to him by pmd. patient states recent application of unna boot has worsened symptoms. patient denies any diabetes. patient with prior hx of chronic back pain currently on pain management. patient denies any fevers, streaking up the leg. patient denies any tingling to toes. no weakness to legs. no vomiting or diarrhea.

## 2021-08-01 NOTE — ED ADULT TRIAGE NOTE - CHIEF COMPLAINT QUOTE
Patient states "I have a cellulitis infection in my right leg that started three weeks ago". Patient states he was on oral antibiotics but it isn't working.

## 2021-08-01 NOTE — H&P ADULT - NSHPPHYSICALEXAM_GEN_ALL_CORE
PHYSICAL EXAM:    General: obese, AAOx3, NAD    HEENT: NCAT, no JVD    Thorax: CTA b/l    Heart: normal S1/S2, rrr, no m/r/g    GI: BS normoactive, s/nt/nd    Extremities: 3+ b/l LE edema to knees. Chronic b/l LE venous stasis changes. RLE w/ large (~4cm) weeping ulcer outer aspect of ankle. Left calf covered in ACE bandage. WWP    Neurologic: no focal deficits

## 2021-08-01 NOTE — ED PROVIDER NOTE - CARE PLAN
Principal Discharge DX:	Venous stasis ulcers of both lower extremities  Secondary Diagnosis:	Cellulitis and abscess of leg

## 2021-08-01 NOTE — H&P ADULT - NSHPSOCIALHISTORY_GEN_ALL_CORE
, lives with wife. On disability (former ). Smokes 3/4 packs/day x15 years. Denies any EtOH or illicit drug use.

## 2021-08-01 NOTE — ED PROVIDER NOTE - PHYSICAL EXAMINATION
Vital Signs: I have reviewed the initial vital signs.  Constitutional: well-nourished, no acute distress, normocephalic  Eyes: PERRLA, EOMI, clear conjunctiva  ENT: MMM  Cardiovascular: regular rate, regular rhythm, no murmur appreciated  Respiratory: unlabored respiratory effort, clear to auscultation bilaterally  Gastrointestinal: soft, non-tender, non-distended  abdomen, obsese  Musculoskeletal: supple neck, +b/l lower extermity lymphadema , right lateral calf +ulceration with yellowish draiange foul smelling , no crepitation, no   Integumentary: warm, dry, no rash  Neurologic: awake, alert, cranial nerves II-XII grossly intact, extremities’ motor and sensory functions grossly intact, no focal deficits, GCS 15  heme: no lymphangitis

## 2021-08-01 NOTE — ED PROVIDER NOTE - ATTENDING CONTRIBUTION TO CARE
Pt reports chronic leg ulcers that intermittently get infected. He was recently seen by his Doctor who found and infection and placed pt on clindamycin and doxycycline. Pt however notes in spite of antibiotics the wound now have a foul odor. No fever. On exam there are chronic deep ulcerations with foul smelling purulent discharge.

## 2021-08-01 NOTE — H&P ADULT - PROBLEM SELECTOR PLAN 2
- f/u b/l venous duplex  - continue vanc/aztreonam  - f/u blood cx  - consider ID consult  - continue to monitor fever/WBC - f/u b/l venous duplex  - continue vanc/aztreonam  - f/u blood cx  - ID consult  - continue to monitor fever/WBC

## 2021-08-01 NOTE — ED PROVIDER NOTE - NS ED ROS FT
Review of Systems    Constitutional: (-) fever/ chills (-)loss of appetite  ENT: (-) epistaxis (-) sore throat (-) ear pain  Cardiovascular: (-) chest pain, (-) syncope (-) palpitations  Respiratory: (-) cough, (-) shortness of breath  Gastrointestinal: (-) vomiting, (-) diarrhea (-) abdominal pain  : (-) dysuria , hematuria   neck: (-) neck pain or stiffness  Musculoskeletal:  (-) back pain, (-) joint pain   Integumentary: (+)redness and swelling to leg   Neurological: (-) headache, (-) altered mental status

## 2021-08-01 NOTE — ED ADULT NURSE NOTE - NS ED NURSE LEVEL OF CONSCIOUSNESS MENTAL STATUS
[Return Date: _____] : as of [unfilled].  This has been discussed in detail with ~Iqra~ and ~he/she~ understands this. Awake/Alert/Cooperative

## 2021-08-02 LAB
A1C WITH ESTIMATED AVERAGE GLUCOSE RESULT: 7.3 % — HIGH (ref 4–5.6)
ANION GAP SERPL CALC-SCNC: 10 MMOL/L — SIGNIFICANT CHANGE UP (ref 7–14)
BUN SERPL-MCNC: 15 MG/DL — SIGNIFICANT CHANGE UP (ref 10–20)
CALCIUM SERPL-MCNC: 9.4 MG/DL — SIGNIFICANT CHANGE UP (ref 8.5–10.1)
CHLORIDE SERPL-SCNC: 103 MMOL/L — SIGNIFICANT CHANGE UP (ref 98–110)
CO2 SERPL-SCNC: 29 MMOL/L — SIGNIFICANT CHANGE UP (ref 17–32)
CREAT SERPL-MCNC: 0.8 MG/DL — SIGNIFICANT CHANGE UP (ref 0.7–1.5)
ERYTHROCYTE [SEDIMENTATION RATE] IN BLOOD: 20 MM/HR — HIGH (ref 0–10)
ESTIMATED AVERAGE GLUCOSE: 163 MG/DL — HIGH (ref 68–114)
GLUCOSE SERPL-MCNC: 126 MG/DL — HIGH (ref 70–99)
HCT VFR BLD CALC: 40.1 % — LOW (ref 42–52)
HGB BLD-MCNC: 12.3 G/DL — LOW (ref 14–18)
MCHC RBC-ENTMCNC: 26.1 PG — LOW (ref 27–31)
MCHC RBC-ENTMCNC: 30.7 G/DL — LOW (ref 32–37)
MCV RBC AUTO: 85 FL — SIGNIFICANT CHANGE UP (ref 80–94)
NRBC # BLD: 0 /100 WBCS — SIGNIFICANT CHANGE UP (ref 0–0)
PLATELET # BLD AUTO: 219 K/UL — SIGNIFICANT CHANGE UP (ref 130–400)
POTASSIUM SERPL-MCNC: 4.9 MMOL/L — SIGNIFICANT CHANGE UP (ref 3.5–5)
POTASSIUM SERPL-SCNC: 4.9 MMOL/L — SIGNIFICANT CHANGE UP (ref 3.5–5)
RBC # BLD: 4.72 M/UL — SIGNIFICANT CHANGE UP (ref 4.7–6.1)
RBC # FLD: 14.9 % — HIGH (ref 11.5–14.5)
SODIUM SERPL-SCNC: 142 MMOL/L — SIGNIFICANT CHANGE UP (ref 135–146)
WBC # BLD: 7.04 K/UL — SIGNIFICANT CHANGE UP (ref 4.8–10.8)
WBC # FLD AUTO: 7.04 K/UL — SIGNIFICANT CHANGE UP (ref 4.8–10.8)

## 2021-08-02 PROCEDURE — 99233 SBSQ HOSP IP/OBS HIGH 50: CPT

## 2021-08-02 RX ORDER — LINEZOLID 600 MG/300ML
INJECTION, SOLUTION INTRAVENOUS
Refills: 0 | Status: DISCONTINUED | OUTPATIENT
Start: 2021-08-03 | End: 2021-08-04

## 2021-08-02 RX ORDER — LINEZOLID 600 MG/300ML
600 INJECTION, SOLUTION INTRAVENOUS ONCE
Refills: 0 | Status: COMPLETED | OUTPATIENT
Start: 2021-08-02 | End: 2021-08-03

## 2021-08-02 RX ORDER — LINEZOLID 600 MG/300ML
600 INJECTION, SOLUTION INTRAVENOUS EVERY 12 HOURS
Refills: 0 | Status: DISCONTINUED | OUTPATIENT
Start: 2021-08-03 | End: 2021-08-04

## 2021-08-02 RX ORDER — HYDROMORPHONE HYDROCHLORIDE 2 MG/ML
1 INJECTION INTRAMUSCULAR; INTRAVENOUS; SUBCUTANEOUS EVERY 4 HOURS
Refills: 0 | Status: DISCONTINUED | OUTPATIENT
Start: 2021-08-02 | End: 2021-08-04

## 2021-08-02 RX ADMIN — Medication 40 MILLIGRAM(S): at 06:03

## 2021-08-02 RX ADMIN — Medication 50 MILLIGRAM(S): at 22:10

## 2021-08-02 RX ADMIN — HYDROMORPHONE HYDROCHLORIDE 1 MILLIGRAM(S): 2 INJECTION INTRAMUSCULAR; INTRAVENOUS; SUBCUTANEOUS at 22:33

## 2021-08-02 RX ADMIN — HYDROMORPHONE HYDROCHLORIDE 1 MILLIGRAM(S): 2 INJECTION INTRAMUSCULAR; INTRAVENOUS; SUBCUTANEOUS at 13:37

## 2021-08-02 RX ADMIN — HYDROMORPHONE HYDROCHLORIDE 1 MILLIGRAM(S): 2 INJECTION INTRAMUSCULAR; INTRAVENOUS; SUBCUTANEOUS at 15:11

## 2021-08-02 RX ADMIN — Medication 50 MILLIGRAM(S): at 06:02

## 2021-08-02 RX ADMIN — OXYCODONE HYDROCHLORIDE 10 MILLIGRAM(S): 5 TABLET ORAL at 06:00

## 2021-08-02 RX ADMIN — MORPHINE SULFATE 4 MILLIGRAM(S): 50 CAPSULE, EXTENDED RELEASE ORAL at 03:01

## 2021-08-02 RX ADMIN — HYDROMORPHONE HYDROCHLORIDE 1 MILLIGRAM(S): 2 INJECTION INTRAMUSCULAR; INTRAVENOUS; SUBCUTANEOUS at 22:09

## 2021-08-02 RX ADMIN — Medication 1 PATCH: at 17:59

## 2021-08-02 RX ADMIN — MORPHINE SULFATE 4 MILLIGRAM(S): 50 CAPSULE, EXTENDED RELEASE ORAL at 09:42

## 2021-08-02 RX ADMIN — Medication 250 MILLIGRAM(S): at 17:33

## 2021-08-02 RX ADMIN — MORPHINE SULFATE 4 MILLIGRAM(S): 50 CAPSULE, EXTENDED RELEASE ORAL at 03:22

## 2021-08-02 RX ADMIN — HYDROMORPHONE HYDROCHLORIDE 1 MILLIGRAM(S): 2 INJECTION INTRAMUSCULAR; INTRAVENOUS; SUBCUTANEOUS at 17:37

## 2021-08-02 RX ADMIN — HYDROMORPHONE HYDROCHLORIDE 1 MILLIGRAM(S): 2 INJECTION INTRAMUSCULAR; INTRAVENOUS; SUBCUTANEOUS at 18:06

## 2021-08-02 RX ADMIN — Medication 250 MILLIGRAM(S): at 06:02

## 2021-08-02 RX ADMIN — Medication 50 MILLIGRAM(S): at 14:08

## 2021-08-02 RX ADMIN — OXYCODONE HYDROCHLORIDE 10 MILLIGRAM(S): 5 TABLET ORAL at 07:00

## 2021-08-02 RX ADMIN — Medication 1 PATCH: at 11:11

## 2021-08-02 RX ADMIN — MORPHINE SULFATE 4 MILLIGRAM(S): 50 CAPSULE, EXTENDED RELEASE ORAL at 10:17

## 2021-08-02 RX ADMIN — LOSARTAN POTASSIUM 100 MILLIGRAM(S): 100 TABLET, FILM COATED ORAL at 06:03

## 2021-08-02 NOTE — CONSULT NOTE ADULT - SUBJECTIVE AND OBJECTIVE BOX
Patient is a 55y old  Male with PMH of DM, HTN, presents to the ER for evaluation of 2 weeks of b/l LE swelling and erythema. Pt reports he saw his PMD for these complaints and was started on PO clindamycin and doxycycline. However, pt began experiencing worsening pain in LE's over the last 2 days (R>L), progressing to 10/10 in intensity and foul-smelling drainage from RLE which prompting him to present to ER for IV abx. On admission, He has no fever and Leukocytosis, and started on Aztreonam and IV Vancomycin. The ID consult requested to assist with further evaluation and antibiotic management.       REVIEW OF SYSTEMS: Total of twelve systems have been reviewed with patient and found to be negative unless mentioned in HPI        PAST MEDICAL & SURGICAL HISTORY:  Hypertension  Disc disease, degenerative, lumbar or lumbosacral  Obstructive sleep apnea  Morbid obesity  DM (diabetes mellitus)  Spinal cord stimulator status  H/O arthroscopy of right knee  History of excision of pilonidal cyst        SOCIAL HISTORY  Alcohol: Does not drink  Tobacco: Does not smoke  Illicit substance use: None      FAMILY HISTORY: Non contributory to the present illness        ALLERGIES: IV Contrast (Sneezing (Mod to Severe))  penicillin (Unknown)      Vital Signs Last 24 Hrs  T(C): 36.1 (02 Aug 2021 12:57), Max: 36.3 (01 Aug 2021 18:58)  T(F): 96.9 (02 Aug 2021 12:57), Max: 97.4 (01 Aug 2021 18:58)  HR: 75 (02 Aug 2021 12:57) (66 - 93)  BP: 163/85 (02 Aug 2021 12:57) (141/71 - 185/87)  BP(mean): --  RR: 18 (02 Aug 2021 12:57) (18 - 18)  SpO2: 96% (01 Aug 2021 18:21) (96% - 96%)      PHYSICAL EXAM:  GENERAL: Not in distress   CHEST/LUNG: Not using accessory muscles   HEART: s1 and s2 present  ABDOMEN:  Nontender and  Nondistended  EXTREMITIES: No pedal  edema  CNS: Awake and Alert      LABS:                        12.3   7.04  )-----------( 219      ( 02 Aug 2021 06:04 )             40.1         08-02    142  |  103  |  15  ----------------------------<  126<H>  4.9   |  29  |  0.8    Ca    9.4      02 Aug 2021 06:04    TPro  7.3  /  Alb  4.4  /  TBili  0.2  /  DBili  x   /  AST  13  /  ALT  16  /  AlkPhos  100  08-01        MEDICATIONS  (STANDING):  aztreonam  IVPB 1000 milliGRAM(s) IV Intermittent every 8 hours  furosemide    Tablet 40 milliGRAM(s) Oral daily  heparin   Injectable 5000 Unit(s) SubCutaneous every 8 hours  losartan 100 milliGRAM(s) Oral daily  nicotine - 21 mG/24Hr(s) Patch 1 patch Transdermal daily  vancomycin  IVPB 1000 milliGRAM(s) IV Intermittent every 12 hours    MEDICATIONS  (PRN):  acetaminophen   Tablet .. 650 milliGRAM(s) Oral every 6 hours PRN Temp greater or equal to 38.5C (101.3F), Mild Pain (1 - 3)  HYDROmorphone  Injectable 1 milliGRAM(s) IV Push every 4 hours PRN Severe Pain (7 - 10)  oxyCODONE    IR 10 milliGRAM(s) Oral every 4 hours PRN Moderate Pain (4 - 6)        RADIOLOGY & ADDITIONAL TESTS:    < from: [facility] Duplex Lower Ext Vein Scan, Bilat (08.01.21 @ 17:28) >  Limited studydue to edema, body habitus, ulcers on the calves and obesity.  Right femoral vein in the mid and distal segment, right popliteal vein, and left popliteal vein and peroneal veins could not be visualized.  No evidence of deep venous thrombosis or superficial thrombophlebitis in the visualized parts of bilateral lower extremities.        MICROBIOLOGY DATA:    Respiratory Viral Panel with COVID-19 by JOSH (08.01.21 @ 13:22)   Rapid RVP Result: [facility]   SARS-CoV-2: [facility]:                      Patient is a 55y old  Male with PMH of DM, HTN, presents to the ER for evaluation of 2 weeks of b/l LE swelling and erythema. Pt reports he saw his PMD for these complaints and was started on PO clindamycin and doxycycline. However, pt began experiencing worsening pain in LE's over the last 2 days (R>L), progressing to 10/10 in intensity and foul-smelling drainage from RLE which prompting him to present to ER for IV abx. On admission, He has no fever and Leukocytosis, and started on Aztreonam and IV Vancomycin. The ID consult requested to assist with further evaluation and antibiotic management.       REVIEW OF SYSTEMS: Total of twelve systems have been reviewed with patient and found to be negative unless mentioned in HPI        PAST MEDICAL & SURGICAL HISTORY:  Hypertension  Disc disease, degenerative, lumbar or lumbosacral  Obstructive sleep apnea  Morbid obesity  DM (diabetes mellitus)  Spinal cord stimulator status  H/O arthroscopy of right knee  History of excision of pilonidal cyst        SOCIAL HISTORY  Alcohol: Does not drink  Tobacco: Does not smoke  Illicit substance use: None      FAMILY HISTORY: Non contributory to the present illness        ALLERGIES: IV Contrast (Sneezing (Mod to Severe))  penicillin (Unknown)      Vital Signs Last 24 Hrs  T(C): 36.1 (02 Aug 2021 12:57), Max: 36.3 (01 Aug 2021 18:58)  T(F): 96.9 (02 Aug 2021 12:57), Max: 97.4 (01 Aug 2021 18:58)  HR: 75 (02 Aug 2021 12:57) (66 - 93)  BP: 163/85 (02 Aug 2021 12:57) (141/71 - 185/87)  BP(mean): --  RR: 18 (02 Aug 2021 12:57) (18 - 18)  SpO2: 96% (01 Aug 2021 18:21) (96% - 96%)      PHYSICAL EXAM:  GENERAL: Not in distress   CHEST/LUNG: Not using accessory muscles   HEART: s1 and s2 present  ABDOMEN:  grossly obese  EXTREMITIES: LE swollen, mild erythematous and warmth to touch and bandage in placed  CNS: Awake and Alert      LABS:                        12.3   7.04  )-----------( 219      ( 02 Aug 2021 06:04 )             40.1         08-02    142  |  103  |  15  ----------------------------<  126<H>  4.9   |  29  |  0.8    Ca    9.4      02 Aug 2021 06:04    TPro  7.3  /  Alb  4.4  /  TBili  0.2  /  DBili  x   /  AST  13  /  ALT  16  /  AlkPhos  100  08-01        MEDICATIONS  (STANDING):    aztreonam  IVPB 1000 milliGRAM(s) IV Intermittent every 8 hours  furosemide    Tablet 40 milliGRAM(s) Oral daily  heparin   Injectable 5000 Unit(s) SubCutaneous every 8 hours  losartan 100 milliGRAM(s) Oral daily  nicotine - 21 mG/24Hr(s) Patch 1 patch Transdermal daily  vancomycin  IVPB 1000 milliGRAM(s) IV Intermittent every 12 hours    MEDICATIONS  (PRN):  acetaminophen   Tablet .. 650 milliGRAM(s) Oral every 6 hours PRN Temp greater or equal to 38.5C (101.3F), Mild Pain (1 - 3)  HYDROmorphone  Injectable 1 milliGRAM(s) IV Push every 4 hours PRN Severe Pain (7 - 10)  oxyCODONE    IR 10 milliGRAM(s) Oral every 4 hours PRN Moderate Pain (4 - 6)        RADIOLOGY & ADDITIONAL TESTS:    < from: [facility] Duplex Lower Ext Vein Scan, Bilat (08.01.21 @ 17:28) >  Limited studydue to edema, body habitus, ulcers on the calves and obesity.  Right femoral vein in the mid and distal segment, right popliteal vein, and left popliteal vein and peroneal veins could not be visualized.  No evidence of deep venous thrombosis or superficial thrombophlebitis in the visualized parts of bilateral lower extremities.        MICROBIOLOGY DATA:    Respiratory Viral Panel with COVID-19 by JOSH (08.01.21 @ 13:22)   Rapid RVP Result: [facility]   SARS-CoV-2: [facility]:

## 2021-08-02 NOTE — ADVANCED PRACTICE NURSE CONSULT - RECOMMEDATIONS
Plan: Clean b/l calf with wound cleanser, pat dry then apply xeroform with non adhering and Kerlix dressing           Follow up with podiatry  after discharge           Pressure ulcer preventive  measures         skin care   Asses wound and inform primary provider of any changes   Case discussed with primary Rn  Wound/ ostomy specialist  to f/u as needed     Offloading: [ ] Frequent position changes [ ] Devices/Equipment  Cleansing: [ ] Saline [ ] Soap/Water [ ] Other: ______  Topicals: [ ] Barrier Cream [ ] Antimicrobial [ ] Enzymatic Wound Debridement  Dressings: [ ] Dry, sterile [ ] Foam [ ] Absorbant Pads [ ] Collagenase

## 2021-08-02 NOTE — PROGRESS NOTE ADULT - ASSESSMENT
54yo M w/ PMH and HPI as above now being admitted for b/l LE cellulitis     Problem/Plan - 1:  ·  Problem: Venous stasis ulcers of both lower extremities.  Plan:   - continue vanc/aztreonam  - f/u blood cx.   -Venous duplex neg for DVT     Problem/Plan - 2:  ·  Problem: Cellulitis and abscess of leg.  Plan:   -Failed outpatient treatment with Doxy and clinda  - continue vanc/aztreonam  - f/u blood cx  - ID consult  - continue to monitor fever/WBC.     Problem/Plan - 3:  ·  Problem: DM (diabetes mellitus).  Plan: - pt maintains he does not have DM  - f/u HbA1c.      Problem/Plan - 4:  ·  Problem: Disc disease, degenerative, lumbar or lumbosacral.  Plan: - continue tylenol/oxycodone/morphine PRN for mild/moderate/severe pain  - CTM.      Problem/Plan - 5:  ·  Problem: Hypertension.  Plan: - continue home losartan  - continue to monitor BP, HR.    56yo M w/ PMH and HPI as above now being admitted for b/l LE cellulitis     Problem/Plan - 1:  ·  Problem: Venous stasis ulcers of both lower extremities.  Plan:   - continue vanc/aztreonam  - f/u blood cx.   -Venous duplex neg for DVT     Problem/Plan - 2:  ·  Problem: Cellulitis and abscess of leg.  Plan:   -Failed outpatient treatment with Doxy and clinda  - continue vanc/aztreonam  - f/u blood cx  - ID consult  - continue to monitor fever/WBC.     Problem/Plan - 3:  ·  Problem: DM (diabetes mellitus).  Plan: - pt maintains he does not have DM  - f/u HbA1c.      Problem/Plan - 4:  ·  Problem: Disc disease, degenerative, lumbar or lumbosacral.  Plan: - continue tylenol/oxycodone/morphine PRN for mild/moderate/severe pain  - CTM.      Problem/Plan - 5:  ·  Problem: Hypertension.  Plan: - continue home losartan  - continue to monitor BP, HR.

## 2021-08-02 NOTE — ADVANCED PRACTICE NURSE CONSULT - ASSESSMENT
56yo obese M w/ PMH as below p/w ~2 weeks of b/l LE swelling and erythema. Pt reports he saw his PMD for these complaints and was started on PO clindamycin and doxycycline. However, pt began experiencing worsening pain in LE's over the last 2 days (R>L), progressing to 10/10 in intensity this AM. Pt also noted foul-smelling drainage from RLE this AM, prompting him to present to ED. States he has experienced these sx before and recognized that he probably needed to come to the hospital for IV abx. Reports current sx also accompanied by mild dyspnea x2 days, mild HA since yesterday, and intermittent nausea for several days. Denies any recent fever, chills, dizziness, CP, abdominal pain, vomiting, diarrhea, or other acute sx. States emphatically he does not have DM and does not take any DM meds (including metformin/jardiance) despite his chart saying otherwise. Also states he takes furosemide for b/l leg swelling but does not have dx of CHF.     PAST MEDICAL & SURGICAL HISTORY:  Hypertension  Disc disease, degenerative, lumbar or lumbosacral  Obstructive sleep apnea  Morbid obesity  DM (diabetes mellitus)  Spinal cord stimulator status  H/O arthroscopy of right knee  History of excision of pilonidal cyst    Assessment:   Patient received in recliner chair, A/o responds appropriately to verbal command                       B/l Le venous stasis ulcers      Wound #1  Location: L calf   Size: 4x3   Tissue Description :   [ ] Necrotic   [ ] Slough   [ ] Infected   [ ] Granulation (firm, beefy red tissue)   [ ] Hypergranulation (soft, gelatinous)  [x ] Poor-Quality Granulation (pale, grey/brown/red granulation tissue)   [ ] Epithelium (pink/mauve at wound edges)  [ ] Macerated  [ ] Other: _______  Wound Exudate : Moderate    Wound Edge):  Inatct  Periwound Condition :   [ ] Maceration [ ] Excoriation [x ] Dry skin [x] Hyperkeratosis [ ] Callus [ ] Eczema [ ] Other: _______    Wound #2  Location: R  calf   Size: 5x4  Tissue Description :   [ ] Necrotic   [ ] Slough   [ ] Infected   [ ] Granulation (firm, beefy red tissue)   [ ] Hypergranulation (soft, gelatinous)  [x ] Poor-Quality Granulation (pale, grey/brown/red granulation tissue)   [ ] Epithelium (pink/mauve at wound edges)  [ ] Macerated  [ ] Other: _______  Wound Exudate : Moderate    Wound Edge):  Inatct  Periwound Condition :   [ ] Maceration [ ] Excoriation [x ] Dry skin [x] Hyperkeratosis [ ] Callus [ ] Eczema [ ] Other: _______

## 2021-08-02 NOTE — CONSULT NOTE ADULT - ASSESSMENT
Patient is a 55y old  Male with PMH of DM, HTN, presents to the ER for evaluation of 2 weeks of b/l LE swelling and erythema. Pt reports he saw his PMD for these complaints and was started on PO clindamycin and doxycycline. However, pt began experiencing worsening pain in LE's over the last 2 days (R>L), progressing to 10/10 in intensity and foul-smelling drainage from RLE which prompting him to present to ER for IV abx. On admission, He has no fever and Leukocytosis, and started on Aztreonam and IV Vancomycin. The ID consult requested to assist with further evaluation and antibiotic management.     # LE cellulitis    would recommend:    1. Follow up Blood culture  2. Continue Vancomycin and keep level between 15 to 20  3.       will follow the patient with you and make further recommendation based on the clinical course and Lab results  Thank you for the opportunity to participate in Mr. NO's care      Attending Attestation:    Spent more than 65 minutes on total encounter, more than 50 % of the visit was spent counseling and/or coordinating care by the Attending physician.       Patient is a 55y old  Male with PMH of DM, HTN, presents to the ER for evaluation of 2 weeks of b/l LE swelling and erythema. Pt reports he saw his PMD for these complaints and was started on PO clindamycin and doxycycline. However, pt began experiencing worsening pain in LE's over the last 2 days (R>L), progressing to 10/10 in intensity and foul-smelling drainage from RLE which prompting him to present to ER for IV abx. On admission, He has no fever and Leukocytosis, and started on Aztreonam and IV Vancomycin. The ID consult requested to assist with further evaluation and antibiotic management.     # LE cellulitis  # Morbid obesity    would recommend:    1. Follow up Blood culture  2. Continue Aztreonam for now  3. Change IV Vancomycin to Linezolid since it would be difficult to dose Vancomycin in a obese patient  4. Continue wound care  5. Keep LE elevated    d/w patient     will follow the patient with you and make further recommendation based on the clinical course and Lab results  Thank you for the opportunity to participate in Mr. NO's care      Attending Attestation:    Spent more than 65 minutes on total encounter, more than 50 % of the visit was spent counseling and/or coordinating care by the Attending physician.

## 2021-08-02 NOTE — CHART NOTE - NSCHARTNOTEFT_GEN_A_CORE
Patient still with continued complaints of pain, no relief from MSO4 4mg IVP.  No relief from Oxycodone IR.  Will d/c MSO4 and change to Hydromorphone 4mg IVP q4h PRN

## 2021-08-03 LAB
ALBUMIN SERPL ELPH-MCNC: 3.9 G/DL — SIGNIFICANT CHANGE UP (ref 3.5–5.2)
ALP SERPL-CCNC: 83 U/L — SIGNIFICANT CHANGE UP (ref 30–115)
ALT FLD-CCNC: 15 U/L — SIGNIFICANT CHANGE UP (ref 0–41)
ANION GAP SERPL CALC-SCNC: 10 MMOL/L — SIGNIFICANT CHANGE UP (ref 7–14)
AST SERPL-CCNC: 11 U/L — SIGNIFICANT CHANGE UP (ref 0–41)
BASOPHILS # BLD AUTO: 0.05 K/UL — SIGNIFICANT CHANGE UP (ref 0–0.2)
BASOPHILS NFR BLD AUTO: 0.8 % — SIGNIFICANT CHANGE UP (ref 0–1)
BILIRUB SERPL-MCNC: 0.3 MG/DL — SIGNIFICANT CHANGE UP (ref 0.2–1.2)
BUN SERPL-MCNC: 15 MG/DL — SIGNIFICANT CHANGE UP (ref 10–20)
CALCIUM SERPL-MCNC: 9.2 MG/DL — SIGNIFICANT CHANGE UP (ref 8.5–10.1)
CHLORIDE SERPL-SCNC: 102 MMOL/L — SIGNIFICANT CHANGE UP (ref 98–110)
CO2 SERPL-SCNC: 27 MMOL/L — SIGNIFICANT CHANGE UP (ref 17–32)
COVID-19 SPIKE DOMAIN AB INTERP: POSITIVE
COVID-19 SPIKE DOMAIN ANTIBODY RESULT: >250 U/ML — HIGH
CREAT SERPL-MCNC: 0.9 MG/DL — SIGNIFICANT CHANGE UP (ref 0.7–1.5)
CRP SERPL-MCNC: 21 MG/L — HIGH
EOSINOPHIL # BLD AUTO: 0.2 K/UL — SIGNIFICANT CHANGE UP (ref 0–0.7)
EOSINOPHIL NFR BLD AUTO: 3.2 % — SIGNIFICANT CHANGE UP (ref 0–8)
GLUCOSE SERPL-MCNC: 150 MG/DL — HIGH (ref 70–99)
HCT VFR BLD CALC: 39 % — LOW (ref 42–52)
HGB BLD-MCNC: 12.1 G/DL — LOW (ref 14–18)
IMM GRANULOCYTES NFR BLD AUTO: 0.2 % — SIGNIFICANT CHANGE UP (ref 0.1–0.3)
LYMPHOCYTES # BLD AUTO: 1.8 K/UL — SIGNIFICANT CHANGE UP (ref 1.2–3.4)
LYMPHOCYTES # BLD AUTO: 28.7 % — SIGNIFICANT CHANGE UP (ref 20.5–51.1)
MCHC RBC-ENTMCNC: 26.2 PG — LOW (ref 27–31)
MCHC RBC-ENTMCNC: 31 G/DL — LOW (ref 32–37)
MCV RBC AUTO: 84.4 FL — SIGNIFICANT CHANGE UP (ref 80–94)
MONOCYTES # BLD AUTO: 0.47 K/UL — SIGNIFICANT CHANGE UP (ref 0.1–0.6)
MONOCYTES NFR BLD AUTO: 7.5 % — SIGNIFICANT CHANGE UP (ref 1.7–9.3)
NEUTROPHILS # BLD AUTO: 3.74 K/UL — SIGNIFICANT CHANGE UP (ref 1.4–6.5)
NEUTROPHILS NFR BLD AUTO: 59.6 % — SIGNIFICANT CHANGE UP (ref 42.2–75.2)
NRBC # BLD: 0 /100 WBCS — SIGNIFICANT CHANGE UP (ref 0–0)
PLATELET # BLD AUTO: 242 K/UL — SIGNIFICANT CHANGE UP (ref 130–400)
POTASSIUM SERPL-MCNC: 4.1 MMOL/L — SIGNIFICANT CHANGE UP (ref 3.5–5)
POTASSIUM SERPL-SCNC: 4.1 MMOL/L — SIGNIFICANT CHANGE UP (ref 3.5–5)
PROT SERPL-MCNC: 6.4 G/DL — SIGNIFICANT CHANGE UP (ref 6–8)
RBC # BLD: 4.62 M/UL — LOW (ref 4.7–6.1)
RBC # FLD: 14.7 % — HIGH (ref 11.5–14.5)
SARS-COV-2 IGG+IGM SERPL QL IA: >250 U/ML — HIGH
SARS-COV-2 IGG+IGM SERPL QL IA: POSITIVE
SODIUM SERPL-SCNC: 139 MMOL/L — SIGNIFICANT CHANGE UP (ref 135–146)
VANCOMYCIN TROUGH SERPL-MCNC: <4 UG/ML — LOW (ref 5–10)
WBC # BLD: 6.27 K/UL — SIGNIFICANT CHANGE UP (ref 4.8–10.8)
WBC # FLD AUTO: 6.27 K/UL — SIGNIFICANT CHANGE UP (ref 4.8–10.8)

## 2021-08-03 PROCEDURE — 76937 US GUIDE VASCULAR ACCESS: CPT | Mod: 26,59

## 2021-08-03 PROCEDURE — 36573 INSJ PICC RS&I 5 YR+: CPT

## 2021-08-03 PROCEDURE — 99233 SBSQ HOSP IP/OBS HIGH 50: CPT

## 2021-08-03 RX ADMIN — HYDROMORPHONE HYDROCHLORIDE 1 MILLIGRAM(S): 2 INJECTION INTRAMUSCULAR; INTRAVENOUS; SUBCUTANEOUS at 03:16

## 2021-08-03 RX ADMIN — Medication 1 PATCH: at 11:33

## 2021-08-03 RX ADMIN — OXYCODONE HYDROCHLORIDE 10 MILLIGRAM(S): 5 TABLET ORAL at 18:48

## 2021-08-03 RX ADMIN — OXYCODONE HYDROCHLORIDE 10 MILLIGRAM(S): 5 TABLET ORAL at 10:14

## 2021-08-03 RX ADMIN — OXYCODONE HYDROCHLORIDE 10 MILLIGRAM(S): 5 TABLET ORAL at 18:47

## 2021-08-03 RX ADMIN — OXYCODONE HYDROCHLORIDE 10 MILLIGRAM(S): 5 TABLET ORAL at 04:13

## 2021-08-03 RX ADMIN — Medication 1 PATCH: at 19:00

## 2021-08-03 RX ADMIN — HYDROMORPHONE HYDROCHLORIDE 1 MILLIGRAM(S): 2 INJECTION INTRAMUSCULAR; INTRAVENOUS; SUBCUTANEOUS at 19:48

## 2021-08-03 RX ADMIN — HYDROMORPHONE HYDROCHLORIDE 1 MILLIGRAM(S): 2 INJECTION INTRAMUSCULAR; INTRAVENOUS; SUBCUTANEOUS at 02:55

## 2021-08-03 RX ADMIN — HYDROMORPHONE HYDROCHLORIDE 1 MILLIGRAM(S): 2 INJECTION INTRAMUSCULAR; INTRAVENOUS; SUBCUTANEOUS at 08:22

## 2021-08-03 RX ADMIN — HYDROMORPHONE HYDROCHLORIDE 1 MILLIGRAM(S): 2 INJECTION INTRAMUSCULAR; INTRAVENOUS; SUBCUTANEOUS at 14:21

## 2021-08-03 RX ADMIN — Medication 50 MILLIGRAM(S): at 21:31

## 2021-08-03 RX ADMIN — Medication 40 MILLIGRAM(S): at 05:25

## 2021-08-03 RX ADMIN — LINEZOLID 300 MILLIGRAM(S): 600 INJECTION, SOLUTION INTRAVENOUS at 05:25

## 2021-08-03 RX ADMIN — HYDROMORPHONE HYDROCHLORIDE 1 MILLIGRAM(S): 2 INJECTION INTRAMUSCULAR; INTRAVENOUS; SUBCUTANEOUS at 14:22

## 2021-08-03 RX ADMIN — Medication 1 PATCH: at 11:32

## 2021-08-03 RX ADMIN — OXYCODONE HYDROCHLORIDE 10 MILLIGRAM(S): 5 TABLET ORAL at 10:16

## 2021-08-03 RX ADMIN — HYDROMORPHONE HYDROCHLORIDE 1 MILLIGRAM(S): 2 INJECTION INTRAMUSCULAR; INTRAVENOUS; SUBCUTANEOUS at 08:25

## 2021-08-03 RX ADMIN — LINEZOLID 300 MILLIGRAM(S): 600 INJECTION, SOLUTION INTRAVENOUS at 02:52

## 2021-08-03 RX ADMIN — LINEZOLID 300 MILLIGRAM(S): 600 INJECTION, SOLUTION INTRAVENOUS at 18:07

## 2021-08-03 RX ADMIN — LOSARTAN POTASSIUM 100 MILLIGRAM(S): 100 TABLET, FILM COATED ORAL at 05:25

## 2021-08-03 RX ADMIN — OXYCODONE HYDROCHLORIDE 10 MILLIGRAM(S): 5 TABLET ORAL at 05:39

## 2021-08-03 RX ADMIN — Medication 50 MILLIGRAM(S): at 05:24

## 2021-08-03 RX ADMIN — Medication 50 MILLIGRAM(S): at 14:21

## 2021-08-03 NOTE — PROGRESS NOTE ADULT - ASSESSMENT
54yo M w/ PMH and HPI as above now being admitted for b/l LE Cellulitis.     Problem/Plan - 1:  ·  Problem: Venous stasis ulcers of both lower extremities.  Plan:   - continue aztreonam, ID appreciated, vacno changed to Zyvox 8/2  - blood cx NGTD   -Venous duplex neg for DVT     Problem/Plan - 2:  ·  Problem: Cellulitis and abscess of leg.  Plan:   -Failed outpatient treatment with Doxy and clinda  - continue Zyvox/aztreonam  - continue to monitor fever/WBC.     Problem/Plan - 3:  ·  Problem: DM (diabetes mellitus).  Plan: - pt maintains he does not have DM  - f/u HbA1c.      Problem/Plan - 4:  ·  Problem: Disc disease, degenerative, lumbar or lumbosacral.  Plan: - continue tylenol/oxycodone/morphine PRN for mild/moderate/severe pain  - CTM.      Problem/Plan - 5:  ·  Problem: Hypertension.  Plan: - continue home losartan  - continue to monitor BP, HR.     Follow with ID for duration of antibiotics, likely dispo home.  54yo M w/ PMH and HPI as above now being admitted for b/l LE Cellulitis.     Problem/Plan - 1:  ·  Problem: Venous stasis ulcers of both lower extremities.  Plan:   - continue aztreonam, ID appreciated, vacno changed to Zyvox 8/2  - blood cx NGTD   -Venous duplex neg for DVT     Problem/Plan - 2:  ·  Problem: Cellulitis and abscess of leg.  Plan:   -Failed outpatient treatment with Doxy and clinda  - continue Zyvox/aztreonam  - continue to monitor fever/WBC.     Problem/Plan - 3:  ·  Problem: DM (diabetes mellitus).  Plan: - pt maintains he does not have DM  - HbA1c 7.3%  -CHO diet  -Can monitor FS, start sliding scale if uncontrolled.  Patient states he was on Metformin and Jardiance; now only treats with diet.  -Discussed results with patient, states he will follow with his PMD.      Problem/Plan - 4:  ·  Problem: Disc disease, degenerative, lumbar or lumbosacral.  Plan: - continue tylenol/oxycodone/morphine PRN for mild/moderate/severe pain  - CTM.      Problem/Plan - 5:  ·  Problem: Hypertension.  Plan: - continue home losartan  - continue to monitor BP, HR.     Follow with ID for duration of antibiotics, likely dispo home.

## 2021-08-04 ENCOUNTER — TRANSCRIPTION ENCOUNTER (OUTPATIENT)
Age: 56
End: 2021-08-04

## 2021-08-04 VITALS
TEMPERATURE: 96 F | HEART RATE: 64 BPM | DIASTOLIC BLOOD PRESSURE: 69 MMHG | RESPIRATION RATE: 16 BRPM | SYSTOLIC BLOOD PRESSURE: 138 MMHG

## 2021-08-04 LAB
ALBUMIN SERPL ELPH-MCNC: 4.2 G/DL — SIGNIFICANT CHANGE UP (ref 3.5–5.2)
ALP SERPL-CCNC: 96 U/L — SIGNIFICANT CHANGE UP (ref 30–115)
ALT FLD-CCNC: 19 U/L — SIGNIFICANT CHANGE UP (ref 0–41)
ANION GAP SERPL CALC-SCNC: 14 MMOL/L — SIGNIFICANT CHANGE UP (ref 7–14)
AST SERPL-CCNC: 15 U/L — SIGNIFICANT CHANGE UP (ref 0–41)
BASOPHILS # BLD AUTO: 0.06 K/UL — SIGNIFICANT CHANGE UP (ref 0–0.2)
BASOPHILS NFR BLD AUTO: 0.8 % — SIGNIFICANT CHANGE UP (ref 0–1)
BILIRUB SERPL-MCNC: 0.4 MG/DL — SIGNIFICANT CHANGE UP (ref 0.2–1.2)
BUN SERPL-MCNC: 13 MG/DL — SIGNIFICANT CHANGE UP (ref 10–20)
CALCIUM SERPL-MCNC: 9.2 MG/DL — SIGNIFICANT CHANGE UP (ref 8.5–10.1)
CHLORIDE SERPL-SCNC: 102 MMOL/L — SIGNIFICANT CHANGE UP (ref 98–110)
CO2 SERPL-SCNC: 27 MMOL/L — SIGNIFICANT CHANGE UP (ref 17–32)
CREAT SERPL-MCNC: 0.9 MG/DL — SIGNIFICANT CHANGE UP (ref 0.7–1.5)
EOSINOPHIL # BLD AUTO: 0.29 K/UL — SIGNIFICANT CHANGE UP (ref 0–0.7)
EOSINOPHIL NFR BLD AUTO: 4 % — SIGNIFICANT CHANGE UP (ref 0–8)
GLUCOSE SERPL-MCNC: 141 MG/DL — HIGH (ref 70–99)
HCT VFR BLD CALC: 40.4 % — LOW (ref 42–52)
HGB BLD-MCNC: 12.6 G/DL — LOW (ref 14–18)
IMM GRANULOCYTES NFR BLD AUTO: 0.3 % — SIGNIFICANT CHANGE UP (ref 0.1–0.3)
LYMPHOCYTES # BLD AUTO: 1.84 K/UL — SIGNIFICANT CHANGE UP (ref 1.2–3.4)
LYMPHOCYTES # BLD AUTO: 25.4 % — SIGNIFICANT CHANGE UP (ref 20.5–51.1)
MAGNESIUM SERPL-MCNC: 2.2 MG/DL — SIGNIFICANT CHANGE UP (ref 1.8–2.4)
MCHC RBC-ENTMCNC: 26.3 PG — LOW (ref 27–31)
MCHC RBC-ENTMCNC: 31.2 G/DL — LOW (ref 32–37)
MCV RBC AUTO: 84.3 FL — SIGNIFICANT CHANGE UP (ref 80–94)
MONOCYTES # BLD AUTO: 0.55 K/UL — SIGNIFICANT CHANGE UP (ref 0.1–0.6)
MONOCYTES NFR BLD AUTO: 7.6 % — SIGNIFICANT CHANGE UP (ref 1.7–9.3)
NEUTROPHILS # BLD AUTO: 4.48 K/UL — SIGNIFICANT CHANGE UP (ref 1.4–6.5)
NEUTROPHILS NFR BLD AUTO: 61.9 % — SIGNIFICANT CHANGE UP (ref 42.2–75.2)
NRBC # BLD: 0 /100 WBCS — SIGNIFICANT CHANGE UP (ref 0–0)
PHOSPHATE SERPL-MCNC: 3.7 MG/DL — SIGNIFICANT CHANGE UP (ref 2.1–4.9)
PLATELET # BLD AUTO: 264 K/UL — SIGNIFICANT CHANGE UP (ref 130–400)
POTASSIUM SERPL-MCNC: 4.4 MMOL/L — SIGNIFICANT CHANGE UP (ref 3.5–5)
POTASSIUM SERPL-SCNC: 4.4 MMOL/L — SIGNIFICANT CHANGE UP (ref 3.5–5)
PROT SERPL-MCNC: 7 G/DL — SIGNIFICANT CHANGE UP (ref 6–8)
RBC # BLD: 4.79 M/UL — SIGNIFICANT CHANGE UP (ref 4.7–6.1)
RBC # FLD: 14.6 % — HIGH (ref 11.5–14.5)
SODIUM SERPL-SCNC: 143 MMOL/L — SIGNIFICANT CHANGE UP (ref 135–146)
WBC # BLD: 7.24 K/UL — SIGNIFICANT CHANGE UP (ref 4.8–10.8)
WBC # FLD AUTO: 7.24 K/UL — SIGNIFICANT CHANGE UP (ref 4.8–10.8)

## 2021-08-04 PROCEDURE — 99238 HOSP IP/OBS DSCHRG MGMT 30/<: CPT

## 2021-08-04 RX ORDER — AZTREONAM 2 G
1 VIAL (EA) INJECTION
Qty: 28 | Refills: 0
Start: 2021-08-04 | End: 2021-08-17

## 2021-08-04 RX ORDER — CIPROFLOXACIN LACTATE 400MG/40ML
1 VIAL (ML) INTRAVENOUS
Qty: 14 | Refills: 0
Start: 2021-08-04 | End: 2021-08-17

## 2021-08-04 RX ADMIN — OXYCODONE HYDROCHLORIDE 10 MILLIGRAM(S): 5 TABLET ORAL at 10:56

## 2021-08-04 RX ADMIN — HYDROMORPHONE HYDROCHLORIDE 1 MILLIGRAM(S): 2 INJECTION INTRAMUSCULAR; INTRAVENOUS; SUBCUTANEOUS at 10:27

## 2021-08-04 RX ADMIN — Medication 50 MILLIGRAM(S): at 13:46

## 2021-08-04 RX ADMIN — LINEZOLID 300 MILLIGRAM(S): 600 INJECTION, SOLUTION INTRAVENOUS at 05:24

## 2021-08-04 RX ADMIN — Medication 50 MILLIGRAM(S): at 05:24

## 2021-08-04 RX ADMIN — LOSARTAN POTASSIUM 100 MILLIGRAM(S): 100 TABLET, FILM COATED ORAL at 05:25

## 2021-08-04 RX ADMIN — Medication 40 MILLIGRAM(S): at 05:25

## 2021-08-04 RX ADMIN — OXYCODONE HYDROCHLORIDE 10 MILLIGRAM(S): 5 TABLET ORAL at 13:46

## 2021-08-04 RX ADMIN — HYDROMORPHONE HYDROCHLORIDE 1 MILLIGRAM(S): 2 INJECTION INTRAMUSCULAR; INTRAVENOUS; SUBCUTANEOUS at 16:10

## 2021-08-04 RX ADMIN — HYDROMORPHONE HYDROCHLORIDE 1 MILLIGRAM(S): 2 INJECTION INTRAMUSCULAR; INTRAVENOUS; SUBCUTANEOUS at 10:56

## 2021-08-04 RX ADMIN — OXYCODONE HYDROCHLORIDE 10 MILLIGRAM(S): 5 TABLET ORAL at 17:07

## 2021-08-04 RX ADMIN — OXYCODONE HYDROCHLORIDE 10 MILLIGRAM(S): 5 TABLET ORAL at 12:31

## 2021-08-04 RX ADMIN — Medication 1 PATCH: at 11:17

## 2021-08-04 RX ADMIN — OXYCODONE HYDROCHLORIDE 10 MILLIGRAM(S): 5 TABLET ORAL at 07:26

## 2021-08-04 RX ADMIN — HYDROMORPHONE HYDROCHLORIDE 1 MILLIGRAM(S): 2 INJECTION INTRAMUSCULAR; INTRAVENOUS; SUBCUTANEOUS at 05:23

## 2021-08-04 NOTE — PROGRESS NOTE ADULT - SUBJECTIVE AND OBJECTIVE BOX
ONEALJOSE ALBERTO  55y, Male  Allergy: IV Contrast (Sneezing (Mod to Severe))  penicillin (Unknown)      LOS  3d    CHIEF COMPLAINT: b/l LE pain/swelling/erythema/drainage r/o cellulitis (04 Aug 2021 13:30)      INTERVAL EVENTS/HPI  - No acute events overnight  - T(F): , Max: 96.1 (08-04-21 @ 05:08)  - pain improved but still with posterior leg pain worse on right naveed e  - Tolerating medication  - WBC Count: 7.24 (08-04-21 @ 06:19)  WBC Count: 6.27 (08-03-21 @ 07:49)     - Creatinine, Serum: 0.9 (08-04-21 @ 06:19)  Creatinine, Serum: 0.9 (08-03-21 @ 07:49)       ROS  General: Denies rigors, nightsweats  HEENT: Denies headache, rhinorrhea, sore throat, eye pain  CV: Denies CP, palpitations  PULM: Denies wheezing, hemoptysis  GI: Denies hematemesis, hematochezia, melena  : Denies discharge, hematuria  MSK: Denies arthralgias, myalgias  SKIN: Denies rash, lesions  NEURO: Denies paresthesias, weakness  PSYCH: Denies depression, anxiety    VITALS:  T(F): 96.1, Max: 96.1 (08-04-21 @ 05:08)  HR: 64  BP: 138/69  RR: 16Vital Signs Last 24 Hrs  T(C): 35.6 (04 Aug 2021 13:52), Max: 35.6 (04 Aug 2021 05:08)  T(F): 96.1 (04 Aug 2021 13:52), Max: 96.1 (04 Aug 2021 05:08)  HR: 64 (04 Aug 2021 13:52) (64 - 72)  BP: 138/69 (04 Aug 2021 13:52) (138/69 - 138/77)  BP(mean): --  RR: 16 (04 Aug 2021 13:52) (16 - 16)  SpO2: --    PHYSICAL EXAM:  Gen: NAD, resting in bed  HEENT: Normocephalic, atraumatic  Neck: supple, no lymphadenopathy  CV: Regular rate & regular rhythm  Lungs: decreased BS at bases, no fremitus  Abdomen: Soft, BS present  Ext: Warm, well perfused  Neuro: non focal, awake  Skin: no rash, no erythema: right LE with large venous ulcer mostly pink base -- mild induration, not much erythema surrounding   Lines: no phlebitis    FH: Non-contributory  Social Hx: Non-contributory    TESTS & MEASUREMENTS:                        12.6   7.24  )-----------( 264      ( 04 Aug 2021 06:19 )             40.4     08-04    143  |  102  |  13  ----------------------------<  141<H>  4.4   |  27  |  0.9    Ca    9.2      04 Aug 2021 06:19  Phos  3.7     08-04  Mg     2.2     08-04    TPro  7.0  /  Alb  4.2  /  TBili  0.4  /  DBili  x   /  AST  15  /  ALT  19  /  AlkPhos  96  08-04    eGFR if Non African American: 97 mL/min/1.73M2 (08-04-21 @ 06:19)  eGFR if African American: 113 mL/min/1.73M2 (08-04-21 @ 06:19)    LIVER FUNCTIONS - ( 04 Aug 2021 06:19 )  Alb: 4.2 g/dL / Pro: 7.0 g/dL / ALK PHOS: 96 U/L / ALT: 19 U/L / AST: 15 U/L / GGT: x               Culture - Blood (collected 08-01-21 @ 13:22)  Source: .Blood Blood  Preliminary Report (08-02-21 @ 22:02):    No growth to date.        Lactate, Blood: 1.7 mmol/L (08-01-21 @ 13:55)      INFECTIOUS DISEASES TESTING  Rapid RVP Result: NotDetec (08-01-21 @ 13:22)      INFLAMMATORY MARKERS  Sedimentation Rate, Erythrocyte: 20 mm/Hr (08-02-21 @ 15:30)  C-Reactive Protein, Serum: 21 mg/L (08-02-21 @ 15:30)      RADIOLOGY & ADDITIONAL TESTS:  I have personally reviewed the last available Chest xray  CXR      CT      CARDIOLOGY TESTING      MEDICATIONS  aztreonam  IVPB 1000 IV Intermittent every 8 hours  furosemide    Tablet 40 Oral daily  heparin   Injectable 5000 SubCutaneous every 8 hours  linezolid  IVPB     linezolid  IVPB 600 IV Intermittent every 12 hours  losartan 100 Oral daily  nicotine - 21 mG/24Hr(s) Patch 1 Transdermal daily      WEIGHT  Weight (kg): 98 (08-03-21 @ 03:15)  Creatinine, Serum: 0.9 mg/dL (08-04-21 @ 06:19)      ANTIBIOTICS:  aztreonam  IVPB 1000 milliGRAM(s) IV Intermittent every 8 hours  linezolid  IVPB      linezolid  IVPB 600 milliGRAM(s) IV Intermittent every 12 hours      All available historical records have been reviewed      
INTERVAL HPI/OVERNIGHT EVENTS:  No acute events overnight.    SUBJECTIVE: Patient seen and examined at bedside.   He reports feeling better. Still on IV abx.   Pt denies fever, chills, CP, SOB, abd pain.         OBJECTIVE:    VITAL SIGNS:  Vital Signs Last 24 Hrs  T(C): 35.6 (04 Aug 2021 05:08), Max: 36.1 (03 Aug 2021 14:39)  T(F): 96.1 (04 Aug 2021 05:08), Max: 96.9 (03 Aug 2021 14:39)  HR: 72 (04 Aug 2021 05:08) (72 - 104)  BP: 138/77 (04 Aug 2021 05:08) (138/77 - 157/71)  RR: 16 (04 Aug 2021 05:08) (16 - 16)        PHYSICAL EXAM:    General: obese male in NAD  HEENT: NC/AT; PERRL, clear conjunctiva  Neck: supple  Respiratory: CTA b/l  Cardiovascular: +S1/S2; RRR  Abdomen: soft, NT/ND; +BS x4  Extremities:  3+ b/l LE edema to knees. Chronic b/l LE venous stasis changes. RLE w/ large (~4cm) weeping ulcer outer aspect of ankle. Left calf covered in ACE bandage  Neurological: AAOx 3    MEDICATIONS:  MEDICATIONS  (STANDING):  aztreonam  IVPB 1000 milliGRAM(s) IV Intermittent every 8 hours  furosemide    Tablet 40 milliGRAM(s) Oral daily  heparin   Injectable 5000 Unit(s) SubCutaneous every 8 hours  linezolid  IVPB      linezolid  IVPB 600 milliGRAM(s) IV Intermittent every 12 hours  losartan 100 milliGRAM(s) Oral daily  nicotine - 21 mG/24Hr(s) Patch 1 patch Transdermal daily    MEDICATIONS  (PRN):  acetaminophen   Tablet .. 650 milliGRAM(s) Oral every 6 hours PRN Temp greater or equal to 38.5C (101.3F), Mild Pain (1 - 3)  HYDROmorphone  Injectable 1 milliGRAM(s) IV Push every 4 hours PRN Severe Pain (7 - 10)  oxyCODONE    IR 10 milliGRAM(s) Oral every 4 hours PRN Moderate Pain (4 - 6)      ALLERGIES:  Allergies    IV Contrast (Sneezing (Mod to Severe))  penicillin (Unknown)    Intolerances        LABS:                        12.6   7.24  )-----------( 264      ( 04 Aug 2021 06:19 )             40.4     Hemoglobin: 12.6 g/dL (08-04 @ 06:19)  Hemoglobin: 12.1 g/dL (08-03 @ 07:49)  Hemoglobin: 12.3 g/dL (08-02 @ 06:04)  Hemoglobin: 13.2 g/dL (08-01 @ 13:55)    CBC Full  -  ( 04 Aug 2021 06:19 )  WBC Count : 7.24 K/uL  RBC Count : 4.79 M/uL  Hemoglobin : 12.6 g/dL  Hematocrit : 40.4 %  Platelet Count - Automated : 264 K/uL  Mean Cell Volume : 84.3 fL  Mean Cell Hemoglobin : 26.3 pg  Mean Cell Hemoglobin Concentration : 31.2 g/dL  Auto Neutrophil # : 4.48 K/uL  Auto Lymphocyte # : 1.84 K/uL  Auto Monocyte # : 0.55 K/uL  Auto Eosinophil # : 0.29 K/uL  Auto Basophil # : 0.06 K/uL  Auto Neutrophil % : 61.9 %  Auto Lymphocyte % : 25.4 %  Auto Monocyte % : 7.6 %  Auto Eosinophil % : 4.0 %  Auto Basophil % : 0.8 %    08-04    143  |  102  |  13  ----------------------------<  141<H>  4.4   |  27  |  0.9    Ca    9.2      04 Aug 2021 06:19  Phos  3.7     08-04  Mg     2.2     08-04    TPro  7.0  /  Alb  4.2  /  TBili  0.4  /  DBili  x   /  AST  15  /  ALT  19  /  AlkPhos  96  08-04    Creatinine Trend: 0.9<--, 0.9<--, 0.8<--, 0.9<--  LIVER FUNCTIONS - ( 04 Aug 2021 06:19 )  Alb: 4.2 g/dL / Pro: 7.0 g/dL / ALK PHOS: 96 U/L / ALT: 19 U/L / AST: 15 U/L / GGT: x             MICROBIOLOGY:    Culture - Blood (collected 01 Aug 2021 13:22)  Source: .Blood Blood  Preliminary Report (02 Aug 2021 22:02):    No growth to date.      RADIOLOGY & ADDITIONAL TESTS: Reviewed.  
56yo M w/ PMH and HPI as above now being admitted for b/l LE Cellulitis.    Today:  Seen at bedside, no new complaints.              REVIEW OF SYSTEMS:  No new complaints.      MEDICATIONS  (STANDING):  aztreonam  IVPB 1000 milliGRAM(s) IV Intermittent every 8 hours  furosemide    Tablet 40 milliGRAM(s) Oral daily  heparin   Injectable 5000 Unit(s) SubCutaneous every 8 hours  linezolid  IVPB      linezolid  IVPB 600 milliGRAM(s) IV Intermittent every 12 hours  losartan 100 milliGRAM(s) Oral daily  nicotine - 21 mG/24Hr(s) Patch 1 patch Transdermal daily    MEDICATIONS  (PRN):  acetaminophen   Tablet .. 650 milliGRAM(s) Oral every 6 hours PRN Temp greater or equal to 38.5C (101.3F), Mild Pain (1 - 3)  HYDROmorphone  Injectable 1 milliGRAM(s) IV Push every 4 hours PRN Severe Pain (7 - 10)  oxyCODONE    IR 10 milliGRAM(s) Oral every 4 hours PRN Moderate Pain (4 - 6)      Allergies  IV Contrast (Sneezing (Mod to Severe))  penicillin (Unknown)        FAMILY HISTORY:  Family history of early CAD (Father)  Family history of diabetes mellitus in mother (Mother)        Vital Signs Last 24 Hrs  T(C): 36.2 (03 Aug 2021 05:09), Max: 36.7 (02 Aug 2021 21:03)  T(F): 97.2 (03 Aug 2021 05:09), Max: 98 (02 Aug 2021 21:03)  HR: 70 (03 Aug 2021 05:09) (70 - 75)  BP: 145/86 (03 Aug 2021 05:09) (132/61 - 163/85)  RR: 18 (03 Aug 2021 05:09) (18 - 18)      PHYSICAL EXAM:  General: obese, AAOx3, NAD  HEENT: NCAT, no JVD  Thorax: CTA b/l  Heart: normal S1/S2, rrr, no m/r/g  GI: BS normoactive, s/nt/nd  Extremities: 3+ b/l LE edema to knees. Chronic b/l LE venous stasis changes. RLE w/ large (~4cm) weeping ulcer outer aspect of ankle. Left calf covered in ACE bandage. WWP  Neurologic: no focal deficits      LABS:                        12.1   6.27  )-----------( 242      ( 03 Aug 2021 07:49 )             39.0     08-03    139  |  102  |  15  ----------------------------<  150<H>  4.1   |  27  |  0.9    Ca    9.2      03 Aug 2021 07:49    TPro  6.4  /  Alb  3.9  /  TBili  0.3  /  DBili  x   /  AST  11  /  ALT  15  /  AlkPhos  83  08-03        
56yo M w/ PMH and HPI as above now being admitted for b/l LE Cellulitis:    Today:  seen at bedside, no complaints.          REVIEW OF SYSTEMS:  no new complaints    MEDICATIONS  (STANDING):  aztreonam  IVPB 1000 milliGRAM(s) IV Intermittent every 8 hours  furosemide    Tablet 40 milliGRAM(s) Oral daily  heparin   Injectable 5000 Unit(s) SubCutaneous every 8 hours  linezolid  IVPB      linezolid  IVPB 600 milliGRAM(s) IV Intermittent every 12 hours  losartan 100 milliGRAM(s) Oral daily  nicotine - 21 mG/24Hr(s) Patch 1 patch Transdermal daily    MEDICATIONS  (PRN):  acetaminophen   Tablet .. 650 milliGRAM(s) Oral every 6 hours PRN Temp greater or equal to 38.5C (101.3F), Mild Pain (1 - 3)  HYDROmorphone  Injectable 1 milliGRAM(s) IV Push every 4 hours PRN Severe Pain (7 - 10)  oxyCODONE    IR 10 milliGRAM(s) Oral every 4 hours PRN Moderate Pain (4 - 6)      Allergies  IV Contrast (Sneezing (Mod to Severe))  penicillin (Unknown)        FAMILY HISTORY:  Family history of early CAD (Father)  Family history of diabetes mellitus in mother (Mother)        Vital Signs  T(C): 36.2 (03 Aug 2021 05:09), Max: 36.7 (02 Aug 2021 21:03)  T(F): 97.2 (03 Aug 2021 05:09), Max: 98 (02 Aug 2021 21:03)  HR: 70 (03 Aug 2021 05:09) (70 - 75)  BP: 145/86 (03 Aug 2021 05:09) (132/61 - 163/85)  RR: 18 (03 Aug 2021 05:09) (18 - 18)      PHYSICAL EXAM:  General: obese, AAOx3, NAD  HEENT: NCAT, no JVD  Thorax: CTA b/l  Heart: normal S1/S2, rrr, no m/r/g  GI: BS normoactive, s/nt/nd  Extremities: 3+ b/l LE edema to knees. Chronic b/l LE venous stasis changes. RLE w/ large (~4cm) weeping ulcer outer aspect of ankle. Left calf covered in ACE bandage. WWP  Neurologic: no focal deficits

## 2021-08-04 NOTE — DISCHARGE NOTE PROVIDER - CARE PROVIDER_API CALL
Felix Summers   MEDICINE  [address] [address]  Phone: [phone]  Fax: [phone]  Follow Up Time: 2 weeks

## 2021-08-04 NOTE — PROGRESS NOTE ADULT - REASON FOR ADMISSION
b/l LE pain/swelling/erythema/drainage r/o cellulitis

## 2021-08-04 NOTE — PROGRESS NOTE ADULT - ASSESSMENT
55 year old male presented with b/l LE Cellulitis.    # Venous stasis ulcers of both lower extremities   - continue aztreonam  - Vanc changed to Zyvox 8/2  - ID follow up for home Abx recommendation  - blood cx NGTD   -Venous duplex neg for DVT    # Cellulitis and abscess of leg  - Failed outpatient treatment with Doxy and clinda  - continue Zyvox/aztreonam  - continue to monitor fever/WBC.    # DM (diabetes mellitus)  - pt reports he does not have DM  - HbA1c 7.3%  -CHO diet  -Can monitor FS, start sliding scale if uncontrolled.  Patient states he was on Metformin and Jardiance; now only treats with diet.  -Discussed results with patient, states he will follow with his PMD as outpatient     # Disc disease, degenerative, lumbar or lumbosacral  - continue tylenol/oxycodone/morphine PRN for mild/moderate/severe pain    # Hypertension  - continue home losartan  - continue to monitor BP    Disposition: home after ID follow up regarding home Abx recommendation    55 year old male presented with b/l LE Cellulitis.    # Venous stasis ulcers of both lower extremities   - continue aztreonam  - Vanc changed to Zyvox 8/2  - ID follow up for home Abx recommendation - Per ID, can discharge patient on levaquin and bactrim for 14d.  - blood cx NGTD   -Venous duplex neg for DVT    # Cellulitis and abscess of leg  - Failed outpatient treatment with Doxy and clinda  - continue Zyvox/aztreonam  - continue to monitor fever/WBC.    # DM (diabetes mellitus)  - pt reports he does not have DM  - HbA1c 7.3%  -CHO diet  -Can monitor FS, start sliding scale if uncontrolled.  Patient states he was on Metformin and Jardiance; now only treats with diet.  -Discussed results with patient, states he will follow with his PMD as outpatient     # Disc disease, degenerative, lumbar or lumbosacral  - continue tylenol/oxycodone/morphine PRN for mild/moderate/severe pain    # Hypertension  - continue home losartan  - continue to monitor BP    Disposition: home after ID follow up regarding home Abx recommendation

## 2021-08-04 NOTE — DISCHARGE NOTE NURSING/CASE MANAGEMENT/SOCIAL WORK - PATIENT PORTAL LINK FT
You can access the FollowMyHealth Patient Portal offered by [facility] by registering at the following website: [email or web address]. By joining [employer]’s FollowMyHealth portal, you will also be able to view your health information using other applications (apps) compatible with our system.

## 2021-08-04 NOTE — DISCHARGE NOTE NURSING/CASE MANAGEMENT/SOCIAL WORK - NSDCPEEMAIL_GEN_ALL_CORE
[facility] for Tobacco Control email [email or web address]

## 2021-08-04 NOTE — PROGRESS NOTE ADULT - ASSESSMENT
Patient is a 55y old  Male with PMH of DM, HTN, presents to the ER for evaluation of 2 weeks of b/l LE swelling and erythema. Pt reports he saw his PMD for these complaints and was started on PO clindamycin and doxycycline. However, pt began experiencing worsening pain in LE's over the last 2 days (R>L), progressing to 10/10 in intensity and foul-smelling drainage from RLE which prompting him to present to ER for IV abx. On admission, He has no fever and Leukocytosis, and started on Aztreonam and IV Vancomycin. The ID consult requested to assist with further evaluation and antibiotic management.     # LE cellulitis  # Morbid obesity    Recommendations  - continue linezolid and aztreonam for now  - when ready for discharge, can give PO levofloxacin 750 mg daily and bactrim 1DS BID to complete total of 14 days of antibiotics  - needs BMP while on antibiotics to ensure Cr/K is stable   - Continue wound care  - Keep LE elevated    Please call or message on [employer] if with any questions.  Spectra [phone]

## 2021-08-04 NOTE — PROGRESS NOTE ADULT - TIME BILLING
Direct patient care.
Direct patient care.
I have personally seen and examined this patient.    I have reviewed all pertinent clinical information and reviewed all relevant imaging and diagnostic studies personally.   I counseled the patient about diagnostic testing and treatment plan. All questions were answered.   I discussed recommendations with the primary team.

## 2021-08-04 NOTE — DISCHARGE NOTE NURSING/CASE MANAGEMENT/SOCIAL WORK - NSDCPEWEB_GEN_ALL_CORE
[facility] for Tobacco Control website --- [email or web address] website --- [email or web address]

## 2021-08-04 NOTE — PROGRESS NOTE ADULT - ATTENDING COMMENTS
Pt seen and examined. Currently being treated for bilateral LE cellulitis with history of chronic ulcers. He has previous history of MRSA and pseudomonas that grew from his ulcers last year. Discussed with ID, can treat with levaquin 750mg qD and bactrim DS BID x 14 days. He will need close f/u for outpatient woundcare and management.    #Progress Note Handoff  Pending (specify): DC planning  Family discussion: d/w pt regarding DC planning with oral abx  Disposition: Home

## 2021-08-04 NOTE — DISCHARGE NOTE PROVIDER - NSDCMRMEDTOKEN_GEN_ALL_CORE_FT
buprenorphine 15 mcg/hr transdermal film, extended release: 1 patch transdermal once a week  clindamycin 300 mg oral capsule: 1 cap(s) orally every 6 hours  doxycycline hyclate 100 mg oral tablet: 1 tab(s) orally 2 times a day  furosemide 40 mg oral tablet: 1 tab(s) orally once a day  losartan 100 mg oral tablet: 1 tab(s) orally once a day  meloxicam 15 mg oral tablet: 1 tab(s) orally once a day  Nicoderm C-Q 21 mg/24 hr transdermal film, extended release: 1 patch transdermal once a day  oxyCODONE 10 mg oral tablet: 1 tab(s) orally every 4 hours, As needed, Moderate Pain (4 - 6)   Bactrim  mg-160 mg oral tablet: 1 tab(s) orally 2 times a day   buprenorphine 15 mcg/hr transdermal film, extended release: 1 patch transdermal once a week  furosemide 40 mg oral tablet: 1 tab(s) orally once a day  levoFLOXacin 750 mg oral tablet: 1 tab(s) orally once a day   losartan 100 mg oral tablet: 1 tab(s) orally once a day  meloxicam 15 mg oral tablet: 1 tab(s) orally once a day  Nicoderm C-Q 21 mg/24 hr transdermal film, extended release: 1 patch transdermal once a day  oxyCODONE 10 mg oral tablet: 1 tab(s) orally every 4 hours, As needed, Moderate Pain (4 - 6)

## 2021-08-04 NOTE — DISCHARGE NOTE PROVIDER - NSDCCPCAREPLAN_GEN_ALL_CORE_FT
PRINCIPAL DISCHARGE DIAGNOSIS  Diagnosis: Venous stasis ulcers of both lower extremities  Assessment and Plan of Treatment: continue antibiotics and wound care as directed.   Follow up with PCP in 1-2 weeks      SECONDARY DISCHARGE DIAGNOSES  Diagnosis: Cellulitis and abscess of leg  Assessment and Plan of Treatment: continue antibiotics as directed. Rx will be sent to pharmacy.   Please follow up with your PCP in 1-2 weeks for reassessment

## 2021-08-04 NOTE — DISCHARGE NOTE PROVIDER - HOSPITAL COURSE
55 year old male with Hx chronic LE ulcers presented  with bilateral LE cellulitis. He has previous history of MRSA and pseudomonas that grew from his ulcers last year.  He was admitted to medicine and started on continue aztreonam and Vancomycin which was subsequently switched to Zyvox as per ID. Blood cx  NTD.  Patient is medically cleared for a hospital discharge on PO Levaquin 750mg qD and bactrim DS BID x 14 days. He will need close f/u for outpatient wound care and management.

## 2021-08-06 LAB
CULTURE RESULTS: SIGNIFICANT CHANGE UP
SPECIMEN SOURCE: SIGNIFICANT CHANGE UP

## 2021-08-11 DIAGNOSIS — I10 ESSENTIAL (PRIMARY) HYPERTENSION: ICD-10-CM

## 2021-08-11 DIAGNOSIS — L97.819 NON-PRESSURE CHRONIC ULCER OF OTHER PART OF RIGHT LOWER LEG WITH UNSPECIFIED SEVERITY: ICD-10-CM

## 2021-08-11 DIAGNOSIS — L02.419 CUTANEOUS ABSCESS OF LIMB, UNSPECIFIED: ICD-10-CM

## 2021-08-11 DIAGNOSIS — Z88.0 ALLERGY STATUS TO PENICILLIN: ICD-10-CM

## 2021-08-11 DIAGNOSIS — L97.829 NON-PRESSURE CHRONIC ULCER OF OTHER PART OF LEFT LOWER LEG WITH UNSPECIFIED SEVERITY: ICD-10-CM

## 2021-08-11 DIAGNOSIS — L03.115 CELLULITIS OF RIGHT LOWER LIMB: ICD-10-CM

## 2021-08-11 DIAGNOSIS — L03.116 CELLULITIS OF LEFT LOWER LIMB: ICD-10-CM

## 2021-08-11 DIAGNOSIS — E10.622 TYPE 1 DIABETES MELLITUS WITH OTHER SKIN ULCER: ICD-10-CM

## 2021-08-11 DIAGNOSIS — Z20.822 CONTACT WITH AND (SUSPECTED) EXPOSURE TO COVID-19: ICD-10-CM

## 2021-08-11 DIAGNOSIS — Z91.041 RADIOGRAPHIC DYE ALLERGY STATUS: ICD-10-CM

## 2021-08-11 DIAGNOSIS — M47.816 SPONDYLOSIS WITHOUT MYELOPATHY OR RADICULOPATHY, LUMBAR REGION: ICD-10-CM

## 2021-12-10 NOTE — PROGRESS NOTE ADULT - SUBJECTIVE AND OBJECTIVE BOX
KAYLAJOSE ALBERTO GARZA  52y, Male      OVERNIGHT EVENTS:    no fevers, has back pain    VITALS:  T(F): 96.9, Max: 97.5 (02-27-18 @ 13:34)  HR: 80  BP: 155/84  RR: 20Vital Signs Last 24 Hrs  T(C): 36.1 (28 Feb 2018 03:39), Max: 36.4 (27 Feb 2018 13:34)  T(F): 96.9 (28 Feb 2018 03:39), Max: 97.5 (27 Feb 2018 13:34)  HR: 80 (28 Feb 2018 03:39) (79 - 87)  BP: 155/84 (28 Feb 2018 03:39) (139/80 - 184/83)  BP(mean): --  RR: 20 (28 Feb 2018 03:39) (20 - 20)  SpO2: --    TESTS & MEASUREMENTS:                        12.9   6.44  )-----------( 279      ( 28 Feb 2018 06:33 )             41.0     02-28    136  |  102  |  8<L>  ----------------------------<  155<H>  4.1   |  30  |  0.7    Ca    8.6      28 Feb 2018 06:33          Culture - Blood (collected 02-24-18 @ 06:10)  Source: .Blood None  Preliminary Report (02-25-18 @ 17:00):    No growth to date.    Culture - Abscess with Gram Stain (collected 02-23-18 @ 15:51)  Source: .Abscess thoracic incision  Preliminary Report (02-25-18 @ 09:38):    Moderate Staphylococcus aureus  Organism: Staphylococcus aureus (02-26-18 @ 10:54)  Organism: Staphylococcus aureus (02-26-18 @ 10:54)      -  Ampicillin/Sulbactam: S <=8/4      -  Cefazolin: S <=4      -  Ciprofloxacin: S <=1      -  Clindamycin: S <=0.25      -  Erythromycin: S <=0.25      -  Gentamicin: S <=1      -  Levofloxacin: S <=0.5      -  Moxifloxacin(Aerobic): S <=0.5      -  Oxacillin: S <=0.25      -  Penicillin: R 4      -  RIF- Rifampin: S <=1      -  Tetra/Doxy: S <=1      -  Trimethoprim/Sulfamethoxazole: S <=0.5/9.5      -  Vancomycin: S 2      Method Type: JAYCOB            RADIOLOGY & ADDITIONAL TESTS:    ANTIBIOTICS:  ceFAZolin   IVPB 2000 milliGRAM(s) IV Intermittent every 6 hours  ceFAZolin   IVPB      clindamycin IVPB 900 milliGRAM(s) IV Intermittent every 8 hours  clindamycin IVPB 900 milliGRAM(s) IV Intermittent every 8 hours 26.9

## 2022-01-01 ENCOUNTER — EMERGENCY (EMERGENCY)
Facility: HOSPITAL | Age: 57
LOS: 0 days | Discharge: HOME | End: 2022-01-02
Attending: EMERGENCY MEDICINE | Admitting: EMERGENCY MEDICINE
Payer: MEDICARE

## 2022-01-01 VITALS
TEMPERATURE: 99 F | SYSTOLIC BLOOD PRESSURE: 178 MMHG | HEIGHT: 78 IN | HEART RATE: 88 BPM | WEIGHT: 315 LBS | DIASTOLIC BLOOD PRESSURE: 80 MMHG | OXYGEN SATURATION: 92 % | RESPIRATION RATE: 20 BRPM

## 2022-01-01 DIAGNOSIS — I89.0 LYMPHEDEMA, NOT ELSEWHERE CLASSIFIED: ICD-10-CM

## 2022-01-01 DIAGNOSIS — Z91.041 RADIOGRAPHIC DYE ALLERGY STATUS: ICD-10-CM

## 2022-01-01 DIAGNOSIS — Z20.822 CONTACT WITH AND (SUSPECTED) EXPOSURE TO COVID-19: ICD-10-CM

## 2022-01-01 DIAGNOSIS — Z88.0 ALLERGY STATUS TO PENICILLIN: ICD-10-CM

## 2022-01-01 DIAGNOSIS — G47.33 OBSTRUCTIVE SLEEP APNEA (ADULT) (PEDIATRIC): ICD-10-CM

## 2022-01-01 DIAGNOSIS — M54.9 DORSALGIA, UNSPECIFIED: ICD-10-CM

## 2022-01-01 DIAGNOSIS — E11.9 TYPE 2 DIABETES MELLITUS WITHOUT COMPLICATIONS: ICD-10-CM

## 2022-01-01 DIAGNOSIS — Z96.89 PRESENCE OF OTHER SPECIFIED FUNCTIONAL IMPLANTS: Chronic | ICD-10-CM

## 2022-01-01 DIAGNOSIS — I10 ESSENTIAL (PRIMARY) HYPERTENSION: ICD-10-CM

## 2022-01-01 DIAGNOSIS — Z98.890 OTHER SPECIFIED POSTPROCEDURAL STATES: Chronic | ICD-10-CM

## 2022-01-01 DIAGNOSIS — R53.1 WEAKNESS: ICD-10-CM

## 2022-01-01 DIAGNOSIS — G89.29 OTHER CHRONIC PAIN: ICD-10-CM

## 2022-01-01 DIAGNOSIS — I83.009 VARICOSE VEINS OF UNSPECIFIED LOWER EXTREMITY WITH ULCER OF UNSPECIFIED SITE: ICD-10-CM

## 2022-01-01 DIAGNOSIS — R11.0 NAUSEA: ICD-10-CM

## 2022-01-01 DIAGNOSIS — L97.909 NON-PRESSURE CHRONIC ULCER OF UNSPECIFIED PART OF UNSPECIFIED LOWER LEG WITH UNSPECIFIED SEVERITY: ICD-10-CM

## 2022-01-01 PROCEDURE — 99284 EMERGENCY DEPT VISIT MOD MDM: CPT

## 2022-01-01 RX ORDER — OXYCODONE AND ACETAMINOPHEN 5; 325 MG/1; MG/1
2 TABLET ORAL ONCE
Refills: 0 | Status: DISCONTINUED | OUTPATIENT
Start: 2022-01-01 | End: 2022-01-01

## 2022-01-01 RX ORDER — KETOROLAC TROMETHAMINE 30 MG/ML
15 SYRINGE (ML) INJECTION ONCE
Refills: 0 | Status: DISCONTINUED | OUTPATIENT
Start: 2022-01-01 | End: 2022-01-01

## 2022-01-01 RX ORDER — ONDANSETRON 8 MG/1
4 TABLET, FILM COATED ORAL ONCE
Refills: 0 | Status: COMPLETED | OUTPATIENT
Start: 2022-01-01 | End: 2022-01-01

## 2022-01-01 RX ORDER — SODIUM CHLORIDE 9 MG/ML
1000 INJECTION INTRAMUSCULAR; INTRAVENOUS; SUBCUTANEOUS ONCE
Refills: 0 | Status: COMPLETED | OUTPATIENT
Start: 2022-01-01 | End: 2022-01-01

## 2022-01-01 RX ADMIN — ONDANSETRON 4 MILLIGRAM(S): 8 TABLET, FILM COATED ORAL at 23:59

## 2022-01-01 RX ADMIN — SODIUM CHLORIDE 1000 MILLILITER(S): 9 INJECTION INTRAMUSCULAR; INTRAVENOUS; SUBCUTANEOUS at 23:59

## 2022-01-01 RX ADMIN — Medication 15 MILLIGRAM(S): at 23:59

## 2022-01-01 NOTE — ED ADULT TRIAGE NOTE - CHIEF COMPLAINT QUOTE
I think I'm going through withdrawals and I lost my medications (oxycodone, morphine, meloxicam, losartin, doxycycline, clindamycin). I think I'm going through withdrawals and I lost my medications (oxycodone, morphine, meloxicam, losartin, doxycycline, clindamycin).  I feel nauseous constantly and in extreme pain.  I have no appetitie.

## 2022-01-02 VITALS
DIASTOLIC BLOOD PRESSURE: 83 MMHG | OXYGEN SATURATION: 99 % | RESPIRATION RATE: 20 BRPM | HEART RATE: 70 BPM | SYSTOLIC BLOOD PRESSURE: 148 MMHG

## 2022-01-02 LAB
ALBUMIN SERPL ELPH-MCNC: 4 G/DL — SIGNIFICANT CHANGE UP (ref 3.5–5.2)
ALP SERPL-CCNC: 96 U/L — SIGNIFICANT CHANGE UP (ref 30–115)
ALT FLD-CCNC: 13 U/L — SIGNIFICANT CHANGE UP (ref 0–41)
ANION GAP SERPL CALC-SCNC: 14 MMOL/L — SIGNIFICANT CHANGE UP (ref 7–14)
AST SERPL-CCNC: 15 U/L — SIGNIFICANT CHANGE UP (ref 0–41)
BASOPHILS # BLD AUTO: 0.1 K/UL — SIGNIFICANT CHANGE UP (ref 0–0.2)
BASOPHILS NFR BLD AUTO: 1 % — SIGNIFICANT CHANGE UP (ref 0–1)
BILIRUB SERPL-MCNC: 0.3 MG/DL — SIGNIFICANT CHANGE UP (ref 0.2–1.2)
BUN SERPL-MCNC: 14 MG/DL — SIGNIFICANT CHANGE UP (ref 10–20)
CALCIUM SERPL-MCNC: 10 MG/DL — SIGNIFICANT CHANGE UP (ref 8.5–10.1)
CHLORIDE SERPL-SCNC: 104 MMOL/L — SIGNIFICANT CHANGE UP (ref 98–110)
CO2 SERPL-SCNC: 18 MMOL/L — SIGNIFICANT CHANGE UP (ref 17–32)
CREAT SERPL-MCNC: 0.9 MG/DL — SIGNIFICANT CHANGE UP (ref 0.7–1.5)
EOSINOPHIL # BLD AUTO: 0.43 K/UL — SIGNIFICANT CHANGE UP (ref 0–0.7)
EOSINOPHIL NFR BLD AUTO: 4.1 % — SIGNIFICANT CHANGE UP (ref 0–8)
GLUCOSE SERPL-MCNC: 126 MG/DL — HIGH (ref 70–99)
HCT VFR BLD CALC: 41.3 % — LOW (ref 42–52)
HGB BLD-MCNC: 12.7 G/DL — LOW (ref 14–18)
IMM GRANULOCYTES NFR BLD AUTO: 0.3 % — SIGNIFICANT CHANGE UP (ref 0.1–0.3)
LACTATE SERPL-SCNC: 1.1 MMOL/L — SIGNIFICANT CHANGE UP (ref 0.7–2)
LYMPHOCYTES # BLD AUTO: 2.49 K/UL — SIGNIFICANT CHANGE UP (ref 1.2–3.4)
LYMPHOCYTES # BLD AUTO: 23.8 % — SIGNIFICANT CHANGE UP (ref 20.5–51.1)
MCHC RBC-ENTMCNC: 24.4 PG — LOW (ref 27–31)
MCHC RBC-ENTMCNC: 30.8 G/DL — LOW (ref 32–37)
MCV RBC AUTO: 79.3 FL — LOW (ref 80–94)
MONOCYTES # BLD AUTO: 0.67 K/UL — HIGH (ref 0.1–0.6)
MONOCYTES NFR BLD AUTO: 6.4 % — SIGNIFICANT CHANGE UP (ref 1.7–9.3)
NEUTROPHILS # BLD AUTO: 6.75 K/UL — HIGH (ref 1.4–6.5)
NEUTROPHILS NFR BLD AUTO: 64.4 % — SIGNIFICANT CHANGE UP (ref 42.2–75.2)
NRBC # BLD: 0 /100 WBCS — SIGNIFICANT CHANGE UP (ref 0–0)
PLATELET # BLD AUTO: 409 K/UL — HIGH (ref 130–400)
POTASSIUM SERPL-MCNC: 4.6 MMOL/L — SIGNIFICANT CHANGE UP (ref 3.5–5)
POTASSIUM SERPL-SCNC: 4.6 MMOL/L — SIGNIFICANT CHANGE UP (ref 3.5–5)
PROT SERPL-MCNC: 7.6 G/DL — SIGNIFICANT CHANGE UP (ref 6–8)
RBC # BLD: 5.21 M/UL — SIGNIFICANT CHANGE UP (ref 4.7–6.1)
RBC # FLD: 16.6 % — HIGH (ref 11.5–14.5)
SARS-COV-2 RNA SPEC QL NAA+PROBE: SIGNIFICANT CHANGE UP
SODIUM SERPL-SCNC: 136 MMOL/L — SIGNIFICANT CHANGE UP (ref 135–146)
WBC # BLD: 10.47 K/UL — SIGNIFICANT CHANGE UP (ref 4.8–10.8)
WBC # FLD AUTO: 10.47 K/UL — SIGNIFICANT CHANGE UP (ref 4.8–10.8)

## 2022-01-02 RX ORDER — OXYCODONE AND ACETAMINOPHEN 5; 325 MG/1; MG/1
1 TABLET ORAL
Qty: 12 | Refills: 0
Start: 2022-01-02 | End: 2022-01-04

## 2022-01-02 RX ORDER — ONDANSETRON 8 MG/1
1 TABLET, FILM COATED ORAL
Qty: 10 | Refills: 0
Start: 2022-01-02

## 2022-01-02 RX ORDER — MELOXICAM 15 MG/1
1 TABLET ORAL
Qty: 30 | Refills: 0
Start: 2022-01-02

## 2022-01-02 RX ADMIN — OXYCODONE AND ACETAMINOPHEN 2 TABLET(S): 5; 325 TABLET ORAL at 00:00

## 2022-01-02 RX ADMIN — Medication 300 MILLIGRAM(S): at 02:14

## 2022-01-02 NOTE — ED PROVIDER NOTE - NSFOLLOWUPINSTRUCTIONS_ED_ALL_ED_FT
Chronic Pain    An Emergency prescription Rx of Percocet 5/325 was given to you tonight for 4 days. please follow up with your pain management for further medication refill.     Nausea / Vomiting    Nausea is the feeling that you have an upset stomach or have to vomit. As nausea gets worse, it can lead to vomiting. Vomiting occurs when stomach contents are thrown up and out of the mouth which puts you at an increased risk for dehydration. Older adults and people with other diseases or a weak immune system are at higher risk for dehydration. Drink clear fluids in small but frequent amounts as tolerated. Eat bland, easy-to-digest foods in small amounts as tolerated.     SEEK IMMEDIATE MEDICAL CARE IF YOU HAVE THE FOLLOWING SYMPTOMS: fever, inability to keep fluids down, black or bloody vomitus, black or bloody stools, lightheadedness/dizziness, chest pain, severe headache, rash, shortness of breath, cold or clammy skin, confusion, pain with urination, or any signs of dehydration.     Back Pain    Back pain is very common in adults. The cause of back pain is rarely dangerous and the pain often gets better over time. The cause of your back pain may not be known and may include strain of muscles or ligaments, degeneration of the spinal disks, or arthritis. Occasionally the pain may radiate down your leg(s). Over-the-counter medicines to reduce pain and inflammation are often the most helpful. Stretching and remaining active frequently helps the healing process.     SEEK IMMEDIATE MEDICAL CARE IF YOU HAVE THE FOLLOWING SYMPTOMS: bowel or bladder control problems, unusual weakness or numbness in your arms or legs, nausea or vomiting, abdominal pain, fever, dizziness/lightheadedness.

## 2022-01-02 NOTE — ED PROVIDER NOTE - PROGRESS NOTE DETAILS
11/30/2021	12/04/2021	oxycodone-acetaminophen  mg tab	120	30	Mireya Harris	[id number]	Medicare  [facility] #[id number]  11/30/2021	11/30/2021	morphine sulf er 30 mg tablet	90	22	Mireya Harris	[id number]	Medicaid  [facility] #[id number]  11/30/2021	11/30/2021	pregabalin 100 mg capsule 11/30/2021	12/04/2021	oxycodone-acetaminophen  mg tab	120	30	Mireya Harris	[id number]	Medicare  [facility] #[id number]  11/30/2021	11/30/2021	morphine sulf er 30 mg tablet	90	22	Steven Mireya	[id number]	Medicaid  [facility] #[id number]  11/30/2021	11/30/2021	pregabalin 100 mg capsule    istop reviewed, consistent to patient provided history. will provide an emergency Rx for pain med. f/u with his pain management.

## 2022-01-02 NOTE — ED PROVIDER NOTE - PATIENT PORTAL LINK FT
You can access the FollowMyHealth Patient Portal offered by [facility] by registering at the following website: [email or web address]. By joining [employer]’s FollowMyHealth portal, you will also be able to view your health information using other applications (apps) compatible with our system.

## 2022-01-02 NOTE — ED PROVIDER NOTE - NS ED ROS FT
Constitutional: no fever, chills, no recent weight loss, change in appetite or malaise  Eyes: no redness/discharge/pain/vision changes  ENT: no rhinorrhea/ear pain/sore throat  Cardiac: No chest pain, SOB or edema.  Respiratory: No cough or respiratory distress  GI: + nausea No vomiting, diarrhea or abdominal pain.  : No dysuria, frequency, urgency or hematuria  MS: see HPI  Neuro: No headache or weakness. No LOC.  Skin: see hPI  Endocrine: No history of thyroid disease or diabetes.

## 2022-01-02 NOTE — ED ADULT NURSE NOTE - NSPATIENTFLAG_GEN_A_ER
Purple DH (Discharge Huddle; Vulnerable Patient) Orange (Pneumonia)/Purple DH (Discharge Huddle; Vulnerable Patient)

## 2022-01-02 NOTE — ED PROVIDER NOTE - OBJECTIVE STATEMENT
57 yo male hx of HTN/chronic back pain/ chronic lymphedema with stasis ulcers for about 2 years present c/o nausea and weakness for about 4 days. reported lost his Rx for MS contin 30mg/ oxycodone 10 mg about a week ago during a Road trip to [place]. He had a lost dose of percocet 6 days ago. He started feeling weak and nausea and worsening back pain over the past 4 days. Not sure if he is having withdrawal symptoms or symptoms from his legs infection so he comes to ED for evaluation.   His doctor also started him on Clinda and Doxy for his legs wounds.   Denies fever/chill/chest pain/sob/abd pain/vomiting/diarrhea or sweating.

## 2022-01-02 NOTE — ED ADULT NURSE NOTE - CHIEF COMPLAINT QUOTE
I think I'm going through withdrawals and I lost my medications (oxycodone, morphine, meloxicam, losartin, doxycycline, clindamycin).  I feel nauseous constantly and in extreme pain.  I have no appetitie.

## 2022-01-02 NOTE — ED PROVIDER NOTE - CLINICAL SUMMARY MEDICAL DECISION MAKING FREE TEXT BOX
Labs noted for wbc 10k, lactate 1.1.  Covid negative.  Given Toradol, Percocet and Zofran with relief.  Patient wants to go home.  Will D/C to follow up with PCP.

## 2022-01-02 NOTE — ED PROVIDER NOTE - ATTENDING CONTRIBUTION TO CARE
I personally evaluated the patient. I reviewed the Resident’s or Physician Assistant’s note (as assigned above), and agree with the findings and plan except as documented in my note.  Chart reviewed.  H/O HTN/chronic back pain/ chronic lymphedema with stasis ulcers, presents with weakness, nausea and leg infection.  States he lost his Percocet.  On clindamycin and doxycycline.  Exam shows alert obese patient in no distress, HEENT NCAT, lungs clear, RR S1S2, abdomen soft Nt +BS, +lymphedema, chronic venous stasis ulcers with serous discharge.

## 2022-08-09 NOTE — PATIENT PROFILE ADULT. - HEALTHCARE QUESTIONS, PROFILE
Start a fiber supplement daily such as Metamucil or Citrucel, Fibercon is a bigger capsule (consideration for trouble swallowing)    Use Calmoseptine ointment as a skin barrier to perineal area. [facility] has this to purchase over the counter    Colonoscopy order placed. Wait to speak with GI as they will likely want to order and upper scope also. Both can be done at the same time.     Pelvic floor physical therapy. Mention the fecal incontinence as well.       Ledy Boggs.     [facility] in [place], [place]:    (p) [phone].    (f) [phone].         [facility]: Telly Jones   [address], [place] [address]   p [phone]   f [phone]     n/a

## 2022-08-29 ENCOUNTER — INPATIENT (INPATIENT)
Facility: HOSPITAL | Age: 57
LOS: 3 days | Discharge: HOME | End: 2022-09-02
Attending: HOSPITALIST | Admitting: HOSPITALIST

## 2022-08-29 VITALS
HEART RATE: 102 BPM | OXYGEN SATURATION: 97 % | DIASTOLIC BLOOD PRESSURE: 91 MMHG | HEIGHT: 78 IN | SYSTOLIC BLOOD PRESSURE: 187 MMHG | TEMPERATURE: 99 F | WEIGHT: 315 LBS | RESPIRATION RATE: 18 BRPM

## 2022-08-29 DIAGNOSIS — Z96.89 PRESENCE OF OTHER SPECIFIED FUNCTIONAL IMPLANTS: Chronic | ICD-10-CM

## 2022-08-29 DIAGNOSIS — Z98.890 OTHER SPECIFIED POSTPROCEDURAL STATES: Chronic | ICD-10-CM

## 2022-08-29 LAB
ALBUMIN SERPL ELPH-MCNC: 3.8 G/DL — SIGNIFICANT CHANGE UP (ref 3.5–5.2)
ALP SERPL-CCNC: 101 U/L — SIGNIFICANT CHANGE UP (ref 30–115)
ALT FLD-CCNC: 13 U/L — SIGNIFICANT CHANGE UP (ref 0–41)
ANION GAP SERPL CALC-SCNC: 9 MMOL/L — SIGNIFICANT CHANGE UP (ref 7–14)
APTT BLD: 32.4 SEC — SIGNIFICANT CHANGE UP (ref 27–39.2)
AST SERPL-CCNC: 19 U/L — SIGNIFICANT CHANGE UP (ref 0–41)
BASOPHILS # BLD AUTO: 0.06 K/UL — SIGNIFICANT CHANGE UP (ref 0–0.2)
BASOPHILS NFR BLD AUTO: 0.7 % — SIGNIFICANT CHANGE UP (ref 0–1)
BILIRUB SERPL-MCNC: <0.2 MG/DL — SIGNIFICANT CHANGE UP (ref 0.2–1.2)
BUN SERPL-MCNC: 12 MG/DL — SIGNIFICANT CHANGE UP (ref 10–20)
CALCIUM SERPL-MCNC: 9.1 MG/DL — SIGNIFICANT CHANGE UP (ref 8.5–10.1)
CHLORIDE SERPL-SCNC: 104 MMOL/L — SIGNIFICANT CHANGE UP (ref 98–110)
CO2 SERPL-SCNC: 27 MMOL/L — SIGNIFICANT CHANGE UP (ref 17–32)
CREAT SERPL-MCNC: 0.7 MG/DL — SIGNIFICANT CHANGE UP (ref 0.7–1.5)
EGFR: 110 ML/MIN/1.73M2 — SIGNIFICANT CHANGE UP
EOSINOPHIL # BLD AUTO: 0.27 K/UL — SIGNIFICANT CHANGE UP (ref 0–0.7)
EOSINOPHIL NFR BLD AUTO: 3 % — SIGNIFICANT CHANGE UP (ref 0–8)
GLUCOSE SERPL-MCNC: 176 MG/DL — HIGH (ref 70–99)
HCT VFR BLD CALC: 36.6 % — LOW (ref 42–52)
HGB BLD-MCNC: 11.3 G/DL — LOW (ref 14–18)
IMM GRANULOCYTES NFR BLD AUTO: 0.5 % — HIGH (ref 0.1–0.3)
INR BLD: 1.1 RATIO — SIGNIFICANT CHANGE UP (ref 0.65–1.3)
LACTATE SERPL-SCNC: 1.9 MMOL/L — SIGNIFICANT CHANGE UP (ref 0.7–2)
LYMPHOCYTES # BLD AUTO: 2.32 K/UL — SIGNIFICANT CHANGE UP (ref 1.2–3.4)
LYMPHOCYTES # BLD AUTO: 26.2 % — SIGNIFICANT CHANGE UP (ref 20.5–51.1)
MCHC RBC-ENTMCNC: 24.1 PG — LOW (ref 27–31)
MCHC RBC-ENTMCNC: 30.9 G/DL — LOW (ref 32–37)
MCV RBC AUTO: 78 FL — LOW (ref 80–94)
MONOCYTES # BLD AUTO: 0.41 K/UL — SIGNIFICANT CHANGE UP (ref 0.1–0.6)
MONOCYTES NFR BLD AUTO: 4.6 % — SIGNIFICANT CHANGE UP (ref 1.7–9.3)
NEUTROPHILS # BLD AUTO: 5.76 K/UL — SIGNIFICANT CHANGE UP (ref 1.4–6.5)
NEUTROPHILS NFR BLD AUTO: 65 % — SIGNIFICANT CHANGE UP (ref 42.2–75.2)
NRBC # BLD: 0 /100 WBCS — SIGNIFICANT CHANGE UP (ref 0–0)
PLATELET # BLD AUTO: 349 K/UL — SIGNIFICANT CHANGE UP (ref 130–400)
POTASSIUM SERPL-MCNC: 5 MMOL/L — SIGNIFICANT CHANGE UP (ref 3.5–5)
POTASSIUM SERPL-SCNC: 5 MMOL/L — SIGNIFICANT CHANGE UP (ref 3.5–5)
PROT SERPL-MCNC: 6.8 G/DL — SIGNIFICANT CHANGE UP (ref 6–8)
PROTHROM AB SERPL-ACNC: 12.6 SEC — SIGNIFICANT CHANGE UP (ref 9.95–12.87)
RBC # BLD: 4.69 M/UL — LOW (ref 4.7–6.1)
RBC # FLD: 16.7 % — HIGH (ref 11.5–14.5)
SARS-COV-2 RNA SPEC QL NAA+PROBE: SIGNIFICANT CHANGE UP
SODIUM SERPL-SCNC: 140 MMOL/L — SIGNIFICANT CHANGE UP (ref 135–146)
WBC # BLD: 8.86 K/UL — SIGNIFICANT CHANGE UP (ref 4.8–10.8)
WBC # FLD AUTO: 8.86 K/UL — SIGNIFICANT CHANGE UP (ref 4.8–10.8)

## 2022-08-29 PROCEDURE — 99223 1ST HOSP IP/OBS HIGH 75: CPT

## 2022-08-29 PROCEDURE — 93010 ELECTROCARDIOGRAM REPORT: CPT

## 2022-08-29 PROCEDURE — 73590 X-RAY EXAM OF LOWER LEG: CPT | Mod: 26,LT,RT

## 2022-08-29 PROCEDURE — 99285 EMERGENCY DEPT VISIT HI MDM: CPT

## 2022-08-29 RX ORDER — OXYCODONE AND ACETAMINOPHEN 5; 325 MG/1; MG/1
2 TABLET ORAL ONCE
Refills: 0 | Status: DISCONTINUED | OUTPATIENT
Start: 2022-08-29 | End: 2022-08-29

## 2022-08-29 RX ORDER — KETOROLAC TROMETHAMINE 30 MG/ML
15 SYRINGE (ML) INJECTION
Refills: 0 | Status: DISCONTINUED | OUTPATIENT
Start: 2022-08-29 | End: 2022-09-02

## 2022-08-29 RX ORDER — MORPHINE SULFATE 50 MG/1
4 CAPSULE, EXTENDED RELEASE ORAL EVERY 6 HOURS
Refills: 0 | Status: DISCONTINUED | OUTPATIENT
Start: 2022-08-29 | End: 2022-08-30

## 2022-08-29 RX ORDER — BUPRENORPHINE 10 UG/H
1 PATCH, EXTENDED RELEASE TRANSDERMAL
Qty: 0 | Refills: 0 | DISCHARGE

## 2022-08-29 RX ORDER — LOSARTAN POTASSIUM 100 MG/1
100 TABLET, FILM COATED ORAL DAILY
Refills: 0 | Status: DISCONTINUED | OUTPATIENT
Start: 2022-08-29 | End: 2022-09-02

## 2022-08-29 RX ORDER — MORPHINE SULFATE 50 MG/1
6 CAPSULE, EXTENDED RELEASE ORAL ONCE
Refills: 0 | Status: DISCONTINUED | OUTPATIENT
Start: 2022-08-29 | End: 2022-08-29

## 2022-08-29 RX ORDER — OXYCODONE HYDROCHLORIDE 5 MG/1
10 TABLET ORAL EVERY 4 HOURS
Refills: 0 | Status: DISCONTINUED | OUTPATIENT
Start: 2022-08-29 | End: 2022-09-02

## 2022-08-29 RX ORDER — VANCOMYCIN HCL 1 G
1000 VIAL (EA) INTRAVENOUS EVERY 12 HOURS
Refills: 0 | Status: DISCONTINUED | OUTPATIENT
Start: 2022-08-29 | End: 2022-08-31

## 2022-08-29 RX ORDER — FUROSEMIDE 40 MG
40 TABLET ORAL DAILY
Refills: 0 | Status: DISCONTINUED | OUTPATIENT
Start: 2022-08-29 | End: 2022-09-02

## 2022-08-29 RX ORDER — VANCOMYCIN HCL 1 G
1000 VIAL (EA) INTRAVENOUS ONCE
Refills: 0 | Status: COMPLETED | OUTPATIENT
Start: 2022-08-29 | End: 2022-08-29

## 2022-08-29 RX ADMIN — Medication 250 MILLIGRAM(S): at 20:22

## 2022-08-29 RX ADMIN — MORPHINE SULFATE 6 MILLIGRAM(S): 50 CAPSULE, EXTENDED RELEASE ORAL at 20:20

## 2022-08-29 RX ADMIN — OXYCODONE AND ACETAMINOPHEN 2 TABLET(S): 5; 325 TABLET ORAL at 22:16

## 2022-08-29 RX ADMIN — OXYCODONE HYDROCHLORIDE 10 MILLIGRAM(S): 5 TABLET ORAL at 23:37

## 2022-08-29 NOTE — H&P ADULT - NSHPLABSRESULTS_GEN_ALL_CORE
11.3   8.86  )-----------( 349      ( 29 Aug 2022 20:00 )             36.6   08-29    140  |  104  |  12  ----------------------------<  176<H>  5.0   |  27  |  0.7    Ca    9.1      29 Aug 2022 20:00    TPro  6.8  /  Alb  3.8  /  TBili  <0.2  /  DBili  x   /  AST  19  /  ALT  13  /  AlkPhos  101  08-29

## 2022-08-29 NOTE — ED PROVIDER NOTE - NS ED ROS FT
Constitutional: (-) fever  Eyes/ENT: (-) blurry vision, (-) epistaxis  Cardiovascular: (-) chest pain, (-) syncope  Respiratory: (-) cough, (-) shortness of breath  Gastrointestinal: (-) vomiting, (-) diarrhea  Musculoskeletal: (-) neck pain, (-) back pain, (-) joint pain  Integumentary: + ulcer  Neurological: (-) headache, (-) altered mental status  Psychiatric: (-) hallucinations  Allergic/Immunologic: (-) pruritus

## 2022-08-29 NOTE — ED ADULT TRIAGE NOTE - BMI (KG/M2)
Change ranitidine to (pepcid--famotidine 20mg daily)    Chemical stress test for chest pain    Increase Crestor to 40mg daily    See me back in early November with fasting labs   52.9

## 2022-08-29 NOTE — H&P ADULT - HISTORY OF PRESENT ILLNESS
55 y/o male presents with c/o bilateral lower extremity erythema and non-healing bilateral ulcers.  Pt states his last admission to address the non-hleaing ulcers was approx one year ago.  pt states the non-healing ulcers are on-going and despite previous admissions  55 y/o male presents with c/o bilateral lower extremity erythema and non-healing bilateral ulcers.  Pt states his last admission to address the non-hleaing ulcers was approx one year ago.  Pt states the non-healing ulcers are on-going and despite previous admissions with inpt and outpt care the wounds have not healed.  Pt states his PMD sent him for IV abx and wound care consult.  Pt states pain level 10/10 of bilateral lower extremity.      Home meds provided by pt.

## 2022-08-29 NOTE — H&P ADULT - ASSESSMENT
57 y/o male presents with c/o bilateral lower extremity erythema and non-healing bilateral ulcers.  Pt states his last admission to address the non-hleaing ulcers was approx one year ago.  Pt states the non-healing ulcers are on-going and despite previous admissions with inpt and outpt care the wounds have not healed.  Pt states his PMD sent him for IV abx and wound care consult.  Pt states pain level 10/10 of bilateral lower extremity.    Pt denies DM.         # Cellulitis   - ID consult  - c/w vanco  - venous duplex    # bilateral LLE wound   - ID consult   - wound cx  - wound care consult  - pain management  - bld cx  - MRSA nares cx    # HTN  - VS  - c/w home med    # Chronic back pain  - c/w home med MSO4 60mg PO q6am  - c/w home med MSO4 30mg PO q6pm  - mobic non-form  - toradol 15mg IVP q6am    # ? DM ?  - non healing ulcers  - FS qac  - Hgb A1C in am

## 2022-08-29 NOTE — ED PROVIDER NOTE - PHYSICAL EXAMINATION
Physical Exam    Vital Signs: I have reviewed the initial vital signs.  Constitutional: well-nourished, appears stated age, no acute distress  Eyes: Conjunctiva pink, Sclera clear, PERRLA, EOMI.  Cardiovascular: S1 and S2, regular rate, regular rhythm, well-perfused extremities, radial pulses equal and 2+  Respiratory: unlabored respiratory effort, clear to auscultation bilaterally no wheezing, rales and rhonchi  Gastrointestinal: soft, non-tender abdomen, no pulsatile mass, normal bowl sounds  Musculoskeletal: supple neck, +b/l lower extremity edema, no midline tenderness  Integumentary: warm, dry, +large area to right lower leg and smaller area to left leg ulcers with green discharge and foul smell and redness extending from area with generalized tenderness.   Neurologic: awake, alert, cranial nerves II-XII grossly intact, extremities’ motor and sensory functions grossly intact  Psychiatric: appropriate mood, appropriate affect

## 2022-08-29 NOTE — H&P ADULT - NS ATTEND AMEND GEN_ALL_CORE FT
57 y/o male with PMHx of DM diet controlled, RUSSELL, HTN, and chronic b/l LE venous insufficiency with chronic ulcers, morbid obesity presents with c/o bilateral lower extremity erythema and non-healing bilateral ulcers. Patient seen and examined at bedside.  Case discussed with PA, agree with H&P and A/P     # b/l LE Cellulitis with R>L worsening ulcers   # Hx of MRSA infection  - ID consult  - podiatry eval  - c/w vanco  - venous duplex  - wound cx  - wound care consult  - pain management  - bld cx  - MRSA nares cx  - c/w home medications  - c/w CPAP at HS  - monitor FS  - may benefit from outpatient follow up with Dr. Bustamante at the burn clinic

## 2022-08-29 NOTE — ED PROVIDER NOTE - OBJECTIVE STATEMENT
56 year old male presents with complaining of bilateral lower extremity redness and non-healing bilateral ulcers.  Pt states this has been a problem for the last year and half. patient has been admitted, seen by wound specialist at [facility] and [facility] and has not helped. patient being treated by PMD and has been on bactrim and clindamycin on and off for the last month with no help. patient states ulcers are getting worse with foul smell coming from legs with discharge.  Pt states his primary medical provider sent him for IV abx and wound care consult.  Pt states pain level 10/10 of bilateral lower extremity.

## 2022-08-29 NOTE — ED PROVIDER NOTE - CLINICAL SUMMARY MEDICAL DECISION MAKING FREE TEXT BOX
56yM with chronic leg  wounds  followed by  dr winchester -  admitted for  nonhealing wounds  - has been on  mrsa coverage x 1 month -   1 week increased purulence and foul smelling .  no systemic symptoms  wbc  8  - not tense  soft compartments   IV abx given  pt admitted to  medicine

## 2022-08-29 NOTE — ED PROVIDER NOTE - CHILD ABUSE FACILITY
INTERDISCIPLINARY PLAN OF CARE CONFERENCE    Date/Time: 10/22/2020 9:46 AM  Completed by: Radha Calderon Case Management      Patient Name:  Fanny Snow  YOB: 1960  Admitting Diagnosis: Pneumonia [J18.9]  PNA (pneumonia) [J18.9]  PNA (pneumonia) [J18.9]     Admit Date/Time:  10/15/2020 12:33 PM    Chart reviewed. Interdisciplinary team contacted or reviewed plan related to patient progress and discharge plans. Disciplines included Case Management, Nursing, and Dietitian. Current Status:stable  PT/OT recommendation for discharge plan of care: NA    Expected D/C Disposition:  Home  Confirmed plan with patient and/or family Yes confirmed with: (name) pt    Discharge Plan Comments: Chart reviewed, met with pt at bedside. Pt continues with plan to return home where she lives with her SO. Pt states she took herself off of O2 last night. Will continues to follow for home o2 needs.      [facility] admit[facility]

## 2022-08-30 LAB
A1C WITH ESTIMATED AVERAGE GLUCOSE RESULT: 7.6 % — HIGH (ref 4–5.6)
ANION GAP SERPL CALC-SCNC: 9 MMOL/L — SIGNIFICANT CHANGE UP (ref 7–14)
BUN SERPL-MCNC: 14 MG/DL — SIGNIFICANT CHANGE UP (ref 10–20)
CALCIUM SERPL-MCNC: 8.8 MG/DL — SIGNIFICANT CHANGE UP (ref 8.5–10.1)
CHLORIDE SERPL-SCNC: 104 MMOL/L — SIGNIFICANT CHANGE UP (ref 98–110)
CO2 SERPL-SCNC: 28 MMOL/L — SIGNIFICANT CHANGE UP (ref 17–32)
CREAT SERPL-MCNC: 0.7 MG/DL — SIGNIFICANT CHANGE UP (ref 0.7–1.5)
EGFR: 110 ML/MIN/1.73M2 — SIGNIFICANT CHANGE UP
ESTIMATED AVERAGE GLUCOSE: 171 MG/DL — HIGH (ref 68–114)
GLUCOSE SERPL-MCNC: 154 MG/DL — HIGH (ref 70–99)
HCT VFR BLD CALC: 34.9 % — LOW (ref 42–52)
HGB BLD-MCNC: 10.7 G/DL — LOW (ref 14–18)
MCHC RBC-ENTMCNC: 24.2 PG — LOW (ref 27–31)
MCHC RBC-ENTMCNC: 30.7 G/DL — LOW (ref 32–37)
MCV RBC AUTO: 78.8 FL — LOW (ref 80–94)
NRBC # BLD: 0 /100 WBCS — SIGNIFICANT CHANGE UP (ref 0–0)
PLATELET # BLD AUTO: 317 K/UL — SIGNIFICANT CHANGE UP (ref 130–400)
POTASSIUM SERPL-MCNC: 4.1 MMOL/L — SIGNIFICANT CHANGE UP (ref 3.5–5)
POTASSIUM SERPL-SCNC: 4.1 MMOL/L — SIGNIFICANT CHANGE UP (ref 3.5–5)
RBC # BLD: 4.43 M/UL — LOW (ref 4.7–6.1)
RBC # FLD: 16.6 % — HIGH (ref 11.5–14.5)
SODIUM SERPL-SCNC: 141 MMOL/L — SIGNIFICANT CHANGE UP (ref 135–146)
WBC # BLD: 7.92 K/UL — SIGNIFICANT CHANGE UP (ref 4.8–10.8)
WBC # FLD AUTO: 7.92 K/UL — SIGNIFICANT CHANGE UP (ref 4.8–10.8)

## 2022-08-30 PROCEDURE — 99221 1ST HOSP IP/OBS SF/LOW 40: CPT

## 2022-08-30 PROCEDURE — 99222 1ST HOSP IP/OBS MODERATE 55: CPT

## 2022-08-30 PROCEDURE — 99232 SBSQ HOSP IP/OBS MODERATE 35: CPT

## 2022-08-30 PROCEDURE — 93970 EXTREMITY STUDY: CPT | Mod: 26

## 2022-08-30 RX ORDER — SODIUM CHLORIDE 9 MG/ML
1000 INJECTION, SOLUTION INTRAVENOUS
Refills: 0 | Status: DISCONTINUED | OUTPATIENT
Start: 2022-08-30 | End: 2022-09-02

## 2022-08-30 RX ORDER — ACETAMINOPHEN 500 MG
650 TABLET ORAL EVERY 6 HOURS
Refills: 0 | Status: DISCONTINUED | OUTPATIENT
Start: 2022-08-30 | End: 2022-09-02

## 2022-08-30 RX ORDER — ONDANSETRON 8 MG/1
4 TABLET, FILM COATED ORAL EVERY 8 HOURS
Refills: 0 | Status: DISCONTINUED | OUTPATIENT
Start: 2022-08-30 | End: 2022-09-02

## 2022-08-30 RX ORDER — MORPHINE SULFATE 50 MG/1
30 CAPSULE, EXTENDED RELEASE ORAL
Refills: 0 | Status: DISCONTINUED | OUTPATIENT
Start: 2022-08-30 | End: 2022-08-30

## 2022-08-30 RX ORDER — MORPHINE SULFATE 50 MG/1
30 CAPSULE, EXTENDED RELEASE ORAL
Refills: 0 | Status: DISCONTINUED | OUTPATIENT
Start: 2022-08-30 | End: 2022-09-02

## 2022-08-30 RX ORDER — LANOLIN ALCOHOL/MO/W.PET/CERES
3 CREAM (GRAM) TOPICAL AT BEDTIME
Refills: 0 | Status: DISCONTINUED | OUTPATIENT
Start: 2022-08-30 | End: 2022-09-02

## 2022-08-30 RX ORDER — DEXTROSE 50 % IN WATER 50 %
25 SYRINGE (ML) INTRAVENOUS ONCE
Refills: 0 | Status: DISCONTINUED | OUTPATIENT
Start: 2022-08-30 | End: 2022-09-02

## 2022-08-30 RX ORDER — DEXTROSE 50 % IN WATER 50 %
12.5 SYRINGE (ML) INTRAVENOUS ONCE
Refills: 0 | Status: DISCONTINUED | OUTPATIENT
Start: 2022-08-30 | End: 2022-09-02

## 2022-08-30 RX ORDER — COLLAGENASE CLOSTRIDIUM HIST. 250 UNIT/G
1 OINTMENT (GRAM) TOPICAL
Refills: 0 | Status: DISCONTINUED | OUTPATIENT
Start: 2022-08-30 | End: 2022-09-02

## 2022-08-30 RX ORDER — HEPARIN SODIUM 5000 [USP'U]/ML
5000 INJECTION INTRAVENOUS; SUBCUTANEOUS EVERY 8 HOURS
Refills: 0 | Status: DISCONTINUED | OUTPATIENT
Start: 2022-08-30 | End: 2022-09-02

## 2022-08-30 RX ORDER — GLUCAGON INJECTION, SOLUTION 0.5 MG/.1ML
1 INJECTION, SOLUTION SUBCUTANEOUS ONCE
Refills: 0 | Status: DISCONTINUED | OUTPATIENT
Start: 2022-08-30 | End: 2022-09-02

## 2022-08-30 RX ORDER — MORPHINE SULFATE 50 MG/1
60 CAPSULE, EXTENDED RELEASE ORAL
Refills: 0 | Status: DISCONTINUED | OUTPATIENT
Start: 2022-08-30 | End: 2022-09-02

## 2022-08-30 RX ORDER — MORPHINE SULFATE 50 MG/1
60 CAPSULE, EXTENDED RELEASE ORAL
Refills: 0 | Status: DISCONTINUED | OUTPATIENT
Start: 2022-08-30 | End: 2022-08-30

## 2022-08-30 RX ORDER — INSULIN LISPRO 100/ML
VIAL (ML) SUBCUTANEOUS
Refills: 0 | Status: DISCONTINUED | OUTPATIENT
Start: 2022-08-30 | End: 2022-09-02

## 2022-08-30 RX ORDER — DEXTROSE 50 % IN WATER 50 %
15 SYRINGE (ML) INTRAVENOUS ONCE
Refills: 0 | Status: DISCONTINUED | OUTPATIENT
Start: 2022-08-30 | End: 2022-09-02

## 2022-08-30 RX ADMIN — MORPHINE SULFATE 4 MILLIGRAM(S): 50 CAPSULE, EXTENDED RELEASE ORAL at 04:39

## 2022-08-30 RX ADMIN — MORPHINE SULFATE 4 MILLIGRAM(S): 50 CAPSULE, EXTENDED RELEASE ORAL at 04:00

## 2022-08-30 RX ADMIN — Medication 250 MILLIGRAM(S): at 05:56

## 2022-08-30 RX ADMIN — OXYCODONE HYDROCHLORIDE 10 MILLIGRAM(S): 5 TABLET ORAL at 06:42

## 2022-08-30 RX ADMIN — Medication 40 MILLIGRAM(S): at 06:03

## 2022-08-30 RX ADMIN — OXYCODONE HYDROCHLORIDE 10 MILLIGRAM(S): 5 TABLET ORAL at 17:21

## 2022-08-30 RX ADMIN — OXYCODONE HYDROCHLORIDE 10 MILLIGRAM(S): 5 TABLET ORAL at 00:04

## 2022-08-30 RX ADMIN — MORPHINE SULFATE 60 MILLIGRAM(S): 50 CAPSULE, EXTENDED RELEASE ORAL at 06:56

## 2022-08-30 RX ADMIN — OXYCODONE HYDROCHLORIDE 10 MILLIGRAM(S): 5 TABLET ORAL at 10:34

## 2022-08-30 RX ADMIN — OXYCODONE HYDROCHLORIDE 10 MILLIGRAM(S): 5 TABLET ORAL at 23:40

## 2022-08-30 RX ADMIN — Medication 100 MILLIGRAM(S): at 05:58

## 2022-08-30 RX ADMIN — MORPHINE SULFATE 30 MILLIGRAM(S): 50 CAPSULE, EXTENDED RELEASE ORAL at 17:12

## 2022-08-30 RX ADMIN — Medication 250 MILLIGRAM(S): at 17:13

## 2022-08-30 RX ADMIN — Medication 100 MILLIGRAM(S): at 18:49

## 2022-08-30 RX ADMIN — OXYCODONE HYDROCHLORIDE 10 MILLIGRAM(S): 5 TABLET ORAL at 23:10

## 2022-08-30 RX ADMIN — MORPHINE SULFATE 60 MILLIGRAM(S): 50 CAPSULE, EXTENDED RELEASE ORAL at 06:50

## 2022-08-30 RX ADMIN — Medication 15 MILLIGRAM(S): at 06:09

## 2022-08-30 RX ADMIN — Medication 15 MILLIGRAM(S): at 06:38

## 2022-08-30 RX ADMIN — OXYCODONE HYDROCHLORIDE 10 MILLIGRAM(S): 5 TABLET ORAL at 06:23

## 2022-08-30 RX ADMIN — LOSARTAN POTASSIUM 100 MILLIGRAM(S): 100 TABLET, FILM COATED ORAL at 05:56

## 2022-08-30 NOTE — PHYSICAL THERAPY INITIAL EVALUATION ADULT - ADDITIONAL COMMENTS
pt is 57 y/o old male , lives with  wife in  , information obtain from the pt and wife at bedside , has  steps5  to enter with   LT HR and 12  steps to the  Shower upstairs w/ LT HR , pt was independent bed mobility , transfer , ambulation ,stair negotiation and basic ADLS PTA

## 2022-08-30 NOTE — PHYSICAL THERAPY INITIAL EVALUATION ADULT - PLANNED THERAPY INTERVENTIONS, PT EVAL
Pt is independent w/ Bed mobility , transfer , ambulation W/o AD and Supervision W? Aaron mora, Skill PT not recommended at this time , Reconsult Skill PT in future as appropriate.

## 2022-08-30 NOTE — PHYSICAL THERAPY INITIAL EVALUATION ADULT - GENERAL OBSERVATIONS, REHAB EVAL
14:30 -15:00 Chart reviewed. Order recived.  Patient available to be seen for Pt, confirmed with RN. pt encountered  Semi haynes in the bed C/o BLE and Lower back  pain, and agrees to participate in session, +RUE Heplock , +BLE Ace wrap  ,NAD.

## 2022-08-30 NOTE — CONSULT NOTE ADULT - SUBJECTIVE AND OBJECTIVE BOX
Podiatry Consult Note    Subjective:  JOSE ALBERTO NO  Seen Bedside 56y Male  .   Patient is a 56y old  Male who presents with a chief complaint of Cellulitis (30 Aug 2022 16:39)    HPI:  57 y/o male presents with c/o bilateral lower extremity erythema and non-healing bilateral ulcers.  Pt states his last admission to address the non-hleaing ulcers was approx one year ago.  Pt states the non-healing ulcers are on-going and despite previous admissions with inpt and outpt care the wounds have not healed.  Pt states his PMD sent him for IV abx and wound care consult.  Pt states pain level 10/10 of bilateral lower extremity.    Pt reports ulcerations first appearing after spinal cord stimulator surgery 3 years ago, which resulted in multiple hostpial stays and IV abx. He states his leg wound on his right side started as a small scrape and slowly enlarged after treatment with multiple unna boots. Pt has tried using silvadene which aggrivates the wound and xeroform which sticks to the wound. He has had it debrided in office by multiple doctors however it does not induce healing and results in enlargement of the wound. Pt reports the only cream that worked before was a red top tube. His wife currently wraps his lower extremity in dressing daily with vasaline and bacitracin.    Home meds provided by pt.  (29 Aug 2022 22:53)      Past Medical History and Surgical History  PAST MEDICAL & SURGICAL HISTORY:  Hypertension      Disc disease, degenerative, lumbar or lumbosacral      Obstructive sleep apnea      Morbid obesity      DM (diabetes mellitus)      Spinal cord stimulator status      H/O arthroscopy of right knee      History of excision of pilonidal cyst           Review of Systems:  [X] Ten point review of systems is otherwise negative except as noted     Objective:  Vital Signs Last 24 Hrs  T(C): 37.1 (30 Aug 2022 13:43), Max: 37.3 (29 Aug 2022 19:05)  T(F): 98.7 (30 Aug 2022 13:43), Max: 99.2 (29 Aug 2022 19:05)  HR: 76 (30 Aug 2022 13:43) (67 - 102)  BP: 152/67 (30 Aug 2022 13:43) (152/67 - 187/91)  BP(mean): --  RR: 18 (29 Aug 2022 19:05) (18 - 18)  SpO2: 97% (29 Aug 2022 19:05) (97% - 97%)    Parameters below as of 29 Aug 2022 19:05  Patient On (Oxygen Delivery Method): room air                            10.7   7.92  )-----------( 317      ( 30 Aug 2022 07:11 )             34.9                 08-30    141  |  104  |  14  ----------------------------<  154<H>  4.1   |  28  |  0.7    Ca    8.8      30 Aug 2022 07:11    TPro  6.8  /  Alb  3.8  /  TBili  <0.2  /  DBili  x   /  AST  19  /  ALT  13  /  AlkPhos  101  08-29    Radiology:  impression:    No acute displaced fracture or dislocation. Diffuse soft tissue swelling/edema, increased since 2020 on the left. No definite radiographic evidence of osteomyelitis.           Physical Exam - Lower Extremity Focused:   Derm: b/l lower extremity ulceration, right wound is located on lateral malleolus and travels half way to mid tibia. It is granulo fibrotic wound base, no purulent drainage, no malodor, no erythema.  Left lower extremity ulceration superior to the left lateral malleolus, granulo fibrotic, no purulent drainage, no malodor, no erythema.   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     Plan:  Chart reviewed and Patient evaluated. All Questions and Concerns Addressed and Answered  XR Imaging;  see report above.   Wound Culture: no culturable material.   Local Wound Care;  bacitracin abd loose ACE.   Weight Bearing Status; WBAT    Recommend; Lower Extremity Arterial Duplex B/L   Request ID Consult;  No indication for podiatric surgical intervention at this time.   Possible starting of Oral prednisone once pt infection has resolved.  Discussed Plan w/   [facility]  Podiatry Consult Note    Subjective:  JOSE ALBERTO NO  Seen Bedside 56y Male  .   Patient is a 56y old  Male who presents with a chief complaint of Cellulitis (30 Aug 2022 16:39)    HPI:  57 y/o male presents with c/o bilateral lower extremity erythema and non-healing bilateral ulcers.  Pt states his last admission to address the non-hleaing ulcers was approx one year ago.  Pt states the non-healing ulcers are on-going and despite previous admissions with inpt and outpt care the wounds have not healed.  Pt states his PMD sent him for IV abx and wound care consult.  Pt states pain level 10/10 of bilateral lower extremity.    Pt reports ulcerations first appearing after spinal cord stimulator surgery 3 years ago, which resulted in multiple hostpial stays and IV abx. He states his leg wound on his right side started as a small scrape and slowly enlarged after treatment with multiple unna boots. Pt has tried using silvadene which aggrivates the wound and xeroform which sticks to the wound. He has had it debrided in office by multiple doctors however it does not induce healing and results in enlargement of the wound. Pt reports the only cream that worked before was a red top tube. His wife currently wraps his lower extremity in dressing daily with vasaline and bacitracin.    Home meds provided by pt.  (29 Aug 2022 22:53)      Past Medical History and Surgical History  PAST MEDICAL & SURGICAL HISTORY:  Hypertension      Disc disease, degenerative, lumbar or lumbosacral      Obstructive sleep apnea      Morbid obesity      DM (diabetes mellitus)      Spinal cord stimulator status      H/O arthroscopy of right knee      History of excision of pilonidal cyst           Review of Systems:  [X] Ten point review of systems is otherwise negative except as noted     Objective:  Vital Signs Last 24 Hrs  T(C): 37.1 (30 Aug 2022 13:43), Max: 37.3 (29 Aug 2022 19:05)  T(F): 98.7 (30 Aug 2022 13:43), Max: 99.2 (29 Aug 2022 19:05)  HR: 76 (30 Aug 2022 13:43) (67 - 102)  BP: 152/67 (30 Aug 2022 13:43) (152/67 - 187/91)  BP(mean): --  RR: 18 (29 Aug 2022 19:05) (18 - 18)  SpO2: 97% (29 Aug 2022 19:05) (97% - 97%)    Parameters below as of 29 Aug 2022 19:05  Patient On (Oxygen Delivery Method): room air                            10.7   7.92  )-----------( 317      ( 30 Aug 2022 07:11 )             34.9                 08-30    141  |  104  |  14  ----------------------------<  154<H>  4.1   |  28  |  0.7    Ca    8.8      30 Aug 2022 07:11    TPro  6.8  /  Alb  3.8  /  TBili  <0.2  /  DBili  x   /  AST  19  /  ALT  13  /  AlkPhos  101  08-29    Radiology:  impression:    No acute displaced fracture or dislocation. Diffuse soft tissue swelling/edema, increased since 2020 on the left. No definite radiographic evidence of osteomyelitis.           Physical Exam - Lower Extremity Focused:   Derm: b/l lower extremity ulceration, right wound is located on lateral malleolus and travels half way to mid tibia. It is granulo fibrotic wound base, no purulent drainage, no malodor, no erythema. Rolled borders B/L LE. Size 15cm x 20.2cm   Left lower extremity ulceration superior to the left lateral malleolus, granulo fibrotic, no purulent drainage, no malodor, no erythema. Rolled borders 5cm x 4.2cm   Hemosiderosis of B/L LE. Moderated B/L LE swelling   Vascular: DP and PT Pulses Diminished; Foot is Warm to Warm to the touch; Capillary Refill Time < 3 Seconds;    Neuro: Protective Sensation Diminished / Moderately Neuropathic   MSK: Pain On Palpation at Wound Site     Assessment:  Chronic b/l lower extremity wounds, venus stasis in nature. No sings of infection -  pyoderma gangrenosum could not be excluded     Plan:  Chart reviewed and Patient evaluated. All Questions and Concerns Addressed and Answered  XR Imaging;  see report above.   Wound Culture: no culturable material.    Weight Bearing Status; WBAT    Recommend; Lower Extremity Arterial Duplex B/L   Cont with local wound care   No indication for podiatric surgical intervention at this time.   Discussed Plan w/   [facility]

## 2022-08-30 NOTE — CONSULT NOTE ADULT - TIME BILLING
I have personally seen and examined this patient.    I have reviewed all pertinent clinical information and reviewed all relevant imaging and diagnostic studies personally.   I counseled the patient about diagnostic testing and treatment plan. All questions were answered.   I discussed recommendations with the primary team.
Complicated Chronic  wounds B/L. Requiring extensive treatment. All treatment options, benefits, risk and compilations discussed with the patient

## 2022-08-30 NOTE — CONSULT NOTE ADULT - SUBJECTIVE AND OBJECTIVE BOX
JOSE ALBERTO NO  56y, Male  Allergy: IV Contrast (Sneezing (Mod to Severe))  penicillin (Unknown)      CHIEF COMPLAINT: Cellulitis (30 Aug 2022 13:55)      LOS  1d    HPI:  55 y/o male presents with c/o bilateral lower extremity erythema and non-healing bilateral ulcers.  Pt states his last admission to address the non-hleaing ulcers was approx one year ago.  Pt states the non-healing ulcers are on-going and despite previous admissions with inpt and outpt care the wounds have not healed.  Pt states his PMD sent him for IV abx and wound care consult.  Pt states pain level 10/10 of bilateral lower extremity.      Home meds provided by pt.  (29 Aug 2022 22:53)      INFECTIOUS DISEASE HISTORY:  History as above.  Has tried multiple course of antibiotics.   Most recently with bactrim which appeared to help clear and dry out wounds.   Drainage worsened a few days after stopping bactrim.     PAST MEDICAL & SURGICAL HISTORY:  Hypertension      Disc disease, degenerative, lumbar or lumbosacral      Obstructive sleep apnea      Morbid obesity      DM (diabetes mellitus)      Spinal cord stimulator status      H/O arthroscopy of right knee      History of excision of pilonidal cyst          FAMILY HISTORY  Family history of early CAD (Father)    Family history of diabetes mellitus in mother (Mother)        SOCIAL HISTORY  Social History:  , lives with wife. On disability (former ). Smokes 3/4 packs/day x15 years. Denies any EtOH or illicit drug use. (01 Aug 2021 18:12)        ROS  General: Denies rigors, nightsweats  HEENT: Denies headache, rhinorrhea, sore throat, eye pain  CV: Denies CP, palpitations  PULM: Denies wheezing, hemoptysis  GI: Denies hematemesis, hematochezia, melena  : Denies discharge, hematuria  MSK: Denies arthralgias, myalgias  SKIN: Denies rash, lesions  NEURO: Denies paresthesias, weakness  PSYCH: Denies depression, anxiety    VITALS:  T(F): 98.7, Max: 99.2 (08-29-22 @ 19:05)  HR: 76  BP: 152/67  RR: 18Vital Signs Last 24 Hrs  T(C): 37.1 (30 Aug 2022 13:43), Max: 37.3 (29 Aug 2022 19:05)  T(F): 98.7 (30 Aug 2022 13:43), Max: 99.2 (29 Aug 2022 19:05)  HR: 76 (30 Aug 2022 13:43) (67 - 102)  BP: 152/67 (30 Aug 2022 13:43) (152/67 - 187/91)  BP(mean): --  RR: 18 (29 Aug 2022 19:05) (18 - 18)  SpO2: 97% (29 Aug 2022 19:05) (97% - 97%)    Parameters below as of 29 Aug 2022 19:05  Patient On (Oxygen Delivery Method): room air        PHYSICAL EXAM:  Gen: NAD, resting in bed  HEENT: Normocephalic, atraumatic  Neck: supple, no lymphadenopathy  CV: Regular rate & regular rhythm  Lungs: decreased BS at bases, no fremitus  Abdomen: Soft, BS present  Ext: Lower extremity ulcers bilaterally - right more extensive -- malodourous, mild erythema - drainage noted on dressings   Neuro: non focal, awake  Skin: no rash, no erythema  Lines: no phlebitis    TESTS & MEASUREMENTS:                        10.7   7.92  )-----------( 317      ( 30 Aug 2022 07:11 )             34.9     08-30    141  |  104  |  14  ----------------------------<  154<H>  4.1   |  28  |  0.7    Ca    8.8      30 Aug 2022 07:11    TPro  6.8  /  Alb  3.8  /  TBili  <0.2  /  DBili  x   /  AST  19  /  ALT  13  /  AlkPhos  101  08-29      LIVER FUNCTIONS - ( 29 Aug 2022 20:00 )  Alb: 3.8 g/dL / Pro: 6.8 g/dL / ALK PHOS: 101 U/L / ALT: 13 U/L / AST: 19 U/L / GGT: x                 Lactate, Blood: 1.9 mmol/L (08-29-22 @ 20:00)      INFECTIOUS DISEASES TESTING  COVID-19 PCR: NotDetec (08-29-22 @ 20:15)  COVID-19 PCR: NotDetec (01-01-22 @ 23:00)      RADIOLOGY & ADDITIONAL TESTS:  I have personally reviewed the last Chest xray  CXR      CT      CARDIOLOGY TESTING  12 Lead ECG:   Ventricular Rate 91 BPM    Atrial Rate 91 BPM    P-R Interval 190 ms    QRS Duration 76 ms    Q-T Interval 356 ms    QTC Calculation(Bazett) 437 ms    P Axis 46 degrees    R Axis 49 degrees    T Axis 4 degrees    Diagnosis Line Normal sinus rhythm  Nonspecific T wave abnormality  Abnormal ECG    Confirmed by CHARISMA MORTON MD ([id number]) on 8/30/2022 9:09:22 AM (08-29-22 @ 20:03)      MEDICATIONS  collagenase Ointment 1 Topical two times a day  furosemide    Tablet 40 Oral daily  heparin   Injectable 5000 SubCutaneous every 8 hours  ketorolac   Injectable 15 IV Push <User Schedule>  losartan 100 Oral daily  morphine ER Tablet 30 Oral <User Schedule>  morphine ER Tablet 60 Oral <User Schedule>  pregabalin 100 Oral two times a day  vancomycin  IVPB 1000 IV Intermittent every 12 hours      Weight  Weight (kg): 207 (08-29-22 @ 23:37)    ANTIBIOTICS:  vancomycin  IVPB 1000 milliGRAM(s) IV Intermittent every 12 hours      ALLERGIES:  IV Contrast (Sneezing (Mod to Severe))  penicillin (Unknown)

## 2022-08-30 NOTE — CHART NOTE - NSCHARTNOTEFT_GEN_A_CORE
Pt's HbgA1C is 7.6.   Pt denies DM.  Will reorder FS and add SS coverage.  Pt will need outpt endocrine follow up.

## 2022-08-30 NOTE — PHYSICAL THERAPY INITIAL EVALUATION ADULT - STANDING BALANCE: DYNAMIC, REHAB EVAL
Addended by: CAROLIN MENENDEZ on: 7/15/2022 03:33 PM     Modules accepted: Orders    
W/o AD/fair plus

## 2022-08-30 NOTE — PROGRESS NOTE ADULT - ASSESSMENT
57 y/o male presents with c/o bilateral lower extremity erythema and non-healing bilateral ulcers.  Pt states his last admission to address the non-hleaing ulcers was approx one year ago.  Pt states the non-healing ulcers are on-going and despite previous admissions with inpt and outpt care the wounds have not healed.  Pt states his PMD sent him for IV abx and wound care consult.     # Cellulitis w/ purulent discharge   - ID consult  - c/w vanco  - f/u venous duplex  - would care   - f/u cultures   - a1c    # HTN  - VS  - c/w home med    # Chronic back pain  - c/w home med MSO4 60mg PO q6am  - c/w home med MSO4 30mg PO q6pm  - mobic non-form  - toradol 15mg IVP q6am    #enoxaparin subq

## 2022-08-30 NOTE — CONSULT NOTE ADULT - SUBJECTIVE AND OBJECTIVE BOX
Patient is a 55 y/o male presents with c/o bilateral lower extremity erythema and non-healing bilateral ulcers.  Pt states his last admission to address the non-hleaing ulcers was approx one year ago.  Pt states the non-healing ulcers are on-going and despite previous admissions with inpt and outpt care the wounds have not healed.  Pt states his PMD sent him for IV abx and wound care consult.  Pt states pain level 10/10 of bilateral lower extremity.    Patient currently admitted on [facility] floor for treatment of chronic lower extremity wound       PAST MEDICAL & SURGICAL HISTORY:  Hypertension  Disc disease, degenerative, lumbar or lumbosacral  Obstructive sleep apnea  Morbid obesity  DM (diabetes mellitus)  Spinal cord stimulator status  H/O arthroscopy of right knee  History of excision of pilonidal cyst      REVIEW OF SYSTEMS: All other systems negative, unless indicated in HPI    MEDICATIONS  (STANDING):  furosemide    Tablet 40 milliGRAM(s) Oral daily  heparin   Injectable 5000 Unit(s) SubCutaneous every 8 hours  ketorolac   Injectable 15 milliGRAM(s) IV Push <User Schedule>  losartan 100 milliGRAM(s) Oral daily  morphine ER Tablet 30 milliGRAM(s) Oral <User Schedule>  morphine ER Tablet 60 milliGRAM(s) Oral <User Schedule>  pregabalin 100 milliGRAM(s) Oral two times a day  vancomycin  IVPB 1000 milliGRAM(s) IV Intermittent every 12 hours    MEDICATIONS  (PRN):  acetaminophen     Tablet .. 650 milliGRAM(s) Oral every 6 hours PRN Temp greater or equal to 38C (100.4F), Mild Pain (1 - 3)  aluminum hydroxide/magnesium hydroxide/simethicone Suspension 30 milliLiter(s) Oral every 4 hours PRN Dyspepsia  melatonin 3 milliGRAM(s) Oral at bedtime PRN Insomnia  ondansetron Injectable 4 milliGRAM(s) IV Push every 8 hours PRN Nausea and/or Vomiting  oxyCODONE    IR 10 milliGRAM(s) Oral every 4 hours PRN Moderate Pain (4 - 6)      Allergies  IV Contrast (Sneezing (Mod to Severe))  penicillin (Unknown)  Intolerances    SOCIAL HISTORY:  ,    FAMILY HISTORY:  Family history of early CAD (Father)  Family history of diabetes mellitus in mother (Mother)      PHYSICAL EXAM:  Vital Signs Last 24 Hrs  T(C): 37.1 (30 Aug 2022 13:43), Max: 37.3 (29 Aug 2022 19:05)  T(F): 98.7 (30 Aug 2022 13:43), Max: 99.2 (29 Aug 2022 19:05)  HR: 76 (30 Aug 2022 13:43) (67 - 102)  BP: 152/67 (30 Aug 2022 13:43) (152/67 - 187/91)  BP(mean): --  RR: 18 (29 Aug 2022 19:05) (18 - 18)  SpO2: 97% (29 Aug 2022 19:05) (97% - 97%)    Parameters below as of 29 Aug 2022 19:05  Patient On (Oxygen Delivery Method): room air         General NAD , A/O obese   HEENT:  NC/AT, PERRL, EOMI, sclera clear, mucosa moist, throat clear, trachea midline, neck supple  Cardiovascular: RRR   Respiratory: CTA/ equal chest rise  Gastrointestinal soft NT/ND (+)BS   obese   Neurology:  weakened strength & sensation   Psych: calm  Musculoskeletal:  limited stiff / FROM,  deformities/ contractures  Vascular: BLE equally cool,   clubbing, edema , lymphedema     Skin:  B/l le chronic venous stasis ,serosanguinous drainage + odor     LABS/ CULTURES/ RADIOLOGY:                        10.7   7.92  )-----------( 317      ( 30 Aug 2022 07:11 )             34.9       141  |  104  |  14  ----------------------------<  154      [08-30-22 @ 07:11]  4.1   |  28  |  0.7        Ca     8.8     [08-30-22 @ 07:11]    TPro  6.8  /  Alb  3.8  /  TBili  <0.2  /  DBili  x   /  AST  19  /  ALT  13  /  AlkPhos  101  [08-29-22 @ 20:00]    PT/INR: PT 12.60, INR 1.10       [08-29-22 @ 20:00]  PTT: 32.4       [08-29-22 @ 20:00]      A1C with Estimated Average Glucose Result: 7.6: Method: Immunoassay   Reference Range 4.0-5.6%   High risk (prediabetic) 5.7-6.4%   Diabetic, diagnostic >=6.5%   ADA diabetic treatment goal <7.0%

## 2022-08-30 NOTE — PROGRESS NOTE ADULT - SUBJECTIVE AND OBJECTIVE BOX
ONEALJOSE ALBERTO  56y  Male      Subjective:     no acute overnight events. Denies any chest pain, sob, or n/v     REVIEW OF SYSTEMS:  All 12 ROS negative except ones mentioned in HPI     T(C): 36.3 (08-30-22 @ 05:20), Max: 37.3 (08-29-22 @ 19:05)  HR: 67 (08-30-22 @ 05:20) (67 - 102)  BP: 164/77 (08-30-22 @ 05:20) (164/77 - 187/91)  RR: 18 (08-29-22 @ 19:05) (18 - 18)  SpO2: 97% (08-29-22 @ 19:05) (97% - 97%)    PHYSICAL EXAM:  GENERAL: NAD +obese  HEAD:  Atraumatic, Normocephalic  ENMT: Moist mucous membranes  NECK: Supple  CHEST/LUNG: Clear to percussion bilaterally  HEART: Regular rate and rhythm  ABDOMEN: Soft, Nontender, Nondistended; Bowel sounds present  EXTREMITIES:   +chronice LE edema w/ skin changes, purulent discharge foul smelling from both LE   NERVOUS SYSTEM:  Alert & Oriented X3      Consultant(s) Notes Reviewed:  [x ] YES  [ ] NO  Care Discussed with Consultants/Other Providers [ x] YES  [ ] NO    LAB:                        10.7   7.92  )-----------( 317      ( 30 Aug 2022 07:11 )             34.9     08-30    141  |  104  |  14  ----------------------------<  154<H>  4.1   |  28  |  0.7    Ca    8.8      30 Aug 2022 07:11    TPro  6.8  /  Alb  3.8  /  TBili  <0.2  /  DBili  x   /  AST  19  /  ALT  13  /  AlkPhos  101  08-29    LIVER FUNCTIONS - ( 29 Aug 2022 20:00 )  Alb: 3.8 g/dL / Pro: 6.8 g/dL / ALK PHOS: 101 U/L / ALT: 13 U/L / AST: 19 U/L / GGT: x                     Drug Dosing Weight  Height (cm): 200.7 (29 Aug 2022 23:37)  Weight (kg): 207 (29 Aug 2022 23:37)  BMI (kg/m2): 51.4 (29 Aug 2022 23:37)  BSA (m2): 3.24 (29 Aug 2022 23:37)  Height (cm): 200.7 (08-29-22 @ 23:37)  Weight (kg): 207 (08-29-22 @ 23:37)  BMI (kg/m2): 51.4 (08-29-22 @ 23:37)  BSA (m2): 3.24 (08-29-22 @ 23:37)  CAPILLARY BLOOD GLUCOSE        I&O's Summary        RADIOLOGY & ADDITIONAL TESTS:  Imaging Personally Reviewed:  [x] YES  [ ] NO    MEDS:  acetaminophen     Tablet .. 650 milliGRAM(s) Oral every 6 hours PRN  aluminum hydroxide/magnesium hydroxide/simethicone Suspension 30 milliLiter(s) Oral every 4 hours PRN  furosemide    Tablet 40 milliGRAM(s) Oral daily  heparin   Injectable 5000 Unit(s) SubCutaneous every 8 hours  ketorolac   Injectable 15 milliGRAM(s) IV Push <User Schedule>  losartan 100 milliGRAM(s) Oral daily  melatonin 3 milliGRAM(s) Oral at bedtime PRN  morphine ER Tablet 30 milliGRAM(s) Oral <User Schedule>  morphine ER Tablet 60 milliGRAM(s) Oral <User Schedule>  ondansetron Injectable 4 milliGRAM(s) IV Push every 8 hours PRN  oxyCODONE    IR 10 milliGRAM(s) Oral every 4 hours PRN  pregabalin 100 milliGRAM(s) Oral two times a day  vancomycin  IVPB 1000 milliGRAM(s) IV Intermittent every 12 hours

## 2022-08-30 NOTE — CONSULT NOTE ADULT - ASSESSMENT
Assessment:  Patient  received in bed , A/O obese,  diabetic, with chronic venous insufficiency.  per patient was seeing vascular and wound Md outpatient but nothing they                        did works so his wife has been taking care of wound   Geovany score 19.                         Limited  Mobility, High risk for pressure injury development or progression   Skin assessed- B/l buttock and sacrum intact     Wound #1  Type and location :  B/L lower extremity  chronic   non healing venous stasis ulcer with lymphedema   Size:~ L 6x6 x0.8 [id number]  Tissue Description :  Pale with fibrinous slough, non pitting edema, tissue fibrosis ,hyperkeratosis   Wound Exudate : Moderate foul smelling    Wound Edge: irregular  Periwound Condition; soft tissue changes , hyperkeratosis      Other Etiology:  [ ] Aterial  [ ] Venous   [ ] Surgical Incision  [ ] Other: ________    Plan:  Wound /skin care recs   Clean B/L lower extremity wounds with vashe' wound cleanser, pat dry then apply santyl with vashe soaked guaze dressing q 12 hours - Patient refused recommendation and dressing change, states non of this medications have worked, his wife his on his way to the hospital and she will do the dressing the way they have been doing it at home.  Consider vascular consult   Consider dietary consult for optimal nutrition for DM and obesity    Pressure  injury  preventive  measures  skin  and incontinence care  Assess wound and inform primary provider of any changes   Case discussed with primary Rn  Wound/ ostomy specialist  to f/u as needed     Offloading: [ x] Frequent position changes [ ] Devices/Equipment  Cleansing: [ ] Saline [ ] Soap/Water [ ] Other: ______  Topicals: [ ] Barrier Cream [ ] Antimicrobial [ x] Enzymatic Wound Debridement  Dressings: [ ] Dry, sterile [ ] Allevyn  Foam [ ] Absorbant Pads [ ] Collagenase    Other Recs.        Total time for bedside assessment , review of medical records  and  discussion of plan of care with primary team greater than 35 min
Please call or message on [employer] Teams if with any questions.  Spectra [id number]  55 y/o male presents with c/o bilateral lower extremity erythema and non-healing bilateral ulcers.      IMPRESSION  #Chronic Venous Stasis Ulcers with cellulitis  - Wound Cx 7/19/2020 Pseudomonas aeruginosa (pan-susceptible)    #Obesity BMI (kg/m2): 51.4    #Abx allergy: IV Contrast (Sneezing (Mod to Severe))  penicillin (Unknown)    RECOMMENDATIONS  - difficult to dose vancomycin and contraindication of linezolid while on morphine   - can give daptomycin 10 mg/kg q 24 hours for now  - check CK  - add cefepime 2g q 8 hours   - keep LE elevated  - wound care evaluation     Please call or message on [employer] Teams if with any questions.  Spectra [phone]

## 2022-08-30 NOTE — PHYSICAL THERAPY INITIAL EVALUATION ADULT - PERTINENT HX OF CURRENT PROBLEM, REHAB EVAL
55 y/o male presents with c/o bilateral lower extremity erythema and non-healing bilateral ulcers w/ PMH x of disc disease, Sleep apnea , DM

## 2022-08-30 NOTE — PATIENT PROFILE ADULT - FALL HARM RISK - UNIVERSAL INTERVENTIONS
Bed in lowest position, wheels locked, appropriate side rails in place/Call bell, personal items and telephone in reach/Instruct patient to call for assistance before getting out of bed or chair/Non-slip footwear when patient is out of bed/[place] to call system/Physically safe environment - no spills, clutter or unnecessary equipment/Purposeful Proactive Rounding/Room/bathroom lighting operational, light cord in reach

## 2022-08-31 LAB
ANION GAP SERPL CALC-SCNC: 8 MMOL/L — SIGNIFICANT CHANGE UP (ref 7–14)
BUN SERPL-MCNC: 12 MG/DL — SIGNIFICANT CHANGE UP (ref 10–20)
CALCIUM SERPL-MCNC: 8.7 MG/DL — SIGNIFICANT CHANGE UP (ref 8.5–10.1)
CHLORIDE SERPL-SCNC: 101 MMOL/L — SIGNIFICANT CHANGE UP (ref 98–110)
CO2 SERPL-SCNC: 30 MMOL/L — SIGNIFICANT CHANGE UP (ref 17–32)
CREAT SERPL-MCNC: 0.7 MG/DL — SIGNIFICANT CHANGE UP (ref 0.7–1.5)
CRP SERPL-MCNC: 32 MG/L — HIGH
EGFR: 110 ML/MIN/1.73M2 — SIGNIFICANT CHANGE UP
ERYTHROCYTE [SEDIMENTATION RATE] IN BLOOD: 25 MM/HR — HIGH (ref 0–10)
GLUCOSE SERPL-MCNC: 142 MG/DL — HIGH (ref 70–99)
HCT VFR BLD CALC: 35.1 % — LOW (ref 42–52)
HGB BLD-MCNC: 10.6 G/DL — LOW (ref 14–18)
MAGNESIUM SERPL-MCNC: 2.2 MG/DL — SIGNIFICANT CHANGE UP (ref 1.8–2.4)
MCHC RBC-ENTMCNC: 23.9 PG — LOW (ref 27–31)
MCHC RBC-ENTMCNC: 30.2 G/DL — LOW (ref 32–37)
MCV RBC AUTO: 79.1 FL — LOW (ref 80–94)
NRBC # BLD: 0 /100 WBCS — SIGNIFICANT CHANGE UP (ref 0–0)
PLATELET # BLD AUTO: 329 K/UL — SIGNIFICANT CHANGE UP (ref 130–400)
POTASSIUM SERPL-MCNC: 4.4 MMOL/L — SIGNIFICANT CHANGE UP (ref 3.5–5)
POTASSIUM SERPL-SCNC: 4.4 MMOL/L — SIGNIFICANT CHANGE UP (ref 3.5–5)
RBC # BLD: 4.44 M/UL — LOW (ref 4.7–6.1)
RBC # FLD: 16.6 % — HIGH (ref 11.5–14.5)
SODIUM SERPL-SCNC: 139 MMOL/L — SIGNIFICANT CHANGE UP (ref 135–146)
VANCOMYCIN TROUGH SERPL-MCNC: 6.3 UG/ML — SIGNIFICANT CHANGE UP (ref 5–10)
WBC # BLD: 6.55 K/UL — SIGNIFICANT CHANGE UP (ref 4.8–10.8)
WBC # FLD AUTO: 6.55 K/UL — SIGNIFICANT CHANGE UP (ref 4.8–10.8)

## 2022-08-31 PROCEDURE — 99231 SBSQ HOSP IP/OBS SF/LOW 25: CPT | Mod: 25

## 2022-08-31 PROCEDURE — 11045 DBRDMT SUBQ TISS EACH ADDL: CPT | Mod: 59

## 2022-08-31 PROCEDURE — 36573 INSJ PICC RS&I 5 YR+: CPT

## 2022-08-31 PROCEDURE — 11042 DBRDMT SUBQ TIS 1ST 20SQCM/<: CPT | Mod: 59

## 2022-08-31 PROCEDURE — 93925 LOWER EXTREMITY STUDY: CPT | Mod: 26

## 2022-08-31 PROCEDURE — 76937 US GUIDE VASCULAR ACCESS: CPT | Mod: 26,59

## 2022-08-31 PROCEDURE — 99233 SBSQ HOSP IP/OBS HIGH 50: CPT

## 2022-08-31 RX ORDER — CEFEPIME 1 G/1
2000 INJECTION, POWDER, FOR SOLUTION INTRAMUSCULAR; INTRAVENOUS EVERY 8 HOURS
Refills: 0 | Status: DISCONTINUED | OUTPATIENT
Start: 2022-08-31 | End: 2022-09-02

## 2022-08-31 RX ORDER — CEFEPIME 1 G/1
INJECTION, POWDER, FOR SOLUTION INTRAMUSCULAR; INTRAVENOUS
Refills: 0 | Status: DISCONTINUED | OUTPATIENT
Start: 2022-08-31 | End: 2022-09-02

## 2022-08-31 RX ORDER — DAPTOMYCIN 500 MG/10ML
1400 INJECTION, POWDER, LYOPHILIZED, FOR SOLUTION INTRAVENOUS EVERY 24 HOURS
Refills: 0 | Status: DISCONTINUED | OUTPATIENT
Start: 2022-08-31 | End: 2022-09-02

## 2022-08-31 RX ORDER — MORPHINE SULFATE 50 MG/1
6 CAPSULE, EXTENDED RELEASE ORAL ONCE
Refills: 0 | Status: DISCONTINUED | OUTPATIENT
Start: 2022-08-31 | End: 2022-08-31

## 2022-08-31 RX ORDER — CEFEPIME 1 G/1
2000 INJECTION, POWDER, FOR SOLUTION INTRAMUSCULAR; INTRAVENOUS ONCE
Refills: 0 | Status: COMPLETED | OUTPATIENT
Start: 2022-08-31 | End: 2022-08-31

## 2022-08-31 RX ADMIN — Medication 250 MILLIGRAM(S): at 05:26

## 2022-08-31 RX ADMIN — MORPHINE SULFATE 60 MILLIGRAM(S): 50 CAPSULE, EXTENDED RELEASE ORAL at 05:25

## 2022-08-31 RX ADMIN — OXYCODONE HYDROCHLORIDE 10 MILLIGRAM(S): 5 TABLET ORAL at 12:23

## 2022-08-31 RX ADMIN — OXYCODONE HYDROCHLORIDE 10 MILLIGRAM(S): 5 TABLET ORAL at 11:36

## 2022-08-31 RX ADMIN — OXYCODONE HYDROCHLORIDE 10 MILLIGRAM(S): 5 TABLET ORAL at 06:00

## 2022-08-31 RX ADMIN — Medication 100 MILLIGRAM(S): at 05:25

## 2022-08-31 RX ADMIN — CEFEPIME 100 MILLIGRAM(S): 1 INJECTION, POWDER, FOR SOLUTION INTRAMUSCULAR; INTRAVENOUS at 14:16

## 2022-08-31 RX ADMIN — DAPTOMYCIN 156 MILLIGRAM(S): 500 INJECTION, POWDER, LYOPHILIZED, FOR SOLUTION INTRAVENOUS at 14:17

## 2022-08-31 RX ADMIN — OXYCODONE HYDROCHLORIDE 10 MILLIGRAM(S): 5 TABLET ORAL at 05:28

## 2022-08-31 RX ADMIN — Medication 15 MILLIGRAM(S): at 05:45

## 2022-08-31 RX ADMIN — MORPHINE SULFATE 6 MILLIGRAM(S): 50 CAPSULE, EXTENDED RELEASE ORAL at 01:15

## 2022-08-31 RX ADMIN — MORPHINE SULFATE 30 MILLIGRAM(S): 50 CAPSULE, EXTENDED RELEASE ORAL at 17:12

## 2022-08-31 RX ADMIN — OXYCODONE HYDROCHLORIDE 10 MILLIGRAM(S): 5 TABLET ORAL at 21:20

## 2022-08-31 RX ADMIN — Medication 40 MILLIGRAM(S): at 05:25

## 2022-08-31 RX ADMIN — CEFEPIME 100 MILLIGRAM(S): 1 INJECTION, POWDER, FOR SOLUTION INTRAMUSCULAR; INTRAVENOUS at 21:23

## 2022-08-31 RX ADMIN — LOSARTAN POTASSIUM 100 MILLIGRAM(S): 100 TABLET, FILM COATED ORAL at 05:25

## 2022-08-31 RX ADMIN — MORPHINE SULFATE 6 MILLIGRAM(S): 50 CAPSULE, EXTENDED RELEASE ORAL at 01:00

## 2022-08-31 RX ADMIN — MORPHINE SULFATE 60 MILLIGRAM(S): 50 CAPSULE, EXTENDED RELEASE ORAL at 06:00

## 2022-08-31 RX ADMIN — Medication 100 MILLIGRAM(S): at 17:09

## 2022-08-31 RX ADMIN — Medication 15 MILLIGRAM(S): at 05:26

## 2022-08-31 RX ADMIN — OXYCODONE HYDROCHLORIDE 10 MILLIGRAM(S): 5 TABLET ORAL at 17:09

## 2022-08-31 NOTE — PROGRESS NOTE ADULT - SUBJECTIVE AND OBJECTIVE BOX
Patient is seen and examined at the bed side, is afebrile.      REVIEW OF SYSTEMS: All other review systems are negative        ALLERGIES: IV Contrast (Sneezing (Mod to Severe))  penicillin (Unknown)        Vital Signs Last 24 Hrs  T(C): 36.2 (31 Aug 2022 13:47), Max: 36.4 (30 Aug 2022 21:38)  T(F): 97.1 (31 Aug 2022 13:47), Max: 97.5 (30 Aug 2022 21:38)  HR: 84 (31 Aug 2022 13:47) (61 - 84)  BP: 151/67 (31 Aug 2022 13:47) (131/60 - 158/74)  BP(mean): --  RR: 18 (31 Aug 2022 13:47) (18 - 18)  SpO2: --        PHYSICAL EXAM:  GENERAL: Not in distress   CHEST/LUNG:  Aire ntry bilaterally  HEART: s1 and s2 present  ABDOMEN:  Nontender and  Nondistended  EXTREMITIES: No pedal  edema  CNS: Awake and Alert      LABS:                        10.6   6.55  )-----------( 329      ( 31 Aug 2022 06:14 )             35.1       08-31    139  |  101  |  12  ----------------------------<  142<H>  4.4   |  30  |  0.7    Ca    8.7      31 Aug 2022 06:14  Mg     2.2     08-31    TPro  6.8  /  Alb  3.8  /  TBili  <0.2  /  DBili  x   /  AST  19  /  ALT  13  /  AlkPhos  101  08-29    PT/INR - ( 29 Aug 2022 20:00 )   PT: 12.60 sec;   INR: 1.10 ratio         PTT - ( 29 Aug 2022 20:00 )  PTT:32.4 sec        MEDICATIONS  (STANDING):  cefepime   IVPB 2000 milliGRAM(s) IV Intermittent every 8 hours  cefepime   IVPB      collagenase Ointment 1 Application(s) Topical two times a day  DAPTOmycin IVPB 1400 milliGRAM(s) IV Intermittent every 24 hours  dextrose 5%. 1000 milliLiter(s) (100 mL/Hr) IV Continuous <Continuous>  dextrose 5%. 1000 milliLiter(s) (50 mL/Hr) IV Continuous <Continuous>  dextrose 50% Injectable 25 Gram(s) IV Push once  dextrose 50% Injectable 12.5 Gram(s) IV Push once  dextrose 50% Injectable 25 Gram(s) IV Push once  furosemide    Tablet 40 milliGRAM(s) Oral daily  glucagon  Injectable 1 milliGRAM(s) IntraMuscular once  heparin   Injectable 5000 Unit(s) SubCutaneous every 8 hours  insulin lispro (ADMELOG) corrective regimen sliding scale   SubCutaneous three times a day before meals  ketorolac   Injectable 15 milliGRAM(s) IV Push <User Schedule>  losartan 100 milliGRAM(s) Oral daily  morphine ER Tablet 30 milliGRAM(s) Oral <User Schedule>  morphine ER Tablet 60 milliGRAM(s) Oral <User Schedule>  pregabalin 100 milliGRAM(s) Oral two times a day    MEDICATIONS  (PRN):  acetaminophen     Tablet .. 650 milliGRAM(s) Oral every 6 hours PRN Temp greater or equal to 38C (100.4F), Mild Pain (1 - 3)  aluminum hydroxide/magnesium hydroxide/simethicone Suspension 30 milliLiter(s) Oral every 4 hours PRN Dyspepsia  dextrose Oral Gel 15 Gram(s) Oral once PRN Blood Glucose LESS THAN 70 milliGRAM(s)/deciliter  melatonin 3 milliGRAM(s) Oral at bedtime PRN Insomnia  ondansetron Injectable 4 milliGRAM(s) IV Push every 8 hours PRN Nausea and/or Vomiting  oxyCODONE    IR 10 milliGRAM(s) Oral every 4 hours PRN Moderate Pain (4 - 6)          RADIOLOGY & ADDITIONAL TESTS:               Patient is seen and examined at the bed side, is afebrile. The Blood cultures have no growth to date.      REVIEW OF SYSTEMS: All other review systems are negative      ALLERGIES: IV Contrast (Sneezing (Mod to Severe))  penicillin (Unknown)        Vital Signs Last 24 Hrs  T(C): 36.2 (31 Aug 2022 13:47), Max: 36.4 (30 Aug 2022 21:38)  T(F): 97.1 (31 Aug 2022 13:47), Max: 97.5 (30 Aug 2022 21:38)  HR: 84 (31 Aug 2022 13:47) (61 - 84)  BP: 151/67 (31 Aug 2022 13:47) (131/60 - 158/74)  BP(mean): --  RR: 18 (31 Aug 2022 13:47) (18 - 18)  SpO2: --      PHYSICAL EXAM:  GENERAL: Not in distress   CHEST/LUNG:  Not using accessory muscles  HEART: s1 and s2 present  ABDOMEN: grossly obese  EXTREMITIES: B/L LE ACE Bandage in placed   CNS: Awake and Alert      LABS:                        10.6   6.55  )-----------( 329      ( 31 Aug 2022 06:14 )             35.1       08-31    139  |  101  |  12  ----------------------------<  142<H>  4.4   |  30  |  0.7    Ca    8.7      31 Aug 2022 06:14  Mg     2.2     08-31    TPro  6.8  /  Alb  3.8  /  TBili  <0.2  /  DBili  x   /  AST  19  /  ALT  13  /  AlkPhos  101  08-29    PT/INR - ( 29 Aug 2022 20:00 )   PT: 12.60 sec;   INR: 1.10 ratio         PTT - ( 29 Aug 2022 20:00 )  PTT:32.4 sec        MEDICATIONS  (STANDING):  cefepime   IVPB 2000 milliGRAM(s) IV Intermittent every 8 hours  cefepime   IVPB      collagenase Ointment 1 Application(s) Topical two times a day  DAPTOmycin IVPB 1400 milliGRAM(s) IV Intermittent every 24 hours  furosemide    Tablet 40 milliGRAM(s) Oral daily  glucagon  Injectable 1 milliGRAM(s) IntraMuscular once  heparin   Injectable 5000 Unit(s) SubCutaneous every 8 hours  insulin lispro (ADMELOG) corrective regimen sliding scale   SubCutaneous three times a day before meals  ketorolac   Injectable 15 milliGRAM(s) IV Push <User Schedule>  losartan 100 milliGRAM(s) Oral daily  morphine ER Tablet 30 milliGRAM(s) Oral <User Schedule>  morphine ER Tablet 60 milliGRAM(s) Oral <User Schedule>  pregabalin 100 milliGRAM(s) Oral two times a day    MEDICATIONS  (PRN):  acetaminophen     Tablet .. 650 milliGRAM(s) Oral every 6 hours PRN Temp greater or equal to 38C (100.4F), Mild Pain (1 - 3)  aluminum hydroxide/magnesium hydroxide/simethicone Suspension 30 milliLiter(s) Oral every 4 hours PRN Dyspepsia  dextrose Oral Gel 15 Gram(s) Oral once PRN Blood Glucose LESS THAN 70 milliGRAM(s)/deciliter  melatonin 3 milliGRAM(s) Oral at bedtime PRN Insomnia  ondansetron Injectable 4 milliGRAM(s) IV Push every 8 hours PRN Nausea and/or Vomiting  oxyCODONE    IR 10 milliGRAM(s) Oral every 4 hours PRN Moderate Pain (4 - 6)        RADIOLOGY & ADDITIONAL TESTS:    ra< from: [facility] Duplex Lower Ext Vein Scan, Bilat (08.30.22 @ 10:16) >    No evidence of deep venous thrombosis or superficial thrombophlebitis in   the bilateral lower extremities.  Enlarged right groin lymph node as measured above.  Bilateral calf veins were not visualizeddue to swelling and bandages.    < end of copied text >  COVID-19 PCR (08.29.22 @ 20:15)   COVID-19 PCR: NotDetec:     Culture - Blood (08.29.22 @ 20:00)   Specimen Source: .Blood Blood-Peripheral   Culture Results:   No growth to date.     Culture - Blood (08.29.22 @ 20:00)   Specimen Source: .Blood Blood-Peripheral   Culture Results:   No growth to date.

## 2022-08-31 NOTE — PROGRESS NOTE ADULT - SUBJECTIVE AND OBJECTIVE BOX
JOSE ALBERTO NO  56y, Male awake no distress      lung clear no rales  heart s1s2 NSR  Allergy: IV Contrast (Sneezing (Mod to Severe))  penicillin (Unknown)      OVERNIGHT EVENTS:    PAST MEDICAL & SURGICAL HISTORY:  Hypertension      Disc disease, degenerative, lumbar or lumbosacral      Obstructive sleep apnea      Morbid obesity      DM (diabetes mellitus)      Spinal cord stimulator status      H/O arthroscopy of right knee      History of excision of pilonidal cyst          VITALS:  T(F): 97.4 (08-31-22 @ 05:00), Max: 98.7 (08-30-22 @ 13:43)  HR: 69 (08-31-22 @ 05:00)  BP: 158/74 (08-31-22 @ 05:00) (131/60 - 158/74)  RR: 18 (08-30-22 @ 21:38)  SpO2: --    TESTS & MEASUREMENTS:  Height (cm): 200.7 (08-29-22 @ 23:37)  Weight (kg): 207 (08-29-22 @ 23:37)  BMI (kg/m2): 51.4 (08-29-22 @ 23:37)                          10.6   6.55  )-----------( 329      ( 31 Aug 2022 06:14 )             35.1     PT/INR - ( 29 Aug 2022 20:00 )   PT: 12.60 sec;   INR: 1.10 ratio         PTT - ( 29 Aug 2022 20:00 )  PTT:32.4 sec  08-31    139  |  101  |  12  ----------------------------<  142<H>  4.4   |  30  |  0.7    Ca    8.7      31 Aug 2022 06:14  Mg     2.2     08-31    TPro  6.8  /  Alb  3.8  /  TBili  <0.2  /  DBili  x   /  AST  19  /  ALT  13  /  AlkPhos  101  08-29    LIVER FUNCTIONS - ( 29 Aug 2022 20:00 )  Alb: 3.8 g/dL / Pro: 6.8 g/dL / ALK PHOS: 101 U/L / ALT: 13 U/L / AST: 19 U/L / GGT: x                 Culture - Blood (collected 08-29-22 @ 20:00)  Source: .Blood Blood-Peripheral  Preliminary Report (08-31-22 @ 02:02):    No growth to date.    Culture - Blood (collected 08-29-22 @ 20:00)  Source: .Blood Blood-Peripheral  Preliminary Report (08-31-22 @ 02:02):    No growth to date.          RADIOLOGY & ADDITIONAL TESTS:    MEDICATIONS:  MEDICATIONS  (STANDING):  cefepime   IVPB 2000 milliGRAM(s) IV Intermittent once  cefepime   IVPB 2000 milliGRAM(s) IV Intermittent every 8 hours  cefepime   IVPB      collagenase Ointment 1 Application(s) Topical two times a day  DAPTOmycin IVPB 1400 milliGRAM(s) IV Intermittent every 24 hours  dextrose 5%. 1000 milliLiter(s) (100 mL/Hr) IV Continuous <Continuous>  dextrose 5%. 1000 milliLiter(s) (50 mL/Hr) IV Continuous <Continuous>  dextrose 50% Injectable 25 Gram(s) IV Push once  dextrose 50% Injectable 12.5 Gram(s) IV Push once  dextrose 50% Injectable 25 Gram(s) IV Push once  furosemide    Tablet 40 milliGRAM(s) Oral daily  glucagon  Injectable 1 milliGRAM(s) IntraMuscular once  heparin   Injectable 5000 Unit(s) SubCutaneous every 8 hours  insulin lispro (ADMELOG) corrective regimen sliding scale   SubCutaneous three times a day before meals  ketorolac   Injectable 15 milliGRAM(s) IV Push <User Schedule>  losartan 100 milliGRAM(s) Oral daily  morphine ER Tablet 30 milliGRAM(s) Oral <User Schedule>  morphine ER Tablet 60 milliGRAM(s) Oral <User Schedule>  pregabalin 100 milliGRAM(s) Oral two times a day    MEDICATIONS  (PRN):  acetaminophen     Tablet .. 650 milliGRAM(s) Oral every 6 hours PRN Temp greater or equal to 38C (100.4F), Mild Pain (1 - 3)  aluminum hydroxide/magnesium hydroxide/simethicone Suspension 30 milliLiter(s) Oral every 4 hours PRN Dyspepsia  dextrose Oral Gel 15 Gram(s) Oral once PRN Blood Glucose LESS THAN 70 milliGRAM(s)/deciliter  melatonin 3 milliGRAM(s) Oral at bedtime PRN Insomnia  ondansetron Injectable 4 milliGRAM(s) IV Push every 8 hours PRN Nausea and/or Vomiting  oxyCODONE    IR 10 milliGRAM(s) Oral every 4 hours PRN Moderate Pain (4 - 6)      HEALTH ISSUES - PROBLEM Dx:          Case discussed in details with:

## 2022-08-31 NOTE — PROGRESS NOTE ADULT - ASSESSMENT
57 y/o male presents with c/o bilateral lower extremity erythema and non-healing bilateral ulcers.      IMPRESSION  #Chronic Venous Stasis Ulcers with cellulitis  - Wound Cx 7/19/2020 Pseudomonas aeruginosa (pan-susceptible)    #Obesity BMI (kg/m2): 51.4    #Abx allergy: IV Contrast (Sneezing (Mod to Severe))  penicillin (Unknown)    RECOMMENDATIONS  - difficult to dose vancomycin and contraindication of linezolid while on morphine   - can give daptomycin 10 mg/kg q 24 hours for now  - check CK  - add cefepime 2g q 8 hours   - keep LE elevated  - wound care evaluation    57 y/o male presents with c/o bilateral lower extremity erythema and non-healing bilateral ulcers.      IMPRESSION  #Chronic Venous Stasis Ulcers with cellulitis  - Wound Cx 7/19/2020 Pseudomonas aeruginosa (pan-susceptible)    #Obesity BMI (kg/m2): 51.4    #Abx allergy: IV Contrast (Sneezing (Mod to Severe))  penicillin (Unknown)    would recommend:    1. Continue Daptomycin since  difficult to dose vancomycin and contraindication of linezolid while on morphine   2. Monitor CPK while on Daptomycin  3. Continue cefepime 2g q 8 hours   4.  Keep both LE elevated  5.  Wound care     Attending Attestation:  Spent more than 45 minutes on total encounter, more than 50 % of the visit was spent counseling and/or coordinating care by the Attending physician.

## 2022-08-31 NOTE — CHART NOTE - NSCHARTNOTESELECT_GEN_ALL_CORE
PANote/Event Note Render Post-Care Instructions In Note?: no Detail Level: Simple Consent: The patient's consent was obtained including but not limited to risks of crusting, scabbing, blistering, scarring, darker or lighter pigmentary change, recurrence, incomplete removal and infection. Post-Care Instructions: I reviewed with the patient in detail post-care instructions. Patient is to wear sunprotection, and avoid picking at any of the treated lesions. Pt may apply Vaseline to crusted or scabbing areas.

## 2022-08-31 NOTE — PROGRESS NOTE ADULT - SUBJECTIVE AND OBJECTIVE BOX
PROGRESS NOTE   Pt seen @ bedside during PM rounds. Dressing wet and dirty B/L LE       Vital Signs Last 24 Hrs  T(C): 36.2 (31 Aug 2022 13:47), Max: 36.4 (30 Aug 2022 21:38)  T(F): 97.1 (31 Aug 2022 13:47), Max: 97.5 (30 Aug 2022 21:38)  HR: 84 (31 Aug 2022 13:47) (61 - 84)  BP: 151/67 (31 Aug 2022 13:47) (131/60 - 158/74)  BP(mean): --  RR: 18 (31 Aug 2022 13:47) (18 - 18)  SpO2: --                              10.6   6.55  )-----------( 329      ( 31 Aug 2022 06:14 )             35.1               08-31    139  |  101  |  12  ----------------------------<  142<H>  4.4   |  30  |  0.7    Ca    8.7      31 Aug 2022 06:14  Mg     2.2     08-31    TPro  6.8  /  Alb  3.8  /  TBili  <0.2  /  DBili  x   /  AST  19  /  ALT  13  /  AlkPhos  101  08-29      Physical Exam - Lower Extremity Focused:   Derm: b/l lower extremity ulceration, right wound is located on lateral malleolus and travels half way to mid tibia. It is granulo fibrotic wound base, no purulent drainage, no malodor, no erythema. Rolled borders B/L LE. Size 15cm x 20.2cm   Left lower extremity ulceration superior to the left lateral malleolus, granulo fibrotic, no purulent drainage, no malodor, no erythema. Rolled borders 5cm x 4.2cm   Hemosiderosis of B/L LE. Moderated B/L LE swelling   Vascular: DP and PT Pulses Diminished; Foot is Warm to Warm to the touch; Capillary Refill Time < 3 Seconds;    Neuro: Protective Sensation Diminished / Moderately Neuropathic   MSK: Pain On Palpation at Wound Site     Assessment:  Chronic b/l lower extremity wounds, venus stasis in nature. No sings of infection -  pyoderma gangrenosum could not be excluded     Plan:  Chart reviewed and Patient evaluated. All Questions and Concerns Addressed and Answered  Bedside Excisional debridement of fibrotic non viable soft tissue down to the level of the subcutaneous tissue B/L LE. (Size in physical exam portion of the exam)   WOUnd wash with Vashe  Applied Aquacell / ADB /ACE bandage with compression from the toes toe the Knee B/L LE  Cont LWC - please see order   Weight Bearing Status; WBAT    F/u as o/p with Dr. Lambert at [address] [facility]

## 2022-08-31 NOTE — CHART NOTE - NSCHARTNOTEFT_GEN_A_CORE
Dx Cellulitis    Plan as per ID Dr Figueredo    Start Cefepime    Start Daptomycin:  dose discussed with Pharmacy, Adjusted body weight calculation

## 2022-08-31 NOTE — PHARMACY COMMUNICATION NOTE - COMMENTS
Pt adjusted body br=123hr. Dr Ruiz wants pt on 10mg/kg of daptomycin. Daptomycin dose confirmed with Alina.

## 2022-09-01 ENCOUNTER — TRANSCRIPTION ENCOUNTER (OUTPATIENT)
Age: 57
End: 2022-09-01

## 2022-09-01 PROCEDURE — 29581 APPL MULTLAYER CMPRN SYS LEG: CPT | Mod: 50,59

## 2022-09-01 PROCEDURE — 99231 SBSQ HOSP IP/OBS SF/LOW 25: CPT | Mod: 25

## 2022-09-01 PROCEDURE — 73700 CT LOWER EXTREMITY W/O DYE: CPT | Mod: 26,LT,RT

## 2022-09-01 PROCEDURE — 99233 SBSQ HOSP IP/OBS HIGH 50: CPT

## 2022-09-01 PROCEDURE — 11721 DEBRIDE NAIL 6 OR MORE: CPT | Mod: 59

## 2022-09-01 RX ORDER — NICOTINE POLACRILEX 2 MG
1 GUM BUCCAL
Qty: 0 | Refills: 0 | DISCHARGE

## 2022-09-01 RX ADMIN — LOSARTAN POTASSIUM 100 MILLIGRAM(S): 100 TABLET, FILM COATED ORAL at 05:35

## 2022-09-01 RX ADMIN — OXYCODONE HYDROCHLORIDE 10 MILLIGRAM(S): 5 TABLET ORAL at 15:50

## 2022-09-01 RX ADMIN — Medication 100 MILLIGRAM(S): at 05:35

## 2022-09-01 RX ADMIN — MORPHINE SULFATE 60 MILLIGRAM(S): 50 CAPSULE, EXTENDED RELEASE ORAL at 05:35

## 2022-09-01 RX ADMIN — MORPHINE SULFATE 30 MILLIGRAM(S): 50 CAPSULE, EXTENDED RELEASE ORAL at 17:38

## 2022-09-01 RX ADMIN — CEFEPIME 100 MILLIGRAM(S): 1 INJECTION, POWDER, FOR SOLUTION INTRAMUSCULAR; INTRAVENOUS at 05:36

## 2022-09-01 RX ADMIN — Medication 40 MILLIGRAM(S): at 05:35

## 2022-09-01 RX ADMIN — Medication 15 MILLIGRAM(S): at 05:35

## 2022-09-01 RX ADMIN — Medication 1 APPLICATION(S): at 05:39

## 2022-09-01 RX ADMIN — CEFEPIME 100 MILLIGRAM(S): 1 INJECTION, POWDER, FOR SOLUTION INTRAMUSCULAR; INTRAVENOUS at 15:39

## 2022-09-01 RX ADMIN — CEFEPIME 100 MILLIGRAM(S): 1 INJECTION, POWDER, FOR SOLUTION INTRAMUSCULAR; INTRAVENOUS at 21:39

## 2022-09-01 RX ADMIN — OXYCODONE HYDROCHLORIDE 10 MILLIGRAM(S): 5 TABLET ORAL at 11:59

## 2022-09-01 RX ADMIN — OXYCODONE HYDROCHLORIDE 10 MILLIGRAM(S): 5 TABLET ORAL at 07:00

## 2022-09-01 RX ADMIN — Medication 100 MILLIGRAM(S): at 17:38

## 2022-09-01 RX ADMIN — Medication 15 MILLIGRAM(S): at 07:00

## 2022-09-01 RX ADMIN — DAPTOMYCIN 156 MILLIGRAM(S): 500 INJECTION, POWDER, LYOPHILIZED, FOR SOLUTION INTRAVENOUS at 15:40

## 2022-09-01 RX ADMIN — OXYCODONE HYDROCHLORIDE 10 MILLIGRAM(S): 5 TABLET ORAL at 17:13

## 2022-09-01 RX ADMIN — OXYCODONE HYDROCHLORIDE 10 MILLIGRAM(S): 5 TABLET ORAL at 20:04

## 2022-09-01 RX ADMIN — OXYCODONE HYDROCHLORIDE 10 MILLIGRAM(S): 5 TABLET ORAL at 06:14

## 2022-09-01 RX ADMIN — MORPHINE SULFATE 60 MILLIGRAM(S): 50 CAPSULE, EXTENDED RELEASE ORAL at 07:30

## 2022-09-01 RX ADMIN — OXYCODONE HYDROCHLORIDE 10 MILLIGRAM(S): 5 TABLET ORAL at 11:18

## 2022-09-01 NOTE — DISCHARGE NOTE PROVIDER - NSDCCPCAREPLAN_GEN_ALL_CORE_FT
PRINCIPAL DISCHARGE DIAGNOSIS  Diagnosis: Cellulitis  Assessment and Plan of Treatment: You were treated with IV antibiotics and you will be discharged home on oral antibiotics.   You were seen and cleared by podiatry and wound care, who made recommendations to care for your legs.  You should follow up with your PCP and podiatrist upon discharge.        SECONDARY DISCHARGE DIAGNOSES  Diagnosis: Ulcers of both lower legs  Assessment and Plan of Treatment: Local wound care

## 2022-09-01 NOTE — PROGRESS NOTE ADULT - SUBJECTIVE AND OBJECTIVE BOX
PROGRESS NOTE   Pt seen @ bedside during AM rounds. S/p ......... Dressing +/-Clean, +/-Dry, +/- Intact      Vital Signs Last 24 Hrs  T(C): 36.2 (01 Sep 2022 05:00), Max: 36.4 (31 Aug 2022 21:30)  T(F): 97.1 (01 Sep 2022 05:00), Max: 97.6 (31 Aug 2022 21:30)  HR: 69 (01 Sep 2022 05:00) (64 - 84)  BP: 151/78 (01 Sep 2022 05:00) (150/82 - 151/78)  BP(mean): --  RR: 16 (31 Aug 2022 21:30) (16 - 18)  SpO2: --                              10.6   6.55  )-----------( 329      ( 31 Aug 2022 06:14 )             35.1               08-31    139  |  101  |  12  ----------------------------<  142<H>  4.4   |  30  |  0.7    Ca    8.7      31 Aug 2022 06:14  Mg     2.2     08-31      Physical Exam - Lower Extremity Focused:   Derm: b/l lower extremity ulceration, right wound is located on lateral malleolus and travels half way to mid tibia. It is granulo fibrotic wound base, no purulent drainage, no malodor, no erythema. Rolled borders B/L LE. Size 15cm x 20.2cm   Left lower extremity ulceration superior to the left lateral malleolus, granulo fibrotic, no purulent drainage, no malodor, no erythema. Rolled borders 5cm x 4.2cm   Hemosiderosis of B/L LE. Moderated B/L LE swelling   Elongated painful thick nails with subungual debris x10  Vascular: DP and PT Pulses Diminished; Foot is Warm to Warm to the touch; Capillary Refill Time < 3 Seconds;    Neuro: Protective Sensation Diminished / Moderately Neuropathic   MSK: Pain On Palpation at Wound Site     Assessment:  Chronic b/l lower extremity wounds, venus stasis in nature. No sings of infection -  pyoderma gangrenosum could not be excluded   Onychomycosis with pain around the toes   Plan:  Chart reviewed and Patient evaluated. All Questions and Concerns Addressed and Answered  Dbx of nails x10   WOUnd wash with Vashe  Applied Aquacell / ADB /ACE bandage with compression from the toes toe the Knee B/L LE  Cont LWC - please see order (NO santyl needed)   Weight Bearing Status; WBAT    F/u as o/p with Dr. Lambert at [address] [place] [address]

## 2022-09-01 NOTE — DISCHARGE NOTE PROVIDER - NSDCFUADDINST_GEN_ALL_CORE_FT
- Return to Emergency room if develop any persistent fever > 101, chills, tremors, persistent nausea/vomiting, severe pain not relieved by pain medication, inability to urinate, chest pains, difficulty breathing or any bleeding.     Local wound care: Wash your wound with Vashe, then dress your wound with Aquacell / ADB /ACE bandage with compression from the toes toe the Knee B/L LE twice a day.

## 2022-09-01 NOTE — DISCHARGE NOTE PROVIDER - PROVIDER TOKENS
PROVIDER:[TOKEN:[[id number]],FOLLOWUP:[1 week]],PROVIDER:[TOKEN:[[id number]],FOLLOWUP:[Routine]]

## 2022-09-01 NOTE — DISCHARGE NOTE PROVIDER - HOSPITAL COURSE
55 y/o male presents with c/o bilateral lower extremity erythema and non-healing bilateral ulcers.  Pt states his last admission to address the non-hleaing ulcers was approx one year ago.  Pt states the non-healing ulcers are on-going and despite previous admissions with inpt and outpt care the wounds have not healed.  Pt states his PMD sent him for IV abx and wound care consult.     # Cellulitis w/ purulent discharge   - ID consulted - changed to IV vanco and daptomycin   - Arterial and venous duplexes WNL  - Recommendations from wound care RN appreciated  - Podiatry agreed w/ current wound care recommendations; pt will need to follow up as an outpatient.     # HTN  - VS  - c/w home med    # Chronic back pain  - c/w home med MSO4 60mg PO q6am  - c/w home med MSO4 30mg PO q6pm  - mobic non-form  - toradol 15mg IVP q6am    #enoxaparin subq

## 2022-09-01 NOTE — PROGRESS NOTE ADULT - SUBJECTIVE AND OBJECTIVE BOX
ONEALJOSE ALBERTO  56y, Male  Allergy: IV Contrast (Sneezing (Mod to Severe))  penicillin (Unknown)      LOS  3d    CHIEF COMPLAINT: Cellulitis (01 Sep 2022 12:28)      INTERVAL EVENTS/HPI  - No acute events overnight  - T(F): , Max: 97.6 (08-31-22 @ 21:30)  - Denies any worsening symptoms  - Tolerating medication  - WBC Count: 6.55 (08-31-22 @ 06:14)  WBC Count: 7.92 (08-30-22 @ 07:11)     - Creatinine, Serum: 0.7 (08-31-22 @ 06:14)       ROS  General: Denies rigors, nightsweats  HEENT: Denies headache, rhinorrhea, sore throat, eye pain  CV: Denies CP, palpitations  PULM: Denies wheezing, hemoptysis  GI: Denies hematemesis, hematochezia, melena  : Denies discharge, hematuria  MSK: Denies arthralgias, myalgias  SKIN: Denies rash, lesions  NEURO: Denies paresthesias, weakness  PSYCH: Denies depression, anxiety    VITALS:  T(F): 97.6, Max: 97.6 (08-31-22 @ 21:30)  HR: 76  BP: 148/73  RR: 18Vital Signs Last 24 Hrs  T(C): 36.4 (01 Sep 2022 14:15), Max: 36.4 (31 Aug 2022 21:30)  T(F): 97.6 (01 Sep 2022 14:15), Max: 97.6 (31 Aug 2022 21:30)  HR: 76 (01 Sep 2022 14:15) (64 - 76)  BP: 148/73 (01 Sep 2022 14:15) (148/73 - 151/78)  BP(mean): --  RR: 18 (01 Sep 2022 14:15) (16 - 18)  SpO2: --        PHYSICAL EXAM:  Gen: NAD, resting in bed  HEENT: Normocephalic, atraumatic  Neck: supple, no lymphadenopathy  CV: Regular rate & regular rhythm  Lungs: decreased BS at bases, no fremitus  Abdomen: Soft, BS present  Ext: Warm, well perfused  Neuro: non focal, awake  Skin: lower extremities dressed  Lines: no phlebitis    FH: Non-contributory  Social Hx: Non-contributory    TESTS & MEASUREMENTS:                        10.6   6.55  )-----------( 329      ( 31 Aug 2022 06:14 )             35.1     08-31    139  |  101  |  12  ----------------------------<  142<H>  4.4   |  30  |  0.7    Ca    8.7      31 Aug 2022 06:14  Mg     2.2     08-31              Culture - Blood (collected 08-29-22 @ 20:00)  Source: .Blood Blood-Peripheral  Preliminary Report (08-31-22 @ 02:02):    No growth to date.    Culture - Blood (collected 08-29-22 @ 20:00)  Source: .Blood Blood-Peripheral  Preliminary Report (08-31-22 @ 02:02):    No growth to date.        Lactate, Blood: 1.9 mmol/L (08-29-22 @ 20:00)      INFECTIOUS DISEASES TESTING  COVID-19 PCR: NotDetec (08-29-22 @ 20:15)  COVID-19 PCR: NotDetec (01-01-22 @ 23:00)      INFLAMMATORY MARKERS  Sedimentation Rate, Erythrocyte: 25 mm/Hr (08-31-22 @ 06:14)  C-Reactive Protein, Serum: 32 mg/L (08-31-22 @ 06:14)      RADIOLOGY & ADDITIONAL TESTS:  I have personally reviewed the last available Chest xray  CXR      CT      CARDIOLOGY TESTING  12 Lead ECG:   Ventricular Rate 91 BPM    Atrial Rate 91 BPM    P-R Interval 190 ms    QRS Duration 76 ms    Q-T Interval 356 ms    QTC Calculation(Bazett) 437 ms    P Axis 46 degrees    R Axis 49 degrees    T Axis 4 degrees    Diagnosis Line Normal sinus rhythm  Nonspecific T wave abnormality  Abnormal ECG    Confirmed by CHARISMA MORTON MD ([id number]) on 8/30/2022 9:09:22 AM (08-29-22 @ 20:03)      MEDICATIONS  cefepime   IVPB 2000 IV Intermittent every 8 hours  cefepime   IVPB     collagenase Ointment 1 Topical two times a day  DAPTOmycin IVPB 1400 IV Intermittent every 24 hours  dextrose 5%. 1000 IV Continuous <Continuous>  dextrose 5%. 1000 IV Continuous <Continuous>  dextrose 50% Injectable 25 IV Push once  dextrose 50% Injectable 12.5 IV Push once  dextrose 50% Injectable 25 IV Push once  furosemide    Tablet 40 Oral daily  glucagon  Injectable 1 IntraMuscular once  heparin   Injectable 5000 SubCutaneous every 8 hours  insulin lispro (ADMELOG) corrective regimen sliding scale  SubCutaneous three times a day before meals  ketorolac   Injectable 15 IV Push <User Schedule>  losartan 100 Oral daily  morphine ER Tablet 30 Oral <User Schedule>  morphine ER Tablet 60 Oral <User Schedule>  pregabalin 100 Oral two times a day      WEIGHT  Weight (kg): 207 (08-29-22 @ 23:37)      ANTIBIOTICS:  cefepime   IVPB 2000 milliGRAM(s) IV Intermittent every 8 hours  cefepime   IVPB      DAPTOmycin IVPB 1400 milliGRAM(s) IV Intermittent every 24 hours      All available historical records have been reviewed

## 2022-09-01 NOTE — DISCHARGE NOTE PROVIDER - NSDCMRMEDTOKEN_GEN_ALL_CORE_FT
furosemide 40 mg oral tablet: 1 tab(s) orally once a day  losartan 100 mg oral tablet: 1 tab(s) orally once a day  meloxicam 15 mg oral tablet: 1 tab(s) orally once a day  oxyCODONE 10 mg oral tablet: 1 tab(s) orally every 4 hours, As needed, Moderate Pain (4 - 6)  Percocet 5 mg-325 mg oral tablet: 1 tab(s) orally every 6 hours MDD:4  pregabalin 100 mg oral capsule: 1 cap(s) orally 2 times a day

## 2022-09-01 NOTE — PROGRESS NOTE ADULT - ASSESSMENT
57 y/o male presents with c/o bilateral lower extremity erythema and non-healing bilateral ulcers.      IMPRESSION  #Chronic Venous Stasis Ulcers with cellulitis  - Wound Cx 7/19/2020 Pseudomonas aeruginosa (pan-susceptible)    #Obesity BMI (kg/m2): 51.4    #Abx allergy: IV Contrast (Sneezing (Mod to Severe))  penicillin (Unknown)    Recommendations  - continue daptomycin and cefepime while in house  - can switch to PO ciprofloxacin 750 mg BID and Bactrim 1DS BID (end date 9/12)   - local wound care  - appreciate podiatry recs    Please call or message on Microsoft Teams if with any questions.  Spectra [phone]

## 2022-09-01 NOTE — DISCHARGE NOTE PROVIDER - CARE PROVIDER_API CALL
Felix Summers [facility]  [address]  [place] [address]  Phone: [phone]  Fax: [phone]  Follow Up Time: 1 week    Fernando Lambert (DPM)  Surgery  General SurgN  [address] [place] [address]  Phone: [phone]  Fax: [phone]  Follow Up Time: Routine

## 2022-09-02 ENCOUNTER — TRANSCRIPTION ENCOUNTER (OUTPATIENT)
Age: 57
End: 2022-09-02

## 2022-09-02 VITALS
HEART RATE: 78 BPM | TEMPERATURE: 96 F | SYSTOLIC BLOOD PRESSURE: 139 MMHG | RESPIRATION RATE: 18 BRPM | DIASTOLIC BLOOD PRESSURE: 65 MMHG

## 2022-09-02 PROCEDURE — 99232 SBSQ HOSP IP/OBS MODERATE 35: CPT

## 2022-09-02 RX ORDER — CIPROFLOXACIN LACTATE 400MG/40ML
1 VIAL (ML) INTRAVENOUS
Qty: 20 | Refills: 0
Start: 2022-09-02

## 2022-09-02 RX ADMIN — OXYCODONE HYDROCHLORIDE 10 MILLIGRAM(S): 5 TABLET ORAL at 05:39

## 2022-09-02 RX ADMIN — OXYCODONE HYDROCHLORIDE 10 MILLIGRAM(S): 5 TABLET ORAL at 12:16

## 2022-09-02 RX ADMIN — LOSARTAN POTASSIUM 100 MILLIGRAM(S): 100 TABLET, FILM COATED ORAL at 05:40

## 2022-09-02 RX ADMIN — MORPHINE SULFATE 60 MILLIGRAM(S): 50 CAPSULE, EXTENDED RELEASE ORAL at 05:40

## 2022-09-02 RX ADMIN — DAPTOMYCIN 156 MILLIGRAM(S): 500 INJECTION, POWDER, LYOPHILIZED, FOR SOLUTION INTRAVENOUS at 13:18

## 2022-09-02 RX ADMIN — Medication 15 MILLIGRAM(S): at 05:45

## 2022-09-02 RX ADMIN — OXYCODONE HYDROCHLORIDE 10 MILLIGRAM(S): 5 TABLET ORAL at 00:26

## 2022-09-02 RX ADMIN — MORPHINE SULFATE 60 MILLIGRAM(S): 50 CAPSULE, EXTENDED RELEASE ORAL at 06:26

## 2022-09-02 RX ADMIN — Medication 15 MILLIGRAM(S): at 06:26

## 2022-09-02 RX ADMIN — CEFEPIME 100 MILLIGRAM(S): 1 INJECTION, POWDER, FOR SOLUTION INTRAMUSCULAR; INTRAVENOUS at 05:40

## 2022-09-02 RX ADMIN — OXYCODONE HYDROCHLORIDE 10 MILLIGRAM(S): 5 TABLET ORAL at 01:50

## 2022-09-02 RX ADMIN — Medication 100 MILLIGRAM(S): at 05:40

## 2022-09-02 RX ADMIN — Medication 40 MILLIGRAM(S): at 05:40

## 2022-09-02 RX ADMIN — OXYCODONE HYDROCHLORIDE 10 MILLIGRAM(S): 5 TABLET ORAL at 06:26

## 2022-09-02 RX ADMIN — CEFEPIME 100 MILLIGRAM(S): 1 INJECTION, POWDER, FOR SOLUTION INTRAMUSCULAR; INTRAVENOUS at 13:13

## 2022-09-02 NOTE — PROGRESS NOTE ADULT - PROVIDER SPECIALTY LIST ADULT
Hospitalist
Infectious Disease
Podiatry
Infectious Disease
Infectious Disease
Podiatry

## 2022-09-02 NOTE — PROGRESS NOTE ADULT - SUBJECTIVE AND OBJECTIVE BOX
JOSE ALBERTO NO  56y, Male    Allergy: IV Contrast (Sneezing (Mod to Severe))  penicillin (Unknown)      OVERNIGHT EVENTS:    PAST MEDICAL & SURGICAL HISTORY:  Hypertension      Disc disease, degenerative, lumbar or lumbosacral      Obstructive sleep apnea      Morbid obesity      DM (diabetes mellitus)      Spinal cord stimulator status      H/O arthroscopy of right knee      History of excision of pilonidal cyst          VITALS:  T(F): 97.1 (09-02-22 @ 05:00), Max: 97.6 (09-01-22 @ 14:15)  HR: 63 (09-02-22 @ 05:00)  BP: 115/68 (09-02-22 @ 05:00) (115/68 - 190/91)  RR: 18 (09-02-22 @ 05:00)  SpO2: --    TESTS & MEASUREMENTS:      09-01-22 @ 07:01  -  09-02-22 @ 07:00  --------------------------------------------------------  IN: 150 mL / OUT: 100 mL / NET: 50 mL                        Culture - Blood (collected 08-29-22 @ 20:00)  Source: .Blood Blood-Peripheral  Preliminary Report (08-31-22 @ 02:02):    No growth to date.    Culture - Blood (collected 08-29-22 @ 20:00)  Source: .Blood Blood-Peripheral  Preliminary Report (08-31-22 @ 02:02):    No growth to date.          RADIOLOGY & ADDITIONAL TESTS:    MEDICATIONS:  MEDICATIONS  (STANDING):  cefepime   IVPB 2000 milliGRAM(s) IV Intermittent every 8 hours  cefepime   IVPB      collagenase Ointment 1 Application(s) Topical two times a day  DAPTOmycin IVPB 1400 milliGRAM(s) IV Intermittent every 24 hours  dextrose 5%. 1000 milliLiter(s) (100 mL/Hr) IV Continuous <Continuous>  dextrose 5%. 1000 milliLiter(s) (50 mL/Hr) IV Continuous <Continuous>  dextrose 50% Injectable 25 Gram(s) IV Push once  dextrose 50% Injectable 12.5 Gram(s) IV Push once  dextrose 50% Injectable 25 Gram(s) IV Push once  furosemide    Tablet 40 milliGRAM(s) Oral daily  glucagon  Injectable 1 milliGRAM(s) IntraMuscular once  heparin   Injectable 5000 Unit(s) SubCutaneous every 8 hours  insulin lispro (ADMELOG) corrective regimen sliding scale   SubCutaneous three times a day before meals  ketorolac   Injectable 15 milliGRAM(s) IV Push <User Schedule>  losartan 100 milliGRAM(s) Oral daily  morphine ER Tablet 30 milliGRAM(s) Oral <User Schedule>  morphine ER Tablet 60 milliGRAM(s) Oral <User Schedule>  pregabalin 100 milliGRAM(s) Oral two times a day    MEDICATIONS  (PRN):  acetaminophen     Tablet .. 650 milliGRAM(s) Oral every 6 hours PRN Temp greater or equal to 38C (100.4F), Mild Pain (1 - 3)  aluminum hydroxide/magnesium hydroxide/simethicone Suspension 30 milliLiter(s) Oral every 4 hours PRN Dyspepsia  dextrose Oral Gel 15 Gram(s) Oral once PRN Blood Glucose LESS THAN 70 milliGRAM(s)/deciliter  melatonin 3 milliGRAM(s) Oral at bedtime PRN Insomnia  ondansetron Injectable 4 milliGRAM(s) IV Push every 8 hours PRN Nausea and/or Vomiting  oxyCODONE    IR 10 milliGRAM(s) Oral every 4 hours PRN Moderate Pain (4 - 6)      HEALTH ISSUES - PROBLEM Dx:          Case discussed in details with:

## 2022-09-02 NOTE — DISCHARGE NOTE NURSING/CASE MANAGEMENT/SOCIAL WORK - PATIENT PORTAL LINK FT
You can access the FollowMyHealth Patient Portal offered by [facility] by registering at the following website: [email or web address]. By joining [employer]’s FollowMyHealth portal, you will also be able to view your health information using other applications (apps) compatible with our system.

## 2022-09-02 NOTE — DISCHARGE NOTE NURSING/CASE MANAGEMENT/SOCIAL WORK - NSDCPEEMAIL_GEN_ALL_CORE
[facility] for Tobacco Control email [email or web address]

## 2022-09-02 NOTE — DISCHARGE NOTE NURSING/CASE MANAGEMENT/SOCIAL WORK - NSDCPEFALRISK_GEN_ALL_CORE
For information on Fall & Injury Prevention, visit: [email or web address] OR  [email or web address] OR  https://www.cdc.gov/steadi/patient.html

## 2022-09-02 NOTE — DISCHARGE NOTE NURSING/CASE MANAGEMENT/SOCIAL WORK - NSDCPEWEB_GEN_ALL_CORE
[facility] for Tobacco Control website --- [email or web address] website --- [email or web address]

## 2022-09-02 NOTE — PROGRESS NOTE ADULT - ASSESSMENT
55 y/o male presents with c/o bilateral lower extremity erythema and non-healing bilateral ulcers.      IMPRESSION  #Chronic Venous Stasis Ulcers with cellulitis  - Wound Cx 7/19/2020 Pseudomonas aeruginosa (pan-susceptible)    #Obesity BMI (kg/m2): 51.4    #Abx allergy: IV Contrast (Sneezing (Mod to Severe))  penicillin (Unknown)    Recommendations  - CT reviewed - no fluid collections   - can switch to PO ciprofloxacin 750 mg BID and Bactrim 1DS BID (end date 9/12)   - local wound care  - appreciate podiatry recs    Please call or message on [employer] Teams if with any questions.  Spectra [phone]

## 2022-09-02 NOTE — PROGRESS NOTE ADULT - SUBJECTIVE AND OBJECTIVE BOX
ONEALJOSE ALBERTO  56y, Male  Allergy: IV Contrast (Sneezing (Mod to Severe))  penicillin (Unknown)      LOS  4d    CHIEF COMPLAINT: Cellulitis (01 Sep 2022 16:22)      INTERVAL EVENTS/HPI  - No acute events overnight  - T(F): , Max: 97.6 (09-01-22 @ 14:15)  - Denies any worsening symptoms  - Tolerating medication  - WBC Count: 6.55 (08-31-22 @ 06:14)     -      ROS  General: Denies rigors, nightsweats  HEENT: Denies headache, rhinorrhea, sore throat, eye pain  CV: Denies CP, palpitations  PULM: Denies wheezing, hemoptysis  GI: Denies hematemesis, hematochezia, melena  : Denies discharge, hematuria  MSK: Denies arthralgias, myalgias  SKIN: Denies rash, lesions  NEURO: Denies paresthesias, weakness  PSYCH: Denies depression, anxiety    VITALS:  T(F): 97.1, Max: 97.6 (09-01-22 @ 14:15)  HR: 63  BP: 115/68  RR: 18Vital Signs Last 24 Hrs  T(C): 36.2 (02 Sep 2022 05:00), Max: 36.4 (01 Sep 2022 14:15)  T(F): 97.1 (02 Sep 2022 05:00), Max: 97.6 (01 Sep 2022 14:15)  HR: 63 (02 Sep 2022 05:00) (63 - 76)  BP: 115/68 (02 Sep 2022 05:00) (115/68 - 190/91)  BP(mean): --  RR: 18 (02 Sep 2022 05:00) (18 - 18)  SpO2: --        PHYSICAL EXAM:  Gen: NAD, resting in bed  HEENT: Normocephalic, atraumatic  Neck: supple, no lymphadenopathy  CV: Regular rate & regular rhythm  Lungs: decreased BS at bases, no fremitus  Abdomen: Soft, BS present  Ext: Warm, well perfused  Neuro: non focal, awake  Skin: no rash, no erythema  Lines: no phlebitis    FH: Non-contributory  Social Hx: Non-contributory    TESTS & MEASUREMENTS:                  Culture - Blood (collected 08-29-22 @ 20:00)  Source: .Blood Blood-Peripheral  Preliminary Report (08-31-22 @ 02:02):    No growth to date.    Culture - Blood (collected 08-29-22 @ 20:00)  Source: .Blood Blood-Peripheral  Preliminary Report (08-31-22 @ 02:02):    No growth to date.        Lactate, Blood: 1.9 mmol/L (08-29-22 @ 20:00)      INFECTIOUS DISEASES TESTING  COVID-19 PCR: NotDetec (08-29-22 @ 20:15)  COVID-19 PCR: NotDetec (01-01-22 @ 23:00)      INFLAMMATORY MARKERS  Sedimentation Rate, Erythrocyte: 25 mm/Hr (08-31-22 @ 06:14)  C-Reactive Protein, Serum: 32 mg/L (08-31-22 @ 06:14)      RADIOLOGY & ADDITIONAL TESTS:  I have personally reviewed the last available Chest xray  CXR      CT      CARDIOLOGY TESTING  12 Lead ECG:   Ventricular Rate 91 BPM    Atrial Rate 91 BPM    P-R Interval 190 ms    QRS Duration 76 ms    Q-T Interval 356 ms    QTC Calculation(Bazett) 437 ms    P Axis 46 degrees    R Axis 49 degrees    T Axis 4 degrees    Diagnosis Line Normal sinus rhythm  Nonspecific T wave abnormality  Abnormal ECG    Confirmed by CHARISMA MOROTN MD ([id number]) on 8/30/2022 9:09:22 AM (08-29-22 @ 20:03)      MEDICATIONS  cefepime   IVPB 2000 IV Intermittent every 8 hours  cefepime   IVPB     collagenase Ointment 1 Topical two times a day  DAPTOmycin IVPB 1400 IV Intermittent every 24 hours  dextrose 5%. 1000 IV Continuous <Continuous>  dextrose 5%. 1000 IV Continuous <Continuous>  dextrose 50% Injectable 25 IV Push once  dextrose 50% Injectable 12.5 IV Push once  dextrose 50% Injectable 25 IV Push once  furosemide    Tablet 40 Oral daily  glucagon  Injectable 1 IntraMuscular once  heparin   Injectable 5000 SubCutaneous every 8 hours  insulin lispro (ADMELOG) corrective regimen sliding scale  SubCutaneous three times a day before meals  ketorolac   Injectable 15 IV Push <User Schedule>  losartan 100 Oral daily  morphine ER Tablet 30 Oral <User Schedule>  morphine ER Tablet 60 Oral <User Schedule>  pregabalin 100 Oral two times a day      WEIGHT  Weight (kg): 207 (08-29-22 @ 23:37)      ANTIBIOTICS:  cefepime   IVPB 2000 milliGRAM(s) IV Intermittent every 8 hours  cefepime   IVPB      DAPTOmycin IVPB 1400 milliGRAM(s) IV Intermittent every 24 hours      All available historical records have been reviewed

## 2022-09-04 LAB
CULTURE RESULTS: SIGNIFICANT CHANGE UP
CULTURE RESULTS: SIGNIFICANT CHANGE UP
SPECIMEN SOURCE: SIGNIFICANT CHANGE UP
SPECIMEN SOURCE: SIGNIFICANT CHANGE UP

## 2022-09-05 NOTE — DISCHARGE NOTE ADULT - NSFTFATTENDCERTRD_GEN_ALL_CORE
141
My signature below certifies that the above stated patient is homebound and upon completion of the Face-To-Face encounter, has the need for intermittent skilled nursing, physical therapy and/or speech or occupational therapy services in their home for their current diagnosis as outlined in their initial plan of care. These services will continue to be monitored by myself or another physician.

## 2022-09-08 DIAGNOSIS — I83.228 VARICOSE VEINS OF LEFT LOWER EXTREMITY WITH BOTH ULCER OF OTHER PART OF LOWER EXTREMITY AND INFLAMMATION: ICD-10-CM

## 2022-09-08 DIAGNOSIS — E66.01 MORBID (SEVERE) OBESITY DUE TO EXCESS CALORIES: ICD-10-CM

## 2022-09-08 DIAGNOSIS — E11.9 TYPE 2 DIABETES MELLITUS WITHOUT COMPLICATIONS: ICD-10-CM

## 2022-09-08 DIAGNOSIS — G47.33 OBSTRUCTIVE SLEEP APNEA (ADULT) (PEDIATRIC): ICD-10-CM

## 2022-09-08 DIAGNOSIS — I83.218 VARICOSE VEINS OF RIGHT LOWER EXTREMITY WITH BOTH ULCER OF OTHER PART OF LOWER EXTREMITY AND INFLAMMATION: ICD-10-CM

## 2022-09-08 DIAGNOSIS — Z88.0 ALLERGY STATUS TO PENICILLIN: ICD-10-CM

## 2022-09-08 DIAGNOSIS — L03.115 CELLULITIS OF RIGHT LOWER LIMB: ICD-10-CM

## 2022-09-08 DIAGNOSIS — Z96.89 PRESENCE OF OTHER SPECIFIED FUNCTIONAL IMPLANTS: ICD-10-CM

## 2022-09-08 DIAGNOSIS — Z91.041 RADIOGRAPHIC DYE ALLERGY STATUS: ICD-10-CM

## 2022-09-15 ENCOUNTER — APPOINTMENT (OUTPATIENT)
Dept: PODIATRY | Facility: CLINIC | Age: 57
End: 2022-09-15

## 2022-09-15 ENCOUNTER — OUTPATIENT (OUTPATIENT)
Dept: OUTPATIENT SERVICES | Facility: HOSPITAL | Age: 57
LOS: 1 days | Discharge: HOME | End: 2022-09-15

## 2022-09-15 DIAGNOSIS — Z96.89 PRESENCE OF OTHER SPECIFIED FUNCTIONAL IMPLANTS: Chronic | ICD-10-CM

## 2022-09-15 DIAGNOSIS — Z98.890 OTHER SPECIFIED POSTPROCEDURAL STATES: Chronic | ICD-10-CM

## 2022-09-15 PROCEDURE — 11045 DBRDMT SUBQ TISS EACH ADDL: CPT

## 2022-09-15 PROCEDURE — 11042 DBRDMT SUBQ TIS 1ST 20SQCM/<: CPT

## 2022-09-15 PROCEDURE — 29581 APPL MULTLAYER CMPRN SYS LEG: CPT | Mod: 50,59

## 2022-09-15 PROCEDURE — 99214 OFFICE O/P EST MOD 30 MIN: CPT | Mod: 25

## 2022-09-15 NOTE — HISTORY OF PRESENT ILLNESS
[Sneakers] : josefina [FreeTextEntry1] : B/L LE [place] stasis wounds\par - Large\par - Failed unna boot therapy\par - Very painful\par - D/C from hospital with cellulites

## 2022-09-15 NOTE — PHYSICAL EXAM
[Ankle Swelling (On Exam)] : present [Ankle Swelling Bilaterally] : severe [Delayed in the Right Toes] : capillary refills normal in right toes [Delayed in the Left Toes] : capillary refills normal in the left toes [FreeTextEntry3] : Non palpable B/L pedal pulses secondary to Edema [] : normal strength/tone [FreeTextEntry1] : RLE wound 15cm x 15cm (circumferential) down to fat layer and subcutaneous tissue. malodor present. severe serous drainage\par LLE wound 10cm x 8cm (circumferential) down to fat layer and subcutaneous tissue. malodor present. severe serous drainage\par Hemosiderin discoloration on the periwound skin B/L\par Very painful on palpation \par Xerosis B/L LE

## 2022-09-15 NOTE — ASSESSMENT
[FreeTextEntry1] : Ex Dbx of wounds B/L LE down to subcutaneous tissue. pt was experiencing a lot of pain from the wound sites\par - Advised the patient to take pain meds prior to the next appointment\par - Wound Size above\par - Applied multilayer compression dressing with HONG as primary dressing B/L LE \par - Rx for wound care sent to [facility] and given to pt. Pt's wife instructed how to do dressing changes q48\par RTO 1 week  [Verbal] : verbal [Patient] : patient [Good - alert, interested, motivated] : Good - alert, interested, motivated [Demonstrates independently] : demonstrates independently [Dressing changes] : dressing changes [Foot Care] : foot care

## 2022-09-15 NOTE — REASON FOR VISIT
[Initial Visit] : an initial visit for [Spouse] : spouse [FreeTextEntry2] : B/L LE [place] stasis wounds

## 2022-09-19 DIAGNOSIS — I83.029 VARICOSE VEINS OF LEFT LOWER EXTREMITY WITH ULCER OF UNSPECIFIED SITE: ICD-10-CM

## 2022-09-19 DIAGNOSIS — L97.812 NON-PRESSURE CHRONIC ULCER OF OTHER PART OF RIGHT LOWER LEG WITH FAT LAYER EXPOSED: ICD-10-CM

## 2022-09-19 DIAGNOSIS — L97.822 NON-PRESSURE CHRONIC ULCER OF OTHER PART OF LEFT LOWER LEG WITH FAT LAYER EXPOSED: ICD-10-CM

## 2022-09-19 DIAGNOSIS — I83.019 VARICOSE VEINS OF RIGHT LOWER EXTREMITY WITH ULCER OF UNSPECIFIED SITE: ICD-10-CM

## 2022-09-19 DIAGNOSIS — I87.2 VENOUS INSUFFICIENCY (CHRONIC) (PERIPHERAL): ICD-10-CM

## 2022-09-19 DIAGNOSIS — M79.89 OTHER SPECIFIED SOFT TISSUE DISORDERS: ICD-10-CM

## 2022-09-22 ENCOUNTER — OUTPATIENT (OUTPATIENT)
Dept: OUTPATIENT SERVICES | Facility: HOSPITAL | Age: 57
LOS: 1 days | Discharge: HOME | End: 2022-09-22

## 2022-09-22 ENCOUNTER — APPOINTMENT (OUTPATIENT)
Dept: PODIATRY | Facility: CLINIC | Age: 57
End: 2022-09-22

## 2022-09-22 DIAGNOSIS — Z96.89 PRESENCE OF OTHER SPECIFIED FUNCTIONAL IMPLANTS: Chronic | ICD-10-CM

## 2022-09-22 DIAGNOSIS — Z98.890 OTHER SPECIFIED POSTPROCEDURAL STATES: Chronic | ICD-10-CM

## 2022-09-22 PROCEDURE — 29581 APPL MULTLAYER CMPRN SYS LEG: CPT | Mod: 50,58,59

## 2022-09-22 PROCEDURE — 11045 DBRDMT SUBQ TISS EACH ADDL: CPT | Mod: 59,58

## 2022-09-22 PROCEDURE — 11042 DBRDMT SUBQ TIS 1ST 20SQCM/<: CPT | Mod: 59,58

## 2022-09-22 NOTE — HISTORY OF PRESENT ILLNESS
[Sneakers] : josefina [FreeTextEntry1] : B/L LE Venus stasis wounds\par - Large\par - Failed unna boot therapy\par - Very painful\par - D/C from hospital with cellulites\par - Unable to get supplies

## 2022-09-22 NOTE — PHYSICAL EXAM
[Ankle Swelling (On Exam)] : present [Ankle Swelling Bilaterally] : severe [Delayed in the Right Toes] : capillary refills normal in right toes [Delayed in the Left Toes] : capillary refills normal in the left toes [FreeTextEntry3] : Non palpable B/L pedal pulses secondary to Edema [] : normal strength/tone [FreeTextEntry1] : RLE wound 15cm x 15cm (circumferential) down to fat layer and subcutaneous tissue. malodor present. severe serous drainage\par LLE wound 10cm x 8cm (circumferential) down to fat layer and subcutaneous tissue. No malodor present. severe serous drainage\par Hemosiderin discoloration on the periwound skin B/L\par Very painful on palpation \par Xerosis B/L LE

## 2022-09-22 NOTE — ASSESSMENT
[FreeTextEntry1] : Ex Dbx of wounds B/L LE down to subcutaneous tissue. \par - Wound Size above\par - Applied multilayer compression dressing with HONG as primary dressing B/L LE \par - Rx for wound care. Pt's wife instructed how to do dressing changes q48\par RTO 1 week  [Verbal] : verbal [Patient] : patient [Good - alert, interested, motivated] : Good - alert, interested, motivated [Demonstrates independently] : demonstrates independently [Dressing changes] : dressing changes [Foot Care] : foot care

## 2022-09-22 NOTE — PROGRESS NOTE ADULT - HEIGHT IN CM
Update blood work in 8 weeks    Switch from generic to Levoxyl 75 mcg daily    Schedule calcium heart scan:  [email or web address]    Follow-up: 4-5 months  
200.66

## 2022-09-23 DIAGNOSIS — L97.812 NON-PRESSURE CHRONIC ULCER OF OTHER PART OF RIGHT LOWER LEG WITH FAT LAYER EXPOSED: ICD-10-CM

## 2022-09-23 DIAGNOSIS — I83.029 VARICOSE VEINS OF LEFT LOWER EXTREMITY WITH ULCER OF UNSPECIFIED SITE: ICD-10-CM

## 2022-09-23 DIAGNOSIS — I87.2 VENOUS INSUFFICIENCY (CHRONIC) (PERIPHERAL): ICD-10-CM

## 2022-09-23 DIAGNOSIS — I83.019 VARICOSE VEINS OF RIGHT LOWER EXTREMITY WITH ULCER OF UNSPECIFIED SITE: ICD-10-CM

## 2022-09-23 DIAGNOSIS — M79.89 OTHER SPECIFIED SOFT TISSUE DISORDERS: ICD-10-CM

## 2022-09-23 DIAGNOSIS — L97.822 NON-PRESSURE CHRONIC ULCER OF OTHER PART OF LEFT LOWER LEG WITH FAT LAYER EXPOSED: ICD-10-CM

## 2022-09-29 ENCOUNTER — APPOINTMENT (OUTPATIENT)
Dept: PODIATRY | Facility: CLINIC | Age: 57
End: 2022-09-29

## 2022-09-29 ENCOUNTER — OUTPATIENT (OUTPATIENT)
Dept: OUTPATIENT SERVICES | Facility: HOSPITAL | Age: 57
LOS: 1 days | Discharge: HOME | End: 2022-09-29

## 2022-09-29 DIAGNOSIS — M79.606 PAIN IN LEG, UNSPECIFIED: ICD-10-CM

## 2022-09-29 DIAGNOSIS — I83.029 VARICOSE VEINS OF LEFT LOWER EXTREMITY WITH ULCER OF UNSPECIFIED SITE: ICD-10-CM

## 2022-09-29 DIAGNOSIS — L97.822 NON-PRESSURE CHRONIC ULCER OF OTHER PART OF LEFT LOWER LEG WITH FAT LAYER EXPOSED: ICD-10-CM

## 2022-09-29 DIAGNOSIS — Z96.89 PRESENCE OF OTHER SPECIFIED FUNCTIONAL IMPLANTS: Chronic | ICD-10-CM

## 2022-09-29 DIAGNOSIS — Z98.890 OTHER SPECIFIED POSTPROCEDURAL STATES: Chronic | ICD-10-CM

## 2022-09-29 DIAGNOSIS — I83.019 VARICOSE VEINS OF RIGHT LOWER EXTREMITY WITH ULCER OF UNSPECIFIED SITE: ICD-10-CM

## 2022-09-29 DIAGNOSIS — L97.812 NON-PRESSURE CHRONIC ULCER OF OTHER PART OF RIGHT LOWER LEG WITH FAT LAYER EXPOSED: ICD-10-CM

## 2022-09-29 DIAGNOSIS — M79.89 OTHER SPECIFIED SOFT TISSUE DISORDERS: ICD-10-CM

## 2022-09-29 DIAGNOSIS — I87.2 VENOUS INSUFFICIENCY (CHRONIC) (PERIPHERAL): ICD-10-CM

## 2022-09-29 PROCEDURE — 11045 DBRDMT SUBQ TISS EACH ADDL: CPT

## 2022-09-29 PROCEDURE — 11042 DBRDMT SUBQ TIS 1ST 20SQCM/<: CPT

## 2022-09-29 NOTE — PHYSICAL EXAM
[Ankle Swelling (On Exam)] : present [Ankle Swelling Bilaterally] : severe [Delayed in the Right Toes] : capillary refills normal in right toes [Delayed in the Left Toes] : capillary refills normal in the left toes [FreeTextEntry3] : Non palpable B/L pedal pulses secondary to Edema [] : normal strength/tone [FreeTextEntry1] : RLE wound 14cm x 14.5cm (circumferential) down to fat layer and subcutaneous tissue. malodor present. severe serous drainage\par LLE wound 9.5cm x 8cm (circumferential) down to fat layer and subcutaneous tissue. No malodor present. severe serous drainage\par Hemosiderin discoloration on the periwound skin B/L\par Very painful on palpation \par Xerosis B/L LE

## 2022-09-29 NOTE — ASSESSMENT
[FreeTextEntry1] : - Ex Dbx of wounds B/L LE down to subcutaneous tissue. \par - Wound Size above\par - Applied multilayer compression dressing with HONG as primary dressing B/L LE \par - Rx for wound care. Pt's wife instructed how to do dressing changes q48\par RTO 1 week  [Verbal] : verbal [Patient] : patient [Good - alert, interested, motivated] : Good - alert, interested, motivated [Demonstrates independently] : demonstrates independently [Dressing changes] : dressing changes [Foot Care] : foot care

## 2022-09-29 NOTE — HISTORY OF PRESENT ILLNESS
[Sneakers] : josefina [FreeTextEntry1] : B/L LE [place] stasis wounds\par - Large\par - Failed unna boot therapy\par - Very painful

## 2022-10-05 ENCOUNTER — OUTPATIENT (OUTPATIENT)
Dept: OUTPATIENT SERVICES | Facility: HOSPITAL | Age: 57
LOS: 1 days | Discharge: HOME | End: 2022-10-05

## 2022-10-05 ENCOUNTER — APPOINTMENT (OUTPATIENT)
Dept: PODIATRY | Facility: CLINIC | Age: 57
End: 2022-10-05
Payer: MEDICARE

## 2022-10-05 DIAGNOSIS — Z98.890 OTHER SPECIFIED POSTPROCEDURAL STATES: Chronic | ICD-10-CM

## 2022-10-05 DIAGNOSIS — Z96.89 PRESENCE OF OTHER SPECIFIED FUNCTIONAL IMPLANTS: Chronic | ICD-10-CM

## 2022-10-05 PROCEDURE — 11045 DBRDMT SUBQ TISS EACH ADDL: CPT | Mod: 58

## 2022-10-05 PROCEDURE — 29581 APPL MULTLAYER CMPRN SYS LEG: CPT | Mod: 50,59,58

## 2022-10-05 PROCEDURE — 11042 DBRDMT SUBQ TIS 1ST 20SQCM/<: CPT | Mod: 58

## 2022-10-05 NOTE — ASSESSMENT
[FreeTextEntry1] : - Ex Dbx of wounds B/L LE down to subcutaneous tissue. \par - Wound Size above\par - Applied multilayer compression dressing with HONG as primary dressing B/L LE \par - Pt's wife instructed how to do dressing changes q48\par RTO 1 week  [Verbal] : verbal [Patient] : patient [Good - alert, interested, motivated] : Good - alert, interested, motivated [Demonstrates independently] : demonstrates independently [Dressing changes] : dressing changes [Foot Care] : foot care

## 2022-10-05 NOTE — PHYSICAL EXAM
[Ankle Swelling (On Exam)] : present [Ankle Swelling Bilaterally] : severe [Delayed in the Right Toes] : capillary refills normal in right toes [Delayed in the Left Toes] : capillary refills normal in the left toes [FreeTextEntry3] : Non palpable B/L pedal pulses secondary to Edema [] : normal strength/tone [FreeTextEntry1] : RLE wound 15.0 cm x 10.5cm (circumferential) down to fat layer and subcutaneous tissue. malodor present. severe serous drainage\par LLE wound 11.5cm x 8.5cm (circumferential) down to fat layer and subcutaneous tissue. No malodor present. severe serous drainage\par Hemosiderin discoloration on the periwound skin B/L\par Very painful on palpation \par Xerosis B/L LE

## 2022-10-06 DIAGNOSIS — L97.812 NON-PRESSURE CHRONIC ULCER OF OTHER PART OF RIGHT LOWER LEG WITH FAT LAYER EXPOSED: ICD-10-CM

## 2022-10-06 DIAGNOSIS — I83.029 VARICOSE VEINS OF LEFT LOWER EXTREMITY WITH ULCER OF UNSPECIFIED SITE: ICD-10-CM

## 2022-10-06 DIAGNOSIS — I87.2 VENOUS INSUFFICIENCY (CHRONIC) (PERIPHERAL): ICD-10-CM

## 2022-10-06 DIAGNOSIS — M79.606 PAIN IN LEG, UNSPECIFIED: ICD-10-CM

## 2022-10-06 DIAGNOSIS — I83.019 VARICOSE VEINS OF RIGHT LOWER EXTREMITY WITH ULCER OF UNSPECIFIED SITE: ICD-10-CM

## 2022-10-06 DIAGNOSIS — M79.89 OTHER SPECIFIED SOFT TISSUE DISORDERS: ICD-10-CM

## 2022-10-06 DIAGNOSIS — L97.822 NON-PRESSURE CHRONIC ULCER OF OTHER PART OF LEFT LOWER LEG WITH FAT LAYER EXPOSED: ICD-10-CM

## 2022-10-13 ENCOUNTER — OUTPATIENT (OUTPATIENT)
Dept: OUTPATIENT SERVICES | Facility: HOSPITAL | Age: 57
LOS: 1 days | Discharge: HOME | End: 2022-10-13

## 2022-10-13 ENCOUNTER — APPOINTMENT (OUTPATIENT)
Dept: PODIATRY | Facility: CLINIC | Age: 57
End: 2022-10-13
Payer: MEDICARE

## 2022-10-13 DIAGNOSIS — L97.822 NON-PRESSURE CHRONIC ULCER OF OTHER PART OF LEFT LOWER LEG WITH FAT LAYER EXPOSED: ICD-10-CM

## 2022-10-13 DIAGNOSIS — Z98.890 OTHER SPECIFIED POSTPROCEDURAL STATES: Chronic | ICD-10-CM

## 2022-10-13 DIAGNOSIS — I83.019 VARICOSE VEINS OF RIGHT LOWER EXTREMITY WITH ULCER OF UNSPECIFIED SITE: ICD-10-CM

## 2022-10-13 DIAGNOSIS — M79.606 PAIN IN LEG, UNSPECIFIED: ICD-10-CM

## 2022-10-13 DIAGNOSIS — L97.812 NON-PRESSURE CHRONIC ULCER OF OTHER PART OF RIGHT LOWER LEG WITH FAT LAYER EXPOSED: ICD-10-CM

## 2022-10-13 DIAGNOSIS — I83.029 VARICOSE VEINS OF LEFT LOWER EXTREMITY WITH ULCER OF UNSPECIFIED SITE: ICD-10-CM

## 2022-10-13 DIAGNOSIS — I87.2 VENOUS INSUFFICIENCY (CHRONIC) (PERIPHERAL): ICD-10-CM

## 2022-10-13 DIAGNOSIS — M79.89 OTHER SPECIFIED SOFT TISSUE DISORDERS: ICD-10-CM

## 2022-10-13 DIAGNOSIS — Z96.89 PRESENCE OF OTHER SPECIFIED FUNCTIONAL IMPLANTS: Chronic | ICD-10-CM

## 2022-10-13 PROCEDURE — 11042 DBRDMT SUBQ TIS 1ST 20SQCM/<: CPT

## 2022-10-13 PROCEDURE — 29581 APPL MULTLAYER CMPRN SYS LEG: CPT | Mod: 50,59,58

## 2022-10-13 PROCEDURE — 11045 DBRDMT SUBQ TISS EACH ADDL: CPT

## 2022-10-20 ENCOUNTER — OUTPATIENT (OUTPATIENT)
Dept: OUTPATIENT SERVICES | Facility: HOSPITAL | Age: 57
LOS: 1 days | Discharge: HOME | End: 2022-10-20

## 2022-10-20 ENCOUNTER — APPOINTMENT (OUTPATIENT)
Dept: PODIATRY | Facility: CLINIC | Age: 57
End: 2022-10-20
Payer: MEDICARE

## 2022-10-20 DIAGNOSIS — Z98.890 OTHER SPECIFIED POSTPROCEDURAL STATES: Chronic | ICD-10-CM

## 2022-10-20 DIAGNOSIS — Z96.89 PRESENCE OF OTHER SPECIFIED FUNCTIONAL IMPLANTS: Chronic | ICD-10-CM

## 2022-10-20 DIAGNOSIS — M79.606 PAIN IN LEG, UNSPECIFIED: ICD-10-CM

## 2022-10-20 DIAGNOSIS — L03.116 CELLULITIS OF LEFT LOWER LIMB: ICD-10-CM

## 2022-10-20 DIAGNOSIS — M79.89 OTHER SPECIFIED SOFT TISSUE DISORDERS: ICD-10-CM

## 2022-10-20 DIAGNOSIS — L97.812 NON-PRESSURE CHRONIC ULCER OF OTHER PART OF RIGHT LOWER LEG WITH FAT LAYER EXPOSED: ICD-10-CM

## 2022-10-20 PROCEDURE — 29581 APPL MULTLAYER CMPRN SYS LEG: CPT | Mod: 50

## 2022-10-20 PROCEDURE — 99213 OFFICE O/P EST LOW 20 MIN: CPT | Mod: 25

## 2022-10-20 RX ORDER — LEVOFLOXACIN 750 MG/1
750 TABLET, FILM COATED ORAL DAILY
Qty: 7 | Refills: 0 | Status: ACTIVE | COMMUNITY
Start: 2022-10-20 | End: 1900-01-01

## 2022-10-20 NOTE — HISTORY OF PRESENT ILLNESS
[Sneakers] : josefina [FreeTextEntry1] : CC: "Change my dressings"\par HPI:\par - 57M presents to clinic c/o dressing change\par - Wife has been changing dressings every other day\par - Dressing w/Fany / ACE \par - States Insurance won't send Fany unless it's 6 month Rx\par - B/L LE Venous stasis wounds\par - Failed unna boot therapy\par - Very painful\par - unable to tolerate compression

## 2022-10-20 NOTE — REASON FOR VISIT
[Follow-Up Visit] : a follow-up visit for [Spouse] : spouse [FreeTextEntry2] : B/L LE Venous stasis wounds

## 2022-10-20 NOTE — ASSESSMENT
[Verbal] : verbal [Patient] : patient [Good - alert, interested, motivated] : Good - alert, interested, motivated [Demonstrates independently] : demonstrates independently [Dressing changes] : dressing changes [Foot Care] : foot care [FreeTextEntry1] : Assessment:\par -Venous Stasis Wounds, B/L LE\par \par Plan:\par -Pt seen & evaluated\par -No dbx of wounds performed; pt could not tolerate\par -Wound Size above\par -Rx Abx \par -Dressed w/Fany / ABD / ACE to Knee; B/L LE - Multilayer compression dressing applied B/L LE \par -Pt's wife instructed how to do dressing changes q48\par -RTO 1 week

## 2022-10-20 NOTE — PHYSICAL EXAM
[Ankle Swelling (On Exam)] : present [Ankle Swelling Bilaterally] : severe [] : normal strength/tone [Vibration Dec.] : diminished vibratory sensation at the level of the toes [Diminished Throughout Right Foot] : diminished sensation with monofilament testing throughout right foot [Diminished Throughout Left Foot] : diminished sensation with monofilament testing throughout left foot [Varicose Veins Of Lower Extremities] : not present [Delayed in the Right Toes] : capillary refills normal in right toes [Delayed in the Left Toes] : capillary refills normal in the left toes [FreeTextEntry3] : Non palpable B/L pedal pulses secondary to Edema [FreeTextEntry1] : RLE wound 15.0 cm x 10.5cm (circumferential) down to fat layer and subcutaneous tissue. malodor present. severe serous drainage\par LLE wound 11.5cm x 8.5cm (circumferential) down to fat layer and subcutaneous tissue. No malodor present. severe serous drainage\par Hemosiderin discoloration on the periwound skin B/L\par Malodor and erythema periwound area\par Very painful on palpation \par Xerosis B/L LE

## 2022-10-27 ENCOUNTER — APPOINTMENT (OUTPATIENT)
Dept: PODIATRY | Facility: CLINIC | Age: 57
End: 2022-10-27

## 2022-11-03 ENCOUNTER — APPOINTMENT (OUTPATIENT)
Dept: PODIATRY | Facility: CLINIC | Age: 57
End: 2022-11-03

## 2022-11-03 ENCOUNTER — OUTPATIENT (OUTPATIENT)
Dept: OUTPATIENT SERVICES | Facility: HOSPITAL | Age: 57
LOS: 1 days | Discharge: HOME | End: 2022-11-03

## 2022-11-03 DIAGNOSIS — I83.019 VARICOSE VEINS OF RIGHT LOWER EXTREMITY WITH ULCER OF UNSPECIFIED SITE: ICD-10-CM

## 2022-11-03 DIAGNOSIS — L97.822 NON-PRESSURE CHRONIC ULCER OF OTHER PART OF LEFT LOWER LEG WITH FAT LAYER EXPOSED: ICD-10-CM

## 2022-11-03 DIAGNOSIS — I83.029 VARICOSE VEINS OF LEFT LOWER EXTREMITY WITH ULCER OF UNSPECIFIED SITE: ICD-10-CM

## 2022-11-03 DIAGNOSIS — M79.89 OTHER SPECIFIED SOFT TISSUE DISORDERS: ICD-10-CM

## 2022-11-03 DIAGNOSIS — Z96.89 PRESENCE OF OTHER SPECIFIED FUNCTIONAL IMPLANTS: Chronic | ICD-10-CM

## 2022-11-03 DIAGNOSIS — M79.674 PAIN IN RIGHT TOE(S): ICD-10-CM

## 2022-11-03 DIAGNOSIS — L60.3 NAIL DYSTROPHY: ICD-10-CM

## 2022-11-03 DIAGNOSIS — Z98.890 OTHER SPECIFIED POSTPROCEDURAL STATES: Chronic | ICD-10-CM

## 2022-11-03 DIAGNOSIS — I87.2 VENOUS INSUFFICIENCY (CHRONIC) (PERIPHERAL): ICD-10-CM

## 2022-11-03 DIAGNOSIS — L97.812 NON-PRESSURE CHRONIC ULCER OF OTHER PART OF RIGHT LOWER LEG WITH FAT LAYER EXPOSED: ICD-10-CM

## 2022-11-03 PROCEDURE — 11721 DEBRIDE NAIL 6 OR MORE: CPT | Mod: 59

## 2022-11-03 PROCEDURE — 11046 DBRDMT MUSC&/FSCA EA ADDL: CPT

## 2022-11-03 PROCEDURE — 99214 OFFICE O/P EST MOD 30 MIN: CPT | Mod: 25,24

## 2022-11-03 PROCEDURE — 11043 DBRDMT MUSC&/FSCA 1ST 20/<: CPT

## 2022-11-03 PROCEDURE — 29581 APPL MULTLAYER CMPRN SYS LEG: CPT | Mod: 50,59

## 2022-11-03 NOTE — PHYSICAL EXAM
[Ankle Swelling (On Exam)] : present [Varicose Veins Of Lower Extremities] : bilaterally [Ankle Swelling Bilaterally] : severe [Delayed in the Right Toes] : capillary refills normal in right toes [Delayed in the Left Toes] : capillary refills normal in the left toes [FreeTextEntry3] : Non palpable B/L pedal pulses secondary to Edema [] : normal strength/tone [FreeTextEntry1] : RLE wound 14.5 cm x 10.6cm (circumferential) down to fat layer and subcutaneous tissue. malodor present. severe serous drainage\par LLE wound 11.8cm x 8.2cm (circumferential) down to fat layer and subcutaneous tissue. No malodor present. severe serous drainage\par Hemosiderin discoloration on the periwound skin B/L\par Malodor and erythema periwound area\par Very painful on palpation \par Elongated painful nails x10\par Xerosis B/L LE  [Vibration Dec.] : diminished vibratory sensation at the level of the toes [Diminished Throughout Right Foot] : diminished sensation with monofilament testing throughout right foot [Diminished Throughout Left Foot] : diminished sensation with monofilament testing throughout left foot

## 2022-11-03 NOTE — ASSESSMENT
[FreeTextEntry1] : Assessment:\par -Venous Stasis Wounds, B/L LE\par - Elongated nails, painful \par - B/L LE edema \par Plan:\par -Pt seen & evaluated\par -dbx of wounds performed down to sub cutaneous level B/L LE  (Size in chart)\par -Dressed w/Fany / ABD / ACE to Knee; B/L LE - Multilayer compression dressing applied B/L LE \par -Dbx of painful nails \par -Pt's wife instructed how to do dressing changes q48\par -RTO 2-3 week [Verbal] : verbal [Patient] : patient [Good - alert, interested, motivated] : Good - alert, interested, motivated [Demonstrates independently] : demonstrates independently [Dressing changes] : dressing changes [Foot Care] : foot care

## 2022-11-03 NOTE — HISTORY OF PRESENT ILLNESS
[Sneakers] : josefina [FreeTextEntry1] : CC: "Change my dressings"\par HPI:\par - 57M presents to clinic c/o dressing change\par - Wife has been changing dressings every other day\par - Dressing w/Fany / ACE \par - B/L LE Venous stasis wounds\par - Failed unna boot therapy\par - Very painful\par - unable to tolerate compression

## 2022-11-23 ENCOUNTER — APPOINTMENT (OUTPATIENT)
Dept: PODIATRY | Facility: CLINIC | Age: 57
End: 2022-11-23

## 2022-11-29 ENCOUNTER — APPOINTMENT (OUTPATIENT)
Dept: PODIATRY | Facility: CLINIC | Age: 57
End: 2022-11-29

## 2022-11-29 ENCOUNTER — OUTPATIENT (OUTPATIENT)
Dept: OUTPATIENT SERVICES | Facility: HOSPITAL | Age: 57
LOS: 1 days | Discharge: HOME | End: 2022-11-29

## 2022-11-29 DIAGNOSIS — Z96.89 PRESENCE OF OTHER SPECIFIED FUNCTIONAL IMPLANTS: Chronic | ICD-10-CM

## 2022-11-29 DIAGNOSIS — M79.606 PAIN IN LEG, UNSPECIFIED: ICD-10-CM

## 2022-11-29 DIAGNOSIS — I83.029 VARICOSE VEINS OF LEFT LOWER EXTREMITY WITH ULCER OF UNSPECIFIED SITE: ICD-10-CM

## 2022-11-29 DIAGNOSIS — Z98.890 OTHER SPECIFIED POSTPROCEDURAL STATES: Chronic | ICD-10-CM

## 2022-11-29 DIAGNOSIS — I87.2 VENOUS INSUFFICIENCY (CHRONIC) (PERIPHERAL): ICD-10-CM

## 2022-11-29 DIAGNOSIS — M79.89 OTHER SPECIFIED SOFT TISSUE DISORDERS: ICD-10-CM

## 2022-11-29 DIAGNOSIS — L97.822 NON-PRESSURE CHRONIC ULCER OF OTHER PART OF LEFT LOWER LEG WITH FAT LAYER EXPOSED: ICD-10-CM

## 2022-11-29 DIAGNOSIS — L97.812 NON-PRESSURE CHRONIC ULCER OF OTHER PART OF RIGHT LOWER LEG WITH FAT LAYER EXPOSED: ICD-10-CM

## 2022-11-29 DIAGNOSIS — I83.019 VARICOSE VEINS OF RIGHT LOWER EXTREMITY WITH ULCER OF UNSPECIFIED SITE: ICD-10-CM

## 2022-11-29 PROCEDURE — 29581 APPL MULTLAYER CMPRN SYS LEG: CPT | Mod: 50,59

## 2022-11-29 PROCEDURE — 11045 DBRDMT SUBQ TISS EACH ADDL: CPT

## 2022-11-29 PROCEDURE — 11042 DBRDMT SUBQ TIS 1ST 20SQCM/<: CPT

## 2022-11-29 NOTE — ASSESSMENT
[FreeTextEntry1] : Assessment:\par -Venous Stasis Wounds, B/L LE\par - Elongated nails, painful \par - B/L LE edema \par Plan:\par -Pt seen & evaluated\par -Discussed the options of wound management. Pt states that wound has been getting a bit better with filling of the wound from the bottom up. Offered cahnge of wound care and possible surgical dbx with application of graft. pt wants to cont the same care at this time. \par -dbx of wounds performed down to sub cutaneous level B/L LE  (Size in chart)\par -Dressed w/Fany / ABD / ACE to Knee; B/L LE - Multilayer compression dressing applied B/L LE \par -Pt's wife instructed how to do dressing changes q48\par -RTO 4 weeks [Verbal] : verbal [Patient] : patient [Good - alert, interested, motivated] : Good - alert, interested, motivated [Demonstrates independently] : demonstrates independently [Dressing changes] : dressing changes [Foot Care] : foot care

## 2022-11-29 NOTE — PHYSICAL EXAM
[Ankle Swelling (On Exam)] : present [Varicose Veins Of Lower Extremities] : bilaterally [Ankle Swelling Bilaterally] : severe [Delayed in the Right Toes] : capillary refills normal in right toes [Delayed in the Left Toes] : capillary refills normal in the left toes [FreeTextEntry3] : Non palpable B/L pedal pulses secondary to Edema [] : normal strength/tone [FreeTextEntry1] : RLE wound 13.5 cm x 10.6cm (circumferential) down to fat layer and subcutaneous tissue. malodor present. severe serous drainage\par LLE wound 9.5cm x 8.2cm (circumferential) down to fat layer and subcutaneous tissue. No malodor present. severe serous drainage\par Hemosiderin discoloration on the periwound skin B/L\par Very painful on palpation \par Xerosis B/L LE  [Vibration Dec.] : diminished vibratory sensation at the level of the toes [Diminished Throughout Right Foot] : diminished sensation with monofilament testing throughout right foot [Diminished Throughout Left Foot] : diminished sensation with monofilament testing throughout left foot

## 2022-11-29 NOTE — HISTORY OF PRESENT ILLNESS
[Sneakers] : josefina [FreeTextEntry1] : CC: "Change my dressings"\par HPI:\par - 57M presents to clinic c/o dressing change\par - Wife has been changing dressings every other day\par - Dressing w/Fany / ACE \par - B/L LE Venous stasis wounds\par - Failed unna boot therapy\par - Very painful\par - unable to tolerate compression due to pain

## 2022-12-27 ENCOUNTER — APPOINTMENT (OUTPATIENT)
Dept: PODIATRY | Facility: CLINIC | Age: 57
End: 2022-12-27

## 2022-12-27 ENCOUNTER — OUTPATIENT (OUTPATIENT)
Dept: OUTPATIENT SERVICES | Facility: HOSPITAL | Age: 57
LOS: 1 days | Discharge: HOME | End: 2022-12-27

## 2022-12-27 DIAGNOSIS — Z98.890 OTHER SPECIFIED POSTPROCEDURAL STATES: Chronic | ICD-10-CM

## 2022-12-27 DIAGNOSIS — L97.812 NON-PRESSURE CHRONIC ULCER OF OTHER PART OF RIGHT LOWER LEG WITH FAT LAYER EXPOSED: ICD-10-CM

## 2022-12-27 DIAGNOSIS — I83.029 VARICOSE VEINS OF LEFT LOWER EXTREMITY WITH ULCER OF UNSPECIFIED SITE: ICD-10-CM

## 2022-12-27 DIAGNOSIS — M79.89 OTHER SPECIFIED SOFT TISSUE DISORDERS: ICD-10-CM

## 2022-12-27 DIAGNOSIS — Z96.89 PRESENCE OF OTHER SPECIFIED FUNCTIONAL IMPLANTS: Chronic | ICD-10-CM

## 2022-12-27 DIAGNOSIS — L97.822 NON-PRESSURE CHRONIC ULCER OF OTHER PART OF LEFT LOWER LEG WITH FAT LAYER EXPOSED: ICD-10-CM

## 2022-12-27 DIAGNOSIS — I87.2 VENOUS INSUFFICIENCY (CHRONIC) (PERIPHERAL): ICD-10-CM

## 2022-12-27 DIAGNOSIS — I83.019 VARICOSE VEINS OF RIGHT LOWER EXTREMITY WITH ULCER OF UNSPECIFIED SITE: ICD-10-CM

## 2022-12-27 PROCEDURE — 29581 APPL MULTLAYER CMPRN SYS LEG: CPT | Mod: 50

## 2022-12-27 PROCEDURE — 99214 OFFICE O/P EST MOD 30 MIN: CPT | Mod: 25

## 2022-12-27 RX ORDER — SILVER 2" X 2"
0.9 BANDAGE TOPICAL
Qty: 1 | Refills: 3 | Status: ACTIVE | COMMUNITY
Start: 2022-12-27 | End: 1900-01-01

## 2022-12-27 NOTE — ASSESSMENT
[FreeTextEntry1] : Assessment:\par -Venous Stasis Wounds, B/L LE - not improving \par - B/L LE edema \par Plan:\par -Pt seen & evaluated\par -Discussed the options of wound management. Surgical dbx with possible skin graft substitute. Discussed in length the importance of skin/tissue biopsy to R/o tumor cells. \par -Dressed w/Fany / ABD / ACE to Knee; B/L LE - Multilayer compression dressing applied B/L LE \par -Rx iodosorb - dressign with iodosorb DSD \par - dressing changes q48\par -RTO 3 weeks - after pt seems his PCP, will plan for hospital admission, wound dbx and biopsy. Pt was made aware that the condition needs to be addressed soon. Pt wants to wait until the new year. all risk and complications discussed in length  [Verbal] : verbal [Patient] : patient [Good - alert, interested, motivated] : Good - alert, interested, motivated [Demonstrates independently] : demonstrates independently [Dressing changes] : dressing changes [Foot Care] : foot care

## 2022-12-27 NOTE — HISTORY OF PRESENT ILLNESS
[Sneakers] : josefina [FreeTextEntry1] : - 57M presents to clinic c/o dressing change\par - Wife has been changing dressings every other day\par - Dressing w/Collagen Ag / ACE \par - B/L LE Venous stasis wounds\par - Failed unna boot therapy\par - Very painful\par - unable to tolerate compression due to pain

## 2022-12-27 NOTE — PHYSICAL EXAM
[Ankle Swelling (On Exam)] : present [Varicose Veins Of Lower Extremities] : bilaterally [Ankle Swelling Bilaterally] : severe [Delayed in the Right Toes] : capillary refills normal in right toes [Delayed in the Left Toes] : capillary refills normal in the left toes [FreeTextEntry3] : Non palpable B/L pedal pulses secondary to Edema [] : normal strength/tone [FreeTextEntry1] : RLE wound 13.5 cm x 10.6cm (circumferential) down to fat layer and subcutaneous tissue. malodor present. severe serous drainage\par LLE wound 9.5cm x 8.2cm (circumferential) down to fat layer and subcutaneous tissue. No malodor present. severe serous drainage\par Hemosiderin discoloration on the periwound skin B/L\par Tissue overgrowth in border of B/L wounds \par Very painful on palpation \par Xerosis B/L LE  [Vibration Dec.] : diminished vibratory sensation at the level of the toes [Diminished Throughout Right Foot] : diminished sensation with monofilament testing throughout right foot [Diminished Throughout Left Foot] : diminished sensation with monofilament testing throughout left foot

## 2023-01-12 ENCOUNTER — APPOINTMENT (OUTPATIENT)
Dept: PODIATRY | Facility: CLINIC | Age: 58
End: 2023-01-12
Payer: MEDICARE

## 2023-01-12 ENCOUNTER — OUTPATIENT (OUTPATIENT)
Dept: OUTPATIENT SERVICES | Facility: HOSPITAL | Age: 58
LOS: 1 days | Discharge: HOME | End: 2023-01-12

## 2023-01-12 DIAGNOSIS — Z96.89 PRESENCE OF OTHER SPECIFIED FUNCTIONAL IMPLANTS: Chronic | ICD-10-CM

## 2023-01-12 DIAGNOSIS — I83.029 VARICOSE VEINS OF LEFT LOWER EXTREMITY WITH ULCER OF UNSPECIFIED SITE: ICD-10-CM

## 2023-01-12 DIAGNOSIS — Z98.890 OTHER SPECIFIED POSTPROCEDURAL STATES: Chronic | ICD-10-CM

## 2023-01-12 DIAGNOSIS — I83.019 VARICOSE VEINS OF RIGHT LOWER EXTREMITY WITH ULCER OF UNSPECIFIED SITE: ICD-10-CM

## 2023-01-12 DIAGNOSIS — I87.2 VENOUS INSUFFICIENCY (CHRONIC) (PERIPHERAL): ICD-10-CM

## 2023-01-12 DIAGNOSIS — L97.822 NON-PRESSURE CHRONIC ULCER OF OTHER PART OF LEFT LOWER LEG WITH FAT LAYER EXPOSED: ICD-10-CM

## 2023-01-12 DIAGNOSIS — L97.812 NON-PRESSURE CHRONIC ULCER OF OTHER PART OF RIGHT LOWER LEG WITH FAT LAYER EXPOSED: ICD-10-CM

## 2023-01-12 PROCEDURE — 11045 DBRDMT SUBQ TISS EACH ADDL: CPT

## 2023-01-12 PROCEDURE — 29581 APPL MULTLAYER CMPRN SYS LEG: CPT | Mod: 50,59

## 2023-01-12 PROCEDURE — 11042 DBRDMT SUBQ TIS 1ST 20SQCM/<: CPT

## 2023-01-12 NOTE — HISTORY OF PRESENT ILLNESS
[Sneakers] : josefina [FreeTextEntry1] : - 57M presents to clinic c/o dressing change\par - Wife has been changing dressings every other day\par - Dressing w/Collagen Ag / ACE - Pt states it's not helping a lot, painful, and wound not improving. \par - B/L LE Venous stasis wounds\par - Failed unna boot therapy\par - Very painful\par - unable to tolerate compression due to pain

## 2023-01-12 NOTE — ASSESSMENT
[FreeTextEntry1] : Assessment:\par -Venous Stasis Wounds, B/L LE - not improving, pt requesting FANY Ag as the only dressing where he has seen inmprvment\par - B/L LE edema \par Plan:\par -Pt seen & evaluated\par -Discussed the options of wound management. Surgical dbx with possible skin graft substitute. Discussed in length the importance of skin/tissue biopsy to R/o tumor cells. Pt reuses skin biopsy and further dbx. pt is aware of infection. pt is already at risk of infection \par -Dressed w/Fany / ABD / ACE to Knee; B/L LE - Multilayer compression dressing applied B/L LE \par -Rx iodosorb - Pt did not get iodosorb  \par - dressing changes q48\par -RTO 2 weeks - . Pt was made aware that the condition needs to be addressed soon. all risk and complications discussed in length  [Verbal] : verbal [Patient] : patient [Good - alert, interested, motivated] : Good - alert, interested, motivated [Demonstrates independently] : demonstrates independently [Dressing changes] : dressing changes [Foot Care] : foot care

## 2023-01-12 NOTE — PHYSICAL EXAM
[Ankle Swelling (On Exam)] : present [Varicose Veins Of Lower Extremities] : bilaterally [Ankle Swelling Bilaterally] : severe [Delayed in the Right Toes] : capillary refills normal in right toes [Delayed in the Left Toes] : capillary refills normal in the left toes [FreeTextEntry3] : Non palpable B/L pedal pulses secondary to Edema [] : normal strength/tone [FreeTextEntry1] : RLE wound 13. cm x 10.cm (circumferential) down to fat layer and subcutaneous tissue. malodor present. severe serous drainage\par LLE wound 9cm x 8cm (circumferential) down to fat layer and subcutaneous tissue. No malodor present. severe serous drainage\par Hemosiderin discoloration on the periwound skin B/L\par Tissue overgrowth and rolled in border of B/L wounds \par Very painful on palpation \par Xerosis B/L LE  [Vibration Dec.] : diminished vibratory sensation at the level of the toes [Diminished Throughout Right Foot] : diminished sensation with monofilament testing throughout right foot [Diminished Throughout Left Foot] : diminished sensation with monofilament testing throughout left foot

## 2023-01-13 ENCOUNTER — APPOINTMENT (OUTPATIENT)
Age: 58
End: 2023-01-13
Payer: MEDICARE

## 2023-01-13 PROCEDURE — 99205 OFFICE O/P NEW HI 60 MIN: CPT

## 2023-01-26 ENCOUNTER — APPOINTMENT (OUTPATIENT)
Dept: PODIATRY | Facility: CLINIC | Age: 58
End: 2023-01-26
Payer: MEDICARE

## 2023-01-26 ENCOUNTER — INPATIENT (INPATIENT)
Facility: HOSPITAL | Age: 58
LOS: 10 days | Discharge: HOME | DRG: 982 | End: 2023-02-06
Attending: HOSPITALIST | Admitting: HOSPITALIST
Payer: MEDICARE

## 2023-01-26 ENCOUNTER — OUTPATIENT (OUTPATIENT)
Dept: OUTPATIENT SERVICES | Facility: HOSPITAL | Age: 58
LOS: 1 days | Discharge: HOME | End: 2023-01-26

## 2023-01-26 VITALS
HEIGHT: 78 IN | OXYGEN SATURATION: 99 % | TEMPERATURE: 97 F | DIASTOLIC BLOOD PRESSURE: 110 MMHG | RESPIRATION RATE: 18 BRPM | HEART RATE: 106 BPM | SYSTOLIC BLOOD PRESSURE: 220 MMHG | WEIGHT: 315 LBS

## 2023-01-26 DIAGNOSIS — L03.115 CELLULITIS OF LEFT LOWER LIMB: ICD-10-CM

## 2023-01-26 DIAGNOSIS — Z98.890 OTHER SPECIFIED POSTPROCEDURAL STATES: Chronic | ICD-10-CM

## 2023-01-26 DIAGNOSIS — G47.33 OBSTRUCTIVE SLEEP APNEA (ADULT) (PEDIATRIC): ICD-10-CM

## 2023-01-26 DIAGNOSIS — L03.115 CELLULITIS OF RIGHT LOWER LIMB: ICD-10-CM

## 2023-01-26 DIAGNOSIS — G47.00 INSOMNIA, UNSPECIFIED: ICD-10-CM

## 2023-01-26 DIAGNOSIS — Z96.89 PRESENCE OF OTHER SPECIFIED FUNCTIONAL IMPLANTS: Chronic | ICD-10-CM

## 2023-01-26 DIAGNOSIS — L03.116 CELLULITIS OF LEFT LOWER LIMB: ICD-10-CM

## 2023-01-26 DIAGNOSIS — I10 ESSENTIAL (PRIMARY) HYPERTENSION: ICD-10-CM

## 2023-01-26 DIAGNOSIS — I87.2 VENOUS INSUFFICIENCY (CHRONIC) (PERIPHERAL): ICD-10-CM

## 2023-01-26 DIAGNOSIS — L97.322 NON-PRESSURE CHRONIC ULCER OF LEFT ANKLE WITH FAT LAYER EXPOSED: ICD-10-CM

## 2023-01-26 DIAGNOSIS — L97.822 NON-PRESSURE CHRONIC ULCER OF OTHER PART OF LEFT LOWER LEG WITH FAT LAYER EXPOSED: ICD-10-CM

## 2023-01-26 DIAGNOSIS — E11.9 TYPE 2 DIABETES MELLITUS WITHOUT COMPLICATIONS: ICD-10-CM

## 2023-01-26 DIAGNOSIS — Z88.0 ALLERGY STATUS TO PENICILLIN: ICD-10-CM

## 2023-01-26 DIAGNOSIS — M79.606 PAIN IN LEG, UNSPECIFIED: ICD-10-CM

## 2023-01-26 DIAGNOSIS — M79.651 PAIN IN RIGHT THIGH: ICD-10-CM

## 2023-01-26 DIAGNOSIS — L97.812 NON-PRESSURE CHRONIC ULCER OF OTHER PART OF RIGHT LOWER LEG WITH FAT LAYER EXPOSED: ICD-10-CM

## 2023-01-26 DIAGNOSIS — M51.37 OTHER INTERVERTEBRAL DISC DEGENERATION, LUMBOSACRAL REGION: ICD-10-CM

## 2023-01-26 DIAGNOSIS — I83.019 VARICOSE VEINS OF RIGHT LOWER EXTREMITY WITH ULCER OF UNSPECIFIED SITE: ICD-10-CM

## 2023-01-26 DIAGNOSIS — I96 GANGRENE, NOT ELSEWHERE CLASSIFIED: ICD-10-CM

## 2023-01-26 DIAGNOSIS — Z91.041 RADIOGRAPHIC DYE ALLERGY STATUS: ICD-10-CM

## 2023-01-26 DIAGNOSIS — Z99.89 DEPENDENCE ON OTHER ENABLING MACHINES AND DEVICES: ICD-10-CM

## 2023-01-26 DIAGNOSIS — G44.009 CLUSTER HEADACHE SYNDROME, UNSPECIFIED, NOT INTRACTABLE: ICD-10-CM

## 2023-01-26 DIAGNOSIS — L97.312 NON-PRESSURE CHRONIC ULCER OF RIGHT ANKLE WITH FAT LAYER EXPOSED: ICD-10-CM

## 2023-01-26 DIAGNOSIS — M62.838 OTHER MUSCLE SPASM: ICD-10-CM

## 2023-01-26 DIAGNOSIS — E66.01 MORBID (SEVERE) OBESITY DUE TO EXCESS CALORIES: ICD-10-CM

## 2023-01-26 LAB
ALBUMIN SERPL ELPH-MCNC: 4.2 G/DL — SIGNIFICANT CHANGE UP (ref 3.5–5.2)
ALP SERPL-CCNC: 117 U/L — HIGH (ref 30–115)
ALT FLD-CCNC: 13 U/L — SIGNIFICANT CHANGE UP (ref 0–41)
ANION GAP SERPL CALC-SCNC: 9 MMOL/L — SIGNIFICANT CHANGE UP (ref 7–14)
APTT BLD: 29.5 SEC — SIGNIFICANT CHANGE UP (ref 27–39.2)
AST SERPL-CCNC: 15 U/L — SIGNIFICANT CHANGE UP (ref 0–41)
BASE EXCESS BLDV CALC-SCNC: 8.3 MMOL/L — HIGH (ref -2–3)
BASOPHILS # BLD AUTO: 0.07 K/UL — SIGNIFICANT CHANGE UP (ref 0–0.2)
BASOPHILS NFR BLD AUTO: 0.7 % — SIGNIFICANT CHANGE UP (ref 0–1)
BILIRUB SERPL-MCNC: 0.3 MG/DL — SIGNIFICANT CHANGE UP (ref 0.2–1.2)
BUN SERPL-MCNC: 15 MG/DL — SIGNIFICANT CHANGE UP (ref 10–20)
CA-I SERPL-SCNC: 1.24 MMOL/L — SIGNIFICANT CHANGE UP (ref 1.15–1.33)
CALCIUM SERPL-MCNC: 9.4 MG/DL — SIGNIFICANT CHANGE UP (ref 8.4–10.5)
CHLORIDE SERPL-SCNC: 101 MMOL/L — SIGNIFICANT CHANGE UP (ref 98–110)
CO2 SERPL-SCNC: 28 MMOL/L — SIGNIFICANT CHANGE UP (ref 17–32)
CREAT SERPL-MCNC: 0.9 MG/DL — SIGNIFICANT CHANGE UP (ref 0.7–1.5)
EGFR: 101 ML/MIN/1.73M2 — SIGNIFICANT CHANGE UP
EOSINOPHIL # BLD AUTO: 0.32 K/UL — SIGNIFICANT CHANGE UP (ref 0–0.7)
EOSINOPHIL NFR BLD AUTO: 3.2 % — SIGNIFICANT CHANGE UP (ref 0–8)
ERYTHROCYTE [SEDIMENTATION RATE] IN BLOOD: 29 MM/HR — HIGH (ref 0–10)
GAS PNL BLDV: 135 MMOL/L — LOW (ref 136–145)
GAS PNL BLDV: SIGNIFICANT CHANGE UP
GLUCOSE SERPL-MCNC: 236 MG/DL — HIGH (ref 70–99)
HCO3 BLDV-SCNC: 34 MMOL/L — HIGH (ref 22–29)
HCT VFR BLD CALC: 42.2 % — SIGNIFICANT CHANGE UP (ref 42–52)
HCT VFR BLDA CALC: 41 % — SIGNIFICANT CHANGE UP (ref 39–51)
HGB BLD CALC-MCNC: 13.7 G/DL — SIGNIFICANT CHANGE UP (ref 12.6–17.4)
HGB BLD-MCNC: 12.9 G/DL — LOW (ref 14–18)
IMM GRANULOCYTES NFR BLD AUTO: 0.5 % — HIGH (ref 0.1–0.3)
INR BLD: 1.03 RATIO — SIGNIFICANT CHANGE UP (ref 0.65–1.3)
LACTATE BLDV-MCNC: 1.8 MMOL/L — SIGNIFICANT CHANGE UP (ref 0.5–2)
LYMPHOCYTES # BLD AUTO: 2.35 K/UL — SIGNIFICANT CHANGE UP (ref 1.2–3.4)
LYMPHOCYTES # BLD AUTO: 23.5 % — SIGNIFICANT CHANGE UP (ref 20.5–51.1)
MCHC RBC-ENTMCNC: 23.7 PG — LOW (ref 27–31)
MCHC RBC-ENTMCNC: 30.6 G/DL — LOW (ref 32–37)
MCV RBC AUTO: 77.4 FL — LOW (ref 80–94)
MONOCYTES # BLD AUTO: 0.58 K/UL — SIGNIFICANT CHANGE UP (ref 0.1–0.6)
MONOCYTES NFR BLD AUTO: 5.8 % — SIGNIFICANT CHANGE UP (ref 1.7–9.3)
NEUTROPHILS # BLD AUTO: 6.65 K/UL — HIGH (ref 1.4–6.5)
NEUTROPHILS NFR BLD AUTO: 66.3 % — SIGNIFICANT CHANGE UP (ref 42.2–75.2)
NRBC # BLD: 0 /100 WBCS — SIGNIFICANT CHANGE UP (ref 0–0)
PCO2 BLDV: 50 MMHG — SIGNIFICANT CHANGE UP (ref 42–55)
PH BLDV: 7.44 — HIGH (ref 7.32–7.43)
PLATELET # BLD AUTO: 376 K/UL — SIGNIFICANT CHANGE UP (ref 130–400)
PO2 BLDV: 51 MMHG — SIGNIFICANT CHANGE UP
POTASSIUM BLDV-SCNC: 4 MMOL/L — SIGNIFICANT CHANGE UP (ref 3.5–5.1)
POTASSIUM SERPL-MCNC: 4.5 MMOL/L — SIGNIFICANT CHANGE UP (ref 3.5–5)
POTASSIUM SERPL-SCNC: 4.5 MMOL/L — SIGNIFICANT CHANGE UP (ref 3.5–5)
PROT SERPL-MCNC: 7.3 G/DL — SIGNIFICANT CHANGE UP (ref 6–8)
PROTHROM AB SERPL-ACNC: 11.8 SEC — SIGNIFICANT CHANGE UP (ref 9.95–12.87)
RBC # BLD: 5.45 M/UL — SIGNIFICANT CHANGE UP (ref 4.7–6.1)
RBC # FLD: 17 % — HIGH (ref 11.5–14.5)
SAO2 % BLDV: 85 % — SIGNIFICANT CHANGE UP
SARS-COV-2 RNA SPEC QL NAA+PROBE: SIGNIFICANT CHANGE UP
SODIUM SERPL-SCNC: 138 MMOL/L — SIGNIFICANT CHANGE UP (ref 135–146)
WBC # BLD: 10.02 K/UL — SIGNIFICANT CHANGE UP (ref 4.8–10.8)
WBC # FLD AUTO: 10.02 K/UL — SIGNIFICANT CHANGE UP (ref 4.8–10.8)

## 2023-01-26 PROCEDURE — 88305 TISSUE EXAM BY PATHOLOGIST: CPT

## 2023-01-26 PROCEDURE — 93970 EXTREMITY STUDY: CPT

## 2023-01-26 PROCEDURE — 99214 OFFICE O/P EST MOD 30 MIN: CPT | Mod: 25

## 2023-01-26 PROCEDURE — 87040 BLOOD CULTURE FOR BACTERIA: CPT

## 2023-01-26 PROCEDURE — 96376 TX/PRO/DX INJ SAME DRUG ADON: CPT

## 2023-01-26 PROCEDURE — 73701 CT LOWER EXTREMITY W/DYE: CPT | Mod: RT

## 2023-01-26 PROCEDURE — 85014 HEMATOCRIT: CPT

## 2023-01-26 PROCEDURE — 99285 EMERGENCY DEPT VISIT HI MDM: CPT

## 2023-01-26 PROCEDURE — 84295 ASSAY OF SERUM SODIUM: CPT

## 2023-01-26 PROCEDURE — 84132 ASSAY OF SERUM POTASSIUM: CPT

## 2023-01-26 PROCEDURE — 85610 PROTHROMBIN TIME: CPT

## 2023-01-26 PROCEDURE — 87077 CULTURE AEROBIC IDENTIFY: CPT

## 2023-01-26 PROCEDURE — 85027 COMPLETE CBC AUTOMATED: CPT

## 2023-01-26 PROCEDURE — 85652 RBC SED RATE AUTOMATED: CPT

## 2023-01-26 PROCEDURE — 73701 CT LOWER EXTREMITY W/DYE: CPT | Mod: 26,RT,MA

## 2023-01-26 PROCEDURE — 99221 1ST HOSP IP/OBS SF/LOW 40: CPT | Mod: 25

## 2023-01-26 PROCEDURE — 99285 EMERGENCY DEPT VISIT HI MDM: CPT | Mod: 25

## 2023-01-26 PROCEDURE — 80053 COMPREHEN METABOLIC PANEL: CPT

## 2023-01-26 PROCEDURE — 36415 COLL VENOUS BLD VENIPUNCTURE: CPT

## 2023-01-26 PROCEDURE — 82330 ASSAY OF CALCIUM: CPT

## 2023-01-26 PROCEDURE — 85018 HEMOGLOBIN: CPT

## 2023-01-26 PROCEDURE — 73630 X-RAY EXAM OF FOOT: CPT | Mod: 50

## 2023-01-26 PROCEDURE — 80202 ASSAY OF VANCOMYCIN: CPT

## 2023-01-26 PROCEDURE — 83735 ASSAY OF MAGNESIUM: CPT

## 2023-01-26 PROCEDURE — 96374 THER/PROPH/DIAG INJ IV PUSH: CPT

## 2023-01-26 PROCEDURE — 85025 COMPLETE CBC W/AUTO DIFF WBC: CPT

## 2023-01-26 PROCEDURE — 80048 BASIC METABOLIC PNL TOTAL CA: CPT

## 2023-01-26 PROCEDURE — 70450 CT HEAD/BRAIN W/O DYE: CPT

## 2023-01-26 PROCEDURE — 29581 APPL MULTLAYER CMPRN SYS LEG: CPT | Mod: 50

## 2023-01-26 PROCEDURE — 73630 X-RAY EXAM OF FOOT: CPT | Mod: 26,LT,RT

## 2023-01-26 PROCEDURE — 73590 X-RAY EXAM OF LOWER LEG: CPT | Mod: 50

## 2023-01-26 PROCEDURE — 73590 X-RAY EXAM OF LOWER LEG: CPT | Mod: 26,LT,RT

## 2023-01-26 PROCEDURE — 82803 BLOOD GASES ANY COMBINATION: CPT

## 2023-01-26 PROCEDURE — 83605 ASSAY OF LACTIC ACID: CPT

## 2023-01-26 PROCEDURE — 96375 TX/PRO/DX INJ NEW DRUG ADDON: CPT

## 2023-01-26 PROCEDURE — 93005 ELECTROCARDIOGRAM TRACING: CPT

## 2023-01-26 PROCEDURE — 94760 N-INVAS EAR/PLS OXIMETRY 1: CPT

## 2023-01-26 PROCEDURE — 86140 C-REACTIVE PROTEIN: CPT

## 2023-01-26 PROCEDURE — 87186 SC STD MICRODIL/AGAR DIL: CPT

## 2023-01-26 PROCEDURE — 97162 PT EVAL MOD COMPLEX 30 MIN: CPT | Mod: GP

## 2023-01-26 PROCEDURE — 85730 THROMBOPLASTIN TIME PARTIAL: CPT

## 2023-01-26 PROCEDURE — 94660 CPAP INITIATION&MGMT: CPT

## 2023-01-26 PROCEDURE — 87070 CULTURE OTHR SPECIMN AEROBIC: CPT

## 2023-01-26 RX ORDER — NICOTINE POLACRILEX 2 MG
1 GUM BUCCAL DAILY
Refills: 0 | Status: DISCONTINUED | OUTPATIENT
Start: 2023-01-26 | End: 2023-01-30

## 2023-01-26 RX ORDER — AZTREONAM 2 G
2000 VIAL (EA) INJECTION EVERY 12 HOURS
Refills: 0 | Status: DISCONTINUED | OUTPATIENT
Start: 2023-01-26 | End: 2023-01-27

## 2023-01-26 RX ORDER — VANCOMYCIN HCL 1 G
1500 VIAL (EA) INTRAVENOUS ONCE
Refills: 0 | Status: COMPLETED | OUTPATIENT
Start: 2023-01-26 | End: 2023-01-26

## 2023-01-26 RX ORDER — FUROSEMIDE 40 MG
40 TABLET ORAL DAILY
Refills: 0 | Status: DISCONTINUED | OUTPATIENT
Start: 2023-01-26 | End: 2023-01-30

## 2023-01-26 RX ORDER — ACETAMINOPHEN 500 MG
650 TABLET ORAL EVERY 6 HOURS
Refills: 0 | Status: DISCONTINUED | OUTPATIENT
Start: 2023-01-26 | End: 2023-01-30

## 2023-01-26 RX ORDER — VANCOMYCIN HCL 1 G
1500 VIAL (EA) INTRAVENOUS EVERY 12 HOURS
Refills: 0 | Status: DISCONTINUED | OUTPATIENT
Start: 2023-01-26 | End: 2023-01-27

## 2023-01-26 RX ORDER — HYDROMORPHONE HYDROCHLORIDE 2 MG/ML
1 INJECTION INTRAMUSCULAR; INTRAVENOUS; SUBCUTANEOUS ONCE
Refills: 0 | Status: DISCONTINUED | OUTPATIENT
Start: 2023-01-26 | End: 2023-01-26

## 2023-01-26 RX ORDER — AZTREONAM 2 G
2000 VIAL (EA) INJECTION ONCE
Refills: 0 | Status: COMPLETED | OUTPATIENT
Start: 2023-01-26 | End: 2023-01-26

## 2023-01-26 RX ORDER — ONDANSETRON 8 MG/1
4 TABLET, FILM COATED ORAL EVERY 4 HOURS
Refills: 0 | Status: DISCONTINUED | OUTPATIENT
Start: 2023-01-26 | End: 2023-01-30

## 2023-01-26 RX ORDER — DIPHENHYDRAMINE HCL 50 MG
50 CAPSULE ORAL ONCE
Refills: 0 | Status: COMPLETED | OUTPATIENT
Start: 2023-01-26 | End: 2023-01-26

## 2023-01-26 RX ORDER — KETOROLAC TROMETHAMINE 30 MG/ML
15 SYRINGE (ML) INJECTION ONCE
Refills: 0 | Status: DISCONTINUED | OUTPATIENT
Start: 2023-01-26 | End: 2023-01-26

## 2023-01-26 RX ORDER — CELECOXIB 200 MG/1
200 CAPSULE ORAL DAILY
Refills: 0 | Status: DISCONTINUED | OUTPATIENT
Start: 2023-01-26 | End: 2023-01-30

## 2023-01-26 RX ORDER — ENOXAPARIN SODIUM 100 MG/ML
40 INJECTION SUBCUTANEOUS EVERY 12 HOURS
Refills: 0 | Status: DISCONTINUED | OUTPATIENT
Start: 2023-01-26 | End: 2023-01-30

## 2023-01-26 RX ORDER — MORPHINE SULFATE 50 MG/1
30 CAPSULE, EXTENDED RELEASE ORAL THREE TIMES A DAY
Refills: 0 | Status: DISCONTINUED | OUTPATIENT
Start: 2023-01-26 | End: 2023-01-29

## 2023-01-26 RX ORDER — LOSARTAN POTASSIUM 100 MG/1
100 TABLET, FILM COATED ORAL DAILY
Refills: 0 | Status: DISCONTINUED | OUTPATIENT
Start: 2023-01-26 | End: 2023-01-30

## 2023-01-26 RX ADMIN — ONDANSETRON 4 MILLIGRAM(S): 8 TABLET, FILM COATED ORAL at 23:30

## 2023-01-26 RX ADMIN — HYDROMORPHONE HYDROCHLORIDE 1 MILLIGRAM(S): 2 INJECTION INTRAMUSCULAR; INTRAVENOUS; SUBCUTANEOUS at 12:33

## 2023-01-26 RX ADMIN — Medication 15 MILLIGRAM(S): at 19:30

## 2023-01-26 RX ADMIN — Medication 100 MILLIGRAM(S): at 12:52

## 2023-01-26 RX ADMIN — Medication 100 MILLIGRAM(S): at 18:00

## 2023-01-26 RX ADMIN — HYDROMORPHONE HYDROCHLORIDE 1 MILLIGRAM(S): 2 INJECTION INTRAMUSCULAR; INTRAVENOUS; SUBCUTANEOUS at 16:35

## 2023-01-26 RX ADMIN — Medication 125 MILLIGRAM(S): at 13:38

## 2023-01-26 RX ADMIN — Medication 300 MILLIGRAM(S): at 13:10

## 2023-01-26 RX ADMIN — Medication 50 MILLIGRAM(S): at 12:35

## 2023-01-26 RX ADMIN — Medication 100 MILLIGRAM(S): at 17:57

## 2023-01-26 RX ADMIN — Medication 300 MILLIGRAM(S): at 19:14

## 2023-01-26 RX ADMIN — Medication 15 MILLIGRAM(S): at 19:12

## 2023-01-26 NOTE — H&P ADULT - NSHPPHYSICALEXAM_GEN_ALL_CORE
VITALS:   T(C): 36.3 (01-26-23 @ 16:19), Max: 36.3 (01-26-23 @ 11:18)  HR: 78 (01-26-23 @ 16:19) (78 - 106)  BP: 142/73 (01-26-23 @ 16:19) (142/73 - 220/110)  RR: 18 (01-26-23 @ 16:19) (18 - 18)  SpO2: 100% (01-26-23 @ 16:19) (99% - 100%)    GENERAL: obese man in NAD, sitting in bed comfortably  HEAD:  Atraumatic, Normocephalic  EYES: EOMI  ENT: Moist mucous membranes  NECK: Supple  CHEST/LUNG: Clear to auscultation bilaterally, no wheezing, or rubs  HEART: Regular rate and rhythm; no  rubs, or gallops  ABDOMEN: obese abdomen, Bowel sounds present; Soft, Nontender  EXTREMITIES: Bilateral ankle ulcers, about 10cm on RLE, 8cm on LLE; malodorous, no purulence noted  NERVOUS SYSTEM:  Alert & Oriented X3, speech clear. No gross deficits   SKIN: No rashes or lesions

## 2023-01-26 NOTE — PHYSICAL EXAM
[Ankle Swelling (On Exam)] : present [Ankle Swelling Bilaterally] : severe [] : normal strength/tone [Vibration Dec.] : diminished vibratory sensation at the level of the toes [Diminished Throughout Right Foot] : diminished sensation with monofilament testing throughout right foot [Diminished Throughout Left Foot] : diminished sensation with monofilament testing throughout left foot [Varicose Veins Of Lower Extremities] : not present [Delayed in the Right Toes] : capillary refills normal in right toes [Delayed in the Left Toes] : capillary refills normal in the left toes [FreeTextEntry3] : Non palpable B/L pedal pulses secondary to Edema [FreeTextEntry1] : RLE wound 13. cm x 10.cm (circumferential) down to fat layer and subcutaneous tissue. malodor present. severe serous drainage\par LLE wound 9cm x 8cm (circumferential) down to fat layer and subcutaneous tissue. No malodor present. severe serous drainage\par Hemosiderin discoloration on the periwound skin B/L\par Tissue overgrowth and rolled in border of B/L wounds \par Very painful on palpation \par Xerosis B/L LE

## 2023-01-26 NOTE — ED ADULT NURSE NOTE - CHIEF COMPLAINT
The patient is a 57y Male complaining of pain, upper leg. Additional Notes: 2 lesions was treated today by iris scissors, no charge Detail Level: Detailed

## 2023-01-26 NOTE — CONSULT NOTE ADULT - NSCONSULTADDITIONALINFOA_GEN_ALL_CORE
Continue the same regimen he followed at home. He has shown to be consistent with them.  CONTINUE:  1. Lyrica 100 mg BID.  2. Morphine 30 mg ER TID  3. Oxycodone-acetaminophen 10 mg QID

## 2023-01-26 NOTE — H&P ADULT - HISTORY OF PRESENT ILLNESS
58 y/o male with hx of obesity, RUSSELL, HTN, DM (? diet controlled), back pain s/p spinal cord stimulator, venous stasis ulcers x 3 years was seen by podiatry this morning and was sent to ED for further management of bilateral LE ulcers. Patient admits to R>L ulcers to lower extremities with foul smelling drainage and pain. He also reports he was started on Clindamycin for 4 days with no improvement.  Pt denies fevers, chills, N/V/C/D, abd pain, CP, SOB, urinary complaints.   In Ed pt afebrile, initially hypertensive and tachy to 106 but improved after pain medications. CT LE redemonstrated cellulitis of the leg with extensive subcutaneous edema and skin thickening without drainable fluid collection.   He was administered Azactam 2g/Vanco 1500, Dilaudid 1g x2.  Pt was seen by podiatry who is planning to take him to OR.  56 y/o male with hx of obesity, RUSSELL, HTN, DM (?diet controlled), back pain s/p spinal cord stimulator, venous stasis ulcers x 3 years was seen by podiatry this morning and was sent to ED for further management of bilateral LE ulcers. Patient admits to R>L ulcers to lower extremities with foul smelling drainage and pain. He also reports he was started on Clindamycin for 4 days with no improvement.  Pt denies fevers, chills, N/V/C/D, abd pain, CP, SOB, urinary complaints.   In Ed pt afebrile, initially hypertensive and tachy to 106 but improved after pain medications. CT LE redemonstrated cellulitis of the leg with extensive subcutaneous edema and skin thickening without drainable fluid collection.   He was administered Azactam 2g/Vanco 1500, Dilaudid 1g x2.  Pt was seen by podiatry who is planning to take him to OR.

## 2023-01-26 NOTE — HISTORY OF PRESENT ILLNESS
[Sneakers] : josefina [FreeTextEntry1] : - 57M presents to clinic c/o dressing change\par - Wife has been changing dressings every other day\par - Dressing w/HONG\par - B/L LE Venous stasis wounds\par - Failed unna boot therapy\par - Very painful\par - unable to tolerate compression due to pain\par - Reports pain, redness and swelling B?L LE\par - Worsening and spreading of the wound B/L LE\par - On clinda by PCP - not improving

## 2023-01-26 NOTE — CONSULT NOTE ADULT - SUBJECTIVE AND OBJECTIVE BOX
56 y/o male with h/o Chronic Back Pain followed by Dr. Lombardo. He has Morbid Obesity and RUSSELL on CPAP. On exam he appears comfortable, his vitals have been stable. He c/o 7/10 pain of legs, affected by cellulitis. The magy worsen when he moves.

## 2023-01-26 NOTE — ASSESSMENT
[Verbal] : verbal [Patient] : patient [Good - alert, interested, motivated] : Good - alert, interested, motivated [Demonstrates independently] : demonstrates independently [Dressing changes] : dressing changes [Foot Care] : foot care [FreeTextEntry1] : Assessment:\par -Venous Stasis Wounds, B/L LE - Worsening\par - B/L LE edema \par - Cellulites\par Plan:\par -Pt seen & evaluated\par - Recommend admission to hospital for IV abx and wound dbx and wound care\par -Discussed the options of wound management. Surgical dbx with possible skin graft substitute. Discussed in length the importance of skin/tissue biopsy to R/o tumor cells.\par -Dressed adaptic  ABD / ACE to Knee; B/L LE - Multilayer compression dressing applied B/L LE \par -ID and pain management at hospital

## 2023-01-26 NOTE — ED PROVIDER NOTE - OBJECTIVE STATEMENT
56 y/o male with hx of RUSSELL, venous stasis ulcers x 3 years hx mrsa , hx of unna boot, antibx, vascular evaluation . patient currently on antibx x 4 days clindamycin. patient with R>L ulcers to lower extremities with foul smelling drainage and swelling to right calf. no fevers. patient c/o throbbing pain . patient sent to the ED from podiatry office dr. velazco for admission and bx next week in or.

## 2023-01-26 NOTE — ED PROVIDER NOTE - PHYSICAL EXAMINATION
Vital Signs: I have reviewed the initial vital signs.  Constitutional: well-nourished, no acute distress, normocephalic  Eyes: PERRLA, EOMI, no nystagmus, clear conjunctiva  ENT: MMM, TM b/l clear , no nasal congestion  Cardiovascular: regular rate, regular rhythm, no murmur appreciated  Respiratory: unlabored respiratory effort, clear to auscultation bilaterally  Gastrointestinal: soft, non-tender, non-distended  abdomen, no pulsatile mass  Musculoskeletal: supple neck, +b/l R>L lower extremity edema, no bony tenderness  Integumentary: venous stasis ulcers R>L with foul smelling drainage, tenderness, surrounding erythema to lateral aspect of b/l legs. no crepitation   Neurologic: awake, alert, cranial nerves II-XII grossly intact, extremities’ motor and sensory functions grossly intact, no focal deficits, GCS 15  Psychiatric: appropriate mood, appropriate affect

## 2023-01-26 NOTE — ED PROVIDER NOTE - CARE PLAN
1 Principal Discharge DX:	Venous stasis ulcer of lower extremity without varicose veins  Secondary Diagnosis:	Cellulitis of lower leg

## 2023-01-26 NOTE — CONSULT NOTE ADULT - SUBJECTIVE AND OBJECTIVE BOX
Podiatry Consult Note    Subjective:  JOSE ALBERTO NO is a 57y old  Male who presents with a chief complaint of Bilateral ankle ulcer;  HPI:  Seen by bedside w/ attending; pt was seen at Dr. Lambert's office earlier today at [facility], sent for sx intervention of bilateral ankle wound; eval BL ankle;     PAST MEDICAL & SURGICAL HISTORY:  Hypertension  Disc disease, degenerative, lumbar or lumbosacral  Obstructive sleep apnea  Morbid obesity  DM (diabetes mellitus)  Spinal cord stimulator status  H/O arthroscopy of right knee  History of excision of pilonidal cyst    Objective:  Vital Signs Last 24 Hrs  T(C): 36.3 (26 Jan 2023 11:18), Max: 36.3 (26 Jan 2023 11:18)  T(F): 97.4 (26 Jan 2023 11:18), Max: 97.4 (26 Jan 2023 11:18)  HR: 106 (26 Jan 2023 11:18) (106 - 106)  BP: 220/110 (26 Jan 2023 11:18) (220/110 - 220/110)  BP(mean): --  RR: 18 (26 Jan 2023 11:18) (18 - 18)  SpO2: 99% (26 Jan 2023 11:18) (99% - 99%)                        12.9   10.02 )-----------( 376      ( 26 Jan 2023 12:28 )             42.2                 01-26    138  |  101  |  15  ----------------------------<  236<H>  4.5   |  28  |  0.9    Ca    9.4      26 Jan 2023 12:28    TPro  7.3  /  Alb  4.2  /  TBili  0.3  /  DBili  x   /  AST  15  /  ALT  13  /  AlkPhos  117<H>  01-26    Physical Exam - Lower Extremity Focused:   Derm:   Bilateral ankle venous ulcer on lateral aspect; about 10cm x 12cm in size superficial ulcer on RLE, 8cm x 11cm on LLE; both ulcers are granular wound base, no purulence was noted; non-malodorous;   Vascular: DP and PT Pulses Diminished; bilateral lower extremities edema noted;   Neuro: Protective Sensation Diminished;  MSK: Severe pain on bilateral ankle wound upon palpation;     Assessment:  Bilateral venous stasis ulcer on lateral aspect of ankle;    Plan:  Chart reviewed and Patient evaluated. All Questions and Concerns Addressed and Answered  Discussed diagnosis and treatment with patient  Wound Flushed w/ normal saline; Betadine soaked adaptic / 4x4 / ACE; q24  Local Wound Care; As Stated Above   Will continue with local wound care with Provodine dressing; see wound care order; q24  Request ID consult for abx regiment recommendation;   Request pain management consult; severe pain on bilateral ankle ulcer site;   Plan for sx intervention; due to heavy volume of OR sx status, unable to finalize sx schedule; will update final procedure and schedule once finalized;   Request medical clearance;   Request pre-lab optimization and OR stratification prior to sx, once sx schedule updated later date;   Weight bearing status; WBAT BL feet;   Evaluated and Discussed Plan w/ Dr. Lambert;     Podiatry        Podiatry Consult Note    Subjective:  JOSE ALBERTO NO is a 57y old  Male who presents with a chief complaint of Bilateral ankle ulcer;  HPI:  Seen by bedside w/ attending; pt was seen at Dr. Lambert's office earlier today at [facility], sent for sx intervention of bilateral ankle wound; eval BL ankle;     PAST MEDICAL & SURGICAL HISTORY:  Hypertension  Disc disease, degenerative, lumbar or lumbosacral  Obstructive sleep apnea  Morbid obesity  DM (diabetes mellitus)  Spinal cord stimulator status  H/O arthroscopy of right knee  History of excision of pilonidal cyst    Objective:  Vital Signs Last 24 Hrs  T(C): 36.3 (26 Jan 2023 11:18), Max: 36.3 (26 Jan 2023 11:18)  T(F): 97.4 (26 Jan 2023 11:18), Max: 97.4 (26 Jan 2023 11:18)  HR: 106 (26 Jan 2023 11:18) (106 - 106)  BP: 220/110 (26 Jan 2023 11:18) (220/110 - 220/110)  BP(mean): --  RR: 18 (26 Jan 2023 11:18) (18 - 18)  SpO2: 99% (26 Jan 2023 11:18) (99% - 99%)                        12.9   10.02 )-----------( 376      ( 26 Jan 2023 12:28 )             42.2                 01-26    138  |  101  |  15  ----------------------------<  236<H>  4.5   |  28  |  0.9    Ca    9.4      26 Jan 2023 12:28    TPro  7.3  /  Alb  4.2  /  TBili  0.3  /  DBili  x   /  AST  15  /  ALT  13  /  AlkPhos  117<H>  01-26    Physical Exam - Lower Extremity Focused:   Derm:   Bilateral ankle venous ulcer on lateral aspect; about 10cm x 12cm in size superficial ulcer on RLE, 8cm x 11cm on LLE; both ulcers are granular/fibrotic wound base, no purulence was noted; malodorous; Sevre oozing. Hemosiderin discoloration B/L LE. Rolled hypertrophic borders B/L wounds  Vascular: DP and PT Pulses Diminished; Severe bilateral lower extremities edema noted;   Neuro: Protective Sensation Diminished;  MSK: Severe pain on bilateral ankle wound upon palpation;     Assessment:  Bilateral venous stasis ulcer on lateral aspect of ankle;    Plan:  Chart reviewed and Patient evaluated. All Questions and Concerns Addressed and Answered  Discussed diagnosis and treatment with patient  Wound Flushed w/ normal saline; Betadine soaked adaptic / 4x4 / ACE; q24  Will continue with local wound care with Provodine dressing; see wound care order; q24  Request ID consult for abx regiment recommendation;   Request pain management consult; severe pain on bilateral ankle ulcer site;   Plan for sx intervention; due to heavy volume of OR sx status, unable to finalize sx schedule; will update final procedure and schedule once finalized;   Request medical clearance;   Request pre-lab optimization and OR stratification prior to sx, once sx schedule updated later date;   Weight bearing status; WBAT BL feet;   Evaluated and Discussed Plan w/ Dr. Lambert;     Podiatry

## 2023-01-26 NOTE — H&P ADULT - NS ATTEND AMEND GEN_ALL_CORE FT
56 y/o male with hx of obesity, DM (?diet controlled), RUSSELL, HTN, back pain s/p spinal cord stimulator, venous stasis ulcers x 3 years was seen by podiatry this morning and was sent for further management of bilateral LE ulcers.    # Bilateral LE cellulitis with ulceration  # hx venous stasis  CT Lower Extremity w/ IV Cont, Right (01.26.23 @ 14:45): Redemonstrated cellulitis of the leg with extensive subcutaneous edema and skin thickening without drainable fluid collection.  Xray Tibia + Fibula 2 Views, Bilateral (01.26.23 @ 12:04): There is suggestion of prominent soft tissue and possible air within the urrounding soft tissues.    - admit to med/surg  - s/p Azactam/Vanco in ED, will continue   - f/u blood cx x 2 sets  - ID consult   - f/u CRP  - podiatry following, plan for OR, due to heavy volume of OR sx status, unable to finalized schedule   - podiatry requested: pain management consult, medical clearance                                  # HTN  - monitor BP  - continue losartan    # RUSSELL  - not on bipap   - will start avap qhs, TV-450, EPAP-8, RR-14, minIPAP-12, maxIPAP-25    # Back pain   - s/p spinal cord stimulator  - pain management consult    # DM  - reportedly diet controlled  - obtain hgA1C  - monitor FS  - carb consistent diet      # Obesity  - diet modification counseling done    # Weight bearing status: WBAT BL feet  # Would care: wash/ normal saline; Betadine soaked adaptic / 4x4 / ACE; q24    DVT ppx: lovenox  GI ppx: PPI    Dr. Dhiraj Johns  Attending Hospitalist

## 2023-01-26 NOTE — CONSULT NOTE ADULT - ATTENDING COMMENTS
Cont with wound care and compression  Pain management requested  Sx B/L Dbx with skin biopsy and possible of skin graft substitution B/L LE   Request ID for abx

## 2023-01-26 NOTE — ED PROVIDER NOTE - CLINICAL SUMMARY MEDICAL DECISION MAKING FREE TEXT BOX
57-year-old male with a past medical history of sleep apnea, hypertension, obesity, diabetes, chronic back pain, chronic venous stasis ulcers for the past couple of years, history of MRSA, presents with pain to bilateral legs right greater than left.  Sent in by podiatrist for IV antibiotics and admission for possible operation.  Has been on clindamycin for the past couple of days.  On exam hypertensive, minimally tachycardic, nontoxic, obese, normal work of breathing, bilateral chronic venous stasis changes with bilateral lower leg ulcers more on the right than left.  1 in the right with some serous discharge and foul-smelling odor.  No fluctuance or crepitus.  No pain out of proportion.  Smaller ulcer with no foul smell on left.  Capillary refill less than 2 seconds bilateral distal feet and warm to touch.  Labs done and notable for minimally elevated ESR, stable lactate, CT with no signs of free air or drainable fluid collection.  Clinically not concern for necrotizing soft tissue infection.  Will admit to medicine for IV antibiotics and podiatry will follow.

## 2023-01-26 NOTE — PHARMACOTHERAPY INTERVENTION NOTE - COMMENTS
As per policy, ordered a vancomycin trough for 1/27 at 3pm since patient would be due for a trough prior to the fourth dose on 1.5 g IV q12h.    Herman Charles, PharmD, [facility]  Clinical Pharmacy Specialist, Infectious Diseases  Tele-Antimicrobial Stewardship Program (Tele-ASP)  Tele-ASP Phone: [phone]

## 2023-01-26 NOTE — H&P ADULT - NSHPLABSRESULTS_GEN_ALL_CORE
12.9   10.02 )-----------( 376      ( 26 Jan 2023 12:28 )             42.2       01-26    138  |  101  |  15  ----------------------------<  236<H>  4.5   |  28  |  0.9    Ca    9.4      26 Jan 2023 12:28  TPro  7.3  /  Alb  4.2  /  TBili  0.3  /  DBili  x   /  AST  15  /  ALT  13  /  AlkPhos  117<H>  01-26  PT/INR - ( 26 Jan 2023 12:28 )   PT: 11.80 sec;   INR: 1.03 ratio    PTT - ( 26 Jan 2023 12:28 )  PTT:29.5 sec    RADIOLOGY:  CT Lower Extremity w/ IV Cont, Right (01.26.23 @ 14:45)   IMPRESSION:  Redemonstrated cellulitis of the leg with extensive subcutaneous edema   and skin thickening without drainable fluid collection. Superficial skin   defects at the lateral distal leg region. Correlate clinically for ulcer.    GARFIELD MUNGUIA MD; Resident Radiologist  This document has been electronically signed.  REY ALICIA MD; Attending Radiologist  This document has been electronically signed. Jan 26 2023  3:49PM    Xray Tibia + Fibula 2 Views, Bilateral (01.26.23 @ 12:04)   impression:  There is suggestion of prominent soft tissue and possible air within the   surrounding soft tissues. Mild degenerative changes of the left knee.   Tibia and fibula are intact with no acute fracture or dislocation. Mild   arthritic changes of the midfoot.    PAULO CUEVAS MD; Attending Radiologist  This document has been electronically signed. Jan 26 2023 12:47PM    Xray Foot AP + Lateral + Oblique, Bilat (01.26.23 @ 12:03)   Findings/  impression:  There is mild calcaneal spurring. There are arthritic changes of the   midfoot with dorsal spurring. Arthritic changes are also seen of the   forefoot, particularly of the first metatarsophalangeal joint. Scoliosis   is seen between the middle and distal fifth phalanx, likely chronic in   nature. There is no acute fracture or radiographic evidence of   osteomyelitis.      PAULO CUEVAS MD; Attending Radiologist  This document has been electronically signed. Jan 26 2023  1:12PM

## 2023-01-27 DIAGNOSIS — Z02.9 ENCOUNTER FOR ADMINISTRATIVE EXAMINATIONS, UNSPECIFIED: ICD-10-CM

## 2023-01-27 LAB
CRP SERPL-MCNC: 47 MG/L — HIGH
VANCOMYCIN TROUGH SERPL-MCNC: 7.4 UG/ML — SIGNIFICANT CHANGE UP (ref 5–10)

## 2023-01-27 PROCEDURE — 93010 ELECTROCARDIOGRAM REPORT: CPT

## 2023-01-27 PROCEDURE — 29581 APPL MULTLAYER CMPRN SYS LEG: CPT | Mod: 50,GC

## 2023-01-27 PROCEDURE — 99233 SBSQ HOSP IP/OBS HIGH 50: CPT

## 2023-01-27 PROCEDURE — 36569 INSJ PICC 5 YR+ W/O IMAGING: CPT

## 2023-01-27 PROCEDURE — 93970 EXTREMITY STUDY: CPT | Mod: 26

## 2023-01-27 PROCEDURE — 76937 US GUIDE VASCULAR ACCESS: CPT | Mod: 26,59

## 2023-01-27 PROCEDURE — 99231 SBSQ HOSP IP/OBS SF/LOW 25: CPT | Mod: GC,25

## 2023-01-27 RX ORDER — HYDROMORPHONE HYDROCHLORIDE 2 MG/ML
1 INJECTION INTRAMUSCULAR; INTRAVENOUS; SUBCUTANEOUS EVERY 6 HOURS
Refills: 0 | Status: DISCONTINUED | OUTPATIENT
Start: 2023-01-27 | End: 2023-01-28

## 2023-01-27 RX ORDER — CEFEPIME 1 G/1
2000 INJECTION, POWDER, FOR SOLUTION INTRAMUSCULAR; INTRAVENOUS EVERY 8 HOURS
Refills: 0 | Status: DISCONTINUED | OUTPATIENT
Start: 2023-01-27 | End: 2023-01-30

## 2023-01-27 RX ADMIN — MORPHINE SULFATE 30 MILLIGRAM(S): 50 CAPSULE, EXTENDED RELEASE ORAL at 05:36

## 2023-01-27 RX ADMIN — Medication 40 MILLIGRAM(S): at 05:31

## 2023-01-27 RX ADMIN — Medication 100 MILLIGRAM(S): at 05:24

## 2023-01-27 RX ADMIN — CELECOXIB 200 MILLIGRAM(S): 200 CAPSULE ORAL at 12:49

## 2023-01-27 RX ADMIN — Medication 100 MILLIGRAM(S): at 05:32

## 2023-01-27 RX ADMIN — LOSARTAN POTASSIUM 100 MILLIGRAM(S): 100 TABLET, FILM COATED ORAL at 05:39

## 2023-01-27 RX ADMIN — HYDROMORPHONE HYDROCHLORIDE 1 MILLIGRAM(S): 2 INJECTION INTRAMUSCULAR; INTRAVENOUS; SUBCUTANEOUS at 17:48

## 2023-01-27 RX ADMIN — HYDROMORPHONE HYDROCHLORIDE 1 MILLIGRAM(S): 2 INJECTION INTRAMUSCULAR; INTRAVENOUS; SUBCUTANEOUS at 12:55

## 2023-01-27 RX ADMIN — HYDROMORPHONE HYDROCHLORIDE 1 MILLIGRAM(S): 2 INJECTION INTRAMUSCULAR; INTRAVENOUS; SUBCUTANEOUS at 11:42

## 2023-01-27 RX ADMIN — MORPHINE SULFATE 30 MILLIGRAM(S): 50 CAPSULE, EXTENDED RELEASE ORAL at 06:22

## 2023-01-27 RX ADMIN — Medication 300 MILLIGRAM(S): at 05:40

## 2023-01-27 RX ADMIN — Medication 1 PATCH: at 11:36

## 2023-01-27 RX ADMIN — HYDROMORPHONE HYDROCHLORIDE 1 MILLIGRAM(S): 2 INJECTION INTRAMUSCULAR; INTRAVENOUS; SUBCUTANEOUS at 23:58

## 2023-01-27 RX ADMIN — CEFEPIME 100 MILLIGRAM(S): 1 INJECTION, POWDER, FOR SOLUTION INTRAMUSCULAR; INTRAVENOUS at 21:29

## 2023-01-27 RX ADMIN — MORPHINE SULFATE 30 MILLIGRAM(S): 50 CAPSULE, EXTENDED RELEASE ORAL at 21:35

## 2023-01-27 RX ADMIN — CELECOXIB 200 MILLIGRAM(S): 200 CAPSULE ORAL at 11:36

## 2023-01-27 RX ADMIN — Medication 100 MILLIGRAM(S): at 17:11

## 2023-01-27 NOTE — PROGRESS NOTE ADULT - ASSESSMENT
58 y/o male with hx of obesity, DM (?diet controlled), RUSSELL, HTN, back pain s/p spinal cord stimulator, venous stasis ulcers x 3 years was seen by podiatry this morning and was sent for further management of bilateral LE ulcers.    # Bilateral LE cellulitis with ulceration  # hx venous stasis  CT Lower Extremity w/ IV Cont, Right (01.26.23 @ 14:45): Redemonstrated cellulitis of the leg with extensive subcutaneous edema and skin thickening without drainable fluid collection.  Xray Tibia + Fibula 2 Views, Bilateral (01.26.23 @ 12:04): There is suggestion of prominent soft tissue and possible air within the urrounding soft tissues.  - s/p Azactam/Vanco in ED, will continue   - f/u blood cx x 2 sets  - ID consult   - f/u CRP  - Will get b/l LE venous duplex  - podiatry following, plan for OR, due to heavy volume of OR sx status, unable to finalized schedule   - patient is medically optimized for planned procedure                                 # HTN  - monitor BP  - continue losartan    # RUSSELL  - not on bipap   - will start avap qhs, TV-450, EPAP-8, RR-14, minIPAP-12, maxIPAP-25    # Back pain   - s/p spinal cord stimulator  - pain management consult appreciated    # DM  - reportedly diet controlled  - obtain hgA1C  - monitor FS  - carb consistent diet      # Obesity  - diet modification counseling done    # Weight bearing status: WBAT BL feet  # Would care: wash/ normal saline; Betadine soaked adaptic / 4x4 / ACE; q24    DVT ppx: lovenox  GI ppx: PPI

## 2023-01-27 NOTE — CONSULT NOTE ADULT - SUBJECTIVE AND OBJECTIVE BOX
JOSE ALBERTO NO  57y, Male  Allergy: IV Contrast (Sneezing (Mod to Severe))  penicillin (Unknown)      CHIEF COMPLAINT: Bilat leg Cellulitis (26 Jan 2023 22:07)      LOS  1d    HPI:  58 y/o male with hx of obesity, RUSSELL, HTN, DM (?diet controlled), back pain s/p spinal cord stimulator, venous stasis ulcers x 3 years was seen by podiatry this morning and was sent to ED for further management of bilateral LE ulcers. Patient admits to R>L ulcers to lower extremities with foul smelling drainage and pain. He also reports he was started on Clindamycin for 4 days with no improvement.  Pt denies fevers, chills, N/V/C/D, abd pain, CP, SOB, urinary complaints.   In Ed pt afebrile, initially hypertensive and tachy to 106 but improved after pain medications. CT LE redemonstrated cellulitis of the leg with extensive subcutaneous edema and skin thickening without drainable fluid collection.   He was administered Azactam 2g/Vanco 1500, Dilaudid 1g x2.  Pt was seen by podiatry who is planning to take him to OR.  (26 Jan 2023 16:34)      INFECTIOUS DISEASE HISTORY:  History as above.  Presents with worsening R>L ulcer with increased drainage and malodor.   Took clindamycin prior to admission with no improvement.     PAST MEDICAL & SURGICAL HISTORY:  Hypertension      Disc disease, degenerative, lumbar or lumbosacral      Obstructive sleep apnea      Morbid obesity      DM (diabetes mellitus)      Spinal cord stimulator status      H/O arthroscopy of right knee      History of excision of pilonidal cyst          FAMILY HISTORY  Family history of early CAD (Father)    Family history of diabetes mellitus in mother (Mother)        SOCIAL HISTORY  Social History:  Tobacco use: denies  EtOH use: denies  Illicit drug use: denies (26 Jan 2023 16:34)        ROS  General: Denies rigors, nightsweats  HEENT: Denies headache, rhinorrhea, sore throat, eye pain  CV: Denies CP, palpitations  PULM: Denies wheezing, hemoptysis  GI: Denies hematemesis, hematochezia, melena  : Denies discharge, hematuria  MSK: Denies arthralgias, myalgias  SKIN: Denies rash, lesions  NEURO: Denies paresthesias, weakness  PSYCH: Denies depression, anxiety    VITALS:  T(F): 98.9, Max: 98.9 (01-27-23 @ 05:25)  HR: 97  BP: 191/91  RR: 18Vital Signs Last 24 Hrs  T(C): 37.2 (27 Jan 2023 05:25), Max: 37.2 (27 Jan 2023 05:25)  T(F): 98.9 (27 Jan 2023 05:25), Max: 98.9 (27 Jan 2023 05:25)  HR: 97 (27 Jan 2023 05:25) (77 - 97)  BP: 191/91 (27 Jan 2023 05:25) (134/61 - 191/91)  BP(mean): --  RR: 18 (26 Jan 2023 21:30) (18 - 18)  SpO2: 100% (26 Jan 2023 16:19) (100% - 100%)        PHYSICAL EXAM:  Gen: NAD, resting in bed  HEENT: Normocephalic, atraumatic  Neck: supple, no lymphadenopathy  CV: Regular rate & regular rhythm  Lungs: decreased BS at bases, no fremitus  Abdomen: Soft, BS present  Ext: Warm, well perfused  Neuro: non focal, awake  Skin: RLE with full thickness ulcer -- middle with red wound base with yello/green tissue on peripheral with malodour   Lines: no phlebitis    TESTS & MEASUREMENTS:                        12.9   10.02 )-----------( 376      ( 26 Jan 2023 12:28 )             42.2     01-26    138  |  101  |  15  ----------------------------<  236<H>  4.5   |  28  |  0.9    Ca    9.4      26 Jan 2023 12:28    TPro  7.3  /  Alb  4.2  /  TBili  0.3  /  DBili  x   /  AST  15  /  ALT  13  /  AlkPhos  117<H>  01-26      LIVER FUNCTIONS - ( 26 Jan 2023 12:28 )  Alb: 4.2 g/dL / Pro: 7.3 g/dL / ALK PHOS: 117 U/L / ALT: 13 U/L / AST: 15 U/L / GGT: x               Culture - Blood (collected 08-29-22 @ 20:00)  Source: .Blood Blood-Peripheral  Final Report (09-04-22 @ 02:00):    No Growth Final    Culture - Blood (collected 08-29-22 @ 20:00)  Source: .Blood Blood-Peripheral  Final Report (09-04-22 @ 02:00):    No Growth Final        Blood Gas Venous - Lactate: 1.80 mmol/L (01-26-23 @ 12:31)      INFECTIOUS DISEASES TESTING  COVID-19 PCR: NotDetec (01-26-23 @ 12:30)  COVID-19 PCR: NotDetec (08-29-22 @ 20:15)      RADIOLOGY & ADDITIONAL TESTS:  I have personally reviewed the last Chest xray  CXR      CT      CARDIOLOGY TESTING      MEDICATIONS  aztreonam  IVPB 2000 IV Intermittent every 12 hours  celecoxib 200 Oral daily  enoxaparin Injectable 40 SubCutaneous every 12 hours  furosemide    Tablet 40 Oral daily  losartan 100 Oral daily  nicotine - 21 mG/24Hr(s) Patch 1 Transdermal daily  pregabalin 100 Oral two times a day  vancomycin  IVPB 1500 IV Intermittent every 12 hours      Weight  Weight (kg): 235 (01-26-23 @ 18:17)    ANTIBIOTICS:  aztreonam  IVPB 2000 milliGRAM(s) IV Intermittent every 12 hours  vancomycin  IVPB 1500 milliGRAM(s) IV Intermittent every 12 hours      ALLERGIES:  IV Contrast (Sneezing (Mod to Severe))  penicillin (Unknown)

## 2023-01-27 NOTE — CONSULT NOTE ADULT - ASSESSMENT
ASSESSMENT  56 y/o male with hx of obesity, RUSSELL, HTN, DM (?diet controlled), back pain s/p spinal cord stimulator, venous stasis ulcers x 3 years was seen by podiatry this morning and was sent to ED for further management of bilateral LE ulcers    IMPRESSION  #Venous Stasis bilateral Ulcers with cellulitis   - Wound Cx 7/19/2020  Pseudomonas aeruginosa     #Obesity BMI (kg/m2): 58.3  #Abx allergy: IV Contrast (Sneezing (Mod to Severe))  penicillin (Unknown) - rash as child -- tolerated cefepime previously     RECOMMENDATIONS  - stop vancomycin   - switch aztreonam to cefepime 2g q 8 hours   - planned for OR for debridement - will follow-up cultures    Please call or message on Microsoft Teams if with any questions.  Spectra [phone]
Lower Extremity Cellulitis

## 2023-01-27 NOTE — PROGRESS NOTE ADULT - SUBJECTIVE AND OBJECTIVE BOX
58 y/o male with hx of obesity, DM (?diet controlled), RUSSELL, HTN, back pain s/p spinal cord stimulator, venous stasis ulcers x 3 years was seen by podiatry this morning and was sent for further management of bilateral LE ulcers.    Today:  Seen at bedside, complains of LE pain as well as nausea with vomiting.        REVIEW OF SYSTEMS:  LE pain  N/V      MEDICATIONS  (STANDING):  aztreonam  IVPB 2000 milliGRAM(s) IV Intermittent every 12 hours  celecoxib 200 milliGRAM(s) Oral daily  enoxaparin Injectable 40 milliGRAM(s) SubCutaneous every 12 hours  furosemide    Tablet 40 milliGRAM(s) Oral daily  losartan 100 milliGRAM(s) Oral daily  nicotine - 21 mG/24Hr(s) Patch 1 Patch Transdermal daily  pregabalin 100 milliGRAM(s) Oral two times a day  vancomycin  IVPB 1500 milliGRAM(s) IV Intermittent every 12 hours    MEDICATIONS  (PRN):  acetaminophen     Tablet .. 650 milliGRAM(s) Oral every 6 hours PRN Temp greater or equal to 38C (100.4F), Mild Pain (1 - 3)  HYDROmorphone  Injectable 1 milliGRAM(s) IV Push every 6 hours PRN Severe Pain (7 - 10)  morphine  IR 30 milliGRAM(s) Oral three times a day PRN Severe Pain (7 - 10)  ondansetron Injectable 4 milliGRAM(s) IV Push every 4 hours PRN Nausea and/or Vomiting  oxycodone    5 mG/acetaminophen 325 mG 2 Tablet(s) Oral every 6 hours PRN Moderate Pain (4 - 6)      Allergies  IV Contrast (Sneezing (Mod to Severe))  penicillin (Unknown)        FAMILY HISTORY:  Family history of early CAD (Father)  Family history of diabetes mellitus in mother (Mother)        Vital Signs Last 24 Hrs  T(C): 37.2 (27 Jan 2023 05:25), Max: 37.2 (27 Jan 2023 05:25)  T(F): 98.9 (27 Jan 2023 05:25), Max: 98.9 (27 Jan 2023 05:25)  HR: 97 (27 Jan 2023 05:25) (77 - 97)  BP: 191/91 (27 Jan 2023 05:25) (134/61 - 191/91)  RR: 18 (26 Jan 2023 21:30) (18 - 18)  SpO2: 100% (26 Jan 2023 16:19) (100% - 100%)        PHYSICAL EXAM:  GENERAL: obese man in NAD, sitting in bed comfortably  HEAD:  Atraumatic, Normocephalic  EYES: EOMI  ENT: Moist mucous membranes  NECK: Supple  CHEST/LUNG: Clear to auscultation bilaterally, no wheezing, or rubs  HEART: Regular rate and rhythm; no  rubs, or gallops  ABDOMEN: obese abdomen, Bowel sounds present; Soft, Nontender  EXTREMITIES: Bilateral ankle ulcers wrapped  NERVOUS SYSTEM:  Alert & Oriented X3, speech clear. No gross deficits   SKIN: No rashes or lesions      LABS:                        12.9   10.02 )-----------( 376      ( 26 Jan 2023 12:28 )             42.2     01-26    138  |  101  |  15  ----------------------------<  236<H>  4.5   |  28  |  0.9    Ca    9.4      26 Jan 2023 12:28    TPro  7.3  /  Alb  4.2  /  TBili  0.3  /  DBili  x   /  AST  15  /  ALT  13  /  AlkPhos  117<H>  01-26    PT/INR - ( 26 Jan 2023 12:28 )   PT: 11.80 sec;   INR: 1.03 ratio         PTT - ( 26 Jan 2023 12:28 )  PTT:29.5 sec

## 2023-01-27 NOTE — PROGRESS NOTE ADULT - SUBJECTIVE AND OBJECTIVE BOX
Podiatry Progress Note    Subjective:   JOSE ALBERTO NO is a 57y old  Male who presents with a chief complaint of Worsening PAin (27 Jan 2023 13:15)  Seen by bedside; evaluated BL LE wounds;     PAST MEDICAL & SURGICAL HISTORY:  Hypertension  Disc disease, degenerative, lumbar or lumbosacral  Obstructive sleep apnea  Morbid obesity  DM (diabetes mellitus)  Spinal cord stimulator status  H/O arthroscopy of right knee  History of excision of pilonidal cyst    Objective:  Vital Signs Last 24 Hrs  T(C): 35.8 (27 Jan 2023 14:05), Max: 37.2 (27 Jan 2023 05:25)  T(F): 96.5 (27 Jan 2023 14:05), Max: 98.9 (27 Jan 2023 05:25)  HR: 78 (27 Jan 2023 14:05) (77 - 97)  BP: 105/73 (27 Jan 2023 14:05) (105/73 - 191/91)  BP(mean): --  RR: 18 (27 Jan 2023 14:05) (18 - 18)  SpO2: --                   12.9   10.02 )-----------( 376      ( 26 Jan 2023 12:28 )             42.2                 01-26    138  |  101  |  15  ----------------------------<  236<H>  4.5   |  28  |  0.9    Ca    9.4      26 Jan 2023 12:28    TPro  7.3  /  Alb  4.2  /  TBili  0.3  /  DBili  x   /  AST  15  /  ALT  13  /  AlkPhos  117<H>  01-26    Physical Exam - Lower Extremity Focused:   Derm:   Bilateral ankle venous ulcer on lateral aspect; about 10cm x 12cm in size superficial ulcer on RLE, 8cm x 11cm on LLE; both ulcers are granular/fibrotic wound base, no purulence was noted; malodorous; Severe oozing. Hemosiderin discoloration B/L LE. Rolled hypertrophic borders B/L wounds  Vascular: DP and PT Pulses Diminished; Severe bilateral lower extremities edema noted;   Neuro: Protective Sensation Diminished;  MSK: Severe pain on bilateral ankle wound upon palpation;     Assessment:  Bilateral venous stasis ulcer on lateral aspect of ankle;    Plan:  Chart reviewed and Patient evaluated. All Questions and Concerns Addressed and Answered  Discussed diagnosis and treatment with patient  Wound Flushed w/ normal saline; Betadine soaked adaptic / 4x4 / ACE; q24  Will continue with local wound care with Provodine dressing; see wound care order; q24  Much appreciated for ID recommendation;   Pain management on board; much appreciated for recommendation;    Sx scheduled Mon 1/30 @ add-on after 4:00PM; excisional debridement of soft tissue and bone with skin biopsy and possible Apligraf application, bilateral lower extremities;   Request medical clearance;   Request pre-lab optimization and OR stratification prior to sx on Mon 1/30;  NPO after Breakfast-8AM @ Mon 1/30;   Weight bearing status; WBAT BL feet;   Discussed Plan w/ Dr. Lambert;     Podiatry

## 2023-01-28 LAB
ALBUMIN SERPL ELPH-MCNC: 4 G/DL — SIGNIFICANT CHANGE UP (ref 3.5–5.2)
ALP SERPL-CCNC: 99 U/L — SIGNIFICANT CHANGE UP (ref 30–115)
ALT FLD-CCNC: 13 U/L — SIGNIFICANT CHANGE UP (ref 0–41)
ANION GAP SERPL CALC-SCNC: 10 MMOL/L — SIGNIFICANT CHANGE UP (ref 7–14)
AST SERPL-CCNC: 11 U/L — SIGNIFICANT CHANGE UP (ref 0–41)
BASOPHILS # BLD AUTO: 0.1 K/UL — SIGNIFICANT CHANGE UP (ref 0–0.2)
BASOPHILS NFR BLD AUTO: 1 % — SIGNIFICANT CHANGE UP (ref 0–1)
BILIRUB SERPL-MCNC: 0.4 MG/DL — SIGNIFICANT CHANGE UP (ref 0.2–1.2)
BUN SERPL-MCNC: 16 MG/DL — SIGNIFICANT CHANGE UP (ref 10–20)
CALCIUM SERPL-MCNC: 9.3 MG/DL — SIGNIFICANT CHANGE UP (ref 8.4–10.5)
CHLORIDE SERPL-SCNC: 101 MMOL/L — SIGNIFICANT CHANGE UP (ref 98–110)
CO2 SERPL-SCNC: 31 MMOL/L — SIGNIFICANT CHANGE UP (ref 17–32)
CREAT SERPL-MCNC: 0.9 MG/DL — SIGNIFICANT CHANGE UP (ref 0.7–1.5)
EGFR: 101 ML/MIN/1.73M2 — SIGNIFICANT CHANGE UP
EOSINOPHIL # BLD AUTO: 0.12 K/UL — SIGNIFICANT CHANGE UP (ref 0–0.7)
EOSINOPHIL NFR BLD AUTO: 1.3 % — SIGNIFICANT CHANGE UP (ref 0–8)
GLUCOSE SERPL-MCNC: 193 MG/DL — HIGH (ref 70–99)
HCT VFR BLD CALC: 39.7 % — LOW (ref 42–52)
HGB BLD-MCNC: 12.2 G/DL — LOW (ref 14–18)
IMM GRANULOCYTES NFR BLD AUTO: 0.4 % — HIGH (ref 0.1–0.3)
LYMPHOCYTES # BLD AUTO: 3.33 K/UL — SIGNIFICANT CHANGE UP (ref 1.2–3.4)
LYMPHOCYTES # BLD AUTO: 34.9 % — SIGNIFICANT CHANGE UP (ref 20.5–51.1)
MCHC RBC-ENTMCNC: 23.9 PG — LOW (ref 27–31)
MCHC RBC-ENTMCNC: 30.7 G/DL — LOW (ref 32–37)
MCV RBC AUTO: 77.7 FL — LOW (ref 80–94)
MONOCYTES # BLD AUTO: 0.57 K/UL — SIGNIFICANT CHANGE UP (ref 0.1–0.6)
MONOCYTES NFR BLD AUTO: 6 % — SIGNIFICANT CHANGE UP (ref 1.7–9.3)
NEUTROPHILS # BLD AUTO: 5.37 K/UL — SIGNIFICANT CHANGE UP (ref 1.4–6.5)
NEUTROPHILS NFR BLD AUTO: 56.4 % — SIGNIFICANT CHANGE UP (ref 42.2–75.2)
NRBC # BLD: 0 /100 WBCS — SIGNIFICANT CHANGE UP (ref 0–0)
PLATELET # BLD AUTO: 369 K/UL — SIGNIFICANT CHANGE UP (ref 130–400)
POTASSIUM SERPL-MCNC: 4.4 MMOL/L — SIGNIFICANT CHANGE UP (ref 3.5–5)
POTASSIUM SERPL-SCNC: 4.4 MMOL/L — SIGNIFICANT CHANGE UP (ref 3.5–5)
PROT SERPL-MCNC: 6.8 G/DL — SIGNIFICANT CHANGE UP (ref 6–8)
RBC # BLD: 5.11 M/UL — SIGNIFICANT CHANGE UP (ref 4.7–6.1)
RBC # FLD: 16.7 % — HIGH (ref 11.5–14.5)
SODIUM SERPL-SCNC: 142 MMOL/L — SIGNIFICANT CHANGE UP (ref 135–146)
WBC # BLD: 9.53 K/UL — SIGNIFICANT CHANGE UP (ref 4.8–10.8)
WBC # FLD AUTO: 9.53 K/UL — SIGNIFICANT CHANGE UP (ref 4.8–10.8)

## 2023-01-28 PROCEDURE — 99232 SBSQ HOSP IP/OBS MODERATE 35: CPT | Mod: GC,25,57

## 2023-01-28 PROCEDURE — 29581 APPL MULTLAYER CMPRN SYS LEG: CPT | Mod: 50,GC

## 2023-01-28 PROCEDURE — 99232 SBSQ HOSP IP/OBS MODERATE 35: CPT

## 2023-01-28 RX ORDER — HYDROMORPHONE HYDROCHLORIDE 2 MG/ML
2 INJECTION INTRAMUSCULAR; INTRAVENOUS; SUBCUTANEOUS EVERY 4 HOURS
Refills: 0 | Status: DISCONTINUED | OUTPATIENT
Start: 2023-01-28 | End: 2023-01-30

## 2023-01-28 RX ADMIN — CEFEPIME 100 MILLIGRAM(S): 1 INJECTION, POWDER, FOR SOLUTION INTRAMUSCULAR; INTRAVENOUS at 21:31

## 2023-01-28 RX ADMIN — ENOXAPARIN SODIUM 40 MILLIGRAM(S): 100 INJECTION SUBCUTANEOUS at 06:02

## 2023-01-28 RX ADMIN — CELECOXIB 200 MILLIGRAM(S): 200 CAPSULE ORAL at 11:51

## 2023-01-28 RX ADMIN — CEFEPIME 100 MILLIGRAM(S): 1 INJECTION, POWDER, FOR SOLUTION INTRAMUSCULAR; INTRAVENOUS at 06:01

## 2023-01-28 RX ADMIN — Medication 1 PATCH: at 11:06

## 2023-01-28 RX ADMIN — HYDROMORPHONE HYDROCHLORIDE 2 MILLIGRAM(S): 2 INJECTION INTRAMUSCULAR; INTRAVENOUS; SUBCUTANEOUS at 23:58

## 2023-01-28 RX ADMIN — Medication 40 MILLIGRAM(S): at 06:03

## 2023-01-28 RX ADMIN — HYDROMORPHONE HYDROCHLORIDE 1 MILLIGRAM(S): 2 INJECTION INTRAMUSCULAR; INTRAVENOUS; SUBCUTANEOUS at 13:34

## 2023-01-28 RX ADMIN — LOSARTAN POTASSIUM 100 MILLIGRAM(S): 100 TABLET, FILM COATED ORAL at 06:02

## 2023-01-28 RX ADMIN — Medication 1 PATCH: at 11:51

## 2023-01-28 RX ADMIN — HYDROMORPHONE HYDROCHLORIDE 1 MILLIGRAM(S): 2 INJECTION INTRAMUSCULAR; INTRAVENOUS; SUBCUTANEOUS at 12:15

## 2023-01-28 RX ADMIN — Medication 1 PATCH: at 08:00

## 2023-01-28 RX ADMIN — Medication 100 MILLIGRAM(S): at 17:12

## 2023-01-28 RX ADMIN — HYDROMORPHONE HYDROCHLORIDE 2 MILLIGRAM(S): 2 INJECTION INTRAMUSCULAR; INTRAVENOUS; SUBCUTANEOUS at 22:00

## 2023-01-28 RX ADMIN — HYDROMORPHONE HYDROCHLORIDE 1 MILLIGRAM(S): 2 INJECTION INTRAMUSCULAR; INTRAVENOUS; SUBCUTANEOUS at 06:01

## 2023-01-28 RX ADMIN — CEFEPIME 100 MILLIGRAM(S): 1 INJECTION, POWDER, FOR SOLUTION INTRAMUSCULAR; INTRAVENOUS at 13:49

## 2023-01-28 RX ADMIN — Medication 100 MILLIGRAM(S): at 06:01

## 2023-01-28 RX ADMIN — HYDROMORPHONE HYDROCHLORIDE 2 MILLIGRAM(S): 2 INJECTION INTRAMUSCULAR; INTRAVENOUS; SUBCUTANEOUS at 15:31

## 2023-01-28 RX ADMIN — CELECOXIB 200 MILLIGRAM(S): 200 CAPSULE ORAL at 11:06

## 2023-01-28 RX ADMIN — Medication 1 PATCH: at 19:48

## 2023-01-28 RX ADMIN — HYDROMORPHONE HYDROCHLORIDE 2 MILLIGRAM(S): 2 INJECTION INTRAMUSCULAR; INTRAVENOUS; SUBCUTANEOUS at 20:34

## 2023-01-28 NOTE — PROGRESS NOTE ADULT - SUBJECTIVE AND OBJECTIVE BOX
56 y/o male with hx of obesity, DM (?diet controlled), RUSSELL, HTN, back pain s/p spinal cord stimulator, venous stasis ulcers x 3 years was seen by podiatry this morning and was sent for further management of bilateral LE ulcers.    Today:  Seen at bedside, complains of LE pain.          REVIEW OF SYSTEMS:  No new complaints      MEDICATIONS  (STANDING):  cefepime   IVPB 2000 milliGRAM(s) IV Intermittent every 8 hours  celecoxib 200 milliGRAM(s) Oral daily  enoxaparin Injectable 40 milliGRAM(s) SubCutaneous every 12 hours  furosemide    Tablet 40 milliGRAM(s) Oral daily  losartan 100 milliGRAM(s) Oral daily  nicotine - 21 mG/24Hr(s) Patch 1 Patch Transdermal daily  pregabalin 100 milliGRAM(s) Oral two times a day    MEDICATIONS  (PRN):  acetaminophen     Tablet .. 650 milliGRAM(s) Oral every 6 hours PRN Temp greater or equal to 38C (100.4F), Mild Pain (1 - 3)  HYDROmorphone  Injectable 2 milliGRAM(s) IV Push every 4 hours PRN Severe Pain (7 - 10)  morphine  IR 30 milliGRAM(s) Oral three times a day PRN Severe Pain (7 - 10)  ondansetron Injectable 4 milliGRAM(s) IV Push every 4 hours PRN Nausea and/or Vomiting  oxycodone    5 mG/acetaminophen 325 mG 2 Tablet(s) Oral every 6 hours PRN Moderate Pain (4 - 6)      Allergies  IV Contrast (Sneezing (Mod to Severe))  penicillin (Unknown)        FAMILY HISTORY:  Family history of early CAD (Father)  Family history of diabetes mellitus in mother (Mother)        Vital Signs Last 24 Hrs  T(C): 35.6 (28 Jan 2023 13:44), Max: 36.7 (27 Jan 2023 20:33)  T(F): 96.1 (28 Jan 2023 13:44), Max: 98 (27 Jan 2023 20:33)  HR: 77 (28 Jan 2023 13:44) (62 - 77)  BP: 129/59 (28 Jan 2023 13:44) (129/59 - 151/72)  RR: 18 (28 Jan 2023 13:44) (18 - 18)  SpO2: 97% (28 Jan 2023 13:44) (97% - 100%)        PHYSICAL EXAM:  GENERAL: obese man in NAD, sitting in bed comfortably  HEAD:  Atraumatic, Normocephalic  EYES: EOMI  ENT: Moist mucous membranes  NECK: Supple  CHEST/LUNG: Clear to auscultation bilaterally, no wheezing, or rubs  HEART: Regular rate and rhythm; no  rubs, or gallops  ABDOMEN: obese abdomen, Bowel sounds present; Soft, Nontender  EXTREMITIES: Bilateral ankle ulcers wrapped  NERVOUS SYSTEM:  Alert & Oriented X3, speech clear. No gross deficits   SKIN: No rashes or lesions    LABS:                        12.2   9.53  )-----------( 369      ( 28 Jan 2023 07:49 )             39.7     01-28    142  |  101  |  16  ----------------------------<  193<H>  4.4   |  31  |  0.9    Ca    9.3      28 Jan 2023 07:49    TPro  6.8  /  Alb  4.0  /  TBili  0.4  /  DBili  x   /  AST  11  /  ALT  13  /  AlkPhos  99  01-28

## 2023-01-28 NOTE — PROGRESS NOTE ADULT - SUBJECTIVE AND OBJECTIVE BOX
Podiatry Progress Note    Subjective:  JOSE ALBERTO NO is a  57y Male.   Seen bedside.   Patient is a 57y old  Male who presents with a chief complaint of Worsening PAin (27 Jan 2023 13:15)  Complaining of severe pain between pain med administration     Past Medical History and Surgical History  PAST MEDICAL & SURGICAL HISTORY:  Hypertension      Disc disease, degenerative, lumbar or lumbosacral      Obstructive sleep apnea      Morbid obesity      DM (diabetes mellitus)      Spinal cord stimulator status      H/O arthroscopy of right knee      History of excision of pilonidal cyst           Objective:  Vital Signs Last 24 Hrs  T(C): 35.8 (28 Jan 2023 21:00), Max: 36.2 (28 Jan 2023 05:32)  T(F): 96.4 (28 Jan 2023 21:00), Max: 97.1 (28 Jan 2023 05:32)  HR: 71 (28 Jan 2023 21:00) (62 - 77)  BP: 176/92 (28 Jan 2023 21:00) (129/59 - 176/92)  BP(mean): --  RR: 18 (28 Jan 2023 21:00) (18 - 18)  SpO2: 97% (28 Jan 2023 13:44) (97% - 100%)                            12.2   9.53  )-----------( 369      ( 28 Jan 2023 07:49 )             39.7                 01-28    142  |  101  |  16  ----------------------------<  193<H>  4.4   |  31  |  0.9    Ca    9.3      28 Jan 2023 07:49    TPro  6.8  /  Alb  4.0  /  TBili  0.4  /  DBili  x   /  AST  11  /  ALT  13  /  AlkPhos  99  01-28      Physical Exam - Lower Extremity Focused:   Derm:   Bilateral ankle venous ulcer on lateral aspect; about 10cm x 12cm in size superficial ulcer on RLE, 8cm x 11cm on LLE; both ulcers are granular/fibrotic wound base, no purulence was noted; malodorous; Severe oozing. Hemosiderin discoloration B/L LE. Rolled hypertrophic borders B/L wounds  Vascular: DP and PT Pulses Diminished; Severe bilateral lower extremities edema noted;   Neuro: Protective Sensation Diminished;  MSK: Severe pain on bilateral ankle wound upon palpation;     Assessment:  Bilateral venous stasis ulcer on lateral aspect of ankle     Plan:  Chart reviewed and Patient evaluated. All Questions and Concerns Addressed and Answered  Discussed diagnosis and treatment with patient  Dressing change performed  Continue with local wound care with Povodine-iodine on petroleum strip (if oil emulsion dressing not available)/ABD/Kerlix/ACE up to the knee   Per attending; povodine was ordered, do not use betadine  See wound care order; q24h dressing changes  Much appreciated for ID recommendation;   Pain management on board; much appreciated for recommendation;    Request: Type and Screen  Sx scheduled Mon 1/30 @ add-on after 4:00PM; excisional debridement of soft tissue and bone with skin biopsy and possible Apligraf application, bilateral lower extremities;   Request medical clearance;   Request pre-lab optimization and OR stratification prior to sx on Mon 1/30;  NPO after Breakfast-8AM @ Mon 1/30;   Weight bearing status; WBAT BL feet;   Discussed Plan w/ Dr. Lambert;       Discussed Plan w/ Dr. Lambert;     Podiatry [id number]       Podiatry Progress Note    Subjective:  JOSE ALBERTO NO is a  57y Male.   Seen bedside.   Patient is a 57y old  Male who presents with a chief complaint of Worsening PAin (27 Jan 2023 13:15)  Complaining of severe pain between pain med administration     Past Medical History and Surgical History  PAST MEDICAL & SURGICAL HISTORY:  Hypertension      Disc disease, degenerative, lumbar or lumbosacral      Obstructive sleep apnea      Morbid obesity      DM (diabetes mellitus)      Spinal cord stimulator status      H/O arthroscopy of right knee      History of excision of pilonidal cyst           Objective:  Vital Signs Last 24 Hrs  T(C): 35.8 (28 Jan 2023 21:00), Max: 36.2 (28 Jan 2023 05:32)  T(F): 96.4 (28 Jan 2023 21:00), Max: 97.1 (28 Jan 2023 05:32)  HR: 71 (28 Jan 2023 21:00) (62 - 77)  BP: 176/92 (28 Jan 2023 21:00) (129/59 - 176/92)  BP(mean): --  RR: 18 (28 Jan 2023 21:00) (18 - 18)  SpO2: 97% (28 Jan 2023 13:44) (97% - 100%)                            12.2   9.53  )-----------( 369      ( 28 Jan 2023 07:49 )             39.7                 01-28    142  |  101  |  16  ----------------------------<  193<H>  4.4   |  31  |  0.9    Ca    9.3      28 Jan 2023 07:49    TPro  6.8  /  Alb  4.0  /  TBili  0.4  /  DBili  x   /  AST  11  /  ALT  13  /  AlkPhos  99  01-28      Physical Exam - Lower Extremity Focused:   Derm:   Bilateral ankle venous ulcer on lateral aspect; about 10cm x 12cm in size superficial ulcer on RLE, 8cm x 11cm on LLE; both ulcers are granular/fibrotic wound base, no purulence was noted; malodorous; Severe oozing. Hemosiderin discoloration B/L LE. Rolled hypertrophic borders B/L wounds  Vascular: DP and PT Pulses Diminished; Severe bilateral lower extremities edema noted;   Neuro: Protective Sensation Diminished;  MSK: Severe pain on bilateral ankle wound upon palpation;     Assessment:  Bilateral venous stasis ulcer on lateral aspect of ankle     Plan:  Chart reviewed and Patient evaluated. All Questions and Concerns Addressed and Answered  Discussed diagnosis and treatment with patient  Dressing change performed  Continue with local wound care with iodosorb/oil emulsion dressing or petroleum strips/ABD/Kerlix/ACE bilateral legs  See wound care order; q24h dressing changes  Much appreciated for ID recommendation;   Pain management on board; much appreciated for recommendation;    Request: Type and Screen  Sx scheduled Mon 1/30 @ add-on after 4:00PM; excisional debridement of soft tissue and bone with skin biopsy and possible Apligraf application, bilateral lower extremities;   Request medical clearance;   Request pre-lab optimization and OR stratification prior to sx on Mon 1/30;  NPO after Breakfast-8AM @ Mon 1/30;   Weight bearing status; WBAT BL feet;   Discussed Plan w/ Dr. Lambert;       Discussed Plan w/ Dr. Lambert;     Podiatry        Podiatry Progress Note    Subjective:  JOSE ALBERTO NO is a  57y Male.   Seen bedside.   Patient is a 57y old  Male who presents with a chief complaint of Worsening PAin (27 Jan 2023 13:15)  Complaining of severe pain between pain med administration     Past Medical History and Surgical History  PAST MEDICAL & SURGICAL HISTORY:  Hypertension      Disc disease, degenerative, lumbar or lumbosacral      Obstructive sleep apnea      Morbid obesity      DM (diabetes mellitus)      Spinal cord stimulator status      H/O arthroscopy of right knee      History of excision of pilonidal cyst           Objective:  Vital Signs Last 24 Hrs  T(C): 35.8 (28 Jan 2023 21:00), Max: 36.2 (28 Jan 2023 05:32)  T(F): 96.4 (28 Jan 2023 21:00), Max: 97.1 (28 Jan 2023 05:32)  HR: 71 (28 Jan 2023 21:00) (62 - 77)  BP: 176/92 (28 Jan 2023 21:00) (129/59 - 176/92)  BP(mean): --  RR: 18 (28 Jan 2023 21:00) (18 - 18)  SpO2: 97% (28 Jan 2023 13:44) (97% - 100%)                            12.2   9.53  )-----------( 369      ( 28 Jan 2023 07:49 )             39.7                 01-28    142  |  101  |  16  ----------------------------<  193<H>  4.4   |  31  |  0.9    Ca    9.3      28 Jan 2023 07:49    TPro  6.8  /  Alb  4.0  /  TBili  0.4  /  DBili  x   /  AST  11  /  ALT  13  /  AlkPhos  99  01-28      Physical Exam - Lower Extremity Focused:   Derm:   Bilateral ankle venous ulcer on lateral aspect; about 10cm x 12cm in size superficial ulcer on RLE, 8cm x 11cm on LLE; both ulcers are granular/fibrotic wound base, no purulence was noted; malodorous; Severe oozing. Hemosiderin discoloration B/L LE. Rolled hypertrophic borders B/L wounds  Vascular: DP and PT Pulses Diminished; Severe bilateral lower extremities edema noted;   Neuro: Protective Sensation Diminished;  MSK: Severe pain on bilateral ankle wound upon palpation;     Assessment:  Bilateral venous stasis ulcer on lateral aspect of ankle     Plan:  Chart reviewed and Patient evaluated. All Questions and Concerns Addressed and Answered  Discussed diagnosis and treatment with patient  Dressing change performed  Continue with local wound care with iodosorb/oil emulsion dressing or petroleum strips/ABD/Kerlix/ACE bilateral legs - multilayer compression dressing B/L LE   See wound care order; q24h dressing changes  Much appreciated for ID recommendation;   Pain management on board; much appreciated for recommendation;    Request: Type and Screen  Sx scheduled Mon 1/30 @ add-on after 4:00PM; excisional debridement of soft tissue and bone with skin biopsy and possible Apligraf application, bilateral lower extremities;   Request medical clearance;   Request pre-lab optimization and OR stratification prior to sx on Mon 1/30;  NPO after Breakfast-8AM @ Mon 1/30;   Weight bearing status; WBAT BL feet;   Discussed Plan w/ Dr. Lambert;       Discussed Plan w/ Dr. Lambert;     Podiatry

## 2023-01-28 NOTE — PROGRESS NOTE ADULT - ASSESSMENT
56 y/o male with hx of obesity, DM (?diet controlled), RUSSELL, HTN, back pain s/p spinal cord stimulator, venous stasis ulcers x 3 years was seen by podiatry this morning and was sent for further management of bilateral LE ulcers.    # Bilateral LE cellulitis with ulceration  # hx venous stasis  CT Lower Extremity w/ IV Cont, Right (01.26.23 @ 14:45): Redemonstrated cellulitis of the leg with extensive subcutaneous edema and skin thickening without drainable fluid collection.  Xray Tibia + Fibula 2 Views, Bilateral (01.26.23 @ 12:04): There is suggestion of prominent soft tissue and possible air within the urrounding soft tissues.  - s/p Azactam/Vanco  - blood cx x 2 sets  - ID consult appreciated-change abx to cefepime  - b/l LE venous duplex neg for DVT  - podiatry following, plan for OR, Monday  - patient is medically optimized for planned procedure                                 # HTN  - monitor BP  - continue losartan    # RUSSELL  - not on bipap   - will start avap qhs, TV-450, EPAP-8, RR-14, minIPAP-12, maxIPAP-25    # Back pain   - s/p spinal cord stimulator  - pain management consult appreciated    # DM  - reportedly diet controlled  - monitor FS  - carb consistent diet      # Obesity  - diet modification counseling done    # Weight bearing status: WBAT BL feet  # Would care: wash/ normal saline; Betadine soaked adaptic / 4x4 / ACE; q24    DVT ppx: lovenox  GI ppx: PPI

## 2023-01-29 LAB
ALBUMIN SERPL ELPH-MCNC: 4.1 G/DL — SIGNIFICANT CHANGE UP (ref 3.5–5.2)
ALP SERPL-CCNC: 106 U/L — SIGNIFICANT CHANGE UP (ref 30–115)
ALT FLD-CCNC: 13 U/L — SIGNIFICANT CHANGE UP (ref 0–41)
ANION GAP SERPL CALC-SCNC: 10 MMOL/L — SIGNIFICANT CHANGE UP (ref 7–14)
AST SERPL-CCNC: 13 U/L — SIGNIFICANT CHANGE UP (ref 0–41)
BASOPHILS # BLD AUTO: 0.09 K/UL — SIGNIFICANT CHANGE UP (ref 0–0.2)
BASOPHILS NFR BLD AUTO: 1.1 % — HIGH (ref 0–1)
BILIRUB SERPL-MCNC: 0.4 MG/DL — SIGNIFICANT CHANGE UP (ref 0.2–1.2)
BUN SERPL-MCNC: 16 MG/DL — SIGNIFICANT CHANGE UP (ref 10–20)
CALCIUM SERPL-MCNC: 9.5 MG/DL — SIGNIFICANT CHANGE UP (ref 8.4–10.5)
CHLORIDE SERPL-SCNC: 99 MMOL/L — SIGNIFICANT CHANGE UP (ref 98–110)
CO2 SERPL-SCNC: 31 MMOL/L — SIGNIFICANT CHANGE UP (ref 17–32)
CREAT SERPL-MCNC: 0.9 MG/DL — SIGNIFICANT CHANGE UP (ref 0.7–1.5)
EGFR: 101 ML/MIN/1.73M2 — SIGNIFICANT CHANGE UP
EOSINOPHIL # BLD AUTO: 0.21 K/UL — SIGNIFICANT CHANGE UP (ref 0–0.7)
EOSINOPHIL NFR BLD AUTO: 2.7 % — SIGNIFICANT CHANGE UP (ref 0–8)
GLUCOSE SERPL-MCNC: 204 MG/DL — HIGH (ref 70–99)
HCT VFR BLD CALC: 41.9 % — LOW (ref 42–52)
HGB BLD-MCNC: 13 G/DL — LOW (ref 14–18)
IMM GRANULOCYTES NFR BLD AUTO: 0.3 % — SIGNIFICANT CHANGE UP (ref 0.1–0.3)
LYMPHOCYTES # BLD AUTO: 2.55 K/UL — SIGNIFICANT CHANGE UP (ref 1.2–3.4)
LYMPHOCYTES # BLD AUTO: 32.3 % — SIGNIFICANT CHANGE UP (ref 20.5–51.1)
MCHC RBC-ENTMCNC: 23.8 PG — LOW (ref 27–31)
MCHC RBC-ENTMCNC: 31 G/DL — LOW (ref 32–37)
MCV RBC AUTO: 76.6 FL — LOW (ref 80–94)
MONOCYTES # BLD AUTO: 0.53 K/UL — SIGNIFICANT CHANGE UP (ref 0.1–0.6)
MONOCYTES NFR BLD AUTO: 6.7 % — SIGNIFICANT CHANGE UP (ref 1.7–9.3)
NEUTROPHILS # BLD AUTO: 4.5 K/UL — SIGNIFICANT CHANGE UP (ref 1.4–6.5)
NEUTROPHILS NFR BLD AUTO: 56.9 % — SIGNIFICANT CHANGE UP (ref 42.2–75.2)
NRBC # BLD: 0 /100 WBCS — SIGNIFICANT CHANGE UP (ref 0–0)
PLATELET # BLD AUTO: 350 K/UL — SIGNIFICANT CHANGE UP (ref 130–400)
POTASSIUM SERPL-MCNC: 4.6 MMOL/L — SIGNIFICANT CHANGE UP (ref 3.5–5)
POTASSIUM SERPL-SCNC: 4.6 MMOL/L — SIGNIFICANT CHANGE UP (ref 3.5–5)
PROT SERPL-MCNC: 7 G/DL — SIGNIFICANT CHANGE UP (ref 6–8)
RBC # BLD: 5.47 M/UL — SIGNIFICANT CHANGE UP (ref 4.7–6.1)
RBC # FLD: 16.8 % — HIGH (ref 11.5–14.5)
SODIUM SERPL-SCNC: 140 MMOL/L — SIGNIFICANT CHANGE UP (ref 135–146)
WBC # BLD: 7.9 K/UL — SIGNIFICANT CHANGE UP (ref 4.8–10.8)
WBC # FLD AUTO: 7.9 K/UL — SIGNIFICANT CHANGE UP (ref 4.8–10.8)

## 2023-01-29 PROCEDURE — 99232 SBSQ HOSP IP/OBS MODERATE 35: CPT

## 2023-01-29 RX ORDER — OXYCODONE HYDROCHLORIDE 5 MG/1
15 TABLET ORAL EVERY 12 HOURS
Refills: 0 | Status: DISCONTINUED | OUTPATIENT
Start: 2023-01-29 | End: 2023-01-29

## 2023-01-29 RX ORDER — ZOLPIDEM TARTRATE 10 MG/1
5 TABLET ORAL ONCE
Refills: 0 | Status: DISCONTINUED | OUTPATIENT
Start: 2023-01-29 | End: 2023-01-29

## 2023-01-29 RX ORDER — OXYCODONE HYDROCHLORIDE 5 MG/1
10 TABLET ORAL EVERY 12 HOURS
Refills: 0 | Status: DISCONTINUED | OUTPATIENT
Start: 2023-01-29 | End: 2023-01-30

## 2023-01-29 RX ORDER — CADEXOMER IODINE 0.9 %
1 PADS, MEDICATED (EA) TOPICAL DAILY
Refills: 0 | Status: DISCONTINUED | OUTPATIENT
Start: 2023-01-28 | End: 2023-01-30

## 2023-01-29 RX ADMIN — HYDROMORPHONE HYDROCHLORIDE 2 MILLIGRAM(S): 2 INJECTION INTRAMUSCULAR; INTRAVENOUS; SUBCUTANEOUS at 23:03

## 2023-01-29 RX ADMIN — Medication 1 PATCH: at 12:44

## 2023-01-29 RX ADMIN — CEFEPIME 100 MILLIGRAM(S): 1 INJECTION, POWDER, FOR SOLUTION INTRAMUSCULAR; INTRAVENOUS at 21:49

## 2023-01-29 RX ADMIN — Medication 1 APPLICATION(S): at 12:48

## 2023-01-29 RX ADMIN — CELECOXIB 200 MILLIGRAM(S): 200 CAPSULE ORAL at 16:37

## 2023-01-29 RX ADMIN — HYDROMORPHONE HYDROCHLORIDE 2 MILLIGRAM(S): 2 INJECTION INTRAMUSCULAR; INTRAVENOUS; SUBCUTANEOUS at 23:26

## 2023-01-29 RX ADMIN — Medication 1 PATCH: at 12:40

## 2023-01-29 RX ADMIN — Medication 40 MILLIGRAM(S): at 05:35

## 2023-01-29 RX ADMIN — HYDROMORPHONE HYDROCHLORIDE 2 MILLIGRAM(S): 2 INJECTION INTRAMUSCULAR; INTRAVENOUS; SUBCUTANEOUS at 14:37

## 2023-01-29 RX ADMIN — LOSARTAN POTASSIUM 100 MILLIGRAM(S): 100 TABLET, FILM COATED ORAL at 05:28

## 2023-01-29 RX ADMIN — Medication 100 MILLIGRAM(S): at 17:33

## 2023-01-29 RX ADMIN — ZOLPIDEM TARTRATE 5 MILLIGRAM(S): 10 TABLET ORAL at 03:23

## 2023-01-29 RX ADMIN — CELECOXIB 200 MILLIGRAM(S): 200 CAPSULE ORAL at 12:44

## 2023-01-29 RX ADMIN — HYDROMORPHONE HYDROCHLORIDE 2 MILLIGRAM(S): 2 INJECTION INTRAMUSCULAR; INTRAVENOUS; SUBCUTANEOUS at 00:35

## 2023-01-29 RX ADMIN — Medication 1 PATCH: at 07:34

## 2023-01-29 RX ADMIN — CEFEPIME 100 MILLIGRAM(S): 1 INJECTION, POWDER, FOR SOLUTION INTRAMUSCULAR; INTRAVENOUS at 13:15

## 2023-01-29 RX ADMIN — HYDROMORPHONE HYDROCHLORIDE 2 MILLIGRAM(S): 2 INJECTION INTRAMUSCULAR; INTRAVENOUS; SUBCUTANEOUS at 05:27

## 2023-01-29 RX ADMIN — HYDROMORPHONE HYDROCHLORIDE 2 MILLIGRAM(S): 2 INJECTION INTRAMUSCULAR; INTRAVENOUS; SUBCUTANEOUS at 14:45

## 2023-01-29 RX ADMIN — HYDROMORPHONE HYDROCHLORIDE 2 MILLIGRAM(S): 2 INJECTION INTRAMUSCULAR; INTRAVENOUS; SUBCUTANEOUS at 10:02

## 2023-01-29 RX ADMIN — CEFEPIME 100 MILLIGRAM(S): 1 INJECTION, POWDER, FOR SOLUTION INTRAMUSCULAR; INTRAVENOUS at 05:27

## 2023-01-29 RX ADMIN — Medication 100 MILLIGRAM(S): at 05:28

## 2023-01-29 RX ADMIN — OXYCODONE HYDROCHLORIDE 10 MILLIGRAM(S): 5 TABLET ORAL at 12:44

## 2023-01-29 RX ADMIN — HYDROMORPHONE HYDROCHLORIDE 2 MILLIGRAM(S): 2 INJECTION INTRAMUSCULAR; INTRAVENOUS; SUBCUTANEOUS at 18:49

## 2023-01-29 NOTE — PROGRESS NOTE ADULT - SUBJECTIVE AND OBJECTIVE BOX
58 y/o male with hx of obesity, DM (?diet controlled), RUSSELL, HTN, back pain s/p spinal cord stimulator, venous stasis ulcers x 3 years was seen by podiatry this morning and was sent for further management of bilateral LE ulcers.    Today:  Seen at bedside, complains of LE pain.          REVIEW OF SYSTEMS:  LE pain      MEDICATIONS  (STANDING):  cadexomer iodine 0.9% Gel 1 Application(s) Topical daily  cefepime   IVPB 2000 milliGRAM(s) IV Intermittent every 8 hours  celecoxib 200 milliGRAM(s) Oral daily  enoxaparin Injectable 40 milliGRAM(s) SubCutaneous every 12 hours  furosemide    Tablet 40 milliGRAM(s) Oral daily  losartan 100 milliGRAM(s) Oral daily  nicotine - 21 mG/24Hr(s) Patch 1 Patch Transdermal daily  pregabalin 100 milliGRAM(s) Oral two times a day    MEDICATIONS  (PRN):  acetaminophen     Tablet .. 650 milliGRAM(s) Oral every 6 hours PRN Temp greater or equal to 38C (100.4F), Mild Pain (1 - 3)  HYDROmorphone  Injectable 2 milliGRAM(s) IV Push every 4 hours PRN Severe Pain (7 - 10)  morphine  IR 30 milliGRAM(s) Oral three times a day PRN Severe Pain (7 - 10)  ondansetron Injectable 4 milliGRAM(s) IV Push every 4 hours PRN Nausea and/or Vomiting  oxycodone    5 mG/acetaminophen 325 mG 2 Tablet(s) Oral every 6 hours PRN Moderate Pain (4 - 6)      Allergies  IV Contrast (Sneezing (Mod to Severe))  penicillin (Unknown)        FAMILY HISTORY:  Family history of early CAD (Father)  Family history of diabetes mellitus in mother (Mother)        Vital Signs Last 24 Hrs  T(C): 35.7 (29 Jan 2023 05:09), Max: 35.8 (28 Jan 2023 21:00)  T(F): 96.2 (29 Jan 2023 05:09), Max: 96.4 (28 Jan 2023 21:00)  HR: 63 (29 Jan 2023 05:09) (63 - 77)  BP: 178/84 (29 Jan 2023 05:09) (129/59 - 178/84)  RR: 18 (29 Jan 2023 05:09) (18 - 18)  SpO2: 97% (28 Jan 2023 13:44) (97% - 97%)        PHYSICAL EXAM:  GENERAL: obese man in NAD, sitting in bed comfortably  HEAD:  Atraumatic, Normocephalic  EYES: EOMI  ENT: Moist mucous membranes  NECK: Supple  CHEST/LUNG: Clear to auscultation bilaterally, no wheezing, or rubs  HEART: Regular rate and rhythm; no  rubs, or gallops  ABDOMEN: obese abdomen, Bowel sounds present; Soft, Nontender  EXTREMITIES: Bilateral ankle ulcers wrapped  NERVOUS SYSTEM:  Alert & Oriented X3, speech clear. No gross deficits   SKIN: No rashes or lesions      LABS:                        12.2   9.53  )-----------( 369      ( 28 Jan 2023 07:49 )             39.7     01-28    142  |  101  |  16  ----------------------------<  193<H>  4.4   |  31  |  0.9    Ca    9.3      28 Jan 2023 07:49    TPro  6.8  /  Alb  4.0  /  TBili  0.4  /  DBili  x   /  AST  11  /  ALT  13  /  AlkPhos  99  01-28

## 2023-01-30 ENCOUNTER — RESULT REVIEW (OUTPATIENT)
Age: 58
End: 2023-01-30

## 2023-01-30 PROCEDURE — 11043 DBRDMT MUSC&/FSCA 1ST 20/<: CPT | Mod: GC,59

## 2023-01-30 PROCEDURE — 11107 INCAL BX SKN EA SEP/ADDL: CPT | Mod: GC,59,LT

## 2023-01-30 PROCEDURE — 99232 SBSQ HOSP IP/OBS MODERATE 35: CPT

## 2023-01-30 PROCEDURE — 11046 DBRDMT MUSC&/FSCA EA ADDL: CPT | Mod: GC,59

## 2023-01-30 PROCEDURE — 88305 TISSUE EXAM BY PATHOLOGIST: CPT | Mod: 26

## 2023-01-30 PROCEDURE — 11106 INCAL BX SKN SINGLE LES: CPT | Mod: GC,59,RT

## 2023-01-30 PROCEDURE — 29581 APPL MULTLAYER CMPRN SYS LEG: CPT | Mod: 50,GC,59

## 2023-01-30 RX ORDER — NICOTINE POLACRILEX 2 MG
1 GUM BUCCAL DAILY
Refills: 0 | Status: DISCONTINUED | OUTPATIENT
Start: 2023-01-30 | End: 2023-02-06

## 2023-01-30 RX ORDER — CADEXOMER IODINE 0.9 %
1 PADS, MEDICATED (EA) TOPICAL DAILY
Refills: 0 | Status: DISCONTINUED | OUTPATIENT
Start: 2023-01-30 | End: 2023-02-01

## 2023-01-30 RX ORDER — FUROSEMIDE 40 MG
40 TABLET ORAL DAILY
Refills: 0 | Status: DISCONTINUED | OUTPATIENT
Start: 2023-01-30 | End: 2023-02-06

## 2023-01-30 RX ORDER — MUPIROCIN 20 MG/G
1 OINTMENT TOPICAL ONCE
Refills: 0 | Status: COMPLETED | OUTPATIENT
Start: 2023-01-30 | End: 2023-01-31

## 2023-01-30 RX ORDER — LOSARTAN POTASSIUM 100 MG/1
100 TABLET, FILM COATED ORAL DAILY
Refills: 0 | Status: DISCONTINUED | OUTPATIENT
Start: 2023-01-30 | End: 2023-02-06

## 2023-01-30 RX ORDER — ACETAMINOPHEN 500 MG
650 TABLET ORAL EVERY 6 HOURS
Refills: 0 | Status: DISCONTINUED | OUTPATIENT
Start: 2023-01-30 | End: 2023-02-02

## 2023-01-30 RX ORDER — HYDROMORPHONE HYDROCHLORIDE 2 MG/ML
0.5 INJECTION INTRAMUSCULAR; INTRAVENOUS; SUBCUTANEOUS
Refills: 0 | Status: DISCONTINUED | OUTPATIENT
Start: 2023-01-30 | End: 2023-01-30

## 2023-01-30 RX ORDER — SODIUM CHLORIDE 9 MG/ML
1000 INJECTION, SOLUTION INTRAVENOUS
Refills: 0 | Status: DISCONTINUED | OUTPATIENT
Start: 2023-01-30 | End: 2023-01-30

## 2023-01-30 RX ORDER — CELECOXIB 200 MG/1
200 CAPSULE ORAL DAILY
Refills: 0 | Status: DISCONTINUED | OUTPATIENT
Start: 2023-01-30 | End: 2023-02-02

## 2023-01-30 RX ORDER — ONDANSETRON 8 MG/1
4 TABLET, FILM COATED ORAL ONCE
Refills: 0 | Status: DISCONTINUED | OUTPATIENT
Start: 2023-01-30 | End: 2023-01-30

## 2023-01-30 RX ORDER — CEFEPIME 1 G/1
2000 INJECTION, POWDER, FOR SOLUTION INTRAMUSCULAR; INTRAVENOUS EVERY 8 HOURS
Refills: 0 | Status: DISCONTINUED | OUTPATIENT
Start: 2023-01-30 | End: 2023-02-03

## 2023-01-30 RX ORDER — OXYCODONE HYDROCHLORIDE 5 MG/1
10 TABLET ORAL EVERY 12 HOURS
Refills: 0 | Status: DISCONTINUED | OUTPATIENT
Start: 2023-01-30 | End: 2023-01-31

## 2023-01-30 RX ORDER — ONDANSETRON 8 MG/1
4 TABLET, FILM COATED ORAL EVERY 4 HOURS
Refills: 0 | Status: DISCONTINUED | OUTPATIENT
Start: 2023-01-30 | End: 2023-02-06

## 2023-01-30 RX ORDER — HYDROMORPHONE HYDROCHLORIDE 2 MG/ML
2 INJECTION INTRAMUSCULAR; INTRAVENOUS; SUBCUTANEOUS EVERY 4 HOURS
Refills: 0 | Status: DISCONTINUED | OUTPATIENT
Start: 2023-01-30 | End: 2023-01-31

## 2023-01-30 RX ORDER — HYDROXYZINE HCL 10 MG
25 TABLET ORAL ONCE
Refills: 0 | Status: COMPLETED | OUTPATIENT
Start: 2023-01-30 | End: 2023-01-30

## 2023-01-30 RX ORDER — ENOXAPARIN SODIUM 100 MG/ML
40 INJECTION SUBCUTANEOUS EVERY 12 HOURS
Refills: 0 | Status: DISCONTINUED | OUTPATIENT
Start: 2023-01-30 | End: 2023-02-06

## 2023-01-30 RX ADMIN — HYDROMORPHONE HYDROCHLORIDE 2 MILLIGRAM(S): 2 INJECTION INTRAMUSCULAR; INTRAVENOUS; SUBCUTANEOUS at 12:45

## 2023-01-30 RX ADMIN — CEFEPIME 100 MILLIGRAM(S): 1 INJECTION, POWDER, FOR SOLUTION INTRAMUSCULAR; INTRAVENOUS at 05:26

## 2023-01-30 RX ADMIN — Medication 1 PATCH: at 11:33

## 2023-01-30 RX ADMIN — HYDROMORPHONE HYDROCHLORIDE 2 MILLIGRAM(S): 2 INJECTION INTRAMUSCULAR; INTRAVENOUS; SUBCUTANEOUS at 15:14

## 2023-01-30 RX ADMIN — Medication 25 MILLIGRAM(S): at 02:05

## 2023-01-30 RX ADMIN — LOSARTAN POTASSIUM 100 MILLIGRAM(S): 100 TABLET, FILM COATED ORAL at 05:25

## 2023-01-30 RX ADMIN — OXYCODONE HYDROCHLORIDE 10 MILLIGRAM(S): 5 TABLET ORAL at 20:10

## 2023-01-30 RX ADMIN — HYDROMORPHONE HYDROCHLORIDE 2 MILLIGRAM(S): 2 INJECTION INTRAMUSCULAR; INTRAVENOUS; SUBCUTANEOUS at 03:49

## 2023-01-30 RX ADMIN — HYDROMORPHONE HYDROCHLORIDE 2 MILLIGRAM(S): 2 INJECTION INTRAMUSCULAR; INTRAVENOUS; SUBCUTANEOUS at 09:35

## 2023-01-30 RX ADMIN — CEFEPIME 100 MILLIGRAM(S): 1 INJECTION, POWDER, FOR SOLUTION INTRAMUSCULAR; INTRAVENOUS at 21:54

## 2023-01-30 RX ADMIN — CELECOXIB 200 MILLIGRAM(S): 200 CAPSULE ORAL at 11:33

## 2023-01-30 RX ADMIN — OXYCODONE HYDROCHLORIDE 10 MILLIGRAM(S): 5 TABLET ORAL at 20:06

## 2023-01-30 RX ADMIN — Medication 1 PATCH: at 00:43

## 2023-01-30 RX ADMIN — CELECOXIB 200 MILLIGRAM(S): 200 CAPSULE ORAL at 12:50

## 2023-01-30 RX ADMIN — Medication 100 MILLIGRAM(S): at 05:25

## 2023-01-30 RX ADMIN — CEFEPIME 100 MILLIGRAM(S): 1 INJECTION, POWDER, FOR SOLUTION INTRAMUSCULAR; INTRAVENOUS at 13:00

## 2023-01-30 RX ADMIN — OXYCODONE HYDROCHLORIDE 10 MILLIGRAM(S): 5 TABLET ORAL at 05:25

## 2023-01-30 RX ADMIN — HYDROMORPHONE HYDROCHLORIDE 2 MILLIGRAM(S): 2 INJECTION INTRAMUSCULAR; INTRAVENOUS; SUBCUTANEOUS at 22:49

## 2023-01-30 RX ADMIN — HYDROMORPHONE HYDROCHLORIDE 2 MILLIGRAM(S): 2 INJECTION INTRAMUSCULAR; INTRAVENOUS; SUBCUTANEOUS at 23:14

## 2023-01-30 RX ADMIN — Medication 40 MILLIGRAM(S): at 05:26

## 2023-01-30 RX ADMIN — HYDROMORPHONE HYDROCHLORIDE 2 MILLIGRAM(S): 2 INJECTION INTRAMUSCULAR; INTRAVENOUS; SUBCUTANEOUS at 08:31

## 2023-01-30 RX ADMIN — SODIUM CHLORIDE 75 MILLILITER(S): 9 INJECTION, SOLUTION INTRAVENOUS at 18:48

## 2023-01-30 NOTE — BRIEF OPERATIVE NOTE - NSICDXBRIEFPOSTOP_GEN_ALL_CORE_FT
POST-OP DIAGNOSIS:  Venous insufficiency 30-Jan-2023 18:05:29  Fernando Lambert  Non-prs chronic ulcer oth prt r low leg w fat layer exposed 30-Jan-2023 18:05:41  Fernando Lambert  Non-prs chronic ulcer oth prt l low leg w fat layer exposed 30-Jan-2023 18:05:53  Fernando Lambert

## 2023-01-30 NOTE — BRIEF OPERATIVE NOTE - NSICDXBRIEFPREOP_GEN_ALL_CORE_FT
PRE-OP DIAGNOSIS:  Venous insufficiency 30-Jan-2023 18:04:43  Fernando Lambert  Non-prs chronic ulcer oth prt r low leg w fat layer exposed 30-Jan-2023 18:05:04  Fernando Lambert  Ulcer of left medial lower extremity with fat layer exposed 30-Jan-2023 18:05:19  Fernando Lambert

## 2023-01-30 NOTE — CHART NOTE - NSCHARTNOTEFT_GEN_A_CORE
PACU ANESTHESIA ADMISSION NOTE      Procedure: Debridement of fascia      Post op diagnosis:      ____  Intubated  TV:______       Rate: ______      FiO2: ______    __x__  Patent Airway    __x__  Full return of protective reflexes    __x__  Full recovery from anesthesia / back to baseline     Vitals:   T: 97.3          R:    18              BP:  150/77                Sat:  97                 P: 67      Mental Status:  ___x_ Awake   _x____ Alert   _____ Drowsy   _____ Sedated    Nausea/Vomiting:  _x___ NO  ______Yes,   See Post - Op Orders          Pain Scale (0-10):  ___0__    Treatment: ____ None    ____ See Post - Op/PCA Orders    Post - Operative Fluids:   ____ Oral   _x___ See Post - Op Orders    Plan: Discharge:   ____Home       ___x__Floor     _____Critical Care    _____  Other:_________________    Comments:

## 2023-01-30 NOTE — CHART NOTE - NSCHARTNOTEFT_GEN_A_CORE
per RN, pt asking for sleep aide. Was given ambien yesterday but didn't work well  Will give atarax 25 mg x 1

## 2023-01-30 NOTE — PROGRESS NOTE ADULT - ASSESSMENT
58 y/o male with hx of obesity, DM (?diet controlled), RUSSELL, HTN, back pain s/p spinal cord stimulator, venous stasis ulcers x 3 years was seen by podiatry this morning and was sent for further management of bilateral LE ulcers.    # Bilateral LE cellulitis with ulceration  # hx venous stasis  CT Lower Extremity w/ IV Cont, Right (01.26.23 @ 14:45): Redemonstrated cellulitis of the leg with extensive subcutaneous edema and skin thickening without drainable fluid collection.  Xray Tibia + Fibula 2 Views, Bilateral (01.26.23 @ 12:04): There is suggestion of prominent soft tissue and possible air within the urrounding soft tissues.  - s/p Azactam/Vanco  - blood cx x 2 sets NGTD  - ID consult appreciated-change abx to cefepime  - b/l LE venous duplex neg for DVT  -Analgesia PRN  - podiatry following, plan for OR today  - patient is medically optimized for planned procedure                                 # HTN  - monitor BP  - continue losartan    # RUSSELL  - not on bipap   - will start avap qhs, TV-450, EPAP-8, RR-14, minIPAP-12, maxIPAP-25    # Back pain   - s/p spinal cord stimulator  - pain management consult appreciated    # DM  - reportedly diet controlled  - monitor FS  - carb consistent diet      # Obesity  - diet modification counseling done    # Would care pre podiatry    DVT ppx: lovenox  GI ppx: PPI

## 2023-01-31 LAB
CULTURE RESULTS: SIGNIFICANT CHANGE UP
SPECIMEN SOURCE: SIGNIFICANT CHANGE UP

## 2023-01-31 PROCEDURE — 99231 SBSQ HOSP IP/OBS SF/LOW 25: CPT | Mod: GC

## 2023-01-31 PROCEDURE — 99232 SBSQ HOSP IP/OBS MODERATE 35: CPT

## 2023-01-31 RX ORDER — OXYCODONE HYDROCHLORIDE 5 MG/1
20 TABLET ORAL EVERY 12 HOURS
Refills: 0 | Status: DISCONTINUED | OUTPATIENT
Start: 2023-01-31 | End: 2023-01-31

## 2023-01-31 RX ORDER — OXYCODONE HYDROCHLORIDE 5 MG/1
20 TABLET ORAL EVERY 12 HOURS
Refills: 0 | Status: DISCONTINUED | OUTPATIENT
Start: 2023-01-31 | End: 2023-02-02

## 2023-01-31 RX ORDER — HYDROMORPHONE HYDROCHLORIDE 2 MG/ML
0.5 INJECTION INTRAMUSCULAR; INTRAVENOUS; SUBCUTANEOUS ONCE
Refills: 0 | Status: DISCONTINUED | OUTPATIENT
Start: 2023-01-31 | End: 2023-01-31

## 2023-01-31 RX ORDER — MORPHINE SULFATE 50 MG/1
2 CAPSULE, EXTENDED RELEASE ORAL ONCE
Refills: 0 | Status: DISCONTINUED | OUTPATIENT
Start: 2023-01-31 | End: 2023-01-31

## 2023-01-31 RX ORDER — HYDROMORPHONE HYDROCHLORIDE 2 MG/ML
4 INJECTION INTRAMUSCULAR; INTRAVENOUS; SUBCUTANEOUS EVERY 4 HOURS
Refills: 0 | Status: DISCONTINUED | OUTPATIENT
Start: 2023-01-31 | End: 2023-02-01

## 2023-01-31 RX ADMIN — Medication 1 PATCH: at 09:00

## 2023-01-31 RX ADMIN — OXYCODONE HYDROCHLORIDE 20 MILLIGRAM(S): 5 TABLET ORAL at 22:15

## 2023-01-31 RX ADMIN — LOSARTAN POTASSIUM 100 MILLIGRAM(S): 100 TABLET, FILM COATED ORAL at 06:28

## 2023-01-31 RX ADMIN — CEFEPIME 100 MILLIGRAM(S): 1 INJECTION, POWDER, FOR SOLUTION INTRAMUSCULAR; INTRAVENOUS at 06:27

## 2023-01-31 RX ADMIN — MORPHINE SULFATE 2 MILLIGRAM(S): 50 CAPSULE, EXTENDED RELEASE ORAL at 06:20

## 2023-01-31 RX ADMIN — HYDROMORPHONE HYDROCHLORIDE 0.5 MILLIGRAM(S): 2 INJECTION INTRAMUSCULAR; INTRAVENOUS; SUBCUTANEOUS at 01:00

## 2023-01-31 RX ADMIN — Medication 100 MILLIGRAM(S): at 18:08

## 2023-01-31 RX ADMIN — HYDROMORPHONE HYDROCHLORIDE 2 MILLIGRAM(S): 2 INJECTION INTRAMUSCULAR; INTRAVENOUS; SUBCUTANEOUS at 02:49

## 2023-01-31 RX ADMIN — HYDROMORPHONE HYDROCHLORIDE 2 MILLIGRAM(S): 2 INJECTION INTRAMUSCULAR; INTRAVENOUS; SUBCUTANEOUS at 03:00

## 2023-01-31 RX ADMIN — CEFEPIME 100 MILLIGRAM(S): 1 INJECTION, POWDER, FOR SOLUTION INTRAMUSCULAR; INTRAVENOUS at 13:16

## 2023-01-31 RX ADMIN — Medication 40 MILLIGRAM(S): at 13:19

## 2023-01-31 RX ADMIN — HYDROMORPHONE HYDROCHLORIDE 4 MILLIGRAM(S): 2 INJECTION INTRAMUSCULAR; INTRAVENOUS; SUBCUTANEOUS at 14:04

## 2023-01-31 RX ADMIN — CELECOXIB 200 MILLIGRAM(S): 200 CAPSULE ORAL at 11:31

## 2023-01-31 RX ADMIN — OXYCODONE HYDROCHLORIDE 20 MILLIGRAM(S): 5 TABLET ORAL at 21:48

## 2023-01-31 RX ADMIN — HYDROMORPHONE HYDROCHLORIDE 4 MILLIGRAM(S): 2 INJECTION INTRAMUSCULAR; INTRAVENOUS; SUBCUTANEOUS at 13:49

## 2023-01-31 RX ADMIN — HYDROMORPHONE HYDROCHLORIDE 0.5 MILLIGRAM(S): 2 INJECTION INTRAMUSCULAR; INTRAVENOUS; SUBCUTANEOUS at 00:41

## 2023-01-31 RX ADMIN — HYDROMORPHONE HYDROCHLORIDE 4 MILLIGRAM(S): 2 INJECTION INTRAMUSCULAR; INTRAVENOUS; SUBCUTANEOUS at 18:32

## 2023-01-31 RX ADMIN — Medication 100 MILLIGRAM(S): at 06:28

## 2023-01-31 RX ADMIN — Medication 1 PATCH: at 11:27

## 2023-01-31 RX ADMIN — HYDROMORPHONE HYDROCHLORIDE 4 MILLIGRAM(S): 2 INJECTION INTRAMUSCULAR; INTRAVENOUS; SUBCUTANEOUS at 09:10

## 2023-01-31 RX ADMIN — OXYCODONE HYDROCHLORIDE 20 MILLIGRAM(S): 5 TABLET ORAL at 09:56

## 2023-01-31 RX ADMIN — CEFEPIME 100 MILLIGRAM(S): 1 INJECTION, POWDER, FOR SOLUTION INTRAMUSCULAR; INTRAVENOUS at 21:48

## 2023-01-31 RX ADMIN — MUPIROCIN 1 APPLICATION(S): 20 OINTMENT TOPICAL at 12:34

## 2023-01-31 RX ADMIN — Medication 1 PATCH: at 19:00

## 2023-01-31 RX ADMIN — HYDROMORPHONE HYDROCHLORIDE 4 MILLIGRAM(S): 2 INJECTION INTRAMUSCULAR; INTRAVENOUS; SUBCUTANEOUS at 18:17

## 2023-01-31 RX ADMIN — Medication 1 PATCH: at 11:32

## 2023-01-31 RX ADMIN — HYDROMORPHONE HYDROCHLORIDE 4 MILLIGRAM(S): 2 INJECTION INTRAMUSCULAR; INTRAVENOUS; SUBCUTANEOUS at 09:25

## 2023-01-31 NOTE — PROGRESS NOTE ADULT - SUBJECTIVE AND OBJECTIVE BOX
ONEAL JOSE ALBERTO  57y, Male  Allergy: IV Contrast (Sneezing (Mod to Severe))  penicillin (Unknown)      LOS  5d    CHIEF COMPLAINT: Worsening PAin (27 Jan 2023 13:15)      INTERVAL EVENTS/HPI  - No acute events overnight  - T(F): , Max: 98 (01-30-23 @ 19:33)  - s/p debridement 1/30   - WBC Count: 7.90 (01-29-23 @ 08:22)     -      ROS  General: Denies rigors, nightsweats  HEENT: Denies headache, rhinorrhea, sore throat, eye pain  CV: Denies CP, palpitations  PULM: Denies wheezing, hemoptysis  GI: Denies hematemesis, hematochezia, melena  : Denies discharge, hematuria  MSK: Denies arthralgias, myalgias  SKIN: Denies rash, lesions  NEURO: Denies paresthesias, weakness  PSYCH: Denies depression, anxiety    VITALS:  T(F): 96.1, Max: 98 (01-30-23 @ 19:33)  HR: 98  BP: 140/77  RR: 18Vital Signs Last 24 Hrs  T(C): 35.6 (31 Jan 2023 13:56), Max: 36.7 (30 Jan 2023 16:15)  T(F): 96.1 (31 Jan 2023 13:56), Max: 98 (30 Jan 2023 19:33)  HR: 98 (31 Jan 2023 13:56) (61 - 98)  BP: 140/77 (31 Jan 2023 13:56) (140/77 - 198/86)  BP(mean): --  RR: 18 (31 Jan 2023 13:56) (15 - 20)  SpO2: 98% (31 Jan 2023 13:56) (95% - 100%)    Parameters below as of 31 Jan 2023 02:44  Patient On (Oxygen Delivery Method): BiPAP/CPAP        PHYSICAL EXAM:  Gen: NAD, resting in bed  HEENT: Normocephalic, atraumatic  Neck: supple, no lymphadenopathy  CV: Regular rate & regular rhythm  Lungs: decreased BS at bases, no fremitus  Abdomen: Soft, BS present  Ext: Warm, well perfused  Neuro: non focal, awake  Skin: loewr extremities with clean based ulcers   Lines: no phlebitis    FH: Non-contributory  Social Hx: Non-contributory    TESTS & MEASUREMENTS:                  Culture - Blood (collected 01-26-23 @ 12:28)  Source: .Blood Blood  Preliminary Report (01-27-23 @ 23:02):    No growth to date.    Culture - Blood (collected 01-26-23 @ 12:28)  Source: .Blood Blood-Peripheral  Preliminary Report (01-28-23 @ 01:03):    No growth to date.            INFECTIOUS DISEASES TESTING  COVID-19 PCR: NotDetec (01-26-23 @ 12:30)  COVID-19 PCR: NotDetec (08-29-22 @ 20:15)      INFLAMMATORY MARKERS  Sedimentation Rate, Erythrocyte: 29 mm/Hr (01-26-23 @ 12:28)  C-Reactive Protein, Serum: 47 mg/L (01-26-23 @ 12:28)  Sedimentation Rate, Erythrocyte: 25 mm/Hr (08-31-22 @ 06:14)  C-Reactive Protein, Serum: 32 mg/L (08-31-22 @ 06:14)      RADIOLOGY & ADDITIONAL TESTS:  I have personally reviewed the last available Chest xray  CXR      CT      CARDIOLOGY TESTING  12 Lead ECG:   Ventricular Rate 89 BPM    Atrial Rate 89 BPM    P-R Interval 206 ms    QRS Duration 88 ms    Q-T Interval 350 ms    QTC Calculation(Bazett) 425 ms    P Axis 66 degrees    R Axis 38 degrees    T Axis 3 degrees    Diagnosis Line Normal sinus rhythm  Nonspecific T wave abnormality  Abnormal ECG    Confirmed by Sammy Borja ([id number]) on 1/27/2023 2:34:46 PM (01-27-23 @ 09:41)      MEDICATIONS  cadexomer iodine 0.9% Gel 1 Topical daily  cefepime   IVPB 2000 IV Intermittent every 8 hours  celecoxib 200 Oral daily  enoxaparin Injectable 40 SubCutaneous every 12 hours  furosemide    Tablet 40 Oral daily  losartan 100 Oral daily  nicotine - 21 mG/24Hr(s) Patch 1 Transdermal daily  oxyCODONE  ER Tablet 20 Oral every 12 hours  pregabalin 100 Oral two times a day      WEIGHT  Weight (kg): 235 (01-30-23 @ 16:15)      ANTIBIOTICS:  cefepime   IVPB 2000 milliGRAM(s) IV Intermittent every 8 hours      All available historical records have been reviewed

## 2023-01-31 NOTE — PROGRESS NOTE ADULT - ASSESSMENT
58 y/o male with hx of obesity, DM (?diet controlled), RUSSELL, HTN, back pain s/p spinal cord stimulator, venous stasis ulcers x 3 years was seen by podiatry this morning and was sent for further management of bilateral LE ulcers.    # Bilateral LE cellulitis with ulceration  # hx venous stasis  CT Lower Extremity w/ IV Cont, Right (01.26.23 @ 14:45): Redemonstrated cellulitis of the leg with extensive subcutaneous edema and skin thickening without drainable fluid collection.  Xray Tibia + Fibula 2 Views, Bilateral (01.26.23 @ 12:04): There is suggestion of prominent soft tissue and possible air within the urrounding soft tissues.  - s/p Azactam/Vanco  - blood cx x 2 sets NGTD  - ID consult appreciated-changed abx to cefepime  - b/l LE venous duplex neg for DVT  -Analgesia PRN  - podiatry following, s/p OR yesterday  -Follow up with ID and podiatry for further management                                 # HTN  - monitor BP  - continue losartan    # RUSSELL  - not on bipap   - avap qhs, TV-450, EPAP-8, RR-14, minIPAP-12, maxIPAP-25    # Back pain   - s/p spinal cord stimulator  - pain management consult appreciated    # DM  - reportedly diet controlled  - monitor FS  - carb consistent diet      # Obesity  - diet modification counseling done    # Would care pre podiatry    DVT ppx: lovenox  GI ppx: PPI

## 2023-01-31 NOTE — PROGRESS NOTE ADULT - SUBJECTIVE AND OBJECTIVE BOX
58 y/o male with hx of obesity, DM (?diet controlled), RUSSELL, HTN, back pain s/p spinal cord stimulator, venous stasis ulcers x 3 years was seen by podiatry this morning and was sent for further management of bilateral LE ulcers.    Today:  Seen at bedside, complains of LE pain however better controlled today on current regimen.              REVIEW OF SYSTEMS:  No new complaints      MEDICATIONS  (STANDING):  cadexomer iodine 0.9% Gel 1 Application(s) Topical daily  cefepime   IVPB 2000 milliGRAM(s) IV Intermittent every 8 hours  celecoxib 200 milliGRAM(s) Oral daily  enoxaparin Injectable 40 milliGRAM(s) SubCutaneous every 12 hours  furosemide    Tablet 40 milliGRAM(s) Oral daily  losartan 100 milliGRAM(s) Oral daily  nicotine - 21 mG/24Hr(s) Patch 1 Patch Transdermal daily  oxyCODONE  ER Tablet 20 milliGRAM(s) Oral every 12 hours  pregabalin 100 milliGRAM(s) Oral two times a day    MEDICATIONS  (PRN):  acetaminophen     Tablet .. 650 milliGRAM(s) Oral every 6 hours PRN Temp greater or equal to 38C (100.4F), Mild Pain (1 - 3)  HYDROmorphone  Injectable 4 milliGRAM(s) IV Push every 4 hours PRN Severe Pain (7 - 10)  ondansetron Injectable 4 milliGRAM(s) IV Push every 4 hours PRN Nausea and/or Vomiting  oxycodone    5 mG/acetaminophen 325 mG 2 Tablet(s) Oral every 6 hours PRN Moderate Pain (4 - 6)      Allergies  IV Contrast (Sneezing (Mod to Severe))  penicillin (Unknown)        FAMILY HISTORY:  Family history of early CAD (Father)  Family history of diabetes mellitus in mother (Mother)        Vital Signs Last 24 Hrs  T(C): 35.6 (31 Jan 2023 13:56), Max: 36.7 (30 Jan 2023 14:51)  T(F): 96.1 (31 Jan 2023 13:56), Max: 98 (30 Jan 2023 14:51)  HR: 98 (31 Jan 2023 13:56) (61 - 98)  BP: 140/77 (31 Jan 2023 13:56) (140/77 - 198/86)  RR: 18 (31 Jan 2023 13:56) (15 - 20)  SpO2: 98% (31 Jan 2023 13:56) (95% - 100%)    Parameters below as of 31 Jan 2023 02:44  Patient On (Oxygen Delivery Method): BiPAP/CPAP        PHYSICAL EXAM:  GENERAL: obese man in NAD, sitting in bed comfortably  HEAD:  Atraumatic, Normocephalic  EYES: EOMI  ENT: Moist mucous membranes  NECK: Supple  CHEST/LUNG: Clear to auscultation bilaterally, no wheezing, or rubs  HEART: Regular rate and rhythm; no  rubs, or gallops  ABDOMEN: obese abdomen, Bowel sounds present; Soft, Nontender  EXTREMITIES: Bilateral ankle ulcers wrapped  NERVOUS SYSTEM:  Alert & Oriented X3, speech clear. No gross deficits   SKIN: No rashes or lesions

## 2023-01-31 NOTE — PROGRESS NOTE ADULT - SUBJECTIVE AND OBJECTIVE BOX
Podiatry Progress Note    Subjective:   JOSE ALBERTO NO is a pleasant well-nourished, well developed 57y Male in mild distress, alert awake, and oriented to person, place and time.  Patient is a 57y old  Male who presents with a chief complaint of Worsening Pain (27 Jan 2023 13:15). Pt seen & evaluated at bedside. Wife and son bedside. Pt states he was unable to sleep last night due to tight ACE compression bandages.  States leg wounds extremely painful. No other pedal complaints. Pt denies N/V/F/C.       PAST MEDICAL & SURGICAL HISTORY:  Hypertension      Disc disease, degenerative, lumbar or lumbosacral      Obstructive sleep apnea      Morbid obesity      DM (diabetes mellitus)      Spinal cord stimulator status      H/O arthroscopy of right knee      History of excision of pilonidal cyst           Objective:  Vital Signs Last 24 Hrs  T(C): 35.6 (31 Jan 2023 13:56), Max: 36.7 (30 Jan 2023 19:33)  T(F): 96.1 (31 Jan 2023 13:56), Max: 98 (30 Jan 2023 19:33)  HR: 84 (31 Jan 2023 18:01) (69 - 98)  BP: 136/60 (31 Jan 2023 18:01) (136/60 - 198/86)  BP(mean): --  RR: 18 (31 Jan 2023 13:56) (17 - 20)  SpO2: 98% (31 Jan 2023 13:56) (98% - 100%)    Parameters below as of 31 Jan 2023 02:44  Patient On (Oxygen Delivery Method): BiPAP/CPAP    -----------------------------------------    Physical Exam - Lower Extremity Focused:   Derm:   Bilateral ankle venous ulcer on lateral aspect; about 10cm x 12cm in size superficial ulcer on RLE, 8cm x 11cm on LLE; both ulcers with granular wound base, no purulence was noted; malodorous; Mild serosanguinous drainage. Hemosiderin discoloration B/L LE. Rolled hypertrophic borders B/L wounds  Vascular: DP and PT Pulses Diminished; Severe bilateral lower extremities edema noted;   Neuro: Protective Sensation Diminished;  MSK: Severe pain on bilateral ankle wound upon palpation;     Assessment:  Venous stasis ulcer, lateral ankle, B/L  s/p Excisional Debridement of Soft Tissue w/Soft Tissue Biopsy, B/L Leg    Plan:  Chart reviewed and Patient evaluated. All Questions and Concerns Addressed and Answered  Discussed diagnosis and treatment with patient  Wounds Cleansed w/NS; Dressed w/Mupirocin / Xeroform / DSD / ABD / Kerlix / Light ACE; Q24;  Continue Local Wound Care as stated above;   ID Recs Appreciated; Abx recs; Ulcers clean, OK to eventual transition to PO abx; F/u Cx 1/30;   Pain management on board; Recs appreciated;   Weight bearing status; WBAT BL feet;   Discussed Plan w/ Dr. Lambert;     Podiatry

## 2023-01-31 NOTE — PROGRESS NOTE ADULT - ASSESSMENT
ASSESSMENT  56 y/o male with hx of obesity, RUSSELL, HTN, DM (?diet controlled), back pain s/p spinal cord stimulator, venous stasis ulcers x 3 years was seen by podiatry this morning and was sent to ED for further management of bilateral LE ulcers    IMPRESSION  #Venous Stasis bilateral Ulcers with cellulitis   - Wound Cx 7/19/2020  Pseudomonas aeruginosa   - s/p debridement 1/30    #Obesity BMI (kg/m2): 58.3  #Abx allergy: IV Contrast (Sneezing (Mod to Severe))  penicillin (Unknown) - rash as child -- tolerated cefepime previously     RECOMMENDATIONS  - continue cefepime 2g q 8 hours   - follow-up debridement Cx from 1/30   - ulcers are clean based -- ok to switch to PO doxycline 100 mg BID and PO Levofloxacin 750 mg daily on discharge as cx will likely take a few days to result   - will follow Cx data if patient is discharged prior to results     Please call or message on Microsoft Teams if with any questions.  Spectra [id number] Quality 154 Part B: Falls: Risk Screening (Should Be Reported With Measure 155.): Patient screened for future fall risk; documentation of no falls in the past year or only one fall without injury in the past year

## 2023-01-31 NOTE — PROGRESS NOTE ADULT - ATTENDING COMMENTS
B/L LE compression dressing  Cont with wound care   Abx as per ID - please follow OR cultures  pain management for dressing changes
Agree with above plan   Cont with wound care  abx as per ID  Pain Management
Agree with above plan  Cont with wound care  To OR  Monday for Dbx

## 2023-02-01 LAB
CULTURE RESULTS: SIGNIFICANT CHANGE UP
SPECIMEN SOURCE: SIGNIFICANT CHANGE UP

## 2023-02-01 PROCEDURE — 99231 SBSQ HOSP IP/OBS SF/LOW 25: CPT | Mod: GC

## 2023-02-01 PROCEDURE — 99232 SBSQ HOSP IP/OBS MODERATE 35: CPT

## 2023-02-01 RX ORDER — MUPIROCIN 20 MG/G
1 OINTMENT TOPICAL
Refills: 0 | Status: DISCONTINUED | OUTPATIENT
Start: 2023-02-01 | End: 2023-02-06

## 2023-02-01 RX ORDER — MORPHINE SULFATE 50 MG/1
2 CAPSULE, EXTENDED RELEASE ORAL ONCE
Refills: 0 | Status: DISCONTINUED | OUTPATIENT
Start: 2023-02-01 | End: 2023-02-01

## 2023-02-01 RX ORDER — HYDROMORPHONE HYDROCHLORIDE 2 MG/ML
2 INJECTION INTRAMUSCULAR; INTRAVENOUS; SUBCUTANEOUS EVERY 4 HOURS
Refills: 0 | Status: DISCONTINUED | OUTPATIENT
Start: 2023-02-01 | End: 2023-02-02

## 2023-02-01 RX ADMIN — CEFEPIME 100 MILLIGRAM(S): 1 INJECTION, POWDER, FOR SOLUTION INTRAMUSCULAR; INTRAVENOUS at 05:07

## 2023-02-01 RX ADMIN — Medication 1 PATCH: at 19:27

## 2023-02-01 RX ADMIN — OXYCODONE HYDROCHLORIDE 20 MILLIGRAM(S): 5 TABLET ORAL at 21:15

## 2023-02-01 RX ADMIN — HYDROMORPHONE HYDROCHLORIDE 2 MILLIGRAM(S): 2 INJECTION INTRAMUSCULAR; INTRAVENOUS; SUBCUTANEOUS at 22:35

## 2023-02-01 RX ADMIN — OXYCODONE HYDROCHLORIDE 20 MILLIGRAM(S): 5 TABLET ORAL at 10:14

## 2023-02-01 RX ADMIN — MORPHINE SULFATE 2 MILLIGRAM(S): 50 CAPSULE, EXTENDED RELEASE ORAL at 15:32

## 2023-02-01 RX ADMIN — Medication 40 MILLIGRAM(S): at 05:07

## 2023-02-01 RX ADMIN — Medication 100 MILLIGRAM(S): at 05:09

## 2023-02-01 RX ADMIN — OXYCODONE HYDROCHLORIDE 20 MILLIGRAM(S): 5 TABLET ORAL at 23:17

## 2023-02-01 RX ADMIN — HYDROMORPHONE HYDROCHLORIDE 4 MILLIGRAM(S): 2 INJECTION INTRAMUSCULAR; INTRAVENOUS; SUBCUTANEOUS at 09:20

## 2023-02-01 RX ADMIN — CEFEPIME 100 MILLIGRAM(S): 1 INJECTION, POWDER, FOR SOLUTION INTRAMUSCULAR; INTRAVENOUS at 13:38

## 2023-02-01 RX ADMIN — HYDROMORPHONE HYDROCHLORIDE 4 MILLIGRAM(S): 2 INJECTION INTRAMUSCULAR; INTRAVENOUS; SUBCUTANEOUS at 09:35

## 2023-02-01 RX ADMIN — HYDROMORPHONE HYDROCHLORIDE 2 MILLIGRAM(S): 2 INJECTION INTRAMUSCULAR; INTRAVENOUS; SUBCUTANEOUS at 13:38

## 2023-02-01 RX ADMIN — HYDROMORPHONE HYDROCHLORIDE 4 MILLIGRAM(S): 2 INJECTION INTRAMUSCULAR; INTRAVENOUS; SUBCUTANEOUS at 01:04

## 2023-02-01 RX ADMIN — Medication 1 PATCH: at 13:40

## 2023-02-01 RX ADMIN — LOSARTAN POTASSIUM 100 MILLIGRAM(S): 100 TABLET, FILM COATED ORAL at 05:06

## 2023-02-01 RX ADMIN — MORPHINE SULFATE 2 MILLIGRAM(S): 50 CAPSULE, EXTENDED RELEASE ORAL at 15:17

## 2023-02-01 RX ADMIN — HYDROMORPHONE HYDROCHLORIDE 4 MILLIGRAM(S): 2 INJECTION INTRAMUSCULAR; INTRAVENOUS; SUBCUTANEOUS at 05:06

## 2023-02-01 RX ADMIN — HYDROMORPHONE HYDROCHLORIDE 2 MILLIGRAM(S): 2 INJECTION INTRAMUSCULAR; INTRAVENOUS; SUBCUTANEOUS at 23:00

## 2023-02-01 RX ADMIN — CEFEPIME 100 MILLIGRAM(S): 1 INJECTION, POWDER, FOR SOLUTION INTRAMUSCULAR; INTRAVENOUS at 21:29

## 2023-02-01 RX ADMIN — CELECOXIB 200 MILLIGRAM(S): 200 CAPSULE ORAL at 13:44

## 2023-02-01 RX ADMIN — HYDROMORPHONE HYDROCHLORIDE 4 MILLIGRAM(S): 2 INJECTION INTRAMUSCULAR; INTRAVENOUS; SUBCUTANEOUS at 05:35

## 2023-02-01 RX ADMIN — MUPIROCIN 1 APPLICATION(S): 20 OINTMENT TOPICAL at 13:39

## 2023-02-01 RX ADMIN — Medication 100 MILLIGRAM(S): at 17:17

## 2023-02-01 RX ADMIN — HYDROMORPHONE HYDROCHLORIDE 4 MILLIGRAM(S): 2 INJECTION INTRAMUSCULAR; INTRAVENOUS; SUBCUTANEOUS at 04:48

## 2023-02-01 RX ADMIN — HYDROMORPHONE HYDROCHLORIDE 2 MILLIGRAM(S): 2 INJECTION INTRAMUSCULAR; INTRAVENOUS; SUBCUTANEOUS at 17:43

## 2023-02-01 RX ADMIN — HYDROMORPHONE HYDROCHLORIDE 2 MILLIGRAM(S): 2 INJECTION INTRAMUSCULAR; INTRAVENOUS; SUBCUTANEOUS at 13:53

## 2023-02-01 NOTE — PROGRESS NOTE ADULT - SUBJECTIVE AND OBJECTIVE BOX
58 y/o male with hx of obesity, DM (?diet controlled), RUSSELL, HTN, back pain s/p spinal cord stimulator, venous stasis ulcers x 3 years was seen by podiatry this morning and was sent for further management of bilateral LE ulcers.    Today:  Seen at bedside, complains of LE pain during dressing changes.              REVIEW OF SYSTEMS:  LE pain      MEDICATIONS  (STANDING):  cefepime   IVPB 2000 milliGRAM(s) IV Intermittent every 8 hours  celecoxib 200 milliGRAM(s) Oral daily  enoxaparin Injectable 40 milliGRAM(s) SubCutaneous every 12 hours  furosemide    Tablet 40 milliGRAM(s) Oral daily  losartan 100 milliGRAM(s) Oral daily  mupirocin 2% Ointment 1 Application(s) Topical <User Schedule>  nicotine - 21 mG/24Hr(s) Patch 1 Patch Transdermal daily  oxyCODONE  ER Tablet 20 milliGRAM(s) Oral every 12 hours  pregabalin 100 milliGRAM(s) Oral two times a day    MEDICATIONS  (PRN):  acetaminophen     Tablet .. 650 milliGRAM(s) Oral every 6 hours PRN Temp greater or equal to 38C (100.4F), Mild Pain (1 - 3)  HYDROmorphone  Injectable 2 milliGRAM(s) IV Push every 4 hours PRN Severe Pain (7 - 10)  ondansetron Injectable 4 milliGRAM(s) IV Push every 4 hours PRN Nausea and/or Vomiting  oxycodone    5 mG/acetaminophen 325 mG 2 Tablet(s) Oral every 6 hours PRN Moderate Pain (4 - 6)      Allergies  IV Contrast (Sneezing (Mod to Severe))  penicillin (Unknown)        FAMILY HISTORY:  Family history of early CAD (Father)  Family history of diabetes mellitus in mother (Mother)        Vital Signs Last 24 Hrs  T(C): 36.3 (01 Feb 2023 13:37), Max: 36.4 (01 Feb 2023 05:00)  T(F): 97.3 (01 Feb 2023 13:37), Max: 97.6 (01 Feb 2023 05:00)  HR: 81 (01 Feb 2023 13:37) (62 - 84)  BP: 142/68 (01 Feb 2023 13:37) (133/67 - 151/71)  RR: 18 (01 Feb 2023 13:37) (18 - 18)  SpO2: 97% (31 Jan 2023 20:36) (97% - 97%)        PHYSICAL EXAM:  GENERAL: obese man in NAD, sitting in bed comfortably  HEAD:  Atraumatic, Normocephalic  EYES: EOMI  ENT: Moist mucous membranes  NECK: Supple  CHEST/LUNG: Clear to auscultation bilaterally, no wheezing, or rubs  HEART: Regular rate and rhythm; no  rubs, or gallops  ABDOMEN: obese abdomen, Bowel sounds present; Soft, Nontender  EXTREMITIES: Bilateral ankle ulcers wrapped  NERVOUS SYSTEM:  Alert & Oriented X3, speech clear. No gross deficits   SKIN: No rashes or lesions

## 2023-02-01 NOTE — CHART NOTE - NSCHARTNOTEFT_GEN_A_CORE
Discussed possible transfer to [place] with attending; patient may be transferred pending approval through [facility] team consult and patient consent. Please call podiatry to discuss if needed    Podiatry Spectra [phone] [place]iscuss[place]  [facility] [place] [facility] care by [facility]iatry     Podiatry Spectra [phone]

## 2023-02-01 NOTE — PHYSICAL THERAPY INITIAL EVALUATION ADULT - ADDITIONAL COMMENTS
pt lives with his wife in a private house with 5 steps to enter with rail, pt sleeps on the 1 floor 2/2 difficulty to negotiate stairs post spinal stim placement.  Pt stated he can negotiate stairs but is trying to avoid when possible.  Pt amb without AD and is an active

## 2023-02-01 NOTE — PHYSICAL THERAPY INITIAL EVALUATION ADULT - PERTINENT HX OF CURRENT PROBLEM, REHAB EVAL
56 y/o male with hx of obesity, DM (?diet controlled), RUSSELL, HTN, back pain s/p spinal cord stimulator, venous stasis ulcers x 3 years was seen by podiatry this morning and was sent for further management of bilateral LE ulcers.

## 2023-02-01 NOTE — PROGRESS NOTE ADULT - ASSESSMENT
58 y/o male with hx of obesity, DM (?diet controlled), RUSSELL, HTN, back pain s/p spinal cord stimulator, venous stasis ulcers x 3 years was seen by podiatry this morning and was sent for further management of bilateral LE ulcers.    # Bilateral LE cellulitis with ulceration  # hx venous stasis  CT Lower Extremity w/ IV Cont, Right (01.26.23 @ 14:45): Redemonstrated cellulitis of the leg with extensive subcutaneous edema and skin thickening without drainable fluid collection.  Xray Tibia + Fibula 2 Views, Bilateral (01.26.23 @ 12:04): There is suggestion of prominent soft tissue and possible air within the urrounding soft tissues.  - s/p Azactam/Vanco  - blood cx x 2 sets NGTD  - ID consult appreciated-changed abx to cefepime  - b/l LE venous duplex neg for DVT  -Analgesia PRN  - podiatry following, s/p OR yesterday  -Patient can be switched to PO abx on DC, will attempt to titrate analgesia down and switch to PO when patient can tolerate  -Follow up with podiatry for further management                                 # HTN  - monitor BP  - continue losartan    # RUSSELL  - not on bipap   - avap qhs, TV-450, EPAP-8, RR-14, minIPAP-12, maxIPAP-25    # Back pain   - s/p spinal cord stimulator  - pain management consult appreciated    # DM  - reportedly diet controlled  - monitor FS  - carb consistent diet      # Obesity  - diet modification counseling done    # Would care pre podiatry    DVT ppx: lovenox  GI ppx: PPI

## 2023-02-01 NOTE — PHYSICAL THERAPY INITIAL EVALUATION ADULT - GENERAL OBSERVATIONS, REHAB EVAL
10;00-10;22 pt was seen for PT IE at bed side, pt is agreeable, chart thoroughly reviewed, RN Chanel is aware. Pt received and left sitting in bed side chair, in no apparent distress, +hep lock, +call bell within reach, bed side table at reach, dressing B ankle.  Pt is morbidly obese

## 2023-02-01 NOTE — PROGRESS NOTE ADULT - SUBJECTIVE AND OBJECTIVE BOX
PROGRESS NOTE   Pt seen @ bedside during PM rounds.  Dressing +Clean, +Dry, + Intact. Family at bedside. Pain with dressing changes        Vital Signs Last 24 Hrs  T(C): 36.3 (01 Feb 2023 13:37), Max: 36.4 (01 Feb 2023 05:00)  T(F): 97.3 (01 Feb 2023 13:37), Max: 97.6 (01 Feb 2023 05:00)  HR: 81 (01 Feb 2023 13:37) (62 - 84)  BP: 142/68 (01 Feb 2023 13:37) (133/67 - 151/71)  BP(mean): --  RR: 18 (01 Feb 2023 13:37) (18 - 18)  SpO2: 97% (31 Jan 2023 20:36) (97% - 97%)                            Physical Exam - Lower Extremity Focused:   Derm:   Bilateral ankle venous ulcer on lateral aspect; about 10cm x 12cm in size superficial ulcer on RLE, 8cm x 11cm on LLE; both ulcers with granular wound base, no purulence was noted; malodorous; Mild serosanguinous drainage. Hemosiderin discoloration B/L LE. Rolled hypertrophic borders B/L wounds  Vascular: DP and PT Pulses Diminished; Severe bilateral lower extremities edema noted;   Neuro: Severe to moderate pain B/L LE and wounds    Assessment:  Venous stasis ulcer, lateral ankle, B/L  s/p Excisional Debridement of Soft Tissue w/Soft Tissue Biopsy, B/L Leg  Severe/Moderated pain B/L LE   Plan:  Chart reviewed and Patient evaluated. All Questions and Concerns Addressed and Answered  Discussed diagnosis and treatment with patient and his wife   Please reconsult pain management  - pt needs Pain meds for dressing changes  Wounds Cleansed w/NS; Dressed w/Mupirocin / Xeroform / DSD / ABD / Kerlix / Light ACE; Q24;  Compression B/L LE  Keep B/L LE elevated   Abx as per ID - Please f/u OR wound cultures  Pending skin biopsy results   Weight bearing status; WBAT BL feet;   Possible return to OR for further dbx and/or wound washout   Discussed Plan w/ Dr. Lambert;     Podiatry [phone]

## 2023-02-02 LAB
ALBUMIN SERPL ELPH-MCNC: 3.7 G/DL — SIGNIFICANT CHANGE UP (ref 3.5–5.2)
ALP SERPL-CCNC: 105 U/L — SIGNIFICANT CHANGE UP (ref 30–115)
ALT FLD-CCNC: 17 U/L — SIGNIFICANT CHANGE UP (ref 0–41)
ANION GAP SERPL CALC-SCNC: 9 MMOL/L — SIGNIFICANT CHANGE UP (ref 7–14)
AST SERPL-CCNC: 24 U/L — SIGNIFICANT CHANGE UP (ref 0–41)
BASOPHILS # BLD AUTO: 0.08 K/UL — SIGNIFICANT CHANGE UP (ref 0–0.2)
BASOPHILS NFR BLD AUTO: 1.1 % — HIGH (ref 0–1)
BILIRUB SERPL-MCNC: 0.3 MG/DL — SIGNIFICANT CHANGE UP (ref 0.2–1.2)
BUN SERPL-MCNC: 13 MG/DL — SIGNIFICANT CHANGE UP (ref 10–20)
CALCIUM SERPL-MCNC: 9 MG/DL — SIGNIFICANT CHANGE UP (ref 8.4–10.5)
CHLORIDE SERPL-SCNC: 100 MMOL/L — SIGNIFICANT CHANGE UP (ref 98–110)
CO2 SERPL-SCNC: 26 MMOL/L — SIGNIFICANT CHANGE UP (ref 17–32)
CREAT SERPL-MCNC: 0.9 MG/DL — SIGNIFICANT CHANGE UP (ref 0.7–1.5)
EGFR: 101 ML/MIN/1.73M2 — SIGNIFICANT CHANGE UP
EOSINOPHIL # BLD AUTO: 0.35 K/UL — SIGNIFICANT CHANGE UP (ref 0–0.7)
EOSINOPHIL NFR BLD AUTO: 5 % — SIGNIFICANT CHANGE UP (ref 0–8)
GLUCOSE SERPL-MCNC: 243 MG/DL — HIGH (ref 70–99)
HCT VFR BLD CALC: 42.5 % — SIGNIFICANT CHANGE UP (ref 42–52)
HGB BLD-MCNC: 13 G/DL — LOW (ref 14–18)
IMM GRANULOCYTES NFR BLD AUTO: 0.4 % — HIGH (ref 0.1–0.3)
LYMPHOCYTES # BLD AUTO: 2.02 K/UL — SIGNIFICANT CHANGE UP (ref 1.2–3.4)
LYMPHOCYTES # BLD AUTO: 28.9 % — SIGNIFICANT CHANGE UP (ref 20.5–51.1)
MCHC RBC-ENTMCNC: 24 PG — LOW (ref 27–31)
MCHC RBC-ENTMCNC: 30.6 G/DL — LOW (ref 32–37)
MCV RBC AUTO: 78.6 FL — LOW (ref 80–94)
MONOCYTES # BLD AUTO: 0.44 K/UL — SIGNIFICANT CHANGE UP (ref 0.1–0.6)
MONOCYTES NFR BLD AUTO: 6.3 % — SIGNIFICANT CHANGE UP (ref 1.7–9.3)
NEUTROPHILS # BLD AUTO: 4.07 K/UL — SIGNIFICANT CHANGE UP (ref 1.4–6.5)
NEUTROPHILS NFR BLD AUTO: 58.3 % — SIGNIFICANT CHANGE UP (ref 42.2–75.2)
NRBC # BLD: 0 /100 WBCS — SIGNIFICANT CHANGE UP (ref 0–0)
PLATELET # BLD AUTO: 259 K/UL — SIGNIFICANT CHANGE UP (ref 130–400)
POTASSIUM SERPL-MCNC: 5.1 MMOL/L — HIGH (ref 3.5–5)
POTASSIUM SERPL-SCNC: 5.1 MMOL/L — HIGH (ref 3.5–5)
PROT SERPL-MCNC: 6.8 G/DL — SIGNIFICANT CHANGE UP (ref 6–8)
RBC # BLD: 5.41 M/UL — SIGNIFICANT CHANGE UP (ref 4.7–6.1)
RBC # FLD: 17.2 % — HIGH (ref 11.5–14.5)
SODIUM SERPL-SCNC: 135 MMOL/L — SIGNIFICANT CHANGE UP (ref 135–146)
WBC # BLD: 6.99 K/UL — SIGNIFICANT CHANGE UP (ref 4.8–10.8)
WBC # FLD AUTO: 6.99 K/UL — SIGNIFICANT CHANGE UP (ref 4.8–10.8)

## 2023-02-02 PROCEDURE — 99232 SBSQ HOSP IP/OBS MODERATE 35: CPT

## 2023-02-02 RX ORDER — OXYCODONE HYDROCHLORIDE 5 MG/1
20 TABLET ORAL EVERY 8 HOURS
Refills: 0 | Status: DISCONTINUED | OUTPATIENT
Start: 2023-02-02 | End: 2023-02-06

## 2023-02-02 RX ORDER — CELECOXIB 200 MG/1
200 CAPSULE ORAL
Refills: 0 | Status: DISCONTINUED | OUTPATIENT
Start: 2023-02-02 | End: 2023-02-05

## 2023-02-02 RX ORDER — ACETAMINOPHEN 500 MG
650 TABLET ORAL EVERY 6 HOURS
Refills: 0 | Status: DISCONTINUED | OUTPATIENT
Start: 2023-02-02 | End: 2023-02-06

## 2023-02-02 RX ORDER — HYDROMORPHONE HYDROCHLORIDE 2 MG/ML
4 INJECTION INTRAMUSCULAR; INTRAVENOUS; SUBCUTANEOUS EVERY 4 HOURS
Refills: 0 | Status: DISCONTINUED | OUTPATIENT
Start: 2023-02-02 | End: 2023-02-04

## 2023-02-02 RX ADMIN — HYDROMORPHONE HYDROCHLORIDE 2 MILLIGRAM(S): 2 INJECTION INTRAMUSCULAR; INTRAVENOUS; SUBCUTANEOUS at 07:48

## 2023-02-02 RX ADMIN — CEFEPIME 100 MILLIGRAM(S): 1 INJECTION, POWDER, FOR SOLUTION INTRAMUSCULAR; INTRAVENOUS at 05:29

## 2023-02-02 RX ADMIN — HYDROMORPHONE HYDROCHLORIDE 2 MILLIGRAM(S): 2 INJECTION INTRAMUSCULAR; INTRAVENOUS; SUBCUTANEOUS at 12:00

## 2023-02-02 RX ADMIN — CELECOXIB 200 MILLIGRAM(S): 200 CAPSULE ORAL at 11:38

## 2023-02-02 RX ADMIN — Medication 1 PATCH: at 10:03

## 2023-02-02 RX ADMIN — LOSARTAN POTASSIUM 100 MILLIGRAM(S): 100 TABLET, FILM COATED ORAL at 05:35

## 2023-02-02 RX ADMIN — OXYCODONE HYDROCHLORIDE 20 MILLIGRAM(S): 5 TABLET ORAL at 09:57

## 2023-02-02 RX ADMIN — HYDROMORPHONE HYDROCHLORIDE 2 MILLIGRAM(S): 2 INJECTION INTRAMUSCULAR; INTRAVENOUS; SUBCUTANEOUS at 11:36

## 2023-02-02 RX ADMIN — HYDROMORPHONE HYDROCHLORIDE 2 MILLIGRAM(S): 2 INJECTION INTRAMUSCULAR; INTRAVENOUS; SUBCUTANEOUS at 03:30

## 2023-02-02 RX ADMIN — HYDROMORPHONE HYDROCHLORIDE 2 MILLIGRAM(S): 2 INJECTION INTRAMUSCULAR; INTRAVENOUS; SUBCUTANEOUS at 03:04

## 2023-02-02 RX ADMIN — Medication 1 PATCH: at 11:36

## 2023-02-02 RX ADMIN — OXYCODONE HYDROCHLORIDE 20 MILLIGRAM(S): 5 TABLET ORAL at 18:25

## 2023-02-02 RX ADMIN — HYDROMORPHONE HYDROCHLORIDE 4 MILLIGRAM(S): 2 INJECTION INTRAMUSCULAR; INTRAVENOUS; SUBCUTANEOUS at 18:26

## 2023-02-02 RX ADMIN — HYDROMORPHONE HYDROCHLORIDE 2 MILLIGRAM(S): 2 INJECTION INTRAMUSCULAR; INTRAVENOUS; SUBCUTANEOUS at 07:17

## 2023-02-02 RX ADMIN — Medication 40 MILLIGRAM(S): at 05:35

## 2023-02-02 RX ADMIN — HYDROMORPHONE HYDROCHLORIDE 4 MILLIGRAM(S): 2 INJECTION INTRAMUSCULAR; INTRAVENOUS; SUBCUTANEOUS at 22:31

## 2023-02-02 RX ADMIN — OXYCODONE HYDROCHLORIDE 20 MILLIGRAM(S): 5 TABLET ORAL at 23:29

## 2023-02-02 RX ADMIN — MUPIROCIN 1 APPLICATION(S): 20 OINTMENT TOPICAL at 11:36

## 2023-02-02 RX ADMIN — CEFEPIME 100 MILLIGRAM(S): 1 INJECTION, POWDER, FOR SOLUTION INTRAMUSCULAR; INTRAVENOUS at 21:19

## 2023-02-02 RX ADMIN — Medication 100 MILLIGRAM(S): at 18:25

## 2023-02-02 RX ADMIN — CELECOXIB 200 MILLIGRAM(S): 200 CAPSULE ORAL at 12:47

## 2023-02-02 RX ADMIN — CEFEPIME 100 MILLIGRAM(S): 1 INJECTION, POWDER, FOR SOLUTION INTRAMUSCULAR; INTRAVENOUS at 14:05

## 2023-02-02 RX ADMIN — HYDROMORPHONE HYDROCHLORIDE 2 MILLIGRAM(S): 2 INJECTION INTRAMUSCULAR; INTRAVENOUS; SUBCUTANEOUS at 15:37

## 2023-02-02 RX ADMIN — OXYCODONE HYDROCHLORIDE 20 MILLIGRAM(S): 5 TABLET ORAL at 10:48

## 2023-02-02 RX ADMIN — Medication 100 MILLIGRAM(S): at 05:37

## 2023-02-02 RX ADMIN — OXYCODONE HYDROCHLORIDE 20 MILLIGRAM(S): 5 TABLET ORAL at 21:18

## 2023-02-02 RX ADMIN — CELECOXIB 200 MILLIGRAM(S): 200 CAPSULE ORAL at 18:25

## 2023-02-02 RX ADMIN — Medication 650 MILLIGRAM(S): at 07:38

## 2023-02-02 RX ADMIN — Medication 650 MILLIGRAM(S): at 08:48

## 2023-02-02 NOTE — PROGRESS NOTE ADULT - SUBJECTIVE AND OBJECTIVE BOX
56 y/o male with hx of obesity, DM (?diet controlled), RUSSELL, HTN, back pain s/p spinal cord stimulator, venous stasis ulcers x 3 years was seen by podiatry this morning and was sent for further management of bilateral LE ulcers.    Today:  Seen at bedside, patient is upset about his care because he says he is not getting his pain medication fast enough.  Pain is controlled with current regimen.              REVIEW OF SYSTEMS:  LE pain      MEDICATIONS  (STANDING):  cefepime   IVPB 2000 milliGRAM(s) IV Intermittent every 8 hours  celecoxib 200 milliGRAM(s) Oral daily  enoxaparin Injectable 40 milliGRAM(s) SubCutaneous every 12 hours  furosemide    Tablet 40 milliGRAM(s) Oral daily  losartan 100 milliGRAM(s) Oral daily  mupirocin 2% Ointment 1 Application(s) Topical <User Schedule>  nicotine - 21 mG/24Hr(s) Patch 1 Patch Transdermal daily  oxyCODONE  ER Tablet 20 milliGRAM(s) Oral every 12 hours  pregabalin 100 milliGRAM(s) Oral two times a day    MEDICATIONS  (PRN):  acetaminophen     Tablet .. 650 milliGRAM(s) Oral every 6 hours PRN Temp greater or equal to 38C (100.4F), Mild Pain (1 - 3)  HYDROmorphone  Injectable 2 milliGRAM(s) IV Push every 4 hours PRN Severe Pain (7 - 10)  ondansetron Injectable 4 milliGRAM(s) IV Push every 4 hours PRN Nausea and/or Vomiting  oxycodone    5 mG/acetaminophen 325 mG 2 Tablet(s) Oral every 6 hours PRN Moderate Pain (4 - 6)      Allergies  IV Contrast (Sneezing (Mod to Severe))  penicillin (Unknown)        FAMILY HISTORY:  Family history of early CAD (Father)  Family history of diabetes mellitus in mother (Mother)        Vital Signs Last 24 Hrs  T(C): 36.4 (02 Feb 2023 05:08), Max: 36.4 (01 Feb 2023 21:09)  T(F): 97.6 (02 Feb 2023 05:08), Max: 97.6 (01 Feb 2023 21:09)  HR: 82 (02 Feb 2023 05:08) (73 - 84)  BP: 151/68 (02 Feb 2023 05:08) (142/68 - 184/86)  RR: 18 (01 Feb 2023 21:09) (18 - 18)        PHYSICAL EXAM:  GENERAL: obese man in NAD, sitting in bed comfortably  HEAD:  Atraumatic, Normocephalic  EYES: EOMI  ENT: Moist mucous membranes  NECK: Supple  CHEST/LUNG: Clear to auscultation bilaterally, no wheezing, or rubs  HEART: Regular rate and rhythm; no  rubs, or gallops  ABDOMEN: obese abdomen, Bowel sounds present; Soft, Nontender  EXTREMITIES: Bilateral ankle ulcers wrapped  NERVOUS SYSTEM:  Alert & Oriented X3, speech clear. No gross deficits   SKIN: No rashes or lesions      LABS:                        13.0   6.99  )-----------( 259      ( 02 Feb 2023 07:46 )             42.5     02-02    135  |  100  |  13  ----------------------------<  243<H>  5.1<H>   |  26  |  0.9    Ca    9.0      02 Feb 2023 07:46    TPro  6.8  /  Alb  3.7  /  TBili  0.3  /  DBili  x   /  AST  24  /  ALT  17  /  AlkPhos  105  02-02

## 2023-02-02 NOTE — CONSULT NOTE ADULT - SUBJECTIVE AND OBJECTIVE BOX
Patient is a 58 y/o man with history of obesity, RUSSELL, HTN, DM, venous stasis ulcers, and chronic low back pain on chronic opioid therapy.       patient see and evaluated at bedside      Home regimen:  Morphine ER 30mg 3x/day  Percocet 10-325mg 4x/day prn    Recommendations:  -increase oxycodone ER to 20mg Q8h  -start hydromorphone PO 4mg Q4h prn  -stop hydromorphone IV  -increase celecoxib to 200mg BID  -change acetaminophen to 650mg Q6h standing  -continue pregabalin 100mg BID    Rafael Mann MD, Anesthesiologist    Tevin Arrington MD, MPH    Director of Pain Medicine   Director of Pain Medicine  [facility]  , [employer] [facility] [employer] at [facility]  [phone]    thank you for your consultation to help care of Mr. Mobley. if any further  questions, please call us at [phone] at [facility].

## 2023-02-02 NOTE — PROGRESS NOTE ADULT - ASSESSMENT
58 y/o male with hx of obesity, DM (?diet controlled), RUSSELL, HTN, back pain s/p spinal cord stimulator, venous stasis ulcers x 3 years was seen by podiatry this morning and was sent for further management of bilateral LE ulcers.    # Bilateral LE cellulitis with ulceration  # hx venous stasis  CT Lower Extremity w/ IV Cont, Right (01.26.23 @ 14:45): Redemonstrated cellulitis of the leg with extensive subcutaneous edema and skin thickening without drainable fluid collection.  Xray Tibia + Fibula 2 Views, Bilateral (01.26.23 @ 12:04): There is suggestion of prominent soft tissue and possible air within the urrounding soft tissues.  - s/p Azactam/Vanco  - blood cx x 2 sets NGTD  - ID consult appreciated-changed abx to cefepime  - b/l LE venous duplex neg for DVT  -Analgesia PRN  - podiatry following, s/p OR  -per ID-ok to switch to PO doxycline 100 mg BID and PO Levofloxacin 750 mg daily on discharge  -Follow up with podiatry for further management-possible OR again for debridement, wound wash out                                 # HTN  - monitor BP  - continue losartan    # RUSSELL  - not on bipap   - avap qhs, TV-450, EPAP-8, RR-14, minIPAP-12, maxIPAP-25    # Back pain   - s/p spinal cord stimulator  - pain management consult appreciated    # DM  - reportedly diet controlled  - monitor FS  - carb consistent diet      # Obesity  - diet modification counseling done    # Would care pre podiatry    DVT ppx: lovenox  GI ppx: PPI    Dispo:  Acute, still might be taken to OR by podiatry.  Continue cefepime while inpatient, ok to switch to PO doxycline 100 mg BID and PO Levofloxacin 750 mg daily on discharge per ID.  Once podiatry clears for discharge can be switched to PO abx and DCed home-follow with ID for duration.  Pain control has been challenging; patient only wants his wife to change his dressings.

## 2023-02-02 NOTE — PROGRESS NOTE ADULT - SUBJECTIVE AND OBJECTIVE BOX
Podiatry Progress Note    Subjective:   JOSE ALBERTO NO is a 57y old  Male who presents with a chief complaint of Worsening PAin (27 Jan 2023 13:15). Plan updated.      PAST MEDICAL & SURGICAL HISTORY:  Hypertension      Disc disease, degenerative, lumbar or lumbosacral      Obstructive sleep apnea      Morbid obesity      DM (diabetes mellitus)      Spinal cord stimulator status      H/O arthroscopy of right knee      History of excision of pilonidal cyst           Objective:  Vital Signs Last 24 Hrs  T(C): 36.3 (02 Feb 2023 21:26), Max: 37.1 (02 Feb 2023 13:14)  T(F): 97.4 (02 Feb 2023 21:26), Max: 98.7 (02 Feb 2023 13:14)  HR: 75 (02 Feb 2023 21:26) (73 - 83)  BP: 151/66 (02 Feb 2023 21:26) (136/66 - 157/61)  BP(mean): --  RR: 18 (02 Feb 2023 21:26) (18 - 18)  SpO2: 94% (02 Feb 2023 13:14) (94% - 94%)    Parameters below as of 02 Feb 2023 13:14  Patient On (Oxygen Delivery Method): BiPAP/CPAP,cpap- pts own from home                            13.0   6.99  )-----------( 259      ( 02 Feb 2023 07:46 )             42.5                 02-02    135  |  100  |  13  ----------------------------<  243<H>  5.1<H>   |  26  |  0.9    Ca    9.0      02 Feb 2023 07:46    TPro  6.8  /  Alb  3.7  /  TBili  0.3  /  DBili  x   /  AST  24  /  ALT  17  /  AlkPhos  105  02-02        Assessment:  Venous stasis ulcer, lateral ankle, B/L  s/p Excisional Debridement of Soft Tissue w/Soft Tissue Biopsy, B/L Leg; 1/30/23  Severe/Moderate pain B/L LE     Plan:  Chart reviewed and Patient evaluated. All Questions and Concerns Addressed and Answered  Discussed diagnosis and treatment with patient and his wife   Pain management Consult appreciated; Pain meds for dressing changes ordered as per recs  Local Wound Care; Cleanse Wounds w/NS; Dress w/Mupirocin / Xeroform / DSD / ABD / Kerlix / Light ACE; Q24;  Compression B/L LE  Keep B/L LE elevated   Abx as per ID - Please f/u OR wound cultures  Pending skin biopsy results   Weight bearing status; WBAT BL feet;   No further podiatric sx this admission; Pt refusing until final Bx results   Sx Fri 2/3/23 cancelled;   Continue LWC;  Discussed Plan w/ Dr. Lambert;     Podiatry [phone]

## 2023-02-03 LAB
-  AMIKACIN: SIGNIFICANT CHANGE UP
-  AMIKACIN: SIGNIFICANT CHANGE UP
-  AMPICILLIN/SULBACTAM: SIGNIFICANT CHANGE UP
-  AMPICILLIN: SIGNIFICANT CHANGE UP
-  AMPICILLIN: SIGNIFICANT CHANGE UP
-  AZTREONAM: SIGNIFICANT CHANGE UP
-  CEFEPIME: SIGNIFICANT CHANGE UP
-  CEFEPIME: SIGNIFICANT CHANGE UP
-  CEFTAZIDIME: SIGNIFICANT CHANGE UP
-  CEFTRIAXONE: SIGNIFICANT CHANGE UP
-  CIPROFLOXACIN: SIGNIFICANT CHANGE UP
-  CIPROFLOXACIN: SIGNIFICANT CHANGE UP
-  GENTAMICIN: SIGNIFICANT CHANGE UP
-  GENTAMICIN: SIGNIFICANT CHANGE UP
-  IMIPENEM: SIGNIFICANT CHANGE UP
-  LEVOFLOXACIN: SIGNIFICANT CHANGE UP
-  LEVOFLOXACIN: SIGNIFICANT CHANGE UP
-  MEROPENEM: SIGNIFICANT CHANGE UP
-  MEROPENEM: SIGNIFICANT CHANGE UP
-  PIPERACILLIN/TAZOBACTAM: SIGNIFICANT CHANGE UP
-  TETRACYCLINE: SIGNIFICANT CHANGE UP
-  TETRACYCLINE: SIGNIFICANT CHANGE UP
-  TOBRAMYCIN: SIGNIFICANT CHANGE UP
-  TOBRAMYCIN: SIGNIFICANT CHANGE UP
-  TRIMETHOPRIM/SULFAMETHOXAZOLE: SIGNIFICANT CHANGE UP
-  TRIMETHOPRIM/SULFAMETHOXAZOLE: SIGNIFICANT CHANGE UP
-  VANCOMYCIN: SIGNIFICANT CHANGE UP
-  VANCOMYCIN: SIGNIFICANT CHANGE UP
ALBUMIN SERPL ELPH-MCNC: 4 G/DL — SIGNIFICANT CHANGE UP (ref 3.5–5.2)
ALP SERPL-CCNC: 105 U/L — SIGNIFICANT CHANGE UP (ref 30–115)
ALT FLD-CCNC: 18 U/L — SIGNIFICANT CHANGE UP (ref 0–41)
ANION GAP SERPL CALC-SCNC: 10 MMOL/L — SIGNIFICANT CHANGE UP (ref 7–14)
AST SERPL-CCNC: 12 U/L — SIGNIFICANT CHANGE UP (ref 0–41)
BASOPHILS # BLD AUTO: 0.06 K/UL — SIGNIFICANT CHANGE UP (ref 0–0.2)
BASOPHILS NFR BLD AUTO: 0.9 % — SIGNIFICANT CHANGE UP (ref 0–1)
BILIRUB SERPL-MCNC: 0.3 MG/DL — SIGNIFICANT CHANGE UP (ref 0.2–1.2)
BUN SERPL-MCNC: 13 MG/DL — SIGNIFICANT CHANGE UP (ref 10–20)
CALCIUM SERPL-MCNC: 9.1 MG/DL — SIGNIFICANT CHANGE UP (ref 8.4–10.5)
CHLORIDE SERPL-SCNC: 100 MMOL/L — SIGNIFICANT CHANGE UP (ref 98–110)
CO2 SERPL-SCNC: 28 MMOL/L — SIGNIFICANT CHANGE UP (ref 17–32)
CREAT SERPL-MCNC: 0.9 MG/DL — SIGNIFICANT CHANGE UP (ref 0.7–1.5)
CULTURE RESULTS: SIGNIFICANT CHANGE UP
CULTURE RESULTS: SIGNIFICANT CHANGE UP
EGFR: 101 ML/MIN/1.73M2 — SIGNIFICANT CHANGE UP
EOSINOPHIL # BLD AUTO: 0.32 K/UL — SIGNIFICANT CHANGE UP (ref 0–0.7)
EOSINOPHIL NFR BLD AUTO: 5.1 % — SIGNIFICANT CHANGE UP (ref 0–8)
GLUCOSE SERPL-MCNC: 253 MG/DL — HIGH (ref 70–99)
HCT VFR BLD CALC: 40.1 % — LOW (ref 42–52)
HGB BLD-MCNC: 12.4 G/DL — LOW (ref 14–18)
IMM GRANULOCYTES NFR BLD AUTO: 0.3 % — SIGNIFICANT CHANGE UP (ref 0.1–0.3)
LYMPHOCYTES # BLD AUTO: 2.08 K/UL — SIGNIFICANT CHANGE UP (ref 1.2–3.4)
LYMPHOCYTES # BLD AUTO: 32.9 % — SIGNIFICANT CHANGE UP (ref 20.5–51.1)
MCHC RBC-ENTMCNC: 24 PG — LOW (ref 27–31)
MCHC RBC-ENTMCNC: 30.9 G/DL — LOW (ref 32–37)
MCV RBC AUTO: 77.7 FL — LOW (ref 80–94)
METHOD TYPE: SIGNIFICANT CHANGE UP
MONOCYTES # BLD AUTO: 0.61 K/UL — HIGH (ref 0.1–0.6)
MONOCYTES NFR BLD AUTO: 9.6 % — HIGH (ref 1.7–9.3)
NEUTROPHILS # BLD AUTO: 3.24 K/UL — SIGNIFICANT CHANGE UP (ref 1.4–6.5)
NEUTROPHILS NFR BLD AUTO: 51.2 % — SIGNIFICANT CHANGE UP (ref 42.2–75.2)
NRBC # BLD: 0 /100 WBCS — SIGNIFICANT CHANGE UP (ref 0–0)
ORGANISM # SPEC MICROSCOPIC CNT: SIGNIFICANT CHANGE UP
PLATELET # BLD AUTO: 317 K/UL — SIGNIFICANT CHANGE UP (ref 130–400)
POTASSIUM SERPL-MCNC: 4.4 MMOL/L — SIGNIFICANT CHANGE UP (ref 3.5–5)
POTASSIUM SERPL-SCNC: 4.4 MMOL/L — SIGNIFICANT CHANGE UP (ref 3.5–5)
PROT SERPL-MCNC: 6.8 G/DL — SIGNIFICANT CHANGE UP (ref 6–8)
RBC # BLD: 5.16 M/UL — SIGNIFICANT CHANGE UP (ref 4.7–6.1)
RBC # FLD: 17.1 % — HIGH (ref 11.5–14.5)
SODIUM SERPL-SCNC: 138 MMOL/L — SIGNIFICANT CHANGE UP (ref 135–146)
SPECIMEN SOURCE: SIGNIFICANT CHANGE UP
SPECIMEN SOURCE: SIGNIFICANT CHANGE UP
WBC # BLD: 6.33 K/UL — SIGNIFICANT CHANGE UP (ref 4.8–10.8)
WBC # FLD AUTO: 6.33 K/UL — SIGNIFICANT CHANGE UP (ref 4.8–10.8)

## 2023-02-03 PROCEDURE — 99232 SBSQ HOSP IP/OBS MODERATE 35: CPT

## 2023-02-03 RX ORDER — HYDROMORPHONE HYDROCHLORIDE 2 MG/ML
2 INJECTION INTRAMUSCULAR; INTRAVENOUS; SUBCUTANEOUS ONCE
Refills: 0 | Status: DISCONTINUED | OUTPATIENT
Start: 2023-02-03 | End: 2023-02-03

## 2023-02-03 RX ORDER — AMPICILLIN TRIHYDRATE 250 MG
2000 CAPSULE ORAL ONCE
Refills: 0 | Status: COMPLETED | OUTPATIENT
Start: 2023-02-04 | End: 2023-02-04

## 2023-02-03 RX ADMIN — OXYCODONE HYDROCHLORIDE 20 MILLIGRAM(S): 5 TABLET ORAL at 05:46

## 2023-02-03 RX ADMIN — OXYCODONE HYDROCHLORIDE 20 MILLIGRAM(S): 5 TABLET ORAL at 14:20

## 2023-02-03 RX ADMIN — ENOXAPARIN SODIUM 40 MILLIGRAM(S): 100 INJECTION SUBCUTANEOUS at 05:44

## 2023-02-03 RX ADMIN — CEFEPIME 100 MILLIGRAM(S): 1 INJECTION, POWDER, FOR SOLUTION INTRAMUSCULAR; INTRAVENOUS at 05:44

## 2023-02-03 RX ADMIN — HYDROMORPHONE HYDROCHLORIDE 4 MILLIGRAM(S): 2 INJECTION INTRAMUSCULAR; INTRAVENOUS; SUBCUTANEOUS at 04:36

## 2023-02-03 RX ADMIN — CELECOXIB 200 MILLIGRAM(S): 200 CAPSULE ORAL at 05:45

## 2023-02-03 RX ADMIN — CELECOXIB 200 MILLIGRAM(S): 200 CAPSULE ORAL at 17:37

## 2023-02-03 RX ADMIN — HYDROMORPHONE HYDROCHLORIDE 2 MILLIGRAM(S): 2 INJECTION INTRAMUSCULAR; INTRAVENOUS; SUBCUTANEOUS at 20:40

## 2023-02-03 RX ADMIN — MUPIROCIN 1 APPLICATION(S): 20 OINTMENT TOPICAL at 11:24

## 2023-02-03 RX ADMIN — Medication 40 MILLIGRAM(S): at 05:46

## 2023-02-03 RX ADMIN — HYDROMORPHONE HYDROCHLORIDE 4 MILLIGRAM(S): 2 INJECTION INTRAMUSCULAR; INTRAVENOUS; SUBCUTANEOUS at 10:38

## 2023-02-03 RX ADMIN — LOSARTAN POTASSIUM 100 MILLIGRAM(S): 100 TABLET, FILM COATED ORAL at 05:46

## 2023-02-03 RX ADMIN — HYDROMORPHONE HYDROCHLORIDE 4 MILLIGRAM(S): 2 INJECTION INTRAMUSCULAR; INTRAVENOUS; SUBCUTANEOUS at 11:38

## 2023-02-03 RX ADMIN — Medication 100 MILLIGRAM(S): at 17:36

## 2023-02-03 RX ADMIN — CEFEPIME 100 MILLIGRAM(S): 1 INJECTION, POWDER, FOR SOLUTION INTRAMUSCULAR; INTRAVENOUS at 14:22

## 2023-02-03 RX ADMIN — Medication 100 MILLIGRAM(S): at 05:45

## 2023-02-03 RX ADMIN — Medication 1 PATCH: at 11:19

## 2023-02-03 RX ADMIN — OXYCODONE HYDROCHLORIDE 20 MILLIGRAM(S): 5 TABLET ORAL at 22:00

## 2023-02-03 RX ADMIN — HYDROMORPHONE HYDROCHLORIDE 4 MILLIGRAM(S): 2 INJECTION INTRAMUSCULAR; INTRAVENOUS; SUBCUTANEOUS at 15:18

## 2023-02-03 RX ADMIN — HYDROMORPHONE HYDROCHLORIDE 4 MILLIGRAM(S): 2 INJECTION INTRAMUSCULAR; INTRAVENOUS; SUBCUTANEOUS at 16:18

## 2023-02-03 NOTE — PROGRESS NOTE ADULT - SUBJECTIVE AND OBJECTIVE BOX
ONEALJOSE ALBERTO  57y, Male  Allergy: IV Contrast (Sneezing (Mod to Severe))  penicillin (Unknown)      LOS  8d    CHIEF COMPLAINT: Worsening PAin (27 Jan 2023 13:15)      INTERVAL EVENTS/HPI  - No acute events overnight  - T(F): , Max: 97.4 (02-02-23 @ 21:26)  - Denies any worsening symptoms  - Tolerating medication  - WBC Count: 6.33 (02-03-23 @ 07:21)  WBC Count: 6.99 (02-02-23 @ 07:46)     - Creatinine, Serum: 0.9 (02-03-23 @ 07:21)  Creatinine, Serum: 0.9 (02-02-23 @ 07:46)       ROS  General: Denies rigors, nightsweats  HEENT: Denies headache, rhinorrhea, sore throat, eye pain  CV: Denies CP, palpitations  PULM: Denies wheezing, hemoptysis  GI: Denies hematemesis, hematochezia, melena  : Denies discharge, hematuria  MSK: Denies arthralgias, myalgias  SKIN: Denies rash, lesions  NEURO: Denies paresthesias, weakness  PSYCH: Denies depression, anxiety    VITALS:  T(F): 96.3, Max: 97.4 (02-02-23 @ 21:26)  HR: 77  BP: 160/65  RR: 18Vital Signs Last 24 Hrs  T(C): 35.7 (03 Feb 2023 13:59), Max: 36.3 (02 Feb 2023 21:26)  T(F): 96.3 (03 Feb 2023 13:59), Max: 97.4 (02 Feb 2023 21:26)  HR: 77 (03 Feb 2023 13:59) (73 - 77)  BP: 160/65 (03 Feb 2023 13:59) (142/70 - 160/65)  BP(mean): --  RR: 18 (03 Feb 2023 13:59) (18 - 18)  SpO2: --        PHYSICAL EXAM:  Gen: NAD, resting in bed  HEENT: Normocephalic, atraumatic  Neck: supple, no lymphadenopathy  CV: Regular rate & regular rhythm  Lungs: decreased BS at bases, no fremitus  Abdomen: Soft, BS present  Ext: Warm, well perfused  Neuro: non focal, awake  Skin: no rash, no erythema  Lines: no phlebitis    FH: Non-contributory  Social Hx: Non-contributory    TESTS & MEASUREMENTS:                        12.4   6.33  )-----------( 317      ( 03 Feb 2023 07:21 )             40.1     02-03    138  |  100  |  13  ----------------------------<  253<H>  4.4   |  28  |  0.9    Ca    9.1      03 Feb 2023 07:21    TPro  6.8  /  Alb  4.0  /  TBili  0.3  /  DBili  x   /  AST  12  /  ALT  18  /  AlkPhos  105  02-03      LIVER FUNCTIONS - ( 03 Feb 2023 07:21 )  Alb: 4.0 g/dL / Pro: 6.8 g/dL / ALK PHOS: 105 U/L / ALT: 18 U/L / AST: 12 U/L / GGT: x               Culture - Other (collected 01-30-23 @ 17:06)  Source: Wound LEFT CALF WOUND  Final Report (02-03-23 @ 17:04):    Numerous Acinetobacter baumannii/nosocomialis group    Numerous Enterococcus faecalis  Organism: Acinetobacter baumannii/nosocomialis group  Enterococcus faecalis (02-03-23 @ 16:58)  Organism: Enterococcus faecalis (02-03-23 @ 16:58)      -  Ampicillin: S <=2 Predicts results to ampicillin/sulbactam, amoxacillin-clavulanate and  piperacillin-tazobactam.      -  Tetracycline: R >8      -  Vancomycin: S 2      Method Type: JAYCOB  Organism: Acinetobacter baumannii/nosocomialis group (02-03-23 @ 16:58)      -  Amikacin: S <=16      -  Ampicillin/Sulbactam: S 8/4      -  Cefepime: S 4      -  Ceftazidime: S 4      -  Ciprofloxacin: S <=0.25      -  Gentamicin: S <=2      -  Imipenem: S <=1      -  Levofloxacin: S <=0.5      -  Meropenem: S <=1      -  Tobramycin: S <=2      -  Trimethoprim/Sulfamethoxazole: S <=0.5/9.5      Method Type: JAYCOB    Culture - Other (collected 01-30-23 @ 17:05)  Source: Wound RIGHT CALF WOUND  Preliminary Report (02-01-23 @ 23:21):    Few Alcaligenes faecalis    Numerous Enterococcus faecalis    Numerous Corynebacterium species "Susceptibilities not performed"  Organism: Alcaligenes faecalis  Enterococcus faecalis (02-03-23 @ 17:05)  Organism: Enterococcus faecalis (02-03-23 @ 17:05)      -  Ampicillin: S <=2 Predicts results to ampicillin/sulbactam, amoxacillin-clavulanate and  piperacillin-tazobactam.      -  Tetracycline: R >8      -  Vancomycin: S 2      Method Type: JAYCOB  Organism: Alcaligenes faecalis (02-03-23 @ 17:05)      -  Amikacin: S <=16      -  Aztreonam: I 16      -  Cefepime: S 4      -  Ceftriaxone: S <=1      -  Ciprofloxacin: S 1      -  Gentamicin: S <=2      -  Levofloxacin: S <=0.5      -  Meropenem: S <=1      -  Piperacillin/Tazobactam: S <=8      -  Tobramycin: S <=2      -  Trimethoprim/Sulfamethoxazole: S <=0.5/9.5      Method Type: JAYCOB    Culture - Blood (collected 01-26-23 @ 12:28)  Source: .Blood Blood  Final Report (01-31-23 @ 23:01):    No Growth Final    Culture - Blood (collected 01-26-23 @ 12:28)  Source: .Blood Blood-Peripheral  Final Report (02-01-23 @ 01:01):    No Growth Final            INFECTIOUS DISEASES TESTING  COVID-19 PCR: NotDetec (01-26-23 @ 12:30)  COVID-19 PCR: NotDetec (08-29-22 @ 20:15)      INFLAMMATORY MARKERS  Sedimentation Rate, Erythrocyte: 29 mm/Hr (01-26-23 @ 12:28)  C-Reactive Protein, Serum: 47 mg/L (01-26-23 @ 12:28)  Sedimentation Rate, Erythrocyte: 25 mm/Hr (08-31-22 @ 06:14)  C-Reactive Protein, Serum: 32 mg/L (08-31-22 @ 06:14)      RADIOLOGY & ADDITIONAL TESTS:  I have personally reviewed the last available Chest xray  CXR      CT      CARDIOLOGY TESTING  12 Lead ECG:   Ventricular Rate 89 BPM    Atrial Rate 89 BPM    P-R Interval 206 ms    QRS Duration 88 ms    Q-T Interval 350 ms    QTC Calculation(Bazett) 425 ms    P Axis 66 degrees    R Axis 38 degrees    T Axis 3 degrees    Diagnosis Line Normal sinus rhythm  Nonspecific T wave abnormality  Abnormal ECG    Confirmed by Sammy Borja ([id number]) on 1/27/2023 2:34:46 PM (01-27-23 @ 09:41)      MEDICATIONS  acetaminophen     Tablet .. 650 Oral every 6 hours  cefepime   IVPB 2000 IV Intermittent every 8 hours  celecoxib 200 Oral two times a day  enoxaparin Injectable 40 SubCutaneous every 12 hours  furosemide    Tablet 40 Oral daily  losartan 100 Oral daily  mupirocin 2% Ointment 1 Topical <User Schedule>  nicotine - 21 mG/24Hr(s) Patch 1 Transdermal daily  oxyCODONE  ER Tablet 20 Oral every 8 hours  pregabalin 100 Oral two times a day      WEIGHT  Weight (kg): 235 (01-30-23 @ 16:15)  Creatinine, Serum: 0.9 mg/dL (02-03-23 @ 07:21)      ANTIBIOTICS:  cefepime   IVPB 2000 milliGRAM(s) IV Intermittent every 8 hours      All available historical records have been reviewed

## 2023-02-03 NOTE — CHART NOTE - NSCHARTNOTEFT_GEN_A_CORE
RN tells me patient is asking for a blood culture to be drawn because of the way he feels. Chart review reveals that patient already is on IV antibiotics and is not febrile. Will defer decision to obtain new blood cultures to day staff (unless there is some clinical changes suggesting inadequacy of current regimen such as fever before then)

## 2023-02-03 NOTE — PROGRESS NOTE ADULT - SUBJECTIVE AND OBJECTIVE BOX
Progress note    Patient seen and examined at bedside. Patient states he had episode of diaphoresis and chills last night.    INTERVAL HPI/OVERNIGHT EVENTS:    REVIEW OF SYSTEMS:  CONSTITUTIONAL: No fever, weight loss, or fatigue  EYES: No eye pain, visual disturbances, or discharge  ENMT:  No difficulty hearing, tinnitus, vertigo; No sinus or throat pain  NECK: No pain or stiffness  BREASTS: No pain, masses, or nipple discharge  RESPIRATORY: No cough, wheezing, chills or hemoptysis; No shortness of breath  CARDIOVASCULAR: No chest pain, palpitations, dizziness, or leg swelling  GASTROINTESTINAL: No abdominal or epigastric pain. No nausea, vomiting, or hematemesis; No diarrhea or constipation. No melena or hematochezia.  GENITOURINARY: No dysuria, frequency, hematuria, or incontinence  NEUROLOGICAL: No headaches, memory loss, loss of strength, numbness, or tremors  SKIN: No itching, burning, rashes, or lesions   LYMPH NODES: No enlarged glands  ENDOCRINE: No heat or cold intolerance; No hair loss  MUSCULOSKELETAL: No joint pain or swelling; No muscle, back, or extremity pain  PSYCHIATRIC: No depression, anxiety, mood swings, or difficulty sleeping  HEME/LYMPH: No easy bruising, or bleeding gums  ALLERY AND IMMUNOLOGIC: No hives or eczema  FAMILY HISTORY:  Family history of early CAD (Father)    Family history of diabetes mellitus in mother (Mother)      T(C): 35.7 (02-03-23 @ 05:06), Max: 36.3 (02-02-23 @ 21:26)  HR: 73 (02-03-23 @ 05:06) (73 - 75)  BP: 142/70 (02-03-23 @ 05:06) (142/70 - 151/66)  RR: 18 (02-03-23 @ 05:06) (18 - 18)  SpO2: --  Wt(kg): --Vital Signs Last 24 Hrs  T(C): 35.7 (03 Feb 2023 05:06), Max: 36.3 (02 Feb 2023 21:26)  T(F): 96.2 (03 Feb 2023 05:06), Max: 97.4 (02 Feb 2023 21:26)  HR: 73 (03 Feb 2023 05:06) (73 - 75)  BP: 142/70 (03 Feb 2023 05:06) (142/70 - 151/66)  BP(mean): --  RR: 18 (03 Feb 2023 05:06) (18 - 18)  SpO2: --      IV Contrast (Sneezing (Mod to Severe))  penicillin (Unknown)      PHYSICAL EXAM:  GENERAL: NAD, well-groomed, well-developed  HEAD:  Atraumatic, Normocephalic  EYES: EOMI, PERRLA, conjunctiva and sclera clear  ENMT: No tonsillar erythema, exudates, or enlargement; Moist mucous membranes, Good dentition, No lesions  NECK: Supple, No JVD, Normal thyroid  NERVOUS SYSTEM:  Alert & Oriented X3, Good concentration; Motor Strength 5/5 B/L upper and lower extremities; DTRs 2+ intact and symmetric  CHEST/LUNG: Clear to percussion bilaterally; No rales, rhonchi, wheezing, or rubs  HEART: Regular rate and rhythm; No murmurs, rubs, or gallops  ABDOMEN: Soft, Nontender, Nondistended; Bowel sounds present  EXTREMITIES:  2+ Peripheral Pulses, No clubbing, cyanosis, or edema  LYMPH: No lymphadenopathy noted  SKIN: No rashes or lesions    Consultant(s) Notes Reviewed:  [x ] YES  [ ] NO  Care Discussed with Consultants/Other Providers [ x] YES  [ ] NO    LABS:      RADIOLOGY & ADDITIONAL TESTS:    Imaging Personally Reviewed:  [ ] YES  [ ] NO  acetaminophen     Tablet .. 650 milliGRAM(s) Oral every 6 hours  cefepime   IVPB 2000 milliGRAM(s) IV Intermittent every 8 hours  celecoxib 200 milliGRAM(s) Oral two times a day  enoxaparin Injectable 40 milliGRAM(s) SubCutaneous every 12 hours  furosemide    Tablet 40 milliGRAM(s) Oral daily  HYDROmorphone   Tablet 4 milliGRAM(s) Oral every 4 hours PRN  losartan 100 milliGRAM(s) Oral daily  mupirocin 2% Ointment 1 Application(s) Topical <User Schedule>  nicotine - 21 mG/24Hr(s) Patch 1 Patch Transdermal daily  ondansetron Injectable 4 milliGRAM(s) IV Push every 4 hours PRN  oxycodone    5 mG/acetaminophen 325 mG 2 Tablet(s) Oral every 6 hours PRN  oxyCODONE  ER Tablet 20 milliGRAM(s) Oral every 8 hours  pregabalin 100 milliGRAM(s) Oral two times a day      HEALTH ISSUES - PROBLEM Dx:    56 y/o male with hx of obesity, DM (?diet controlled), RUSSELL, HTN, back pain s/p spinal cord stimulator, venous stasis ulcers x 3 years was seen by podiatry this morning and was sent for further management of bilateral LE ulcers.    # Bilateral LE cellulitis with ulceration  # hx venous stasis  CT Lower Extremity w/ IV Cont, Right (01.26.23 @ 14:45): Redemonstrated cellulitis of the leg with extensive subcutaneous edema and skin thickening without drainable fluid collection.  Xray Tibia + Fibula 2 Views, Bilateral (01.26.23 @ 12:04): There is suggestion of prominent soft tissue and possible air within the urrounding soft tissues.  - s/p Azactam/Vanco  - blood cx x 2 sets NGTD  - ID consult appreciated-changed abx to cefepime  - b/l LE venous duplex neg for DVT  - Analgesia PRN  - podiatry following, s/p OR  -per ID-ok to switch to PO doxycline 100 mg BID and PO Levofloxacin 750 mg daily on discharge  -Per podiatry, no further surgical intervention at this time, Pending Bx    # HTN  - monitor BP  - continue losartan    # RUSSELL  - not on bipap   - avap qhs, TV-450, EPAP-8, RR-14, minIPAP-12, maxIPAP-25    # Back pain   - s/p spinal cord stimulator  - pain management consult appreciated    # DM  - reportedly diet controlled  - monitor FS  - carb consistent diet    # Obesity  - diet modification counseling done    # Would care pre podiatry    DVT ppx: lovenox  GI ppx: PPI      Dispo planning, pending finalized cultures, Podiatry has no further interventions during this admission and patient is refusing further debridement. Continue cefepime while inpatient, ok to switch to PO doxycline 100 mg BID and PO Levofloxacin 750 mg daily on discharge per ID.  Once podiatry clears for discharge can be switched to PO abx and DCed home-follow with ID for duration.  Pain control has been challenging; patient only wants his wife to change his dressings. Prepare for discharge in am    Total time spent to complete patient's bedside assessment, review medical chart, discuss medical plan of care with covering medical team was ___25_____ with > 50% of time spent face to face w/ patient, discussion with patient/family and/or coordination of care Progress note    Patient seen and examined at bedside. Patient states he had episode of diaphoresis and chills last night.    INTERVAL HPI/OVERNIGHT EVENTS:    REVIEW OF SYSTEMS:  CONSTITUTIONAL: No fever, weight loss, or fatigue  EYES: No eye pain, visual disturbances, or discharge  ENMT:  No difficulty hearing, tinnitus, vertigo; No sinus or throat pain  NECK: No pain or stiffness  BREASTS: No pain, masses, or nipple discharge  RESPIRATORY: No cough, wheezing, chills or hemoptysis; No shortness of breath  CARDIOVASCULAR: No chest pain, palpitations, dizziness, or leg swelling  GASTROINTESTINAL: No abdominal or epigastric pain. No nausea, vomiting, or hematemesis; No diarrhea or constipation. No melena or hematochezia.  GENITOURINARY: No dysuria, frequency, hematuria, or incontinence  NEUROLOGICAL: No headaches, memory loss, loss of strength, numbness, or tremors  SKIN: No itching, burning, rashes, or lesions   LYMPH NODES: No enlarged glands  ENDOCRINE: No heat or cold intolerance; No hair loss  MUSCULOSKELETAL: No joint pain or swelling; No muscle, back, or extremity pain  PSYCHIATRIC: No depression, anxiety, mood swings, or difficulty sleeping  HEME/LYMPH: No easy bruising, or bleeding gums  ALLERY AND IMMUNOLOGIC: No hives or eczema  FAMILY HISTORY:  Family history of early CAD (Father)    Family history of diabetes mellitus in mother (Mother)      T(C): 35.7 (02-03-23 @ 05:06), Max: 36.3 (02-02-23 @ 21:26)  HR: 73 (02-03-23 @ 05:06) (73 - 75)  BP: 142/70 (02-03-23 @ 05:06) (142/70 - 151/66)  RR: 18 (02-03-23 @ 05:06) (18 - 18)  SpO2: --  Wt(kg): --Vital Signs Last 24 Hrs  T(C): 35.7 (03 Feb 2023 05:06), Max: 36.3 (02 Feb 2023 21:26)  T(F): 96.2 (03 Feb 2023 05:06), Max: 97.4 (02 Feb 2023 21:26)  HR: 73 (03 Feb 2023 05:06) (73 - 75)  BP: 142/70 (03 Feb 2023 05:06) (142/70 - 151/66)  BP(mean): --  RR: 18 (03 Feb 2023 05:06) (18 - 18)  SpO2: --      IV Contrast (Sneezing (Mod to Severe))  penicillin (Unknown)      PHYSICAL EXAM:  GENERAL: NAD, well-groomed, well-developed  HEAD:  Atraumatic, Normocephalic  EYES: EOMI, PERRLA, conjunctiva and sclera clear  ENMT: No tonsillar erythema, exudates, or enlargement; Moist mucous membranes, Good dentition, No lesions  NECK: Supple, No JVD, Normal thyroid  NERVOUS SYSTEM:  Alert & Oriented X3, Good concentration; Motor Strength 5/5 B/L upper and lower extremities; DTRs 2+ intact and symmetric  CHEST/LUNG: Clear to percussion bilaterally; No rales, rhonchi, wheezing, or rubs  HEART: Regular rate and rhythm; No murmurs, rubs, or gallops  ABDOMEN: Soft, Nontender, Nondistended; Bowel sounds present  EXTREMITIES:  2+ Peripheral Pulses, No clubbing, cyanosis, or edema  LYMPH: No lymphadenopathy noted  SKIN: No rashes or lesions    Consultant(s) Notes Reviewed:  [x ] YES  [ ] NO  Care Discussed with Consultants/Other Providers [ x] YES  [ ] NO    LABS:      RADIOLOGY & ADDITIONAL TESTS:    Imaging Personally Reviewed:  [ ] YES  [ ] NO  acetaminophen     Tablet .. 650 milliGRAM(s) Oral every 6 hours  cefepime   IVPB 2000 milliGRAM(s) IV Intermittent every 8 hours  celecoxib 200 milliGRAM(s) Oral two times a day  enoxaparin Injectable 40 milliGRAM(s) SubCutaneous every 12 hours  furosemide    Tablet 40 milliGRAM(s) Oral daily  HYDROmorphone   Tablet 4 milliGRAM(s) Oral every 4 hours PRN  losartan 100 milliGRAM(s) Oral daily  mupirocin 2% Ointment 1 Application(s) Topical <User Schedule>  nicotine - 21 mG/24Hr(s) Patch 1 Patch Transdermal daily  ondansetron Injectable 4 milliGRAM(s) IV Push every 4 hours PRN  oxycodone    5 mG/acetaminophen 325 mG 2 Tablet(s) Oral every 6 hours PRN  oxyCODONE  ER Tablet 20 milliGRAM(s) Oral every 8 hours  pregabalin 100 milliGRAM(s) Oral two times a day      HEALTH ISSUES - PROBLEM Dx:    56 y/o male with hx of obesity, DM (?diet controlled), RUSSELL, HTN, back pain s/p spinal cord stimulator, venous stasis ulcers x 3 years was seen by podiatry this morning and was sent for further management of bilateral LE ulcers.    # Bilateral LE cellulitis with ulceration  # hx venous stasis  CT Lower Extremity w/ IV Cont, Right (01.26.23 @ 14:45): Redemonstrated cellulitis of the leg with extensive subcutaneous edema and skin thickening without drainable fluid collection.  Xray Tibia + Fibula 2 Views, Bilateral (01.26.23 @ 12:04): There is suggestion of prominent soft tissue and possible air within the urrounding soft tissues.  - s/p Azactam/Vanco  - blood cx x 2 sets NGTD  - ID consult appreciated-changed abx to cefepime  - b/l LE venous duplex neg for DVT  - Analgesia PRN  - podiatry following, s/p OR  -per ID-ok to switch to PO doxycline 100 mg BID and PO Levofloxacin 750 mg daily on discharge  -Per podiatry, no further surgical intervention at this time, Pending Bx    # HTN  - monitor BP  - continue losartan    # RUSSELL  - not on bipap   - avap qhs, TV-450, EPAP-8, RR-14, minIPAP-12, maxIPAP-25    # Back pain   - s/p spinal cord stimulator  - pain management consult appreciated    # DM  - reportedly diet controlled  - monitor FS  - carb consistent diet    # Obesity  - diet modification counseling done    # Would care pre podiatry    DVT ppx: lovenox  GI ppx: PPI      Dispo planning, pending finalized cultures, Monitor inpt per podiatry until Cultures result. Continue cefepime while inpatient, ok to switch to PO doxycline 100 mg BID and PO Levofloxacin 750 mg daily on discharge per ID.  Follow up with ID for duration. Pain control has been challenging; patient only wants his wife to change his dressings.    Total time spent to complete patient's bedside assessment, review medical chart, discuss medical plan of care with covering medical team was ___25_____ with > 50% of time spent face to face w/ patient, discussion with patient/family and/or coordination of care Progress note    Patient seen and examined at bedside. Patient states he had episode of diaphoresis and chills last night.    INTERVAL HPI/OVERNIGHT EVENTS:    REVIEW OF SYSTEMS:  CONSTITUTIONAL: No fever, weight loss, or fatigue  EYES: No eye pain, visual disturbances, or discharge  ENMT:  No difficulty hearing, tinnitus, vertigo; No sinus or throat pain  NECK: No pain or stiffness  BREASTS: No pain, masses, or nipple discharge  RESPIRATORY: No cough, wheezing, chills or hemoptysis; No shortness of breath  CARDIOVASCULAR: No chest pain, palpitations, dizziness, or leg swelling  GASTROINTESTINAL: No abdominal or epigastric pain. No nausea, vomiting, or hematemesis; No diarrhea or constipation. No melena or hematochezia.  GENITOURINARY: No dysuria, frequency, hematuria, or incontinence  NEUROLOGICAL: No headaches, memory loss, loss of strength, numbness, or tremors  SKIN: No itching, burning, rashes, or lesions   LYMPH NODES: No enlarged glands  ENDOCRINE: No heat or cold intolerance; No hair loss  MUSCULOSKELETAL: No joint pain or swelling; No muscle, back, or extremity pain  PSYCHIATRIC: No depression, anxiety, mood swings, or difficulty sleeping  HEME/LYMPH: No easy bruising, or bleeding gums  ALLERY AND IMMUNOLOGIC: No hives or eczema  FAMILY HISTORY:  Family history of early CAD (Father)    Family history of diabetes mellitus in mother (Mother)      T(C): 35.7 (02-03-23 @ 05:06), Max: 36.3 (02-02-23 @ 21:26)  HR: 73 (02-03-23 @ 05:06) (73 - 75)  BP: 142/70 (02-03-23 @ 05:06) (142/70 - 151/66)  RR: 18 (02-03-23 @ 05:06) (18 - 18)  SpO2: --  Wt(kg): --Vital Signs Last 24 Hrs  T(C): 35.7 (03 Feb 2023 05:06), Max: 36.3 (02 Feb 2023 21:26)  T(F): 96.2 (03 Feb 2023 05:06), Max: 97.4 (02 Feb 2023 21:26)  HR: 73 (03 Feb 2023 05:06) (73 - 75)  BP: 142/70 (03 Feb 2023 05:06) (142/70 - 151/66)  BP(mean): --  RR: 18 (03 Feb 2023 05:06) (18 - 18)  SpO2: --      IV Contrast (Sneezing (Mod to Severe))  penicillin (Unknown)      PHYSICAL EXAM:  GENERAL: NAD, well-groomed, well-developed  HEAD:  Atraumatic, Normocephalic  EYES: EOMI, PERRLA, conjunctiva and sclera clear  ENMT: No tonsillar erythema, exudates, or enlargement; Moist mucous membranes, Good dentition, No lesions  NECK: Supple, No JVD, Normal thyroid  NERVOUS SYSTEM:  Alert & Oriented X3, Good concentration; Motor Strength 5/5 B/L upper and lower extremities; DTRs 2+ intact and symmetric  CHEST/LUNG: Clear to percussion bilaterally; No rales, rhonchi, wheezing, or rubs  HEART: Regular rate and rhythm; No murmurs, rubs, or gallops  ABDOMEN: Soft, Nontender, Nondistended; Bowel sounds present  EXTREMITIES:  2+ Peripheral Pulses, No clubbing, cyanosis, or edema  LYMPH: No lymphadenopathy noted  SKIN: No rashes or lesions    Consultant(s) Notes Reviewed:  [x ] YES  [ ] NO  Care Discussed with Consultants/Other Providers [ x] YES  [ ] NO    LABS:      RADIOLOGY & ADDITIONAL TESTS:    Imaging Personally Reviewed:  [ ] YES  [ ] NO  acetaminophen     Tablet .. 650 milliGRAM(s) Oral every 6 hours  cefepime   IVPB 2000 milliGRAM(s) IV Intermittent every 8 hours  celecoxib 200 milliGRAM(s) Oral two times a day  enoxaparin Injectable 40 milliGRAM(s) SubCutaneous every 12 hours  furosemide    Tablet 40 milliGRAM(s) Oral daily  HYDROmorphone   Tablet 4 milliGRAM(s) Oral every 4 hours PRN  losartan 100 milliGRAM(s) Oral daily  mupirocin 2% Ointment 1 Application(s) Topical <User Schedule>  nicotine - 21 mG/24Hr(s) Patch 1 Patch Transdermal daily  ondansetron Injectable 4 milliGRAM(s) IV Push every 4 hours PRN  oxycodone    5 mG/acetaminophen 325 mG 2 Tablet(s) Oral every 6 hours PRN  oxyCODONE  ER Tablet 20 milliGRAM(s) Oral every 8 hours  pregabalin 100 milliGRAM(s) Oral two times a day      HEALTH ISSUES - PROBLEM Dx:    56 y/o male with hx of obesity, DM (?diet controlled), RUSSELL, HTN, back pain s/p spinal cord stimulator, venous stasis ulcers x 3 years was seen by podiatry this morning and was sent for further management of bilateral LE ulcers.    # Bilateral LE cellulitis with ulceration  # hx venous stasis  CT Lower Extremity w/ IV Cont, Right (01.26.23 @ 14:45): Redemonstrated cellulitis of the leg with extensive subcutaneous edema and skin thickening without drainable fluid collection.  Xray Tibia + Fibula 2 Views, Bilateral (01.26.23 @ 12:04): There is suggestion of prominent soft tissue and possible air within the urrounding soft tissues.  - s/p Azactam/Vanco  - blood cx x 2 sets NGTD  - ID consult appreciated-changed abx to cefepime  - b/l LE venous duplex neg for DVT  - Analgesia PRN  - podiatry following, s/p OR  -per ID-ok to switch to PO doxycline 100 mg BID and PO Levofloxacin 750 mg daily on discharge  -Per podiatry, no further surgical intervention at this time but monitor over weekend for now, Pending Bx    # HTN  - monitor BP  - continue losartan    # RUSSELL  - not on bipap   - avap qhs, TV-450, EPAP-8, RR-14, minIPAP-12, maxIPAP-25    # Back pain   - s/p spinal cord stimulator  - pain management consult appreciated    # DM  - reportedly diet controlled  - monitor FS  - carb consistent diet    # Obesity  - diet modification counseling done    # Would care pre podiatry    DVT ppx: lovenox  GI ppx: PPI      Dispo planning, pending finalized cultures, Monitor wound over the weekend and wait for cultures per podiatry. Continue PO levofloxacin PO for now. Give 1 dose ampicillin tomorrow at 11am to monitor for reactions. Pain control has been challenging; patient only wants his wife to change his dressings.    Total time spent to complete patient's bedside assessment, review medical chart, discuss medical plan of care with covering medical team was ___25_____ with > 50% of time spent face to face w/ patient, discussion with patient/family and/or coordination of care

## 2023-02-03 NOTE — PROGRESS NOTE ADULT - ASSESSMENT
ASSESSMENT  58 y/o male with hx of obesity, RUSSELL, HTN, DM (?diet controlled), back pain s/p spinal cord stimulator, venous stasis ulcers x 3 years was seen by podiatry this morning and was sent to ED for further management of bilateral LE ulcers    IMPRESSION  #Venous Stasis bilateral Ulcers with cellulitis   - Wound Cx 7/19/2020  Pseudomonas aeruginosa   - s/p debridement 1/30 - Wound Cx with polymicrobiral growth     #Obesity BMI (kg/m2): 58.3  #Abx allergy: IV Contrast (Sneezing (Mod to Severe))  penicillin (Unknown) - rash as child -- tolerated cefepime previously     RECOMMENDATIONS  - stop cefepime -- switch to PO levofloxacin 750 mg daily   - for Enterococcus in cx -- please give dose of ampicillin 2g x 1 tomorrow and monitor closely with nursing involved  -- if no hypotension or acute onset rash -- plan for PO augmentin 875/125 mg BID   - plan 10 from last debridement   - local wound care     Please call or message on [employer] Teams if with any questions.  Spectra [phone]

## 2023-02-03 NOTE — CHART NOTE - NSCHARTNOTEFT_GEN_A_CORE
Patient tells RN that his current pain level is 9/10 and oral Dilaudid is giving him headaches. Will order 1 dose of IV Dilaudid now and monitor.

## 2023-02-04 LAB
ANION GAP SERPL CALC-SCNC: 13 MMOL/L — SIGNIFICANT CHANGE UP (ref 7–14)
BUN SERPL-MCNC: 16 MG/DL — SIGNIFICANT CHANGE UP (ref 10–20)
CALCIUM SERPL-MCNC: 9.2 MG/DL — SIGNIFICANT CHANGE UP (ref 8.4–10.5)
CHLORIDE SERPL-SCNC: 101 MMOL/L — SIGNIFICANT CHANGE UP (ref 98–110)
CO2 SERPL-SCNC: 27 MMOL/L — SIGNIFICANT CHANGE UP (ref 17–32)
CREAT SERPL-MCNC: 0.9 MG/DL — SIGNIFICANT CHANGE UP (ref 0.7–1.5)
EGFR: 101 ML/MIN/1.73M2 — SIGNIFICANT CHANGE UP
GLUCOSE SERPL-MCNC: 269 MG/DL — HIGH (ref 70–99)
HCT VFR BLD CALC: 40.4 % — LOW (ref 42–52)
HGB BLD-MCNC: 12.4 G/DL — LOW (ref 14–18)
MCHC RBC-ENTMCNC: 23.9 PG — LOW (ref 27–31)
MCHC RBC-ENTMCNC: 30.7 G/DL — LOW (ref 32–37)
MCV RBC AUTO: 77.8 FL — LOW (ref 80–94)
NRBC # BLD: 0 /100 WBCS — SIGNIFICANT CHANGE UP (ref 0–0)
PLATELET # BLD AUTO: 301 K/UL — SIGNIFICANT CHANGE UP (ref 130–400)
POTASSIUM SERPL-MCNC: 4.1 MMOL/L — SIGNIFICANT CHANGE UP (ref 3.5–5)
POTASSIUM SERPL-SCNC: 4.1 MMOL/L — SIGNIFICANT CHANGE UP (ref 3.5–5)
RBC # BLD: 5.19 M/UL — SIGNIFICANT CHANGE UP (ref 4.7–6.1)
RBC # FLD: 17 % — HIGH (ref 11.5–14.5)
SODIUM SERPL-SCNC: 141 MMOL/L — SIGNIFICANT CHANGE UP (ref 135–146)
WBC # BLD: 5.86 K/UL — SIGNIFICANT CHANGE UP (ref 4.8–10.8)
WBC # FLD AUTO: 5.86 K/UL — SIGNIFICANT CHANGE UP (ref 4.8–10.8)

## 2023-02-04 PROCEDURE — 70450 CT HEAD/BRAIN W/O DYE: CPT | Mod: 26

## 2023-02-04 RX ORDER — DIPHENHYDRAMINE HCL 50 MG
50 CAPSULE ORAL EVERY 6 HOURS
Refills: 0 | Status: DISCONTINUED | OUTPATIENT
Start: 2023-02-04 | End: 2023-02-06

## 2023-02-04 RX ORDER — KETOROLAC TROMETHAMINE 30 MG/ML
15 SYRINGE (ML) INJECTION ONCE
Refills: 0 | Status: DISCONTINUED | OUTPATIENT
Start: 2023-02-04 | End: 2023-02-04

## 2023-02-04 RX ORDER — HYDROMORPHONE HYDROCHLORIDE 2 MG/ML
2 INJECTION INTRAMUSCULAR; INTRAVENOUS; SUBCUTANEOUS EVERY 4 HOURS
Refills: 0 | Status: DISCONTINUED | OUTPATIENT
Start: 2023-02-04 | End: 2023-02-06

## 2023-02-04 RX ADMIN — OXYCODONE HYDROCHLORIDE 20 MILLIGRAM(S): 5 TABLET ORAL at 23:12

## 2023-02-04 RX ADMIN — Medication 50 MILLIGRAM(S): at 11:06

## 2023-02-04 RX ADMIN — LOSARTAN POTASSIUM 100 MILLIGRAM(S): 100 TABLET, FILM COATED ORAL at 05:34

## 2023-02-04 RX ADMIN — Medication 1 TABLET(S): at 22:30

## 2023-02-04 RX ADMIN — OXYCODONE HYDROCHLORIDE 20 MILLIGRAM(S): 5 TABLET ORAL at 05:36

## 2023-02-04 RX ADMIN — Medication 15 MILLIGRAM(S): at 17:29

## 2023-02-04 RX ADMIN — Medication 650 MILLIGRAM(S): at 17:44

## 2023-02-04 RX ADMIN — HYDROMORPHONE HYDROCHLORIDE 2 MILLIGRAM(S): 2 INJECTION INTRAMUSCULAR; INTRAVENOUS; SUBCUTANEOUS at 20:33

## 2023-02-04 RX ADMIN — OXYCODONE HYDROCHLORIDE 20 MILLIGRAM(S): 5 TABLET ORAL at 15:55

## 2023-02-04 RX ADMIN — OXYCODONE HYDROCHLORIDE 20 MILLIGRAM(S): 5 TABLET ORAL at 14:30

## 2023-02-04 RX ADMIN — OXYCODONE HYDROCHLORIDE 20 MILLIGRAM(S): 5 TABLET ORAL at 22:30

## 2023-02-04 RX ADMIN — CELECOXIB 200 MILLIGRAM(S): 200 CAPSULE ORAL at 05:34

## 2023-02-04 RX ADMIN — HYDROMORPHONE HYDROCHLORIDE 2 MILLIGRAM(S): 2 INJECTION INTRAMUSCULAR; INTRAVENOUS; SUBCUTANEOUS at 21:14

## 2023-02-04 RX ADMIN — CELECOXIB 200 MILLIGRAM(S): 200 CAPSULE ORAL at 17:43

## 2023-02-04 RX ADMIN — Medication 100 MILLIGRAM(S): at 05:35

## 2023-02-04 RX ADMIN — Medication 100 MILLIGRAM(S): at 17:28

## 2023-02-04 RX ADMIN — Medication 200 MILLIGRAM(S): at 11:02

## 2023-02-04 RX ADMIN — Medication 650 MILLIGRAM(S): at 05:33

## 2023-02-04 RX ADMIN — MUPIROCIN 1 APPLICATION(S): 20 OINTMENT TOPICAL at 11:03

## 2023-02-04 RX ADMIN — Medication 40 MILLIGRAM(S): at 05:35

## 2023-02-04 NOTE — PHARMACY COMMUNICATION NOTE - COMMENTS
As per Dr Zuñiga patient tolertes one dose of ampicillin that was ordered by ID. PCN is not a true allergy.

## 2023-02-04 NOTE — PROGRESS NOTE ADULT - SUBJECTIVE AND OBJECTIVE BOX
JOSE ALBERTO NO 57y Male  MRN#: [id number]     SUBJECTIVE  Patient is a 57y old Male who presents with a chief complaint of Worsening PAin (27 Jan 2023 13:15)      Today is hospital day 9d, and this morning he is lying in bed without distress.   No acute overnight events.     OBJECTIVE  PAST MEDICAL & SURGICAL HISTORY  Hypertension    Disc disease, degenerative, lumbar or lumbosacral    Obstructive sleep apnea    Morbid obesity    DM (diabetes mellitus)    Spinal cord stimulator status    H/O arthroscopy of right knee    History of excision of pilonidal cyst      ALLERGIES:  IV Contrast (Sneezing (Mod to Severe))  penicillin (Unknown)    MEDICATIONS:  STANDING MEDICATIONS  acetaminophen     Tablet .. 650 milliGRAM(s) Oral every 6 hours  ampicillin  IVPB 2000 milliGRAM(s) IV Intermittent once  celecoxib 200 milliGRAM(s) Oral two times a day  enoxaparin Injectable 40 milliGRAM(s) SubCutaneous every 12 hours  furosemide    Tablet 40 milliGRAM(s) Oral daily  levoFLOXacin  Tablet 750 milliGRAM(s) Oral every 24 hours  losartan 100 milliGRAM(s) Oral daily  mupirocin 2% Ointment 1 Application(s) Topical <User Schedule>  nicotine - 21 mG/24Hr(s) Patch 1 Patch Transdermal daily  oxyCODONE  ER Tablet 20 milliGRAM(s) Oral every 8 hours  pregabalin 100 milliGRAM(s) Oral two times a day    PRN MEDICATIONS  diphenhydrAMINE 50 milliGRAM(s) Oral every 6 hours PRN  HYDROmorphone   Tablet 4 milliGRAM(s) Oral every 4 hours PRN  ondansetron Injectable 4 milliGRAM(s) IV Push every 4 hours PRN  oxycodone    5 mG/acetaminophen 325 mG 2 Tablet(s) Oral every 6 hours PRN    HOME MEDICATIONS  Home Medications:  furosemide 40 mg oral tablet: 1 tab(s) orally once a day (26 Jan 2023 17:18)  meloxicam 15 mg oral tablet: 1 tab(s) orally once a day (26 Jan 2023 17:18)  morphine 30 mg oral tablet: 1 tab(s) orally 3 times a day, As Needed (26 Jan 2023 17:23)  Percocet 10/325 oral tablet: 1 tab(s) orally every 6 hours, As Needed (26 Jan 2023 17:18)  pregabalin 100 mg oral capsule: 1 cap(s) orally 2 times a day (26 Jan 2023 17:18)      VITAL SIGNS: Last 24 Hours  T(C): 36.6 (04 Feb 2023 05:00), Max: 36.6 (04 Feb 2023 05:00)  T(F): 97.8 (04 Feb 2023 05:00), Max: 97.8 (04 Feb 2023 05:00)  HR: 61 (04 Feb 2023 05:00) (61 - 79)  BP: 164/96 (04 Feb 2023 05:00) (152/69 - 164/96)  BP(mean): --  RR: 18 (04 Feb 2023 05:00) (18 - 18)  SpO2: --      LABS:                        12.4   5.86  )-----------( 301      ( 04 Feb 2023 07:56 )             40.4     02-04    141  |  101  |  16  ----------------------------<  269<H>  4.1   |  27  |  0.9    Ca    9.2      04 Feb 2023 07:56    TPro  6.8  /  Alb  4.0  /  TBili  0.3  /  DBili  x   /  AST  12  /  ALT  18  /  AlkPhos  105  02-03    LIVER FUNCTIONS - ( 03 Feb 2023 07:21 )  Alb: 4.0 g/dL / Pro: 6.8 g/dL / ALK PHOS: 105 U/L / ALT: 18 U/L / AST: 12 U/L / GGT: x                         CAPILLARY BLOOD GLUCOSE          RADIOLOGY:    PHYSICAL EXAM:  GENERAL: NAD, AAO x 4, 57y M  HEAD:  Atraumatic, Normocephalic  EYES: EOMI, conjunctivae clear and sclerae white  NECK: Supple, No JVD  CHEST/LUNG: Clear to auscultation bilaterally; No wheeze; No crackles; No accessory muscles used  HEART: Regular rate and rhythm; No murmurs;   ABDOMEN: Soft, Nontender, Nondistended; Bowel sounds present; No guarding  EXTREMITIES:  2+ Peripheral Pulses, No cyanosis or edema  NEUROLOGY: non-focal    ADMISSION SUMMARY  Patient is a 57y old Male who presents with a chief complaint of Worsening PAin (27 Jan 2023 13:15)    JOSE ALBERTO NO 57y Male  MRN#: [id number]     SUBJECTIVE  Patient is a 57y old Male who presents with a chief complaint of Worsening PAin (27 Jan 2023 13:15)  Today is hospital day 9d, and this morning he is lying in bed without distress.   No acute overnight events.   Pt was complaining of headache today.     OBJECTIVE  PAST MEDICAL & SURGICAL HISTORY  Hypertension    Disc disease, degenerative, lumbar or lumbosacral    Obstructive sleep apnea    Morbid obesity    DM (diabetes mellitus)    Spinal cord stimulator status    H/O arthroscopy of right knee    History of excision of pilonidal cyst      ALLERGIES:  IV Contrast (Sneezing (Mod to Severe))  penicillin (Unknown)    MEDICATIONS:  STANDING MEDICATIONS  acetaminophen     Tablet .. 650 milliGRAM(s) Oral every 6 hours  ampicillin  IVPB 2000 milliGRAM(s) IV Intermittent once  celecoxib 200 milliGRAM(s) Oral two times a day  enoxaparin Injectable 40 milliGRAM(s) SubCutaneous every 12 hours  furosemide    Tablet 40 milliGRAM(s) Oral daily  levoFLOXacin  Tablet 750 milliGRAM(s) Oral every 24 hours  losartan 100 milliGRAM(s) Oral daily  mupirocin 2% Ointment 1 Application(s) Topical <User Schedule>  nicotine - 21 mG/24Hr(s) Patch 1 Patch Transdermal daily  oxyCODONE  ER Tablet 20 milliGRAM(s) Oral every 8 hours  pregabalin 100 milliGRAM(s) Oral two times a day    PRN MEDICATIONS  diphenhydrAMINE 50 milliGRAM(s) Oral every 6 hours PRN  HYDROmorphone   Tablet 4 milliGRAM(s) Oral every 4 hours PRN  ondansetron Injectable 4 milliGRAM(s) IV Push every 4 hours PRN  oxycodone    5 mG/acetaminophen 325 mG 2 Tablet(s) Oral every 6 hours PRN    HOME MEDICATIONS  Home Medications:  furosemide 40 mg oral tablet: 1 tab(s) orally once a day (26 Jan 2023 17:18)  meloxicam 15 mg oral tablet: 1 tab(s) orally once a day (26 Jan 2023 17:18)  morphine 30 mg oral tablet: 1 tab(s) orally 3 times a day, As Needed (26 Jan 2023 17:23)  Percocet 10/325 oral tablet: 1 tab(s) orally every 6 hours, As Needed (26 Jan 2023 17:18)  pregabalin 100 mg oral capsule: 1 cap(s) orally 2 times a day (26 Jan 2023 17:18)      VITAL SIGNS: Last 24 Hours  T(C): 36.6 (04 Feb 2023 05:00), Max: 36.6 (04 Feb 2023 05:00)  T(F): 97.8 (04 Feb 2023 05:00), Max: 97.8 (04 Feb 2023 05:00)  HR: 61 (04 Feb 2023 05:00) (61 - 79)  BP: 164/96 (04 Feb 2023 05:00) (152/69 - 164/96)  BP(mean): --  RR: 18 (04 Feb 2023 05:00) (18 - 18)  SpO2: --      LABS:                        12.4   5.86  )-----------( 301      ( 04 Feb 2023 07:56 )             40.4     02-04    141  |  101  |  16  ----------------------------<  269<H>  4.1   |  27  |  0.9    Ca    9.2      04 Feb 2023 07:56    TPro  6.8  /  Alb  4.0  /  TBili  0.3  /  DBili  x   /  AST  12  /  ALT  18  /  AlkPhos  105  02-03    LIVER FUNCTIONS - ( 03 Feb 2023 07:21 )  Alb: 4.0 g/dL / Pro: 6.8 g/dL / ALK PHOS: 105 U/L / ALT: 18 U/L / AST: 12 U/L / GGT: x             CAPILLARY BLOOD GLUCOSE          RADIOLOGY:    PHYSICAL EXAM:  GENERAL: NAD, AAO x 4, obese 57y M  HEAD:  Atraumatic, Normocephalic  EYES: EOMI, conjunctivae clear and sclerae white  NECK: Supple, No JVD  CHEST/LUNG: Clear to auscultation bilaterally; No wheeze; No crackles; No accessory muscles used  HEART: Regular rate and rhythm; No murmurs;   ABDOMEN: Soft, Nontender, Nondistended; Bowel sounds present; No guarding  EXTREMITIES:  2+ Peripheral Pulses, b/l pitting edema w/ venous stasis dermatitis; ACE wrapped.   NEUROLOGY: non-focal    ADMISSION SUMMARY  Patient is a 57y old Male who presents with a chief complaint of Worsening PAin (27 Jan 2023 13:15)

## 2023-02-04 NOTE — PROGRESS NOTE ADULT - ASSESSMENT
58 y/o male with hx of obesity, DM (?diet controlled), RUSSELL, HTN, back pain s/p spinal cord stimulator, venous stasis ulcers x 3 years was seen by podiatry this morning and was sent for further management of bilateral LE ulcers.    # Bilateral LE cellulitis with ulceration  # hx venous stasis  CT Lower Extremity w/ IV Cont, Right (01.26.23 @ 14:45): Redemonstrated cellulitis of the leg with extensive subcutaneous edema and skin thickening without drainable fluid collection.  Xray Tibia + Fibula 2 Views, Bilateral (01.26.23 @ 12:04): There is suggestion of prominent soft tissue and possible air within the urrounding soft tissues.  - s/p Azactam/Vanco  - blood cx x 2 sets NGTD  - ID consult appreciated-changed abx to cefepime  - b/l LE venous duplex neg for DVT  - Analgesia PRN  - podiatry following, s/p OR  -per ID-ok to switch to PO doxycline 100 mg BID and PO Levofloxacin 750 mg daily on discharge  -Per podiatry, no further surgical intervention at this time but monitor over weekend for now, Pending Bx    # HTN  - monitor BP  - continue losartan    # RUSSELL  - not on bipap   - avap qhs, TV-450, EPAP-8, RR-14, minIPAP-12, maxIPAP-25    # Back pain   - s/p spinal cord stimulator  - pain management consult appreciated    # DM  - reportedly diet controlled  - monitor FS  - carb consistent diet    # Obesity  - diet modification counseling done    # Would care pre podiatry    DVT ppx: lovenox  GI ppx: PPI    Dispo planning, pending finalized cultures, Monitor wound over the weekend and wait for cultures per podiatry. Continue PO levofloxacin PO for now. Give 1 dose ampicillin tomorrow at 11am to monitor for reactions. Pain control has been challenging; patient only wants his wife to change his dressings.    Radha Thompson   [facility] hospitalist  I can be reached directly on MS Teams. My spectra is [phone]. 58 y/o male with hx of obesity, DM (?diet controlled), RUSSELL, HTN, back pain s/p spinal cord stimulator, venous stasis ulcers x 3 years was seen by podiatry this morning and was sent for further management of bilateral LE ulcers.    # Bilateral LE cellulitis with ulceration  # hx venous stasis  - CT Lower Extremity w/ IV Cont, Right (01.26.23 @ 14:45): Redemonstrated cellulitis of the leg with extensive subcutaneous edema and skin thickening without drainable fluid collection.  - Xray Tibia + Fibula 2 Views, Bilateral (01.26.23 @ 12:04): There is suggestion of prominent soft tissue and possible air within the urrounding soft tissues.  - s/p Azactam/Vanco  - blood cx x 2 sets NGTD  - b/l LE venous duplex neg for DVT  - Analgesia PRN  - podiatry following, s/p OR; no further surgical intervention at this time but monitor over weekend for now, Pending Bx  - ID consult appreciated: switch to PO levofloxacin 750 mg daily and for Enterococcus in cx, give dose of ampicillin 2g x 1 and monitor closely with nursing involved   - if no hypotension or acute onset rash, can start PO augmentin 875/125 mg BID     # New onset of headache likely cluster headache  - will obtain CT head  - pain control PRN  - if no improvement, will obtain neuro consult     # HTN  - monitor BP  - continue losartan    # RUSSELL  - not on bipap   - avap qhs, TV-450, EPAP-8, RR-14, minIPAP-12, maxIPAP-25    # Back pain   - s/p spinal cord stimulator  - pain management consult appreciated    # DM  - reportedly diet controlled  - monitor FS  - carb consistent diet    # Obesity  - diet modification counseling done    # Would care pre podiatry    DVT ppx: lovenox  GI ppx: PPI    Dispo planning, pending finalized cultures, Monitor wound over the weekend and wait for cultures per podiatry. Continue PO levofloxacin PO for now. Start augmentin PO if pt tolerates IV ampicillin. Pain control has been challenging; patient only wants his wife to change his dressings.    Radha Thompson   [facility] hospitalist  I can be reached directly on MS Teams. My spectra is [phone].

## 2023-02-05 DIAGNOSIS — F44.5 CONVERSION DISORDER WITH SEIZURES OR CONVULSIONS: ICD-10-CM

## 2023-02-05 DIAGNOSIS — I87.2 VENOUS INSUFFICIENCY (CHRONIC) (PERIPHERAL): ICD-10-CM

## 2023-02-05 LAB
ALBUMIN SERPL ELPH-MCNC: 4.1 G/DL — SIGNIFICANT CHANGE UP (ref 3.5–5.2)
ALP SERPL-CCNC: 100 U/L — SIGNIFICANT CHANGE UP (ref 30–115)
ALT FLD-CCNC: 18 U/L — SIGNIFICANT CHANGE UP (ref 0–41)
ANION GAP SERPL CALC-SCNC: 7 MMOL/L — SIGNIFICANT CHANGE UP (ref 7–14)
AST SERPL-CCNC: 14 U/L — SIGNIFICANT CHANGE UP (ref 0–41)
BASOPHILS # BLD AUTO: 0.06 K/UL — SIGNIFICANT CHANGE UP (ref 0–0.2)
BASOPHILS NFR BLD AUTO: 1.1 % — HIGH (ref 0–1)
BILIRUB SERPL-MCNC: 0.3 MG/DL — SIGNIFICANT CHANGE UP (ref 0.2–1.2)
BUN SERPL-MCNC: 17 MG/DL — SIGNIFICANT CHANGE UP (ref 10–20)
CALCIUM SERPL-MCNC: 9.4 MG/DL — SIGNIFICANT CHANGE UP (ref 8.4–10.5)
CHLORIDE SERPL-SCNC: 100 MMOL/L — SIGNIFICANT CHANGE UP (ref 98–110)
CO2 SERPL-SCNC: 30 MMOL/L — SIGNIFICANT CHANGE UP (ref 17–32)
CREAT SERPL-MCNC: 0.9 MG/DL — SIGNIFICANT CHANGE UP (ref 0.7–1.5)
EGFR: 101 ML/MIN/1.73M2 — SIGNIFICANT CHANGE UP
EOSINOPHIL # BLD AUTO: 0.3 K/UL — SIGNIFICANT CHANGE UP (ref 0–0.7)
EOSINOPHIL NFR BLD AUTO: 5.7 % — SIGNIFICANT CHANGE UP (ref 0–8)
GLUCOSE SERPL-MCNC: 159 MG/DL — HIGH (ref 70–99)
HCT VFR BLD CALC: 42.9 % — SIGNIFICANT CHANGE UP (ref 42–52)
HGB BLD-MCNC: 12.8 G/DL — LOW (ref 14–18)
IMM GRANULOCYTES NFR BLD AUTO: 0.4 % — HIGH (ref 0.1–0.3)
LYMPHOCYTES # BLD AUTO: 2.13 K/UL — SIGNIFICANT CHANGE UP (ref 1.2–3.4)
LYMPHOCYTES # BLD AUTO: 40.6 % — SIGNIFICANT CHANGE UP (ref 20.5–51.1)
MAGNESIUM SERPL-MCNC: 2 MG/DL — SIGNIFICANT CHANGE UP (ref 1.8–2.4)
MCHC RBC-ENTMCNC: 23.4 PG — LOW (ref 27–31)
MCHC RBC-ENTMCNC: 29.8 G/DL — LOW (ref 32–37)
MCV RBC AUTO: 78.3 FL — LOW (ref 80–94)
MONOCYTES # BLD AUTO: 0.76 K/UL — HIGH (ref 0.1–0.6)
MONOCYTES NFR BLD AUTO: 14.5 % — HIGH (ref 1.7–9.3)
NEUTROPHILS # BLD AUTO: 1.97 K/UL — SIGNIFICANT CHANGE UP (ref 1.4–6.5)
NEUTROPHILS NFR BLD AUTO: 37.7 % — LOW (ref 42.2–75.2)
NRBC # BLD: 0 /100 WBCS — SIGNIFICANT CHANGE UP (ref 0–0)
PLATELET # BLD AUTO: 302 K/UL — SIGNIFICANT CHANGE UP (ref 130–400)
POTASSIUM SERPL-MCNC: 4.5 MMOL/L — SIGNIFICANT CHANGE UP (ref 3.5–5)
POTASSIUM SERPL-SCNC: 4.5 MMOL/L — SIGNIFICANT CHANGE UP (ref 3.5–5)
PROT SERPL-MCNC: 7.2 G/DL — SIGNIFICANT CHANGE UP (ref 6–8)
RBC # BLD: 5.48 M/UL — SIGNIFICANT CHANGE UP (ref 4.7–6.1)
RBC # FLD: 17.3 % — HIGH (ref 11.5–14.5)
SODIUM SERPL-SCNC: 137 MMOL/L — SIGNIFICANT CHANGE UP (ref 135–146)
WBC # BLD: 5.24 K/UL — SIGNIFICANT CHANGE UP (ref 4.8–10.8)
WBC # FLD AUTO: 5.24 K/UL — SIGNIFICANT CHANGE UP (ref 4.8–10.8)

## 2023-02-05 RX ORDER — METHOCARBAMOL 500 MG/1
750 TABLET, FILM COATED ORAL
Refills: 0 | Status: DISCONTINUED | OUTPATIENT
Start: 2023-02-05 | End: 2023-02-06

## 2023-02-05 RX ADMIN — Medication 100 MILLIGRAM(S): at 06:28

## 2023-02-05 RX ADMIN — OXYCODONE HYDROCHLORIDE 20 MILLIGRAM(S): 5 TABLET ORAL at 14:24

## 2023-02-05 RX ADMIN — OXYCODONE HYDROCHLORIDE 20 MILLIGRAM(S): 5 TABLET ORAL at 07:15

## 2023-02-05 RX ADMIN — HYDROMORPHONE HYDROCHLORIDE 2 MILLIGRAM(S): 2 INJECTION INTRAMUSCULAR; INTRAVENOUS; SUBCUTANEOUS at 12:00

## 2023-02-05 RX ADMIN — HYDROMORPHONE HYDROCHLORIDE 2 MILLIGRAM(S): 2 INJECTION INTRAMUSCULAR; INTRAVENOUS; SUBCUTANEOUS at 06:29

## 2023-02-05 RX ADMIN — Medication 1 TABLET(S): at 06:31

## 2023-02-05 RX ADMIN — LOSARTAN POTASSIUM 100 MILLIGRAM(S): 100 TABLET, FILM COATED ORAL at 06:29

## 2023-02-05 RX ADMIN — Medication 1 TABLET(S): at 17:44

## 2023-02-05 RX ADMIN — Medication 650 MILLIGRAM(S): at 06:30

## 2023-02-05 RX ADMIN — HYDROMORPHONE HYDROCHLORIDE 2 MILLIGRAM(S): 2 INJECTION INTRAMUSCULAR; INTRAVENOUS; SUBCUTANEOUS at 20:27

## 2023-02-05 RX ADMIN — CELECOXIB 200 MILLIGRAM(S): 200 CAPSULE ORAL at 06:30

## 2023-02-05 RX ADMIN — Medication 650 MILLIGRAM(S): at 07:16

## 2023-02-05 RX ADMIN — CELECOXIB 200 MILLIGRAM(S): 200 CAPSULE ORAL at 07:16

## 2023-02-05 RX ADMIN — HYDROMORPHONE HYDROCHLORIDE 2 MILLIGRAM(S): 2 INJECTION INTRAMUSCULAR; INTRAVENOUS; SUBCUTANEOUS at 07:32

## 2023-02-05 RX ADMIN — METHOCARBAMOL 750 MILLIGRAM(S): 500 TABLET, FILM COATED ORAL at 11:42

## 2023-02-05 RX ADMIN — MUPIROCIN 1 APPLICATION(S): 20 OINTMENT TOPICAL at 11:39

## 2023-02-05 RX ADMIN — HYDROMORPHONE HYDROCHLORIDE 2 MILLIGRAM(S): 2 INJECTION INTRAMUSCULAR; INTRAVENOUS; SUBCUTANEOUS at 16:10

## 2023-02-05 RX ADMIN — HYDROMORPHONE HYDROCHLORIDE 2 MILLIGRAM(S): 2 INJECTION INTRAMUSCULAR; INTRAVENOUS; SUBCUTANEOUS at 11:42

## 2023-02-05 RX ADMIN — HYDROMORPHONE HYDROCHLORIDE 2 MILLIGRAM(S): 2 INJECTION INTRAMUSCULAR; INTRAVENOUS; SUBCUTANEOUS at 15:53

## 2023-02-05 RX ADMIN — HYDROMORPHONE HYDROCHLORIDE 2 MILLIGRAM(S): 2 INJECTION INTRAMUSCULAR; INTRAVENOUS; SUBCUTANEOUS at 02:15

## 2023-02-05 RX ADMIN — Medication 100 MILLIGRAM(S): at 17:44

## 2023-02-05 RX ADMIN — HYDROMORPHONE HYDROCHLORIDE 2 MILLIGRAM(S): 2 INJECTION INTRAMUSCULAR; INTRAVENOUS; SUBCUTANEOUS at 01:18

## 2023-02-05 RX ADMIN — METHOCARBAMOL 750 MILLIGRAM(S): 500 TABLET, FILM COATED ORAL at 17:44

## 2023-02-05 RX ADMIN — Medication 40 MILLIGRAM(S): at 06:32

## 2023-02-05 NOTE — PROGRESS NOTE ADULT - ASSESSMENT
56 y/o male with hx of obesity, DM (?diet controlled), RUSSELL, HTN, back pain s/p spinal cord stimulator, venous stasis ulcers x 3 years was seen by podiatry this morning and was sent for further management of bilateral LE ulcers.    # Bilateral LE cellulitis with ulceration  # hx venous stasis  - CT Lower Extremity w/ IV Cont, Right (01.26.23 @ 14:45): Redemonstrated cellulitis of the leg with extensive subcutaneous edema and skin thickening without drainable fluid collection.  - Xray Tibia + Fibula 2 Views, Bilateral (01.26.23 @ 12:04): There is suggestion of prominent soft tissue and possible air within the urrounding soft tissues.  - s/p Azactam/Vanco  - blood cx x 2 sets NGTD  - b/l LE venous duplex neg for DVT  - Analgesia PRN  - podiatry following, s/p OR; no further surgical intervention at this time but monitor over weekend for now, Pending Bx  - ID consult appreciated: c/w PO levofloxacin 750 mg daily and PO augmentin 875/125 mg BID (pt tolerated ampicillin well with no rash)     # New onset of headache likely cluster headache  - CT head shows no acute pathology  - will try oxygen therapy PRN  - pain control PRN  - if no improvement, will obtain neuro consult     # HTN  - monitor BP  - continue losartan    # RUSSELL  - not on bipap   - avap qhs, TV-450, EPAP-8, RR-14, minIPAP-12, maxIPAP-25    # Back pain   - s/p spinal cord stimulator  - pain management consult appreciated    # DM  - reportedly diet controlled  - monitor FS  - carb consistent diet    # Obesity  - diet modification counseling done    # Would care pre podiatry    DVT ppx: lovenox  GI ppx: PPI    Dispo planning, pending finalized cultures, Monitor wound over the weekend and wait for cultures per podiatry. Continue PO levofloxacin PO and augmentin POfor now. Pain control has been challenging; patient only wants his wife to change his dressings.    Radha Thompson   Covering hospitalist  I can be reached directly on MS Teams. My spectra is [phone]. 56 y/o male with hx of obesity, DM (?diet controlled), RUSSELL, HTN, back pain s/p spinal cord stimulator, venous stasis ulcers x 3 years was seen by podiatry this morning and was sent for further management of bilateral LE ulcers.    # Bilateral LE cellulitis with ulceration  # hx venous stasis  - CT Lower Extremity w/ IV Cont, Right (01.26.23 @ 14:45): Redemonstrated cellulitis of the leg with extensive subcutaneous edema and skin thickening without drainable fluid collection.  - Xray Tibia + Fibula 2 Views, Bilateral (01.26.23 @ 12:04): There is suggestion of prominent soft tissue and possible air within the urrounding soft tissues.  - s/p Azactam/Vanco  - blood cx x 2 sets NGTD  - b/l LE venous duplex neg for DVT  - Analgesia PRN  - podiatry following, s/p OR; no further surgical intervention at this time but monitor over weekend for now, Pending Bx  - ID consult appreciated: c/w PO levofloxacin 750 mg daily and PO augmentin 875/125 mg BID (pt tolerated ampicillin well with no rash)     # L neck pain radiating to head likely muscle spasm   - CT head shows no acute pathology  - will restart methocarbamol which pt takes at home  - pain control PRN  - if no improvement, will obtain neuro consult     # HTN  - monitor BP  - continue losartan    # RUSSELL  - not on bipap   - avap qhs, TV-450, EPAP-8, RR-14, minIPAP-12, maxIPAP-25    # Back pain   - s/p spinal cord stimulator  - pain management consult appreciated    # DM  - reportedly diet controlled  - monitor FS  - carb consistent diet    # Obesity  - diet modification counseling done    # Would care pre podiatry    DVT ppx: lovenox  GI ppx: PPI    Dispo planning, pending finalized cultures, Monitor wound over the weekend and wait for cultures per podiatry. Continue PO levofloxacin PO and augmentin POfor now. Pain control has been challenging; patient only wants his wife to change his dressings. Monitor headache as well.    Radha Thompson   [facility] hospitalist  I can be reached directly on MS Teams. My spectra is [phone].

## 2023-02-05 NOTE — PROGRESS NOTE ADULT - SUBJECTIVE AND OBJECTIVE BOX
JOSE ALBERTO NO 57y Male  MRN#: [id number]     SUBJECTIVE  Patient is a 57y old Male who presents with a chief complaint of Worsening PAin (27 Jan 2023 13:15)      Today is hospital day 10d, and this morning he is lying in bed without distress.   No acute overnight events.     OBJECTIVE  PAST MEDICAL & SURGICAL HISTORY  Hypertension    Disc disease, degenerative, lumbar or lumbosacral    Obstructive sleep apnea    Morbid obesity    DM (diabetes mellitus)    Spinal cord stimulator status    H/O arthroscopy of right knee    History of excision of pilonidal cyst      ALLERGIES:  IV Contrast (Sneezing (Mod to Severe))  penicillin (Unknown)    MEDICATIONS:  STANDING MEDICATIONS  acetaminophen     Tablet .. 650 milliGRAM(s) Oral every 6 hours  amoxicillin  875 milliGRAM(s)/clavulanate 1 Tablet(s) Oral two times a day  celecoxib 200 milliGRAM(s) Oral two times a day  enoxaparin Injectable 40 milliGRAM(s) SubCutaneous every 12 hours  furosemide    Tablet 40 milliGRAM(s) Oral daily  levoFLOXacin  Tablet 750 milliGRAM(s) Oral every 24 hours  losartan 100 milliGRAM(s) Oral daily  mupirocin 2% Ointment 1 Application(s) Topical <User Schedule>  nicotine - 21 mG/24Hr(s) Patch 1 Patch Transdermal daily  oxyCODONE  ER Tablet 20 milliGRAM(s) Oral every 8 hours  pregabalin 100 milliGRAM(s) Oral two times a day    PRN MEDICATIONS  diphenhydrAMINE 50 milliGRAM(s) Oral every 6 hours PRN  HYDROmorphone  Injectable 2 milliGRAM(s) IV Push every 4 hours PRN  ondansetron Injectable 4 milliGRAM(s) IV Push every 4 hours PRN  oxycodone    5 mG/acetaminophen 325 mG 2 Tablet(s) Oral every 6 hours PRN    HOME MEDICATIONS  Home Medications:  furosemide 40 mg oral tablet: 1 tab(s) orally once a day (26 Jan 2023 17:18)  meloxicam 15 mg oral tablet: 1 tab(s) orally once a day (26 Jan 2023 17:18)  morphine 30 mg oral tablet: 1 tab(s) orally 3 times a day, As Needed (26 Jan 2023 17:23)  Percocet 10/325 oral tablet: 1 tab(s) orally every 6 hours, As Needed (26 Jan 2023 17:18)  pregabalin 100 mg oral capsule: 1 cap(s) orally 2 times a day (26 Jan 2023 17:18)      VITAL SIGNS: Last 24 Hours  T(C): 36.2 (05 Feb 2023 04:46), Max: 36.6 (04 Feb 2023 21:08)  T(F): 97.2 (05 Feb 2023 04:46), Max: 97.9 (04 Feb 2023 21:08)  HR: 56 (05 Feb 2023 04:46) (56 - 68)  BP: 152/85 (05 Feb 2023 04:46) (151/71 - 159/74)  BP(mean): --  RR: 18 (05 Feb 2023 04:46) (18 - 18)  SpO2: 97% (04 Feb 2023 22:00) (97% - 97%)      LABS:                        12.4   5.86  )-----------( 301      ( 04 Feb 2023 07:56 )             40.4     02-04    141  |  101  |  16  ----------------------------<  269<H>  4.1   |  27  |  0.9    Ca    9.2      04 Feb 2023 07:56                      CAPILLARY BLOOD GLUCOSE          RADIOLOGY:    PHYSICAL EXAM:  GENERAL: NAD, AAO x 4, 57y M  HEAD:  Atraumatic, Normocephalic  EYES: EOMI, conjunctivae clear and sclerae white  NECK: Supple, No JVD  CHEST/LUNG: Clear to auscultation bilaterally; No wheeze; No crackles; No accessory muscles used  HEART: Regular rate and rhythm; No murmurs;   ABDOMEN: Soft, Nontender, Nondistended; Bowel sounds present; No guarding  EXTREMITIES:  2+ Peripheral Pulses, No cyanosis or edema  NEUROLOGY: non-focal    ADMISSION SUMMARY  Patient is a 57y old Male who presents with a chief complaint of Worsening PAin (27 Jan 2023 13:15)    JOSE ALBERTO NO 57y Male  MRN#: [id number]     SUBJECTIVE  Patient is a 57y old Male who presents with a chief complaint of Worsening PAin (27 Jan 2023 13:15)  Today is hospital day 10d, and this morning he is lying in bed without distress.   No acute overnight events.   Complaining L neck pain radiating to top of head.     OBJECTIVE  PAST MEDICAL & SURGICAL HISTORY  Hypertension    Disc disease, degenerative, lumbar or lumbosacral    Obstructive sleep apnea    Morbid obesity    DM (diabetes mellitus)    Spinal cord stimulator status    H/O arthroscopy of right knee    History of excision of pilonidal cyst      ALLERGIES:  IV Contrast (Sneezing (Mod to Severe))  penicillin (Unknown)    MEDICATIONS:  STANDING MEDICATIONS  acetaminophen     Tablet .. 650 milliGRAM(s) Oral every 6 hours  amoxicillin  875 milliGRAM(s)/clavulanate 1 Tablet(s) Oral two times a day  celecoxib 200 milliGRAM(s) Oral two times a day  enoxaparin Injectable 40 milliGRAM(s) SubCutaneous every 12 hours  furosemide    Tablet 40 milliGRAM(s) Oral daily  levoFLOXacin  Tablet 750 milliGRAM(s) Oral every 24 hours  losartan 100 milliGRAM(s) Oral daily  mupirocin 2% Ointment 1 Application(s) Topical <User Schedule>  nicotine - 21 mG/24Hr(s) Patch 1 Patch Transdermal daily  oxyCODONE  ER Tablet 20 milliGRAM(s) Oral every 8 hours  pregabalin 100 milliGRAM(s) Oral two times a day    PRN MEDICATIONS  diphenhydrAMINE 50 milliGRAM(s) Oral every 6 hours PRN  HYDROmorphone  Injectable 2 milliGRAM(s) IV Push every 4 hours PRN  ondansetron Injectable 4 milliGRAM(s) IV Push every 4 hours PRN  oxycodone    5 mG/acetaminophen 325 mG 2 Tablet(s) Oral every 6 hours PRN    HOME MEDICATIONS  Home Medications:  furosemide 40 mg oral tablet: 1 tab(s) orally once a day (26 Jan 2023 17:18)  meloxicam 15 mg oral tablet: 1 tab(s) orally once a day (26 Jan 2023 17:18)  morphine 30 mg oral tablet: 1 tab(s) orally 3 times a day, As Needed (26 Jan 2023 17:23)  Percocet 10/325 oral tablet: 1 tab(s) orally every 6 hours, As Needed (26 Jan 2023 17:18)  pregabalin 100 mg oral capsule: 1 cap(s) orally 2 times a day (26 Jan 2023 17:18)      VITAL SIGNS: Last 24 Hours  T(C): 36.2 (05 Feb 2023 04:46), Max: 36.6 (04 Feb 2023 21:08)  T(F): 97.2 (05 Feb 2023 04:46), Max: 97.9 (04 Feb 2023 21:08)  HR: 56 (05 Feb 2023 04:46) (56 - 68)  BP: 152/85 (05 Feb 2023 04:46) (151/71 - 159/74)  BP(mean): --  RR: 18 (05 Feb 2023 04:46) (18 - 18)  SpO2: 97% (04 Feb 2023 22:00) (97% - 97%)      LABS:                        12.4   5.86  )-----------( 301      ( 04 Feb 2023 07:56 )             40.4     02-04    141  |  101  |  16  ----------------------------<  269<H>  4.1   |  27  |  0.9    Ca    9.2      04 Feb 2023 07:56                      CAPILLARY BLOOD GLUCOSE          RADIOLOGY:    PHYSICAL EXAM:  GENERAL: NAD, AAO x 4, morbidly obese 57y M  HEAD:  Atraumatic, Normocephalic  EYES: EOMI, conjunctivae clear and sclerae white  NECK: Supple, No JVD  CHEST/LUNG: Clear to auscultation bilaterally; No wheeze; No crackles; No accessory muscles used  HEART: Regular rate and rhythm; No murmurs;   ABDOMEN: Soft, Nontender, Nondistended; Bowel sounds present; No guarding  EXTREMITIES:  2+ Peripheral Pulses, No cyanosis or edema  NEUROLOGY: non-focal    ADMISSION SUMMARY  Patient is a 57y old Male who presents with a chief complaint of Worsening PAin (27 Jan 2023 13:15)

## 2023-02-06 ENCOUNTER — TRANSCRIPTION ENCOUNTER (OUTPATIENT)
Age: 58
End: 2023-02-06

## 2023-02-06 VITALS — TEMPERATURE: 95 F

## 2023-02-06 PROCEDURE — 99239 HOSP IP/OBS DSCHRG MGMT >30: CPT

## 2023-02-06 PROCEDURE — 29581 APPL MULTLAYER CMPRN SYS LEG: CPT | Mod: 50,58

## 2023-02-06 RX ORDER — LEVOFLOXACIN 5 MG/ML
1 INJECTION, SOLUTION INTRAVENOUS
Qty: 5 | Refills: 0
Start: 2023-02-06 | End: 2023-02-10

## 2023-02-06 RX ORDER — ACETAMINOPHEN 500 MG
975 TABLET ORAL ONCE
Refills: 0 | Status: COMPLETED | OUTPATIENT
Start: 2023-02-06 | End: 2023-02-06

## 2023-02-06 RX ADMIN — Medication 100 MILLIGRAM(S): at 05:18

## 2023-02-06 RX ADMIN — Medication 1 PATCH: at 11:57

## 2023-02-06 RX ADMIN — HYDROMORPHONE HYDROCHLORIDE 2 MILLIGRAM(S): 2 INJECTION INTRAMUSCULAR; INTRAVENOUS; SUBCUTANEOUS at 10:24

## 2023-02-06 RX ADMIN — Medication 40 MILLIGRAM(S): at 05:18

## 2023-02-06 RX ADMIN — Medication 975 MILLIGRAM(S): at 08:44

## 2023-02-06 RX ADMIN — LOSARTAN POTASSIUM 100 MILLIGRAM(S): 100 TABLET, FILM COATED ORAL at 05:19

## 2023-02-06 RX ADMIN — MUPIROCIN 1 APPLICATION(S): 20 OINTMENT TOPICAL at 14:19

## 2023-02-06 RX ADMIN — Medication 1 TABLET(S): at 05:19

## 2023-02-06 RX ADMIN — HYDROMORPHONE HYDROCHLORIDE 2 MILLIGRAM(S): 2 INJECTION INTRAMUSCULAR; INTRAVENOUS; SUBCUTANEOUS at 05:20

## 2023-02-06 RX ADMIN — HYDROMORPHONE HYDROCHLORIDE 2 MILLIGRAM(S): 2 INJECTION INTRAMUSCULAR; INTRAVENOUS; SUBCUTANEOUS at 00:50

## 2023-02-06 RX ADMIN — METHOCARBAMOL 750 MILLIGRAM(S): 500 TABLET, FILM COATED ORAL at 05:18

## 2023-02-06 NOTE — PROGRESS NOTE ADULT - ASSESSMENT
ASSESSMENT  56 y/o male with hx of obesity, RUSSELL, HTN, DM (?diet controlled), back pain s/p spinal cord stimulator, venous stasis ulcers x 3 years was seen by podiatry this morning and was sent to ED for further management of bilateral LE ulcers    IMPRESSION  #Venous Stasis bilateral Ulcers with cellulitis   - Wound Cx 7/19/2020  Pseudomonas aeruginosa   - s/p debridement 1/30 - Wound Cx with polymicrobiral growth     #Obesity BMI (kg/m2): 58.3  #Abx allergy: IV Contrast (Sneezing (Mod to Severe))  penicillin (Unknown) - rash as child -- tolerated cefepime previously     RECOMMENDATIONS  - PO levofloxacin 750 mg daily and PO Augmentin 875/125 mg BID-   - plan until 2/11   - local wound care     Please call or message on Microsoft Teams if with any questions.  Spectra [phone]

## 2023-02-06 NOTE — DISCHARGE NOTE PROVIDER - HOSPITAL COURSE
56 y/o male with hx of obesity, DM (?diet controlled), RUSSELL, HTN, back pain s/p spinal cord stimulator, venous stasis ulcers x 3 years was seen by podiatry this morning and was sent for further management of bilateral LE ulcers.    # Bilateral LE cellulitis with ulceration  # hx venous stasis  - CT Lower Extremity w/ IV Cont, Right (01.26.23 @ 14:45): Redemonstrated cellulitis of the leg with extensive subcutaneous edema and skin thickening without drainable fluid collection.  - Xray Tibia + Fibula 2 Views, Bilateral (01.26.23 @ 12:04): There is suggestion of prominent soft tissue and possible air within the urrounding soft tissues.  - s/p Azactam/Vanco  - blood cx x 2 sets NGTD  - b/l LE venous duplex neg for DVT  - Analgesia PRN  - podiatry following, s/p OR; no further surgical intervention at this time but monitor over weekend for now, Pending Bx  - ID consult appreciated: PO levofloxacin 750 mg daily and PO Augmentin 875/125 mg BID- plan until 2/11       Dispo: DC home with wound care and follow up with PCP and podiatry 56 y/o male with hx of obesity, DM (?diet controlled), RUSSELL, HTN, back pain s/p spinal cord stimulator, venous stasis ulcers x 3 years was seen by podiatry this morning and was sent for further management of bilateral LE ulcers.    # Bilateral LE cellulitis with ulceration  # hx venous stasis  - CT Lower Extremity w/ IV Cont, Right (01.26.23 @ 14:45): Redemonstrated cellulitis of the leg with extensive subcutaneous edema and skin thickening without drainable fluid collection.  - Xray Tibia + Fibula 2 Views, Bilateral (01.26.23 @ 12:04): There is suggestion of prominent soft tissue and possible air within the urrounding soft tissues.  - s/p Azactam/Vanco  - blood cx x 2 sets NGTD  - b/l LE venous duplex neg for DVT  - Analgesia PRN  - podiatry following, s/p OR; no further surgical intervention at this time but monitor over weekend for now, Pending Bx  - ID consult appreciated: PO levofloxacin 750 mg daily and PO Augmentin 875/125 mg BID- plan until 2/11     Wounds Cleansed w/NS; Dressed w/Mupirocin / Xeroform / DSD / ABD / Kerlix / Light ACE; Q24 - multilayer compression dressing   Compression B/L LE  Keep B/L LE elevated   Wound Care: Wash with wound wash or saline and apply HONG/ABD/Kerlix/ACE Daily     Dispo: DC home with wound care and follow up with PCP and podiatry

## 2023-02-06 NOTE — DISCHARGE NOTE NURSING/CASE MANAGEMENT/SOCIAL WORK - NSDCPEEMAIL_GEN_ALL_CORE
[facility] for Tobacco Control email [email or web address]

## 2023-02-06 NOTE — DISCHARGE NOTE PROVIDER - NSDCFUSCHEDAPPT_GEN_ALL_CORE_FT
Darin Pat  [facility] PreAdmits  Scheduled Appointment: 03/09/2023    [facility] Physician [facility]  SLEEPL[facility] Karla   Scheduled Appointment: 03/09/2023

## 2023-02-06 NOTE — PROGRESS NOTE ADULT - ASSESSMENT
58 y/o male with hx of obesity, DM (?diet controlled), RUSSELL, HTN, back pain s/p spinal cord stimulator, venous stasis ulcers x 3 years was seen by podiatry this morning and was sent for further management of bilateral LE ulcers.    # Bilateral LE cellulitis with ulceration  # hx venous stasis  - CT Lower Extremity w/ IV Cont, Right (01.26.23 @ 14:45): Redemonstrated cellulitis of the leg with extensive subcutaneous edema and skin thickening without drainable fluid collection.  - Xray Tibia + Fibula 2 Views, Bilateral (01.26.23 @ 12:04): There is suggestion of prominent soft tissue and possible air within the urrounding soft tissues.  - s/p Azactam/Vanco  - blood cx x 2 sets NGTD  - b/l LE venous duplex neg for DVT  - Analgesia PRN  - podiatry following, s/p OR; no further surgical intervention at this time but monitor over weekend for now, Pending Bx  - ID consult appreciated: c/w PO levofloxacin 750 mg daily and PO augmentin 875/125 mg BID (pt tolerated ampicillin well with no rash)     # L neck pain radiating to head likely muscle spasm   - CT head shows no acute pathology  - will restart methocarbamol which pt takes at home  - pain control PRN  - if no improvement, will obtain neuro consult     # HTN  - monitor BP  - continue losartan    # RUSSELL  - not on bipap   - avap qhs, TV-450, EPAP-8, RR-14, minIPAP-12, maxIPAP-25    # Back pain   - s/p spinal cord stimulator  - pain management consult appreciated    # DM  - reportedly diet controlled  - monitor FS  - carb consistent diet    # Obesity  - diet modification counseling done    # Would care pre podiatry    DVT ppx: lovenox  GI ppx: PPI    Dispo planning, pending finalized cultures, Monitor wound over the weekend and wait for cultures per podiatry. Continue PO levofloxacin PO and augmentin POfor now. Pain control has been challenging; patient only wants his wife to change his dressings. Monitor headache as well.

## 2023-02-06 NOTE — DISCHARGE NOTE PROVIDER - ATTENDING DISCHARGE PHYSICAL EXAMINATION:
GENERAL: NAD, AAO x 4, morbidly obese 57y M  HEAD:  Atraumatic, Normocephalic  EYES: EOMI, conjunctivae clear and sclerae white  NECK: Supple, No JVD  CHEST/LUNG: Clear to auscultation bilaterally; No wheeze; No crackles; No accessory muscles used  HEART: Regular rate and rhythm; No murmurs;   ABDOMEN: Soft, Nontender, Nondistended; Bowel sounds present; No guarding  EXTREMITIES:  2+ Peripheral Pulses, No cyanosis or edema  NEUROLOGY: non-focal

## 2023-02-06 NOTE — DISCHARGE NOTE PROVIDER - NSDCCAREPROVSEEN_GEN_ALL_CORE_FT
Levi Sanchez, Luis Daniel Sheth, Spenser Galeana, Nico Talbert, Tk Mendenhall, Madhavi Marshall, Radha Vila, Simran Johns, Dhiraj Andrew, Yamileth Mann, Rafael Reveles, Chiquita Prado, Nicholas Avelar, Ronaldo Jennings, David Marsh, Jerrell Lambert, Fernando

## 2023-02-06 NOTE — PROGRESS NOTE ADULT - SUBJECTIVE AND OBJECTIVE BOX
ONEALJOSE ALBERTO  57y, Male  Allergy: IV Contrast (Sneezing (Mod to Severe))  penicillin (Unknown)      LOS  11d    CHIEF COMPLAINT: Worsening PAin (27 Jan 2023 13:15)      INTERVAL EVENTS/HPI  - No acute events overnight  - T(F): , Max: 97.8 (02-05-23 @ 14:10)  - deferred wound dressing change now -- discussed with wife -- no worsening drainage noted   - WBC Count: 5.24 (02-05-23 @ 08:06)  WBC Count: 5.86 (02-04-23 @ 07:56)     - Creatinine, Serum: 0.9 (02-05-23 @ 08:06)       ROS  General: Denies rigors, nightsweats  HEENT: Denies headache, rhinorrhea, sore throat, eye pain  CV: Denies CP, palpitations  PULM: Denies wheezing, hemoptysis  GI: Denies hematemesis, hematochezia, melena  : Denies discharge, hematuria  MSK: Denies arthralgias, myalgias  SKIN: Denies rash, lesions  NEURO: Denies paresthesias, weakness  PSYCH: Denies depression, anxiety    VITALS:  T(F): 96.8, Max: 97.8 (02-05-23 @ 14:10)  HR: 60  BP: 160/71  RR: 16Vital Signs Last 24 Hrs  T(C): 36 (06 Feb 2023 05:33), Max: 36.6 (05 Feb 2023 14:10)  T(F): 96.8 (06 Feb 2023 05:33), Max: 97.8 (05 Feb 2023 14:10)  HR: 60 (06 Feb 2023 05:33) (60 - 77)  BP: 160/71 (06 Feb 2023 10:16) (140/76 - 181/93)  BP(mean): --  RR: 16 (06 Feb 2023 05:33) (16 - 18)  SpO2: --        PHYSICAL EXAM:  Gen: NAD, resting in bed  HEENT: Normocephalic, atraumatic  Neck: supple, no lymphadenopathy  CV: Regular rate & regular rhythm  Lungs: decreased BS at bases, no fremitus  Abdomen: Soft, BS present  Ext: Warm, well perfused  Neuro: non focal, awake  Skin: no rash, no erythema  Lines: no phlebitis    FH: Non-contributory  Social Hx: Non-contributory    TESTS & MEASUREMENTS:                        12.8   5.24  )-----------( 302      ( 05 Feb 2023 08:06 )             42.9     02-05    137  |  100  |  17  ----------------------------<  159<H>  4.5   |  30  |  0.9    Ca    9.4      05 Feb 2023 08:06  Mg     2.0     02-05    TPro  7.2  /  Alb  4.1  /  TBili  0.3  /  DBili  x   /  AST  14  /  ALT  18  /  AlkPhos  100  02-05      LIVER FUNCTIONS - ( 05 Feb 2023 08:06 )  Alb: 4.1 g/dL / Pro: 7.2 g/dL / ALK PHOS: 100 U/L / ALT: 18 U/L / AST: 14 U/L / GGT: x               Culture - Blood (collected 02-04-23 @ 16:02)  Source: .Blood None  Preliminary Report (02-06-23 @ 01:02):    No growth to date.    Culture - Other (collected 01-30-23 @ 17:06)  Source: Wound LEFT CALF WOUND  Final Report (02-03-23 @ 17:04):    Numerous Acinetobacter baumannii/nosocomialis group    Numerous Enterococcus faecalis  Organism: Acinetobacter baumannii/nosocomialis group  Enterococcus faecalis (02-03-23 @ 16:58)  Organism: Enterococcus faecalis (02-03-23 @ 16:58)      -  Ampicillin: S <=2 Predicts results to ampicillin/sulbactam, amoxacillin-clavulanate and  piperacillin-tazobactam.      -  Tetracycline: R >8      -  Vancomycin: S 2      Method Type: JAYCOB  Organism: Acinetobacter baumannii/nosocomialis group (02-03-23 @ 16:58)      -  Amikacin: S <=16      -  Ampicillin/Sulbactam: S 8/4      -  Cefepime: S 4      -  Ceftazidime: S 4      -  Ciprofloxacin: S <=0.25      -  Gentamicin: S <=2      -  Imipenem: S <=1      -  Levofloxacin: S <=0.5      -  Meropenem: S <=1      -  Tobramycin: S <=2      -  Trimethoprim/Sulfamethoxazole: S <=0.5/9.5      Method Type: JAYCOB    Culture - Other (collected 01-30-23 @ 17:05)  Source: Wound RIGHT CALF WOUND  Final Report (02-03-23 @ 23:40):    Few Alcaligenes faecalis    Numerous Enterococcus faecalis    Numerous Corynebacterium species "Susceptibilities not performed"  Organism: Alcaligenes faecalis  Enterococcus faecalis (02-03-23 @ 23:40)  Organism: Enterococcus faecalis (02-03-23 @ 23:40)      -  Ampicillin: S <=2 Predicts results to ampicillin/sulbactam, amoxacillin-clavulanate and  piperacillin-tazobactam.      -  Tetracycline: R >8      -  Vancomycin: S 2      Method Type: JAYCOB  Organism: Alcaligenes faecalis (02-03-23 @ 23:40)      -  Amikacin: S <=16      -  Aztreonam: I 16      -  Cefepime: S 4      -  Ceftriaxone: S <=1      -  Ciprofloxacin: S 1      -  Gentamicin: S <=2      -  Levofloxacin: S <=0.5      -  Meropenem: S <=1      -  Piperacillin/Tazobactam: S <=8      -  Tobramycin: S <=2      -  Trimethoprim/Sulfamethoxazole: S <=0.5/9.5      Method Type: JAYCOB    Culture - Blood (collected 01-26-23 @ 12:28)  Source: .Blood Blood  Final Report (01-31-23 @ 23:01):    No Growth Final    Culture - Blood (collected 01-26-23 @ 12:28)  Source: .Blood Blood-Peripheral  Final Report (02-01-23 @ 01:01):    No Growth Final            INFECTIOUS DISEASES TESTING  COVID-19 PCR: NotDetec (01-26-23 @ 12:30)  COVID-19 PCR: NotDetec (08-29-22 @ 20:15)      INFLAMMATORY MARKERS  Sedimentation Rate, Erythrocyte: 29 mm/Hr (01-26-23 @ 12:28)  C-Reactive Protein, Serum: 47 mg/L (01-26-23 @ 12:28)  Sedimentation Rate, Erythrocyte: 25 mm/Hr (08-31-22 @ 06:14)  C-Reactive Protein, Serum: 32 mg/L (08-31-22 @ 06:14)      RADIOLOGY & ADDITIONAL TESTS:  I have personally reviewed the last available Chest xray  CXR      CT      CARDIOLOGY TESTING  12 Lead ECG:   Ventricular Rate 89 BPM    Atrial Rate 89 BPM    P-R Interval 206 ms    QRS Duration 88 ms    Q-T Interval 350 ms    QTC Calculation(Bazett) 425 ms    P Axis 66 degrees    R Axis 38 degrees    T Axis 3 degrees    Diagnosis Line Normal sinus rhythm  Nonspecific T wave abnormality  Abnormal ECG    Confirmed by Sammy Borja ([id number]) on 1/27/2023 2:34:46 PM (01-27-23 @ 09:41)      MEDICATIONS  acetaminophen     Tablet .. 650 Oral every 6 hours  amoxicillin  875 milliGRAM(s)/clavulanate 1 Oral two times a day  enoxaparin Injectable 40 SubCutaneous every 12 hours  furosemide    Tablet 40 Oral daily  levoFLOXacin  Tablet 750 Oral every 24 hours  losartan 100 Oral daily  methocarbamol 750 Oral two times a day  mupirocin 2% Ointment 1 Topical <User Schedule>  nicotine - 21 mG/24Hr(s) Patch 1 Transdermal daily  oxyCODONE  ER Tablet 20 Oral every 8 hours  pregabalin 100 Oral two times a day      WEIGHT  Weight (kg): 235 (01-30-23 @ 16:15)      ANTIBIOTICS:  amoxicillin  875 milliGRAM(s)/clavulanate 1 Tablet(s) Oral two times a day  levoFLOXacin  Tablet 750 milliGRAM(s) Oral every 24 hours      All available historical records have been reviewed

## 2023-02-06 NOTE — DISCHARGE NOTE NURSING/CASE MANAGEMENT/SOCIAL WORK - NSDCPEWEB_GEN_ALL_CORE
[facility] for Tobacco Control website --- [email or web address] --- [email or web address]

## 2023-02-06 NOTE — DISCHARGE NOTE NURSING/CASE MANAGEMENT/SOCIAL WORK - PATIENT PORTAL LINK FT
You can access the FollowMyHealth Patient Portal offered by [facility] by registering at the following website: [email or web address]. By joining [employer]’s FollowMyHealth portal, you will also be able to view your health information using other applications (apps) compatible with our system.

## 2023-02-06 NOTE — PROGRESS NOTE ADULT - SUBJECTIVE AND OBJECTIVE BOX
PROGRESS NOTE   Pt seen @ bedside during PM rounds. Dressing +Clean, +Dry, + Intact. pain controlled with meds       Vital Signs Last 24 Hrs  T(C): 35.1 (06 Feb 2023 13:22), Max: 36 (06 Feb 2023 05:33)  T(F): 95.1 (06 Feb 2023 13:22), Max: 96.8 (06 Feb 2023 05:33)  HR: 60 (06 Feb 2023 05:33) (60 - 75)  BP: 160/71 (06 Feb 2023 10:16) (140/76 - 181/93)  BP(mean): --  RR: 16 (06 Feb 2023 05:33) (16 - 18)  SpO2: --                              12.8   5.24  )-----------( 302      ( 05 Feb 2023 08:06 )             42.9               02-05    137  |  100  |  17  ----------------------------<  159<H>  4.5   |  30  |  0.9    Ca    9.4      05 Feb 2023 08:06  Mg     2.0     02-05    TPro  7.2  /  Alb  4.1  /  TBili  0.3  /  DBili  x   /  AST  14  /  ALT  18  /  AlkPhos  100  02-05        Physical Exam - Lower Extremity Focused:   Derm:   Bilateral ankle venous ulcer on lateral aspect; about 10cm x 12cm in size superficial ulcer on RLE, 8cm x 11cm on LLE; both ulcers with granular wound base, no purulence was noted; malodorous; Mild serosanguinous drainage. Hemosiderin discoloration B/L LE. Rolled hypertrophic borders B/L wounds  Vascular: DP and PT Pulses Diminished; Severe bilateral lower extremities edema noted;   Neuro: Severe to moderate pain B/L LE and wounds    Assessment:  Venous stasis ulcer, lateral ankle, B/L  s/p Excisional Debridement of Soft Tissue w/Soft Tissue Biopsy, B/L Leg  pain B/L LE - controlled with pain meds     Plan:  Chart reviewed and Patient evaluated. All Questions and Concerns Addressed and Answered  Discussed diagnosis and treatment with patient and his wife   Wounds Cleansed w/NS; Dressed w/Mupirocin / Xeroform / DSD / ABD / Kerlix / Light ACE; Q24 - multilayer compression dressing   Compression B/L LE  Keep B/L LE elevated   Wound Care: Wash with wound wash or saline and apply HONG/ABD/Kerlix/ACE Daily   Abx as per ID  skin biopsy - will follow with as o/p for results   Weight bearing status; WBAT BL feet;   Stable for d/c per podiatry

## 2023-02-06 NOTE — DISCHARGE NOTE PROVIDER - NSDCCPCAREPLAN_GEN_ALL_CORE_FT
PRINCIPAL DISCHARGE DIAGNOSIS  Diagnosis: Venous stasis ulcer of lower extremity without varicose veins  Assessment and Plan of Treatment: You were seen by podiatry. You received antiobitcs and debridement of you uclers.   Please follow up with your podiatrist      SECONDARY DISCHARGE DIAGNOSES  Diagnosis: Cellulitis of lower leg  Assessment and Plan of Treatment: You were seen by ID for cellulitis. You were started on IV antibiotics then transition to PO antibiotics until 2/11.  Please follow up with your PCP in 1 week

## 2023-02-06 NOTE — DISCHARGE NOTE PROVIDER - CARE PROVIDER_API CALL
Fernando Lambert (DPM)  Surgery  General SurgN  [address] [place] [id number]  Phone: [phone]  Fax: [phone]  Follow Up Time: Routine

## 2023-02-06 NOTE — PROGRESS NOTE ADULT - PROVIDER SPECIALTY LIST ADULT
Hospitalist
Podiatry
Hospitalist
Infectious Disease
Infectious Disease
Podiatry
Podiatry
Hospitalist
Infectious Disease

## 2023-02-06 NOTE — PROGRESS NOTE ADULT - TIME BILLING
I have personally seen and examined this patient.    I have reviewed all pertinent clinical information and reviewed all relevant imaging and diagnostic studies personally.   I counseled the patient about diagnostic testing and treatment plan. All questions were answered.   I discussed recommendations with the primary team.
Severity and complexity of pathology and treatment plan discussed in details
I have personally seen and examined this patient.    I have reviewed all pertinent clinical information and reviewed all relevant imaging and diagnostic studies personally.   I counseled the patient about diagnostic testing and treatment plan. All questions were answered.   I discussed recommendations with the primary team.
I have personally seen and examined this patient.    I have reviewed all pertinent clinical information and reviewed all relevant imaging and diagnostic studies personally.   I counseled the patient about diagnostic testing and treatment plan. All questions were answered.   I discussed recommendations with the primary team.

## 2023-02-06 NOTE — DISCHARGE NOTE PROVIDER - NSDCMRMEDTOKEN_GEN_ALL_CORE_FT
amoxicillin-clavulanate 875 mg-125 mg oral tablet: 1 tab(s) orally 2 times a day  furosemide 40 mg oral tablet: 1 tab(s) orally once a day  levoFLOXacin 750 mg oral tablet: 1 tab(s) orally every 24 hours  losartan 100 mg oral tablet: 1 tab(s) orally once a day  meloxicam 15 mg oral tablet: 1 tab(s) orally once a day  morphine 30 mg oral tablet: 1 tab(s) orally 3 times a day, As Needed  Percocet 10/325 oral tablet: 1 tab(s) orally every 6 hours, As Needed  pregabalin 100 mg oral capsule: 1 cap(s) orally 2 times a day

## 2023-02-06 NOTE — PROGRESS NOTE ADULT - SUBJECTIVE AND OBJECTIVE BOX
PROGRESS NOTE:   Spenser Sheth MD  Available on teams    Patient is a 57y old  Male who presents with a chief complaint of Worsening PAin (27 Jan 2023 13:15)      SUBJECTIVE / OVERNIGHT EVENTS:    ADDITIONAL REVIEW OF SYSTEMS:    MEDICATIONS  (STANDING):  acetaminophen     Tablet .. 650 milliGRAM(s) Oral every 6 hours  amoxicillin  875 milliGRAM(s)/clavulanate 1 Tablet(s) Oral two times a day  enoxaparin Injectable 40 milliGRAM(s) SubCutaneous every 12 hours  furosemide    Tablet 40 milliGRAM(s) Oral daily  levoFLOXacin  Tablet 750 milliGRAM(s) Oral every 24 hours  losartan 100 milliGRAM(s) Oral daily  methocarbamol 750 milliGRAM(s) Oral two times a day  mupirocin 2% Ointment 1 Application(s) Topical <User Schedule>  nicotine - 21 mG/24Hr(s) Patch 1 Patch Transdermal daily  oxyCODONE  ER Tablet 20 milliGRAM(s) Oral every 8 hours  pregabalin 100 milliGRAM(s) Oral two times a day    MEDICATIONS  (PRN):  diphenhydrAMINE 50 milliGRAM(s) Oral every 6 hours PRN Rash and/or Itching  HYDROmorphone  Injectable 2 milliGRAM(s) IV Push every 4 hours PRN Severe Pain (7 - 10)  ondansetron Injectable 4 milliGRAM(s) IV Push every 4 hours PRN Nausea and/or Vomiting  oxycodone    5 mG/acetaminophen 325 mG 2 Tablet(s) Oral every 6 hours PRN Moderate Pain (4 - 6)      CAPILLARY BLOOD GLUCOSE        I&O's Summary      PHYSICAL EXAM:  Vital Signs Last 24 Hrs  T(C): 36 (06 Feb 2023 05:33), Max: 36.6 (05 Feb 2023 14:10)  T(F): 96.8 (06 Feb 2023 05:33), Max: 97.8 (05 Feb 2023 14:10)  HR: 60 (06 Feb 2023 05:33) (60 - 77)  BP: 181/93 (06 Feb 2023 05:33) (140/76 - 181/93)  BP(mean): --  RR: 16 (06 Feb 2023 05:33) (16 - 18)  SpO2: --      GENERAL: NAD, AAO x 4, morbidly obese 57y M  HEAD:  Atraumatic, Normocephalic  EYES: EOMI, conjunctivae clear and sclerae white  NECK: Supple, No JVD  CHEST/LUNG: Clear to auscultation bilaterally; No wheeze; No crackles; No accessory muscles used  HEART: Regular rate and rhythm; No murmurs;   ABDOMEN: Soft, Nontender, Nondistended; Bowel sounds present; No guarding  EXTREMITIES:  2+ Peripheral Pulses, No cyanosis or edema  NEUROLOGY: non-focal    LABS:                        12.8   5.24  )-----------( 302      ( 05 Feb 2023 08:06 )             42.9     02-05    137  |  100  |  17  ----------------------------<  159<H>  4.5   |  30  |  0.9    Ca    9.4      05 Feb 2023 08:06  Mg     2.0     02-05    TPro  7.2  /  Alb  4.1  /  TBili  0.3  /  DBili  x   /  AST  14  /  ALT  18  /  AlkPhos  100  02-05              Culture - Blood (collected 04 Feb 2023 16:02)  Source: .Blood None  Preliminary Report (06 Feb 2023 01:02):    No growth to date.        RADIOLOGY & ADDITIONAL TESTS:  Results Reviewed:   Imaging Personally Reviewed:  Electrocardiogram Personally Reviewed:    COORDINATION OF CARE:  Care Discussed with Consultants/Other Providers [Y/N]:  Prior or Outpatient Records Reviewed [Y/N]:

## 2023-02-16 ENCOUNTER — APPOINTMENT (OUTPATIENT)
Dept: PODIATRY | Facility: CLINIC | Age: 58
End: 2023-02-16
Payer: MEDICAID

## 2023-02-16 ENCOUNTER — OUTPATIENT (OUTPATIENT)
Dept: OUTPATIENT SERVICES | Facility: HOSPITAL | Age: 58
LOS: 1 days | End: 2023-02-16
Payer: MEDICARE

## 2023-02-16 DIAGNOSIS — Z98.890 OTHER SPECIFIED POSTPROCEDURAL STATES: Chronic | ICD-10-CM

## 2023-02-16 DIAGNOSIS — M79.675 PAIN IN RIGHT TOE(S): ICD-10-CM

## 2023-02-16 DIAGNOSIS — L60.3 NAIL DYSTROPHY: ICD-10-CM

## 2023-02-16 DIAGNOSIS — Z00.00 ENCOUNTER FOR GENERAL ADULT MEDICAL EXAMINATION WITHOUT ABNORMAL FINDINGS: ICD-10-CM

## 2023-02-16 DIAGNOSIS — B35.1 TINEA UNGUIUM: ICD-10-CM

## 2023-02-16 DIAGNOSIS — I73.9 PERIPHERAL VASCULAR DISEASE, UNSPECIFIED: ICD-10-CM

## 2023-02-16 DIAGNOSIS — M79.674 PAIN IN RIGHT TOE(S): ICD-10-CM

## 2023-02-16 DIAGNOSIS — Z96.89 PRESENCE OF OTHER SPECIFIED FUNCTIONAL IMPLANTS: Chronic | ICD-10-CM

## 2023-02-16 PROCEDURE — 11721 DEBRIDE NAIL 6 OR MORE: CPT | Mod: 59

## 2023-02-16 PROCEDURE — 99214 OFFICE O/P EST MOD 30 MIN: CPT | Mod: 25,24

## 2023-02-16 PROCEDURE — 11045 DBRDMT SUBQ TISS EACH ADDL: CPT

## 2023-02-16 PROCEDURE — 11042 DBRDMT SUBQ TIS 1ST 20SQCM/<: CPT

## 2023-02-16 PROCEDURE — 11042 DBRDMT SUBQ TIS 1ST 20SQCM/<: CPT | Mod: 50

## 2023-02-16 PROCEDURE — 11045 DBRDMT SUBQ TISS EACH ADDL: CPT | Mod: 50

## 2023-02-16 PROCEDURE — 99214 OFFICE O/P EST MOD 30 MIN: CPT | Mod: 25,24,95

## 2023-02-16 PROCEDURE — 11721 DEBRIDE NAIL 6 OR MORE: CPT | Mod: 50

## 2023-02-16 PROCEDURE — 29581 APPL MULTLAYER CMPRN SYS LEG: CPT | Mod: 50

## 2023-02-16 NOTE — HISTORY OF PRESENT ILLNESS
[Sneakers] : josefina [FreeTextEntry1] : - 57M presents to clinic c/o dressing change\par - Wife has been changing dressings every day\par - Dressing w/HONG\par - B/L LE Venous stasis wounds\par - Failed unna boot therapy\par - Very painful\par - S/p dbx and skin punch biopsy \par - D/c from hospital

## 2023-02-16 NOTE — PHYSICAL EXAM
[Ankle Swelling (On Exam)] : present [Varicose Veins Of Lower Extremities] : bilaterally [Ankle Swelling Bilaterally] : severe [Delayed in the Right Toes] : capillary refills normal in right toes [Delayed in the Left Toes] : capillary refills normal in the left toes [FreeTextEntry3] : Non palpable B/L pedal pulses secondary to Edema [] : normal strength/tone [FreeTextEntry1] : RLE wound 12. cm x 10.cm (circumferential) down to fat layer and subcutaneous tissue. malodor present. severe serous drainage\par LLE wound 8cm x 7cm (circumferential) down to fat layer and subcutaneous tissue. No malodor present. severe serous drainage\par Hemosiderin discoloration on the periwound skin B/L\par Very painful on palpation \par Xerosis B/L LE \par Elongated nails with subungual debris and painful on palpation x10  [Vibration Dec.] : diminished vibratory sensation at the level of the toes [Diminished Throughout Right Foot] : diminished sensation with monofilament testing throughout right foot [Diminished Throughout Left Foot] : diminished sensation with monofilament testing throughout left foot

## 2023-02-16 NOTE — ASSESSMENT
[FreeTextEntry1] : Assessment:\par -Venous Stasis Wounds, B/L LE\par - B/L LE edema \par - Elongated mycotic  painful nails x10 \par - PAD \par Plan:\par - Findings of skin biopsy were reviewed - See report \par -Dbx of wound down to subcutaneous level (Size above) \par -Dressed HONG Xeroform   ABD / ACE to Knee; B/L LE - Multilayer compression dressing applied B/L LE \par - Dbx of nails x10\par - RTO 4 weeks or sooner if any problems arise  [Verbal] : verbal [Patient] : patient [Good - alert, interested, motivated] : Good - alert, interested, motivated [Demonstrates independently] : demonstrates independently [Dressing changes] : dressing changes [Foot Care] : foot care

## 2023-02-17 DIAGNOSIS — I83.019 VARICOSE VEINS OF RIGHT LOWER EXTREMITY WITH ULCER OF UNSPECIFIED SITE: ICD-10-CM

## 2023-02-17 DIAGNOSIS — I87.2 VENOUS INSUFFICIENCY (CHRONIC) (PERIPHERAL): ICD-10-CM

## 2023-02-17 DIAGNOSIS — L97.822 NON-PRESSURE CHRONIC ULCER OF OTHER PART OF LEFT LOWER LEG WITH FAT LAYER EXPOSED: ICD-10-CM

## 2023-02-17 DIAGNOSIS — L60.3 NAIL DYSTROPHY: ICD-10-CM

## 2023-02-17 DIAGNOSIS — I73.9 PERIPHERAL VASCULAR DISEASE, UNSPECIFIED: ICD-10-CM

## 2023-02-17 DIAGNOSIS — L97.812 NON-PRESSURE CHRONIC ULCER OF OTHER PART OF RIGHT LOWER LEG WITH FAT LAYER EXPOSED: ICD-10-CM

## 2023-02-17 DIAGNOSIS — M79.674 PAIN IN RIGHT TOE(S): ICD-10-CM

## 2023-02-17 DIAGNOSIS — M79.89 OTHER SPECIFIED SOFT TISSUE DISORDERS: ICD-10-CM

## 2023-02-17 DIAGNOSIS — I83.029 VARICOSE VEINS OF LEFT LOWER EXTREMITY WITH ULCER OF UNSPECIFIED SITE: ICD-10-CM

## 2023-03-08 ENCOUNTER — OUTPATIENT (OUTPATIENT)
Dept: OUTPATIENT SERVICES | Facility: HOSPITAL | Age: 58
LOS: 1 days | End: 2023-03-08
Payer: MEDICARE

## 2023-03-08 ENCOUNTER — APPOINTMENT (OUTPATIENT)
Dept: PODIATRY | Facility: CLINIC | Age: 58
End: 2023-03-08
Payer: MEDICARE

## 2023-03-08 DIAGNOSIS — Z96.89 PRESENCE OF OTHER SPECIFIED FUNCTIONAL IMPLANTS: Chronic | ICD-10-CM

## 2023-03-08 DIAGNOSIS — Z98.890 OTHER SPECIFIED POSTPROCEDURAL STATES: Chronic | ICD-10-CM

## 2023-03-08 DIAGNOSIS — L03.115 CELLULITIS OF RIGHT LOWER LIMB: ICD-10-CM

## 2023-03-08 DIAGNOSIS — Z00.00 ENCOUNTER FOR GENERAL ADULT MEDICAL EXAMINATION WITHOUT ABNORMAL FINDINGS: ICD-10-CM

## 2023-03-08 PROCEDURE — 99215 OFFICE O/P EST HI 40 MIN: CPT | Mod: 25,24

## 2023-03-08 PROCEDURE — 11045 DBRDMT SUBQ TISS EACH ADDL: CPT

## 2023-03-08 PROCEDURE — 11045 DBRDMT SUBQ TISS EACH ADDL: CPT | Mod: 59

## 2023-03-08 PROCEDURE — 99213 OFFICE O/P EST LOW 20 MIN: CPT

## 2023-03-08 PROCEDURE — 11042 DBRDMT SUBQ TIS 1ST 20SQCM/<: CPT | Mod: 59

## 2023-03-08 PROCEDURE — 29581 APPL MULTLAYER CMPRN SYS LEG: CPT

## 2023-03-08 PROCEDURE — 99215 OFFICE O/P EST HI 40 MIN: CPT | Mod: 25,95

## 2023-03-08 PROCEDURE — 11042 DBRDMT SUBQ TIS 1ST 20SQCM/<: CPT

## 2023-03-08 PROCEDURE — 87186 SC STD MICRODIL/AGAR DIL: CPT

## 2023-03-08 PROCEDURE — 87077 CULTURE AEROBIC IDENTIFY: CPT

## 2023-03-08 PROCEDURE — 29581 APPL MULTLAYER CMPRN SYS LEG: CPT | Mod: 50,59

## 2023-03-08 PROCEDURE — 87070 CULTURE OTHR SPECIMN AEROBIC: CPT

## 2023-03-08 RX ORDER — LIDOCAINE 5 G/100G
5 OINTMENT TOPICAL
Qty: 1 | Refills: 2 | Status: ACTIVE | COMMUNITY
Start: 2023-03-08 | End: 1900-01-01

## 2023-03-09 ENCOUNTER — APPOINTMENT (OUTPATIENT)
Dept: SLEEP CENTER | Facility: HOSPITAL | Age: 58
End: 2023-03-09

## 2023-03-09 NOTE — ASSESSMENT
[FreeTextEntry1] : Assessment:\par -Venous Stasis Wounds, B/L LE\par - B/L LE edema \par - Cellulites RLE\par - Pain RLE\par Plan:\par -Dbx of wound down to subcutaneous level (Size above) \par -Wound cultures\par -Rx Abx \par -Dressed HONG Xeroform   ABD / ACE to Knee; B/L LE - Multilayer compression dressing applied B/L LE \par - Rx Lidocaine gel, apply to the wound for pain relief \par - RTO 2 weeks or sooner if any problems arise  [Verbal] : verbal [Patient] : patient [Good - alert, interested, motivated] : Good - alert, interested, motivated [Demonstrates independently] : demonstrates independently [Dressing changes] : dressing changes [Foot Care] : foot care

## 2023-03-09 NOTE — HISTORY OF PRESENT ILLNESS
[Sneakers] : josefina [FreeTextEntry1] : B/L Venous stasis wounds \par - Wife has been changing dressings every day\par - Dressing w/HONG\par - B/L LE Venous stasis wounds\par - Noticed malodor a couple days ago\par - Failed unna boot therapy\par - Very painful\par - S/p dbx and skin punch biopsy

## 2023-03-09 NOTE — PHYSICAL EXAM
[Ankle Swelling (On Exam)] : present [Varicose Veins Of Lower Extremities] : bilaterally [Ankle Swelling Bilaterally] : severe [Delayed in the Right Toes] : capillary refills normal in right toes [Delayed in the Left Toes] : capillary refills normal in the left toes [FreeTextEntry3] : Non palpable B/L pedal pulses secondary to Edema [] : normal strength/tone [FreeTextEntry1] : RLE wound 11. cm x 10.cm (circumferential) down to fat layer and subcutaneous tissue. malodor present. severe serous drainage. greenish biofilm present. periwound warmth \par LLE wound 7cm x 7cm (circumferential) down to fat layer and subcutaneous tissue. No malodor present. severe serous drainage, epithelialization around the wound and the base \par Hemosiderin discoloration on the periwound skin B/L\par Very painful on palpation \par Xerosis B/L LE  [Vibration Dec.] : diminished vibratory sensation at the level of the toes [Diminished Throughout Right Foot] : diminished sensation with monofilament testing throughout right foot [Diminished Throughout Left Foot] : diminished sensation with monofilament testing throughout left foot

## 2023-03-10 DIAGNOSIS — I83.019 VARICOSE VEINS OF RIGHT LOWER EXTREMITY WITH ULCER OF UNSPECIFIED SITE: ICD-10-CM

## 2023-03-10 DIAGNOSIS — L97.812 NON-PRESSURE CHRONIC ULCER OF OTHER PART OF RIGHT LOWER LEG WITH FAT LAYER EXPOSED: ICD-10-CM

## 2023-03-10 DIAGNOSIS — M79.89 OTHER SPECIFIED SOFT TISSUE DISORDERS: ICD-10-CM

## 2023-03-10 DIAGNOSIS — L97.822 NON-PRESSURE CHRONIC ULCER OF OTHER PART OF LEFT LOWER LEG WITH FAT LAYER EXPOSED: ICD-10-CM

## 2023-03-10 DIAGNOSIS — I83.029 VARICOSE VEINS OF LEFT LOWER EXTREMITY WITH ULCER OF UNSPECIFIED SITE: ICD-10-CM

## 2023-03-10 DIAGNOSIS — M79.606 PAIN IN LEG, UNSPECIFIED: ICD-10-CM

## 2023-03-10 DIAGNOSIS — L03.115 CELLULITIS OF RIGHT LOWER LIMB: ICD-10-CM

## 2023-03-14 LAB — BACTERIA SPEC CULT: ABNORMAL

## 2023-03-29 ENCOUNTER — OUTPATIENT (OUTPATIENT)
Dept: OUTPATIENT SERVICES | Facility: HOSPITAL | Age: 58
LOS: 1 days | End: 2023-03-29
Payer: MEDICAID

## 2023-03-29 ENCOUNTER — APPOINTMENT (OUTPATIENT)
Dept: PODIATRY | Facility: CLINIC | Age: 58
End: 2023-03-29
Payer: MEDICARE

## 2023-03-29 DIAGNOSIS — Z98.890 OTHER SPECIFIED POSTPROCEDURAL STATES: Chronic | ICD-10-CM

## 2023-03-29 DIAGNOSIS — Z96.89 PRESENCE OF OTHER SPECIFIED FUNCTIONAL IMPLANTS: Chronic | ICD-10-CM

## 2023-03-29 DIAGNOSIS — Z00.00 ENCOUNTER FOR GENERAL ADULT MEDICAL EXAMINATION WITHOUT ABNORMAL FINDINGS: ICD-10-CM

## 2023-03-29 PROCEDURE — 29581 APPL MULTLAYER CMPRN SYS LEG: CPT | Mod: 50,59

## 2023-03-29 PROCEDURE — 11045 DBRDMT SUBQ TISS EACH ADDL: CPT

## 2023-03-29 PROCEDURE — 29581 APPL MULTLAYER CMPRN SYS LEG: CPT | Mod: 50

## 2023-03-29 PROCEDURE — 11042 DBRDMT SUBQ TIS 1ST 20SQCM/<: CPT

## 2023-03-29 PROCEDURE — 11045 DBRDMT SUBQ TISS EACH ADDL: CPT | Mod: 50

## 2023-03-29 PROCEDURE — 11042 DBRDMT SUBQ TIS 1ST 20SQCM/<: CPT | Mod: 50

## 2023-03-29 RX ORDER — LIDOCAINE 5 G/100G
5 OINTMENT TOPICAL
Qty: 1 | Refills: 2 | Status: ACTIVE | COMMUNITY
Start: 2023-03-29 | End: 1900-01-01

## 2023-03-29 RX ORDER — LIDOCAINE 4% 4 G/100G
4 CREAM TOPICAL
Qty: 1 | Refills: 0 | Status: ACTIVE | COMMUNITY
Start: 2023-03-29 | End: 1900-01-01

## 2023-03-29 NOTE — REVIEW OF SYSTEMS
[As Noted in HPI] : as noted in HPI [Limb Weakness] : limb weakness [Difficulty Walking] : difficulty walking [Negative] : Constitutional [Confused] : no confusion [Convulsions] : no convulsions [Dizziness] : no dizziness [Fainting] : no fainting

## 2023-03-29 NOTE — ASSESSMENT
[Verbal] : verbal [Patient] : patient [Good - alert, interested, motivated] : Good - alert, interested, motivated [Demonstrates independently] : demonstrates independently [Dressing changes] : dressing changes [Foot Care] : foot care [FreeTextEntry1] : Assessment:\par -Venous Stasis Wounds, B/L LE\par - B/L LE edema \par - Pain RLE\par \par Plan:\par -Sharp excisional Dbx of fibrotic tissue  down to subcutaneous level (Size above) \par -Explained prolonged abx use is not without risk \par -Dressed w/ HONG / Adaptic / ABD / ACE to Knee; B/L LE; A&D ointment to b/l leg - Multilayer compression dressing applied B/L LE \par -Rx Lidocaine gel & cream; apply to the wound for pain relief \par - Alternative treatment offered to the patient. patient refused  \par -RTO 2 weeks or sooner if any problems arise

## 2023-03-29 NOTE — PHYSICAL EXAM
[Ankle Swelling (On Exam)] : present [Ankle Swelling Bilaterally] : severe [] : normal strength/tone [Vibration Dec.] : diminished vibratory sensation at the level of the toes [Diminished Throughout Right Foot] : diminished sensation with monofilament testing throughout right foot [Diminished Throughout Left Foot] : diminished sensation with monofilament testing throughout left foot [Varicose Veins Of Lower Extremities] : not present [Delayed in the Right Toes] : capillary refills normal in right toes [Delayed in the Left Toes] : capillary refills normal in the left toes [FreeTextEntry3] : Non palpable B/L pedal pulses secondary to Edema [FreeTextEntry1] : RLE wound 11 cm x 10 cm (circumferential) down to fat layer and subcutaneous tissue. malodor present. severe serous drainage. greenish biofilm present. periwound warmth \par LLE wound 7cm x 7cm (circumferential) down to fat layer and subcutaneous tissue. No malodor present. severe serous drainage, epithelialization around the wound and the base \par Hemosiderin discoloration on the periwound skin B/L\par Very painful on palpation \par Xerosis B/L LE

## 2023-03-29 NOTE — HISTORY OF PRESENT ILLNESS
[Sneakers] : josefina [FreeTextEntry1] : B/L Venous stasis wounds \par - Wife has been changing dressings every day\par - Dressing w/HONG\par - B/L LE Venous stasis wounds\par -  mild malodor\par - Very painful\par - S/p dbx and skin punch biopsy \par - Reports continued issue w/insurance approval of wound care supplies. Pt purchasing wound care supplies out of pocket

## 2023-03-30 DIAGNOSIS — L97.822 NON-PRESSURE CHRONIC ULCER OF OTHER PART OF LEFT LOWER LEG WITH FAT LAYER EXPOSED: ICD-10-CM

## 2023-03-30 DIAGNOSIS — I83.029 VARICOSE VEINS OF LEFT LOWER EXTREMITY WITH ULCER OF UNSPECIFIED SITE: ICD-10-CM

## 2023-03-30 DIAGNOSIS — L97.812 NON-PRESSURE CHRONIC ULCER OF OTHER PART OF RIGHT LOWER LEG WITH FAT LAYER EXPOSED: ICD-10-CM

## 2023-03-30 DIAGNOSIS — M79.606 PAIN IN LEG, UNSPECIFIED: ICD-10-CM

## 2023-03-30 DIAGNOSIS — I87.2 VENOUS INSUFFICIENCY (CHRONIC) (PERIPHERAL): ICD-10-CM

## 2023-03-30 DIAGNOSIS — M79.89 OTHER SPECIFIED SOFT TISSUE DISORDERS: ICD-10-CM

## 2023-03-30 DIAGNOSIS — I83.019 VARICOSE VEINS OF RIGHT LOWER EXTREMITY WITH ULCER OF UNSPECIFIED SITE: ICD-10-CM

## 2023-04-06 NOTE — PATIENT PROFILE ADULT. - FUNCTIONAL SCREEN CURRENT LEVEL: TOILETING, MLM
[facility] Hospitalist Internal Medicine Progress Note      Subjective:    Ongoing abdominal bloating/discomfort and patient refused abdominal x-ray yesterday.  He is agreeable today.  Wife at bedside.  Patient denies shortness of breath/nausea.  Eating well, no BM for a day and a half.      Review of Systems  Negative for all 10 systems except as per subjective    I/O's    Intake/Output Summary (Last 24 hours) at 4/4/2023 1347  Last data filed at 4/4/2023 0015  Gross per 24 hour   Intake --   Output 3175 ml   Net -3175 ml         CIWA   Total Score:       ALLERGIES:  Patient has no known allergies.     Hospital Meds  Current Facility-Administered Medications   Medication Dose Route Frequency Provider Last Rate Last Admin   • heparin (porcine) injection 5,000 Units  5,000 Units Subcutaneous 3 times per day Aubrie France MD       • [START ON 4/5/2023] aspirin chewable 81 mg  81 mg Oral Daily Aubrie France MD       • collagenase (SANTYL) ointment   Topical BID Abi Henry MD   Given at 04/03/23 2255   • bisacodyl (DULCOLAX) suppository 10 mg  10 mg Rectal Daily PRN Abi Henry MD   10 mg at 04/03/23 1046   • bumetanide (BUMEX) tablet 2 mg  2 mg Oral BID Adria Harding MD   2 mg at 04/04/23 0934   • epoetin vy-epbx (RETACRIT) 57958 UNIT/ML injection 5,000 Units  5,000 Units Intravenous Once per day on Mon Wed Fri Adria Harding MD   5,000 Units at 04/03/23 2137   • HYDROmorphone (DILAUDID) injection 0.2 mg  0.2 mg Intravenous On Call PRN Joel Bustamante MD   0.2 mg at 04/01/23 1225   • pantoprazole (PROTONIX) EC tablet 40 mg  40 mg Oral 2 times per day Joel Bustamante MD   40 mg at 04/04/23 0934   • melatonin tablet 3 mg  3 mg Oral Nightly Joel Bustamante MD   3 mg at 04/03/23 2259   • docusate sodium (COLACE) capsule 200 mg  200 mg Oral BID Nancy Quiroz NP   200 mg at 04/04/23 0900   • sodium chloride 0.9 % flush bag 25 mL  25 mL Intravenous PRN Nancy Quiroz NP 25 mL/hr at 04/03/23 7416 25 mL at  04/03/23 2333   • sodium chloride (PF) 0.9 % injection 2 mL  2 mL Intracatheter 2 times per day Nancy Quiroz NP   2 mL at 04/04/23 1000   • oxacillin 2 g in sodium chloride 0.9 % 100 mL IVPB  2,000 mg Intravenous 6 times per day Nancy Quiroz  mL/hr at 04/04/23 1048 2 g at 04/04/23 1048   • dextrose 50 % injection 25 g  25 g Intravenous PRN Nancy Quiroz, NP       • dextrose 50 % injection 12.5 g  12.5 g Intravenous PRN Nancy Richie, ELAINA       • glucagon (GLUCAGEN) injection 1 mg  1 mg Intramuscular PRN Nancy Richie, NP       • dextrose (GLUTOSE) 40 % gel 15 g  15 g Oral PRN Nancy Richie, NP       • dextrose (GLUTOSE) 40 % gel 30 g  30 g Oral PRN Nancy Richie, NP       • HYDROcodone-acetaminophen (NORCO)  MG per tablet 1 tablet  1 tablet Oral Q6H PRN Nancyjhon Quiroz NP   1 tablet at 04/04/23 0857   • acetaminophen (TYLENOL) tablet 650 mg  650 mg Oral Q4H PRN Nancy Richie NP   650 mg at 04/04/23 0626   • tiZANidine (ZANAFLEX) tablet 4 mg  4 mg Oral Q8H PRN Nancy Richie, NP   4 mg at 04/04/23 0938   • rosuvastatin (CRESTOR) tablet 10 mg  10 mg Oral Daily Nancy ELAINA Quiroz   10 mg at 04/04/23 0934   • carvedilol (COREG) tablet 6.25 mg  6.25 mg Oral 2 times per day Nancy Quiroz NP       • lidocaine (LIDOCARE) 4 % patch 1 patch  1 patch Transdermal Daily Nancy Quiroz, NP   1 patch at 04/04/23 0934   • polyethylene glycol (MIRALAX) packet 17 g  17 g Oral Daily PRN Nancy Quiroz NP   17 g at 04/02/23 1212   • tamsulosin (FLOMAX) capsule 0.4 mg  0.4 mg Oral Daily PC Leela Mcneal CNP   0.4 mg at 04/03/23 2325   • gabapentin (NEURONTIN) capsule 300 mg  300 mg Oral 2 times per day Leela Mcneal CNP   300 mg at 04/04/23 0934   • insulin regular (human) (HumuLIN R, NovoLIN R) correction Dose   Subcutaneous 4x Daily AC & HS Leela Mcneal CNP   2 Units at 04/04/23 1315   • midodrine (PROAMATINE) tablet 5 mg  5 mg Oral PRN Adria Harding MD       • midodrine (PROAMATINE)  tablet 5 mg  5 mg Oral PRN Adria Harding MD       • sodium citrate anticoagulant 4 % flush 3 mL  3 mL Intracatheter PRN Adria Harding MD   3 mL at 04/03/23 2158   • sodium chloride (NORMAL SALINE) 0.9 % bolus 100-200 mL  100-200 mL Intravenous PRN Adria Harding MD       • albumin human (SPA) 25 % injection 12.5 g  12.5 g Intravenous PRN Adria Harding MD            Last Recorded Vitals  Visit Vitals  BP (!) 164/87   Pulse 79   Temp 98.1 °F (36.7 °C) (Oral)   Resp 18   Ht 5' 9\" (1.753 m)   Wt 110.2 kg (242 lb 15.2 oz)   SpO2 97%   BMI 35.88 kg/m²         SpO2 Readings from Last 3 Encounters:   04/04/23 97%   03/29/23 100%   03/05/23 97%        Physical Exam  Vitals and nursing note reviewed.   Constitutional:       General: He is not in acute distress.     Appearance: Normal appearance.   HENT:      Head: Normocephalic and atraumatic.      Mouth/Throat:      Pharynx: No oropharyngeal exudate.      Neck: Normal range of motion and neck supple. No rigidity.   Eyes:      General: No scleral icterus.     Extraocular Movements: Extraocular movements intact.      Conjunctiva/sclera: Conjunctivae normal.   Cardiovascular:      Rate and Rhythm: Normal rate and regular rhythm.      Pulses: Normal pulses.      Heart sounds: Normal heart sounds.      Comments: HD catheter present left upper chest without drainage or bleeding  Pulmonary:      Breath sounds: Normal breath sounds. No wheezing, rhonchi or rales.   Abdominal:      General: Bowel sounds are normal. There is distension.      Palpations: Abdomen is soft.      Tenderness: There is abdominal tenderness.      Comments: Mild diffuse abdominal tenderness, mildly distended, soft   Genitourinary:     Comments: Large necrotic decubitus on sacrum/perirectal area.  No drainage or surrounding erythema seen  Musculoskeletal:         General: Swelling present. No tenderness. Normal range of motion.      Right lower leg: Edema present.      Left lower leg: Edema present.       Comments: Moderate diffuse anasarca noted   Lymphadenopathy:      Cervical: No cervical adenopathy.   Skin:     General: Skin is warm and dry.      Coloration: Skin is not jaundiced.      Findings: No rash.   Neurological:      Mental Status: He is alert and oriented to person, place, and time. Mental status is at baseline.      Comments: Mild bilateral lower extremity weakness   Psychiatric:         Mood and Affect: Mood normal.         Behavior: Behavior normal.           Recen  Recent Results (from the past 24 hour(s))   Partial Thromboplastin Time    Collection Time: 04/03/23  4:55 PM   Result Value Ref Range    PTT 44 (H) 22 - 30 sec   GLUCOSE, BEDSIDE - POINT OF CARE    Collection Time: 04/03/23  5:29 PM   Result Value Ref Range    GLUCOSE, BEDSIDE - POINT OF CARE 205 (H) 70 - 99 mg/dL   GLUCOSE, BEDSIDE - POINT OF CARE    Collection Time: 04/03/23 10:39 PM   Result Value Ref Range    GLUCOSE, BEDSIDE - POINT OF CARE 197 (H) 70 - 99 mg/dL   Partial Thromboplastin Time    Collection Time: 04/03/23 11:52 PM   Result Value Ref Range    PTT 76 (H) 22 - 30 sec   Basic Metabolic Panel    Collection Time: 04/04/23  5:25 AM   Result Value Ref Range    Fasting Status      Sodium 132 (L) 135 - 145 mmol/L    Potassium 3.5 3.4 - 5.1 mmol/L    Chloride 100 97 - 110 mmol/L    Carbon Dioxide 25 21 - 32 mmol/L    Anion Gap 11 7 - 19 mmol/L    Glucose 183 (H) 70 - 99 mg/dL    BUN 28 (H) 6 - 20 mg/dL    Creatinine 2.79 (H) 0.67 - 1.17 mg/dL    Glomerular Filtration Rate 27 (L) >=60    BUN/Cr 10 7 - 25    Calcium 8.4 8.4 - 10.2 mg/dL   CBC No Differential    Collection Time: 04/04/23  5:25 AM   Result Value Ref Range    WBC 12.9 (H) 4.2 - 11.0 K/mcL    RBC 3.22 (L) 4.50 - 5.90 mil/mcL    HGB 8.8 (L) 13.0 - 17.0 g/dL    HCT 27.6 (L) 39.0 - 51.0 %    MCV 85.7 78.0 - 100.0 fl    MCH 27.3 26.0 - 34.0 pg    MCHC 31.9 (L) 32.0 - 36.5 g/dL     (H) 140 - 450 K/mcL    RDW-CV 16.8 (H) 11.0 - 15.0 %    RDW-SD 50.4 (H) 39.0 -  50.0 fL    NRBC 0 <=0 /100 WBC   Partial Thromboplastin Time    Collection Time: 04/04/23  8:36 AM   Result Value Ref Range    PTT 53 (H) 22 - 30 sec   GLUCOSE, BEDSIDE - POINT OF CARE    Collection Time: 04/04/23  8:48 AM   Result Value Ref Range    GLUCOSE, BEDSIDE - POINT OF CARE 187 (H) 70 - 99 mg/dL   GLUCOSE, BEDSIDE - POINT OF CARE    Collection Time: 04/04/23 11:57 AM   Result Value Ref Range    GLUCOSE, BEDSIDE - POINT OF CARE 210 (H) 70 - 99 mg/dL   t Results (from the past 24 hour(s))   GLUCOSE, BEDSIDE - POINT OF CARE    Collection Time: 04/02/23  1:32 PM   Result Value Ref Range    GLUCOSE, BEDSIDE - POINT OF CARE 179 (H) 70 - 99 mg/dL   GLUCOSE, BEDSIDE - POINT OF CARE    Collection Time: 04/02/23  5:34 PM   Result Value Ref Range    GLUCOSE, BEDSIDE - POINT OF CARE 163 (H) 70 - 99 mg/dL   GLUCOSE, BEDSIDE - POINT OF CARE    Collection Time: 04/02/23  8:02 PM   Result Value Ref Range    GLUCOSE, BEDSIDE - POINT OF CARE 154 (H) 70 - 99 mg/dL   Partial Thromboplastin Time    Collection Time: 04/02/23  8:35 PM   Result Value Ref Range    PTT 53 (H) 22 - 30 sec   CBC No Differential    Collection Time: 04/03/23  3:11 AM   Result Value Ref Range    WBC 17.4 (H) 4.2 - 11.0 K/mcL    RBC 3.12 (L) 4.50 - 5.90 mil/mcL    HGB 8.7 (L) 13.0 - 17.0 g/dL    HCT 27.4 (L) 39.0 - 51.0 %    MCV 87.8 78.0 - 100.0 fl    MCH 27.9 26.0 - 34.0 pg    MCHC 31.8 (L) 32.0 - 36.5 g/dL     (H) 140 - 450 K/mcL    RDW-CV 16.7 (H) 11.0 - 15.0 %    RDW-SD 50.8 (H) 39.0 - 50.0 fL    NRBC 0 <=0 /100 WBC   Partial Thromboplastin Time    Collection Time: 04/03/23  3:11 AM   Result Value Ref Range    PTT 70 (H) 22 - 30 sec   GLUCOSE, BEDSIDE - POINT OF CARE    Collection Time: 04/03/23  8:00 AM   Result Value Ref Range    GLUCOSE, BEDSIDE - POINT OF CARE 150 (H) 70 - 99 mg/dL   Renal Panel    Collection Time: 04/03/23 10:23 AM   Result Value Ref Range    Fasting Status      Sodium 131 (L) 135 - 145 mmol/L    Potassium 4.0 3.4 - 5.1  mmol/L    Chloride 98 97 - 110 mmol/L    Carbon Dioxide 24 21 - 32 mmol/L    Anion Gap 13 7 - 19 mmol/L    Calcium 8.3 (L) 8.4 - 10.2 mg/dL    Phosphorus 5.5 (H) 2.4 - 4.7 mg/dL    Albumin 1.2 (L) 3.6 - 5.1 g/dL    Glucose 176 (H) 70 - 99 mg/dL    BUN 41 (H) 6 - 20 mg/dL    Creatinine 4.57 (H) 0.67 - 1.17 mg/dL    Glomerular Filtration Rate 15 (L) >=60    BUN/Cr 9 7 - 25   Partial Thromboplastin Time    Collection Time: 04/03/23 10:23 AM   Result Value Ref Range    PTT 54 (H) 22 - 30 sec   GLUCOSE, BEDSIDE - POINT OF CARE    Collection Time: 04/03/23 11:27 AM   Result Value Ref Range    GLUCOSE, BEDSIDE - POINT OF CARE 186 (H) 70 - 99 mg/dL      Final Result      No bowel obstruction, free intraperitoneal air, or loculated   intra-abdominal fluid collections.  Mild mesenteric stranding/edema is   nonspecific, can be seen with meningitis.      There is moderate stranding noted in the right retroperitoneal region   extending into the pelvis, nonspecific.  Please correlate with any prior   available imaging.      Moderate diffuse soft tissue anasarca and small bilateral pleural   effusions.      Air in the bladder lumen may be iatrogenic.  Please correlate with any   recent instrumentation.      Endplate bony destruction at L4-L5 possibly related to degenerative changes   versus discitis.  Recommend further evaluation with MRI lumbar spine as   clinically warranted.      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  Recent CT okay  PT wound care cubitus ulcer.  Surgery input appreciated, no debridement necessary  Offloading as able  Complete oxacillin course per ID  HD, BP management per renal-carvedilol restarted  Track labs, clinical course  Hematology input appreciated, patient switched to PPx heparin  PT/OT, inpatient rehab eval ongoing    Disposition: Hopefully to inpatient rehab soon if remains stable      Primary Care Physician  Latonia Syed MD    Code Status    Code Status: Full Resuscitation    Abi Henry MD  [facility] [place] Hospitalist  4/4/2023 1:47 PM         (2) assistive person

## 2023-04-12 ENCOUNTER — APPOINTMENT (OUTPATIENT)
Dept: PODIATRY | Facility: CLINIC | Age: 58
End: 2023-04-12
Payer: MEDICARE

## 2023-04-12 ENCOUNTER — OUTPATIENT (OUTPATIENT)
Dept: OUTPATIENT SERVICES | Facility: HOSPITAL | Age: 58
LOS: 1 days | End: 2023-04-12
Payer: MEDICARE

## 2023-04-12 DIAGNOSIS — Z98.890 OTHER SPECIFIED POSTPROCEDURAL STATES: Chronic | ICD-10-CM

## 2023-04-12 DIAGNOSIS — Z96.89 PRESENCE OF OTHER SPECIFIED FUNCTIONAL IMPLANTS: Chronic | ICD-10-CM

## 2023-04-12 DIAGNOSIS — Z00.00 ENCOUNTER FOR GENERAL ADULT MEDICAL EXAMINATION WITHOUT ABNORMAL FINDINGS: ICD-10-CM

## 2023-04-12 PROCEDURE — 11045 DBRDMT SUBQ TISS EACH ADDL: CPT

## 2023-04-12 PROCEDURE — 11042 DBRDMT SUBQ TIS 1ST 20SQCM/<: CPT

## 2023-04-12 PROCEDURE — 29581 APPL MULTLAYER CMPRN SYS LEG: CPT | Mod: 50

## 2023-04-12 PROCEDURE — 29581 APPL MULTLAYER CMPRN SYS LEG: CPT | Mod: 50,59

## 2023-04-12 PROCEDURE — 11043 DBRDMT MUSC&/FSCA 1ST 20/<: CPT

## 2023-04-12 PROCEDURE — 11046 DBRDMT MUSC&/FSCA EA ADDL: CPT | Mod: 50

## 2023-04-12 PROCEDURE — 11042 DBRDMT SUBQ TIS 1ST 20SQCM/<: CPT | Mod: 50

## 2023-04-12 NOTE — REVIEW OF SYSTEMS
[As Noted in HPI] : as noted in HPI [Confused] : no confusion [Convulsions] : no convulsions [Dizziness] : no dizziness [Fainting] : no fainting [Limb Weakness] : limb weakness [Difficulty Walking] : difficulty walking [Negative] : Constitutional

## 2023-04-12 NOTE — HISTORY OF PRESENT ILLNESS
[Sneakers] : josefina [FreeTextEntry1] : B/L Venous stasis wounds \par - Wife has been changing dressings every day\par - Dressing w/HONG\par - B/L LE Venous stasis wounds\par - Very painful\par - Reports continued issue w/insurance approval of wound care supplies. Pt purchasing wound care supplies out of pocket

## 2023-04-12 NOTE — ASSESSMENT
[FreeTextEntry1] : Assessment:\par -Venous Stasis Wounds, B/L LE\par - B/L LE edema \par - Pain RLE\par \par Plan:\par -Sharp excisional Dbx of fibrotic tissue  down to subcutaneous level (Size above) \par -Dressed w/ HONG / Adaptic / ABD / ACE to Knee; B/L LE; A&D ointment to b/l leg - Multilayer compression dressing applied B/L LE \par - Alternative treatment offered to the patient. patient refused  \par -RTO 2 weeks or sooner if any problems arise  [Verbal] : verbal [Patient] : patient [Good - alert, interested, motivated] : Good - alert, interested, motivated [Demonstrates independently] : demonstrates independently [Dressing changes] : dressing changes [Foot Care] : foot care

## 2023-04-12 NOTE — PHYSICAL EXAM
[Ankle Swelling (On Exam)] : present [Varicose Veins Of Lower Extremities] : bilaterally [Ankle Swelling Bilaterally] : severe [Delayed in the Right Toes] : capillary refills normal in right toes [Delayed in the Left Toes] : capillary refills normal in the left toes [FreeTextEntry3] : Non palpable B/L pedal pulses secondary to Edema [] : normal strength/tone [FreeTextEntry1] : RLE wound 10 cm x 10 cm (circumferential) down to fat layer and subcutaneous tissue. malodor present. severe serous drainage. greenish biofilm present. periwound warmth \par LLE wound 6cm x 5cm (circumferential) down to fat layer and subcutaneous tissue. No malodor present. severe serous drainage, epithelialization around the wound and the base \par Hemosiderin discoloration on the periwound skin B/L\par Very painful on palpation \par Xerosis B/L LE  [Vibration Dec.] : diminished vibratory sensation at the level of the toes [Diminished Throughout Right Foot] : diminished sensation with monofilament testing throughout right foot [Diminished Throughout Left Foot] : diminished sensation with monofilament testing throughout left foot

## 2023-04-18 ENCOUNTER — APPOINTMENT (OUTPATIENT)
Dept: SLEEP CENTER | Facility: HOSPITAL | Age: 58
End: 2023-04-18
Payer: MEDICARE

## 2023-04-18 ENCOUNTER — OUTPATIENT (OUTPATIENT)
Dept: OUTPATIENT SERVICES | Facility: HOSPITAL | Age: 58
LOS: 1 days | Discharge: HOME | End: 2023-04-18
Payer: MEDICARE

## 2023-04-18 DIAGNOSIS — I87.2 VENOUS INSUFFICIENCY (CHRONIC) (PERIPHERAL): ICD-10-CM

## 2023-04-18 DIAGNOSIS — Z98.890 OTHER SPECIFIED POSTPROCEDURAL STATES: Chronic | ICD-10-CM

## 2023-04-18 DIAGNOSIS — Z96.89 PRESENCE OF OTHER SPECIFIED FUNCTIONAL IMPLANTS: Chronic | ICD-10-CM

## 2023-04-18 DIAGNOSIS — L97.822 NON-PRESSURE CHRONIC ULCER OF OTHER PART OF LEFT LOWER LEG WITH FAT LAYER EXPOSED: ICD-10-CM

## 2023-04-18 DIAGNOSIS — I83.019 VARICOSE VEINS OF RIGHT LOWER EXTREMITY WITH ULCER OF UNSPECIFIED SITE: ICD-10-CM

## 2023-04-18 DIAGNOSIS — L97.812 NON-PRESSURE CHRONIC ULCER OF OTHER PART OF RIGHT LOWER LEG WITH FAT LAYER EXPOSED: ICD-10-CM

## 2023-04-18 DIAGNOSIS — I83.029 VARICOSE VEINS OF LEFT LOWER EXTREMITY WITH ULCER OF UNSPECIFIED SITE: ICD-10-CM

## 2023-04-18 DIAGNOSIS — G47.33 OBSTRUCTIVE SLEEP APNEA (ADULT) (PEDIATRIC): ICD-10-CM

## 2023-04-18 PROCEDURE — 95811 POLYSOM 6/>YRS CPAP 4/> PARM: CPT | Mod: 26

## 2023-04-18 PROCEDURE — 95811 POLYSOM 6/>YRS CPAP 4/> PARM: CPT

## 2023-04-20 DIAGNOSIS — G47.33 OBSTRUCTIVE SLEEP APNEA (ADULT) (PEDIATRIC): ICD-10-CM

## 2023-04-30 ENCOUNTER — INPATIENT (INPATIENT)
Facility: HOSPITAL | Age: 58
LOS: 4 days | Discharge: HOME | DRG: 603 | End: 2023-05-05
Attending: STUDENT IN AN ORGANIZED HEALTH CARE EDUCATION/TRAINING PROGRAM | Admitting: STUDENT IN AN ORGANIZED HEALTH CARE EDUCATION/TRAINING PROGRAM
Payer: MEDICARE

## 2023-04-30 VITALS
OXYGEN SATURATION: 99 % | HEART RATE: 85 BPM | RESPIRATION RATE: 18 BRPM | TEMPERATURE: 98 F | HEIGHT: 78 IN | SYSTOLIC BLOOD PRESSURE: 184 MMHG | DIASTOLIC BLOOD PRESSURE: 86 MMHG | WEIGHT: 315 LBS

## 2023-04-30 DIAGNOSIS — Z96.89 PRESENCE OF OTHER SPECIFIED FUNCTIONAL IMPLANTS: Chronic | ICD-10-CM

## 2023-04-30 DIAGNOSIS — Z98.890 OTHER SPECIFIED POSTPROCEDURAL STATES: Chronic | ICD-10-CM

## 2023-04-30 DIAGNOSIS — L03.90 CELLULITIS, UNSPECIFIED: ICD-10-CM

## 2023-04-30 LAB
ALBUMIN SERPL ELPH-MCNC: 3.6 G/DL — SIGNIFICANT CHANGE UP (ref 3.5–5.2)
ALP SERPL-CCNC: 101 U/L — SIGNIFICANT CHANGE UP (ref 30–115)
ALT FLD-CCNC: 12 U/L — SIGNIFICANT CHANGE UP (ref 0–41)
ANION GAP SERPL CALC-SCNC: 8 MMOL/L — SIGNIFICANT CHANGE UP (ref 7–14)
ANION GAP SERPL CALC-SCNC: 8 MMOL/L — SIGNIFICANT CHANGE UP (ref 7–14)
APTT BLD: 34.5 SEC — SIGNIFICANT CHANGE UP (ref 27–39.2)
AST SERPL-CCNC: 9 U/L — SIGNIFICANT CHANGE UP (ref 0–41)
BASOPHILS # BLD AUTO: 0.04 K/UL — SIGNIFICANT CHANGE UP (ref 0–0.2)
BASOPHILS NFR BLD AUTO: 0.4 % — SIGNIFICANT CHANGE UP (ref 0–1)
BILIRUB SERPL-MCNC: <0.2 MG/DL — SIGNIFICANT CHANGE UP (ref 0.2–1.2)
BUN SERPL-MCNC: 14 MG/DL — SIGNIFICANT CHANGE UP (ref 10–20)
BUN SERPL-MCNC: 16 MG/DL — SIGNIFICANT CHANGE UP (ref 10–20)
CALCIUM SERPL-MCNC: 9.3 MG/DL — SIGNIFICANT CHANGE UP (ref 8.4–10.5)
CALCIUM SERPL-MCNC: 9.6 MG/DL — SIGNIFICANT CHANGE UP (ref 8.4–10.5)
CHLORIDE SERPL-SCNC: 101 MMOL/L — SIGNIFICANT CHANGE UP (ref 98–110)
CHLORIDE SERPL-SCNC: 99 MMOL/L — SIGNIFICANT CHANGE UP (ref 98–110)
CO2 SERPL-SCNC: 30 MMOL/L — SIGNIFICANT CHANGE UP (ref 17–32)
CO2 SERPL-SCNC: 33 MMOL/L — HIGH (ref 17–32)
CREAT SERPL-MCNC: 0.8 MG/DL — SIGNIFICANT CHANGE UP (ref 0.7–1.5)
CREAT SERPL-MCNC: 0.9 MG/DL — SIGNIFICANT CHANGE UP (ref 0.7–1.5)
EGFR: 101 ML/MIN/1.73M2 — SIGNIFICANT CHANGE UP
EGFR: 105 ML/MIN/1.73M2 — SIGNIFICANT CHANGE UP
EOSINOPHIL # BLD AUTO: 0.24 K/UL — SIGNIFICANT CHANGE UP (ref 0–0.7)
EOSINOPHIL NFR BLD AUTO: 2.6 % — SIGNIFICANT CHANGE UP (ref 0–8)
GLUCOSE SERPL-MCNC: 129 MG/DL — HIGH (ref 70–99)
GLUCOSE SERPL-MCNC: 206 MG/DL — HIGH (ref 70–99)
HCT VFR BLD CALC: 37 % — LOW (ref 42–52)
HCT VFR BLD CALC: 38.8 % — LOW (ref 42–52)
HGB BLD-MCNC: 11.2 G/DL — LOW (ref 14–18)
HGB BLD-MCNC: 11.8 G/DL — LOW (ref 14–18)
IMM GRANULOCYTES NFR BLD AUTO: 0.6 % — HIGH (ref 0.1–0.3)
INR BLD: 1.03 RATIO — SIGNIFICANT CHANGE UP (ref 0.65–1.3)
LYMPHOCYTES # BLD AUTO: 1.97 K/UL — SIGNIFICANT CHANGE UP (ref 1.2–3.4)
LYMPHOCYTES # BLD AUTO: 21 % — SIGNIFICANT CHANGE UP (ref 20.5–51.1)
MCHC RBC-ENTMCNC: 23.8 PG — LOW (ref 27–31)
MCHC RBC-ENTMCNC: 23.9 PG — LOW (ref 27–31)
MCHC RBC-ENTMCNC: 30.3 G/DL — LOW (ref 32–37)
MCHC RBC-ENTMCNC: 30.4 G/DL — LOW (ref 32–37)
MCV RBC AUTO: 78.5 FL — LOW (ref 80–94)
MCV RBC AUTO: 78.7 FL — LOW (ref 80–94)
MONOCYTES # BLD AUTO: 0.62 K/UL — HIGH (ref 0.1–0.6)
MONOCYTES NFR BLD AUTO: 6.6 % — SIGNIFICANT CHANGE UP (ref 1.7–9.3)
NEUTROPHILS # BLD AUTO: 6.43 K/UL — SIGNIFICANT CHANGE UP (ref 1.4–6.5)
NEUTROPHILS NFR BLD AUTO: 68.8 % — SIGNIFICANT CHANGE UP (ref 42.2–75.2)
NRBC # BLD: 0 /100 WBCS — SIGNIFICANT CHANGE UP (ref 0–0)
NRBC # BLD: 0 /100 WBCS — SIGNIFICANT CHANGE UP (ref 0–0)
PLATELET # BLD AUTO: 329 K/UL — SIGNIFICANT CHANGE UP (ref 130–400)
PLATELET # BLD AUTO: 362 K/UL — SIGNIFICANT CHANGE UP (ref 130–400)
PMV BLD: 9.2 FL — SIGNIFICANT CHANGE UP (ref 7.4–10.4)
PMV BLD: 9.4 FL — SIGNIFICANT CHANGE UP (ref 7.4–10.4)
POTASSIUM SERPL-MCNC: 4 MMOL/L — SIGNIFICANT CHANGE UP (ref 3.5–5)
POTASSIUM SERPL-MCNC: 4.6 MMOL/L — SIGNIFICANT CHANGE UP (ref 3.5–5)
POTASSIUM SERPL-SCNC: 4 MMOL/L — SIGNIFICANT CHANGE UP (ref 3.5–5)
POTASSIUM SERPL-SCNC: 4.6 MMOL/L — SIGNIFICANT CHANGE UP (ref 3.5–5)
PROT SERPL-MCNC: 6.4 G/DL — SIGNIFICANT CHANGE UP (ref 6–8)
PROTHROM AB SERPL-ACNC: 11.8 SEC — SIGNIFICANT CHANGE UP (ref 9.95–12.87)
RBC # BLD: 4.7 M/UL — SIGNIFICANT CHANGE UP (ref 4.7–6.1)
RBC # BLD: 4.94 M/UL — SIGNIFICANT CHANGE UP (ref 4.7–6.1)
RBC # FLD: 15.6 % — HIGH (ref 11.5–14.5)
RBC # FLD: 15.7 % — HIGH (ref 11.5–14.5)
SODIUM SERPL-SCNC: 139 MMOL/L — SIGNIFICANT CHANGE UP (ref 135–146)
SODIUM SERPL-SCNC: 140 MMOL/L — SIGNIFICANT CHANGE UP (ref 135–146)
WBC # BLD: 10.3 K/UL — SIGNIFICANT CHANGE UP (ref 4.8–10.8)
WBC # BLD: 9.36 K/UL — SIGNIFICANT CHANGE UP (ref 4.8–10.8)
WBC # FLD AUTO: 10.3 K/UL — SIGNIFICANT CHANGE UP (ref 4.8–10.8)
WBC # FLD AUTO: 9.36 K/UL — SIGNIFICANT CHANGE UP (ref 4.8–10.8)

## 2023-04-30 PROCEDURE — 73590 X-RAY EXAM OF LOWER LEG: CPT | Mod: 26,RT

## 2023-04-30 PROCEDURE — 84145 PROCALCITONIN (PCT): CPT

## 2023-04-30 PROCEDURE — 84100 ASSAY OF PHOSPHORUS: CPT

## 2023-04-30 PROCEDURE — 75635 CT ANGIO ABDOMINAL ARTERIES: CPT

## 2023-04-30 PROCEDURE — 83735 ASSAY OF MAGNESIUM: CPT

## 2023-04-30 PROCEDURE — 80202 ASSAY OF VANCOMYCIN: CPT

## 2023-04-30 PROCEDURE — 85027 COMPLETE CBC AUTOMATED: CPT

## 2023-04-30 PROCEDURE — 93970 EXTREMITY STUDY: CPT | Mod: 26

## 2023-04-30 PROCEDURE — 85652 RBC SED RATE AUTOMATED: CPT

## 2023-04-30 PROCEDURE — 80053 COMPREHEN METABOLIC PANEL: CPT

## 2023-04-30 PROCEDURE — 99223 1ST HOSP IP/OBS HIGH 75: CPT

## 2023-04-30 PROCEDURE — 36415 COLL VENOUS BLD VENIPUNCTURE: CPT

## 2023-04-30 PROCEDURE — 71275 CT ANGIOGRAPHY CHEST: CPT

## 2023-04-30 PROCEDURE — 80048 BASIC METABOLIC PNL TOTAL CA: CPT

## 2023-04-30 PROCEDURE — 85025 COMPLETE CBC W/AUTO DIFF WBC: CPT

## 2023-04-30 PROCEDURE — 99285 EMERGENCY DEPT VISIT HI MDM: CPT

## 2023-04-30 PROCEDURE — 84484 ASSAY OF TROPONIN QUANT: CPT

## 2023-04-30 PROCEDURE — 93005 ELECTROCARDIOGRAM TRACING: CPT

## 2023-04-30 PROCEDURE — 86140 C-REACTIVE PROTEIN: CPT

## 2023-04-30 RX ORDER — AZTREONAM 2 G
2000 VIAL (EA) INJECTION ONCE
Refills: 0 | Status: COMPLETED | OUTPATIENT
Start: 2023-04-30 | End: 2023-04-30

## 2023-04-30 RX ORDER — LANOLIN ALCOHOL/MO/W.PET/CERES
3 CREAM (GRAM) TOPICAL AT BEDTIME
Refills: 0 | Status: DISCONTINUED | OUTPATIENT
Start: 2023-04-30 | End: 2023-05-05

## 2023-04-30 RX ORDER — MORPHINE SULFATE 50 MG/1
1 CAPSULE, EXTENDED RELEASE ORAL
Qty: 0 | Refills: 0 | DISCHARGE

## 2023-04-30 RX ORDER — ONDANSETRON 8 MG/1
4 TABLET, FILM COATED ORAL EVERY 8 HOURS
Refills: 0 | Status: DISCONTINUED | OUTPATIENT
Start: 2023-04-30 | End: 2023-05-04

## 2023-04-30 RX ORDER — MORPHINE SULFATE 50 MG/1
30 CAPSULE, EXTENDED RELEASE ORAL AT BEDTIME
Refills: 0 | Status: DISCONTINUED | OUTPATIENT
Start: 2023-04-30 | End: 2023-05-05

## 2023-04-30 RX ORDER — CELECOXIB 200 MG/1
200 CAPSULE ORAL DAILY
Refills: 0 | Status: DISCONTINUED | OUTPATIENT
Start: 2023-04-30 | End: 2023-05-04

## 2023-04-30 RX ORDER — MORPHINE SULFATE 50 MG/1
8 CAPSULE, EXTENDED RELEASE ORAL ONCE
Refills: 0 | Status: DISCONTINUED | OUTPATIENT
Start: 2023-04-30 | End: 2023-04-30

## 2023-04-30 RX ORDER — CHLORHEXIDINE GLUCONATE 213 G/1000ML
1 SOLUTION TOPICAL
Refills: 0 | Status: DISCONTINUED | OUTPATIENT
Start: 2023-04-30 | End: 2023-05-05

## 2023-04-30 RX ORDER — MORPHINE SULFATE 50 MG/1
2 CAPSULE, EXTENDED RELEASE ORAL EVERY 6 HOURS
Refills: 0 | Status: DISCONTINUED | OUTPATIENT
Start: 2023-04-30 | End: 2023-05-04

## 2023-04-30 RX ORDER — LOSARTAN POTASSIUM 100 MG/1
100 TABLET, FILM COATED ORAL DAILY
Refills: 0 | Status: DISCONTINUED | OUTPATIENT
Start: 2023-04-30 | End: 2023-05-05

## 2023-04-30 RX ORDER — MORPHINE SULFATE 50 MG/1
60 CAPSULE, EXTENDED RELEASE ORAL DAILY
Refills: 0 | Status: DISCONTINUED | OUTPATIENT
Start: 2023-04-30 | End: 2023-05-01

## 2023-04-30 RX ORDER — VANCOMYCIN HCL 1 G
1000 VIAL (EA) INTRAVENOUS ONCE
Refills: 0 | Status: COMPLETED | OUTPATIENT
Start: 2023-04-30 | End: 2023-04-30

## 2023-04-30 RX ORDER — ACETAMINOPHEN 500 MG
650 TABLET ORAL EVERY 6 HOURS
Refills: 0 | Status: DISCONTINUED | OUTPATIENT
Start: 2023-04-30 | End: 2023-05-05

## 2023-04-30 RX ORDER — FUROSEMIDE 40 MG
40 TABLET ORAL DAILY
Refills: 0 | Status: DISCONTINUED | OUTPATIENT
Start: 2023-04-30 | End: 2023-05-01

## 2023-04-30 RX ORDER — FUROSEMIDE 40 MG
1 TABLET ORAL
Qty: 0 | Refills: 0 | DISCHARGE

## 2023-04-30 RX ORDER — VANCOMYCIN HCL 1 G
2000 VIAL (EA) INTRAVENOUS EVERY 12 HOURS
Refills: 0 | Status: DISCONTINUED | OUTPATIENT
Start: 2023-04-30 | End: 2023-05-02

## 2023-04-30 RX ORDER — AZTREONAM 2 G
2000 VIAL (EA) INJECTION EVERY 12 HOURS
Refills: 0 | Status: DISCONTINUED | OUTPATIENT
Start: 2023-04-30 | End: 2023-05-02

## 2023-04-30 RX ORDER — ENOXAPARIN SODIUM 100 MG/ML
40 INJECTION SUBCUTANEOUS EVERY 24 HOURS
Refills: 0 | Status: DISCONTINUED | OUTPATIENT
Start: 2023-04-30 | End: 2023-05-02

## 2023-04-30 RX ORDER — MORPHINE SULFATE 50 MG/1
4 CAPSULE, EXTENDED RELEASE ORAL ONCE
Refills: 0 | Status: DISCONTINUED | OUTPATIENT
Start: 2023-04-30 | End: 2023-04-30

## 2023-04-30 RX ADMIN — MORPHINE SULFATE 8 MILLIGRAM(S): 50 CAPSULE, EXTENDED RELEASE ORAL at 14:43

## 2023-04-30 RX ADMIN — Medication 250 MILLIGRAM(S): at 13:57

## 2023-04-30 RX ADMIN — MORPHINE SULFATE 30 MILLIGRAM(S): 50 CAPSULE, EXTENDED RELEASE ORAL at 22:07

## 2023-04-30 RX ADMIN — Medication 100 MILLIGRAM(S): at 13:57

## 2023-04-30 RX ADMIN — Medication 3 MILLIGRAM(S): at 21:35

## 2023-04-30 RX ADMIN — MORPHINE SULFATE 30 MILLIGRAM(S): 50 CAPSULE, EXTENDED RELEASE ORAL at 21:35

## 2023-04-30 RX ADMIN — Medication 100 MILLIGRAM(S): at 21:34

## 2023-04-30 RX ADMIN — MORPHINE SULFATE 4 MILLIGRAM(S): 50 CAPSULE, EXTENDED RELEASE ORAL at 18:19

## 2023-04-30 NOTE — H&P ADULT - NSHPPHYSICALEXAM_GEN_ALL_CORE
Vital Signs Last 24 Hrs  T(C): 36.8 (30 Apr 2023 12:59), Max: 36.8 (30 Apr 2023 12:59)  T(F): 98.2 (30 Apr 2023 12:59), Max: 98.2 (30 Apr 2023 12:59)  HR: 80 (30 Apr 2023 18:23) (80 - 85)  BP: 167/78 (30 Apr 2023 18:23) (167/78 - 184/86)  RR: 18 (30 Apr 2023 18:23) (18 - 18)  SpO2: 98% (30 Apr 2023 18:23) (98% - 99%)    Parameters below as of 30 Apr 2023 18:23  Patient On (Oxygen Delivery Method): room air      GENERAL:  58y/o Male NAD, resting comfortably.  HEAD:  Atraumatic, Normocephalic  EYES: EOMI, PERRLA, conjunctiva and sclera clear  NECK: Supple, No JVD, no cervical lymphadenopathy, non-tender  CHEST/LUNG: Clear to auscultation bilaterally; No wheeze, rhonchi, or rales  HEART: Regular rate and rhythm; S1&S2  ABDOMEN: Soft, Nontender, Nondistended x 4 quadrants; Bowel sounds present  EXTREMITIES:   Peripheral Pulses Present, No clubbing, no cyanosis, or no edema, no calf tenderness  PSYCH: AAOx3, cooperative, appropriate  NEUROLOGY: WNL  SKIN: WNL Vital Signs Last 24 Hrs  T(C): 36.8 (30 Apr 2023 12:59), Max: 36.8 (30 Apr 2023 12:59)  T(F): 98.2 (30 Apr 2023 12:59), Max: 98.2 (30 Apr 2023 12:59)  HR: 80 (30 Apr 2023 18:23) (80 - 85)  BP: 167/78 (30 Apr 2023 18:23) (167/78 - 184/86)  RR: 18 (30 Apr 2023 18:23) (18 - 18)  SpO2: 98% (30 Apr 2023 18:23) (98% - 99%)    Parameters below as of 30 Apr 2023 18:23  Patient On (Oxygen Delivery Method): room air      GENERAL:  58y/o Male NAD, resting comfortably.  HEAD:  Atraumatic, Normocephalic  EYES: EOMI, PERRLA, conjunctiva and sclera clear  NECK: Supple, No JVD, no cervical lymphadenopathy, non-tender  CHEST/LUNG: Clear to auscultation bilaterally; No wheeze, rhonchi, or rales  HEART: Regular rate and rhythm; S1&S2  ABDOMEN:  obese, Soft, Nontender, Nondistended x 4 quadrants; Bowel sounds present  EXTREMITIES:  Pt refused to take dressing off showed picture of b/l LE   Bilateral ankle venous ulcer on lateral aspect; about 10cm x 12cm in size superficial ulcer on RLE, 8cm x 11cm on LLE; both ulcers with granular wound base, no purulence was noted; Mild serosanguinous drainage. Hemosiderin discoloration B/L LE. Rolled hypertrophic borders B/L wounds  Per last podiatry note  DP and PT Pulses Diminished; Severe bilateral lower extremities edema noted;   PSYCH: AAOx3, cooperative, appropriate  NEUROLOGY: WNL

## 2023-04-30 NOTE — ED ADULT NURSE NOTE - CAS EDN DISCHARGE ASSESSMENT
In my Outbox. Grisel Escobedo. Severo Obey, MD  Diplomate, American Board of Internal Medicine  [facility]  [address] [facility] [address], [address], [address]  T: [id number]; F: Lorraine 5 Alert and oriented to person, place and time

## 2023-04-30 NOTE — H&P ADULT - HISTORY OF PRESENT ILLNESS
Patient is a 58yo male with PMHx of HTN, chronic back pain s/p spinal cord stimulator, RUSSELL, DM (diet controlled?) presenting with      A 58 y/o male with PMHx of HTN, chronic back pain s/p spinal cord stimulator, RUSSELL on CPAP, DM (diet controlled) , venous stasis ulcers x 3 years , last admission 1/26-2/6 for cellulitis of LE ,wound culture grew  Acinetobacter baumannii/nosocomialis ,   Numerous Enterococcus faecalis,    presents with swelling/redness worsening of ulcer. Pt reports completed abx upon discharge, then after few weeks his ulcers were getting worse saw his pcp and was started on Clindamycin but is not helping. PT reports his left leg ulcer is getting worse. Pt follows up with podatry Dr. Lambert, last seen 2 wks ago. PT reports LE edema and pain. Pt denies fever, chills, chest pain, shortness of breath , burning with urination, diarrhea.

## 2023-04-30 NOTE — H&P ADULT - ASSESSMENT
Patient is a 58yo male with PMHx of HTN, chronic back pain s/p spinal cord stimulator, RUSSELL, DM (diet controlled?) admitted for recurrent Cellulitus     Cellulitis  A 58 y/o male with PMHx of HTN, chronic back pain s/p spinal cord stimulator, RUSSELL on CPAP, DM (diet controlled) , venous stasis ulcers x 3 years , last admission 1/26-2/6 for cellulitis of LE ,wound culture grew  Acinetobacter baumannii/nosocomialis ,   Numerous Enterococcus faecalis,    presents with swelling/redness worsening of ulcer.     # Bilateral LE cellulitis with ulceration  # hx venous stasis  - Xray  R Tibia + Fibula 2 Views  No acute fracture or dislocation. No soft tissue foreign body.  - s/p Azactam/Vanco, will continue same abx  -follow up  LE venous duplex   - Analgesia PRN  -follow up blood culture   - podiatry consult   - ID consult   -cw lasix     # chronic back pain s/p spinal cord stimulator  cw pain meds prn     # HTN  - monitor BP  - continue losartan    # RUSSELL  -cw using own equipment       # DM  - reportedly diet controlled  - monitor FS  - carb consistent diet    # Obesity  - diet modification counseling done      DVT ppx: lovenox

## 2023-04-30 NOTE — ED PROVIDER NOTE - ATTENDING APP SHARED VISIT CONTRIBUTION OF CARE
I have personally performed a history and physical exam on this patient and personally directed the management of the patient. Patient is a 57-year-old male past medical history of hypertension multiple surgeries in the past with multiple episodes of cellulitis presenting for evaluation of right leg warmth redness swelling worsening of his chronic ulceration patient was taking p.o. clindamycin over the past several days with worsening and complete lack of improvement states this feels typical for initiation of his cellulitis    On physical exam patient is normocephalic atraumatic pupils equal round reactive light accommodation extraocular muscles intact oropharynx clear chest clear to auscultation bilaterally abdomen soft nontender nondistended bowel sounds positive no guarding or rebound radial pulses 2+ lower extremities right lower extremity reveals 1 worsening swelling redness warmth pedal pulses 2+ capillary refill is normal patient has foul-smelling ulceration lateral aspect of the left leg states that it is worse than usual and drainage consistent with yellow-green material in comparison to the left lower extremity which also has an ulceration but has significantly less swelling no warmth    Assessment plan patient presents for evaluation of worsening foul-smelling discharge from ulcer on the right lower extremity consistent with cellulitis given his past medical history concerning for the potential for cellulitis we initiated IV antibiotics I reviewed prior records obtain DVT study I will admit considering the patient's worsening on p.o. clindamycin in addition considering his past medical history patient high risk of failure of treatment as an outpatient patient is aware and agrees with plan of care I have personally performed a history and physical exam on this patient and personally directed the management of the patient. Patient is a 57-year-old male past medical history of hypertension multiple surgeries in the past with multiple episodes of cellulitis presenting for evaluation of right leg warmth redness swelling worsening of his chronic ulceration patient was taking p.o. clindamycin over the past several days with worsening and complete lack of improvement states this feels typical for initiation of his cellulitis    On physical exam patient is normocephalic atraumatic pupils equal round reactive light accommodation extraocular muscles intact oropharynx clear chest clear to auscultation bilaterally abdomen soft nontender nondistended bowel sounds positive no guarding or rebound radial pulses 2+ lower extremities right lower extremity reveals 1 worsening swelling redness warmth pedal pulses 2+ capillary refill is normal patient has foul-smelling ulceration lateral aspect of the left leg states that it is worse than usual and drainage consistent with yellow-green material in comparison to the left lower extremity which also has an ulceration but has significantly less swelling no warmth    Assessment plan patient presents for evaluation of worsening foul-smelling discharge from ulcer on the right lower extremity consistent with cellulitis given his past medical history concerning for the potential for cellulitis we initiated IV antibiotics I reviewed prior records obtain DVT study I will admit considering the patient's worsening on p.o. clindamycin in addition considering his past medical history patient high risk of failure of treatment as an outpatient patient is aware and agrees with plan of care. I have personally performed a history and physical exam on this patient and personally directed the management of the patient. Patient is a 57-year-old male past medical history of hypertension multiple surgeries in the past with multiple episodes of cellulitis presenting for evaluation of right leg warmth redness swelling worsening of his chronic ulceration patient was taking p.o. clindamycin over the past several days with worsening and complete lack of improvement states this feels typical for initiation of his cellulitis    On physical exam patient is normocephalic atraumatic pupils equal round reactive light accommodation extraocular muscles intact oropharynx clear chest clear to auscultation bilaterally abdomen soft nontender nondistended bowel sounds positive no guarding or rebound radial pulses 2+ lower extremities right lower extremity reveals 1 worsening swelling redness warmth pedal pulses 2+ capillary refill is normal patient has foul-smelling ulceration lateral aspect of the left leg states that it is worse than usual and drainage consistent with yellow-green material in comparison to the left lower extremity which also has an ulceration but has significantly less swelling no warmth.    Assessment plan patient presents for evaluation of worsening foul-smelling discharge from ulcer on the right lower extremity consistent with cellulitis given his past medical history concerning for the potential for cellulitis we initiated IV antibiotics I reviewed prior records obtain DVT study I will admit considering the patient's worsening on p.o. clindamycin in addition considering his past medical history patient high risk of failure of treatment as an outpatient patient is aware and agrees with plan of care.

## 2023-04-30 NOTE — H&P ADULT - NSICDXPASTMEDICALHX_GEN_ALL_CORE_FT
PAST MEDICAL HISTORY:  Disc disease, degenerative, lumbar or lumbosacral     DM (diabetes mellitus)     Hypertension     Morbid obesity     Obstructive sleep apnea     Venous ulcer of lower leg without varicose veins

## 2023-04-30 NOTE — ED PROVIDER NOTE - PHYSICAL EXAMINATION
Gen: NAD, AOx3  Head: NCAT  HEENT: PERRL, oral mucosa moist, normal conjunctiva, oropharynx clear without exudate or erythema  Lung: CTAB, no respiratory distress, no wheezing, rales, rhonchi  CV: normal s1/s2, rrr, Normal perfusion, pulses 2+ throughout  Abd: soft, NTND, no CVA tenderness  Genitourinary: no pelvic tenderness  MSK: RLE edema with tenderness and warmth, no visible deformities, full range of motion in all 4 extremities  Neuro: No focal neurologic deficits  Skin: No rash, ulcer to BLE   Psych: normal affect

## 2023-04-30 NOTE — H&P ADULT - NS ATTEND AMEND GEN_ALL_CORE FT
Seen while in the ER, agree with assessment and plan as outlined Seen while in the ER, agree with assessment and plan as outlined with following observations. In addition to injectable analgesics (prn morphine), would continue usual home regimen both for les severe pain and to try to prevent pain exacerbations. Also would classify as morbid obesity

## 2023-04-30 NOTE — ED PROVIDER NOTE - OBJECTIVE STATEMENT
56 yo male with a pmh of htn and dm presents c/o RLE pain/edema for 2 days. pt states to have recurrent cellulitis so started taking clindamycin but has had no improvement. pt also mentions to have chronic ulcers to BLE with no acute changes. pt denies any other symptoms including fevers, chill, headache, recent illness/travel, cough, abdominal pain, chest pain, or SOB.

## 2023-04-30 NOTE — ED PROVIDER NOTE - CLINICAL SUMMARY MEDICAL DECISION MAKING FREE TEXT BOX
patient presents for evaluation of worsening foul-smelling discharge from ulcer on the right lower extremity consistent with cellulitis given his past medical history concerning for the potential for cellulitis we initiated IV antibiotics I reviewed prior records obtain DVT study I will admit considering the patient's worsening on p.o. clindamycin in addition considering his past medical history patient high risk of failure of treatment as an outpatient patient is aware and agrees with plan of care

## 2023-04-30 NOTE — ED ADULT NURSE NOTE - NSICDXPASTSURGICALHX_GEN_ALL_CORE_FT
PAST SURGICAL HISTORY:  H/O arthroscopy of right knee     History of excision of pilonidal cyst     Spinal cord stimulator status     Status post debridement

## 2023-04-30 NOTE — H&P ADULT - NSHPLABSRESULTS_GEN_ALL_CORE
11.2   9.36  )-----------( 329      ( 30 Apr 2023 14:25 )             37.0       04-30    140  |  99  |  16  ----------------------------<  206<H>  4.0   |  33<H>  |  0.9    Ca    9.3      30 Apr 2023 14:25    TPro  6.4  /  Alb  3.6  /  TBili  <0.2  /  DBili  x   /  AST  9   /  ALT  12  /  AlkPhos  101  04-30            PT/INR - ( 30 Apr 2023 14:25 )   PT: 11.80 sec;   INR: 1.03 ratio         PTT - ( 30 Apr 2023 14:25 )  PTT:34.5 sec    Radiology   < from: Xray Tibia + Fibula 2 Views, Right (04.30.23 @ 14:32) >  IMPRESSION:  No acute fracture or dislocation. No soft tissue foreign body.  --- End of Report ---

## 2023-05-01 PROCEDURE — 11042 DBRDMT SUBQ TIS 1ST 20SQCM/<: CPT | Mod: GC

## 2023-05-01 PROCEDURE — 76937 US GUIDE VASCULAR ACCESS: CPT | Mod: 26,59

## 2023-05-01 PROCEDURE — 29581 APPL MULTLAYER CMPRN SYS LEG: CPT | Mod: 50,GC,59

## 2023-05-01 PROCEDURE — 99232 SBSQ HOSP IP/OBS MODERATE 35: CPT

## 2023-05-01 PROCEDURE — 36569 INSJ PICC 5 YR+ W/O IMAGING: CPT

## 2023-05-01 PROCEDURE — 11045 DBRDMT SUBQ TISS EACH ADDL: CPT | Mod: GC

## 2023-05-01 PROCEDURE — 99221 1ST HOSP IP/OBS SF/LOW 40: CPT | Mod: GC,25

## 2023-05-01 RX ORDER — MORPHINE SULFATE 50 MG/1
60 CAPSULE, EXTENDED RELEASE ORAL DAILY
Refills: 0 | Status: DISCONTINUED | OUTPATIENT
Start: 2023-05-01 | End: 2023-05-05

## 2023-05-01 RX ORDER — FUROSEMIDE 40 MG
40 TABLET ORAL
Refills: 0 | Status: DISCONTINUED | OUTPATIENT
Start: 2023-05-01 | End: 2023-05-01

## 2023-05-01 RX ORDER — MORPHINE SULFATE 50 MG/1
30 CAPSULE, EXTENDED RELEASE ORAL ONCE
Refills: 0 | Status: DISCONTINUED | OUTPATIENT
Start: 2023-05-01 | End: 2023-05-01

## 2023-05-01 RX ORDER — FUROSEMIDE 40 MG
40 TABLET ORAL
Refills: 0 | Status: DISCONTINUED | OUTPATIENT
Start: 2023-05-01 | End: 2023-05-05

## 2023-05-01 RX ADMIN — CELECOXIB 200 MILLIGRAM(S): 200 CAPSULE ORAL at 12:31

## 2023-05-01 RX ADMIN — Medication 100 MILLIGRAM(S): at 06:05

## 2023-05-01 RX ADMIN — Medication 100 MILLIGRAM(S): at 18:00

## 2023-05-01 RX ADMIN — MORPHINE SULFATE 2 MILLIGRAM(S): 50 CAPSULE, EXTENDED RELEASE ORAL at 05:22

## 2023-05-01 RX ADMIN — Medication 250 MILLIGRAM(S): at 06:05

## 2023-05-01 RX ADMIN — MORPHINE SULFATE 60 MILLIGRAM(S): 50 CAPSULE, EXTENDED RELEASE ORAL at 06:35

## 2023-05-01 RX ADMIN — Medication 40 MILLIGRAM(S): at 06:05

## 2023-05-01 RX ADMIN — MORPHINE SULFATE 2 MILLIGRAM(S): 50 CAPSULE, EXTENDED RELEASE ORAL at 03:37

## 2023-05-01 RX ADMIN — Medication 250 MILLIGRAM(S): at 19:24

## 2023-05-01 RX ADMIN — CELECOXIB 200 MILLIGRAM(S): 200 CAPSULE ORAL at 11:52

## 2023-05-01 RX ADMIN — MORPHINE SULFATE 30 MILLIGRAM(S): 50 CAPSULE, EXTENDED RELEASE ORAL at 18:00

## 2023-05-01 RX ADMIN — MORPHINE SULFATE 2 MILLIGRAM(S): 50 CAPSULE, EXTENDED RELEASE ORAL at 10:58

## 2023-05-01 RX ADMIN — LOSARTAN POTASSIUM 100 MILLIGRAM(S): 100 TABLET, FILM COATED ORAL at 06:05

## 2023-05-01 RX ADMIN — MORPHINE SULFATE 60 MILLIGRAM(S): 50 CAPSULE, EXTENDED RELEASE ORAL at 06:23

## 2023-05-01 RX ADMIN — MORPHINE SULFATE 2 MILLIGRAM(S): 50 CAPSULE, EXTENDED RELEASE ORAL at 11:28

## 2023-05-01 RX ADMIN — Medication 100 MILLIGRAM(S): at 17:41

## 2023-05-01 RX ADMIN — Medication 40 MILLIGRAM(S): at 15:57

## 2023-05-01 NOTE — CONSULT NOTE ADULT - ASSESSMENT
ASSESSMENT  A 58 y/o male with PMHx of HTN, chronic back pain s/p spinal cord stimulator, RUSSELL on CPAP, DM (diet controlled) , venous stasis ulcers x 3 years who presents with swelling/redness worsening of ulcer.    IMPRESSION  #Venous Stasis bilateral Ulcer with RLE cellulitis  - Wound Cx 7/19/2020  Pseudomonas aeruginosa  - s/p debridement 1/30 - Wound Cx Acinteobacter baumanii, E faecalis    #DM II    #Obesity BMI (kg/m2): 58.3  #Abx allergy: IV Contrast (Sneezing (Mod to Severe))  penicillin (Unknown) - rash as child -- tolerated cefepime previously and Augmentin    RECOMMENDATIONS  - continue vancomycin and aztreonam  - starting tomorrow, switch  to unasyn 3g q 6 hours (tolerated augmentin previously) and Levofloxacin 750 mg daily to cover previous organisms   - keep RLE elevated  - appreciate podiatry evaluation     Please call or message on [employer] Teams if with any questions.  Spectra [phone]

## 2023-05-01 NOTE — PROGRESS NOTE ADULT - SUBJECTIVE AND OBJECTIVE BOX
MEDICINE ATTENDING PROGRESS NOTE  56 y/o male with PMHx of HTN, chronic back pain s/p spinal cord stimulator, RUSSELL on CPAP, DM (diet controlled) , venous stasis ulcers x 3 years , last admission 1/26-2/6 for cellulitis of LE ,wound culture grew  Acinetobacter baumannii/nosocomialis, Numerous Enterococcus faecalis, presents with swelling/redness worsening of ulcer. Admitted for IV antibiotics.    Interval/Overnight events:  No acute complaints  Reported worsening venous stasis ulcer with cellulitis  Denies fever or chills  Vitals stable  Exam significant for B/L venous ulcers with cellulitis  Labs and imging reviewed  cont IV abx  Appreciate Podiatry recs    ROS:   General: denies fever, chills, insomnia, depression, weight change  Skin: denies itch, jaundice   Resp: denies cough, pleuritic chest pain, wheezing   CV: denies PND  GI: denies N/V/D constipation   : denies d/c, itching, hematuria, dysuria   EXT: denies pain, swelling, erythema   Musculoskeletal: denies low back pain, joint pain, swelling   Neuro: denies headache, weakness, syncope    MEDICATIONS  (STANDING):  aztreonam  IVPB 2000 milliGRAM(s) IV Intermittent every 12 hours  celecoxib 200 milliGRAM(s) Oral daily  chlorhexidine 2% Cloths 1 Application(s) Topical <User Schedule>  enoxaparin Injectable 40 milliGRAM(s) SubCutaneous every 24 hours  furosemide    Tablet 40 milliGRAM(s) Oral daily  losartan 100 milliGRAM(s) Oral daily  morphine ER Tablet 30 milliGRAM(s) Oral at bedtime  morphine ER Tablet 60 milliGRAM(s) Oral daily  pregabalin 100 milliGRAM(s) Oral two times a day  vancomycin  IVPB 2000 milliGRAM(s) IV Intermittent every 12 hours    MEDICATIONS  (PRN):  acetaminophen     Tablet .. 650 milliGRAM(s) Oral every 6 hours PRN Temp greater or equal to 38C (100.4F), Mild Pain (1 - 3)  aluminum hydroxide/magnesium hydroxide/simethicone Suspension 30 milliLiter(s) Oral every 4 hours PRN Dyspepsia  melatonin 3 milliGRAM(s) Oral at bedtime PRN Insomnia  morphine  - Injectable 2 milliGRAM(s) IV Push every 6 hours PRN Moderate Pain (4 - 6)  ondansetron Injectable 4 milliGRAM(s) IV Push every 8 hours PRN Nausea and/or Vomiting  oxycodone    5 mG/acetaminophen 325 mG 2 Tablet(s) Oral every 6 hours PRN Severe Pain (7 - 10)    VITALS  T(F): 97 (05-01-23 @ 13:57), Max: 97 (05-01-23 @ 05:18)  HR: 70 (05-01-23 @ 13:57) (70 - 80)  BP: 130/60 (05-01-23 @ 13:57) (128/73 - 167/78)  RR: 16 (05-01-23 @ 13:57) (16 - 18)  SpO2: 98% (05-01-23 @ 05:18) (98% - 98%)    PHYSICAL EXAM  Gen- NAD, AAOx3  HEENT- no pallor, anicteric, moist mucous membranes  CVS- S1/S2, no m/r/g  Chest- CTA b/l no w/r/c, no use of accessory muscles, good inspiratory effort, speaking in full sentences  Abd- soft, nt, nd  Ext- BLE edema with ulcer, pulses intact b/l  Neuro-CN II-XII intact;    LABS/IMAGING  04-30    139  |  101  |  14  ----------------------------<  129<H>  4.6   |  30  |  0.8    Ca    9.6      30 Apr 2023 19:00    TPro  6.4  /  Alb  3.6  /  TBili  <0.2  /  DBili  x   /  AST  9   /  ALT  12  /  AlkPhos  101  04-30    LIVER FUNCTIONS - ( 30 Apr 2023 14:25 )  Alb: 3.6 g/dL / Pro: 6.4 g/dL / ALK PHOS: 101 U/L / ALT: 12 U/L / AST: 9 U/L / GGT: x                               11.8   10.30 )-----------( 362      ( 30 Apr 2023 19:00 )             38.8     IMAGING  VA Duplex Lower Ext Vein Scan, Bilat (04.30.23 @ 15:04)   No evidence of deep venous thrombosis or superficial thrombophlebitis in  the bilateral lower extremities.    Xray Tibia + Fibula 2 Views, Right (04.30.23 @ 14:32)   No acute fracture or dislocation. No soft tissue foreign body.      A/P: 56 y/o male with PMHx of HTN, chronic back pain s/p spinal cord stimulator, RUSSELL on CPAP, DM (diet controlled) , venous stasis ulcers x 3 years , last admission 1/26-2/6 for cellulitis of LE ,wound culture grew  Acinetobacter baumannii/nosocomialis, Numerous Enterococcus faecalis, presents with swelling/redness worsening of ulcer. Admitted for IV antibiotics.    # Bilateral LE cellulitis with ulceration  # hx stasis ulcer  - There is no leukocytosis or left shift  - F/u ESR/CRP  - follow up blood culture   - Xray  R Tibia + Fibula 2 Views: No acute fracture or dislocation. No soft tissue foreign body.  -  LE venous duplex neg  - s/p Azactam/Vanco, will continue same abx  - cw lasix   - Analgesia PRN  - podiatry consult   - ID consult     # chronic back pain s/p spinal cord stimulator  cw pain meds prn     # HTN  - monitor BP  - continue losartan    # RUSSELL  -cw using own equipment     # DM  - reportedly diet controlled  - monitor FS  - carb consistent diet    # Obesity  - diet modification counseling done    DVT ppx: lovenox      Spent over 35 min reviewing chart and on coordinating patient care during interdisciplinary rounds     Darnell Grewal M.D./[place]  Attending Physician

## 2023-05-01 NOTE — CONSULT NOTE ADULT - SUBJECTIVE AND OBJECTIVE BOX
Podiatry Consult Note    Subjective:  JOSE ALBERTO NO  Seen Bedside 57y Male  .   Patient is a 57y old  Male who presents with a chief complaint of Cellulitis (30 Apr 2023 18:28)    HPI:  A 56 y/o male with PMHx of HTN, chronic back pain s/p spinal cord stimulator, RUSSELL on CPAP, DM (diet controlled) , venous stasis ulcers x 3 years , last admission 1/26-2/6 for cellulitis of LE ,wound culture grew  Acinetobacter baumannii/nosocomialis ,   Numerous Enterococcus faecalis,    presents with swelling/redness worsening of ulcer. Pt reports completed abx upon discharge, then after few weeks his ulcers were getting worse saw his pcp and was started on Clindamycin but is not helping. PT reports his left leg ulcer is getting worse. Pt follows up with podatry Dr. Lambert, last seen 2 wks ago. PT reports LE edema and pain. Pt denies fever, chills, chest pain, shortness of breath , burning with urination, diarrhea.  (30 Apr 2023 18:28)      Past Medical History and Surgical History  PAST MEDICAL & SURGICAL HISTORY:  Hypertension      Disc disease, degenerative, lumbar or lumbosacral      Obstructive sleep apnea      Morbid obesity      DM (diabetes mellitus)      Venous ulcer of lower leg without varicose veins      Spinal cord stimulator status      H/O arthroscopy of right knee      History of excision of pilonidal cyst      Status post debridement           Review of Systems:  [X] Ten point review of systems is otherwise negative except as noted     Objective:  Vital Signs Last 24 Hrs  T(C): 36.1 (01 May 2023 05:18), Max: 36.1 (01 May 2023 05:18)  T(F): 97 (01 May 2023 05:18), Max: 97 (01 May 2023 05:18)  HR: 74 (01 May 2023 05:18) (74 - 80)  BP: 128/73 (01 May 2023 05:18) (128/73 - 167/78)  BP(mean): --  RR: 18 (01 May 2023 05:18) (18 - 18)  SpO2: 98% (01 May 2023 05:18) (98% - 98%)    Parameters below as of 30 Apr 2023 18:23  Patient On (Oxygen Delivery Method): room air                            11.8   10.30 )-----------( 362      ( 30 Apr 2023 19:00 )             38.8                 04-30    139  |  101  |  14  ----------------------------<  129<H>  4.6   |  30  |  0.8    Ca    9.6      30 Apr 2023 19:00    TPro  6.4  /  Alb  3.6  /  TBili  <0.2  /  DBili  x   /  AST  9   /  ALT  12  /  AlkPhos  101  04-30      Physical Exam - Lower Extremity Focused:   Derm:   Bilateral ankle venous ulcer on lateral aspect; LLE; both ulcers with granular wound base, no purulence was noted; malodorous; Mild serosanguinous drainage. Hemosiderin discoloration B/L LE. Rolled hypertrophic borders B/L wounds  Vascular: DP and PT Pulses Diminished; Severe bilateral lower extremities edema noted;   Neuro: Severe to moderate pain B/L LE and wounds    Assessment:  Venous stasis ulcer, lateral ankle, B/L  pain B/L LE - controlled with pain meds     Plan:  Chart reviewed and Patient evaluated. All Questions and Concerns Addressed and Answered  Discussed diagnosis and treatment with patient and his wife   Wounds Care: w/NS; Dressed w/Mupirocin / Xeroform / DSD / ABD / Kerlix / Light ACE; Q24 - multilayer compression dressing   Compression B/L LE  Keep B/L LE elevated   Wound Care: Wash with wound wash or saline and apply HONG/ABD/Kerlix/ACE Daily   Abx per ID  Weight bearing status; WBAT BL feet;   No podiatric surgical intervention indicated at this time.   Pt can follow up with Dr. Lambert at [address] [facility] [address] 1w after dsc.  [phone]    Podiatry. Podiatry Consult Note    Subjective:  JOSE ALBERTO NO  Seen Bedside 57y Male  .   Patient is a 57y old  Male who presents with a chief complaint of Cellulitis (30 Apr 2023 18:28)    HPI:  A 58 y/o male with PMHx of HTN, chronic back pain s/p spinal cord stimulator, RUSSELL on CPAP, DM (diet controlled) , venous stasis ulcers x 3 years , last admission 1/26-2/6 for cellulitis of LE ,wound culture grew  Acinetobacter baumannii/nosocomialis ,   Numerous Enterococcus faecalis,    presents with swelling/redness worsening of ulcer. Pt reports completed abx upon discharge, then after few weeks his ulcers were getting worse saw his pcp and was started on Clindamycin but is not helping. PT reports his left leg ulcer is getting worse. Pt follows up with podatry Dr. Lambert, last seen 2 wks ago. PT reports LE edema and pain. Pt denies fever, chills, chest pain, shortness of breath , burning with urination, diarrhea.  (30 Apr 2023 18:28)      Past Medical History and Surgical History  PAST MEDICAL & SURGICAL HISTORY:  Hypertension      Disc disease, degenerative, lumbar or lumbosacral      Obstructive sleep apnea      Morbid obesity      DM (diabetes mellitus)      Venous ulcer of lower leg without varicose veins      Spinal cord stimulator status      H/O arthroscopy of right knee      History of excision of pilonidal cyst      Status post debridement           Review of Systems:  [X] Ten point review of systems is otherwise negative except as noted     Objective:  Vital Signs Last 24 Hrs  T(C): 36.1 (01 May 2023 05:18), Max: 36.1 (01 May 2023 05:18)  T(F): 97 (01 May 2023 05:18), Max: 97 (01 May 2023 05:18)  HR: 74 (01 May 2023 05:18) (74 - 80)  BP: 128/73 (01 May 2023 05:18) (128/73 - 167/78)  BP(mean): --  RR: 18 (01 May 2023 05:18) (18 - 18)  SpO2: 98% (01 May 2023 05:18) (98% - 98%)    Parameters below as of 30 Apr 2023 18:23  Patient On (Oxygen Delivery Method): room air                            11.8   10.30 )-----------( 362      ( 30 Apr 2023 19:00 )             38.8                 04-30    139  |  101  |  14  ----------------------------<  129<H>  4.6   |  30  |  0.8    Ca    9.6      30 Apr 2023 19:00    TPro  6.4  /  Alb  3.6  /  TBili  <0.2  /  DBili  x   /  AST  9   /  ALT  12  /  AlkPhos  101  04-30      Physical Exam - Lower Extremity Focused:   Derm:   Bilateral ankle venous ulcer on lateral aspect; LLE; both ulcers with fibro/granular wound base, no purulence was noted; malodorous; Mild serosanguinous drainage. Hemosiderin discoloration B/L LE. Rolled hypertrophic borders B/L wounds  Mild RLE periwound erythema and warmth   RLE wound 14x8cm, LLE wound 5x6cm  Vascular: DP and PT Pulses Diminished; Severe bilateral lower extremities edema noted;   Neuro: Severe to moderate pain B/L LE and wounds    Assessment:  Venous stasis ulcer, lateral ankle, B/L  pain B/L LE - controlled with pain meds   Cellulites RLE   Plan:  Chart reviewed and Patient evaluated. All Questions and Concerns Addressed and Answered  Discussed diagnosis and treatment with patient and his wife   Wounds Care: w/NS; Dressed w HONG / Xeroform / DSD / ABD / Kerlix / Light ACE; Q48 B/L LE - multilayer compression dressing   bedside Ex Dbx using a gauze and dermal curet of both wound down and including the level of subcutaneous tissue B/L LE (Size above)   Compression B/L LE  Keep B/L LE elevated   Wound Care: Wash with wound wash or saline and apply HONG/ABD/Kerlix/ACE q48   Abx per ID  Weight bearing status; WBAT BL feet;   recommended wound wash in OR, pt refused. Pt would benefit from wound wash to allow for quicker wound healing    Pt can follow up with Dr. Lambert at [address] [facility] [facility] [address] 1w after dsc.  [phone]    Podiatry.

## 2023-05-01 NOTE — CONSULT NOTE ADULT - SUBJECTIVE AND OBJECTIVE BOX
ONEAL JOSE ALBERTO  57y, Male  Allergy: penicillin (Unknown)  IV Contrast (Sneezing (Mod to Severe))      CHIEF COMPLAINT: Cellulitis (30 Apr 2023 18:28)      LOS  1d    HPI:  A 58 y/o male with PMHx of HTN, chronic back pain s/p spinal cord stimulator, RUSSELL on CPAP, DM (diet controlled) , venous stasis ulcers x 3 years , last admission 1/26-2/6 for cellulitis of LE ,wound culture grew  Acinetobacter baumannii/nosocomialis ,   Numerous Enterococcus faecalis,   presents with swelling/redness worsening of ulcer. Pt reports completed abx upon discharge, then after few weeks his ulcers were getting worse saw his pcp and was started on Clindamycin but is not helping. PT reports his left leg ulcer is getting worse. Pt follows up with podatry Dr. Lambert, last seen 2 wks ago. PT reports LE edema and pain. Pt denies fever, chills, chest pain, shortness of breath , burning with urination, diarrhea.  (30 Apr 2023 18:28)      INFECTIOUS DISEASE HISTORY:  History as above.    PAST MEDICAL & SURGICAL HISTORY:  Hypertension      Disc disease, degenerative, lumbar or lumbosacral      Obstructive sleep apnea      Morbid obesity      DM (diabetes mellitus)      Venous ulcer of lower leg without varicose veins      Spinal cord stimulator status      H/O arthroscopy of right knee      History of excision of pilonidal cyst      Status post debridement          FAMILY HISTORY  Family history of early CAD (Father)    Family history of diabetes mellitus in mother (Mother)        SOCIAL HISTORY  Social History:  Tobacco use: denies  EtOH use: denies  Illicit drug use: denies (30 Apr 2023 18:28)        ROS  General: Denies rigors, nightsweats  HEENT: Denies headache, rhinorrhea, sore throat, eye pain  CV: Denies CP, palpitations  PULM: Denies wheezing, hemoptysis  GI: Denies hematemesis, hematochezia, melena  : Denies discharge, hematuria  MSK: Denies arthralgias, myalgias  SKIN: Denies rash, lesions  NEURO: Denies paresthesias, weakness  PSYCH: Denies depression, anxiety    VITALS:  T(F): 97, Max: 98.2 (04-30-23 @ 12:59)  HR: 74  BP: 128/73  RR: 18Vital Signs Last 24 Hrs  T(C): 36.1 (01 May 2023 05:18), Max: 36.8 (30 Apr 2023 12:59)  T(F): 97 (01 May 2023 05:18), Max: 98.2 (30 Apr 2023 12:59)  HR: 74 (01 May 2023 05:18) (74 - 85)  BP: 128/73 (01 May 2023 05:18) (128/73 - 184/86)  BP(mean): --  RR: 18 (01 May 2023 05:18) (18 - 18)  SpO2: 98% (01 May 2023 05:18) (98% - 99%)    Parameters below as of 30 Apr 2023 18:23  Patient On (Oxygen Delivery Method): room air        PHYSICAL EXAM:  Gen: NAD, resting in bed  HEENT: Normocephalic, atraumatic  Neck: supple, no lymphadenopathy  CV: Regular rate & regular rhythm  Lungs: decreased BS at bases, no fremitus  Abdomen: Soft, BS present  Ext: Warm, well perfused  Neuro: non focal, awake  Skin: no rash, no erythema  Lines: no phlebitis    TESTS & MEASUREMENTS:                        11.8  10.30 )-----------( 362      ( 30 Apr 2023 19:00 )             38.8    04-30    139  |  101  |  14  ----------------------------<  129<H>  4.6   |  30  |  0.8    Ca    9.6      30 Apr 2023 19:00    TPro  6.4  /  Alb  3.6  /  TBili  <0.2  /  DBili  x   /  AST  9   /  ALT  12  /  AlkPhos  101  04-30      LIVER FUNCTIONS - ( 30 Apr 2023 14:25 )  Alb: 3.6 g/dL / Pro: 6.4 g/dL / ALK PHOS: 101 U/L / ALT: 12 U/L / AST: 9 U/L / GGT: x              Culture - Blood (collected 02-05-23 @ 08:06)  Source: .Blood None  Final Report (02-10-23 @ 22:00):    No Growth Final    Culture - Blood (collected 02-04-23 @ 16:02)  Source: .Blood None  Final Report (02-10-23 @ 01:00):    No Growth Final    Culture - Other (collected 01-30-23 @ 17:06)  Source: Wound LEFT CALF WOUND  Final Report (02-03-23 @ 17:04):    Numerous Acinetobacter baumannii/nosocomialis group    Numerous Enterococcus faecalis  Organism: Acinetobacter baumannii/nosocomialis group  Enterococcus faecalis (02-03-23 @ 16:58)  Organism: Enterococcus faecalis (02-03-23 @ 16:58)      Method Type: JAYCOB      -  Ampicillin: S <=2 Predicts results to ampicillin/sulbactam, amoxacillin-clavulanate and  piperacillin-tazobactam.      -  Tetracycline: R >8      -  Vancomycin: S 2  Organism: Acinetobacter baumannii/nosocomialis group (02-03-23 @ 16:58)      Method Type: JAYCOB      -  Amikacin: S <=16      -  Ampicillin/Sulbactam: S 8/4      -  Cefepime: S 4      -  Ceftazidime: S 4      -  Ciprofloxacin: S <=0.25      -  Gentamicin: S <=2      -  Imipenem: S <=1      -  Levofloxacin: S <=0.5      -  Meropenem: S <=1      -  Tobramycin: S <=2      -  Trimethoprim/Sulfamethoxazole: S <=0.5/9.5    Culture - Other (collected 01-30-23 @ 17:05)  Source: Wound RIGHT CALF WOUND  Final Report (02-03-23 @ 23:40):    Few Alcaligenes faecalis    Numerous Enterococcus faecalis    Numerous Corynebacterium species "Susceptibilities not performed"  Organism: Alcaligenes faecalis  Enterococcus faecalis (02-03-23 @ 23:40)  Organism: Enterococcus faecalis (02-03-23 @ 23:40)      Method Type: JAYCOB      -  Ampicillin: S <=2 Predicts results to ampicillin/sulbactam, amoxacillin-clavulanate and  piperacillin-tazobactam.      -  Tetracycline: R >8      -  Vancomycin: S 2  Organism: Alcaligenes faecalis (02-03-23 @ 23:40)      Method Type: JAYCOB      -  Amikacin: S <=16      -  Aztreonam: I 16      -  Cefepime: S 4      -  Ceftriaxone: S <=1      -  Ciprofloxacin: S 1      -  Gentamicin: S <=2      -  Levofloxacin: S <=0.5      -  Meropenem: S <=1      -  Piperacillin/Tazobactam: S <=8      -  Tobramycin: S <=2      -  Trimethoprim/Sulfamethoxazole: S <=0.5/9.5    Culture - Blood (collected 01-26-23 @ 12:28)  Source: .Blood Blood  Final Report (01-31-23 @ 23:01):    No Growth Final    Culture - Blood (collected 01-26-23 @ 12:28)  Source: .Blood Blood-Peripheral  Final Report (02-01-23 @ 01:01):    No Growth Final    Culture - Blood (collected 08-29-22 @ 20:00)  Source: .Blood Blood-Peripheral  Final Report (09-04-22 @ 02:00):    No Growth Final    Culture - Blood (collected 08-29-22 @ 20:00)  Source: .Blood Blood-Peripheral  Final Report (09-04-22 @ 02:00):    No Growth Final            INFECTIOUS DISEASES TESTING  COVID-19 PCR: NotDetec (01-26-23 @ 12:30)  COVID-19 PCR: NotDetec (08-29-22 @ 20:15)      RADIOLOGY & ADDITIONAL TESTS:  I have personally reviewed the last Chest xray  CXR      CT      CARDIOLOGY TESTING      MEDICATIONS  aztreonam  IVPB 2000 IV Intermittent every 12 hours  celecoxib 200 Oral daily  chlorhexidine 2% Cloths 1 Topical <User Schedule>  enoxaparin Injectable 40 SubCutaneous every 24 hours  furosemide    Tablet 40 Oral daily  losartan 100 Oral daily  morphine ER Tablet 60 Oral daily  morphine ER Tablet 30 Oral at bedtime  pregabalin 100 Oral two times a day  vancomycin  IVPB 2000 IV Intermittent every 12 hours      Weight  Weight (kg): 229.5 (04-30-23 @ 12:59)    ANTIBIOTICS:  aztreonam  IVPB 2000 milliGRAM(s) IV Intermittent every 12 hours  vancomycin  IVPB 2000 milliGRAM(s) IV Intermittent every 12 hours      ALLERGIES:  penicillin (Unknown)  IV Contrast (Sneezing (Mod to Severe))

## 2023-05-02 LAB
ANION GAP SERPL CALC-SCNC: 10 MMOL/L — SIGNIFICANT CHANGE UP (ref 7–14)
BASOPHILS # BLD AUTO: 0.05 K/UL — SIGNIFICANT CHANGE UP (ref 0–0.2)
BASOPHILS NFR BLD AUTO: 0.7 % — SIGNIFICANT CHANGE UP (ref 0–1)
BUN SERPL-MCNC: 14 MG/DL — SIGNIFICANT CHANGE UP (ref 10–20)
CALCIUM SERPL-MCNC: 9.1 MG/DL — SIGNIFICANT CHANGE UP (ref 8.4–10.5)
CHLORIDE SERPL-SCNC: 102 MMOL/L — SIGNIFICANT CHANGE UP (ref 98–110)
CO2 SERPL-SCNC: 32 MMOL/L — SIGNIFICANT CHANGE UP (ref 17–32)
CREAT SERPL-MCNC: 0.8 MG/DL — SIGNIFICANT CHANGE UP (ref 0.7–1.5)
CRP SERPL-MCNC: 56.9 MG/L — HIGH
EGFR: 105 ML/MIN/1.73M2 — SIGNIFICANT CHANGE UP
EOSINOPHIL # BLD AUTO: 0.17 K/UL — SIGNIFICANT CHANGE UP (ref 0–0.7)
EOSINOPHIL NFR BLD AUTO: 2.4 % — SIGNIFICANT CHANGE UP (ref 0–8)
ERYTHROCYTE [SEDIMENTATION RATE] IN BLOOD: 38 MM/HR — HIGH (ref 0–10)
GLUCOSE SERPL-MCNC: 192 MG/DL — HIGH (ref 70–99)
HCT VFR BLD CALC: 36.1 % — LOW (ref 42–52)
HGB BLD-MCNC: 10.8 G/DL — LOW (ref 14–18)
IMM GRANULOCYTES NFR BLD AUTO: 0.4 % — HIGH (ref 0.1–0.3)
LYMPHOCYTES # BLD AUTO: 1.51 K/UL — SIGNIFICANT CHANGE UP (ref 1.2–3.4)
LYMPHOCYTES # BLD AUTO: 21.7 % — SIGNIFICANT CHANGE UP (ref 20.5–51.1)
MAGNESIUM SERPL-MCNC: 2.2 MG/DL — SIGNIFICANT CHANGE UP (ref 1.8–2.4)
MCHC RBC-ENTMCNC: 23.8 PG — LOW (ref 27–31)
MCHC RBC-ENTMCNC: 29.9 G/DL — LOW (ref 32–37)
MCV RBC AUTO: 79.5 FL — LOW (ref 80–94)
MONOCYTES # BLD AUTO: 0.48 K/UL — SIGNIFICANT CHANGE UP (ref 0.1–0.6)
MONOCYTES NFR BLD AUTO: 6.9 % — SIGNIFICANT CHANGE UP (ref 1.7–9.3)
NEUTROPHILS # BLD AUTO: 4.72 K/UL — SIGNIFICANT CHANGE UP (ref 1.4–6.5)
NEUTROPHILS NFR BLD AUTO: 67.9 % — SIGNIFICANT CHANGE UP (ref 42.2–75.2)
NRBC # BLD: 0 /100 WBCS — SIGNIFICANT CHANGE UP (ref 0–0)
PHOSPHATE SERPL-MCNC: 4.4 MG/DL — SIGNIFICANT CHANGE UP (ref 2.1–4.9)
PLATELET # BLD AUTO: 331 K/UL — SIGNIFICANT CHANGE UP (ref 130–400)
PMV BLD: 9.4 FL — SIGNIFICANT CHANGE UP (ref 7.4–10.4)
POTASSIUM SERPL-MCNC: 4.2 MMOL/L — SIGNIFICANT CHANGE UP (ref 3.5–5)
POTASSIUM SERPL-SCNC: 4.2 MMOL/L — SIGNIFICANT CHANGE UP (ref 3.5–5)
PROCALCITONIN SERPL-MCNC: 0.08 NG/ML — SIGNIFICANT CHANGE UP (ref 0.02–0.1)
RBC # BLD: 4.54 M/UL — LOW (ref 4.7–6.1)
RBC # FLD: 15.6 % — HIGH (ref 11.5–14.5)
SODIUM SERPL-SCNC: 144 MMOL/L — SIGNIFICANT CHANGE UP (ref 135–146)
TROPONIN T SERPL-MCNC: <0.01 NG/ML — SIGNIFICANT CHANGE UP
WBC # BLD: 6.96 K/UL — SIGNIFICANT CHANGE UP (ref 4.8–10.8)
WBC # FLD AUTO: 6.96 K/UL — SIGNIFICANT CHANGE UP (ref 4.8–10.8)

## 2023-05-02 PROCEDURE — 93010 ELECTROCARDIOGRAM REPORT: CPT

## 2023-05-02 PROCEDURE — 71275 CT ANGIOGRAPHY CHEST: CPT | Mod: 26

## 2023-05-02 PROCEDURE — 99232 SBSQ HOSP IP/OBS MODERATE 35: CPT

## 2023-05-02 RX ORDER — MORPHINE SULFATE 50 MG/1
4 CAPSULE, EXTENDED RELEASE ORAL EVERY 4 HOURS
Refills: 0 | Status: DISCONTINUED | OUTPATIENT
Start: 2023-05-02 | End: 2023-05-04

## 2023-05-02 RX ORDER — DIPHENHYDRAMINE HCL 50 MG
50 CAPSULE ORAL EVERY 6 HOURS
Refills: 0 | Status: DISCONTINUED | OUTPATIENT
Start: 2023-05-02 | End: 2023-05-02

## 2023-05-02 RX ORDER — ENOXAPARIN SODIUM 100 MG/ML
150 INJECTION SUBCUTANEOUS EVERY 12 HOURS
Refills: 0 | Status: DISCONTINUED | OUTPATIENT
Start: 2023-05-02 | End: 2023-05-05

## 2023-05-02 RX ORDER — ENOXAPARIN SODIUM 100 MG/ML
230 INJECTION SUBCUTANEOUS EVERY 12 HOURS
Refills: 0 | Status: DISCONTINUED | OUTPATIENT
Start: 2023-05-02 | End: 2023-05-02

## 2023-05-02 RX ORDER — AMPICILLIN SODIUM AND SULBACTAM SODIUM 250; 125 MG/ML; MG/ML
INJECTION, POWDER, FOR SUSPENSION INTRAMUSCULAR; INTRAVENOUS
Refills: 0 | Status: DISCONTINUED | OUTPATIENT
Start: 2023-05-02 | End: 2023-05-04

## 2023-05-02 RX ORDER — AMPICILLIN SODIUM AND SULBACTAM SODIUM 250; 125 MG/ML; MG/ML
3 INJECTION, POWDER, FOR SUSPENSION INTRAMUSCULAR; INTRAVENOUS EVERY 6 HOURS
Refills: 0 | Status: DISCONTINUED | OUTPATIENT
Start: 2023-05-02 | End: 2023-05-04

## 2023-05-02 RX ORDER — DIPHENHYDRAMINE HCL 50 MG
25 CAPSULE ORAL EVERY 4 HOURS
Refills: 0 | Status: DISCONTINUED | OUTPATIENT
Start: 2023-05-02 | End: 2023-05-05

## 2023-05-02 RX ORDER — AMPICILLIN SODIUM AND SULBACTAM SODIUM 250; 125 MG/ML; MG/ML
3 INJECTION, POWDER, FOR SUSPENSION INTRAMUSCULAR; INTRAVENOUS ONCE
Refills: 0 | Status: COMPLETED | OUTPATIENT
Start: 2023-05-02 | End: 2023-05-02

## 2023-05-02 RX ADMIN — Medication 250 MILLIGRAM(S): at 05:45

## 2023-05-02 RX ADMIN — AMPICILLIN SODIUM AND SULBACTAM SODIUM 200 GRAM(S): 250; 125 INJECTION, POWDER, FOR SUSPENSION INTRAMUSCULAR; INTRAVENOUS at 18:00

## 2023-05-02 RX ADMIN — LOSARTAN POTASSIUM 100 MILLIGRAM(S): 100 TABLET, FILM COATED ORAL at 05:44

## 2023-05-02 RX ADMIN — MORPHINE SULFATE 30 MILLIGRAM(S): 50 CAPSULE, EXTENDED RELEASE ORAL at 17:59

## 2023-05-02 RX ADMIN — CELECOXIB 200 MILLIGRAM(S): 200 CAPSULE ORAL at 11:36

## 2023-05-02 RX ADMIN — Medication 100 MILLIGRAM(S): at 05:45

## 2023-05-02 RX ADMIN — ENOXAPARIN SODIUM 150 MILLIGRAM(S): 100 INJECTION SUBCUTANEOUS at 09:39

## 2023-05-02 RX ADMIN — Medication 40 MILLIGRAM(S): at 10:18

## 2023-05-02 RX ADMIN — MORPHINE SULFATE 60 MILLIGRAM(S): 50 CAPSULE, EXTENDED RELEASE ORAL at 05:42

## 2023-05-02 RX ADMIN — MORPHINE SULFATE 2 MILLIGRAM(S): 50 CAPSULE, EXTENDED RELEASE ORAL at 15:06

## 2023-05-02 RX ADMIN — Medication 100 MILLIGRAM(S): at 05:42

## 2023-05-02 RX ADMIN — MORPHINE SULFATE 4 MILLIGRAM(S): 50 CAPSULE, EXTENDED RELEASE ORAL at 22:13

## 2023-05-02 RX ADMIN — MORPHINE SULFATE 2 MILLIGRAM(S): 50 CAPSULE, EXTENDED RELEASE ORAL at 14:36

## 2023-05-02 RX ADMIN — AMPICILLIN SODIUM AND SULBACTAM SODIUM 200 GRAM(S): 250; 125 INJECTION, POWDER, FOR SUSPENSION INTRAMUSCULAR; INTRAVENOUS at 10:18

## 2023-05-02 RX ADMIN — Medication 100 MILLIGRAM(S): at 17:59

## 2023-05-02 RX ADMIN — ENOXAPARIN SODIUM 150 MILLIGRAM(S): 100 INJECTION SUBCUTANEOUS at 17:59

## 2023-05-02 RX ADMIN — CELECOXIB 200 MILLIGRAM(S): 200 CAPSULE ORAL at 12:06

## 2023-05-02 RX ADMIN — MORPHINE SULFATE 4 MILLIGRAM(S): 50 CAPSULE, EXTENDED RELEASE ORAL at 22:14

## 2023-05-02 NOTE — PROGRESS NOTE ADULT - SUBJECTIVE AND OBJECTIVE BOX
MEDICINE ATTENDING PROGRESS NOTE  58 y/o male with PMHx of HTN, chronic back pain s/p spinal cord stimulator, RUSSELL on CPAP, DM (diet controlled) , venous stasis ulcers x 3 years , last admission 1/26-2/6 for cellulitis of LE ,wound culture grew  Acinetobacter baumannii/nosocomialis, Numerous Enterococcus faecalis, presents with swelling/redness worsening of ulcer. Admitted for IV antibiotics.    Interval/Overnight events:  Patient reported Chest Pain last night, worst with excertion. Patient also clarified that her lower extremity cellulitis/pain appear suddently, and patient is not improving.  Vitals stable  Labs and imaging reviewed  Plan is to get rop to r/o ACS, EKG to r/o ST changes, CTA chest to r/o PE (to be done today) and CTA LE to r/o venous vs arterial occlusion (to be done tomorrow). Will  Will start patient of full dose lovenox pending work up; to dc if neg. Will do morphine 4mg q4h PRN for pain.   Will start pt on unasysn and zosyn as ID requested. Pt with h/o allergy to PCN; however, was on augmentin back in Feb 2023. Will start benadryl PRN for allergic rxn.    ROS:   General: denies fever, chills, insomnia, depression, weight change  Skin: denies itch, jaundice   Resp: denies cough, pleuritic chest pain, wheezing   CV: denies PND  GI: denies N/V/D constipation   : denies d/c, itching, hematuria, dysuria   EXT: denies pain, swelling, erythema   Musculoskeletal: denies low back pain, joint pain, swelling   Neuro: denies headache, weakness, syncope    MEDICATIONS  (STANDING):  ampicillin/sulbactam  IVPB      ampicillin/sulbactam  IVPB 3 Gram(s) IV Intermittent every 6 hours  celecoxib 200 milliGRAM(s) Oral daily  chlorhexidine 2% Cloths 1 Application(s) Topical <User Schedule>  enoxaparin Injectable 150 milliGRAM(s) SubCutaneous every 12 hours  furosemide   Injectable 40 milliGRAM(s) IV Push two times a day  levoFLOXacin IVPB 750 milliGRAM(s) IV Intermittent every 24 hours  losartan 100 milliGRAM(s) Oral daily  morphine ER Tablet 60 milliGRAM(s) Oral daily  morphine ER Tablet 30 milliGRAM(s) Oral at bedtime  pregabalin 100 milliGRAM(s) Oral two times a day    MEDICATIONS  (PRN):  acetaminophen     Tablet .. 650 milliGRAM(s) Oral every 6 hours PRN Temp greater or equal to 38C (100.4F), Mild Pain (1 - 3)  aluminum hydroxide/magnesium hydroxide/simethicone Suspension 30 milliLiter(s) Oral every 4 hours PRN Dyspepsia  diphenhydrAMINE Injectable 25 milliGRAM(s) IV Push every 4 hours PRN Rash and/or Itching  melatonin 3 milliGRAM(s) Oral at bedtime PRN Insomnia  morphine  - Injectable 2 milliGRAM(s) IV Push every 6 hours PRN Moderate Pain (4 - 6)  morphine  - Injectable 4 milliGRAM(s) IV Push every 4 hours PRN Severe Pain (7 - 10)  ondansetron Injectable 4 milliGRAM(s) IV Push every 8 hours PRN Nausea and/or Vomiting  oxycodone    5 mG/acetaminophen 325 mG 2 Tablet(s) Oral every 6 hours PRN Severe Pain (7 - 10)    VITALS  T(F): 96 (05-02-23 @ 04:30), Max: 97 (05-01-23 @ 13:57)  HR: 71 (05-02-23 @ 04:30) (70 - 81)  BP: 175/79 (05-02-23 @ 04:30) (130/60 - 175/79)  RR: 16 (05-02-23 @ 04:30) (16 - 16)  SpO2: 100% (05-02-23 @ 04:30) (100% - 100%)    PHYSICAL EXAM  Gen- NAD, AAOx3  HEENT- no pallor, anicteric, moist mucous membranes  CVS- S1/S2, no m/r/g  Chest- CTA b/l no w/r/c, no use of accessory muscles, good inspiratory effort, speaking in full sentences  Abd- soft, nt, nd  Ext- BLE edema with ulcer, pulses intact b/l  Neuro-CN II-XII intact;    LABS/IMAGING  05-02    144  |  102  |  14  ----------------------------<  192<H>  4.2   |  32  |  0.8    Ca    9.1      02 May 2023 08:03  Phos  4.4     05-02  Mg     2.2     05-02    TPro  6.4  /  Alb  3.6  /  TBili  <0.2  /  DBili  x   /  AST  9   /  ALT  12  /  AlkPhos  101  04-30    LIVER FUNCTIONS - ( 30 Apr 2023 14:25 )  Alb: 3.6 g/dL / Pro: 6.4 g/dL / ALK PHOS: 101 U/L / ALT: 12 U/L / AST: 9 U/L / GGT: x                                 10.8   6.96  )-----------( 331      ( 02 May 2023 08:03 )             36.1       MICROBIOLOGY  Culture - Blood (collected 04-30-23 @ 14:25)  Source: .Blood Blood-Peripheral  Preliminary Report (05-02-23 @ 02:02):    No growth to date.    Culture - Blood (collected 04-30-23 @ 14:25)  Source: .Blood Blood-Peripheral  Preliminary Report (05-02-23 @ 02:02):    No growth to date.      IMAGING  VA Duplex Lower Ext Vein Scan, Bilat (04.30.23 @ 15:04)   No evidence of deep venous thrombosis or superficial thrombophlebitis in  the bilateral lower extremities.    Xray Tibia + Fibula 2 Views, Right (04.30.23 @ 14:32)   No acute fracture or dislocation. No soft tissue foreign body.      A/P: 58 y/o male with PMHx of HTN, chronic back pain s/p spinal cord stimulator, RUSSELL on CPAP, DM (diet controlled) , venous stasis ulcers x 3 years , last admission 1/26-2/6 for cellulitis of LE ,wound culture grew  Acinetobacter baumannii/nosocomialis, Numerous Enterococcus faecalis, presents with swelling/redness worsening of ulcer. Admitted for IV antibiotics.    #Atypical Chest Pain, r/o ACS, r/o PE  - Will do trop  - Will get EKG  - Will get CTA chest to r/o PE  - Will start patient on full dose lovenox pending work up  - Morphine 4mg PRN for pain    # Bilateral LE cellulitis with ulceration  # hx stasis ulcer  - There is no leukocytosis or left shift  - F/u ESR/CRP  - follow up blood culture   - Xray  R Tibia + Fibula 2 Views: No acute fracture or dislocation. No soft tissue foreign body.  -  LE venous duplex neg  - Will sent CTA LE to r/o obstruction  - s/p Azactam/Vanco -> start levaquin + unasyn  - cw lasix   - Analgesia PRN  - podiatry consult   - ID consult     # chronic back pain s/p spinal cord stimulator  cw pain meds prn     # HTN  - monitor BP  - continue losartan    # RUSSELL  -cw using own equipment     # DM  - reportedly diet controlled  - monitor FS  - carb consistent diet    # Obesity  - diet modification counseling done    DVT ppx: lovenox      Spent over 35 min reviewing chart and on coordinating patient care during interdisciplinary rounds     Darnell Grewal M.D./[place]  Attending Physician

## 2023-05-02 NOTE — PROGRESS NOTE ADULT - ASSESSMENT
ASSESSMENT  A 56 y/o male with PMHx of HTN, chronic back pain s/p spinal cord stimulator, RUSSELL on CPAP, DM (diet controlled) , venous stasis ulcers x 3 years who presents with swelling/redness worsening of ulcer.    IMPRESSION  #Venous Stasis bilateral Ulcer with RLE cellulitis  - Wound Cx 7/19/2020  Pseudomonas aeruginosa  - s/p debridement 1/30 - Wound Cx Acinteobacter baumanii, E faecalis    #DM II    #Obesity BMI (kg/m2): 58.3  #Abx allergy: IV Contrast (Sneezing (Mod to Severe))  penicillin (Unknown) - rash as child -- tolerated cefepime previously and Augmentin    RECOMMENDATIONS  - continue unasyn 3g q 6 hours and Levofloxacin 750 mg daily to cover previous organisms   - follow-up CT RLE   - if pain worsening tomorrow, switch unasyn to Linezolid 600 mg q 12 hours   - keep RLE elevated  - appreciate podiatry evaluation     Please call or message on [employer] Teams if with any questions.  Spectra [phone]

## 2023-05-02 NOTE — PROGRESS NOTE ADULT - SUBJECTIVE AND OBJECTIVE BOX
KAYLAJOSE ALBERTO GARZA  57y, Male  Allergy: penicillin (Unknown)  IV Contrast (Sneezing (Mod to Severe))      LOS  2d    CHIEF COMPLAINT: Cellulitis (02 May 2023 13:08)      INTERVAL EVENTS/HPI  - No acute events overnight  - T(F): , Max: 96.1 (05-02-23 @ 21:09)  - denies any improvmenet in pain   - WBC Count: 6.96 (05-02-23 @ 08:03)  WBC Count: 10.30 (04-30-23 @ 19:00)     - Creatinine, Serum: 0.8 (05-02-23 @ 08:03)       ROS  General: Denies rigors, nightsweats  HEENT: Denies headache, rhinorrhea, sore throat, eye pain  CV: Denies CP, palpitations  PULM: Denies wheezing, hemoptysis  GI: Denies hematemesis, hematochezia, melena  : Denies discharge, hematuria  MSK: Denies arthralgias, myalgias  SKIN: Denies rash, lesions  NEURO: Denies paresthesias, weakness  PSYCH: Denies depression, anxiety    VITALS:  T(F): 96.1, Max: 96.1 (05-02-23 @ 21:09)  HR: 67  BP: 130/61  RR: 18Vital Signs Last 24 Hrs  T(C): 35.6 (02 May 2023 21:09), Max: 35.6 (02 May 2023 04:30)  T(F): 96.1 (02 May 2023 21:09), Max: 96.1 (02 May 2023 21:09)  HR: 67 (02 May 2023 21:09) (67 - 71)  BP: 130/61 (02 May 2023 21:09) (130/61 - 175/79)  BP(mean): --  RR: 18 (02 May 2023 21:09) (16 - 18)  SpO2: 100% (02 May 2023 04:30) (100% - 100%)        PHYSICAL EXAM:  Gen: NAD, resting in bed  HEENT: Normocephalic, atraumatic  Neck: supple, no lymphadenopathy  CV: Regular rate & regular rhythm  Lungs: decreased BS at bases, no fremitus  Abdomen: Soft, BS present  Ext: Warm, well perfused  Neuro: non focal, awake  Skin: no rash, no erythema  Lines: no phlebitis    FH: Non-contributory  Social Hx: Non-contributory    TESTS & MEASUREMENTS:                        10.8   6.96  )-----------( 331      ( 02 May 2023 08:03 )             36.1     05-02    144  |  102  |  14  ----------------------------<  192<H>  4.2   |  32  |  0.8    Ca    9.1      02 May 2023 08:03  Phos  4.4     05-02  Mg     2.2     05-02              Culture - Blood (collected 04-30-23 @ 14:25)  Source: .Blood Blood-Peripheral  Preliminary Report (05-02-23 @ 02:02):    No growth to date.    Culture - Blood (collected 04-30-23 @ 14:25)  Source: .Blood Blood-Peripheral  Preliminary Report (05-02-23 @ 02:02):    No growth to date.            INFECTIOUS DISEASES TESTING  Procalcitonin, Serum: 0.08 (05-01-23 @ 10:34)  COVID-19 PCR: NotDetec (01-26-23 @ 12:30)  COVID-19 PCR: NotDetec (08-29-22 @ 20:15)      INFLAMMATORY MARKERS  C-Reactive Protein, Serum: 56.9 mg/L (05-02-23 @ 08:03)  Sedimentation Rate, Erythrocyte: 38 mm/Hr (05-02-23 @ 08:03)  Sedimentation Rate, Erythrocyte: 29 mm/Hr (01-26-23 @ 12:28)  C-Reactive Protein, Serum: 47 mg/L (01-26-23 @ 12:28)      RADIOLOGY & ADDITIONAL TESTS:  I have personally reviewed the last available Chest xray  CXR      CT  CT Angio Chest PE Protocol w/ IV Cont:   ACC: [id number] EXAM:  CT ANGIO CHEST PULM ART [facility]AWIC   ORDERED BY:   LEODAN ALEXANDER     PROCEDURE DATE:  05/02/2023          INTERPRETATION:  Reason for exam: Chest pain. Lower extremity pain    Technique:  Multiple post contrast enhanced contiguous transaxial CT   images of the thorax were obtained.  Sagittal and coronal reformatted images were performed to  better assess   anatomic relationships and for potential preoperative planning.    95 cc of Omnipaque 350 was administered. 5 cc discarded.      Comparison: CT thorax  4/21/2020    Findings:    Tubes/lines: None.    Chest wall /axilla: Unremarkable    Mediastinum:  Stable reactive prevascular lymph nodes.    Pericardium/Vessels /Heart: No pericardial effusions. Great vessels are   normal in caliber.No central pulmonary emboli or aortic dissection.    Upper abdomen:Colonic diverticulosis.  Osseous structures:Stable degenerative changes thoracic spine.      Airways/Lung parenchyma/Pleura :Patent central tracheobronchial tree. No   parenchymal mass, consolidation or pleural effusions. There is no   evidence of bronchiectasis or honeycombing.    Pulmonary Nodules:  No suspicious pulmonary nodules.  Calcified granuloma right lower lobe (301/70)      Impression:    Since 4/21/2020.    No current CT evidence of active intrathoracic disease.    --- End of Report ---            OH MEI MD; Attending Radiologist  This document has been electronically signed. May  2 2023  3:33PM (05-02-23 @ 13:37)      CARDIOLOGY TESTING  12 Lead ECG:   Ventricular Rate 68 BPM    Atrial Rate 68 BPM    P-R Interval 230 ms    QRS Duration 88 ms    Q-T Interval 412 ms    QTC Calculation(Bazett) 438 ms    P Axis 51 degrees    R Axis 34 degrees    T Axis 29 degrees    Diagnosis Line Sinus rhythm with 1st degree A-V block  Otherwise normal ECG    Confirmed by Sammy Borja ([id number]) on 5/2/2023 12:20:13 PM (05-02-23 @ 11:54)      MEDICATIONS  ampicillin/sulbactam  IVPB     ampicillin/sulbactam  IVPB 3 IV Intermittent every 6 hours  celecoxib 200 Oral daily  chlorhexidine 2% Cloths 1 Topical <User Schedule>  enoxaparin Injectable 150 SubCutaneous every 12 hours  furosemide   Injectable 40 IV Push two times a day  levoFLOXacin IVPB 750 IV Intermittent every 24 hours  losartan 100 Oral daily  morphine ER Tablet 60 Oral daily  morphine ER Tablet 30 Oral at bedtime  pregabalin 100 Oral two times a day      WEIGHT  Weight (kg): 229.5 (04-30-23 @ 12:59)  Creatinine, Serum: 0.8 mg/dL (05-02-23 @ 08:03)      ANTIBIOTICS:  ampicillin/sulbactam  IVPB      ampicillin/sulbactam  IVPB 3 Gram(s) IV Intermittent every 6 hours  levoFLOXacin IVPB 750 milliGRAM(s) IV Intermittent every 24 hours      All available historical records have been reviewed

## 2023-05-03 ENCOUNTER — APPOINTMENT (OUTPATIENT)
Dept: PODIATRY | Facility: CLINIC | Age: 58
End: 2023-05-03

## 2023-05-03 LAB
ALBUMIN SERPL ELPH-MCNC: 4.3 G/DL — SIGNIFICANT CHANGE UP (ref 3.5–5.2)
ALP SERPL-CCNC: 111 U/L — SIGNIFICANT CHANGE UP (ref 30–115)
ALT FLD-CCNC: 18 U/L — SIGNIFICANT CHANGE UP (ref 0–41)
ANION GAP SERPL CALC-SCNC: 7 MMOL/L — SIGNIFICANT CHANGE UP (ref 7–14)
AST SERPL-CCNC: 16 U/L — SIGNIFICANT CHANGE UP (ref 0–41)
BASOPHILS # BLD AUTO: 0.07 K/UL — SIGNIFICANT CHANGE UP (ref 0–0.2)
BASOPHILS NFR BLD AUTO: 0.9 % — SIGNIFICANT CHANGE UP (ref 0–1)
BILIRUB SERPL-MCNC: 0.2 MG/DL — SIGNIFICANT CHANGE UP (ref 0.2–1.2)
BUN SERPL-MCNC: 14 MG/DL — SIGNIFICANT CHANGE UP (ref 10–20)
CALCIUM SERPL-MCNC: 9.4 MG/DL — SIGNIFICANT CHANGE UP (ref 8.4–10.5)
CHLORIDE SERPL-SCNC: 100 MMOL/L — SIGNIFICANT CHANGE UP (ref 98–110)
CO2 SERPL-SCNC: 35 MMOL/L — HIGH (ref 17–32)
CREAT SERPL-MCNC: 0.8 MG/DL — SIGNIFICANT CHANGE UP (ref 0.7–1.5)
EGFR: 105 ML/MIN/1.73M2 — SIGNIFICANT CHANGE UP
EOSINOPHIL # BLD AUTO: 0.31 K/UL — SIGNIFICANT CHANGE UP (ref 0–0.7)
EOSINOPHIL NFR BLD AUTO: 3.9 % — SIGNIFICANT CHANGE UP (ref 0–8)
ERYTHROCYTE [SEDIMENTATION RATE] IN BLOOD: 35 MM/HR — HIGH (ref 0–10)
GLUCOSE SERPL-MCNC: 110 MG/DL — HIGH (ref 70–99)
HCT VFR BLD CALC: 39.5 % — LOW (ref 42–52)
HGB BLD-MCNC: 11.9 G/DL — LOW (ref 14–18)
IMM GRANULOCYTES NFR BLD AUTO: 0.4 % — HIGH (ref 0.1–0.3)
LYMPHOCYTES # BLD AUTO: 2.62 K/UL — SIGNIFICANT CHANGE UP (ref 1.2–3.4)
LYMPHOCYTES # BLD AUTO: 33.3 % — SIGNIFICANT CHANGE UP (ref 20.5–51.1)
MAGNESIUM SERPL-MCNC: 2.1 MG/DL — SIGNIFICANT CHANGE UP (ref 1.8–2.4)
MCHC RBC-ENTMCNC: 23.8 PG — LOW (ref 27–31)
MCHC RBC-ENTMCNC: 30.1 G/DL — LOW (ref 32–37)
MCV RBC AUTO: 79 FL — LOW (ref 80–94)
MONOCYTES # BLD AUTO: 0.43 K/UL — SIGNIFICANT CHANGE UP (ref 0.1–0.6)
MONOCYTES NFR BLD AUTO: 5.5 % — SIGNIFICANT CHANGE UP (ref 1.7–9.3)
NEUTROPHILS # BLD AUTO: 4.4 K/UL — SIGNIFICANT CHANGE UP (ref 1.4–6.5)
NEUTROPHILS NFR BLD AUTO: 56 % — SIGNIFICANT CHANGE UP (ref 42.2–75.2)
NRBC # BLD: 0 /100 WBCS — SIGNIFICANT CHANGE UP (ref 0–0)
PHOSPHATE SERPL-MCNC: 4.5 MG/DL — SIGNIFICANT CHANGE UP (ref 2.1–4.9)
PLATELET # BLD AUTO: 410 K/UL — HIGH (ref 130–400)
PMV BLD: 9.6 FL — SIGNIFICANT CHANGE UP (ref 7.4–10.4)
POTASSIUM SERPL-MCNC: 4.5 MMOL/L — SIGNIFICANT CHANGE UP (ref 3.5–5)
POTASSIUM SERPL-SCNC: 4.5 MMOL/L — SIGNIFICANT CHANGE UP (ref 3.5–5)
PROT SERPL-MCNC: 7.1 G/DL — SIGNIFICANT CHANGE UP (ref 6–8)
RBC # BLD: 5 M/UL — SIGNIFICANT CHANGE UP (ref 4.7–6.1)
RBC # FLD: 15.6 % — HIGH (ref 11.5–14.5)
SODIUM SERPL-SCNC: 142 MMOL/L — SIGNIFICANT CHANGE UP (ref 135–146)
VANCOMYCIN TROUGH SERPL-MCNC: 12.7 UG/ML — HIGH (ref 5–10)
WBC # BLD: 7.86 K/UL — SIGNIFICANT CHANGE UP (ref 4.8–10.8)
WBC # FLD AUTO: 7.86 K/UL — SIGNIFICANT CHANGE UP (ref 4.8–10.8)

## 2023-05-03 PROCEDURE — 75635 CT ANGIO ABDOMINAL ARTERIES: CPT | Mod: 26

## 2023-05-03 PROCEDURE — 99232 SBSQ HOSP IP/OBS MODERATE 35: CPT

## 2023-05-03 RX ORDER — INSULIN LISPRO 100/ML
VIAL (ML) SUBCUTANEOUS AT BEDTIME
Refills: 0 | Status: DISCONTINUED | OUTPATIENT
Start: 2023-05-03 | End: 2023-05-05

## 2023-05-03 RX ORDER — INSULIN LISPRO 100/ML
VIAL (ML) SUBCUTANEOUS
Refills: 0 | Status: DISCONTINUED | OUTPATIENT
Start: 2023-05-03 | End: 2023-05-05

## 2023-05-03 RX ADMIN — Medication 40 MILLIGRAM(S): at 10:20

## 2023-05-03 RX ADMIN — Medication 25 MILLIGRAM(S): at 09:30

## 2023-05-03 RX ADMIN — AMPICILLIN SODIUM AND SULBACTAM SODIUM 200 GRAM(S): 250; 125 INJECTION, POWDER, FOR SUSPENSION INTRAMUSCULAR; INTRAVENOUS at 18:28

## 2023-05-03 RX ADMIN — MORPHINE SULFATE 4 MILLIGRAM(S): 50 CAPSULE, EXTENDED RELEASE ORAL at 10:59

## 2023-05-03 RX ADMIN — MORPHINE SULFATE 2 MILLIGRAM(S): 50 CAPSULE, EXTENDED RELEASE ORAL at 05:17

## 2023-05-03 RX ADMIN — MORPHINE SULFATE 60 MILLIGRAM(S): 50 CAPSULE, EXTENDED RELEASE ORAL at 06:11

## 2023-05-03 RX ADMIN — MORPHINE SULFATE 2 MILLIGRAM(S): 50 CAPSULE, EXTENDED RELEASE ORAL at 06:11

## 2023-05-03 RX ADMIN — MORPHINE SULFATE 60 MILLIGRAM(S): 50 CAPSULE, EXTENDED RELEASE ORAL at 05:20

## 2023-05-03 RX ADMIN — ENOXAPARIN SODIUM 150 MILLIGRAM(S): 100 INJECTION SUBCUTANEOUS at 05:19

## 2023-05-03 RX ADMIN — MORPHINE SULFATE 4 MILLIGRAM(S): 50 CAPSULE, EXTENDED RELEASE ORAL at 10:29

## 2023-05-03 RX ADMIN — CELECOXIB 200 MILLIGRAM(S): 200 CAPSULE ORAL at 12:42

## 2023-05-03 RX ADMIN — CELECOXIB 200 MILLIGRAM(S): 200 CAPSULE ORAL at 13:12

## 2023-05-03 RX ADMIN — AMPICILLIN SODIUM AND SULBACTAM SODIUM 200 GRAM(S): 250; 125 INJECTION, POWDER, FOR SUSPENSION INTRAMUSCULAR; INTRAVENOUS at 12:41

## 2023-05-03 RX ADMIN — Medication 100 MILLIGRAM(S): at 05:19

## 2023-05-03 RX ADMIN — MORPHINE SULFATE 4 MILLIGRAM(S): 50 CAPSULE, EXTENDED RELEASE ORAL at 21:33

## 2023-05-03 RX ADMIN — AMPICILLIN SODIUM AND SULBACTAM SODIUM 200 GRAM(S): 250; 125 INJECTION, POWDER, FOR SUSPENSION INTRAMUSCULAR; INTRAVENOUS at 00:19

## 2023-05-03 RX ADMIN — Medication 100 MILLIGRAM(S): at 18:52

## 2023-05-03 RX ADMIN — AMPICILLIN SODIUM AND SULBACTAM SODIUM 200 GRAM(S): 250; 125 INJECTION, POWDER, FOR SUSPENSION INTRAMUSCULAR; INTRAVENOUS at 05:20

## 2023-05-03 RX ADMIN — MORPHINE SULFATE 30 MILLIGRAM(S): 50 CAPSULE, EXTENDED RELEASE ORAL at 18:53

## 2023-05-03 RX ADMIN — LOSARTAN POTASSIUM 100 MILLIGRAM(S): 100 TABLET, FILM COATED ORAL at 06:11

## 2023-05-03 NOTE — PROGRESS NOTE ADULT - ASSESSMENT
A 56 y/o male with PMHx of HTN, chronic back pain s/p spinal cord stimulator, RUSSELL on CPAP, DM (diet controlled) , venous stasis ulcers x 3 years who presents with swelling/redness worsening of ulcer.    IMPRESSION  #Venous Stasis bilateral Ulcer with RLE cellulitis  - Wound Cx 7/19/2020  Pseudomonas aeruginosa  - s/p debridement 1/30 - Wound Cx Acinteobacter baumanii, E faecalis    #DM II    #Obesity BMI (kg/m2): 58.3  #Abx allergy: IV Contrast (Sneezing (Mod to Severe))  penicillin (Unknown) - rash as child -- tolerated cefepime previously and Augmentin    RECOMMENDATIONS  - continue unasyn 3g q 6 hours and Levofloxacin 750 mg daily to cover previous organisms   - follow-up CT RLE   - if pain worsening tomorrow, switch unasyn to Linezolid 600 mg q 12 hours   - keep RLE elevated  - appreciate podiatry evaluation    A 58 y/o male with PMHx of HTN, chronic back pain s/p spinal cord stimulator, RUSSELL on CPAP, DM (diet controlled) , venous stasis ulcers x 3 years who presents with swelling/redness worsening of ulcer.    IMPRESSION  #Venous Stasis bilateral Ulcer with RLE cellulitis  - Wound Cx 7/19/2020  Pseudomonas aeruginosa  - s/p debridement 1/30 - Wound Cx Acinetobacter baumanii, E faecalis    #DM II  #Obesity BMI (kg/m2): 58.3  #Abx allergy: IV Contrast (Sneezing (Mod to Severe))  penicillin (Unknown) - rash as child -- tolerated cefepime previously and Augmentin    would recommend:    1. Change Unasyn to Linezolid since pain and swelling is not improving  2. Continue Levofloxacin 750 mg daily to cover previous organisms   3. Keep RLE elevated  4. Pain management as needed    d/w Patient and DR. Figueredo    Attending Attestation:    Spent more than 45 minutes on total encounter, more than 50 % of the visit was spent counseling and/or coordinating care by the Attending physician.

## 2023-05-03 NOTE — PROGRESS NOTE ADULT - SUBJECTIVE AND OBJECTIVE BOX
JOSE ALBERTO NO  57y  Male       History of Present Illness: Patient was seen and examined today. Pt complaining of bilateral lower extremity swelling, heaviness, burning and pain.     Vital Signs Last 24 Hrs  T(C): 35.6 (02 May 2023 21:09), Max: 35.6 (02 May 2023 21:09)  T(F): 96.1 (02 May 2023 21:09), Max: 96.1 (02 May 2023 21:09)  HR: 72 (03 May 2023 10:30) (67 - 72)  BP: 152/71 (03 May 2023 10:30) (130/61 - 168/73)  BP(mean): --  RR: 18 (03 May 2023 10:30) (16 - 18)  SpO2: --        PHYSICAL EXAM:  GENERAL: NAD, well-groomed, well-developed  HEENT - NC/AT, pupils equal and reactive to light,  ; Moist mucous membranes, Good dentition, No lesions  NECK: Supple, No JVD  CHEST/LUNG: Clear to auscultation bilaterally; No rales, rhonchi, wheezing  HEART: Regular rate and rhythm; No murmurs, rubs, or gallops  ABDOMEN: Soft, Nontender, Nondistended; Bowel sounds present  EXTREMITIES:  bilateral lower extremity in clean and dry dressing.   NEURO:  No Focal deficits, sensory and motor intact        acetaminophen     Tablet .. 650 milliGRAM(s) Oral every 6 hours PRN  aluminum hydroxide/magnesium hydroxide/simethicone Suspension 30 milliLiter(s) Oral every 4 hours PRN  ampicillin/sulbactam  IVPB 3 Gram(s) IV Intermittent every 6 hours  ampicillin/sulbactam  IVPB      celecoxib 200 milliGRAM(s) Oral daily  chlorhexidine 2% Cloths 1 Application(s) Topical <User Schedule>  diphenhydrAMINE Injectable 25 milliGRAM(s) IV Push every 4 hours PRN  enoxaparin Injectable 150 milliGRAM(s) SubCutaneous every 12 hours  furosemide   Injectable 40 milliGRAM(s) IV Push two times a day  insulin lispro (ADMELOG) corrective regimen sliding scale   SubCutaneous three times a day before meals  insulin lispro (ADMELOG) corrective regimen sliding scale   SubCutaneous at bedtime  levoFLOXacin IVPB 750 milliGRAM(s) IV Intermittent every 24 hours  losartan 100 milliGRAM(s) Oral daily  melatonin 3 milliGRAM(s) Oral at bedtime PRN  morphine  - Injectable 2 milliGRAM(s) IV Push every 6 hours PRN  morphine  - Injectable 4 milliGRAM(s) IV Push every 4 hours PRN  morphine ER Tablet 60 milliGRAM(s) Oral daily  morphine ER Tablet 30 milliGRAM(s) Oral at bedtime  ondansetron Injectable 4 milliGRAM(s) IV Push every 8 hours PRN  oxycodone    5 mG/acetaminophen 325 mG 2 Tablet(s) Oral every 6 hours PRN  pregabalin 100 milliGRAM(s) Oral two times a day

## 2023-05-03 NOTE — PROGRESS NOTE ADULT - SUBJECTIVE AND OBJECTIVE BOX
Patient is seen and examined at the bed side, is afebrile.      REVIEW OF SYSTEMS: All other review systems are negative      ALLERGIES: penicillin (Unknown)  IV Contrast (Sneezing (Mod to Severe))        Vital Signs Last 24 Hrs  T(C): 35.3 (03 May 2023 13:05), Max: 35.6 (02 May 2023 21:09)  T(F): 95.5 (03 May 2023 13:05), Max: 96.1 (02 May 2023 21:09)  HR: 89 (03 May 2023 18:53) (67 - 89)  BP: 140/62 (03 May 2023 18:53) (130/61 - 169/74)  BP(mean): --  RR: 18 (03 May 2023 18:53) (18 - 18)  SpO2: 96% (03 May 2023 13:05) (96% - 96%)        PHYSICAL EXAM:  GENERAL: Not in distress   CHEST/LUNG:  Aire ntry bilaterally  HEART: s1 and s2 present  ABDOMEN:  Nontender and  Nondistended  EXTREMITIES: No pedal  edema  CNS: Awake and Alert      LABS:                        11.9   7.86  )-----------( 410      ( 03 May 2023 15:49 )             39.5       05-03    142  |  100  |  14  ----------------------------<  110<H>  4.5   |  35<H>  |  0.8    Ca    9.4      03 May 2023 15:49  Phos  4.5     05-03  Mg     2.1     05-03    TPro  7.1  /  Alb  4.3  /  TBili  0.2  /  DBili  x   /  AST  16  /  ALT  18  /  AlkPhos  111  05-03      MEDICATIONS  (STANDING):  ampicillin/sulbactam  IVPB 3 Gram(s) IV Intermittent every 6 hours  ampicillin/sulbactam  IVPB      celecoxib 200 milliGRAM(s) Oral daily  chlorhexidine 2% Cloths 1 Application(s) Topical <User Schedule>  enoxaparin Injectable 150 milliGRAM(s) SubCutaneous every 12 hours  furosemide   Injectable 40 milliGRAM(s) IV Push two times a day  insulin lispro (ADMELOG) corrective regimen sliding scale   SubCutaneous three times a day before meals  insulin lispro (ADMELOG) corrective regimen sliding scale   SubCutaneous at bedtime  levoFLOXacin IVPB 750 milliGRAM(s) IV Intermittent every 24 hours  losartan 100 milliGRAM(s) Oral daily  morphine ER Tablet 60 milliGRAM(s) Oral daily  morphine ER Tablet 30 milliGRAM(s) Oral at bedtime  pregabalin 100 milliGRAM(s) Oral two times a day        RADIOLOGY & ADDITIONAL TESTS:  < from: CT Angio Abd Aorta w/run-off w/ IV Cont (05.03.23 @ 11:55) >  RIGHT LOWER EXTREMITY:    Common Iliac: Atherosclerotic calcifications without associated stenosis.  Internal Iliac: Atherosclerotic calcifications with resultant mild   stenosis.  External Iliac: Atherosclerotic calcifications without associated   stenosis.  Common Femoral: Atherosclerotic calcifications without associated   stenosis.  Deep Femoral: Patent.  Superficial Femoral: Distal aspect is noted for focal atherosclerotic   calcified plaque and resultant mild stenosis (304/477).  Popliteal: Atherosclerotic calcified plaque resulting in mild stenosis.  Three Vessel Runoff: Three-vessel runoff. Anterior and posterior tibial   arteries are patent with runoff visualized the level of the ankle. Trace   calcification in the perineal artery.    LEFT LOWER EXTREMITY:    Common Iliac: Atherosclerotic calcifications without associated stenosis.  Internal Iliac: Atherosclerotic calcifications with resultant mild   stenosis.  External Iliac: Atherosclerotic calcifications without associated   stenosis.  Common Femoral: Atherosclerotic calcifications without associated   stenosis.  Deep Femoral: Atherosclerotic calcifications without associated stenosis.  Superficial Femoral: Atherosclerotic calcifications without associated   stenosis.  Popliteal: Atherosclerotic calcifications without associated stenosis.  Three Vessel Runoff: 2 vessel runoff.The posterior tibial artery is   patent with runoff visualized to the level of the ankle. The anterior   tibial artery is patent with poor opacification at the level of the ankle   suggesting stenosis. The peroneal artery is with atherosclerotic disease   and severe stenosis with eventual occlusion at the level of the distal   calf.      MICROBIOLOGY DATA:    Culture - Blood (04.30.23 @ 14:25)   Specimen Source: .Blood Blood-Peripheral  Culture Results: No growth to date.    Culture - Blood (04.30.23 @ 14:25)   Specimen Source: .Blood Blood-Peripheral  Culture Results: No growth to date.             Patient is seen and examined at the bed side, is afebrile. He is c/o RLE swelling and pain is not improving.       REVIEW OF SYSTEMS: All other review systems are negative      ALLERGIES: penicillin (Unknown)  IV Contrast (Sneezing (Mod to Severe))      Vital Signs Last 24 Hrs  T(C): 35.3 (03 May 2023 13:05), Max: 35.6 (02 May 2023 21:09)  T(F): 95.5 (03 May 2023 13:05), Max: 96.1 (02 May 2023 21:09)  HR: 89 (03 May 2023 18:53) (67 - 89)  BP: 140/62 (03 May 2023 18:53) (130/61 - 169/74)  BP(mean): --  RR: 18 (03 May 2023 18:53) (18 - 18)  SpO2: 96% (03 May 2023 13:05) (96% - 96%)        PHYSICAL EXAM:  GENERAL: Not in distress   CHEST/LUNG: Not using accessory muscles   HEART: s1 and s2 present  ABDOMEN:  Nontender and  Nondistended  EXTREMITIES: No pedal  edema  CNS: Awake and Alert      LABS:                        11.9   7.86  )-----------( 410      ( 03 May 2023 15:49 )             39.5       05-03    142  |  100  |  14  ----------------------------<  110<H>  4.5   |  35<H>  |  0.8    Ca    9.4      03 May 2023 15:49  Phos  4.5     05-03  Mg     2.1     05-03    TPro  7.1  /  Alb  4.3  /  TBili  0.2  /  DBili  x   /  AST  16  /  ALT  18  /  AlkPhos  111  05-03      MEDICATIONS  (STANDING):    ampicillin/sulbactam  IVPB 3 Gram(s) IV Intermittent every 6 hours  ampicillin/sulbactam  IVPB      celecoxib 200 milliGRAM(s) Oral daily  chlorhexidine 2% Cloths 1 Application(s) Topical <User Schedule>  enoxaparin Injectable 150 milliGRAM(s) SubCutaneous every 12 hours  furosemide   Injectable 40 milliGRAM(s) IV Push two times a day  insulin lispro (ADMELOG) corrective regimen sliding scale   SubCutaneous three times a day before meals  insulin lispro (ADMELOG) corrective regimen sliding scale   SubCutaneous at bedtime  levoFLOXacin IVPB 750 milliGRAM(s) IV Intermittent every 24 hours  losartan 100 milliGRAM(s) Oral daily  morphine ER Tablet 60 milliGRAM(s) Oral daily  morphine ER Tablet 30 milliGRAM(s) Oral at bedtime  pregabalin 100 milliGRAM(s) Oral two times a day        RADIOLOGY & ADDITIONAL TESTS:  < from: CT Angio Abd Aorta w/run-off w/ IV Cont (05.03.23 @ 11:55) >  RIGHT LOWER EXTREMITY:    Common Iliac: Atherosclerotic calcifications without associated stenosis.  Internal Iliac: Atherosclerotic calcifications with resultant mild   stenosis.  External Iliac: Atherosclerotic calcifications without associated   stenosis.  Common Femoral: Atherosclerotic calcifications without associated   stenosis.  Deep Femoral: Patent.  Superficial Femoral: Distal aspect is noted for focal atherosclerotic   calcified plaque and resultant mild stenosis (304/477).  Popliteal: Atherosclerotic calcified plaque resulting in mild stenosis.  Three Vessel Runoff: Three-vessel runoff. Anterior and posterior tibial   arteries are patent with runoff visualized the level of the ankle. Trace   calcification in the perineal artery.    LEFT LOWER EXTREMITY:    Common Iliac: Atherosclerotic calcifications without associated stenosis.  Internal Iliac: Atherosclerotic calcifications with resultant mild   stenosis.  External Iliac: Atherosclerotic calcifications without associated   stenosis.  Common Femoral: Atherosclerotic calcifications without associated   stenosis.  Deep Femoral: Atherosclerotic calcifications without associated stenosis.  Superficial Femoral: Atherosclerotic calcifications without associated   stenosis.  Popliteal: Atherosclerotic calcifications without associated stenosis.  Three Vessel Runoff: 2 vessel runoff.The posterior tibial artery is   patent with runoff visualized to the level of the ankle. The anterior   tibial artery is patent with poor opacification at the level of the ankle   suggesting stenosis. The peroneal artery is with atherosclerotic disease   and severe stenosis with eventual occlusion at the level of the distal   calf.      MICROBIOLOGY DATA:    Culture - Blood (04.30.23 @ 14:25)   Specimen Source: .Blood Blood-Peripheral  Culture Results: No growth to date.    Culture - Blood (04.30.23 @ 14:25)   Specimen Source: .Blood Blood-Peripheral  Culture Results: No growth to date.

## 2023-05-03 NOTE — PROGRESS NOTE ADULT - ASSESSMENT
56 y/o male with PMHx of HTN, chronic back pain s/p spinal cord stimulator, RUSSELL on CPAP, DM (diet controlled) , bilateral lower extremity venous stasis ulcers x 3 years , last admission 1/26-2/6 for cellulitis of LE ,wound culture grew  Acinetobacter baumannii/nosocomialis, Numerous Enterococcus faecalis, presents with swelling/redness worsening of ulcer.     # Bilateral LE cellulitis with ulceration  - ESR 38.   - blood culture: negative.   -  LE venous duplex neg  - f/w CTA LE to r/o obstruction  - s/p Azactam/Vanco -> start levaquin + unasyn  - cw lasix 40 mg daily IV.   - podiatry and ID following.     # chronic back pain s/p spinal cord stimulator  cw pain meds prn     # HTN  - monitor BP  - continue losartan    # RUSSELL  -cw using own equipment     # DM  - reportedly diet controlled  - monitor FS  - carb consistent diet    # Obesity  - diet modification counseling done    DVT ppx: lovenox      Spent over 35 min reviewing chart and on coordinating patient care during interdisciplinary rounds

## 2023-05-04 LAB
ANION GAP SERPL CALC-SCNC: 7 MMOL/L — SIGNIFICANT CHANGE UP (ref 7–14)
BASOPHILS # BLD AUTO: 0.06 K/UL — SIGNIFICANT CHANGE UP (ref 0–0.2)
BASOPHILS NFR BLD AUTO: 0.9 % — SIGNIFICANT CHANGE UP (ref 0–1)
BUN SERPL-MCNC: 15 MG/DL — SIGNIFICANT CHANGE UP (ref 10–20)
CALCIUM SERPL-MCNC: 9.5 MG/DL — SIGNIFICANT CHANGE UP (ref 8.4–10.5)
CHLORIDE SERPL-SCNC: 104 MMOL/L — SIGNIFICANT CHANGE UP (ref 98–110)
CO2 SERPL-SCNC: 31 MMOL/L — SIGNIFICANT CHANGE UP (ref 17–32)
CREAT SERPL-MCNC: 0.9 MG/DL — SIGNIFICANT CHANGE UP (ref 0.7–1.5)
CRP SERPL-MCNC: 34 MG/L — HIGH
CRP SERPL-MCNC: 46.1 MG/L — HIGH
EGFR: 101 ML/MIN/1.73M2 — SIGNIFICANT CHANGE UP
EOSINOPHIL # BLD AUTO: 0.31 K/UL — SIGNIFICANT CHANGE UP (ref 0–0.7)
EOSINOPHIL NFR BLD AUTO: 4.5 % — SIGNIFICANT CHANGE UP (ref 0–8)
ERYTHROCYTE [SEDIMENTATION RATE] IN BLOOD: 40 MM/HR — HIGH (ref 0–10)
GLUCOSE SERPL-MCNC: 135 MG/DL — HIGH (ref 70–99)
HCT VFR BLD CALC: 36.8 % — LOW (ref 42–52)
HGB BLD-MCNC: 11.1 G/DL — LOW (ref 14–18)
IMM GRANULOCYTES NFR BLD AUTO: 0.4 % — HIGH (ref 0.1–0.3)
LYMPHOCYTES # BLD AUTO: 1.88 K/UL — SIGNIFICANT CHANGE UP (ref 1.2–3.4)
LYMPHOCYTES # BLD AUTO: 27.5 % — SIGNIFICANT CHANGE UP (ref 20.5–51.1)
MAGNESIUM SERPL-MCNC: 2.2 MG/DL — SIGNIFICANT CHANGE UP (ref 1.8–2.4)
MCHC RBC-ENTMCNC: 23.9 PG — LOW (ref 27–31)
MCHC RBC-ENTMCNC: 30.2 G/DL — LOW (ref 32–37)
MCV RBC AUTO: 79.1 FL — LOW (ref 80–94)
MONOCYTES # BLD AUTO: 0.5 K/UL — SIGNIFICANT CHANGE UP (ref 0.1–0.6)
MONOCYTES NFR BLD AUTO: 7.3 % — SIGNIFICANT CHANGE UP (ref 1.7–9.3)
NEUTROPHILS # BLD AUTO: 4.06 K/UL — SIGNIFICANT CHANGE UP (ref 1.4–6.5)
NEUTROPHILS NFR BLD AUTO: 59.4 % — SIGNIFICANT CHANGE UP (ref 42.2–75.2)
NRBC # BLD: 0 /100 WBCS — SIGNIFICANT CHANGE UP (ref 0–0)
PHOSPHATE SERPL-MCNC: 4.9 MG/DL — SIGNIFICANT CHANGE UP (ref 2.1–4.9)
PLATELET # BLD AUTO: 340 K/UL — SIGNIFICANT CHANGE UP (ref 130–400)
PMV BLD: 9.2 FL — SIGNIFICANT CHANGE UP (ref 7.4–10.4)
POTASSIUM SERPL-MCNC: 4.5 MMOL/L — SIGNIFICANT CHANGE UP (ref 3.5–5)
POTASSIUM SERPL-SCNC: 4.5 MMOL/L — SIGNIFICANT CHANGE UP (ref 3.5–5)
RBC # BLD: 4.65 M/UL — LOW (ref 4.7–6.1)
RBC # FLD: 15.5 % — HIGH (ref 11.5–14.5)
SODIUM SERPL-SCNC: 142 MMOL/L — SIGNIFICANT CHANGE UP (ref 135–146)
WBC # BLD: 6.84 K/UL — SIGNIFICANT CHANGE UP (ref 4.8–10.8)
WBC # FLD AUTO: 6.84 K/UL — SIGNIFICANT CHANGE UP (ref 4.8–10.8)

## 2023-05-04 PROCEDURE — 99232 SBSQ HOSP IP/OBS MODERATE 35: CPT

## 2023-05-04 RX ORDER — MORPHINE SULFATE 50 MG/1
2 CAPSULE, EXTENDED RELEASE ORAL EVERY 6 HOURS
Refills: 0 | Status: DISCONTINUED | OUTPATIENT
Start: 2023-05-04 | End: 2023-05-05

## 2023-05-04 RX ORDER — LINEZOLID 600 MG/300ML
600 INJECTION, SOLUTION INTRAVENOUS ONCE
Refills: 0 | Status: COMPLETED | OUTPATIENT
Start: 2023-05-04 | End: 2023-05-04

## 2023-05-04 RX ORDER — LINEZOLID 600 MG/300ML
INJECTION, SOLUTION INTRAVENOUS
Refills: 0 | Status: DISCONTINUED | OUTPATIENT
Start: 2023-05-04 | End: 2023-05-05

## 2023-05-04 RX ORDER — LINEZOLID 600 MG/300ML
600 INJECTION, SOLUTION INTRAVENOUS EVERY 12 HOURS
Refills: 0 | Status: DISCONTINUED | OUTPATIENT
Start: 2023-05-04 | End: 2023-05-05

## 2023-05-04 RX ADMIN — MORPHINE SULFATE 60 MILLIGRAM(S): 50 CAPSULE, EXTENDED RELEASE ORAL at 05:37

## 2023-05-04 RX ADMIN — LOSARTAN POTASSIUM 100 MILLIGRAM(S): 100 TABLET, FILM COATED ORAL at 05:37

## 2023-05-04 RX ADMIN — Medication 100 MILLIGRAM(S): at 05:37

## 2023-05-04 RX ADMIN — Medication 100 MILLIGRAM(S): at 17:36

## 2023-05-04 RX ADMIN — LINEZOLID 300 MILLIGRAM(S): 600 INJECTION, SOLUTION INTRAVENOUS at 17:36

## 2023-05-04 RX ADMIN — MORPHINE SULFATE 2 MILLIGRAM(S): 50 CAPSULE, EXTENDED RELEASE ORAL at 21:55

## 2023-05-04 RX ADMIN — MORPHINE SULFATE 2 MILLIGRAM(S): 50 CAPSULE, EXTENDED RELEASE ORAL at 11:48

## 2023-05-04 RX ADMIN — MORPHINE SULFATE 30 MILLIGRAM(S): 50 CAPSULE, EXTENDED RELEASE ORAL at 17:36

## 2023-05-04 RX ADMIN — MORPHINE SULFATE 2 MILLIGRAM(S): 50 CAPSULE, EXTENDED RELEASE ORAL at 05:37

## 2023-05-04 RX ADMIN — LINEZOLID 300 MILLIGRAM(S): 600 INJECTION, SOLUTION INTRAVENOUS at 00:52

## 2023-05-04 NOTE — PROGRESS NOTE ADULT - SUBJECTIVE AND OBJECTIVE BOX
JOSE ALBERTO NO  57y  Male       History of Present Illness: Patient was seen and examined today. Pt denied any complaints.       Vital Signs Last 24 Hrs  T(C): 35.8 (04 May 2023 14:00), Max: 36 (04 May 2023 04:30)  T(F): 96.5 (04 May 2023 14:00), Max: 96.8 (04 May 2023 04:30)  HR: 75 (04 May 2023 14:00) (67 - 81)  BP: 135/62 (04 May 2023 14:00) (135/62 - 172/82)  BP(mean): --  RR: 18 (04 May 2023 14:00) (18 - 18)  SpO2: 94% (04 May 2023 04:30) (94% - 94%)        PHYSICAL EXAM:  GENERAL: NAD, well-groomed, well-developed  HEENT - NC/AT, pupils equal and reactive to light,  ; Moist mucous membranes, Good dentition, No lesions  NECK: Supple, No JVD  CHEST/LUNG: Clear to auscultation bilaterally; No rales, rhonchi, wheezing  HEART: Regular rate and rhythm; No murmurs, rubs, or gallops  ABDOMEN: Soft, Nontender, Nondistended; Bowel sounds present  EXTREMITIES:  bilateral lower extremity with swelling.   NEURO:  No Focal deficits, sensory and motor intact        acetaminophen     Tablet .. 650 milliGRAM(s) Oral every 6 hours PRN  aluminum hydroxide/magnesium hydroxide/simethicone Suspension 30 milliLiter(s) Oral every 4 hours PRN  chlorhexidine 2% Cloths 1 Application(s) Topical <User Schedule>  diphenhydrAMINE Injectable 25 milliGRAM(s) IV Push every 4 hours PRN  enoxaparin Injectable 150 milliGRAM(s) SubCutaneous every 12 hours  furosemide   Injectable 40 milliGRAM(s) IV Push two times a day  insulin lispro (ADMELOG) corrective regimen sliding scale   SubCutaneous three times a day before meals  insulin lispro (ADMELOG) corrective regimen sliding scale   SubCutaneous at bedtime  levoFLOXacin IVPB 750 milliGRAM(s) IV Intermittent every 24 hours  linezolid  IVPB      linezolid  IVPB 600 milliGRAM(s) IV Intermittent every 12 hours  losartan 100 milliGRAM(s) Oral daily  melatonin 3 milliGRAM(s) Oral at bedtime PRN  morphine  - Injectable 2 milliGRAM(s) IV Push every 6 hours PRN  morphine ER Tablet 60 milliGRAM(s) Oral daily  morphine ER Tablet 30 milliGRAM(s) Oral at bedtime  oxycodone    5 mG/acetaminophen 325 mG 2 Tablet(s) Oral every 6 hours PRN  pregabalin 100 milliGRAM(s) Oral two times a day

## 2023-05-04 NOTE — PROGRESS NOTE ADULT - ASSESSMENT
A 58 y/o male with PMHx of HTN, chronic back pain s/p spinal cord stimulator, RUSSELL on CPAP, DM (diet controlled) , venous stasis ulcers x 3 years who presents with swelling/redness worsening of ulcer.    IMPRESSION  #Venous Stasis bilateral Ulcer with RLE cellulitis  - Wound Cx 7/19/2020  Pseudomonas aeruginosa  - s/p debridement 1/30 - Wound Cx Acinetobacter baumanii, E faecalis    #DM II  #Obesity BMI (kg/m2): 58.3  #Abx allergy: IV Contrast (Sneezing (Mod to Severe))  penicillin (Unknown) - rash as child -- tolerated cefepime previously and Augmentin    Recommendations  - reports RLE pain is improving   - continue Linezolid 600 mg q 12 hours  - continue levofloxacin 750 mg daily   - if improving by tomorrow, can hopefully plan for discharge soon with PO antibiotics  - would check to see if Linezolid is covered and if prior auth needed      Please call or message on [employer] Teams if with any questions.  Spectra [phone]

## 2023-05-04 NOTE — PHARMACOTHERAPY INTERVENTION NOTE - COMMENTS
As per policy, ordered a vancomycin trough for 5/2 at 3pm in order to assist with vancomycin pharmacokinetic monitoring.     Herman Charles, PharmD, [facility]  Clinical Pharmacy Specialist, Infectious Diseases  Tele-Antimicrobial Stewardship Program (Tele-ASP)  Tele-ASP Phone: [phone]  
Drug-drug interaction between linezolid and opioid analgesics can potentiate CNS depression and serotonin syndrome. As per Dr. Enriquez, aware of interaction, and approves use of concomitant therapy as benefit outweighs risk. Will monitor patient for serotonin syndrome-like symptoms during combination use.  
Patient was started on vancomycin 2000 mg IV q12h. As per [employer] calculations, the steady state vancomycin AUC/JAYCOB is predicted to be 457.03 mg/L*h, as per population data. Will confirm with next trough. Serum creatinine is 0.8 mg/dL. Can continue vancomycin 2000 mg IV q12h for now.    Herman Charles, PharmD, BCIDP  Clinical Pharmacy Specialist, Infectious Diseases  Tele-Antimicrobial Stewardship Program (Tele-ASP)  Tele-ASP Phone: [phone]

## 2023-05-04 NOTE — PROGRESS NOTE ADULT - ASSESSMENT
58 y/o male with PMHx of HTN, chronic back pain s/p spinal cord stimulator, RUSSELL on CPAP, DM (diet controlled) , bilateral lower extremity venous stasis ulcers x 3 years , last admission 1/26-2/6 for cellulitis of LE ,wound culture grew  Acinetobacter baumannii/nosocomialis, Numerous Enterococcus faecalis, presents with swelling/redness worsening of ulcer.     #Venous Stasis bilateral Ulcer with RLE cellulitis  - Wound Cx 7/19/2020  Pseudomonas aeruginosa  - s/p debridement 1/30 - Wound Cx Acinetobacter baumanii, E faecalis  - ESR 38.   - blood culture: negative.   -  LE venous duplex neg  - f/w CTA LE to r/o obstruction  - s/p Azactam/Vanco -> continue with Linezolid 600 mg q 12 hours and levofloxacin 750 mg daily   - cw lasix 40 mg daily IV.   - podiatry and ID following.     # chronic back pain s/p spinal cord stimulator  cw pain meds prn     # HTN  - monitor BP  - continue losartan    # RUSSELL  -cw using own equipment     # DM  - reportedly diet controlled  - monitor FS  - carb consistent diet    # Obesity  - diet modification counseling done    DVT ppx: lovenox

## 2023-05-05 ENCOUNTER — TRANSCRIPTION ENCOUNTER (OUTPATIENT)
Age: 58
End: 2023-05-05

## 2023-05-05 VITALS
OXYGEN SATURATION: 95 % | RESPIRATION RATE: 18 BRPM | DIASTOLIC BLOOD PRESSURE: 65 MMHG | HEART RATE: 61 BPM | TEMPERATURE: 98 F | SYSTOLIC BLOOD PRESSURE: 141 MMHG

## 2023-05-05 PROCEDURE — 99239 HOSP IP/OBS DSCHRG MGMT >30: CPT

## 2023-05-05 RX ORDER — MELOXICAM 15 MG/1
1 TABLET ORAL
Qty: 0 | Refills: 0 | DISCHARGE

## 2023-05-05 RX ORDER — LEVOFLOXACIN 5 MG/ML
1 INJECTION, SOLUTION INTRAVENOUS
Qty: 14 | Refills: 0
Start: 2023-05-05 | End: 2023-05-18

## 2023-05-05 RX ORDER — LINEZOLID 600 MG/300ML
1 INJECTION, SOLUTION INTRAVENOUS
Qty: 28 | Refills: 0
Start: 2023-05-05 | End: 2023-05-18

## 2023-05-05 RX ADMIN — LINEZOLID 300 MILLIGRAM(S): 600 INJECTION, SOLUTION INTRAVENOUS at 06:04

## 2023-05-05 RX ADMIN — MORPHINE SULFATE 60 MILLIGRAM(S): 50 CAPSULE, EXTENDED RELEASE ORAL at 06:13

## 2023-05-05 RX ADMIN — Medication 100 MILLIGRAM(S): at 06:12

## 2023-05-05 RX ADMIN — LOSARTAN POTASSIUM 100 MILLIGRAM(S): 100 TABLET, FILM COATED ORAL at 06:12

## 2023-05-05 RX ADMIN — MORPHINE SULFATE 2 MILLIGRAM(S): 50 CAPSULE, EXTENDED RELEASE ORAL at 06:14

## 2023-05-05 RX ADMIN — CHLORHEXIDINE GLUCONATE 1 APPLICATION(S): 213 SOLUTION TOPICAL at 05:51

## 2023-05-05 NOTE — DISCHARGE NOTE NURSING/CASE MANAGEMENT/SOCIAL WORK - NSDCPEFALRISK_GEN_ALL_CORE
For information on Fall & Injury Prevention, visit: [email or web address] OR  [email or web address] OR  https://www.cdc.gov/steadi/patient.html

## 2023-05-05 NOTE — PROGRESS NOTE ADULT - ASSESSMENT
A 58 y/o male with PMHx of HTN, chronic back pain s/p spinal cord stimulator, RUSSELL on CPAP, DM (diet controlled) , venous stasis ulcers x 3 years who presents with swelling/redness worsening of ulcer.    IMPRESSION  #Venous Stasis bilateral Ulcer with RLE cellulitis  - Wound Cx 7/19/2020  Pseudomonas aeruginosa  - s/p debridement 1/30 - Wound Cx Acinetobacter baumanii, E faecalis    #DM II  #Obesity BMI (kg/m2): 58.3  #Abx allergy: IV Contrast (Sneezing (Mod to Severe))  penicillin (Unknown) - rash as child -- tolerated cefepime previously and Augmentin    Recommendations  - continued improving  - continue Linezolid 600 mg q 12 hours  - continue levofloxacin 750 mg daily   - provide 2 weeks from discharge      Please call or message on Microsoft Teams if with any questions.  Spectra [phone]

## 2023-05-05 NOTE — DISCHARGE NOTE NURSING/CASE MANAGEMENT/SOCIAL WORK - PATIENT PORTAL LINK FT
You can access the FollowMyHealth Patient Portal offered by [facility] by registering at the following website: [email or web address]. By joining [facility]’s FollowMyHealth portal, you will also be able to view your health information using other applications (apps) compatible with our system.

## 2023-05-05 NOTE — DISCHARGE NOTE PROVIDER - NSDCMRMEDTOKEN_GEN_ALL_CORE_FT
furosemide 40 mg oral tablet: 1 tab(s) orally once a day  levoFLOXacin 750 mg oral tablet: 1 tab(s) orally once a day  linezolid 600 mg oral tablet: 1 tab(s) orally 2 times a day  losartan 100 mg oral tablet: 1 tab(s) orally once a day  morphine 30 mg oral tablet: 1 tab(s) orally 3 times a day, As Needed  Percocet 10/325 oral tablet: 1 tab(s) orally every 6 hours, As Needed  pregabalin 100 mg oral capsule: 1 cap(s) orally 2 times a day

## 2023-05-05 NOTE — DISCHARGE NOTE NURSING/CASE MANAGEMENT/SOCIAL WORK - NSDPDISTO_GEN_ALL_CORE
Home
Attending MD Díaz:  I personally have seen and examined this patient.  Resident note reviewed and agree on plan of care and except where noted.  See MDM for details.

## 2023-05-05 NOTE — PROGRESS NOTE ADULT - PROVIDER SPECIALTY LIST ADULT
Hospitalist
Hospitalist
Infectious Disease
Hospitalist
Hospitalist
Infectious Disease

## 2023-05-05 NOTE — DISCHARGE NOTE NURSING/CASE MANAGEMENT/SOCIAL WORK - NSDCPEWEB_GEN_ALL_CORE
[facility] for Tobacco Control website --- [email or web address] website --- [email or web address]

## 2023-05-05 NOTE — DISCHARGE NOTE PROVIDER - HOSPITAL COURSE
58 y/o male with PMHx of HTN, chronic back pain s/p spinal cord stimulator, RUSSELL on CPAP, DM (diet controlled) , bilateral lower extremity venous stasis ulcers x 3 years , last admission 1/26-2/6 for cellulitis of LE ,wound culture grew  Acinetobacter baumannii/nosocomialis, Numerous Enterococcus faecalis, presents with swelling/redness worsening of ulcer. pt was admitted for RLE cellulitis. started on Vnacomycin and Azctam, CTA lower extremity negative for any occlusion. Doppler US negative for DVT.  ID consulted and recommended Linezolid and levofloxacin, pt improved, felt much better on the last regimen. ID recommended discharge on Linezolid and levofloxacin for 14 days and follow up outpt.

## 2023-05-05 NOTE — PROGRESS NOTE ADULT - SUBJECTIVE AND OBJECTIVE BOX
KAYLAJOSE ALBERTO GARZA  57y, Male  Allergy: penicillin (Unknown)  IV Contrast (Sneezing (Mod to Severe))      LOS  5d    CHIEF COMPLAINT: Cellulitis (05 May 2023 10:06)      INTERVAL EVENTS/HPI  - No acute events overnight  - T(F): , Max: 97.6 (05-05-23 @ 04:41)  - reports RLE pain is improving -- leg appears less edematous/shiny   - WBC Count: 6.84 (05-04-23 @ 07:37)  WBC Count: 7.86 (05-03-23 @ 15:49)     - Creatinine, Serum: 0.9 (05-04-23 @ 07:37)  Creatinine, Serum: 0.8 (05-03-23 @ 15:49)       ROS  General: Denies rigors, nightsweats  HEENT: Denies headache, rhinorrhea, sore throat, eye pain  CV: Denies CP, palpitations  PULM: Denies wheezing, hemoptysis  GI: Denies hematemesis, hematochezia, melena  : Denies discharge, hematuria  MSK: Denies arthralgias, myalgias  SKIN: Denies rash, lesions  NEURO: Denies paresthesias, weakness  PSYCH: Denies depression, anxiety    VITALS:  T(F): 97.6, Max: 97.6 (05-05-23 @ 04:41)  HR: 61  BP: 141/65  RR: 18Vital Signs Last 24 Hrs  T(C): 36.4 (05 May 2023 04:41), Max: 36.4 (05 May 2023 04:41)  T(F): 97.6 (05 May 2023 04:41), Max: 97.6 (05 May 2023 04:41)  HR: 61 (05 May 2023 04:41) (61 - 75)  BP: 141/65 (05 May 2023 04:41) (135/62 - 175/77)  BP(mean): --  RR: 18 (05 May 2023 04:41) (18 - 18)  SpO2: 95% (05 May 2023 04:41) (95% - 95%)        PHYSICAL EXAM:  Gen: NAD, resting in bed  HEENT: Normocephalic, atraumatic  Neck: supple, no lymphadenopathy  CV: Regular rate & regular rhythm  Lungs: decreased BS at bases, no fremitus  Abdomen: Soft, BS present  Ext: Warm, well perfused  Neuro: non focal, awake  Skin: no rash, no erythema  Lines: no phlebitis    FH: Non-contributory  Social Hx: Non-contributory    TESTS & MEASUREMENTS:                        11.1   6.84  )-----------( 340      ( 04 May 2023 07:37 )             36.8     05-04    142  |  104  |  15  ----------------------------<  135<H>  4.5   |  31  |  0.9    Ca    9.5      04 May 2023 07:37  Phos  4.9     05-04  Mg     2.2     05-04    TPro  7.1  /  Alb  4.3  /  TBili  0.2  /  DBili  x   /  AST  16  /  ALT  18  /  AlkPhos  111  05-03      LIVER FUNCTIONS - ( 03 May 2023 15:49 )  Alb: 4.3 g/dL / Pro: 7.1 g/dL / ALK PHOS: 111 U/L / ALT: 18 U/L / AST: 16 U/L / GGT: x               Culture - Blood (collected 04-30-23 @ 14:25)  Source: .Blood Blood-Peripheral  Preliminary Report (05-02-23 @ 02:02):    No growth to date.    Culture - Blood (collected 04-30-23 @ 14:25)  Source: .Blood Blood-Peripheral  Preliminary Report (05-02-23 @ 02:02):    No growth to date.            INFECTIOUS DISEASES TESTING  Procalcitonin, Serum: 0.08 (05-01-23 @ 10:34)  COVID-19 PCR: NotDetec (01-26-23 @ 12:30)  COVID-19 PCR: NotDetec (08-29-22 @ 20:15)      INFLAMMATORY MARKERS  C-Reactive Protein, Serum: 34.0 mg/L (05-04-23 @ 07:37)  Sedimentation Rate, Erythrocyte: 40 mm/Hr (05-04-23 @ 07:37)  Sedimentation Rate, Erythrocyte: 35 mm/Hr (05-03-23 @ 15:49)  C-Reactive Protein, Serum: 46.1 mg/L (05-03-23 @ 15:49)      RADIOLOGY & ADDITIONAL TESTS:  I have personally reviewed the last available Chest xray  CXR      CT      CARDIOLOGY TESTING  12 Lead ECG:   Ventricular Rate 68 BPM    Atrial Rate 68 BPM    P-R Interval 230 ms    QRS Duration 88 ms    Q-T Interval 412 ms    QTC Calculation(Bazett) 438 ms    P Axis 51 degrees    R Axis 34 degrees    T Axis 29 degrees    Diagnosis Line Sinus rhythm with 1st degree A-V block  Otherwise normal ECG    Confirmed by Sammy Borja ([id number]) on 5/2/2023 12:20:13 PM (05-02-23 @ 11:54)      MEDICATIONS  chlorhexidine 2% Cloths 1 Topical <User Schedule>  enoxaparin Injectable 150 SubCutaneous every 12 hours  furosemide   Injectable 40 IV Push two times a day  insulin lispro (ADMELOG) corrective regimen sliding scale  SubCutaneous at bedtime  insulin lispro (ADMELOG) corrective regimen sliding scale  SubCutaneous three times a day before meals  levoFLOXacin IVPB 750 IV Intermittent every 24 hours  linezolid  IVPB     linezolid  IVPB 600 IV Intermittent every 12 hours  losartan 100 Oral daily  morphine ER Tablet 30 Oral at bedtime  morphine ER Tablet 60 Oral daily  pregabalin 100 Oral two times a day      WEIGHT  Weight (kg): 229.5 (04-30-23 @ 12:59)      ANTIBIOTICS:  levoFLOXacin IVPB 750 milliGRAM(s) IV Intermittent every 24 hours  linezolid  IVPB      linezolid  IVPB 600 milliGRAM(s) IV Intermittent every 12 hours      All available historical records have been reviewed

## 2023-05-05 NOTE — DISCHARGE NOTE NURSING/CASE MANAGEMENT/SOCIAL WORK - NSDCPEEMAIL_GEN_ALL_CORE
[facility] for Tobacco Control email [email or web address]

## 2023-05-09 DIAGNOSIS — R07.89 OTHER CHEST PAIN: ICD-10-CM

## 2023-05-09 DIAGNOSIS — I10 ESSENTIAL (PRIMARY) HYPERTENSION: ICD-10-CM

## 2023-05-09 DIAGNOSIS — M54.9 DORSALGIA, UNSPECIFIED: ICD-10-CM

## 2023-05-09 DIAGNOSIS — L97.819 NON-PRESSURE CHRONIC ULCER OF OTHER PART OF RIGHT LOWER LEG WITH UNSPECIFIED SEVERITY: ICD-10-CM

## 2023-05-09 DIAGNOSIS — I83.228 VARICOSE VEINS OF LEFT LOWER EXTREMITY WITH BOTH ULCER OF OTHER PART OF LOWER EXTREMITY AND INFLAMMATION: ICD-10-CM

## 2023-05-09 DIAGNOSIS — Z88.0 ALLERGY STATUS TO PENICILLIN: ICD-10-CM

## 2023-05-09 DIAGNOSIS — G47.33 OBSTRUCTIVE SLEEP APNEA (ADULT) (PEDIATRIC): ICD-10-CM

## 2023-05-09 DIAGNOSIS — E11.9 TYPE 2 DIABETES MELLITUS WITHOUT COMPLICATIONS: ICD-10-CM

## 2023-05-09 DIAGNOSIS — Z96.82 PRESENCE OF NEUROSTIMULATOR: ICD-10-CM

## 2023-05-09 DIAGNOSIS — L03.116 CELLULITIS OF LEFT LOWER LIMB: ICD-10-CM

## 2023-05-09 DIAGNOSIS — E66.01 MORBID (SEVERE) OBESITY DUE TO EXCESS CALORIES: ICD-10-CM

## 2023-05-09 DIAGNOSIS — Z91.041 RADIOGRAPHIC DYE ALLERGY STATUS: ICD-10-CM

## 2023-05-09 DIAGNOSIS — G89.29 OTHER CHRONIC PAIN: ICD-10-CM

## 2023-05-09 DIAGNOSIS — L97.829 NON-PRESSURE CHRONIC ULCER OF OTHER PART OF LEFT LOWER LEG WITH UNSPECIFIED SEVERITY: ICD-10-CM

## 2023-05-09 DIAGNOSIS — I83.218 VARICOSE VEINS OF RIGHT LOWER EXTREMITY WITH BOTH ULCER OF OTHER PART OF LOWER EXTREMITY AND INFLAMMATION: ICD-10-CM

## 2023-05-09 DIAGNOSIS — L03.115 CELLULITIS OF RIGHT LOWER LIMB: ICD-10-CM

## 2023-05-09 PROBLEM — I87.2 VENOUS INSUFFICIENCY (CHRONIC) (PERIPHERAL): Chronic | Status: ACTIVE | Noted: 2023-04-30

## 2023-05-09 NOTE — ED ADULT NURSE NOTE - NSHISCREENINGQ1_ED_A_ED
"ED Nursing Triage Note (General)   ________________________________    Ada FIDEL Plasencia is a 31 
No

## 2023-05-19 RX ORDER — LINEZOLID 600 MG/1
600 TABLET, FILM COATED ORAL
Qty: 20 | Refills: 0 | Status: ACTIVE | COMMUNITY
Start: 2023-05-19 | End: 1900-01-01

## 2023-05-19 RX ORDER — LEVOFLOXACIN 750 MG/1
750 TABLET, FILM COATED ORAL DAILY
Qty: 10 | Refills: 0 | Status: ACTIVE | COMMUNITY
Start: 2023-03-08 | End: 1900-01-01

## 2023-06-12 ENCOUNTER — APPOINTMENT (OUTPATIENT)
Dept: PULMONOLOGY | Facility: CLINIC | Age: 58
End: 2023-06-12
Payer: MEDICARE

## 2023-06-12 VITALS
OXYGEN SATURATION: 94 % | WEIGHT: 315 LBS | SYSTOLIC BLOOD PRESSURE: 154 MMHG | HEIGHT: 78 IN | BODY MASS INDEX: 36.45 KG/M2 | RESPIRATION RATE: 14 BRPM | DIASTOLIC BLOOD PRESSURE: 76 MMHG | HEART RATE: 97 BPM

## 2023-06-12 DIAGNOSIS — G47.33 OBSTRUCTIVE SLEEP APNEA (ADULT) (PEDIATRIC): ICD-10-CM

## 2023-06-12 DIAGNOSIS — E66.01 MORBID (SEVERE) OBESITY DUE TO EXCESS CALORIES: ICD-10-CM

## 2023-06-12 PROCEDURE — 99214 OFFICE O/P EST MOD 30 MIN: CPT

## 2023-06-12 NOTE — REASON FOR VISIT
[Follow-Up] : a follow-up visit [Sleep Apnea] : sleep apnea [Obesity] : obesity [TextBox_44] : History of severe RUSSELL.  Usually followed by [facility]  Underwent sleep testing in April.  He needs a new CPAP machine as his old unit is displaying warning that it is beyond the units lifespan.  He is afraid he will die before getting the new machine

## 2023-06-12 NOTE — ASSESSMENT
[FreeTextEntry1] : Assessment:\par RUSSELL\par Obesity\par \par PLAN:\par The patient needs the PAP device .  BiPAP 24/20.  He needs a large fullface mask.\par His old machine is broken beyond repair and needs to be replaced.\par New supplies ordered \par Weight loss discussed\par I stressed the need maintain compliance  with the PAP device.\par The patient is not to use an Ozone or UV sterilizer. 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

## 2023-06-13 ENCOUNTER — OUTPATIENT (OUTPATIENT)
Dept: OUTPATIENT SERVICES | Facility: HOSPITAL | Age: 58
LOS: 1 days | End: 2023-06-13
Payer: MEDICARE

## 2023-06-13 ENCOUNTER — APPOINTMENT (OUTPATIENT)
Dept: PODIATRY | Facility: CLINIC | Age: 58
End: 2023-06-13
Payer: MEDICARE

## 2023-06-13 DIAGNOSIS — Z00.00 ENCOUNTER FOR GENERAL ADULT MEDICAL EXAMINATION WITHOUT ABNORMAL FINDINGS: ICD-10-CM

## 2023-06-13 DIAGNOSIS — Z98.890 OTHER SPECIFIED POSTPROCEDURAL STATES: Chronic | ICD-10-CM

## 2023-06-13 DIAGNOSIS — Z96.89 PRESENCE OF OTHER SPECIFIED FUNCTIONAL IMPLANTS: Chronic | ICD-10-CM

## 2023-06-13 PROCEDURE — 11042 DBRDMT SUBQ TIS 1ST 20SQCM/<: CPT

## 2023-06-13 PROCEDURE — 11045 DBRDMT SUBQ TISS EACH ADDL: CPT

## 2023-06-13 PROCEDURE — 29581 APPL MULTLAYER CMPRN SYS LEG: CPT | Mod: 50

## 2023-06-13 PROCEDURE — 29581 APPL MULTLAYER CMPRN SYS LEG: CPT | Mod: 59

## 2023-06-13 NOTE — PHYSICAL EXAM
[Ankle Swelling (On Exam)] : present [Varicose Veins Of Lower Extremities] : bilaterally [Ankle Swelling Bilaterally] : severe [Delayed in the Right Toes] : capillary refills normal in right toes [Delayed in the Left Toes] : capillary refills normal in the left toes [FreeTextEntry3] : Non palpable B/L pedal pulses secondary to Edema [] : normal strength/tone [FreeTextEntry1] : RLE wound 15 cm x 15 cm (circumferential) down to fat layer and subcutaneous tissue. mild malodor present. severe serous drainage.\par LLE wound 5cm x 4cm (circumferential) down to fat layer and subcutaneous tissue. No malodor present. severe serous drainage, epithelialization around the wound and the base \par Hemosiderin discoloration on the periwound skin B/L\par Very painful on palpation \par Xerosis B/L LE  [Vibration Dec.] : diminished vibratory sensation at the level of the toes [Diminished Throughout Right Foot] : diminished sensation with monofilament testing throughout right foot [Diminished Throughout Left Foot] : diminished sensation with monofilament testing throughout left foot

## 2023-06-13 NOTE — HISTORY OF PRESENT ILLNESS
Gregg Bennett is a 74 y.o. female and presents with Hospital Follow Up (5/9/17 [facility] for tachycardia and C-Diff) and Medication Refill (pt states she would like a refill of test strips sent to [address] [place] [address])    Subjective:  Pt is here for hospital follow-up from 5/9 to 5/22 for SOB and tachycardia following ablation. Recently had stent placed. Contacted or attempted contact within two days of d/c by NN. Diagnosed with A-fib w/ RVR, COPD, CHF, and respiratory failure. Also had C-Diff while in hospital. Had N/V/D on Sunday. Continues to have decreased appetite. Was given diuretics, cardioverted, nebulizer. Instructed to schedule appt with cardio and PCP. Saw cardio yesterday and will return in 2 weeks. Has life vest in place and returned to work yesterday with some fatigue. Reports feeling BETTER THAN when in hospital, energy increasing. Anticoagulation monitoring:  Indication: Atrial Fibrillation and CAD  INR Goal: 2.0-3.0. Current dose:  Coumadin 7.5 mg daily. Missed Coumadin Doses:  None  Medication Changes:  No, but HELD while in hospital. Had hemoptysis, none since however  Dietary Changes:  no    Symptoms: taking coumadin appropriately without any bleeding. Latest INRs:  Lab Results   Component Value Date/Time    INR 1.8 05/22/2017 03:45 AM    INR 2.2 05/21/2017 04:04 AM    INR 1.9 05/20/2017 02:45 AM    INR POC 8.0 05/31/2017 10:16 AM    INR POC 4.4 05/25/2017 04:06 PM    Prothrombin time 18.9 05/22/2017 03:45 AM    Prothrombin time 22.6 05/21/2017 04:04 AM    Prothrombin time 19.6 05/20/2017 02:45 AM        New Coumadin dose:.critical need for compliance with treatment plan to achieve optimal results, the following changes are made - HOLD coumadin x 2 days and take Vit K 100 mcg. Will recheck INR in 2 days. Review of Systems  Constitutional: +fatigue. negative for fevers, chills, anorexia and weight loss  Eyes:   negative for visual disturbance, drainage, and irritation  ENT:   +hoarseness. negative for tinnitus,sore throat,nasal congestion,ear pain  Respiratory:  negative for cough, hemoptysis, dyspnea, and wheezing  CV:   negative for chest pain, palpitations, and lower extremity edema  GI:   negative for nausea, vomiting, diarrhea, abdominal pain, and melena  Endo:               negative for polyuria,polydipsia,polyphagia, and heat intolerance  Genitourinary: negative for frequency, urgency, dysuria, retention, and hematuria  Integument:  negative for rash, ulcerations, and pruritus  Hematologic:  negative for easy bruising and bleeding  Musculoskel: negative for arthralgias, muscle weakness,and joint pain/swelling  Neurological:  negative for headaches, dizziness, vertigo,and memory/gait problems  Behavl/Psych: negative for feelings of anxiety, depression, suicide, and mood changes    Past Medical History:   Diagnosis Date    Atrial fibrillation ([facility] 2010    CAD (coronary artery disease)     stent    Chronic diastolic heart failure ([facility] 2014    Chronic systolic HF (heart failure) ([facility] 5/10/2017    2017 EF 25-30%    COPD     COPD (chronic obstructive pulmonary disease) ([facility] 2010    Diabetes ([facility]     Fibroid     Heart failure ([facility]     History of Clostridium difficile infection 5/10/2017    2017 CDiff positive    Hypertension 2010    Hypotension 5/10/2017    Junctional tachycardia ([address] 5/10/2017    NIDDM (non-insulin dependent diabetes mellitus) 2010    Screening mammogram 5/4/10    SOB (shortness of breath) 2014     Past Surgical History:   Procedure Laterality Date    HX  SECTION      HX OTHER SURGICAL      adrenal gland removed    HX PACEMAKER      [place] EXCISE ADRENAL GLAND       Social History     Social History    Marital status:      Spouse name: N/A    Number of children: N/A    Years of education: N/A     Social History Main Topics    Smoking status: Former Smoker     Types: Cigarettes     Quit date: 2009    Smokeless tobacco: Never Used    Alcohol use No    Drug use: No    Sexual activity: Not Currently     Other Topics Concern    None     Social History Narrative     Family History   Problem Relation Age of Onset    Heart Disease Mother     Diabetes Father     Heart Disease Brother      Current Outpatient Prescriptions   Medication Sig Dispense Refill    Lancets misc Check blood sugar 1 times daily. May substitute for insurance preferred lancets. 50 Each 11    glucose blood VI test strips (BLOOD GLUCOSE TEST) strip Check blood sugar 1 times daily: E11.9, may substitute for insurance preferred strips. 50 Strip 11    vitamin k 100 mcg tablet Take 1 Tab by mouth daily. 2 Tab 0    clopidogrel (PLAVIX) 75 mg tab Take 1 Tab by mouth daily. 30 Tab 11    amiodarone (CORDARONE) 200 mg tablet Take 1 Tab by mouth two (2) times a day. 60 Tab 0    carvedilol (COREG) 6.25 mg tablet Take 1 Tab by mouth two (2) times daily (with meals). 60 Tab 0    furosemide (LASIX) 40 mg tablet Take 40 mg daily 30 Tab 0    pravastatin (PRAVACHOL) 20 mg tablet Take 1 Tab by mouth nightly. 90 Tab 3    warfarin (COUMADIN) 5 mg tablet Take 7.5 mg by mouth daily.  fluticasone (FLONASE) 50 mcg/actuation nasal spray 2 Sprays by Both Nostrils route every evening.  gabapentin (NEURONTIN) 800 mg tablet TAKE ONE TABLET BY MOUTH THREE TIMES DAILY 90 Tab 11    Blood-Glucose Meter monitoring kit Check blood sugar daily. May substitute for insurance preferred meter 1 Kit 0    tiotropium (SPIRIVA WITH HANDIHALER) 18 mcg inhalation capsule INHALE THE CONTENTS OF 1 CAPSULE THROUGH HANDIHALER DEVICE DAILY 90 Cap 3    metFORMIN (GLUCOPHAGE) 1,000 mg tablet TAKE 1 TABLET BY MOUTH TWICE DAILY WITH MEALS  Indications: PREVENTION OF TYPE 2 DIABETES MELLITUS 180 Tab 3    colchicine 0.6 mg tablet Take 1 Tab by mouth two (2) times a day. (Patient taking differently: Take 0.6 mg by mouth two (2) times a day.  Indications: GOUT) 60 Tab 5    budesonide-formoterol (SYMBICORT) 160-4.5 mcg/actuation HFA inhaler Take 1 Puff by inhalation two (2) times a day. 3 Inhaler 3    albuterol (PROVENTIL VENTOLIN) 2.5 mg /3 mL (0.083 %) nebulizer solution 3 mL by Nebulization route every four (4) hours as needed for Wheezing. 1 Package 5    FOLIC ACID/MULTIVIT-MIN/LUTEIN (CENTRUM SILVER PO) Take 1 Tab by mouth daily. Takes one po daily.  albuterol (VENTOLIN HFA) 90 mcg/actuation inhaler Take 2 Puffs by inhalation every six (6) hours as needed for Wheezing. 1 Inhaler 11    Azelastine (ASTEPRO) 0.15 % (205.5 mcg) nasal spray 1 [place] by Both Nostrils route daily.  cod liver oil cap Take 1 Cap by mouth daily. Allergies   Allergen Reactions    Crestor [Rosuvastatin] Myalgia    Levaquin [Levofloxacin] Nausea Only     GI Upset    Lipitor [Atorvastatin] Diarrhea    Lyrica [Pregabalin] Myalgia       Objective:  Visit Vitals    BP 99/59 (BP 1 Location: Left arm, BP Patient Position: Sitting)    Pulse 75    Temp 97.2 °F (36.2 °C) (Oral)    Resp 18    Ht 5' 5.5\" (1.664 m)    Wt 166 lb (75.3 kg)    SpO2 95%    BMI 27.2 kg/m2     Wt Readings from Last 3 Encounters:   05/31/17 166 lb (75.3 kg)   05/30/17 167 lb 11.2 oz (76.1 kg)   05/25/17 164 lb 9.6 oz (74.7 kg)     Physical Exam:   General appearance - alert, well appearing, and in no distress. Mental status - A/O x 4, normal mood and affect. Neck -Supple ,normal CSP. FROM, non-tender. No significant adenopathy/thyromegaly. No JVD. Chest - CTA. Symmetric chest rise. No wheezing, rales or rhonchi.  +life vest in bra. Heart - Normal rate, irregular rhythm. Normal S1, S2. + murmur. Abdomen - Soft,non-distended. Normoactive BS in all quadrants. NT, no mass or HSM. Ext- Radial, DP pulses, 2+ bilaterally. No pedal edema, clubbing, or cyanosis. Skin-Warm and dry. No hyperpigmentation, ulcerations, or suspicious lesions. Neuro - Normal speech, no focal findings or movement disorder.  Normal strength, gait, and muscle tone. Results for orders placed or performed in visit on 05/31/17   AMB POC GLUCOSE BLOOD, BY GLUCOSE MONITORING DEVICE   Result Value Ref Range    Glucose  mg/dL   AMB POC PT/INR   Result Value Ref Range    VALID INTERNAL CONTROL POC Yes     Prothrombin time (POC) 96.0 seconds    INR POC 8.0        Assessment/Plan:  HOLDING coumadin. [place] diet advised. Medication Side Effects and Warnings were discussed with patient: yes   Patient Labs were reviewed: yes  Patient Past Records were reviewed: yes    See below for other orders   Follow-up Disposition:  Return in about 2 days (around 6/2/2017) for INR check. 4 weeks f/u. Pt has given consent verbally while in office for [employer] messaging. ICD-10-CM ICD-9-CM    1. Supratherapeutic INR R79.1 790.92 vitamin k 100 mcg tablet   2. Type 2 diabetes mellitus with diabetic neuropathy, without long-term current use of insulin ([facility]) E11.40 250.60 AMB POC GLUCOSE BLOOD, BY GLUCOSE MONITORING DEVICE     357.2    3. Coagulation defect ([facility]) D68.9 286.9 AMB POC PT/INR   4. Chronic systolic congestive heart failure ([facility]) I50.22 428.22      428.0    5. Cardiac pacemaker in situ Z95.0 V45.01    6. Atrial fibrillation, unspecified type ([facility] I48.91 427.31    7. History of cardioversion Z98.890 V15.1    8. Chronic obstructive pulmonary disease, unspecified COPD type ([facility] J44.9 496    9. Acute gastritis without hemorrhage, unspecified gastritis type K29.00 535.00      Orders Placed This Encounter    AMB POC GLUCOSE BLOOD, BY GLUCOSE MONITORING DEVICE    AMB POC PT/INR    Lancets misc     Sig: Check blood sugar 1 times daily. May substitute for insurance preferred lancets. Dispense:  50 Each     Refill:  11    glucose blood VI test strips (BLOOD GLUCOSE TEST) strip     Sig: Check blood sugar 1 times daily: E11.9, may substitute for insurance preferred strips.      Dispense:  50 Strip     Refill:  11    vitamin k 100 mcg tablet Sig: Take 1 Tab by mouth daily. Dispense:  2 Tab     Refill:  0       Raghu Mack expressed understanding of plan. An After Visit Summary was offered/printed and given to the patient. [Sneakers] : josefina [FreeTextEntry1] : B/L Venous stasis wounds \par - Wife has been changing dressings every day\par - Dressing w/HONG\par - B/L LE Venous stasis wounds\par - Very painful

## 2023-06-13 NOTE — ASSESSMENT
[FreeTextEntry1] : Assessment:\par -Venous Stasis Wounds, B/L LE\par - B/L LE edema \par - Pain RLE\par \par Plan:\par -Sharp excisional Dbx of fibrotic tissue  down to subcutaneous level (Size above) \par -Dressed w/ HONG / Adaptic / ABD / ACE to Knee; B/L LE; A&D ointment to b/l leg - Multilayer compression dressing applied B/L LE \par -RTO 2 weeks or sooner if any problems arise  [Verbal] : verbal [Patient] : patient [Good - alert, interested, motivated] : Good - alert, interested, motivated [Demonstrates independently] : demonstrates independently [Dressing changes] : dressing changes [Foot Care] : foot care [Pain Management] : pain management

## 2023-06-14 DIAGNOSIS — L97.822 NON-PRESSURE CHRONIC ULCER OF OTHER PART OF LEFT LOWER LEG WITH FAT LAYER EXPOSED: ICD-10-CM

## 2023-06-14 DIAGNOSIS — L97.812 NON-PRESSURE CHRONIC ULCER OF OTHER PART OF RIGHT LOWER LEG WITH FAT LAYER EXPOSED: ICD-10-CM

## 2023-06-14 DIAGNOSIS — I83.019 VARICOSE VEINS OF RIGHT LOWER EXTREMITY WITH ULCER OF UNSPECIFIED SITE: ICD-10-CM

## 2023-06-14 DIAGNOSIS — I87.2 VENOUS INSUFFICIENCY (CHRONIC) (PERIPHERAL): ICD-10-CM

## 2023-06-14 DIAGNOSIS — I83.029 VARICOSE VEINS OF LEFT LOWER EXTREMITY WITH ULCER OF UNSPECIFIED SITE: ICD-10-CM

## 2023-06-27 ENCOUNTER — APPOINTMENT (OUTPATIENT)
Dept: PODIATRY | Facility: CLINIC | Age: 58
End: 2023-06-27

## 2023-06-28 RX ORDER — LEVOFLOXACIN 5 MG/ML
1 INJECTION, SOLUTION INTRAVENOUS
Qty: 10 | Refills: 0
Start: 2023-06-28

## 2023-06-28 RX ORDER — LINEZOLID 600 MG/300ML
1 INJECTION, SOLUTION INTRAVENOUS
Qty: 20 | Refills: 0
Start: 2023-06-28 | End: 2023-07-07

## 2023-07-25 ENCOUNTER — OUTPATIENT (OUTPATIENT)
Dept: OUTPATIENT SERVICES | Facility: HOSPITAL | Age: 58
LOS: 1 days | End: 2023-07-25
Payer: MEDICARE

## 2023-07-25 ENCOUNTER — APPOINTMENT (OUTPATIENT)
Dept: PODIATRY | Facility: CLINIC | Age: 58
End: 2023-07-25
Payer: MEDICARE

## 2023-07-25 DIAGNOSIS — Z98.890 OTHER SPECIFIED POSTPROCEDURAL STATES: Chronic | ICD-10-CM

## 2023-07-25 DIAGNOSIS — Z00.00 ENCOUNTER FOR GENERAL ADULT MEDICAL EXAMINATION WITHOUT ABNORMAL FINDINGS: ICD-10-CM

## 2023-07-25 DIAGNOSIS — Z96.89 PRESENCE OF OTHER SPECIFIED FUNCTIONAL IMPLANTS: Chronic | ICD-10-CM

## 2023-07-25 PROCEDURE — 99213 OFFICE O/P EST LOW 20 MIN: CPT | Mod: 25

## 2023-07-25 PROCEDURE — 29581 APPL MULTLAYER CMPRN SYS LEG: CPT | Mod: 50

## 2023-07-25 NOTE — ASSESSMENT
[Verbal] : verbal [Patient] : patient [Good - alert, interested, motivated] : Good - alert, interested, motivated [Demonstrates independently] : demonstrates independently [Dressing changes] : dressing changes [Foot Care] : foot care [Pain Management] : pain management [FreeTextEntry1] : Assessment:\par -Venous Stasis Wounds, B/L LE\par - B/L LE edema \par - Pain B/L LE\par \par Plan:\par -Wound washed using normal saline solution \par -Dressed w/ Adaptic / Betadine/ ABD / ACE to Knee; B/L LE; A&D ointment to b/l leg - Multilayer compression dressing applied B/L LE \par - F/u with ID \par -Adjust plan following OR washout

## 2023-07-25 NOTE — PHYSICAL EXAM
[Ankle Swelling (On Exam)] : present [Ankle Swelling Bilaterally] : severe [] : normal strength/tone [Vibration Dec.] : diminished vibratory sensation at the level of the toes [Diminished Throughout Right Foot] : diminished sensation with monofilament testing throughout right foot [Diminished Throughout Left Foot] : diminished sensation with monofilament testing throughout left foot [Varicose Veins Of Lower Extremities] : not present [Delayed in the Right Toes] : capillary refills normal in right toes [Delayed in the Left Toes] : capillary refills normal in the left toes [FreeTextEntry3] : Non palpable B/L pedal pulses secondary to Edema [FreeTextEntry1] : RLE wound 15 cm x 15 cm (circumferential) down to fat layer and subcutaneous tissue. mild malodor present. severe serous drainage.\par LLE wound 5cm x 4cm (circumferential) down to fat layer and subcutaneous tissue. No malodor present. severe serous drainage, epithelialization around the wound and the base \par Hemosiderin discoloration on the periwound skin B/L\par Very painful on palpation \par Xerosis B/L LE

## 2023-07-25 NOTE — HISTORY OF PRESENT ILLNESS
[Sneakers] : josefina [FreeTextEntry1] : B/L Venous stasis wounds \par - Wife has been changing dressings every day\par - Dressing w/HONG\par - B/L LE Venous stasis wounds\par - Very painful\par -Says that he feels the right leg is infected and wants to admit himself to the [facility] ED on 7/26 and have a washout

## 2023-07-26 ENCOUNTER — INPATIENT (INPATIENT)
Facility: HOSPITAL | Age: 58
LOS: 6 days | Discharge: HOME | DRG: 982 | End: 2023-08-02
Attending: INTERNAL MEDICINE | Admitting: INTERNAL MEDICINE
Payer: MEDICARE

## 2023-07-26 VITALS
WEIGHT: 315 LBS | SYSTOLIC BLOOD PRESSURE: 179 MMHG | TEMPERATURE: 99 F | HEIGHT: 78 IN | RESPIRATION RATE: 18 BRPM | OXYGEN SATURATION: 98 % | DIASTOLIC BLOOD PRESSURE: 93 MMHG | HEART RATE: 103 BPM

## 2023-07-26 DIAGNOSIS — Z98.890 OTHER SPECIFIED POSTPROCEDURAL STATES: Chronic | ICD-10-CM

## 2023-07-26 DIAGNOSIS — Z96.89 PRESENCE OF OTHER SPECIFIED FUNCTIONAL IMPLANTS: Chronic | ICD-10-CM

## 2023-07-26 DIAGNOSIS — M79.89 OTHER SPECIFIED SOFT TISSUE DISORDERS: ICD-10-CM

## 2023-07-26 DIAGNOSIS — L03.90 CELLULITIS, UNSPECIFIED: ICD-10-CM

## 2023-07-26 DIAGNOSIS — I83.019 VARICOSE VEINS OF RIGHT LOWER EXTREMITY WITH ULCER OF UNSPECIFIED SITE: ICD-10-CM

## 2023-07-26 DIAGNOSIS — I83.029 VARICOSE VEINS OF LEFT LOWER EXTREMITY WITH ULCER OF UNSPECIFIED SITE: ICD-10-CM

## 2023-07-26 DIAGNOSIS — L97.812 NON-PRESSURE CHRONIC ULCER OF OTHER PART OF RIGHT LOWER LEG WITH FAT LAYER EXPOSED: ICD-10-CM

## 2023-07-26 DIAGNOSIS — L97.822 NON-PRESSURE CHRONIC ULCER OF OTHER PART OF LEFT LOWER LEG WITH FAT LAYER EXPOSED: ICD-10-CM

## 2023-07-26 DIAGNOSIS — I87.2 VENOUS INSUFFICIENCY (CHRONIC) (PERIPHERAL): ICD-10-CM

## 2023-07-26 LAB
ALBUMIN SERPL ELPH-MCNC: 3.9 G/DL — SIGNIFICANT CHANGE UP (ref 3.5–5.2)
ALP SERPL-CCNC: 108 U/L — SIGNIFICANT CHANGE UP (ref 30–115)
ALT FLD-CCNC: 11 U/L — SIGNIFICANT CHANGE UP (ref 0–41)
ANION GAP SERPL CALC-SCNC: 10 MMOL/L — SIGNIFICANT CHANGE UP (ref 7–14)
APTT BLD: 32.9 SEC — SIGNIFICANT CHANGE UP (ref 27–39.2)
AST SERPL-CCNC: 9 U/L — SIGNIFICANT CHANGE UP (ref 0–41)
BASOPHILS # BLD AUTO: 0.05 K/UL — SIGNIFICANT CHANGE UP (ref 0–0.2)
BASOPHILS NFR BLD AUTO: 0.6 % — SIGNIFICANT CHANGE UP (ref 0–1)
BILIRUB SERPL-MCNC: 0.2 MG/DL — SIGNIFICANT CHANGE UP (ref 0.2–1.2)
BLD GP AB SCN SERPL QL: SIGNIFICANT CHANGE UP
BUN SERPL-MCNC: 14 MG/DL — SIGNIFICANT CHANGE UP (ref 10–20)
CALCIUM SERPL-MCNC: 9.3 MG/DL — SIGNIFICANT CHANGE UP (ref 8.4–10.5)
CHLORIDE SERPL-SCNC: 100 MMOL/L — SIGNIFICANT CHANGE UP (ref 98–110)
CO2 SERPL-SCNC: 32 MMOL/L — SIGNIFICANT CHANGE UP (ref 17–32)
CREAT SERPL-MCNC: 0.7 MG/DL — SIGNIFICANT CHANGE UP (ref 0.7–1.5)
EGFR: 109 ML/MIN/1.73M2 — SIGNIFICANT CHANGE UP
EOSINOPHIL # BLD AUTO: 0.27 K/UL — SIGNIFICANT CHANGE UP (ref 0–0.7)
EOSINOPHIL NFR BLD AUTO: 3 % — SIGNIFICANT CHANGE UP (ref 0–8)
GLUCOSE SERPL-MCNC: 173 MG/DL — HIGH (ref 70–99)
HCT VFR BLD CALC: 40 % — LOW (ref 42–52)
HGB BLD-MCNC: 12.2 G/DL — LOW (ref 14–18)
IMM GRANULOCYTES NFR BLD AUTO: 0.2 % — SIGNIFICANT CHANGE UP (ref 0.1–0.3)
INR BLD: 0.99 RATIO — SIGNIFICANT CHANGE UP (ref 0.65–1.3)
LACTATE SERPL-SCNC: 1.3 MMOL/L — SIGNIFICANT CHANGE UP (ref 0.7–2)
LYMPHOCYTES # BLD AUTO: 2.17 K/UL — SIGNIFICANT CHANGE UP (ref 1.2–3.4)
LYMPHOCYTES # BLD AUTO: 24.4 % — SIGNIFICANT CHANGE UP (ref 20.5–51.1)
MAGNESIUM SERPL-MCNC: 2 MG/DL — SIGNIFICANT CHANGE UP (ref 1.8–2.4)
MCHC RBC-ENTMCNC: 24.2 PG — LOW (ref 27–31)
MCHC RBC-ENTMCNC: 30.5 G/DL — LOW (ref 32–37)
MCV RBC AUTO: 79.4 FL — LOW (ref 80–94)
MONOCYTES # BLD AUTO: 0.59 K/UL — SIGNIFICANT CHANGE UP (ref 0.1–0.6)
MONOCYTES NFR BLD AUTO: 6.6 % — SIGNIFICANT CHANGE UP (ref 1.7–9.3)
NEUTROPHILS # BLD AUTO: 5.8 K/UL — SIGNIFICANT CHANGE UP (ref 1.4–6.5)
NEUTROPHILS NFR BLD AUTO: 65.2 % — SIGNIFICANT CHANGE UP (ref 42.2–75.2)
NRBC # BLD: 0 /100 WBCS — SIGNIFICANT CHANGE UP (ref 0–0)
PLATELET # BLD AUTO: 355 K/UL — SIGNIFICANT CHANGE UP (ref 130–400)
PMV BLD: 9.5 FL — SIGNIFICANT CHANGE UP (ref 7.4–10.4)
POTASSIUM SERPL-MCNC: 4.1 MMOL/L — SIGNIFICANT CHANGE UP (ref 3.5–5)
POTASSIUM SERPL-SCNC: 4.1 MMOL/L — SIGNIFICANT CHANGE UP (ref 3.5–5)
PROT SERPL-MCNC: 6.8 G/DL — SIGNIFICANT CHANGE UP (ref 6–8)
PROTHROM AB SERPL-ACNC: 11.3 SEC — SIGNIFICANT CHANGE UP (ref 9.95–12.87)
RBC # BLD: 5.04 M/UL — SIGNIFICANT CHANGE UP (ref 4.7–6.1)
RBC # FLD: 17 % — HIGH (ref 11.5–14.5)
SODIUM SERPL-SCNC: 142 MMOL/L — SIGNIFICANT CHANGE UP (ref 135–146)
WBC # BLD: 8.9 K/UL — SIGNIFICANT CHANGE UP (ref 4.8–10.8)
WBC # FLD AUTO: 8.9 K/UL — SIGNIFICANT CHANGE UP (ref 4.8–10.8)

## 2023-07-26 PROCEDURE — 82962 GLUCOSE BLOOD TEST: CPT

## 2023-07-26 PROCEDURE — 99221 1ST HOSP IP/OBS SF/LOW 40: CPT | Mod: GC,25

## 2023-07-26 PROCEDURE — 93925 LOWER EXTREMITY STUDY: CPT

## 2023-07-26 PROCEDURE — 86140 C-REACTIVE PROTEIN: CPT

## 2023-07-26 PROCEDURE — 87077 CULTURE AEROBIC IDENTIFY: CPT

## 2023-07-26 PROCEDURE — 36415 COLL VENOUS BLD VENIPUNCTURE: CPT

## 2023-07-26 PROCEDURE — 73590 X-RAY EXAM OF LOWER LEG: CPT | Mod: 26,RT

## 2023-07-26 PROCEDURE — 29581 APPL MULTLAYER CMPRN SYS LEG: CPT | Mod: 50,GC

## 2023-07-26 PROCEDURE — 93970 EXTREMITY STUDY: CPT

## 2023-07-26 PROCEDURE — 85652 RBC SED RATE AUTOMATED: CPT

## 2023-07-26 PROCEDURE — 87640 STAPH A DNA AMP PROBE: CPT

## 2023-07-26 PROCEDURE — 85027 COMPLETE CBC AUTOMATED: CPT

## 2023-07-26 PROCEDURE — 87070 CULTURE OTHR SPECIMN AEROBIC: CPT

## 2023-07-26 PROCEDURE — 87641 MR-STAPH DNA AMP PROBE: CPT

## 2023-07-26 PROCEDURE — 87186 SC STD MICRODIL/AGAR DIL: CPT

## 2023-07-26 PROCEDURE — 80053 COMPREHEN METABOLIC PANEL: CPT

## 2023-07-26 PROCEDURE — 99285 EMERGENCY DEPT VISIT HI MDM: CPT

## 2023-07-26 PROCEDURE — 99222 1ST HOSP IP/OBS MODERATE 55: CPT

## 2023-07-26 PROCEDURE — 85025 COMPLETE CBC W/AUTO DIFF WBC: CPT

## 2023-07-26 PROCEDURE — 93971 EXTREMITY STUDY: CPT | Mod: RT

## 2023-07-26 RX ORDER — METRONIDAZOLE 500 MG
500 TABLET ORAL EVERY 12 HOURS
Refills: 0 | Status: DISCONTINUED | OUTPATIENT
Start: 2023-07-26 | End: 2023-07-28

## 2023-07-26 RX ORDER — CEFEPIME 1 G/1
2000 INJECTION, POWDER, FOR SOLUTION INTRAMUSCULAR; INTRAVENOUS EVERY 12 HOURS
Refills: 0 | Status: DISCONTINUED | OUTPATIENT
Start: 2023-07-26 | End: 2023-07-28

## 2023-07-26 RX ORDER — FUROSEMIDE 40 MG
40 TABLET ORAL DAILY
Refills: 0 | Status: DISCONTINUED | OUTPATIENT
Start: 2023-07-26 | End: 2023-07-28

## 2023-07-26 RX ORDER — VANCOMYCIN HCL 1 G
1000 VIAL (EA) INTRAVENOUS ONCE
Refills: 0 | Status: COMPLETED | OUTPATIENT
Start: 2023-07-26 | End: 2023-07-26

## 2023-07-26 RX ORDER — SODIUM CHLORIDE 9 MG/ML
1000 INJECTION, SOLUTION INTRAVENOUS ONCE
Refills: 0 | Status: COMPLETED | OUTPATIENT
Start: 2023-07-26 | End: 2023-07-26

## 2023-07-26 RX ORDER — ENOXAPARIN SODIUM 100 MG/ML
40 INJECTION SUBCUTANEOUS EVERY 24 HOURS
Refills: 0 | Status: DISCONTINUED | OUTPATIENT
Start: 2023-07-26 | End: 2023-07-28

## 2023-07-26 RX ORDER — LOSARTAN POTASSIUM 100 MG/1
100 TABLET, FILM COATED ORAL DAILY
Refills: 0 | Status: DISCONTINUED | OUTPATIENT
Start: 2023-07-26 | End: 2023-07-28

## 2023-07-26 RX ORDER — MORPHINE SULFATE 50 MG/1
30 CAPSULE, EXTENDED RELEASE ORAL THREE TIMES A DAY
Refills: 0 | Status: DISCONTINUED | OUTPATIENT
Start: 2023-07-26 | End: 2023-07-27

## 2023-07-26 RX ORDER — HYDROMORPHONE HYDROCHLORIDE 2 MG/ML
1 INJECTION INTRAMUSCULAR; INTRAVENOUS; SUBCUTANEOUS ONCE
Refills: 0 | Status: DISCONTINUED | OUTPATIENT
Start: 2023-07-26 | End: 2023-07-26

## 2023-07-26 RX ADMIN — Medication 250 MILLIGRAM(S): at 10:27

## 2023-07-26 RX ADMIN — HYDROMORPHONE HYDROCHLORIDE 1 MILLIGRAM(S): 2 INJECTION INTRAMUSCULAR; INTRAVENOUS; SUBCUTANEOUS at 16:16

## 2023-07-26 RX ADMIN — CEFEPIME 100 MILLIGRAM(S): 1 INJECTION, POWDER, FOR SOLUTION INTRAMUSCULAR; INTRAVENOUS at 18:24

## 2023-07-26 RX ADMIN — Medication 100 MILLIGRAM(S): at 18:24

## 2023-07-26 RX ADMIN — MORPHINE SULFATE 30 MILLIGRAM(S): 50 CAPSULE, EXTENDED RELEASE ORAL at 18:27

## 2023-07-26 RX ADMIN — HYDROMORPHONE HYDROCHLORIDE 1 MILLIGRAM(S): 2 INJECTION INTRAMUSCULAR; INTRAVENOUS; SUBCUTANEOUS at 15:04

## 2023-07-26 RX ADMIN — Medication 500 MILLIGRAM(S): at 18:24

## 2023-07-26 RX ADMIN — SODIUM CHLORIDE 1000 MILLILITER(S): 9 INJECTION, SOLUTION INTRAVENOUS at 10:28

## 2023-07-26 NOTE — CONSULT NOTE ADULT - ASSESSMENT
* THIS IS AN INCOMPLETE NOTE. FINAL RECOMMENDATION IS PENDING *   57-year-old male past history of hypertension, chronic back pain on opioids, chronic wounds to bilateral lower extremities referred to the ED by his podiatrist for evaluation of nonhealing wound to right lower extremity despite outpatient antibiotic treatment.    ID is consulted for nonhealing bilateral LE wounds  Persistent and worsening wound for years  Currently followed by Podiatry and ID outpatient, on PO doxycycline ppx  He noticed increased malodorous drainage and increased pain in the past few days  Evaluated by Dr. Lambert outpatient and was referred to ED  Afebrile, no leukocytosis  Prior wound Cx with pseudomonas in 2020 and Acinetobacter 1/2023 (S to cefepime but at breakpoint)  Pending Podiatry eval    Antibiotics:  Vancomycin 7/26    Overall chronic bilateral LE wounds, exacerbated by severe venous stasis dermatitis  Overall not severely infected on examination, minimal periwound erythema and warmth  Slight malodorous drainage, no purulence  Sharp wound edge, no necrosis  Will empirically cover with cefepime and Flagyl for now pending Podiatry intervention    Penicillin allergy: told by his mother that he had allergic reaction when he was young, tolerated cefepime and Augmentin in the past.      IMPRESSION:  Bilateral LE wounds  Chronic venous stasis dermatitis  Morbid obesity  Drug allergy    RECOMMENDATIONS:  - Start IV cefepime 2gm q12hrs and PO Flagyl 500mg q12hrs  - Podiatry eval  - Routine dressing change  - Trend WBC, fever curve, transaminases, creatinine daily  - Will continue to follow      Indy Menchaca D.O.  Attending Physician  Division of Infectious Diseases  [facility]  Please contact me via [employer] Teams

## 2023-07-26 NOTE — H&P ADULT - HISTORY OF PRESENT ILLNESS
Patient is a 57-year-old male past history of hypertension, chronic back pain on opioids, chronic wounds to bilateral lower extremities referred to the ED by his podiatrist for evaluation of nonhealing wound to right lower extremity despite outpatient antibiotic treatment.   Patients condition dates back to 3 years ago and over time have received multiple treatments for his condition in the hospital. Last admission was about a month ago.     Patient denies fever, chills, chest pain, trauma, weakness, numbness

## 2023-07-26 NOTE — PATIENT PROFILE ADULT - FALL HARM RISK - UNIVERSAL INTERVENTIONS
Bed in lowest position, wheels locked, appropriate side rails in place/Call bell, personal items and telephone in reach/Instruct patient to call for assistance before getting out of bed or chair/Non-slip footwear when patient is out of bed/[place] to call system/Physically safe environment - no spills, clutter or unnecessary equipment/Purposeful Proactive Rounding/Room/bathroom lighting operational, light cord in reach

## 2023-07-26 NOTE — CONSULT NOTE ADULT - SUBJECTIVE AND OBJECTIVE BOX
Podiatry Consult Note    Subjective:  JOSE ALBERTO NO  Seen Bedside 57y Male  .   Patient is a 57y old  Male who presents with a chief complaint of Non healing Bilateral LE wound (26 Jul 2023 15:17)    HPI:  Patient is a 57-year-old male past history of hypertension, chronic back pain on opioids, chronic wounds to bilateral lower extremities referred to the ED by his podiatrist for evaluation of nonhealing wound to right lower extremity despite outpatient antibiotic treatment.   Patients condition dates back to 3 years ago and over time have received multiple treatments for his condition in the hospital. Last admission was about a month ago.     Patient denies fever, chills, chest pain, trauma, weakness, numbness (26 Jul 2023 12:10)      Past Medical History and Surgical History  PAST MEDICAL & SURGICAL HISTORY:  Hypertension      Disc disease, degenerative, lumbar or lumbosacral      Obstructive sleep apnea      Morbid obesity      DM (diabetes mellitus)      Venous ulcer of lower leg without varicose veins      Spinal cord stimulator status      H/O arthroscopy of right knee      History of excision of pilonidal cyst      Status post debridement           Review of Systems:  [X] Ten point review of systems is otherwise negative except as noted     Objective:  Vital Signs Last 24 Hrs  T(C): 37.2 (26 Jul 2023 09:16), Max: 37.2 (26 Jul 2023 09:16)  T(F): 98.9 (26 Jul 2023 09:16), Max: 98.9 (26 Jul 2023 09:16)  HR: 103 (26 Jul 2023 09:16) (103 - 103)  BP: 179/93 (26 Jul 2023 09:16) (179/93 - 179/93)  BP(mean): --  RR: 18 (26 Jul 2023 09:16) (18 - 18)  SpO2: 98% (26 Jul 2023 09:16) (98% - 98%)    Parameters below as of 26 Jul 2023 09:16  Patient On (Oxygen Delivery Method): room air                            12.2   8.90  )-----------( 355      ( 26 Jul 2023 10:22 )             40.0                 07-26    142  |  100  |  14  ----------------------------<  173<H>  4.1   |  32  |  0.7    Ca    9.3      26 Jul 2023 10:22  Mg     2.0     07-26    TPro  6.8  /  Alb  3.9  /  TBili  0.2  /  DBili  x   /  AST  9   /  ALT  11  /  AlkPhos  108  07-26        Physical Exam - Lower Extremity Focused:   Derm: RLE shows a partial thickness wound with a fibrogranular base, mild discharge and biofilm present with mild malodor  LLE shows a partial thickness wound to the lateral ankle area. Well demarcated borders, mild discharge and malodor  Vascular: DP and PT Pulses Diminished; Foot is Warm to Warm to the touch; Capillary Refill Time < 3 Seconds;    Neuro: Protective Sensation Diminished / Moderately Neuropathic   MSK: Pain On Palpation at Wound Site     Assessment:  Bilateral venous stasis ulcers    Plan:  Chart reviewed and Patient evaluated. All Questions and Concerns Addressed and Answered  XR Imaging  Foot; Pending Results   Local Wound Care; Wound Flushed w/ NS; Wound Packed w/ Betadine Soaked non adhesive dressing/ DSD / Kerlix / ACE  Weight Bearing Status; WBAT    Recommend; Lower Extremity Arterial Duplex B/L; ESR/CRP; MRI-Right Foot;   Request ID Consult;  / Follow Up w/ Wound Culture  Planned surgical debridement for Friday 7/28 in afternoon.  Please optimize patient accordingly  Patient was also very adamant on pain management team getting involved with his post operative care because he says that after the previous debridement he received inadequate pain medication   Discussed Plan w/ Dr Lambert    Podiatry  Podiatry Consult Note    Subjective:  JOSE ALBERTO NO  Seen Bedside 57y Male  .   Patient is a 57y old  Male who presents with a chief complaint of Non healing Bilateral LE wound (26 Jul 2023 15:17)    HPI:  Patient is a 57-year-old male past history of hypertension, chronic back pain on opioids, chronic wounds to bilateral lower extremities referred to the ED by his podiatrist for evaluation of nonhealing wound to right lower extremity despite outpatient antibiotic treatment.   Patients condition dates back to 3 years ago and over time have received multiple treatments for his condition in the hospital. Last admission was about a month ago.     Patient denies fever, chills, chest pain, trauma, weakness, numbness (26 Jul 2023 12:10)      Past Medical History and Surgical History  PAST MEDICAL & SURGICAL HISTORY:  Hypertension      Disc disease, degenerative, lumbar or lumbosacral      Obstructive sleep apnea      Morbid obesity      DM (diabetes mellitus)      Venous ulcer of lower leg without varicose veins      Spinal cord stimulator status      H/O arthroscopy of right knee      History of excision of pilonidal cyst      Status post debridement           Review of Systems:  [X] Ten point review of systems is otherwise negative except as noted     Objective:  Vital Signs Last 24 Hrs  T(C): 37.2 (26 Jul 2023 09:16), Max: 37.2 (26 Jul 2023 09:16)  T(F): 98.9 (26 Jul 2023 09:16), Max: 98.9 (26 Jul 2023 09:16)  HR: 103 (26 Jul 2023 09:16) (103 - 103)  BP: 179/93 (26 Jul 2023 09:16) (179/93 - 179/93)  BP(mean): --  RR: 18 (26 Jul 2023 09:16) (18 - 18)  SpO2: 98% (26 Jul 2023 09:16) (98% - 98%)    Parameters below as of 26 Jul 2023 09:16  Patient On (Oxygen Delivery Method): room air                            12.2   8.90  )-----------( 355      ( 26 Jul 2023 10:22 )             40.0                 07-26    142  |  100  |  14  ----------------------------<  173<H>  4.1   |  32  |  0.7    Ca    9.3      26 Jul 2023 10:22  Mg     2.0     07-26    TPro  6.8  /  Alb  3.9  /  TBili  0.2  /  DBili  x   /  AST  9   /  ALT  11  /  AlkPhos  108  07-26        Physical Exam - Lower Extremity Focused:   Derm: RLE shows a Fullthickness wound with a fibrogranular base, mild discharge and biofilm present with mild malodor  LLE shows a Full thickness wound to the lateral ankle area. Well demarcated borders, mild discharge and malodor  Vascular: DP and PT Pulses Diminished; Foot is Warm to Warm to the touch; Capillary Refill Time < 3 Seconds;    Neuro: Pain On Palpation at Wound Site     Assessment:  Bilateral venous stasis ulcers    Plan:  Chart reviewed and Patient evaluated. All Questions and Concerns Addressed and Answered  XR Imaging  Foot; Pending Results   Local Wound Care; Wound Flushed w/ NS; Wound Packed w/ Betadine Soaked non adhesive dressing/ DSD / Kerlix / ACE - multilayer compression dressing B/L LE   Weight Bearing Status; WBAT    Recommend; Lower Extremity Arterial Duplex B/L; ESR/CRP  Request ID Consult;  / Follow Up w/ Wound Culture  Planned surgical debridement for Friday 7/28 in afternoon.  Please optimize patient accordingly  Patient was also very adamant on pain management team getting involved with his post operative care because he says that after the previous debridement he received inadequate pain medication   Discussed Plan w/ Dr Lambert    Podiatry

## 2023-07-26 NOTE — ED PROVIDER NOTE - CLINICAL SUMMARY MEDICAL DECISION MAKING FREE TEXT BOX
patient given empiric IV antibiotics we obtained x-rays per my independent evaluation are negative for any evidence of fracture I will admit given patient's failure of p.o. antibiotics in combination with symptoms as well as prior history of infections in the past we consulted Id as well as podiatry who all agree with poc given iv antibitoicis

## 2023-07-26 NOTE — H&P ADULT - NSHPREVIEWOFSYSTEMS_GEN_ALL_CORE

## 2023-07-26 NOTE — ED PROVIDER NOTE - ATTENDING APP SHARED VISIT CONTRIBUTION OF CARE
I have personally performed a history and physical exam on this patient and personally directed the management of the patient. Patient is a 57-year-old male presenting with worsening pain of bilateral lower extremities he has a nonhealing wound to the right lower extremity over the course of several months to years however is worse in the past couple days patient's been on multiple courses of recent antibiotics failure to improve sent in by his ID and podiatrist patient actually denies any fevers chills denies diaphoresis    On physical exam patient is normocephalic atraumatic pupils equally round react light accommodation extraocular muscles intact oropharynx clear chest clear to auscultation bilaterally abdomen soft nontender nondistended bowel sounds positive no guarding or rebound lower extremities capillary refills normal pedal pulse 2+ equal patient has large active draining areas of ulcerations which are chronic however appear acutely inflamed no signs of ascending cellulitis patient is able to ambulate well no focal deficits noted    a/p  patient given empiric IV antibiotics we obtained x-rays per my independent evaluation are negative for any evidence of fracture I will admit given patient's failure of p.o. antibiotics in combination with symptoms as well as prior history of infections in the past we consulted Id as well as podiatry who all agree with poc given iv antibitoicis

## 2023-07-26 NOTE — ED ADULT NURSE NOTE - NSFALLUNIVINTERV_ED_ALL_ED
Bed/Stretcher in lowest position, wheels locked, appropriate side rails in place/Call bell, personal items and telephone in reach/Instruct patient to call for assistance before getting out of bed/chair/stretcher/Non-slip footwear applied when patient is off stretcher/[place] to call system/Physically safe environment - no spills, clutter or unnecessary equipment/Purposeful proactive rounding/Room/bathroom lighting operational, light cord in reach

## 2023-07-26 NOTE — H&P ADULT - NSHPLABSRESULTS_GEN_ALL_CORE
12.2   8.90  )-----------( 355      ( 26 Jul 2023 10:22 )             40.0    07-26    142  |  100  |  14  ----------------------------<  173<H>  4.1   |  32  |  0.7    Ca    9.3      26 Jul 2023 10:22  Mg     2.0     07-26    TPro  6.8  /  Alb  3.9  /  TBili  0.2  /  DBili  x   /  AST  9   /  ALT  11  /  AlkPhos  108  07-26      XRay tibia and fibula reviewed and negative

## 2023-07-26 NOTE — CONSULT NOTE ADULT - SUBJECTIVE AND OBJECTIVE BOX
INFECTIOUS DISEASE CONSULT NOTE    Patient is a 57y old  Male who presents with a chief complaint of Non healing Bilateral LE wound (26 Jul 2023 12:10)    HPI:  Patient is a 57-year-old male past history of hypertension, chronic back pain on opioids, chronic wounds to bilateral lower extremities referred to the ED by his podiatrist for evaluation of nonhealing wound to right lower extremity despite outpatient antibiotic treatment.   Patients condition dates back to 3 years ago and over time have received multiple treatments for his condition in the hospital. Last admission was about a month ago.     Patient denies fever, chills, chest pain, trauma, weakness, numbness (26 Jul 2023 12:10)         Prior hospital charts reviewed [Yes]  Primary team notes reviewed [Yes]  Other consultant notes reviewed [Yes]    REVIEW OF SYSTEMS:  CONSTITUTIONAL: No fever or chills  HEAD: No lesion on scalp  EYES: No visual disturbance  ENT: No sore throat  RESPIRATORY: No cough, no shortness of breath  CARDIOVASCULAR: No chest pain or palpitations  GASTROINTESTINAL: No abdominal or epigastric pain  GENITOURINARY: No dysuria  NEUROLOGICAL: No headache/dizziness  MUSCULOSKELETAL: No joint pain, erythema, or swelling; no back pain  SKIN: No itching, rashes  All other ROS negative except noted above    PAST MEDICAL & SURGICAL HISTORY:  Hypertension      Disc disease, degenerative, lumbar or lumbosacral      Obstructive sleep apnea      Morbid obesity      DM (diabetes mellitus)      Venous ulcer of lower leg without varicose veins      Spinal cord stimulator status      H/O arthroscopy of right knee      History of excision of pilonidal cyst      Status post debridement          SOCIAL HISTORY:  - Born in [place], migrated [place] in 19XX  - Currently working as / Retired  - Lives with _____; no pets  - No recent travel  - Denies tobacco use  - Denies alcohol use  - Denies illicit drug use  - Currently sexually active, has one male/female sexual partner    FAMILY HISTORY:  Family history of early CAD (Father)    Family history of diabetes mellitus in mother (Mother)        Allergies:  penicillin (Unknown)  IV Contrast (Sneezing (Mod to Severe))      ANTIMICROBIALS:      ANTIMICROBIALS (past 90 days):  MEDICATIONS  (STANDING):  vancomycin  IVPB.   250 mL/Hr IV Intermittent (07-26-23 @ 10:27)        OTHER MEDS:   MEDICATIONS  (STANDING):  enoxaparin Injectable 40 every 24 hours  furosemide    Tablet 40 daily  losartan 100 daily  morphine  IR 30 three times a day PRN  oxycodone    5 mG/acetaminophen 325 mG 2 every 6 hours PRN  pregabalin 100 two times a day      VITALS:  Vital Signs Last 24 Hrs  T(F): 98.9 (07-26-23 @ 09:16), Max: 98.9 (07-26-23 @ 09:16)    Vital Signs Last 24 Hrs  HR: 103 (07-26-23 @ 09:16) (103 - 103)  BP: 179/93 (07-26-23 @ 09:16) (179/93 - 179/93)  RR: 18 (07-26-23 @ 09:16)  SpO2: 98% (07-26-23 @ 09:16) (98% - 98%)  Wt(kg): --    EXAM:  GENERAL: NAD, lying in bed  HEAD: No head lesions  EYES: Conjunctiva pink and cornea white  EAR, NOSE, MOUTH, THROAT: Normal external ears and nose, no discharges; moist mucous membranes  NECK: Supple, nontender to palpation; no JVD  RESPIRATORY: Clear to auscultation bilaterally  CARDIOVASCULAR: S1 S2  GASTROINTESTINAL: Soft, nontender, nondistended; normoactive bowel sounds  EXTREMITIES: No clubbing, cyanosis, or petal edema  NERVOUS SYSTEM: Alert and oriented to person, time, place and situation, speech clear. No focal deficits   MUSCULOSKELETAL: No joint erythema, swelling or pain  SKIN: No rashes or lesions, no superficial thrombophlebitis  PSYCH: Normal affect    Labs:                        12.2   8.90  )-----------( 355      ( 26 Jul 2023 10:22 )             40.0     07-26    142  |  100  |  14  ----------------------------<  173<H>  4.1   |  32  |  0.7    Ca    9.3      26 Jul 2023 10:22  Mg     2.0     07-26    TPro  6.8  /  Alb  3.9  /  TBili  0.2  /  DBili  x   /  AST  9   /  ALT  11  /  AlkPhos  108  07-26      WBC Trend:  WBC Count: 8.90 (07-26-23 @ 10:22)      Auto Neutrophil #: 5.80 K/uL (07-26-23 @ 10:22)  Auto Neutrophil #: 4.06 K/uL (05-04-23 @ 07:37)  Auto Neutrophil #: 4.40 K/uL (05-03-23 @ 15:49)  Auto Neutrophil #: 4.72 K/uL (05-02-23 @ 08:03)  Auto Neutrophil #: 6.43 K/uL (04-30-23 @ 14:25)      Creatine Trend:  Creatinine: 0.7 (07-26)      Liver Biochemical Testing Trend:  Alanine Aminotransferase (ALT/SGPT): 11 (07-26)  Alanine Aminotransferase (ALT/SGPT): 18 (05-03)  Alanine Aminotransferase (ALT/SGPT): 12 (04-30)  Alanine Aminotransferase (ALT/SGPT): 18 (02-05)  Alanine Aminotransferase (ALT/SGPT): 18 (02-03)  Aspartate Aminotransferase (AST/SGOT): 9 (07-26-23 @ 10:22)  Aspartate Aminotransferase (AST/SGOT): 16 (05-03-23 @ 15:49)  Aspartate Aminotransferase (AST/SGOT): 9 (04-30-23 @ 14:25)  Aspartate Aminotransferase (AST/SGOT): 14 (02-05-23 @ 08:06)  Aspartate Aminotransferase (AST/SGOT): 12 (02-03-23 @ 07:21)  Bilirubin Total: 0.2 (07-26)  Bilirubin Total, Serum: 0.2 (05-03)  Bilirubin Total, Serum: <0.2 (04-30)  Bilirubin Total, Serum: 0.3 (02-05)  Bilirubin Total, Serum: 0.3 (02-03)      Trend LDH      Auto Eosinophil %: 3.0 % (07-26-23 @ 10:22)      Urinalysis Basic - ( 26 Jul 2023 10:22 )    Color: x / Appearance: x / SG: x / pH: x  Gluc: 173 mg/dL / Ketone: x  / Bili: x / Urobili: x   Blood: x / Protein: x / Nitrite: x   Leuk Esterase: x / RBC: x / WBC x   Sq Epi: x / Non Sq Epi: x / Bacteria: x        MICROBIOLOGY:      Lactate, Blood: 1.3 (07-26 @ 10:22)        RADIOLOGY:  imaging below personally reviewed    < from: Xray Tibia + Fibula 2 Views, Right (07.26.23 @ 10:48) >  There is no acute fracture. There are degenerative changes of the   visualized right knee. No soft tissue air noted.    < end of copied text >   INFECTIOUS DISEASE CONSULT NOTE    Patient is a 57y old  Male who presents with a chief complaint of Non healing Bilateral LE wound (26 Jul 2023 12:10)    HPI:  Patient is a 57-year-old male past history of hypertension, chronic back pain on opioids, chronic wounds to bilateral lower extremities referred to the ED by his podiatrist for evaluation of nonhealing wound to right lower extremity despite outpatient antibiotic treatment.   Patients condition dates back to 3 years ago and over time have received multiple treatments for his condition in the hospital. Last admission was about a month ago.     Patient denies fever, chills, chest pain, trauma, weakness, numbness (26 Jul 2023 12:10)     ID is consulted on bilateral LE infections    Prior hospital charts reviewed [Yes]  Primary team notes reviewed [Yes]  Other consultant notes reviewed [Yes]    REVIEW OF SYSTEMS:  CONSTITUTIONAL: No fever or chills  HEAD: No lesion on scalp  EYES: No visual disturbance  ENT: No sore throat  RESPIRATORY: No cough, no shortness of breath  CARDIOVASCULAR: No chest pain or palpitations  GASTROINTESTINAL: No abdominal or epigastric pain  GENITOURINARY: No dysuria  NEUROLOGICAL: No headache/dizziness  MUSCULOSKELETAL: No joint pain, erythema, or swelling; no back pain  SKIN: + bilateral LE draining wounds, slightly malodorous   All other ROS negative except noted above    PAST MEDICAL & SURGICAL HISTORY:  Hypertension      Disc disease, degenerative, lumbar or lumbosacral      Obstructive sleep apnea      Morbid obesity      DM (diabetes mellitus)      Venous ulcer of lower leg without varicose veins      Spinal cord stimulator status      H/O arthroscopy of right knee      History of excision of pilonidal cyst      Status post debridement      SOCIAL HISTORY:  - No recent travel  - Denies tobacco use  - Denies alcohol use  - Denies illicit drug use    FAMILY HISTORY:  Family history of early CAD (Father)    Family history of diabetes mellitus in mother (Mother)    Allergies:  penicillin (Unknown)  IV Contrast (Sneezing (Mod to Severe))      ANTIMICROBIALS:      ANTIMICROBIALS (past 90 days):  MEDICATIONS  (STANDING):  vancomycin  IVPB.   250 mL/Hr IV Intermittent (07-26-23 @ 10:27)        OTHER MEDS:   MEDICATIONS  (STANDING):  enoxaparin Injectable 40 every 24 hours  furosemide    Tablet 40 daily  losartan 100 daily  morphine  IR 30 three times a day PRN  oxycodone    5 mG/acetaminophen 325 mG 2 every 6 hours PRN  pregabalin 100 two times a day      VITALS:  Vital Signs Last 24 Hrs  T(F): 98.9 (07-26-23 @ 09:16), Max: 98.9 (07-26-23 @ 09:16)    Vital Signs Last 24 Hrs  HR: 103 (07-26-23 @ 09:16) (103 - 103)  BP: 179/93 (07-26-23 @ 09:16) (179/93 - 179/93)  RR: 18 (07-26-23 @ 09:16)  SpO2: 98% (07-26-23 @ 09:16) (98% - 98%)  Wt(kg): --    EXAM:  GENERAL: NAD, lying in bed, obese  HEAD: No head lesions  EYES: Conjunctiva pink and cornea white  EAR, NOSE, MOUTH, THROAT: Normal external ears and nose, no discharges; moist mucous membranes  NECK: Supple, nontender to palpation; no JVD  RESPIRATORY: Clear to auscultation bilaterally  CARDIOVASCULAR: S1 S2  GASTROINTESTINAL: Soft, nontender, nondistended; normoactive bowel sounds  EXTREMITIES: No clubbing, cyanosis, or petal edema  NERVOUS SYSTEM: Alert and oriented to person, time, place and situation, speech clear. No focal deficits   MUSCULOSKELETAL: No joint erythema, swelling or pain  SKIN: Bilateral LE wounds, with granulated wound bed and pale edge. no necrosis. slight malodorous serous drainage, minimal periwound erythema. no superficial thrombophlebitis  PSYCH: Normal affect    Labs:                        12.2   8.90  )-----------( 355      ( 26 Jul 2023 10:22 )             40.0     07-26    142  |  100  |  14  ----------------------------<  173<H>  4.1   |  32  |  0.7    Ca    9.3      26 Jul 2023 10:22  Mg     2.0     07-26    TPro  6.8  /  Alb  3.9  /  TBili  0.2  /  DBili  x   /  AST  9   /  ALT  11  /  AlkPhos  108  07-26      WBC Trend:  WBC Count: 8.90 (07-26-23 @ 10:22)      Auto Neutrophil #: 5.80 K/uL (07-26-23 @ 10:22)  Auto Neutrophil #: 4.06 K/uL (05-04-23 @ 07:37)  Auto Neutrophil #: 4.40 K/uL (05-03-23 @ 15:49)  Auto Neutrophil #: 4.72 K/uL (05-02-23 @ 08:03)  Auto Neutrophil #: 6.43 K/uL (04-30-23 @ 14:25)      Creatine Trend:  Creatinine: 0.7 (07-26)      Liver Biochemical Testing Trend:  Alanine Aminotransferase (ALT/SGPT): 11 (07-26)  Alanine Aminotransferase (ALT/SGPT): 18 (05-03)  Alanine Aminotransferase (ALT/SGPT): 12 (04-30)  Alanine Aminotransferase (ALT/SGPT): 18 (02-05)  Alanine Aminotransferase (ALT/SGPT): 18 (02-03)  Aspartate Aminotransferase (AST/SGOT): 9 (07-26-23 @ 10:22)  Aspartate Aminotransferase (AST/SGOT): 16 (05-03-23 @ 15:49)  Aspartate Aminotransferase (AST/SGOT): 9 (04-30-23 @ 14:25)  Aspartate Aminotransferase (AST/SGOT): 14 (02-05-23 @ 08:06)  Aspartate Aminotransferase (AST/SGOT): 12 (02-03-23 @ 07:21)  Bilirubin Total: 0.2 (07-26)  Bilirubin Total, Serum: 0.2 (05-03)  Bilirubin Total, Serum: <0.2 (04-30)  Bilirubin Total, Serum: 0.3 (02-05)  Bilirubin Total, Serum: 0.3 (02-03)      Trend LDH      Auto Eosinophil %: 3.0 % (07-26-23 @ 10:22)      Urinalysis Basic - ( 26 Jul 2023 10:22 )    Color: x / Appearance: x / SG: x / pH: x  Gluc: 173 mg/dL / Ketone: x  / Bili: x / Urobili: x   Blood: x / Protein: x / Nitrite: x   Leuk Esterase: x / RBC: x / WBC x   Sq Epi: x / Non Sq Epi: x / Bacteria: x        MICROBIOLOGY:      Lactate, Blood: 1.3 (07-26 @ 10:22)        RADIOLOGY:  imaging below personally reviewed    < from: Xray Tibia + Fibula 2 Views, Right (07.26.23 @ 10:48) >  There is no acute fracture. There are degenerative changes of the   visualized right knee. No soft tissue air noted.    < end of copied text >

## 2023-07-26 NOTE — H&P ADULT - NSICDXFAMILYHX_GEN_ALL_CORE_FT
RECORD     [] Admission Note          [] Progress Note          [x] Discharge Summary     Male Zachariah Simon is a well-appearing male infant born on 2020 at 10:01 AM via vaginal, spontaneous. His mother is a 32y.o.  year-old  . Prenatal serologies were negative. GBS was positive, adequately treated with PCN x3 prior to delivery. ROM occurred 1h 34m  prior to delivery. Pregnancy was uncomplicated. Delivery was uncomplicated. Presentation was Vertex. He weighed 3.595 kg and measured 20\" in length. His APGAR scores were 9 and 9 at one and five minutes, respectively.  History     Mother's Prenatal Labs  Lab Results   Component Value Date/Time    ABO/Rh(D) B POSITIVE 2022 10:11 PM    HBsAg, External NEGATIVE 2022 12:00 AM    HIV, External NON-REACTIVE 2022 12:00 AM    Rubella, External IMMUNE 2022 12:00 AM    T. Pallidum Antibody, External NON-REACTIVE 2022 12:00 AM    Gonorrhea, External NEGATIVE 2022 12:00 AM    Chlamydia, External NEGATIVE 2022 12:00 AM    GrBStrep, External POSITIVE 2022 12:00 AM    ABO,Rh B POSITIVE 2022 12:00 AM        Mother's Medical History  History reviewed. No pertinent past medical history. Current Outpatient Medications   Medication Instructions    PNV Comb #2-Iron-FA-Omega 3 29-1-400 mg cmpk Oral    polyethylene glycol (MIRALAX) 17 g, Oral, DAILY        Labor Events   Labor: No    Steroids: None   Antibiotics During Labor: Yes   Rupture Date/Time: 2022 8:27 AM   Rupture Type: AROM   Amniotic Fluid Description: Clear    Amniotic Fluid Odor: None    Labor complications: None       Additional complications:        Delivery Summary  Delivery Type: Vaginal, Spontaneous   Delivery Resuscitation: Suctioning-bulb; Tactile Stimulation     Number of Vessels:  3 Vessels   Cord Events: None   Meconium Stained: None   Amniotic Fluid Description: Clear        Additional Information  Fetal Ultrasound Abnormalities/Concerns?: No  Seen By MFM (Maternal Fetal Medicine)?: No  Pediatrician After Birth/ Follow Up Baby Visits: on call     Mother's anticipated feeding method is Breast Milk . Refer to maternal Labor & Delivery records for additional details. Early-Onset Sepsis Evaluation     [email or web address]    Incidence of Early-Onset Sepsis: 0.1000 Live Births     Gestational Age: 39w0d      Maternal Temperature: Temp (48hrs), Av.3 °F (36.8 °C), Min:97.3 °F (36.3 °C), Max:99.3 °F (37.4 °C)      ROM Duration: 1h 34m      Maternal GBS Status: Lab Results   Component Value Date/Time    [place] External POSITIVE 2022 12:00 AM         Type of Intrapartum Antibiotics:  GBS specific antibiotics > 2 hrs prior to birth     Infant's clinical exam is well-appearing. His risk per 1000/births is 0.02 with a clinical recommendation for no culture and no antibiotics and routine vitals. Hemolytic Disease Evaluation     Maternal Blood Type  Lab Results   Component Value Date/Time    ABO/Rh(D) B POSITIVE 2022 10:11 PM        Infant's Blood Type & Cord Screen  No results found for: ABO, PCTABR, RHFACTOR, ABORH, ABORH, ABORHEXT    No results found for: Dulce [address] Course / Problem List         Patient Active Problem List    Diagnosis    Single liveborn, born in hospital, delivered      ?   Intake & Output     Feeding Plan: Breast Milk     Intake  Patient Vitals for the past 24 hrs:   Breast Feeding (# of Times) Breast Feed Minutes LATCH Score   22 0825 1 15 no documentation   22 0920 1 15 no documentation   22 1050 1 15 no documentation   22 1300 1 20 no documentation   22 1500 1 15 9   22 1600 1 10 no documentation   22 1930 no documentation 10 no documentation   22 2230 1 30 no documentation   12/10/22 0130 1 10 no documentation   12/10/22 0330 1 15 no documentation Output  Patient Vitals for the past 24 hrs:   Urine Occurrence(s) Stool Occurrence(s)   12/09/22 1300 1 1   12/09/22 2230 no documentation 1   12/10/22 0110 1 no documentation         Vital Signs     Most Recent 24 Hour Range   Temp: 98.3 °F (36.8 °C)     Pulse (Heart Rate): 150     Resp Rate: 42  Temp  Min: 98.2 °F (36.8 °C)  Max: 99.5 °F (37.5 °C)    Pulse  Min: 120  Max: 150    Resp  Min: 36  Max: 48     Physical Exam     Birth Weight Current Weight Change since Birth (%)   3.595 kg 3.352 kg (7 lb 6.2 oz)  -7%     General  Active and well-appearing infant. HEENT  Anterior fontenelle soft and flat. Back   Symmetric, no evidence of spinal defect. Lungs   Clear to auscultation bilaterally. Chest Wall  Symmetric movement with respiration. No retractions. Heart  Regular rate and rhythm, no murmur. Abdomen   Soft, non-tender. Bowel sounds active. No masses or organomegaly. Genitalia  Normal male. Testes descended, uncircumcised. Rectal  Appropriately positioned and patent anal opening. MSK No clavicular crepitus. Negative Kruse and Ortolani. Leg lengths grossly symmetric. Pulses 2+ and equal brachial and femoral pulses. Skin No rashes or lesions. Congenital dermal melanosis over buttocks. Neurologic Spontaneous movement of all extremities. Appropriate tone and activity. Root, suck, grasp, and [place] reflexes present.         Examiner: MARY Bella  Date/Time: 2022 @ 0450     Medications     Medications Administered       erythromycin (ILOTYCIN) 5 mg/gram (0.5 %) ophthalmic ointment       Medication Administration       Admin Date  2022 Action  Given Dose  None Route  Both Eyes Administered By  Jessica Crowley RN                      hepatitis B virus vaccine (PF) (ENGERIX) DHEC syringe 10 mcg       Medication Administration       Admin Date  2022 Action  Given Dose  10 mcg Route  IntraMUSCular Administered By  Jessica Crowley RN                      phytonadione (vitamin K1) (AQUA-MEPHYTON) injection 1 mg       Medication Administration       Admin Date  2022 Action  Given Dose  1 mg Route  IntraMUSCular Administered By  Berkley Ureña RN                             Laboratory Studies (24 Hrs)     Recent Results (from the past 24 hour(s))   BILIRUBIN, TXCUTANEOUS POC    Collection Time: 12/10/22  1:09 AM   Result Value Ref Range    TcBili 24-48 hrs. 6.2 9 - 14 mg/dL        Health Maintenance     Metabolic Screen:    Yes (Device ID: [id number])     CCHD Screen:   Pre Ductal O2 Sat (%): 100  Post Ductal O2 Sat (%): 100     Hearing Screen:    Left Ear: Pass (12/09/22 1418)  Right Ear: Pass (12/09/22 1418)     Car Seat Trial:    N/A     Immunization History:  Immunization History   Administered Date(s) Administered    Hep B, Adol/Ped 2022            Assessment     Baby Jennifer Pace is a well-appearing infant born at a gestational age of 36w0d  and is now 40-hour old old. His physical exam is without concerning findings. His vital signs have been within acceptable ranges. He is now -7% from his birth weight. Mother is breastfeeding and feeding is progressing appropriately. NB screening is complete and appointment confirmed with [facility] for 2022 at 1500. Plan     - Discharge home with parent(s)  - Anticipate follow-up with [address] [place] [address]. Parental Contact     Infant's mother and father updated and provided the opportunity for questions.      Signed: Mari Da Silva NP FAMILY HISTORY:  Father  Still living? Unknown  Family history of early CAD, Age at diagnosis: Age Unknown    Mother  Still living? Unknown  Family history of diabetes mellitus in mother, Age at diagnosis: Age Unknown

## 2023-07-26 NOTE — ED PROVIDER NOTE - OBJECTIVE STATEMENT
Patient is a 57-year-old male past history of hypertension, chronic back pain on opioids, chronic wounds to bilateral lower extremities here for evaluation of nonhealing wound to right lower extremity despite full course of doxycycline prescribed by patient's podiatrist.  Patient saw his podiatrist yesterday and was informed to come to the ED today for admission for possible debridement on Friday.  Patient has also been in contact with his infectious disease doctor who recommended admission for IV antibiotics.  Patient denies fever, chills, chest pain, trauma, weakness, numbness

## 2023-07-26 NOTE — PATIENT PROFILE ADULT - NSTOBACCOCESSATIONEDU3_GEN_A_NUR
Thank you for allowing us to care for your child today.  Please follow discharge instructions and follow-up with your pediatrician as discussed.    Your wound was closed with absorbable sutures.  They will dissolve in approximately 5 to 7 days.  You may wash gently with warm, soapy water.  Pat dry and apply bandage.  No ointment to area until fully healed.  No swimming or soaking until fully healed.  You may give Tylenol every 4-6 hours and Motrin every 6-8 hours for pain control.  Apply extra sunscreen to the area once fully healed to help prevent scarring.  Follow-up with your pediatrician in approximately 48 hours for wound check.    Return to ED for worsening symptoms, uncontrolled head pain, vomiting, behavior changes, fevers, pus draining from wound, or any new/concerning symptoms  
Learning behavioral activities to cope with urges.  For example, distraction and changing routines

## 2023-07-26 NOTE — H&P ADULT - NSHPPHYSICALEXAM_GEN_ALL_CORE
Vital Signs Last 24 Hrs  T(C): 37.2 (26 Jul 2023 09:16), Max: 37.2 (26 Jul 2023 09:16)  T(F): 98.9 (26 Jul 2023 09:16), Max: 98.9 (26 Jul 2023 09:16)  HR: 103 (26 Jul 2023 09:16) (103 - 103)  BP: 179/93 (26 Jul 2023 09:16) (179/93 - 179/93)  BP(mean): --  RR: 18 (26 Jul 2023 09:16) (18 - 18)  SpO2: 98% (26 Jul 2023 09:16) (98% - 98%)    Parameters below as of 26 Jul 2023 09:16  Patient On (Oxygen Delivery Method): room air    General: Obese male, afebrile, in NAD  Neurology: A&Ox3, nonfocal, NICHOLSON x 4  Eyes: PERRLA/ EOMI, Gross vision intact  ENT/Neck: Neck supple, trachea midline, No JVD, Gross hearing intact  Respiratory: CTA B/L, No wheezing, rales, rhonchi  CV: RRR, S1S2, no murmurs, rubs or gallops  Abdominal: Soft, NT, ND +BS,   Extremities: + edema, peripheral pulses difficult to palpate  Skin: bilateral LE ulcers

## 2023-07-26 NOTE — H&P ADULT - ASSESSMENT
Assessment::  # Chronic bilateral non healing wounds suspect due to PVD  # Chronic lower back pain on opoid medications  # HTN  # Morbid obesity      Plan:  # ID and podiatrist consulted, plan for wound debridement  # Received a dose of Vanco in the ED  # Home medications restarted  # No need for daily labs, monitor vitals  # patient educated  # Discharge planning   Assessment::  # Chronic bilateral non healing wounds suspect due to PVD  # Chronic lower back pain on opoid medications  # HTN  # Morbid obesity BMI >40      Plan:  # ID and podiatrist consulted, plan for wound debridement  # Received a dose of Vanco in the ED  # Home medications restarted  # No need for daily labs, monitor vitals  # patient educated, life style modification  # Discharge planning   Assessment::  # Chronic bilateral non healing wounds suspect due to PVD  # Chronic lower back pain on opoid medications  # HTN  # Morbid obesity BMI >40      Plan:  # ID and podiatrist consulted, plan for wound debridement  # Received a dose of Vanco in the ED  # Home medications restarted  # No need for daily labs, monitor vitals  # patient educated, life style modification  # DVT prophylaxis with Lovenox.  # Discharge planning

## 2023-07-26 NOTE — ED PROVIDER NOTE - PHYSICAL EXAMINATION
VITAL SIGNS: I have reviewed nursing notes and confirm.  CONSTITUTIONAL: Well-developeD  SKIN:  SEE EXT: skin exam is warm and dry, no acute rash.    HEAD: Normocephalic; atraumatic.  EYES: conjunctiva and sclera clear.  ENT: No nasal discharge; airway clear.  CARD: S1, S2 normal; no murmurs, gallops, or rubs. Regular rate and rhythm.   RESP: No wheezes, rales or rhonchi.  ABD: Normal bowel sounds; soft; non-distended; non-tender  EXT: Poor healing large wound to right shin, active drainage and foul odor  NEURO: Alert, oriented, grossly unremarkable

## 2023-07-26 NOTE — ED ADULT NURSE NOTE - NSFALLRISKASMTTYPE_ED_ALL_ED
Detail Level: Detailed
Quality 226: Preventive Care And Screening: Tobacco Use: Screening And Cessation Intervention: Patient screened for tobacco use and is an ex/non-smoker
Initial (On Arrival)

## 2023-07-26 NOTE — ED ADULT NURSE NOTE - CAS TRG GEN SKIN COLOR
Recommend to stop it 2 days prior, patient may restart it the evening of the colonoscopy or the evening day after depending on surgeon's preference.   Normal for race

## 2023-07-27 LAB
BASOPHILS # BLD AUTO: 0.06 K/UL — SIGNIFICANT CHANGE UP (ref 0–0.2)
BASOPHILS NFR BLD AUTO: 0.7 % — SIGNIFICANT CHANGE UP (ref 0–1)
EOSINOPHIL # BLD AUTO: 0.29 K/UL — SIGNIFICANT CHANGE UP (ref 0–0.7)
EOSINOPHIL NFR BLD AUTO: 3.5 % — SIGNIFICANT CHANGE UP (ref 0–8)
ERYTHROCYTE [SEDIMENTATION RATE] IN BLOOD: 24 MM/HR — HIGH (ref 0–10)
HCT VFR BLD CALC: 37.9 % — LOW (ref 42–52)
HGB BLD-MCNC: 11.7 G/DL — LOW (ref 14–18)
IMM GRANULOCYTES NFR BLD AUTO: 0.2 % — SIGNIFICANT CHANGE UP (ref 0.1–0.3)
LYMPHOCYTES # BLD AUTO: 2.48 K/UL — SIGNIFICANT CHANGE UP (ref 1.2–3.4)
LYMPHOCYTES # BLD AUTO: 29.7 % — SIGNIFICANT CHANGE UP (ref 20.5–51.1)
MCHC RBC-ENTMCNC: 24.1 PG — LOW (ref 27–31)
MCHC RBC-ENTMCNC: 30.9 G/DL — LOW (ref 32–37)
MCV RBC AUTO: 78 FL — LOW (ref 80–94)
MONOCYTES # BLD AUTO: 0.57 K/UL — SIGNIFICANT CHANGE UP (ref 0.1–0.6)
MONOCYTES NFR BLD AUTO: 6.8 % — SIGNIFICANT CHANGE UP (ref 1.7–9.3)
NEUTROPHILS # BLD AUTO: 4.92 K/UL — SIGNIFICANT CHANGE UP (ref 1.4–6.5)
NEUTROPHILS NFR BLD AUTO: 59.1 % — SIGNIFICANT CHANGE UP (ref 42.2–75.2)
NRBC # BLD: 0 /100 WBCS — SIGNIFICANT CHANGE UP (ref 0–0)
PLATELET # BLD AUTO: 360 K/UL — SIGNIFICANT CHANGE UP (ref 130–400)
PMV BLD: 9.4 FL — SIGNIFICANT CHANGE UP (ref 7.4–10.4)
RBC # BLD: 4.86 M/UL — SIGNIFICANT CHANGE UP (ref 4.7–6.1)
RBC # FLD: 16.9 % — HIGH (ref 11.5–14.5)
WBC # BLD: 8.34 K/UL — SIGNIFICANT CHANGE UP (ref 4.8–10.8)
WBC # FLD AUTO: 8.34 K/UL — SIGNIFICANT CHANGE UP (ref 4.8–10.8)

## 2023-07-27 PROCEDURE — 93970 EXTREMITY STUDY: CPT | Mod: 26

## 2023-07-27 PROCEDURE — 99233 SBSQ HOSP IP/OBS HIGH 50: CPT

## 2023-07-27 PROCEDURE — 93925 LOWER EXTREMITY STUDY: CPT | Mod: 26

## 2023-07-27 RX ORDER — HYDROMORPHONE HYDROCHLORIDE 2 MG/ML
2 INJECTION INTRAMUSCULAR; INTRAVENOUS; SUBCUTANEOUS EVERY 4 HOURS
Refills: 0 | Status: DISCONTINUED | OUTPATIENT
Start: 2023-07-27 | End: 2023-07-28

## 2023-07-27 RX ORDER — MORPHINE SULFATE 50 MG/1
30 CAPSULE, EXTENDED RELEASE ORAL THREE TIMES A DAY
Refills: 0 | Status: DISCONTINUED | OUTPATIENT
Start: 2023-07-27 | End: 2023-07-28

## 2023-07-27 RX ADMIN — Medication 500 MILLIGRAM(S): at 06:12

## 2023-07-27 RX ADMIN — LOSARTAN POTASSIUM 100 MILLIGRAM(S): 100 TABLET, FILM COATED ORAL at 06:12

## 2023-07-27 RX ADMIN — MORPHINE SULFATE 30 MILLIGRAM(S): 50 CAPSULE, EXTENDED RELEASE ORAL at 21:28

## 2023-07-27 RX ADMIN — MORPHINE SULFATE 30 MILLIGRAM(S): 50 CAPSULE, EXTENDED RELEASE ORAL at 13:52

## 2023-07-27 RX ADMIN — Medication 100 MILLIGRAM(S): at 06:12

## 2023-07-27 RX ADMIN — Medication 40 MILLIGRAM(S): at 06:12

## 2023-07-27 RX ADMIN — Medication 100 MILLIGRAM(S): at 17:18

## 2023-07-27 RX ADMIN — MORPHINE SULFATE 30 MILLIGRAM(S): 50 CAPSULE, EXTENDED RELEASE ORAL at 06:18

## 2023-07-27 RX ADMIN — CEFEPIME 100 MILLIGRAM(S): 1 INJECTION, POWDER, FOR SOLUTION INTRAMUSCULAR; INTRAVENOUS at 06:13

## 2023-07-27 RX ADMIN — Medication 500 MILLIGRAM(S): at 17:19

## 2023-07-27 RX ADMIN — MORPHINE SULFATE 30 MILLIGRAM(S): 50 CAPSULE, EXTENDED RELEASE ORAL at 21:45

## 2023-07-27 RX ADMIN — MORPHINE SULFATE 30 MILLIGRAM(S): 50 CAPSULE, EXTENDED RELEASE ORAL at 17:32

## 2023-07-27 RX ADMIN — CEFEPIME 100 MILLIGRAM(S): 1 INJECTION, POWDER, FOR SOLUTION INTRAMUSCULAR; INTRAVENOUS at 17:20

## 2023-07-27 NOTE — PROGRESS NOTE ADULT - ASSESSMENT
57-year-old male past history of hypertension, chronic back pain on opioids, chronic wounds to bilateral lower extremities referred to the ED by his podiatrist for evaluation of nonhealing wound to right lower extremity despite outpatient antibiotic treatment.    ID is following for nonhealing bilateral LE wounds  Persistent and worsening wound for years  Currently followed by Podiatry and ID outpatient, on PO doxycycline ppx  He noticed increased malodorous drainage and increased pain in the past few days  Evaluated by Dr. Lambert outpatient and was referred to ED  Afebrile, no leukocytosis  Prior wound Cx with pseudomonas in 2020 and Acinetobacter 1/2023 (S to cefepime but at breakpoint)    Antibiotics:  Vancomycin 7/26  Cefepime/Flagyl 7/26 ->    Overall chronic bilateral LE wounds, exacerbated by severe venous stasis dermatitis  Overall not severely infected on examination, minimal periwound erythema and warmth  Slight malodorous drainage, no purulence  Sharp wound edge, no necrosis  Will empirically cover with cefepime and Flagyl for now pending Podiatry intervention    Penicillin allergy: told by his mother that he had allergic reaction when he was young, tolerated cefepime and Augmentin in the past.      IMPRESSION:  Bilateral LE wounds  Chronic venous stasis dermatitis  Morbid obesity  Drug allergy    RECOMMENDATIONS:  - Continue IV cefepime 2gm q12hrs and PO Flagyl 500mg q12hrs  - Plan for wound debridement on Friday 7/28  - Routine dressing change  - Trend WBC, fever curve, transaminases, creatinine daily  - Will continue to follow      Indy Menchaca D.O.  Attending Physician  Division of Infectious Diseases  [facility]  Please contact me via [employer] Teams

## 2023-07-27 NOTE — PROGRESS NOTE ADULT - SUBJECTIVE AND OBJECTIVE BOX
INFECTIOUS DISEASE FOLLOW UP NOTE:    Interval History/ROS: Patient is a 57y old  Male who presents with a chief complaint of Non healing Bilateral LE wound (27 Jul 2023 15:22)    Overnight events: No overnight events.    REVIEW OF SYSTEMS:  CONSTITUTIONAL: No fever or chills  HEAD: No lesion on scalp  EYES: No visual disturbance  ENT: No sore throat  RESPIRATORY: No cough, no shortness of breath  CARDIOVASCULAR: No chest pain or palpitations  GASTROINTESTINAL: No abdominal or epigastric pain  GENITOURINARY: No dysuria  NEUROLOGICAL: No headache/dizziness  MUSCULOSKELETAL: No joint pain, erythema, or swelling; no back pain  SKIN: + bilateral LE draining wounds, slightly malodorous   All other ROS negative except noted above      Prior hospital charts reviewed [Yes]  Primary team notes reviewed [Yes]  Other consultant notes reviewed [Yes]    Allergies:  penicillin (Unknown)  Penicillin G  Reaction: Unknown (Unknown)  IV Contrast (Sneezing (Mod to Severe))      ANTIMICROBIALS:   cefepime   IVPB 2000 every 12 hours  metroNIDAZOLE    Tablet 500 every 12 hours      OTHER MEDS: MEDICATIONS  (STANDING):  enoxaparin Injectable 40 every 24 hours  furosemide    Tablet 40 daily  HYDROmorphone  Injectable 2 every 4 hours PRN  losartan 100 daily  morphine ER Tablet 30 three times a day  oxycodone    5 mG/acetaminophen 325 mG 2 every 6 hours PRN  pregabalin 100 two times a day      Vital Signs Last 24 Hrs  T(F): 96.5 (07-26-23 @ 20:25), Max: 98.9 (07-26-23 @ 09:16)    Vital Signs Last 24 Hrs  HR: 74 (07-27-23 @ 14:00) (73 - 75)  BP: 135/74 (07-27-23 @ 14:00) (135/74 - 166/79)  RR: 18 (07-27-23 @ 14:00)  SpO2: 98% (07-27-23 @ 04:51) (98% - 98%)  Wt(kg): --    EXAM:  GENERAL: NAD, sitting in chair, obese  HEAD: No head lesions  EYES: Conjunctiva pink and cornea white  EAR, NOSE, MOUTH, THROAT: Normal external ears and nose, no discharges; moist mucous membranes  NECK: Supple, nontender to palpation; no JVD  RESPIRATORY: Clear to auscultation bilaterally  CARDIOVASCULAR: S1 S2  GASTROINTESTINAL: Soft, nontender, nondistended; normoactive bowel sounds  EXTREMITIES: No clubbing, cyanosis, or petal edema  NERVOUS SYSTEM: Alert and oriented to person, time, place and situation, speech clear. No focal deficits   MUSCULOSKELETAL: No joint erythema, swelling or pain  SKIN: Bilateral LE wounds, with granulated wound bed and pale edge. no necrosis. slight malodorous serous drainage, minimal periwound erythema. no superficial thrombophlebitis  PSYCH: Normal affect      Labs:                        12.2   8.90  )-----------( 355      ( 26 Jul 2023 10:22 )             40.0     07-26    142  |  100  |  14  ----------------------------<  173<H>  4.1   |  32  |  0.7    Ca    9.3      26 Jul 2023 10:22  Mg     2.0     07-26    TPro  6.8  /  Alb  3.9  /  TBili  0.2  /  DBili  x   /  AST  9   /  ALT  11  /  AlkPhos  108  07-26      WBC Trend:  WBC Count: 8.90 (07-26-23 @ 10:22)      Creatine Trend:  Creatinine: 0.7 (07-26)      Liver Biochemical Testing Trend:  Alanine Aminotransferase (ALT/SGPT): 11 (07-26)  Alanine Aminotransferase (ALT/SGPT): 18 (05-03)  Alanine Aminotransferase (ALT/SGPT): 12 (04-30)  Alanine Aminotransferase (ALT/SGPT): 18 (02-05)  Alanine Aminotransferase (ALT/SGPT): 18 (02-03)  Aspartate Aminotransferase (AST/SGOT): 9 (07-26-23 @ 10:22)  Aspartate Aminotransferase (AST/SGOT): 16 (05-03-23 @ 15:49)  Aspartate Aminotransferase (AST/SGOT): 9 (04-30-23 @ 14:25)  Aspartate Aminotransferase (AST/SGOT): 14 (02-05-23 @ 08:06)  Aspartate Aminotransferase (AST/SGOT): 12 (02-03-23 @ 07:21)  Bilirubin Total: 0.2 (07-26)  Bilirubin Total, Serum: 0.2 (05-03)  Bilirubin Total, Serum: <0.2 (04-30)  Bilirubin Total, Serum: 0.3 (02-05)  Bilirubin Total, Serum: 0.3 (02-03)      Trend LDH      Urinalysis Basic - ( 26 Jul 2023 10:22 )    Color: x / Appearance: x / SG: x / pH: x  Gluc: 173 mg/dL / Ketone: x  / Bili: x / Urobili: x   Blood: x / Protein: x / Nitrite: x   Leuk Esterase: x / RBC: x / WBC x   Sq Epi: x / Non Sq Epi: x / Bacteria: x        MICROBIOLOGY:        Culture - Blood (collected 30 Apr 2023 14:25)  Source: .Blood Blood-Peripheral  Final Report:    No Growth Final    Culture - Blood (collected 30 Apr 2023 14:25)  Source: .Blood Blood-Peripheral  Final Report:    No Growth Final    Culture - Blood (collected 05 Feb 2023 08:06)  Source: .Blood None  Final Report:    No Growth Final    Culture - Blood (collected 04 Feb 2023 16:02)  Source: .Blood None  Final Report:    No Growth Final    Culture - Other (collected 30 Jan 2023 17:06)  Source: Wound LEFT CALF WOUND  Final Report:    Numerous Acinetobacter baumannii/nosocomialis group    Numerous Enterococcus faecalis  Organism: Acinetobacter baumannii/nosocomialis group  Enterococcus faecalis  Organism: Enterococcus faecalis    Sensitivities:      -  Vancomycin: S 2      -  Ampicillin: S <=2 Predicts results to ampicillin/sulbactam, amoxacillin-clavulanate and  piperacillin-tazobactam.      -  Tetracycline: R >8      Method Type: JAYCOB  Organism: Acinetobacter baumannii/nosocomialis group    Sensitivities:      -  Levofloxacin: S <=0.5      -  Tobramycin: S <=2      -  Gentamicin: S <=2      -  Cefepime: S 4      -  Ciprofloxacin: S <=0.25      -  Imipenem: S <=1      Method Type: JAYCOB      -  Meropenem: S <=1      -  Ampicillin/Sulbactam: S 8/4      -  Ceftazidime: S 4      -  Amikacin: S <=16      -  Trimethoprim/Sulfamethoxazole: S <=0.5/9.5    Culture - Other (collected 30 Jan 2023 17:05)  Source: Wound RIGHT CALF WOUND  Final Report:    Few Alcaligenes faecalis    Numerous Enterococcus faecalis    Numerous Corynebacterium species "Susceptibilities not performed"  Organism: Enterococcus faecalis    Sensitivities:      -  Vancomycin: S 2      -  Ampicillin: S <=2 Predicts results to ampicillin/sulbactam, amoxacillin-clavulanate and  piperacillin-tazobactam.      -  Tetracycline: R >8      Method Type: JAYCOB  Organism: Alcaligenes faecalis  Enterococcus faecalis  Organism: Alcaligenes faecalis    Sensitivities:      -  Levofloxacin: S <=0.5      -  Tobramycin: S <=2      -  Aztreonam: I 16      -  Gentamicin: S <=2      -  Cefepime: S 4      -  Piperacillin/Tazobactam: S <=8      -  Ciprofloxacin: S 1      -  Ceftriaxone: S <=1      Method Type: JAYCOB      -  Meropenem: S <=1      -  Amikacin: S <=16      -  Trimethoprim/Sulfamethoxazole: S <=0.5/9.5    Culture - Blood (collected 26 Jan 2023 12:28)  Source: .Blood Blood  Final Report:    No Growth Final    Culture - Blood (collected 26 Jan 2023 12:28)  Source: .Blood Blood-Peripheral  Final Report:    No Growth Final    Culture - Blood (collected 29 Aug 2022 20:00)  Source: .Blood Blood-Peripheral  Final Report:    No Growth Final    Culture - Blood (collected 29 Aug 2022 20:00)  Source: .Blood Blood-Peripheral  Final Report:    No Growth Final      Lactate, Blood: 1.3 (07-26 @ 10:22)      RADIOLOGY:  imaging below personally reviewed    < from: [facility] Duplex Lower Ext Vein Scan, Bilat (07.27.23 @ 17:06) >  IMPRESSION:  No evidence of deep venous thrombosis in either lower extremity.  Bilateral calf veins are not visualized    < end of copied text >    < from: [facility] Duplex Lower Extrem Arterial, Bilat (07.27.23 @ 17:04) >  Impression:    Normal arterial flow in the bilateral lower extremities.  Bilateral calf arteries are not visualized due to patient status    < end of copied text >    < from: Xray Tibia + Fibula 2 Views, Right (07.26.23 @ 10:48) >    There is no acute fracture. There are degenerative changes of the   visualized right knee. No soft tissue air noted.    < end of copied text >

## 2023-07-27 NOTE — PROGRESS NOTE ADULT - SUBJECTIVE AND OBJECTIVE BOX
SUBJECTIVE:    Patient is a 57y old Male who presents with a chief complaint of Non healing Bilateral LE wound (26 Jul 2023 16:44)    Currently admitted to medicine with the primary diagnosis of Cellulitis       Today is hospital day 1d. This morning he is resting comfortably in bed and reports no new issues or overnight events.     ROS:   CONSTITUTIONAL: No weakness, fevers or chills   EYES/ENT: No visual changes; No vertigo or throat pain   NECK: No pain or stiffness   RESPIRATORY: No cough, wheezing, hemoptysis; No shortness of breath   CARDIOVASCULAR: No chest pain or palpitations   GASTROINTESTINAL: No abdominal or epigastric pain. No nausea, vomiting, or hematemesis; No diarrhea or constipation. No melena or hematochezia.  GENITOURINARY: No dysuria, frequency or hematuria  NEUROLOGICAL: No numbness or weakness        PAST MEDICAL & SURGICAL HISTORY  Hypertension    Disc disease, degenerative, lumbar or lumbosacral    Obstructive sleep apnea    Morbid obesity    DM (diabetes mellitus)    Venous ulcer of lower leg without varicose veins    Spinal cord stimulator status    H/O arthroscopy of right knee    History of excision of pilonidal cyst    Status post debridement      SOCIAL HISTORY:    ALLERGIES:  penicillin (Unknown)  Penicillin G  Reaction: Unknown (Unknown)  IV Contrast (Sneezing (Mod to Severe))    MEDICATIONS:  STANDING MEDICATIONS  cefepime   IVPB 2000 milliGRAM(s) IV Intermittent every 12 hours  enoxaparin Injectable 40 milliGRAM(s) SubCutaneous every 24 hours  furosemide    Tablet 40 milliGRAM(s) Oral daily  losartan 100 milliGRAM(s) Oral daily  metroNIDAZOLE    Tablet 500 milliGRAM(s) Oral every 12 hours  morphine ER Tablet 30 milliGRAM(s) Oral three times a day  pregabalin 100 milliGRAM(s) Oral two times a day    PRN MEDICATIONS  oxycodone    5 mG/acetaminophen 325 mG 2 Tablet(s) Oral every 6 hours PRN    VITALS:   T(F): 96.5  HR: 74  BP: 135/74  RR: 18  SpO2: 98%    LABS:  Negative for smoking/alcohol/drug use.                         12.2   8.90  )-----------( 355      ( 26 Jul 2023 10:22 )             40.0     07-26    142  |  100  |  14  ----------------------------<  173<H>  4.1   |  32  |  0.7    Ca    9.3      26 Jul 2023 10:22  Mg     2.0     07-26    TPro  6.8  /  Alb  3.9  /  TBili  0.2  /  DBili  x   /  AST  9   /  ALT  11  /  AlkPhos  108  07-26    PT/INR - ( 26 Jul 2023 10:22 )   PT: 11.30 sec;   INR: 0.99 ratio         PTT - ( 26 Jul 2023 10:22 )  PTT:32.9 sec  Urinalysis Basic - ( 26 Jul 2023 10:22 )    Color: x / Appearance: x / SG: x / pH: x  Gluc: 173 mg/dL / Ketone: x  / Bili: x / Urobili: x   Blood: x / Protein: x / Nitrite: x   Leuk Esterase: x / RBC: x / WBC x   Sq Epi: x / Non Sq Epi: x / Bacteria: x                RADIOLOGY:    PHYSICAL EXAM:  GEN: No acute distress, obese   HEENT: normocephalic, atraumatic, aniceteric  LUNGS: bl breath sounds   HEART: S1/S2 present. RRR, no murmurs  ABD: Soft, non-tender, non-distended. Bowel sounds present  EXT: warm , bl le edema with clean dry and intact dressing   NEURO: AAOX3, normal affect      ASSESSMENT AND PLAN:    57-year-old male past history of hypertension, chronic back pain on opioids, chronic wounds to bilateral lower extremities referred to the ED by his podiatrist for evaluation of nonhealing wound to right lower extremity despite outpatient antibiotic treatment.     Assessment:  # Chronic bilateral non healing wounds suspect due to PVD  # Chronic lower back pain sp spinal stimulator with revisions x3 complicated by mrsa infection on chronic opoid medication management  # H/O  HTN  #H/O Morbid obesity BMI >40    PLAN:   - no sepsis present on admission  - XRAY RLE - no acute fx  - check esr/ crp, mrsa swab , blood clx 7/26   - duplex venous and arterial  - ID eval appreciated - Cef/Flagyl  - dressing changes per podiatry - Local Wound Care; Wound Flushed w/ NS; Wound Packed w/ Betadine Soaked non adhesive dressing/ DSD / Kerlix / ACE - multilayer compression dressing B/L LE   - WBAT per podiatry   - pain control post- op , dilaudid ordered ; cw home pain management medications   - surgical debridement for Friday 7/28     dvt/gi ppx/diet  dispo: acute - pending debridement with podiatry 7/28

## 2023-07-28 LAB
ALBUMIN SERPL ELPH-MCNC: 4.2 G/DL — SIGNIFICANT CHANGE UP (ref 3.5–5.2)
ALP SERPL-CCNC: 111 U/L — SIGNIFICANT CHANGE UP (ref 30–115)
ALT FLD-CCNC: 13 U/L — SIGNIFICANT CHANGE UP (ref 0–41)
ANION GAP SERPL CALC-SCNC: 12 MMOL/L — SIGNIFICANT CHANGE UP (ref 7–14)
AST SERPL-CCNC: 13 U/L — SIGNIFICANT CHANGE UP (ref 0–41)
BILIRUB SERPL-MCNC: 0.4 MG/DL — SIGNIFICANT CHANGE UP (ref 0.2–1.2)
BUN SERPL-MCNC: 12 MG/DL — SIGNIFICANT CHANGE UP (ref 10–20)
CALCIUM SERPL-MCNC: 9.3 MG/DL — SIGNIFICANT CHANGE UP (ref 8.4–10.5)
CHLORIDE SERPL-SCNC: 99 MMOL/L — SIGNIFICANT CHANGE UP (ref 98–110)
CO2 SERPL-SCNC: 28 MMOL/L — SIGNIFICANT CHANGE UP (ref 17–32)
CREAT SERPL-MCNC: 0.8 MG/DL — SIGNIFICANT CHANGE UP (ref 0.7–1.5)
CRP SERPL-MCNC: 31.4 MG/L — HIGH
EGFR: 105 ML/MIN/1.73M2 — SIGNIFICANT CHANGE UP
GLUCOSE BLDC GLUCOMTR-MCNC: 148 MG/DL — HIGH (ref 70–99)
GLUCOSE SERPL-MCNC: 166 MG/DL — HIGH (ref 70–99)
HCT VFR BLD CALC: 41.2 % — LOW (ref 42–52)
HGB BLD-MCNC: 12.6 G/DL — LOW (ref 14–18)
MCHC RBC-ENTMCNC: 23.9 PG — LOW (ref 27–31)
MCHC RBC-ENTMCNC: 30.6 G/DL — LOW (ref 32–37)
MCV RBC AUTO: 78 FL — LOW (ref 80–94)
MRSA PCR RESULT.: NEGATIVE — SIGNIFICANT CHANGE UP
NRBC # BLD: 0 /100 WBCS — SIGNIFICANT CHANGE UP (ref 0–0)
PLATELET # BLD AUTO: 386 K/UL — SIGNIFICANT CHANGE UP (ref 130–400)
PMV BLD: 9.5 FL — SIGNIFICANT CHANGE UP (ref 7.4–10.4)
POTASSIUM SERPL-MCNC: 4.2 MMOL/L — SIGNIFICANT CHANGE UP (ref 3.5–5)
POTASSIUM SERPL-SCNC: 4.2 MMOL/L — SIGNIFICANT CHANGE UP (ref 3.5–5)
PROT SERPL-MCNC: 7.2 G/DL — SIGNIFICANT CHANGE UP (ref 6–8)
RBC # BLD: 5.28 M/UL — SIGNIFICANT CHANGE UP (ref 4.7–6.1)
RBC # FLD: 17 % — HIGH (ref 11.5–14.5)
SODIUM SERPL-SCNC: 139 MMOL/L — SIGNIFICANT CHANGE UP (ref 135–146)
WBC # BLD: 7.46 K/UL — SIGNIFICANT CHANGE UP (ref 4.8–10.8)
WBC # FLD AUTO: 7.46 K/UL — SIGNIFICANT CHANGE UP (ref 4.8–10.8)

## 2023-07-28 PROCEDURE — 11046 DBRDMT MUSC&/FSCA EA ADDL: CPT | Mod: GC

## 2023-07-28 PROCEDURE — 11042 DBRDMT SUBQ TIS 1ST 20SQCM/<: CPT | Mod: GC,59

## 2023-07-28 PROCEDURE — 99233 SBSQ HOSP IP/OBS HIGH 50: CPT

## 2023-07-28 PROCEDURE — 29581 APPL MULTLAYER CMPRN SYS LEG: CPT | Mod: 50,GC,59

## 2023-07-28 PROCEDURE — 11043 DBRDMT MUSC&/FSCA 1ST 20/<: CPT | Mod: GC

## 2023-07-28 PROCEDURE — 11721 DEBRIDE NAIL 6 OR MORE: CPT | Mod: GC,59

## 2023-07-28 PROCEDURE — 93971 EXTREMITY STUDY: CPT | Mod: 26,RT

## 2023-07-28 PROCEDURE — 11045 DBRDMT SUBQ TISS EACH ADDL: CPT | Mod: GC,59

## 2023-07-28 RX ORDER — KETOROLAC TROMETHAMINE 30 MG/ML
30 SYRINGE (ML) INJECTION ONCE
Refills: 0 | Status: DISCONTINUED | OUTPATIENT
Start: 2023-07-28 | End: 2023-07-28

## 2023-07-28 RX ORDER — ONDANSETRON 8 MG/1
4 TABLET, FILM COATED ORAL ONCE
Refills: 0 | Status: DISCONTINUED | OUTPATIENT
Start: 2023-07-28 | End: 2023-07-28

## 2023-07-28 RX ORDER — MORPHINE SULFATE 50 MG/1
2 CAPSULE, EXTENDED RELEASE ORAL ONCE
Refills: 0 | Status: DISCONTINUED | OUTPATIENT
Start: 2023-07-28 | End: 2023-07-28

## 2023-07-28 RX ORDER — CEFEPIME 1 G/1
2000 INJECTION, POWDER, FOR SOLUTION INTRAMUSCULAR; INTRAVENOUS EVERY 12 HOURS
Refills: 0 | Status: DISCONTINUED | OUTPATIENT
Start: 2023-07-28 | End: 2023-08-02

## 2023-07-28 RX ORDER — MORPHINE SULFATE 50 MG/1
30 CAPSULE, EXTENDED RELEASE ORAL THREE TIMES A DAY
Refills: 0 | Status: DISCONTINUED | OUTPATIENT
Start: 2023-07-28 | End: 2023-08-02

## 2023-07-28 RX ORDER — METRONIDAZOLE 500 MG
500 TABLET ORAL EVERY 12 HOURS
Refills: 0 | Status: DISCONTINUED | OUTPATIENT
Start: 2023-07-28 | End: 2023-07-31

## 2023-07-28 RX ORDER — SODIUM CHLORIDE 9 MG/ML
1000 INJECTION, SOLUTION INTRAVENOUS
Refills: 0 | Status: DISCONTINUED | OUTPATIENT
Start: 2023-07-28 | End: 2023-07-28

## 2023-07-28 RX ORDER — LOSARTAN POTASSIUM 100 MG/1
100 TABLET, FILM COATED ORAL DAILY
Refills: 0 | Status: DISCONTINUED | OUTPATIENT
Start: 2023-07-28 | End: 2023-08-02

## 2023-07-28 RX ORDER — SENNA PLUS 8.6 MG/1
2 TABLET ORAL AT BEDTIME
Refills: 0 | Status: DISCONTINUED | OUTPATIENT
Start: 2023-07-28 | End: 2023-08-02

## 2023-07-28 RX ORDER — SODIUM CHLORIDE 9 MG/ML
1000 INJECTION, SOLUTION INTRAVENOUS
Refills: 0 | Status: DISCONTINUED | OUTPATIENT
Start: 2023-07-28 | End: 2023-07-29

## 2023-07-28 RX ORDER — HYDROMORPHONE HYDROCHLORIDE 2 MG/ML
4 INJECTION INTRAMUSCULAR; INTRAVENOUS; SUBCUTANEOUS EVERY 4 HOURS
Refills: 0 | Status: DISCONTINUED | OUTPATIENT
Start: 2023-07-28 | End: 2023-07-28

## 2023-07-28 RX ORDER — OXYCODONE AND ACETAMINOPHEN 5; 325 MG/1; MG/1
1 TABLET ORAL ONCE
Refills: 0 | Status: DISCONTINUED | OUTPATIENT
Start: 2023-07-28 | End: 2023-07-28

## 2023-07-28 RX ORDER — ENOXAPARIN SODIUM 100 MG/ML
40 INJECTION SUBCUTANEOUS EVERY 24 HOURS
Refills: 0 | Status: DISCONTINUED | OUTPATIENT
Start: 2023-07-28 | End: 2023-08-02

## 2023-07-28 RX ORDER — FUROSEMIDE 40 MG
40 TABLET ORAL DAILY
Refills: 0 | Status: DISCONTINUED | OUTPATIENT
Start: 2023-07-28 | End: 2023-08-02

## 2023-07-28 RX ORDER — HYDROMORPHONE HYDROCHLORIDE 2 MG/ML
4 INJECTION INTRAMUSCULAR; INTRAVENOUS; SUBCUTANEOUS EVERY 4 HOURS
Refills: 0 | Status: DISCONTINUED | OUTPATIENT
Start: 2023-07-28 | End: 2023-08-01

## 2023-07-28 RX ORDER — ONDANSETRON 8 MG/1
4 TABLET, FILM COATED ORAL ONCE
Refills: 0 | Status: DISCONTINUED | OUTPATIENT
Start: 2023-07-28 | End: 2023-08-02

## 2023-07-28 RX ADMIN — CEFEPIME 100 MILLIGRAM(S): 1 INJECTION, POWDER, FOR SOLUTION INTRAMUSCULAR; INTRAVENOUS at 05:41

## 2023-07-28 RX ADMIN — Medication 100 MILLIGRAM(S): at 17:16

## 2023-07-28 RX ADMIN — LOSARTAN POTASSIUM 100 MILLIGRAM(S): 100 TABLET, FILM COATED ORAL at 05:43

## 2023-07-28 RX ADMIN — HYDROMORPHONE HYDROCHLORIDE 4 MILLIGRAM(S): 2 INJECTION INTRAMUSCULAR; INTRAVENOUS; SUBCUTANEOUS at 18:54

## 2023-07-28 RX ADMIN — MORPHINE SULFATE 30 MILLIGRAM(S): 50 CAPSULE, EXTENDED RELEASE ORAL at 23:02

## 2023-07-28 RX ADMIN — Medication 40 MILLIGRAM(S): at 05:44

## 2023-07-28 RX ADMIN — HYDROMORPHONE HYDROCHLORIDE 4 MILLIGRAM(S): 2 INJECTION INTRAMUSCULAR; INTRAVENOUS; SUBCUTANEOUS at 22:38

## 2023-07-28 RX ADMIN — HYDROMORPHONE HYDROCHLORIDE 2 MILLIGRAM(S): 2 INJECTION INTRAMUSCULAR; INTRAVENOUS; SUBCUTANEOUS at 07:22

## 2023-07-28 RX ADMIN — SENNA PLUS 2 TABLET(S): 8.6 TABLET ORAL at 22:10

## 2023-07-28 RX ADMIN — Medication 500 MILLIGRAM(S): at 17:16

## 2023-07-28 RX ADMIN — HYDROMORPHONE HYDROCHLORIDE 2 MILLIGRAM(S): 2 INJECTION INTRAMUSCULAR; INTRAVENOUS; SUBCUTANEOUS at 10:45

## 2023-07-28 RX ADMIN — Medication 100 MILLIGRAM(S): at 05:44

## 2023-07-28 RX ADMIN — MORPHINE SULFATE 30 MILLIGRAM(S): 50 CAPSULE, EXTENDED RELEASE ORAL at 05:43

## 2023-07-28 RX ADMIN — HYDROMORPHONE HYDROCHLORIDE 4 MILLIGRAM(S): 2 INJECTION INTRAMUSCULAR; INTRAVENOUS; SUBCUTANEOUS at 23:02

## 2023-07-28 RX ADMIN — Medication 30 MILLIGRAM(S): at 22:11

## 2023-07-28 RX ADMIN — Medication 30 MILLIGRAM(S): at 20:09

## 2023-07-28 RX ADMIN — MORPHINE SULFATE 30 MILLIGRAM(S): 50 CAPSULE, EXTENDED RELEASE ORAL at 06:15

## 2023-07-28 RX ADMIN — HYDROMORPHONE HYDROCHLORIDE 2 MILLIGRAM(S): 2 INJECTION INTRAMUSCULAR; INTRAVENOUS; SUBCUTANEOUS at 10:30

## 2023-07-28 RX ADMIN — HYDROMORPHONE HYDROCHLORIDE 4 MILLIGRAM(S): 2 INJECTION INTRAMUSCULAR; INTRAVENOUS; SUBCUTANEOUS at 18:29

## 2023-07-28 RX ADMIN — MORPHINE SULFATE 30 MILLIGRAM(S): 50 CAPSULE, EXTENDED RELEASE ORAL at 22:11

## 2023-07-28 RX ADMIN — Medication 500 MILLIGRAM(S): at 05:43

## 2023-07-28 RX ADMIN — HYDROMORPHONE HYDROCHLORIDE 2 MILLIGRAM(S): 2 INJECTION INTRAMUSCULAR; INTRAVENOUS; SUBCUTANEOUS at 07:42

## 2023-07-28 RX ADMIN — CEFEPIME 100 MILLIGRAM(S): 1 INJECTION, POWDER, FOR SOLUTION INTRAMUSCULAR; INTRAVENOUS at 17:16

## 2023-07-28 NOTE — PROGRESS NOTE ADULT - SUBJECTIVE AND OBJECTIVE BOX
SUBJECTIVE:    Patient is a 57y old Male who presents with a chief complaint of Non healing Bilateral LE wound (28 Jul 2023 10:25)    Currently admitted to medicine with the primary diagnosis of Cellulitis       Today is hospital day 2d. This morning he is resting comfortably in bed and reports increased pain and swelling in RLE     ROS:   CONSTITUTIONAL: No weakness, fevers or chills   EYES/ENT: No visual changes; No vertigo or throat pain   NECK: No pain or stiffness   RESPIRATORY: No cough, wheezing, hemoptysis; No shortness of breath   CARDIOVASCULAR: No chest pain or palpitations   GASTROINTESTINAL: No abdominal or epigastric pain. No nausea, vomiting, or hematemesis; No diarrhea or constipation. No melena or hematochezia.  GENITOURINARY: No dysuria, frequency or hematuria  NEUROLOGICAL: No numbness or weakness  EXTRE: RLE worsening swelling       PAST MEDICAL & SURGICAL HISTORY  Hypertension    Disc disease, degenerative, lumbar or lumbosacral    Obstructive sleep apnea    Morbid obesity    DM (diabetes mellitus)    Venous ulcer of lower leg without varicose veins    Spinal cord stimulator status    H/O arthroscopy of right knee    History of excision of pilonidal cyst    Status post debridement      SOCIAL HISTORY:    ALLERGIES:  penicillin (Unknown)  Penicillin G  Reaction: Unknown (Unknown)  IV Contrast (Sneezing (Mod to Severe))    MEDICATIONS:  STANDING MEDICATIONS  cefepime   IVPB 2000 milliGRAM(s) IV Intermittent every 12 hours  furosemide    Tablet 40 milliGRAM(s) Oral daily  losartan 100 milliGRAM(s) Oral daily  metroNIDAZOLE    Tablet 500 milliGRAM(s) Oral every 12 hours  morphine ER Tablet 30 milliGRAM(s) Oral three times a day  pregabalin 100 milliGRAM(s) Oral two times a day    PRN MEDICATIONS    VITALS:   T(F): 96.7  HR: 75  BP: 138/66  RR: 18  SpO2: 100%    LABS:  Negative for smoking/alcohol/drug use.                         12.6   7.46  )-----------( 386      ( 28 Jul 2023 08:37 )             41.2     07-28    139  |  99  |  12  ----------------------------<  166<H>  4.2   |  28  |  0.8    Ca    9.3      28 Jul 2023 08:37    TPro  7.2  /  Alb  4.2  /  TBili  0.4  /  DBili  x   /  AST  13  /  ALT  13  /  AlkPhos  111  07-28      Urinalysis Basic - ( 28 Jul 2023 08:37 )    Color: x / Appearance: x / SG: x / pH: x  Gluc: 166 mg/dL / Ketone: x  / Bili: x / Urobili: x   Blood: x / Protein: x / Nitrite: x   Leuk Esterase: x / RBC: x / WBC x   Sq Epi: x / Non Sq Epi: x / Bacteria: x        Sedimentation Rate, Erythrocyte: 24 mm/Hr *H* (07-27-23 @ 20:16)      Culture - Blood (collected 26 Jul 2023 10:22)  Source: .Blood Blood  Preliminary Report (27 Jul 2023 23:01):    No growth at 24 hours    Culture - Blood (collected 26 Jul 2023 10:22)  Source: .Blood Blood  Preliminary Report (27 Jul 2023 23:01):    No growth at 24 hours      RADIOLOGY:    PHYSICAL EXAM:  GEN: No acute distress  HEENT: normocephalic, atraumatic, aniceteric  LUNGS: Clear to auscultation bilaterally, no rales/wheezing/ rhonchi  HEART: S1/S2 present. RRR, no murmurs  ABD: Soft, non-tender, non-distended. Bowel sounds present  EXT: RLE edema > LLE edema , bl LE chronic wounds with no pus or drainage , clean dressing   NEURO: AAOX3, normal affect      ASSESSMENT AND PLAN:  57-year-old male past history of hypertension, chronic back pain on opioids, chronic wounds to bilateral lower extremities referred to the ED by his podiatrist for evaluation of nonhealing wound to right lower extremity despite outpatient antibiotic treatment.     Assessment:  # Chronic bilateral non healing wounds suspect due to PVD  # Chronic lower back pain sp spinal stimulator with revisions x3 complicated by mrsa infection on chronic opoid medication management  # H/O  HTN  #H/O Morbid obesity BMI >40    PLAN:   - no sepsis present on admission  - XRAY RLE - no acute fx  - check esr/ crp, mrsa swab neg , blood clx 7/26 neg prelim x2 , fu final   - duplex venous and arterial unremarkable   - repeat RLE venous duplex given increased swelling in RLE today   - ID eval appreciated - Cef/Flagyl  - dressing changes per podiatry - Local Wound Care; Wound Flushed w/ NS; Wound Packed w/ Betadine Soaked non adhesive dressing/ DSD / Kerlix / ACE - multilayer compression dressing B/L LE   - WBAT per podiatry   - pain control post- op , dilaudid ordered ; cw home pain management medications   - bowel regimen   - surgical debridement for Friday 7/28     dvt/gi ppx/diet  dispo: acute - pending debridement with podiatry today

## 2023-07-28 NOTE — PROGRESS NOTE ADULT - SUBJECTIVE AND OBJECTIVE BOX
INFECTIOUS DISEASE FOLLOW UP NOTE:    Interval History/ROS: Patient is a 57y old  Male who presents with a chief complaint of Non healing Bilateral LE wound (27 Jul 2023 18:40)      Overnight events:    REVIEW OF SYSTEMS:        Prior hospital charts reviewed [Yes]  Primary team notes reviewed [Yes]  Other consultant notes reviewed [Yes]    Allergies:  penicillin (Unknown)  Penicillin G  Reaction: Unknown (Unknown)  IV Contrast (Sneezing (Mod to Severe))      ANTIMICROBIALS:   cefepime   IVPB 2000 every 12 hours  metroNIDAZOLE    Tablet 500 every 12 hours      OTHER MEDS: MEDICATIONS  (STANDING):  enoxaparin Injectable 40 every 24 hours  furosemide    Tablet 40 daily  HYDROmorphone  Injectable 2 every 4 hours PRN  losartan 100 daily  morphine ER Tablet 30 three times a day  oxycodone    5 mG/acetaminophen 325 mG 2 every 6 hours PRN  pregabalin 100 two times a day      Vital Signs Last 24 Hrs  T(F): 97 (07-28-23 @ 04:50), Max: 98.9 (07-26-23 @ 09:16)    Vital Signs Last 24 Hrs  HR: 78 (07-28-23 @ 04:50) (67 - 78)  BP: 128/87 (07-28-23 @ 04:50) (123/69 - 135/74)  RR: 16 (07-28-23 @ 04:50)  SpO2: --  Wt(kg): --    EXAM:      Labs:                        12.6   7.46  )-----------( 386      ( 28 Jul 2023 08:37 )             41.2     07-28    139  |  99  |  12  ----------------------------<  166<H>  4.2   |  28  |  0.8    Ca    9.3      28 Jul 2023 08:37    TPro  7.2  /  Alb  4.2  /  TBili  0.4  /  DBili  x   /  AST  13  /  ALT  13  /  AlkPhos  111  07-28      WBC Trend:  WBC Count: 7.46 (07-28-23 @ 08:37)  WBC Count: 8.34 (07-27-23 @ 20:16)  WBC Count: 8.90 (07-26-23 @ 10:22)      Creatine Trend:  Creatinine: 0.8 (07-28)  Creatinine: 0.7 (07-26)      Liver Biochemical Testing Trend:  Alanine Aminotransferase (ALT/SGPT): 13 (07-28)  Alanine Aminotransferase (ALT/SGPT): 11 (07-26)  Alanine Aminotransferase (ALT/SGPT): 18 (05-03)  Alanine Aminotransferase (ALT/SGPT): 12 (04-30)  Alanine Aminotransferase (ALT/SGPT): 18 (02-05)  Aspartate Aminotransferase (AST/SGOT): 13 (07-28-23 @ 08:37)  Aspartate Aminotransferase (AST/SGOT): 9 (07-26-23 @ 10:22)  Aspartate Aminotransferase (AST/SGOT): 16 (05-03-23 @ 15:49)  Aspartate Aminotransferase (AST/SGOT): 9 (04-30-23 @ 14:25)  Aspartate Aminotransferase (AST/SGOT): 14 (02-05-23 @ 08:06)  Bilirubin Total: 0.4 (07-28)  Bilirubin Total: 0.2 (07-26)  Bilirubin Total, Serum: 0.2 (05-03)  Bilirubin Total, Serum: <0.2 (04-30)  Bilirubin Total, Serum: 0.3 (02-05)      Trend LDH      Urinalysis Basic - ( 28 Jul 2023 08:37 )    Color: x / Appearance: x / SG: x / pH: x  Gluc: 166 mg/dL / Ketone: x  / Bili: x / Urobili: x   Blood: x / Protein: x / Nitrite: x   Leuk Esterase: x / RBC: x / WBC x   Sq Epi: x / Non Sq Epi: x / Bacteria: x        MICROBIOLOGY:    MRSA PCR Result.: Negative (07-28-23 @ 07:45)      Culture - Blood (collected 26 Jul 2023 10:22)  Source: .Blood Blood  Preliminary Report:    No growth at 24 hours    Culture - Blood (collected 26 Jul 2023 10:22)  Source: .Blood Blood  Preliminary Report:    No growth at 24 hours    Culture - Blood (collected 30 Apr 2023 14:25)  Source: .Blood Blood-Peripheral  Final Report:    No Growth Final    Culture - Blood (collected 30 Apr 2023 14:25)  Source: .Blood Blood-Peripheral  Final Report:    No Growth Final    Culture - Blood (collected 05 Feb 2023 08:06)  Source: .Blood None  Final Report:    No Growth Final    Culture - Blood (collected 04 Feb 2023 16:02)  Source: .Blood None  Final Report:    No Growth Final    Culture - Other (collected 30 Jan 2023 17:06)  Source: Wound LEFT CALF WOUND  Final Report:    Numerous Acinetobacter baumannii/nosocomialis group    Numerous Enterococcus faecalis  Organism: Acinetobacter baumannii/nosocomialis group  Enterococcus faecalis  Organism: Enterococcus faecalis    Sensitivities:      Method Type: JAYCOB      -  Ampicillin: S <=2 Predicts results to ampicillin/sulbactam, amoxacillin-clavulanate and  piperacillin-tazobactam.      -  Tetracycline: R >8      -  Vancomycin: S 2  Organism: Acinetobacter baumannii/nosocomialis group    Sensitivities:      Method Type: JAYCOB      -  Amikacin: S <=16      -  Ampicillin/Sulbactam: S 8/4      -  Cefepime: S 4      -  Ceftazidime: S 4      -  Ciprofloxacin: S <=0.25      -  Gentamicin: S <=2      -  Imipenem: S <=1      -  Levofloxacin: S <=0.5      -  Meropenem: S <=1      -  Tobramycin: S <=2      -  Trimethoprim/Sulfamethoxazole: S <=0.5/9.5    Culture - Other (collected 30 Jan 2023 17:05)  Source: Wound RIGHT CALF WOUND  Final Report:    Few Alcaligenes faecalis    Numerous Enterococcus faecalis    Numerous Corynebacterium species "Susceptibilities not performed"  Organism: Alcaligenes faecalis  Enterococcus faecalis  Organism: Enterococcus faecalis    Sensitivities:      Method Type: JAYCOB      -  Ampicillin: S <=2 Predicts results to ampicillin/sulbactam, amoxacillin-clavulanate and  piperacillin-tazobactam.      -  Tetracycline: R >8      -  Vancomycin: S 2  Organism: Alcaligenes faecalis    Sensitivities:      Method Type: JAYCOB      -  Amikacin: S <=16      -  Aztreonam: I 16      -  Cefepime: S 4      -  Ceftriaxone: S <=1      -  Ciprofloxacin: S 1      -  Gentamicin: S <=2      -  Levofloxacin: S <=0.5      -  Meropenem: S <=1      -  Piperacillin/Tazobactam: S <=8      -  Tobramycin: S <=2      -  Trimethoprim/Sulfamethoxazole: S <=0.5/9.5    Culture - Blood (collected 26 Jan 2023 12:28)  Source: .Blood Blood  Final Report:    No Growth Final    Culture - Blood (collected 26 Jan 2023 12:28)  Source: .Blood Blood-Peripheral  Final Report:    No Growth Final      C-Reactive Protein, Serum: 31.4 (07-27)    Lactate, Blood: 1.3 (07-26 @ 10:22)      RADIOLOGY:  imaging below personally reviewed      < from: [facility] Duplex Lower Ext Vein Scan, Bilat (07.27.23 @ 17:06) >  IMPRESSION:  No evidence of deep venous thrombosis in either lower extremity.  Bilateral calf veins are not visualized    < end of copied text >    < from: [facility] Duplex Lower Extrem Arterial, Bilat (07.27.23 @ 17:04) >  Impression:    Normal arterial flow in the bilateral lower extremities.  Bilateral calf arteries are not visualized due to patient status    < end of copied text >    < from: Xray Tibia + Fibula 2 Views, Right (07.26.23 @ 10:48) >    There is no acute fracture. There are degenerative changes of the   visualized right knee. No soft tissue air noted.    < end of copied text > INFECTIOUS DISEASE FOLLOW UP NOTE:    Interval History/ROS: Patient is a 57y old  Male who presents with a chief complaint of Non healing Bilateral LE wound (27 Jul 2023 18:40)    Overnight events: No overnight events. Reports his legs are more swollen today    REVIEW OF SYSTEMS:  CONSTITUTIONAL: No fever or chills  HEAD: No lesion on scalp  EYES: No visual disturbance  ENT: No sore throat  RESPIRATORY: No cough, no shortness of breath  CARDIOVASCULAR: No chest pain or palpitations  GASTROINTESTINAL: No abdominal or epigastric pain  GENITOURINARY: No dysuria  NEUROLOGICAL: No headache/dizziness  MUSCULOSKELETAL: No joint pain, erythema, or swelling; no back pain  SKIN: + bilateral LE wounds  All other ROS negative except noted above        Prior hospital charts reviewed [Yes]  Primary team notes reviewed [Yes]  Other consultant notes reviewed [Yes]    Allergies:  penicillin (Unknown)  Penicillin G  Reaction: Unknown (Unknown)  IV Contrast (Sneezing (Mod to Severe))      ANTIMICROBIALS:   cefepime   IVPB 2000 every 12 hours  metroNIDAZOLE    Tablet 500 every 12 hours      OTHER MEDS: MEDICATIONS  (STANDING):  enoxaparin Injectable 40 every 24 hours  furosemide    Tablet 40 daily  HYDROmorphone  Injectable 2 every 4 hours PRN  losartan 100 daily  morphine ER Tablet 30 three times a day  oxycodone    5 mG/acetaminophen 325 mG 2 every 6 hours PRN  pregabalin 100 two times a day      Vital Signs Last 24 Hrs  T(F): 97 (07-28-23 @ 04:50), Max: 98.9 (07-26-23 @ 09:16)    Vital Signs Last 24 Hrs  HR: 78 (07-28-23 @ 04:50) (67 - 78)  BP: 128/87 (07-28-23 @ 04:50) (123/69 - 135/74)  RR: 16 (07-28-23 @ 04:50)  SpO2: --  Wt(kg): --    EXAM:  GENERAL: NAD, lying in bed; morbidly obese  HEAD: No head lesions  EYES: Conjunctiva pink and cornea white  EAR, NOSE, MOUTH, THROAT: Normal external ears and nose, no discharges; moist mucous membranes  NECK: Supple, nontender to palpation; no JVD  RESPIRATORY: Clear to auscultation bilaterally  CARDIOVASCULAR: S1 S2  GASTROINTESTINAL: Soft, nontender, nondistended; normoactive bowel sounds  EXTREMITIES: No clubbing, cyanosis; chronic venous stasis changes with dry and scaly skin  NERVOUS SYSTEM: Alert and oriented to person, time, place and situation, speech clear. No focal deficits   MUSCULOSKELETAL: No joint erythema, swelling or pain  SKIN: + bilateral wounds, wrapped with ACE bandage, no superficial thrombophlebitis  PSYCH: Normal affect, calm      Labs:                        12.6   7.46  )-----------( 386      ( 28 Jul 2023 08:37 )             41.2     07-28    139  |  99  |  12  ----------------------------<  166<H>  4.2   |  28  |  0.8    Ca    9.3      28 Jul 2023 08:37    TPro  7.2  /  Alb  4.2  /  TBili  0.4  /  DBili  x   /  AST  13  /  ALT  13  /  AlkPhos  111  07-28      WBC Trend:  WBC Count: 7.46 (07-28-23 @ 08:37)  WBC Count: 8.34 (07-27-23 @ 20:16)  WBC Count: 8.90 (07-26-23 @ 10:22)      Creatine Trend:  Creatinine: 0.8 (07-28)  Creatinine: 0.7 (07-26)      Liver Biochemical Testing Trend:  Alanine Aminotransferase (ALT/SGPT): 13 (07-28)  Alanine Aminotransferase (ALT/SGPT): 11 (07-26)  Alanine Aminotransferase (ALT/SGPT): 18 (05-03)  Alanine Aminotransferase (ALT/SGPT): 12 (04-30)  Alanine Aminotransferase (ALT/SGPT): 18 (02-05)  Aspartate Aminotransferase (AST/SGOT): 13 (07-28-23 @ 08:37)  Aspartate Aminotransferase (AST/SGOT): 9 (07-26-23 @ 10:22)  Aspartate Aminotransferase (AST/SGOT): 16 (05-03-23 @ 15:49)  Aspartate Aminotransferase (AST/SGOT): 9 (04-30-23 @ 14:25)  Aspartate Aminotransferase (AST/SGOT): 14 (02-05-23 @ 08:06)  Bilirubin Total: 0.4 (07-28)  Bilirubin Total: 0.2 (07-26)  Bilirubin Total, Serum: 0.2 (05-03)  Bilirubin Total, Serum: <0.2 (04-30)  Bilirubin Total, Serum: 0.3 (02-05)      Trend LDH      Urinalysis Basic - ( 28 Jul 2023 08:37 )    Color: x / Appearance: x / SG: x / pH: x  Gluc: 166 mg/dL / Ketone: x  / Bili: x / Urobili: x   Blood: x / Protein: x / Nitrite: x   Leuk Esterase: x / RBC: x / WBC x   Sq Epi: x / Non Sq Epi: x / Bacteria: x        MICROBIOLOGY:    MRSA PCR Result.: Negative (07-28-23 @ 07:45)      Culture - Blood (collected 26 Jul 2023 10:22)  Source: .Blood Blood  Preliminary Report:    No growth at 24 hours    Culture - Blood (collected 26 Jul 2023 10:22)  Source: .Blood Blood  Preliminary Report:    No growth at 24 hours    Culture - Blood (collected 30 Apr 2023 14:25)  Source: .Blood Blood-Peripheral  Final Report:    No Growth Final    Culture - Blood (collected 30 Apr 2023 14:25)  Source: .Blood Blood-Peripheral  Final Report:    No Growth Final    Culture - Blood (collected 05 Feb 2023 08:06)  Source: .Blood None  Final Report:    No Growth Final    Culture - Blood (collected 04 Feb 2023 16:02)  Source: .Blood None  Final Report:    No Growth Final    Culture - Other (collected 30 Jan 2023 17:06)  Source: Wound LEFT CALF WOUND  Final Report:    Numerous Acinetobacter baumannii/nosocomialis group    Numerous Enterococcus faecalis  Organism: Acinetobacter baumannii/nosocomialis group  Enterococcus faecalis  Organism: Enterococcus faecalis    Sensitivities:      Method Type: JAYCOB      -  Ampicillin: S <=2 Predicts results to ampicillin/sulbactam, amoxacillin-clavulanate and  piperacillin-tazobactam.      -  Tetracycline: R >8      -  Vancomycin: S 2  Organism: Acinetobacter baumannii/nosocomialis group    Sensitivities:      Method Type: JAYCOB      -  Amikacin: S <=16      -  Ampicillin/Sulbactam: S 8/4      -  Cefepime: S 4      -  Ceftazidime: S 4      -  Ciprofloxacin: S <=0.25      -  Gentamicin: S <=2      -  Imipenem: S <=1      -  Levofloxacin: S <=0.5      -  Meropenem: S <=1      -  Tobramycin: S <=2      -  Trimethoprim/Sulfamethoxazole: S <=0.5/9.5    Culture - Other (collected 30 Jan 2023 17:05)  Source: Wound RIGHT CALF WOUND  Final Report:    Few Alcaligenes faecalis    Numerous Enterococcus faecalis    Numerous Corynebacterium species "Susceptibilities not performed"  Organism: Alcaligenes faecalis  Enterococcus faecalis  Organism: Enterococcus faecalis    Sensitivities:      Method Type: JAYCOB      -  Ampicillin: S <=2 Predicts results to ampicillin/sulbactam, amoxacillin-clavulanate and  piperacillin-tazobactam.      -  Tetracycline: R >8      -  Vancomycin: S 2  Organism: Alcaligenes faecalis    Sensitivities:      Method Type: JAYCOB      -  Amikacin: S <=16      -  Aztreonam: I 16      -  Cefepime: S 4      -  Ceftriaxone: S <=1      -  Ciprofloxacin: S 1      -  Gentamicin: S <=2      -  Levofloxacin: S <=0.5      -  Meropenem: S <=1      -  Piperacillin/Tazobactam: S <=8      -  Tobramycin: S <=2      -  Trimethoprim/Sulfamethoxazole: S <=0.5/9.5    Culture - Blood (collected 26 Jan 2023 12:28)  Source: .Blood Blood  Final Report:    No Growth Final    Culture - Blood (collected 26 Jan 2023 12:28)  Source: .Blood Blood-Peripheral  Final Report:    No Growth Final      C-Reactive Protein, Serum: 31.4 (07-27)    Lactate, Blood: 1.3 (07-26 @ 10:22)      RADIOLOGY:  imaging below personally reviewed      < from: [facility] Duplex Lower Ext Vein Scan, Bilat (07.27.23 @ 17:06) >  IMPRESSION:  No evidence of deep venous thrombosis in either lower extremity.  Bilateral calf veins are not visualized    < end of copied text >    < from: [facility] Duplex Lower Extrem Arterial, Bilat (07.27.23 @ 17:04) >  Impression:    Normal arterial flow in the bilateral lower extremities.  Bilateral calf arteries are not visualized due to patient status    < end of copied text >    < from: Xray Tibia + Fibula 2 Views, Right (07.26.23 @ 10:48) >    There is no acute fracture. There are degenerative changes of the   visualized right knee. No soft tissue air noted.    < end of copied text >

## 2023-07-28 NOTE — BRIEF OPERATIVE NOTE - NSICDXBRIEFPREOP_GEN_ALL_CORE_FT
PRE-OP DIAGNOSIS:  Open wound of right lower leg, sequela 28-Jul-2023 12:52:15  Fernando Lambert  Open wound of left lower leg 28-Jul-2023 12:52:33  Fernando Lambert

## 2023-07-28 NOTE — PROGRESS NOTE ADULT - ASSESSMENT
57-year-old male past history of hypertension, chronic back pain on opioids, chronic wounds to bilateral lower extremities referred to the ED by his podiatrist for evaluation of nonhealing wound to right lower extremity despite outpatient antibiotic treatment.    ID is following for nonhealing bilateral LE wounds  Persistent and worsening wound for years  Currently followed by Podiatry and ID outpatient, on PO doxycycline ppx  He noticed increased malodorous drainage and increased pain in the past few days  Evaluated by Dr. Lambert outpatient and was referred to ED  Afebrile, no leukocytosis  Prior wound Cx with pseudomonas in 2020 and Acinetobacter 1/2023 (S to cefepime but at breakpoint)    Antibiotics:  Vancomycin 7/26  Cefepime/Flagyl 7/26 ->    Overall chronic bilateral LE wounds, exacerbated by severe venous stasis dermatitis  Overall not severely infected on examination, minimal periwound erythema and warmth  Slight malodorous drainage, no purulence  Sharp wound edge, no necrosis  Will empirically cover with cefepime and Flagyl for now pending Podiatry intervention    Penicillin allergy: told by his mother that he had allergic reaction when he was young, tolerated cefepime and Augmentin in the past.      IMPRESSION:  Bilateral LE wounds  Chronic venous stasis dermatitis  Morbid obesity  Drug allergy    RECOMMENDATIONS:  - Continue IV cefepime 2gm q12hrs and PO Flagyl 500mg q12hrs  - Plan for wound debridement on Friday 7/28  - Routine dressing change  - Trend WBC, fever curve, transaminases, creatinine daily  - Will continue to follow    * THIS IS AN INCOMPLETE NOTE. FINAL RECOMMENDATION IS PENDING *   57-year-old male past history of hypertension, chronic back pain on opioids, chronic wounds to bilateral lower extremities referred to the ED by his podiatrist for evaluation of nonhealing wound to right lower extremity despite outpatient antibiotic treatment.    ID is following for nonhealing bilateral LE wounds  Persistent and worsening wound for years  Currently followed by Podiatry and ID outpatient, on PO doxycycline ppx  He noticed increased malodorous drainage and increased pain in the past few days  Evaluated by Dr. Lambert outpatient and was referred to ED  Afebrile, no leukocytosis  Prior wound Cx with pseudomonas in 2020 and Acinetobacter 1/2023 (S to cefepime but at breakpoint)    Antibiotics:  Vancomycin 7/26  Cefepime/Flagyl 7/26 ->    Overall chronic bilateral LE wounds, exacerbated by severe venous stasis dermatitis  Overall not severely infected on examination, minimal periwound erythema and warmth  Slight malodorous drainage, no purulence  Sharp wound edge, no necrosis  Will empirically cover with cefepime and Flagyl for now   S/p wound debridement, OR culture pending    Penicillin allergy: told by his mother that he had allergic reaction when he was young, tolerated cefepime and Augmentin in the past.      IMPRESSION:  Bilateral LE wounds  Chronic venous stasis dermatitis  Morbid obesity  Drug allergy    RECOMMENDATIONS:  - Discussed with Dr. Lambert from Podiatry, wounds look good, fibriotic tissue debrided in OR, culture obtained  - Continue IV cefepime 2gm q12hrs and PO Flagyl 500mg q12hrs  - Follow up wound Cx  - Routine dressing change  - Trend WBC, fever curve, transaminases, creatinine daily  - Will continue to follow    Indy Menchaca D.O.  Attending Physician  Division of Infectious Diseases  [facility]  Please contact me via [employer] Teams

## 2023-07-29 LAB
ALBUMIN SERPL ELPH-MCNC: 3.6 G/DL — SIGNIFICANT CHANGE UP (ref 3.5–5.2)
ALP SERPL-CCNC: 96 U/L — SIGNIFICANT CHANGE UP (ref 30–115)
ALT FLD-CCNC: 12 U/L — SIGNIFICANT CHANGE UP (ref 0–41)
ANION GAP SERPL CALC-SCNC: 12 MMOL/L — SIGNIFICANT CHANGE UP (ref 7–14)
AST SERPL-CCNC: 12 U/L — SIGNIFICANT CHANGE UP (ref 0–41)
BASOPHILS # BLD AUTO: 0.04 K/UL — SIGNIFICANT CHANGE UP (ref 0–0.2)
BASOPHILS NFR BLD AUTO: 0.5 % — SIGNIFICANT CHANGE UP (ref 0–1)
BILIRUB SERPL-MCNC: <0.2 MG/DL — SIGNIFICANT CHANGE UP (ref 0.2–1.2)
BUN SERPL-MCNC: 15 MG/DL — SIGNIFICANT CHANGE UP (ref 10–20)
CALCIUM SERPL-MCNC: 8.9 MG/DL — SIGNIFICANT CHANGE UP (ref 8.4–10.5)
CHLORIDE SERPL-SCNC: 101 MMOL/L — SIGNIFICANT CHANGE UP (ref 98–110)
CO2 SERPL-SCNC: 27 MMOL/L — SIGNIFICANT CHANGE UP (ref 17–32)
CREAT SERPL-MCNC: 0.7 MG/DL — SIGNIFICANT CHANGE UP (ref 0.7–1.5)
EGFR: 109 ML/MIN/1.73M2 — SIGNIFICANT CHANGE UP
EOSINOPHIL # BLD AUTO: 0.11 K/UL — SIGNIFICANT CHANGE UP (ref 0–0.7)
EOSINOPHIL NFR BLD AUTO: 1.4 % — SIGNIFICANT CHANGE UP (ref 0–8)
GLUCOSE SERPL-MCNC: 156 MG/DL — HIGH (ref 70–99)
HCT VFR BLD CALC: 37.8 % — LOW (ref 42–52)
HGB BLD-MCNC: 11.5 G/DL — LOW (ref 14–18)
IMM GRANULOCYTES NFR BLD AUTO: 0.4 % — HIGH (ref 0.1–0.3)
LYMPHOCYTES # BLD AUTO: 1.64 K/UL — SIGNIFICANT CHANGE UP (ref 1.2–3.4)
LYMPHOCYTES # BLD AUTO: 20.3 % — LOW (ref 20.5–51.1)
MCHC RBC-ENTMCNC: 24.3 PG — LOW (ref 27–31)
MCHC RBC-ENTMCNC: 30.4 G/DL — LOW (ref 32–37)
MCV RBC AUTO: 79.7 FL — LOW (ref 80–94)
MONOCYTES # BLD AUTO: 0.83 K/UL — HIGH (ref 0.1–0.6)
MONOCYTES NFR BLD AUTO: 10.3 % — HIGH (ref 1.7–9.3)
NEUTROPHILS # BLD AUTO: 5.42 K/UL — SIGNIFICANT CHANGE UP (ref 1.4–6.5)
NEUTROPHILS NFR BLD AUTO: 67.1 % — SIGNIFICANT CHANGE UP (ref 42.2–75.2)
NRBC # BLD: 0 /100 WBCS — SIGNIFICANT CHANGE UP (ref 0–0)
PLATELET # BLD AUTO: 302 K/UL — SIGNIFICANT CHANGE UP (ref 130–400)
PMV BLD: 9.6 FL — SIGNIFICANT CHANGE UP (ref 7.4–10.4)
POTASSIUM SERPL-MCNC: 4.2 MMOL/L — SIGNIFICANT CHANGE UP (ref 3.5–5)
POTASSIUM SERPL-SCNC: 4.2 MMOL/L — SIGNIFICANT CHANGE UP (ref 3.5–5)
PROT SERPL-MCNC: 6.1 G/DL — SIGNIFICANT CHANGE UP (ref 6–8)
RBC # BLD: 4.74 M/UL — SIGNIFICANT CHANGE UP (ref 4.7–6.1)
RBC # FLD: 17 % — HIGH (ref 11.5–14.5)
SODIUM SERPL-SCNC: 140 MMOL/L — SIGNIFICANT CHANGE UP (ref 135–146)
WBC # BLD: 8.07 K/UL — SIGNIFICANT CHANGE UP (ref 4.8–10.8)
WBC # FLD AUTO: 8.07 K/UL — SIGNIFICANT CHANGE UP (ref 4.8–10.8)

## 2023-07-29 PROCEDURE — 99231 SBSQ HOSP IP/OBS SF/LOW 25: CPT | Mod: GC,25

## 2023-07-29 PROCEDURE — 99232 SBSQ HOSP IP/OBS MODERATE 35: CPT

## 2023-07-29 RX ORDER — MUPIROCIN 20 MG/G
1 OINTMENT TOPICAL ONCE
Refills: 0 | Status: COMPLETED | OUTPATIENT
Start: 2023-07-29 | End: 2023-07-29

## 2023-07-29 RX ORDER — KETOROLAC TROMETHAMINE 30 MG/ML
15 SYRINGE (ML) INJECTION ONCE
Refills: 0 | Status: DISCONTINUED | OUTPATIENT
Start: 2023-07-29 | End: 2023-07-29

## 2023-07-29 RX ADMIN — HYDROMORPHONE HYDROCHLORIDE 4 MILLIGRAM(S): 2 INJECTION INTRAMUSCULAR; INTRAVENOUS; SUBCUTANEOUS at 22:39

## 2023-07-29 RX ADMIN — Medication 15 MILLIGRAM(S): at 20:20

## 2023-07-29 RX ADMIN — LOSARTAN POTASSIUM 100 MILLIGRAM(S): 100 TABLET, FILM COATED ORAL at 06:28

## 2023-07-29 RX ADMIN — Medication 100 MILLIGRAM(S): at 18:31

## 2023-07-29 RX ADMIN — MORPHINE SULFATE 30 MILLIGRAM(S): 50 CAPSULE, EXTENDED RELEASE ORAL at 21:50

## 2023-07-29 RX ADMIN — MORPHINE SULFATE 30 MILLIGRAM(S): 50 CAPSULE, EXTENDED RELEASE ORAL at 07:30

## 2023-07-29 RX ADMIN — CEFEPIME 100 MILLIGRAM(S): 1 INJECTION, POWDER, FOR SOLUTION INTRAMUSCULAR; INTRAVENOUS at 06:29

## 2023-07-29 RX ADMIN — Medication 500 MILLIGRAM(S): at 18:31

## 2023-07-29 RX ADMIN — HYDROMORPHONE HYDROCHLORIDE 4 MILLIGRAM(S): 2 INJECTION INTRAMUSCULAR; INTRAVENOUS; SUBCUTANEOUS at 18:48

## 2023-07-29 RX ADMIN — HYDROMORPHONE HYDROCHLORIDE 4 MILLIGRAM(S): 2 INJECTION INTRAMUSCULAR; INTRAVENOUS; SUBCUTANEOUS at 23:07

## 2023-07-29 RX ADMIN — MORPHINE SULFATE 30 MILLIGRAM(S): 50 CAPSULE, EXTENDED RELEASE ORAL at 21:24

## 2023-07-29 RX ADMIN — HYDROMORPHONE HYDROCHLORIDE 4 MILLIGRAM(S): 2 INJECTION INTRAMUSCULAR; INTRAVENOUS; SUBCUTANEOUS at 10:34

## 2023-07-29 RX ADMIN — Medication 100 MILLIGRAM(S): at 06:29

## 2023-07-29 RX ADMIN — Medication 500 MILLIGRAM(S): at 06:28

## 2023-07-29 RX ADMIN — HYDROMORPHONE HYDROCHLORIDE 4 MILLIGRAM(S): 2 INJECTION INTRAMUSCULAR; INTRAVENOUS; SUBCUTANEOUS at 14:36

## 2023-07-29 RX ADMIN — MORPHINE SULFATE 30 MILLIGRAM(S): 50 CAPSULE, EXTENDED RELEASE ORAL at 14:36

## 2023-07-29 RX ADMIN — HYDROMORPHONE HYDROCHLORIDE 4 MILLIGRAM(S): 2 INJECTION INTRAMUSCULAR; INTRAVENOUS; SUBCUTANEOUS at 15:06

## 2023-07-29 RX ADMIN — MORPHINE SULFATE 30 MILLIGRAM(S): 50 CAPSULE, EXTENDED RELEASE ORAL at 06:29

## 2023-07-29 RX ADMIN — MUPIROCIN 1 APPLICATION(S): 20 OINTMENT TOPICAL at 14:45

## 2023-07-29 RX ADMIN — HYDROMORPHONE HYDROCHLORIDE 4 MILLIGRAM(S): 2 INJECTION INTRAMUSCULAR; INTRAVENOUS; SUBCUTANEOUS at 07:30

## 2023-07-29 RX ADMIN — HYDROMORPHONE HYDROCHLORIDE 4 MILLIGRAM(S): 2 INJECTION INTRAMUSCULAR; INTRAVENOUS; SUBCUTANEOUS at 11:04

## 2023-07-29 RX ADMIN — Medication 40 MILLIGRAM(S): at 06:28

## 2023-07-29 RX ADMIN — MORPHINE SULFATE 30 MILLIGRAM(S): 50 CAPSULE, EXTENDED RELEASE ORAL at 15:06

## 2023-07-29 RX ADMIN — Medication 15 MILLIGRAM(S): at 20:46

## 2023-07-29 RX ADMIN — CEFEPIME 100 MILLIGRAM(S): 1 INJECTION, POWDER, FOR SOLUTION INTRAMUSCULAR; INTRAVENOUS at 18:32

## 2023-07-29 RX ADMIN — HYDROMORPHONE HYDROCHLORIDE 4 MILLIGRAM(S): 2 INJECTION INTRAMUSCULAR; INTRAVENOUS; SUBCUTANEOUS at 02:28

## 2023-07-29 RX ADMIN — HYDROMORPHONE HYDROCHLORIDE 4 MILLIGRAM(S): 2 INJECTION INTRAMUSCULAR; INTRAVENOUS; SUBCUTANEOUS at 18:31

## 2023-07-29 RX ADMIN — HYDROMORPHONE HYDROCHLORIDE 4 MILLIGRAM(S): 2 INJECTION INTRAMUSCULAR; INTRAVENOUS; SUBCUTANEOUS at 06:34

## 2023-07-29 NOTE — PROGRESS NOTE ADULT - SUBJECTIVE AND OBJECTIVE BOX
Podiatry Progress Note    Subjective:  JOSE ALBERTO NO is a  57y Male who presented to the [facility] with a chief complaint of Non healing Bilateral LE wound (28 Jul 2023 15:54).  Patient is S/P BLE excisional debridement of soft tissue (DOS: 7/28/23). Overall, patient is doing well post-operatively. He complains of moderate pain to the lower extremities, tenderness most severe to the left lower extremity wound. Denies any f/n/v/c/sob. Dressings intact, no strikethrough noted.    PAST MEDICAL & SURGICAL HISTORY:  Hypertension  Disc disease, degenerative, lumbar or lumbosacral  Obstructive sleep apnea  Morbid obesity  DM (diabetes mellitus)  Venous ulcer of lower leg without varicose veins  Spinal cord stimulator status  H/O arthroscopy of right knee  History of excision of pilonidal cyst  Status post debridement      Objective:  Vital Signs Last 24 Hrs  T(C): 36 (29 Jul 2023 05:13), Max: 36.6 (28 Jul 2023 14:02)  T(F): 96.8 (29 Jul 2023 05:13), Max: 97.8 (28 Jul 2023 14:02)  HR: 72 (29 Jul 2023 05:13) (65 - 91)  BP: 145/67 (29 Jul 2023 05:13) (113/55 - 176/79)  BP(mean): --  RR: 18 (29 Jul 2023 05:13) (10 - 18)  SpO2: 100% (28 Jul 2023 14:02) (98% - 100%)    Parameters below as of 28 Jul 2023 14:02  Patient On (Oxygen Delivery Method): nasal cannula  O2 Flow (L/min): 3                          11.5   8.07  )-----------( 302      ( 29 Jul 2023 08:00 )             37.8                 07-29    140  |  101  |  15  ----------------------------<  156<H>  4.2   |  27  |  0.7    Ca    8.9      29 Jul 2023 08:00    TPro  6.1  /  Alb  3.6  /  TBili  <0.2  /  DBili  x   /  AST  12  /  ALT  12  /  AlkPhos  96  07-29      Physical Exam - Lower Extremity Focused:   Derm: Full thickness ulcerations on the RLE on the lateral and posterior aspect with central granular wound base and fibrotic wound edges. Mild drainage. Periwound maceration, edema and mild erythema. Ulceration noted on the lateral LLE with granular wound base and macerated wound edges. Mild drainage.  Vascular: DP and PT Pulses Diminished; Foot is Warm to Warm to the touch; Capillary Refill Time < 3 Seconds;    Neuro: Moderate pain On Palpation at Wound Site (L>R).    Assessment:  S/P Excisional debridement of soft tissue BLE (DOS: 7/28/23)  Bilateral venous stasis ulcers    Plan:  Chart reviewed and Patient evaluated. All Questions and Concerns Addressed and Answered  Pain medication given prior to wound dressing change.   Local Wound Care; Wound Flushed w/ NS; Mupirocin applied to wound bed with xeroform over top. Dressed wound with multiple abd, kerlix and ace bandage.   Rx mupirocin daily.   Daily dressing change orders placed.  Weight Bearing Status; WBAT    Request ID Consult;  / Follow Up w/ Wound Culture  Please optimize patient accordingly  Patient was also very adamant on pain management team getting involved with his post operative care because he says that after the previous debridement he received inadequate pain medication   Discussed Plan w/ Dr Lambert    Podiatry    Podiatry Progress Note    Subjective:  JOSE ALBERTO NO is a  57y Male who presented to the [facility] with a chief complaint of Non healing Bilateral LE wound (28 Jul 2023 15:54).  Patient is S/P BLE excisional debridement of soft tissue (DOS: 7/28/23). Overall, patient is doing well post-operatively. He complains of moderate pain to the lower extremities, tenderness most severe to the left lower extremity wound. Denies any f/n/v/c/sob. Dressings intact, no strikethrough noted.    PAST MEDICAL & SURGICAL HISTORY:  Hypertension  Disc disease, degenerative, lumbar or lumbosacral  Obstructive sleep apnea  Morbid obesity  DM (diabetes mellitus)  Venous ulcer of lower leg without varicose veins  Spinal cord stimulator status  H/O arthroscopy of right knee  History of excision of pilonidal cyst  Status post debridement      Objective:  Vital Signs Last 24 Hrs  T(C): 36 (29 Jul 2023 05:13), Max: 36.6 (28 Jul 2023 14:02)  T(F): 96.8 (29 Jul 2023 05:13), Max: 97.8 (28 Jul 2023 14:02)  HR: 72 (29 Jul 2023 05:13) (65 - 91)  BP: 145/67 (29 Jul 2023 05:13) (113/55 - 176/79)  BP(mean): --  RR: 18 (29 Jul 2023 05:13) (10 - 18)  SpO2: 100% (28 Jul 2023 14:02) (98% - 100%)    Parameters below as of 28 Jul 2023 14:02  Patient On (Oxygen Delivery Method): nasal cannula  O2 Flow (L/min): 3                          11.5   8.07  )-----------( 302      ( 29 Jul 2023 08:00 )             37.8                 07-29    140  |  101  |  15  ----------------------------<  156<H>  4.2   |  27  |  0.7    Ca    8.9      29 Jul 2023 08:00    TPro  6.1  /  Alb  3.6  /  TBili  <0.2  /  DBili  x   /  AST  12  /  ALT  12  /  AlkPhos  96  07-29      Physical Exam - Lower Extremity Focused:   Derm: Full thickness ulcerations on the RLE on the lateral and posterior aspect with central granular wound base and fibrotic wound edges. Mild drainage. Ulceration noted on the lateral LLE with granular wound base and macerated wound edges. Mild drainage.  Vascular: DP and PT Pulses Diminished; Foot is Warm to Warm to the touch; Capillary Refill Time < 3 Seconds;    Neuro: Moderate pain On Palpation at Wound Site (L>R).    Assessment:  S/P Excisional debridement of soft tissue BLE (DOS: 7/28/23)  Bilateral venous stasis ulcers    Plan:  Chart reviewed and Patient evaluated. All Questions and Concerns Addressed and Answered  Pain medication given prior to wound dressing change.   Local Wound Care; Wound Flushed w/ NS; Mupirocin applied to wound bed with xeroform over top. Dressed wound with multiple abd, kerlix and ace bandage.   Rx mupirocin daily.   Daily dressing change orders placed.  Weight Bearing Status; WBAT    Request ID Consult;  / Follow Up w/ Wound Culture  Patient was also very adamant on pain management team getting involved with his post operative care because he says that after the previous debridement he received inadequate pain medication   Discussed Plan w/ Dr Lambert    Podiatry

## 2023-07-29 NOTE — PROGRESS NOTE ADULT - ATTENDING COMMENTS
Agree with above note  Multilayer compression dressing applied B/L LE   Cont with wound care and abx

## 2023-07-29 NOTE — PROGRESS NOTE ADULT - SUBJECTIVE AND OBJECTIVE BOX
57-year-old male past history of hypertension, chronic back pain on opioids, chronic wounds to bilateral lower extremities referred to the ED by his podiatrist for evaluation of nonhealing wound to right lower extremity despite outpatient antibiotic treatment.    Today:  Seen at bedside, no new complaints.  Says pain is controlled.          REVIEW OF SYSTEMS:  No new complaints.      MEDICATIONS  (STANDING):  cefepime   IVPB 2000 milliGRAM(s) IV Intermittent every 12 hours  enoxaparin Injectable 40 milliGRAM(s) SubCutaneous every 24 hours  furosemide    Tablet 40 milliGRAM(s) Oral daily  losartan 100 milliGRAM(s) Oral daily  metroNIDAZOLE    Tablet 500 milliGRAM(s) Oral every 12 hours  morphine ER Tablet 30 milliGRAM(s) Oral three times a day  pregabalin 100 milliGRAM(s) Oral two times a day  senna 2 Tablet(s) Oral at bedtime    MEDICATIONS  (PRN):  HYDROmorphone  Injectable 4 milliGRAM(s) IV Push every 4 hours PRN Pain  ondansetron Injectable 4 milliGRAM(s) IV Push once PRN Nausea and/or Vomiting      Allergies  penicillin (Unknown)  IV Contrast (Sneezing (Mod to Severe))  Penicillin G  Reaction: Unknown  Tolerated cefepime during 7/2023 admission. (Unknown)        FAMILY HISTORY:  Family history of early CAD (Father)  Family history of diabetes mellitus in mother (Mother)        Vital Signs Last 24 Hrs  T(C): 36.1 (29 Jul 2023 12:50), Max: 36.1 (29 Jul 2023 12:50)  T(F): 97 (29 Jul 2023 12:50), Max: 97 (29 Jul 2023 12:50)  HR: 89 (29 Jul 2023 12:50) (72 - 91)  BP: 180/86 (29 Jul 2023 12:50) (130/81 - 180/86)  RR: 16 (29 Jul 2023 12:50) (16 - 18)        PHYSICAL EXAM:  GEN: No acute distress  HEENT: normocephalic, atraumatic, aniceteric  LUNGS: Clear to auscultation bilaterally, no rales/wheezing/ rhonchi  HEART: S1/S2 present. RRR, no murmurs  ABD: Soft, non-tender, non-distended. Bowel sounds present  EXT: RLE edema > LLE edema , bl LE chronic wounds with no pus or drainage , clean dressing   NEURO: AAOX3, normal affect      LABS:                        11.5   8.07  )-----------( 302      ( 29 Jul 2023 08:00 )             37.8     07-29    140  |  101  |  15  ----------------------------<  156<H>  4.2   |  27  |  0.7    Ca    8.9      29 Jul 2023 08:00    TPro  6.1  /  Alb  3.6  /  TBili  <0.2  /  DBili  x   /  AST  12  /  ALT  12  /  AlkPhos  96  07-29      Urinalysis Basic - ( 29 Jul 2023 08:00 )    Color: x / Appearance: x / SG: x / pH: x  Gluc: 156 mg/dL / Ketone: x  / Bili: x / Urobili: x   Blood: x / Protein: x / Nitrite: x   Leuk Esterase: x / RBC: x / WBC x   Sq Epi: x / Non Sq Epi: x / Bacteria: x

## 2023-07-29 NOTE — PROGRESS NOTE ADULT - ASSESSMENT
57-year-old male past history of hypertension, chronic back pain on opioids, chronic wounds to bilateral lower extremities referred to the ED by his podiatrist for evaluation of nonhealing wound to right lower extremity despite outpatient antibiotic treatment.     Assessment:  # Chronic bilateral non healing wounds suspect due to PVD  # Chronic lower back pain sp spinal stimulator with revisions x3 complicated by mrsa infection on chronic opoid medication management  # H/O  HTN  #H/O Morbid obesity BMI >40    PLAN:   - no sepsis present on admission  - XRAY RLE - no acute fx  - check esr/ crp, mrsa swab neg , blood clx 7/26 neg prelim x2 , fu final   - duplex venous and arterial unremarkable   - repeat RLE venous duplex neg for DVT  - ID eval appreciated - Cef/Flagyl, follow Cx  - dressing changes per podiatry - Local Wound Care; Wound Flushed w/ NS; Wound Packed w/ Betadine Soaked non adhesive dressing/ DSD / Kerlix / ACE - multilayer compression dressing B/L LE   - WBAT per podiatry   - pain control post- op , dilaudid ordered ;  home pain management medications   - bowel regimen   - s/p surgical debridement for Friday 7/28     dvt/gi ppx/diet

## 2023-07-30 LAB
ALBUMIN SERPL ELPH-MCNC: 4.3 G/DL — SIGNIFICANT CHANGE UP (ref 3.5–5.2)
ALP SERPL-CCNC: 104 U/L — SIGNIFICANT CHANGE UP (ref 30–115)
ALT FLD-CCNC: 34 U/L — SIGNIFICANT CHANGE UP (ref 0–41)
ANION GAP SERPL CALC-SCNC: 12 MMOL/L — SIGNIFICANT CHANGE UP (ref 7–14)
AST SERPL-CCNC: 42 U/L — HIGH (ref 0–41)
BASOPHILS # BLD AUTO: 0.06 K/UL — SIGNIFICANT CHANGE UP (ref 0–0.2)
BASOPHILS NFR BLD AUTO: 0.8 % — SIGNIFICANT CHANGE UP (ref 0–1)
BILIRUB SERPL-MCNC: 0.4 MG/DL — SIGNIFICANT CHANGE UP (ref 0.2–1.2)
BUN SERPL-MCNC: 13 MG/DL — SIGNIFICANT CHANGE UP (ref 10–20)
CALCIUM SERPL-MCNC: 9.3 MG/DL — SIGNIFICANT CHANGE UP (ref 8.4–10.5)
CHLORIDE SERPL-SCNC: 100 MMOL/L — SIGNIFICANT CHANGE UP (ref 98–110)
CO2 SERPL-SCNC: 29 MMOL/L — SIGNIFICANT CHANGE UP (ref 17–32)
CREAT SERPL-MCNC: 0.8 MG/DL — SIGNIFICANT CHANGE UP (ref 0.7–1.5)
EGFR: 105 ML/MIN/1.73M2 — SIGNIFICANT CHANGE UP
EOSINOPHIL # BLD AUTO: 0.23 K/UL — SIGNIFICANT CHANGE UP (ref 0–0.7)
EOSINOPHIL NFR BLD AUTO: 3.3 % — SIGNIFICANT CHANGE UP (ref 0–8)
GLUCOSE SERPL-MCNC: 180 MG/DL — HIGH (ref 70–99)
HCT VFR BLD CALC: 41.3 % — LOW (ref 42–52)
HGB BLD-MCNC: 12.5 G/DL — LOW (ref 14–18)
IMM GRANULOCYTES NFR BLD AUTO: 0.4 % — HIGH (ref 0.1–0.3)
LYMPHOCYTES # BLD AUTO: 1.85 K/UL — SIGNIFICANT CHANGE UP (ref 1.2–3.4)
LYMPHOCYTES # BLD AUTO: 26.2 % — SIGNIFICANT CHANGE UP (ref 20.5–51.1)
MCHC RBC-ENTMCNC: 24.1 PG — LOW (ref 27–31)
MCHC RBC-ENTMCNC: 30.3 G/DL — LOW (ref 32–37)
MCV RBC AUTO: 79.6 FL — LOW (ref 80–94)
MONOCYTES # BLD AUTO: 0.51 K/UL — SIGNIFICANT CHANGE UP (ref 0.1–0.6)
MONOCYTES NFR BLD AUTO: 7.2 % — SIGNIFICANT CHANGE UP (ref 1.7–9.3)
NEUTROPHILS # BLD AUTO: 4.38 K/UL — SIGNIFICANT CHANGE UP (ref 1.4–6.5)
NEUTROPHILS NFR BLD AUTO: 62.1 % — SIGNIFICANT CHANGE UP (ref 42.2–75.2)
NRBC # BLD: 0 /100 WBCS — SIGNIFICANT CHANGE UP (ref 0–0)
PLATELET # BLD AUTO: 351 K/UL — SIGNIFICANT CHANGE UP (ref 130–400)
PMV BLD: 9.3 FL — SIGNIFICANT CHANGE UP (ref 7.4–10.4)
POTASSIUM SERPL-MCNC: 4.5 MMOL/L — SIGNIFICANT CHANGE UP (ref 3.5–5)
POTASSIUM SERPL-SCNC: 4.5 MMOL/L — SIGNIFICANT CHANGE UP (ref 3.5–5)
PROT SERPL-MCNC: 7.4 G/DL — SIGNIFICANT CHANGE UP (ref 6–8)
RBC # BLD: 5.19 M/UL — SIGNIFICANT CHANGE UP (ref 4.7–6.1)
RBC # FLD: 17.1 % — HIGH (ref 11.5–14.5)
SODIUM SERPL-SCNC: 141 MMOL/L — SIGNIFICANT CHANGE UP (ref 135–146)
WBC # BLD: 7.06 K/UL — SIGNIFICANT CHANGE UP (ref 4.8–10.8)
WBC # FLD AUTO: 7.06 K/UL — SIGNIFICANT CHANGE UP (ref 4.8–10.8)

## 2023-07-30 PROCEDURE — 99232 SBSQ HOSP IP/OBS MODERATE 35: CPT

## 2023-07-30 RX ORDER — MUPIROCIN 20 MG/G
1 OINTMENT TOPICAL
Refills: 0 | Status: DISCONTINUED | OUTPATIENT
Start: 2023-07-30 | End: 2023-08-02

## 2023-07-30 RX ADMIN — HYDROMORPHONE HYDROCHLORIDE 4 MILLIGRAM(S): 2 INJECTION INTRAMUSCULAR; INTRAVENOUS; SUBCUTANEOUS at 08:03

## 2023-07-30 RX ADMIN — CEFEPIME 100 MILLIGRAM(S): 1 INJECTION, POWDER, FOR SOLUTION INTRAMUSCULAR; INTRAVENOUS at 17:03

## 2023-07-30 RX ADMIN — MORPHINE SULFATE 30 MILLIGRAM(S): 50 CAPSULE, EXTENDED RELEASE ORAL at 21:17

## 2023-07-30 RX ADMIN — HYDROMORPHONE HYDROCHLORIDE 4 MILLIGRAM(S): 2 INJECTION INTRAMUSCULAR; INTRAVENOUS; SUBCUTANEOUS at 20:19

## 2023-07-30 RX ADMIN — MORPHINE SULFATE 30 MILLIGRAM(S): 50 CAPSULE, EXTENDED RELEASE ORAL at 13:14

## 2023-07-30 RX ADMIN — HYDROMORPHONE HYDROCHLORIDE 4 MILLIGRAM(S): 2 INJECTION INTRAMUSCULAR; INTRAVENOUS; SUBCUTANEOUS at 03:15

## 2023-07-30 RX ADMIN — MORPHINE SULFATE 30 MILLIGRAM(S): 50 CAPSULE, EXTENDED RELEASE ORAL at 21:11

## 2023-07-30 RX ADMIN — MORPHINE SULFATE 30 MILLIGRAM(S): 50 CAPSULE, EXTENDED RELEASE ORAL at 14:18

## 2023-07-30 RX ADMIN — Medication 500 MILLIGRAM(S): at 17:03

## 2023-07-30 RX ADMIN — HYDROMORPHONE HYDROCHLORIDE 4 MILLIGRAM(S): 2 INJECTION INTRAMUSCULAR; INTRAVENOUS; SUBCUTANEOUS at 20:04

## 2023-07-30 RX ADMIN — Medication 100 MILLIGRAM(S): at 17:03

## 2023-07-30 RX ADMIN — Medication 100 MILLIGRAM(S): at 05:54

## 2023-07-30 RX ADMIN — MORPHINE SULFATE 30 MILLIGRAM(S): 50 CAPSULE, EXTENDED RELEASE ORAL at 06:03

## 2023-07-30 RX ADMIN — HYDROMORPHONE HYDROCHLORIDE 4 MILLIGRAM(S): 2 INJECTION INTRAMUSCULAR; INTRAVENOUS; SUBCUTANEOUS at 12:32

## 2023-07-30 RX ADMIN — HYDROMORPHONE HYDROCHLORIDE 4 MILLIGRAM(S): 2 INJECTION INTRAMUSCULAR; INTRAVENOUS; SUBCUTANEOUS at 15:49

## 2023-07-30 RX ADMIN — HYDROMORPHONE HYDROCHLORIDE 4 MILLIGRAM(S): 2 INJECTION INTRAMUSCULAR; INTRAVENOUS; SUBCUTANEOUS at 02:57

## 2023-07-30 RX ADMIN — MUPIROCIN 1 APPLICATION(S): 20 OINTMENT TOPICAL at 15:48

## 2023-07-30 RX ADMIN — HYDROMORPHONE HYDROCHLORIDE 4 MILLIGRAM(S): 2 INJECTION INTRAMUSCULAR; INTRAVENOUS; SUBCUTANEOUS at 15:42

## 2023-07-30 RX ADMIN — HYDROMORPHONE HYDROCHLORIDE 4 MILLIGRAM(S): 2 INJECTION INTRAMUSCULAR; INTRAVENOUS; SUBCUTANEOUS at 11:36

## 2023-07-30 RX ADMIN — Medication 500 MILLIGRAM(S): at 05:51

## 2023-07-30 RX ADMIN — HYDROMORPHONE HYDROCHLORIDE 4 MILLIGRAM(S): 2 INJECTION INTRAMUSCULAR; INTRAVENOUS; SUBCUTANEOUS at 07:34

## 2023-07-30 RX ADMIN — LOSARTAN POTASSIUM 100 MILLIGRAM(S): 100 TABLET, FILM COATED ORAL at 05:52

## 2023-07-30 RX ADMIN — HYDROMORPHONE HYDROCHLORIDE 4 MILLIGRAM(S): 2 INJECTION INTRAMUSCULAR; INTRAVENOUS; SUBCUTANEOUS at 23:59

## 2023-07-30 RX ADMIN — MORPHINE SULFATE 30 MILLIGRAM(S): 50 CAPSULE, EXTENDED RELEASE ORAL at 05:54

## 2023-07-30 RX ADMIN — Medication 40 MILLIGRAM(S): at 05:51

## 2023-07-30 RX ADMIN — CEFEPIME 100 MILLIGRAM(S): 1 INJECTION, POWDER, FOR SOLUTION INTRAMUSCULAR; INTRAVENOUS at 05:51

## 2023-07-30 NOTE — PROGRESS NOTE ADULT - SUBJECTIVE AND OBJECTIVE BOX
57-year-old male past history of hypertension, chronic back pain on opioids, chronic wounds to bilateral lower extremities referred to the ED by his podiatrist for evaluation of nonhealing wound to right lower extremity despite outpatient antibiotic treatment.     Today:  Seen at bedside, no new complaints.            REVIEW OF SYSTEMS:  B/L LE pain    MEDICATIONS  (STANDING):  cefepime   IVPB 2000 milliGRAM(s) IV Intermittent every 12 hours  enoxaparin Injectable 40 milliGRAM(s) SubCutaneous every 24 hours  furosemide    Tablet 40 milliGRAM(s) Oral daily  losartan 100 milliGRAM(s) Oral daily  metroNIDAZOLE    Tablet 500 milliGRAM(s) Oral every 12 hours  morphine ER Tablet 30 milliGRAM(s) Oral three times a day  mupirocin 2% Ointment 1 Application(s) Topical <User Schedule>  pregabalin 100 milliGRAM(s) Oral two times a day  senna 2 Tablet(s) Oral at bedtime    MEDICATIONS  (PRN):  HYDROmorphone  Injectable 4 milliGRAM(s) IV Push every 4 hours PRN Pain  ondansetron Injectable 4 milliGRAM(s) IV Push once PRN Nausea and/or Vomiting      Allergies  penicillin (Unknown)  IV Contrast (Sneezing (Mod to Severe))  Penicillin G  Reaction: Unknown  Tolerated cefepime during 7/2023 admission. (Unknown)        FAMILY HISTORY:  Family history of early CAD (Father)  Family history of diabetes mellitus in mother (Mother)        Vital Signs Last 24 Hrs  T(C): 36 (30 Jul 2023 13:34), Max: 36 (29 Jul 2023 21:06)  T(F): 96.8 (30 Jul 2023 13:34), Max: 96.8 (29 Jul 2023 21:06)  HR: 72 (30 Jul 2023 13:34) (65 - 79)  BP: 138/63 (30 Jul 2023 13:34) (138/63 - 170/80)  RR: 16 (30 Jul 2023 13:34) (16 - 16)        PHYSICAL EXAM:  GEN: No acute distress  HEENT: normocephalic, atraumatic, aniceteric  LUNGS: Clear to auscultation bilaterally, no rales/wheezing/ rhonchi  HEART: S1/S2 present. RRR, no murmurs  ABD: Soft, non-tender, non-distended. Bowel sounds present  EXT: RLE edema > LLE edema , bl LE chronic wounds with no pus or drainage , clean dressing   NEURO: AAOX3, normal affect      LABS:                        12.5   7.06  )-----------( 351      ( 30 Jul 2023 08:35 )             41.3     07-30    141  |  100  |  13  ----------------------------<  180<H>  4.5   |  29  |  0.8    Ca    9.3      30 Jul 2023 08:35    TPro  7.4  /  Alb  4.3  /  TBili  0.4  /  DBili  x   /  AST  42<H>  /  ALT  34  /  AlkPhos  104  07-30      Urinalysis Basic - ( 30 Jul 2023 08:35 )    Color: x / Appearance: x / SG: x / pH: x  Gluc: 180 mg/dL / Ketone: x  / Bili: x / Urobili: x   Blood: x / Protein: x / Nitrite: x   Leuk Esterase: x / RBC: x / WBC x   Sq Epi: x / Non Sq Epi: x / Bacteria: x

## 2023-07-30 NOTE — CHART NOTE - NSCHARTNOTEFT_GEN_A_CORE
Call received from nursing in regards to Dilaudid order 2/2 frequency of use. Patient prescribed Dilaudid 4mg IV PRN for LE cellulitic pain. Per chart review, patient has requested and received 4mg of Dilaudid every 4 hours since admission. I spoke with patient in depth regarding current pain regimen. Specifically, the amount of dilaudid he is getting per 24 hrs; broached the idea of trying extended release or longer acting pain medication w breakthrough support; and informed of withdrawal from med upon discharge. Patient not amendable. Of note, Silvia from admin present during duration of conversation.   Will pass along to day shift provider need for a conversation in regards to pain regimen. Call received from nursing in regards to Dilaudid order 2/2 frequency of use. Patient prescribed Dilaudid 4mg IV PRN for LE cellulitic pain. Per chart review, patient has requested and received 4mg of Dilaudid every 4 hours since admission. I spoke with patient in depth regarding current pain regimen. Specifically, the amount of dilaudid he is getting per 24 hrs; broached the idea of trying extended release or longer acting pain medication w breakthrough support; and informed of withdrawal from med upon discharge. Patient not amendable. Of note, Silvia from admin present during duration of conversation.   Will pass along to day shift provider need for a conversation with patient and adjustment to order.

## 2023-07-31 DIAGNOSIS — M79.605 PAIN IN LEFT LEG: ICD-10-CM

## 2023-07-31 LAB
-  AMIKACIN: SIGNIFICANT CHANGE UP
-  AMIKACIN: SIGNIFICANT CHANGE UP
-  AMOXICILLIN/CLAVULANIC ACID: SIGNIFICANT CHANGE UP
-  AMPICILLIN/SULBACTAM: SIGNIFICANT CHANGE UP
-  AMPICILLIN: SIGNIFICANT CHANGE UP
-  AMPICILLIN: SIGNIFICANT CHANGE UP
-  AZTREONAM: SIGNIFICANT CHANGE UP
-  AZTREONAM: SIGNIFICANT CHANGE UP
-  CEFAZOLIN: SIGNIFICANT CHANGE UP
-  CEFEPIME: SIGNIFICANT CHANGE UP
-  CEFEPIME: SIGNIFICANT CHANGE UP
-  CEFOXITIN: SIGNIFICANT CHANGE UP
-  CEFTAZIDIME/AVIBACTAM: SIGNIFICANT CHANGE UP
-  CEFTOLOZANE/TAZOBACTAM: SIGNIFICANT CHANGE UP
-  CEFTRIAXONE: SIGNIFICANT CHANGE UP
-  CEFTRIAXONE: SIGNIFICANT CHANGE UP
-  CIPROFLOXACIN: SIGNIFICANT CHANGE UP
-  CIPROFLOXACIN: SIGNIFICANT CHANGE UP
-  ERTAPENEM: SIGNIFICANT CHANGE UP
-  GENTAMICIN: SIGNIFICANT CHANGE UP
-  GENTAMICIN: SIGNIFICANT CHANGE UP
-  IMIPENEM: SIGNIFICANT CHANGE UP
-  LEVOFLOXACIN: SIGNIFICANT CHANGE UP
-  LEVOFLOXACIN: SIGNIFICANT CHANGE UP
-  MEROPENEM: SIGNIFICANT CHANGE UP
-  MEROPENEM: SIGNIFICANT CHANGE UP
-  PIPERACILLIN/TAZOBACTAM: SIGNIFICANT CHANGE UP
-  PIPERACILLIN/TAZOBACTAM: SIGNIFICANT CHANGE UP
-  TETRACYCLINE: SIGNIFICANT CHANGE UP
-  TOBRAMYCIN: SIGNIFICANT CHANGE UP
-  TOBRAMYCIN: SIGNIFICANT CHANGE UP
-  TRIMETHOPRIM/SULFAMETHOXAZOLE: SIGNIFICANT CHANGE UP
-  TRIMETHOPRIM/SULFAMETHOXAZOLE: SIGNIFICANT CHANGE UP
-  VANCOMYCIN: SIGNIFICANT CHANGE UP
ALBUMIN SERPL ELPH-MCNC: 4 G/DL — SIGNIFICANT CHANGE UP (ref 3.5–5.2)
ALP SERPL-CCNC: 96 U/L — SIGNIFICANT CHANGE UP (ref 30–115)
ALT FLD-CCNC: 36 U/L — SIGNIFICANT CHANGE UP (ref 0–41)
ANION GAP SERPL CALC-SCNC: 11 MMOL/L — SIGNIFICANT CHANGE UP (ref 7–14)
AST SERPL-CCNC: 38 U/L — SIGNIFICANT CHANGE UP (ref 0–41)
BASOPHILS # BLD AUTO: 0.06 K/UL — SIGNIFICANT CHANGE UP (ref 0–0.2)
BASOPHILS NFR BLD AUTO: 0.7 % — SIGNIFICANT CHANGE UP (ref 0–1)
BILIRUB SERPL-MCNC: 0.2 MG/DL — SIGNIFICANT CHANGE UP (ref 0.2–1.2)
BLANDM BLD POS QL PROBE: SIGNIFICANT CHANGE UP
BUN SERPL-MCNC: 13 MG/DL — SIGNIFICANT CHANGE UP (ref 10–20)
CALCIUM SERPL-MCNC: 9.1 MG/DL — SIGNIFICANT CHANGE UP (ref 8.4–10.5)
CHLORIDE SERPL-SCNC: 99 MMOL/L — SIGNIFICANT CHANGE UP (ref 98–110)
CO2 SERPL-SCNC: 29 MMOL/L — SIGNIFICANT CHANGE UP (ref 17–32)
CREAT SERPL-MCNC: 0.8 MG/DL — SIGNIFICANT CHANGE UP (ref 0.7–1.5)
CULTURE RESULTS: SIGNIFICANT CHANGE UP
EGFR: 105 ML/MIN/1.73M2 — SIGNIFICANT CHANGE UP
EOSINOPHIL # BLD AUTO: 0.43 K/UL — SIGNIFICANT CHANGE UP (ref 0–0.7)
EOSINOPHIL NFR BLD AUTO: 5.3 % — SIGNIFICANT CHANGE UP (ref 0–8)
GLUCOSE SERPL-MCNC: 187 MG/DL — HIGH (ref 70–99)
HCT VFR BLD CALC: 40.4 % — LOW (ref 42–52)
HGB BLD-MCNC: 12.4 G/DL — LOW (ref 14–18)
IMM GRANULOCYTES NFR BLD AUTO: 0.2 % — SIGNIFICANT CHANGE UP (ref 0.1–0.3)
LYMPHOCYTES # BLD AUTO: 1.98 K/UL — SIGNIFICANT CHANGE UP (ref 1.2–3.4)
LYMPHOCYTES # BLD AUTO: 24.5 % — SIGNIFICANT CHANGE UP (ref 20.5–51.1)
MCHC RBC-ENTMCNC: 24.3 PG — LOW (ref 27–31)
MCHC RBC-ENTMCNC: 30.7 G/DL — LOW (ref 32–37)
MCV RBC AUTO: 79.2 FL — LOW (ref 80–94)
METHOD TYPE: SIGNIFICANT CHANGE UP
MONOCYTES # BLD AUTO: 0.5 K/UL — SIGNIFICANT CHANGE UP (ref 0.1–0.6)
MONOCYTES NFR BLD AUTO: 6.2 % — SIGNIFICANT CHANGE UP (ref 1.7–9.3)
NEUTROPHILS # BLD AUTO: 5.09 K/UL — SIGNIFICANT CHANGE UP (ref 1.4–6.5)
NEUTROPHILS NFR BLD AUTO: 63.1 % — SIGNIFICANT CHANGE UP (ref 42.2–75.2)
NRBC # BLD: 0 /100 WBCS — SIGNIFICANT CHANGE UP (ref 0–0)
ORGANISM # SPEC MICROSCOPIC CNT: SIGNIFICANT CHANGE UP
PLATELET # BLD AUTO: 356 K/UL — SIGNIFICANT CHANGE UP (ref 130–400)
PMV BLD: 9.4 FL — SIGNIFICANT CHANGE UP (ref 7.4–10.4)
POTASSIUM SERPL-MCNC: 4.8 MMOL/L — SIGNIFICANT CHANGE UP (ref 3.5–5)
POTASSIUM SERPL-SCNC: 4.8 MMOL/L — SIGNIFICANT CHANGE UP (ref 3.5–5)
PROT SERPL-MCNC: 7 G/DL — SIGNIFICANT CHANGE UP (ref 6–8)
RBC # BLD: 5.1 M/UL — SIGNIFICANT CHANGE UP (ref 4.7–6.1)
RBC # FLD: 16.8 % — HIGH (ref 11.5–14.5)
SODIUM SERPL-SCNC: 139 MMOL/L — SIGNIFICANT CHANGE UP (ref 135–146)
SPECIMEN SOURCE: SIGNIFICANT CHANGE UP
WBC # BLD: 8.08 K/UL — SIGNIFICANT CHANGE UP (ref 4.8–10.8)
WBC # FLD AUTO: 8.08 K/UL — SIGNIFICANT CHANGE UP (ref 4.8–10.8)

## 2023-07-31 PROCEDURE — 99222 1ST HOSP IP/OBS MODERATE 55: CPT

## 2023-07-31 PROCEDURE — 99232 SBSQ HOSP IP/OBS MODERATE 35: CPT

## 2023-07-31 RX ORDER — LINEZOLID 600 MG/300ML
600 INJECTION, SOLUTION INTRAVENOUS EVERY 12 HOURS
Refills: 0 | Status: DISCONTINUED | OUTPATIENT
Start: 2023-07-31 | End: 2023-08-01

## 2023-07-31 RX ADMIN — CEFEPIME 100 MILLIGRAM(S): 1 INJECTION, POWDER, FOR SOLUTION INTRAMUSCULAR; INTRAVENOUS at 05:49

## 2023-07-31 RX ADMIN — MORPHINE SULFATE 30 MILLIGRAM(S): 50 CAPSULE, EXTENDED RELEASE ORAL at 05:55

## 2023-07-31 RX ADMIN — CEFEPIME 100 MILLIGRAM(S): 1 INJECTION, POWDER, FOR SOLUTION INTRAMUSCULAR; INTRAVENOUS at 17:18

## 2023-07-31 RX ADMIN — HYDROMORPHONE HYDROCHLORIDE 4 MILLIGRAM(S): 2 INJECTION INTRAMUSCULAR; INTRAVENOUS; SUBCUTANEOUS at 12:12

## 2023-07-31 RX ADMIN — Medication 100 MILLIGRAM(S): at 05:49

## 2023-07-31 RX ADMIN — Medication 100 MILLIGRAM(S): at 17:17

## 2023-07-31 RX ADMIN — MORPHINE SULFATE 30 MILLIGRAM(S): 50 CAPSULE, EXTENDED RELEASE ORAL at 14:40

## 2023-07-31 RX ADMIN — Medication 500 MILLIGRAM(S): at 05:49

## 2023-07-31 RX ADMIN — MUPIROCIN 1 APPLICATION(S): 20 OINTMENT TOPICAL at 12:42

## 2023-07-31 RX ADMIN — HYDROMORPHONE HYDROCHLORIDE 4 MILLIGRAM(S): 2 INJECTION INTRAMUSCULAR; INTRAVENOUS; SUBCUTANEOUS at 04:33

## 2023-07-31 RX ADMIN — HYDROMORPHONE HYDROCHLORIDE 4 MILLIGRAM(S): 2 INJECTION INTRAMUSCULAR; INTRAVENOUS; SUBCUTANEOUS at 04:02

## 2023-07-31 RX ADMIN — HYDROMORPHONE HYDROCHLORIDE 4 MILLIGRAM(S): 2 INJECTION INTRAMUSCULAR; INTRAVENOUS; SUBCUTANEOUS at 21:56

## 2023-07-31 RX ADMIN — HYDROMORPHONE HYDROCHLORIDE 4 MILLIGRAM(S): 2 INJECTION INTRAMUSCULAR; INTRAVENOUS; SUBCUTANEOUS at 00:30

## 2023-07-31 RX ADMIN — MORPHINE SULFATE 30 MILLIGRAM(S): 50 CAPSULE, EXTENDED RELEASE ORAL at 22:00

## 2023-07-31 RX ADMIN — LOSARTAN POTASSIUM 100 MILLIGRAM(S): 100 TABLET, FILM COATED ORAL at 05:49

## 2023-07-31 RX ADMIN — HYDROMORPHONE HYDROCHLORIDE 4 MILLIGRAM(S): 2 INJECTION INTRAMUSCULAR; INTRAVENOUS; SUBCUTANEOUS at 08:04

## 2023-07-31 RX ADMIN — LINEZOLID 300 MILLIGRAM(S): 600 INJECTION, SOLUTION INTRAVENOUS at 17:17

## 2023-07-31 RX ADMIN — MORPHINE SULFATE 30 MILLIGRAM(S): 50 CAPSULE, EXTENDED RELEASE ORAL at 05:50

## 2023-07-31 RX ADMIN — HYDROMORPHONE HYDROCHLORIDE 4 MILLIGRAM(S): 2 INJECTION INTRAMUSCULAR; INTRAVENOUS; SUBCUTANEOUS at 16:12

## 2023-07-31 RX ADMIN — SENNA PLUS 2 TABLET(S): 8.6 TABLET ORAL at 22:00

## 2023-07-31 RX ADMIN — HYDROMORPHONE HYDROCHLORIDE 4 MILLIGRAM(S): 2 INJECTION INTRAMUSCULAR; INTRAVENOUS; SUBCUTANEOUS at 20:20

## 2023-07-31 RX ADMIN — Medication 40 MILLIGRAM(S): at 05:49

## 2023-07-31 RX ADMIN — MORPHINE SULFATE 30 MILLIGRAM(S): 50 CAPSULE, EXTENDED RELEASE ORAL at 22:11

## 2023-07-31 NOTE — PROGRESS NOTE ADULT - SUBJECTIVE AND OBJECTIVE BOX
57-year-old male past history of hypertension, chronic back pain on opioids, chronic wounds to bilateral lower extremities referred to the ED by his podiatrist for evaluation of nonhealing wound to right lower extremity despite outpatient antibiotic treatment.     Today:  Seen at bedside, complains of LE pain.        REVIEW OF SYSTEMS:  LE pain      MEDICATIONS  (STANDING):  cefepime   IVPB 2000 milliGRAM(s) IV Intermittent every 12 hours  enoxaparin Injectable 40 milliGRAM(s) SubCutaneous every 24 hours  furosemide    Tablet 40 milliGRAM(s) Oral daily  linezolid  IVPB 600 milliGRAM(s) IV Intermittent every 12 hours  losartan 100 milliGRAM(s) Oral daily  morphine ER Tablet 30 milliGRAM(s) Oral three times a day  mupirocin 2% Ointment 1 Application(s) Topical <User Schedule>  pregabalin 100 milliGRAM(s) Oral two times a day  senna 2 Tablet(s) Oral at bedtime    MEDICATIONS  (PRN):  HYDROmorphone  Injectable 4 milliGRAM(s) IV Push every 4 hours PRN Pain  ondansetron Injectable 4 milliGRAM(s) IV Push once PRN Nausea and/or Vomiting      Allergies  penicillin (Unknown)  IV Contrast (Sneezing (Mod to Severe))  Penicillin G  Reaction: Unknown  Tolerated cefepime during 7/2023 admission. (Unknown)        FAMILY HISTORY:  Family history of early CAD (Father)  Family history of diabetes mellitus in mother (Mother)        Vital Signs Last 24 Hrs  T(C): 35.8 (31 Jul 2023 13:50), Max: 36.2 (31 Jul 2023 04:55)  T(F): 96.5 (31 Jul 2023 13:50), Max: 97.2 (31 Jul 2023 04:55)  HR: 86 (31 Jul 2023 13:50) (62 - 86)  BP: 173/97 (31 Jul 2023 13:50) (136/74 - 173/97)  RR: 16 (31 Jul 2023 13:50) (16 - 16)    Parameters below as of 31 Jul 2023 13:50  Patient On (Oxygen Delivery Method): room air        PHYSICAL EXAM:  GEN: No acute distress  HEENT: normocephalic, atraumatic, aniceteric  LUNGS: Clear to auscultation bilaterally, no rales/wheezing/ rhonchi  HEART: S1/S2 present. RRR, no murmurs  ABD: Soft, non-tender, non-distended. Bowel sounds present  EXT: RLE edema > LLE edema , bl LE clean dressing   NEURO: AAOX3, normal affect      LABS:                        12.4   8.08  )-----------( 356      ( 31 Jul 2023 10:40 )             40.4     07-31    139  |  99  |  13  ----------------------------<  187<H>  4.8   |  29  |  0.8    Ca    9.1      31 Jul 2023 10:40    TPro  7.0  /  Alb  4.0  /  TBili  0.2  /  DBili  x   /  AST  38  /  ALT  36  /  AlkPhos  96  07-31      Urinalysis Basic - ( 31 Jul 2023 10:40 )    Color: x / Appearance: x / SG: x / pH: x  Gluc: 187 mg/dL / Ketone: x  / Bili: x / Urobili: x   Blood: x / Protein: x / Nitrite: x   Leuk Esterase: x / RBC: x / WBC x   Sq Epi: x / Non Sq Epi: x / Bacteria: x

## 2023-07-31 NOTE — PROGRESS NOTE ADULT - ASSESSMENT
57-year-old male past history of hypertension, chronic back pain on opioids, chronic wounds to bilateral lower extremities referred to the ED by his podiatrist for evaluation of nonhealing wound to right lower extremity despite outpatient antibiotic treatment.    ID is following for nonhealing bilateral LE wounds  Persistent and worsening wound for years  Currently followed by Podiatry and ID outpatient, on PO doxycycline ppx  He noticed increased malodorous drainage and increased pain in the past few days  Evaluated by Dr. Lambert outpatient and was referred to ED  Afebrile, no leukocytosis  Prior wound Cx with pseudomonas in 2020 and Acinetobacter 1/2023 (S to cefepime but at breakpoint)    Antibiotics:  Vancomycin 7/26  Cefepime/Flagyl 7/26 ->    Overall chronic bilateral LE wounds, exacerbated by severe venous stasis dermatitis  Overall not severely infected on examination, minimal periwound erythema and warmth  Sharp wound edge, no necrosis  Continue with cefepime and Flagyl for now pending further Podiatry intervention    Penicillin allergy: told by his mother that he had allergic reaction when he was young, tolerated cefepime and Augmentin in the past.      IMPRESSION:  Bilateral LE wounds  Chronic venous stasis dermatitis  Morbid obesity  Drug allergy    RECOMMENDATIONS:  - Continue IV cefepime 2gm q12hrs. Stop Flagyll   - Recommend adding Linezolid 600mg BID   - S/p wound debridement on Friday 7/28 Rare Enterobacter cloacae complex and Alcaligenes faecalis noted.   - Routine dressing change as per the podiatry.   - Trend WBC, fever curve, transaminases, creatinine daily  - Will continue to follow  - Podiatry follow up.  - Would need to consider different antibiotic for PPX and perhaps rotation of antibiotic therapy. He may also benefit from hyperbaric chamber therapy- to be evaluated in the outpatient setting.     Leonardo Horvath M.D   Attending Physician  Division of Infectious Diseases  [facility]  Please contact me via [employer] Teams   57-year-old male past history of hypertension, chronic back pain on opioids, chronic wounds to bilateral lower extremities referred to the ED by his podiatrist for evaluation of nonhealing wound to right lower extremity despite outpatient antibiotic treatment.    ID is following for nonhealing bilateral LE wounds  Persistent and worsening wound for years  Currently followed by Podiatry and ID outpatient, on PO doxycycline ppx  He noticed increased malodorous drainage and increased pain in the past few days  Evaluated by Dr. Lambert outpatient and was referred to ED  Afebrile, no leukocytosis  Prior wound Cx with pseudomonas in 2020 and Acinetobacter 1/2023 (S to cefepime but at breakpoint)    Antibiotics:  Vancomycin 7/26  Cefepime/Flagyl 7/26 ->    Overall chronic bilateral LE wounds, exacerbated by severe venous stasis dermatitis  Overall not severely infected on examination, minimal periwound erythema and warmth  Sharp wound edge, no necrosis  Continue with cefepime and Flagyl for now pending further Podiatry intervention    Penicillin allergy: told by his mother that he had allergic reaction when he was young, tolerated cefepime and Augmentin in the past.      IMPRESSION:  Bilateral LE wounds  Chronic venous stasis dermatitis  Morbid obesity  Drug allergy    RECOMMENDATIONS:  - Continue IV cefepime 2gm q12hrs. Stop Flagyll   - Recommend adding Linezolid 600mg BID   - S/p wound debridement on Friday 7/28 Rare Enterobacter cloacae complex and Alcaligenes faecalis noted. Spoke to Micro lab- there is E.feacalis as well.   - Routine dressing change as per the podiatry.   - Trend WBC, fever curve, transaminases, creatinine daily  - Will continue to follow  - Podiatry follow up.  - Would need to consider different antibiotic for PPX and perhaps rotation of antibiotic therapy. He may also benefit from hyperbaric chamber therapy- to be evaluated in the outpatient setting.     Leonardo Horvath M.D   Attending Physician  Division of Infectious Diseases  [facility]  Please contact me via [employer] Teams

## 2023-07-31 NOTE — CONSULT NOTE ADULT - SUBJECTIVE AND OBJECTIVE BOX
PAIN MANAGEMENT CONSULT NOTE    Chief Complaint:    HPI:  Patient is a 57-year-old male past history of hypertension, chronic back pain on opioids, chronic wounds to bilateral lower extremities referred to the ED by his podiatrist for evaluation of nonhealing wound to right lower extremity despite outpatient antibiotic treatment.   Patients condition dates back to 3 years ago and over time have received multiple treatments for his condition in the hospital. Last admission was about a month ago.     Patient denies fever, chills, chest pain, trauma, weakness, numbness     Patient seen and examined @ bedside. He is laying in bed with CPAP machine. His pain is located in the b/l LE, worse on R side. Both lower legs are wrapped.    He is s/p debridement of wound on 7/28/23.    Overall, doing well but reports inadequate pain control with PRN oxycodone and is requiring IV dilauded.       PAST MEDICAL & SURGICAL HISTORY:  Hypertension      Disc disease, degenerative, lumbar or lumbosacral      Obstructive sleep apnea      Morbid obesity      DM (diabetes mellitus)      Venous ulcer of lower leg without varicose veins      Spinal cord stimulator status      H/O arthroscopy of right knee      History of excision of pilonidal cyst      Status post debridement          FAMILY HISTORY:  Family history of early CAD (Father)    Family history of diabetes mellitus in mother (Mother)        SOCIAL HISTORY:  [ ] Denies Smoking, Alcohol, or Drug Use    HOME MEDICATIONS:   Please refer to initial HNP    PAIN HOME MEDICATIONS:    Allergies    penicillin (Unknown)  IV Contrast (Sneezing (Mod to Severe))  Penicillin G  Reaction: Unknown  Tolerated cefepime during 7/2023 admission. (Unknown)      PAIN MEDICATIONS:  HYDROmorphone  Injectable 4 milliGRAM(s) IV Push every 4 hours PRN  morphine ER Tablet 30 milliGRAM(s) Oral three times a day  ondansetron Injectable 4 milliGRAM(s) IV Push once PRN  pregabalin 100 milliGRAM(s) Oral two times a day    Heme:  enoxaparin Injectable 40 milliGRAM(s) SubCutaneous every 24 hours    Antibiotics:  cefepime   IVPB 2000 milliGRAM(s) IV Intermittent every 12 hours  linezolid  IVPB 600 milliGRAM(s) IV Intermittent every 12 hours    Cardiovascular:  furosemide    Tablet 40 milliGRAM(s) Oral daily  losartan 100 milliGRAM(s) Oral daily    GI:  senna 2 Tablet(s) Oral at bedtime    Endocrine:    All Other Medications:  mupirocin 2% Ointment 1 Application(s) Topical <User Schedule>      Vital Signs Last 24 Hrs  T(C): 35.8 (31 Jul 2023 13:50), Max: 36.2 (31 Jul 2023 04:55)  T(F): 96.5 (31 Jul 2023 13:50), Max: 97.2 (31 Jul 2023 04:55)  HR: 86 (31 Jul 2023 13:50) (62 - 86)  BP: 173/97 (31 Jul 2023 13:50) (136/74 - 173/97)  BP(mean): --  RR: 16 (31 Jul 2023 13:50) (16 - 16)  SpO2: --    Parameters below as of 31 Jul 2023 13:50  Patient On (Oxygen Delivery Method): room air        LABS:                        12.4   8.08  )-----------( 356      ( 31 Jul 2023 10:40 )             40.4     07-31    139  |  99  |  13  ----------------------------<  187<H>  4.8   |  29  |  0.8    Ca    9.1      31 Jul 2023 10:40    TPro  7.0  /  Alb  4.0  /  TBili  0.2  /  DBili  x   /  AST  38  /  ALT  36  /  AlkPhos  96  07-31      Urinalysis Basic - ( 31 Jul 2023 10:40 )    Color: x / Appearance: x / SG: x / pH: x  Gluc: 187 mg/dL / Ketone: x  / Bili: x / Urobili: x   Blood: x / Protein: x / Nitrite: x   Leuk Esterase: x / RBC: x / WBC x   Sq Epi: x / Non Sq Epi: x / Bacteria: x      PHYSICAL EXAM  GENERAL: Laying in bed, NAD  Cranial nerves grossly intact  Motor exam: 5/5 b/l UE. 5/5 b/l LE  Sensation intact to LT in UE/LE in 3 dermatomes. lower legs wrapped / cant assess  CHEST/LUNG: Clear to auscultation bilaterally  EXTREMITIES:  b/l LE skin changes/wrapped s/p debridement     ASSESSMENT:   HPI:  Patient is a 57-year-old male past history of hypertension, chronic back pain on opioids, chronic wounds to bilateral lower extremities referred to the ED by his podiatrist for evaluation of nonhealing wound to right lower extremity despite outpatient antibiotic treatment.   Patients condition dates back to 3 years ago and over time have received multiple treatments for his condition in the hospital. Last admission was about a month ago.     Pt s/p lower leg debridement for non healing ulcer on 7/28    Continue lyrica 100 BID, morphine 30mg q8h standing  Can maintain IV hydromorphone 4mg IV q4h PRN for another 24h. The patient should be titrated to his oral bernardo regimen.   We can use oxycodone 10mg q4h PRN (vs home dose q6h PRN) for the additional pain s/p debridement  f/u primary team recs  Bowel regimen: miralax / colace  Nausea ppx: zofran standing

## 2023-07-31 NOTE — PROGRESS NOTE ADULT - ASSESSMENT
57-year-old male past history of hypertension, chronic back pain on opioids, chronic wounds to bilateral lower extremities referred to the ED by his podiatrist for evaluation of nonhealing wound to right lower extremity despite outpatient antibiotic treatment.     Assessment:  # Chronic bilateral non healing wounds suspect due to PVD  # Chronic lower back pain sp spinal stimulator with revisions x3 complicated by mrsa infection on chronic opoid medication management  # H/O  HTN  #H/O Morbid obesity BMI >40    PLAN:   - no sepsis present on admission  - XRAY RLE - no acute fx  - blood clx NGTD  - duplex venous and arterial unremarkable   - repeat RLE venous duplex neg for DVT  - dressing changes per podiatry -   - WBAT per podiatry   - pain control post- op , dilaudid ordered ; cw home pain management medications-will get pain management consult as pain has been difficult to control at various times; does not want Fentanyl   - bowel regimen   - s/p surgical debridement for Friday 7/28   -ID consult appreciated:  - Continue IV cefepime 2gm q12hrs. Stop Flagyl   -Linezolid 600mg BID   - S/p wound debridement on Friday 7/28 Rare Enterobacter cloacae complex and Alcaligenes faecalis noted. Spoke to Micro lab- there is E.feacalis as well.     dvt/gi ppx/diet

## 2023-07-31 NOTE — PROGRESS NOTE ADULT - SUBJECTIVE AND OBJECTIVE BOX
KAYLAJOSE ALBERTO GARZA  57y, Male    LOS  5d    INTERVAL EVENTS/HPI  - No acute events overnight  - T(F): , Max: 97.2 (07-31-23 @ 04:55)  - Denies any worsening symptoms  - Tolerating medication  - WBC Count: 8.08 (07-31-23 @ 10:40)  WBC Count: 7.06 (07-30-23 @ 08:35)     - Creatinine: 0.8 (07-31-23 @ 10:40)  Creatinine: 0.8 (07-30-23 @ 08:35)         REVIEW OF SYSTEMS:  CONSTITUTIONAL: No fever or chills  HEENT: No sore throat  RESPIRATORY: No cough, no shortness of breath  CARDIOVASCULAR: No chest pain or palpitations  GASTROINTESTINAL: No abdominal or epigastric pain  GENITOURINARY: No dysuria  NEUROLOGICAL: No headache/dizziness  MSK: No joint pain, erythema, or swelling; no back pain  SKIN: No itching, rashes  All other ROS negative except noted above    Prior hospital charts reviewed [Yes]  Primary team notes reviewed [Yes]  Other consultant notes reviewed [Yes]    Allergy: 5d    ANTIMICROBIALS:   cefepime   IVPB 2000 every 12 hours  metroNIDAZOLE    Tablet 500 every 12 hours      OTHER MEDS: MEDICATIONS  (STANDING):  enoxaparin Injectable 40 every 24 hours  furosemide    Tablet 40 daily  HYDROmorphone  Injectable 4 every 4 hours PRN  losartan 100 daily  morphine ER Tablet 30 three times a day  ondansetron Injectable 4 once PRN  pregabalin 100 two times a day  senna 2 at bedtime      Vital Signs Last 24 Hrs  T(F): 97.2 (07-31-23 @ 04:55), Max: 98.9 (07-26-23 @ 09:16)    Vital Signs Last 24 Hrs  HR: 62 (07-31-23 @ 04:55) (62 - 76)  BP: 136/74 (07-31-23 @ 04:55) (136/74 - 144/66)  RR: 16 (07-31-23 @ 04:55)  SpO2: --  Wt(kg): --    EXAM:  GENERAL: NAD, lying in bed comfortably  HEAD: No head lesions  EYES: Conjunctiva pink and cornea white  ENT: Normal external ears and nose, no discharges; moist mucous membranes  NECK: Supple, nontender to palpation  CHEST/LUNG: Clear to auscultation bilaterally  HEART: S1 S2  ABDOMEN: Soft, nontender, nondistended; normoactive bowel sounds  EXTREMITIES: No clubbing, cyanosis, or petal edema  NERVOUS SYSTEM: Alert and oriented to person, time, place and situation, speech clear. No focal deficits   MSK: No joint erythema, swelling or pain  SKIN: No rashes or lesions, no superficial thrombophlebitis    Labs:                        12.4   8.08  )-----------( 356      ( 31 Jul 2023 10:40 )             40.4     07-31    139  |  99  |  13  ----------------------------<  187<H>  4.8   |  29  |  0.8    Ca    9.1      31 Jul 2023 10:40    TPro  7.0  /  Alb  4.0  /  TBili  0.2  /  DBili  x   /  AST  38  /  ALT  36  /  AlkPhos  96  07-31      WBC Trend:  WBC Count: 8.08 (07-31-23 @ 10:40)  WBC Count: 7.06 (07-30-23 @ 08:35)  WBC Count: 8.07 (07-29-23 @ 08:00)  WBC Count: 7.46 (07-28-23 @ 08:37)      Creatine Trend:  Creatinine: 0.8 (07-31)  Creatinine: 0.8 (07-30)  Creatinine: 0.7 (07-29)  Creatinine: 0.8 (07-28)      Liver Biochemical Testing Trend:  Alanine Aminotransferase (ALT/SGPT): 36 (07-31)  Alanine Aminotransferase (ALT/SGPT): 34 (07-30)  Alanine Aminotransferase (ALT/SGPT): 12 (07-29)  Alanine Aminotransferase (ALT/SGPT): 13 (07-28)  Alanine Aminotransferase (ALT/SGPT): 11 (07-26)  Aspartate Aminotransferase (AST/SGOT): 38 (07-31-23 @ 10:40)  Aspartate Aminotransferase (AST/SGOT): 42 (07-30-23 @ 08:35)  Aspartate Aminotransferase (AST/SGOT): 12 (07-29-23 @ 08:00)  Aspartate Aminotransferase (AST/SGOT): 13 (07-28-23 @ 08:37)  Aspartate Aminotransferase (AST/SGOT): 9 (07-26-23 @ 10:22)  Bilirubin Total: 0.2 (07-31)  Bilirubin Total: 0.4 (07-30)  Bilirubin Total: <0.2 (07-29)  Bilirubin Total: 0.4 (07-28)  Bilirubin Total: 0.2 (07-26)      Trend LDH      Urinalysis Basic - ( 31 Jul 2023 10:40 )    Color: x / Appearance: x / SG: x / pH: x  Gluc: 187 mg/dL / Ketone: x  / Bili: x / Urobili: x   Blood: x / Protein: x / Nitrite: x   Leuk Esterase: x / RBC: x / WBC x   Sq Epi: x / Non Sq Epi: x / Bacteria: x        MICROBIOLOGY:    Male    Culture - Other (collected 28 Jul 2023 12:41)  Source: .Other deep wound culture right leg  Preliminary Report:    Rare Enterobacter cloacae complex  Organism: Alcaligenes faecalis  Organism: Alcaligenes faecalis    Sensitivities:      Method Type: JAYCOB      -  Amikacin: S <=16      -  Aztreonam: R >16      -  Cefepime: R >16      -  Ceftriaxone: S <=1      -  Ciprofloxacin: R >2      -  Gentamicin: S 4      -  Levofloxacin: S 2      -  Meropenem: S <=1      -  Piperacillin/Tazobactam: S <=8      -  Tobramycin: S 4      -  Trimethoprim/Sulfamethoxazole: S <=0.5/9.5    Culture - Blood (collected 26 Jul 2023 10:22)  Source: .Blood Blood  Preliminary Report:    No growth at 4 days    Culture - Blood (collected 26 Jul 2023 10:22)  Source: .Blood Blood  Preliminary Report:    No growth at 4 days    Culture - Blood (collected 30 Apr 2023 14:25)  Source: .Blood Blood-Peripheral  Final Report:    No Growth Final    Culture - Blood (collected 30 Apr 2023 14:25)  Source: .Blood Blood-Peripheral  Final Report:    No Growth Final    Culture - Blood (collected 05 Feb 2023 08:06)  Source: .Blood None  Final Report:    No Growth Final    Culture - Blood (collected 04 Feb 2023 16:02)  Source: .Blood None  Final Report:    No Growth Final    Culture - Other (collected 30 Jan 2023 17:06)  Source: Wound LEFT CALF WOUND  Final Report:    Numerous Acinetobacter baumannii/nosocomialis group    Numerous Enterococcus faecalis  Organism: Acinetobacter baumannii/nosocomialis group  Enterococcus faecalis  Organism: Enterococcus faecalis    Sensitivities:      Method Type: JAYCOB      -  Ampicillin: S <=2 Predicts results to ampicillin/sulbactam, amoxacillin-clavulanate and  piperacillin-tazobactam.      -  Tetracycline: R >8      -  Vancomycin: S 2  Organism: Acinetobacter baumannii/nosocomialis group    Sensitivities:      Method Type: JAYCOB      -  Amikacin: S <=16      -  Ampicillin/Sulbactam: S 8/4      -  Cefepime: S 4      -  Ceftazidime: S 4      -  Ciprofloxacin: S <=0.25      -  Gentamicin: S <=2      -  Imipenem: S <=1      -  Levofloxacin: S <=0.5      -  Meropenem: S <=1      -  Tobramycin: S <=2      -  Trimethoprim/Sulfamethoxazole: S <=0.5/9.5    Culture - Other (collected 30 Jan 2023 17:05)  Source: Wound RIGHT CALF WOUND  Final Report:    Few Alcaligenes faecalis    Numerous Enterococcus faecalis    Numerous Corynebacterium species "Susceptibilities not performed"  Organism: Alcaligenes faecalis  Enterococcus faecalis  Organism: Enterococcus faecalis    Sensitivities:      Method Type: JAYCOB      -  Ampicillin: S <=2 Predicts results to ampicillin/sulbactam, amoxacillin-clavulanate and  piperacillin-tazobactam.      -  Tetracycline: R >8      -  Vancomycin: S 2  Organism: Alcaligenes faecalis    Sensitivities:      Method Type: JAYCOB      -  Amikacin: S <=16      -  Aztreonam: I 16      -  Cefepime: S 4      -  Ceftriaxone: S <=1      -  Ciprofloxacin: S 1      -  Gentamicin: S <=2      -  Levofloxacin: S <=0.5      -  Meropenem: S <=1      -  Piperacillin/Tazobactam: S <=8      -  Tobramycin: S <=2      -  Trimethoprim/Sulfamethoxazole: S <=0.5/9.5    Culture - Blood (collected 26 Jan 2023 12:28)  Source: .Blood Blood  Final Report:    No Growth Final                                C-Reactive Protein, Serum: 31.4 (07-27)                    RADIOLOGY & ADDITIONAL TESTS:  I have personally reviewed the relevant images.   CXR      CT        WEIGHT  Weight (kg): 229.6 (07-28-23 @ 10:52)  Creatinine: 0.8 mg/dL (07-31-23 @ 10:40)      All available historical records have been reviewed       ONEALJOSE ALBERTO  57y, Male    LOS  5d    INTERVAL EVENTS/HPI  - No acute events overnight  - T(F): , Max: 97.2 (07-31-23 @ 04:55)  - Tolerating medication  - WBC Count: 8.08 (07-31-23 @ 10:40)  WBC Count: 7.06 (07-30-23 @ 08:35)  - Creatinine: 0.8 (07-31-23 @ 10:40)  Creatinine: 0.8 (07-30-23 @ 08:35)         REVIEW OF SYSTEMS:  CONSTITUTIONAL: No fever or chills  HEENT: No sore throat  RESPIRATORY: No cough, no shortness of breath  CARDIOVASCULAR: No chest pain or palpitations  GASTROINTESTINAL: No abdominal or epigastric pain  GENITOURINARY: No dysuria  NEUROLOGICAL: No headache/dizziness  MSK: No joint pain, erythema, or swelling; no back pain  SKIN: No itching, rashes  All other ROS negative except noted above    Prior hospital charts reviewed [Yes]  Primary team notes reviewed [Yes]  Other consultant notes reviewed [Yes]    ANTIMICROBIALS:   cefepime   IVPB 2000 every 12 hours  metroNIDAZOLE    Tablet 500 every 12 hours      OTHER MEDS: MEDICATIONS  (STANDING):  enoxaparin Injectable 40 every 24 hours  furosemide    Tablet 40 daily  HYDROmorphone  Injectable 4 every 4 hours PRN  losartan 100 daily  morphine ER Tablet 30 three times a day  ondansetron Injectable 4 once PRN  pregabalin 100 two times a day  senna 2 at bedtime      Vital Signs Last 24 Hrs  T(F): 97.2 (07-31-23 @ 04:55), Max: 98.9 (07-26-23 @ 09:16)    Vital Signs Last 24 Hrs  HR: 62 (07-31-23 @ 04:55) (62 - 76)  BP: 136/74 (07-31-23 @ 04:55) (136/74 - 144/66)  RR: 16 (07-31-23 @ 04:55)  SpO2: --  Wt(kg): --    EXAM:  GENERAL: NAD, sitting in chair, obese  HEAD: No head lesions  EYES: Conjunctiva pink and cornea white  EAR, NOSE, MOUTH, THROAT: Normal external ears and nose, no discharges; moist mucous membranes  NECK: Supple, nontender to palpation; no JVD  RESPIRATORY: Clear to auscultation bilaterally  CARDIOVASCULAR: S1 S2  GASTROINTESTINAL: Soft, nontender, nondistended; normoactive bowel sounds  EXTREMITIES: No clubbing, cyanosis, or petal edema  NERVOUS SYSTEM: Alert and oriented to person, time, place and situation, speech clear. No focal deficits   MUSCULOSKELETAL: No joint erythema, swelling or pain  SKIN: Bilateral LE wounds, with granulated wound bed and pale edge. no necrosis. serous drainage, minimal periwound erythema. no superficial thrombophlebitis  PSYCH: Normal affect    Labs:                        12.4   8.08  )-----------( 356      ( 31 Jul 2023 10:40 )             40.4     07-31    139  |  99  |  13  ----------------------------<  187<H>  4.8   |  29  |  0.8    Ca    9.1      31 Jul 2023 10:40    TPro  7.0  /  Alb  4.0  /  TBili  0.2  /  DBili  x   /  AST  38  /  ALT  36  /  AlkPhos  96  07-31      WBC Trend:  WBC Count: 8.08 (07-31-23 @ 10:40)  WBC Count: 7.06 (07-30-23 @ 08:35)  WBC Count: 8.07 (07-29-23 @ 08:00)  WBC Count: 7.46 (07-28-23 @ 08:37)      Creatine Trend:  Creatinine: 0.8 (07-31)  Creatinine: 0.8 (07-30)  Creatinine: 0.7 (07-29)  Creatinine: 0.8 (07-28)      Liver Biochemical Testing Trend:  Alanine Aminotransferase (ALT/SGPT): 36 (07-31)  Alanine Aminotransferase (ALT/SGPT): 34 (07-30)  Alanine Aminotransferase (ALT/SGPT): 12 (07-29)  Alanine Aminotransferase (ALT/SGPT): 13 (07-28)  Alanine Aminotransferase (ALT/SGPT): 11 (07-26)  Aspartate Aminotransferase (AST/SGOT): 38 (07-31-23 @ 10:40)  Aspartate Aminotransferase (AST/SGOT): 42 (07-30-23 @ 08:35)  Aspartate Aminotransferase (AST/SGOT): 12 (07-29-23 @ 08:00)  Aspartate Aminotransferase (AST/SGOT): 13 (07-28-23 @ 08:37)  Aspartate Aminotransferase (AST/SGOT): 9 (07-26-23 @ 10:22)  Bilirubin Total: 0.2 (07-31)  Bilirubin Total: 0.4 (07-30)  Bilirubin Total: <0.2 (07-29)  Bilirubin Total: 0.4 (07-28)  Bilirubin Total: 0.2 (07-26)      Trend LDH      Urinalysis Basic - ( 31 Jul 2023 10:40 )    Color: x / Appearance: x / SG: x / pH: x  Gluc: 187 mg/dL / Ketone: x  / Bili: x / Urobili: x   Blood: x / Protein: x / Nitrite: x   Leuk Esterase: x / RBC: x / WBC x   Sq Epi: x / Non Sq Epi: x / Bacteria: x        MICROBIOLOGY:    Male    Culture - Other (collected 28 Jul 2023 12:41)  Source: .Other deep wound culture right leg  Preliminary Report:    Rare Enterobacter cloacae complex  Organism: Alcaligenes faecalis  Organism: Alcaligenes faecalis    Sensitivities:      Method Type: JAYCOB      -  Amikacin: S <=16      -  Aztreonam: R >16      -  Cefepime: R >16      -  Ceftriaxone: S <=1      -  Ciprofloxacin: R >2      -  Gentamicin: S 4      -  Levofloxacin: S 2      -  Meropenem: S <=1      -  Piperacillin/Tazobactam: S <=8      -  Tobramycin: S 4      -  Trimethoprim/Sulfamethoxazole: S <=0.5/9.5    Culture - Blood (collected 26 Jul 2023 10:22)  Source: .Blood Blood  Preliminary Report:    No growth at 4 days    Culture - Blood (collected 26 Jul 2023 10:22)  Source: .Blood Blood  Preliminary Report:    No growth at 4 days    Culture - Blood (collected 30 Apr 2023 14:25)  Source: .Blood Blood-Peripheral  Final Report:    No Growth Final    Culture - Blood (collected 30 Apr 2023 14:25)  Source: .Blood Blood-Peripheral  Final Report:    No Growth Final    Culture - Blood (collected 05 Feb 2023 08:06)  Source: .Blood None  Final Report:    No Growth Final    Culture - Blood (collected 04 Feb 2023 16:02)  Source: .Blood None  Final Report:    No Growth Final    Culture - Other (collected 30 Jan 2023 17:06)  Source: Wound LEFT CALF WOUND  Final Report:    Numerous Acinetobacter baumannii/nosocomialis group    Numerous Enterococcus faecalis  Organism: Acinetobacter baumannii/nosocomialis group  Enterococcus faecalis  Organism: Enterococcus faecalis    Sensitivities:      Method Type: JAYCOB      -  Ampicillin: S <=2 Predicts results to ampicillin/sulbactam, amoxacillin-clavulanate and  piperacillin-tazobactam.      -  Tetracycline: R >8      -  Vancomycin: S 2  Organism: Acinetobacter baumannii/nosocomialis group    Sensitivities:      Method Type: JAYCOB      -  Amikacin: S <=16      -  Ampicillin/Sulbactam: S 8/4      -  Cefepime: S 4      -  Ceftazidime: S 4      -  Ciprofloxacin: S <=0.25      -  Gentamicin: S <=2      -  Imipenem: S <=1      -  Levofloxacin: S <=0.5      -  Meropenem: S <=1      -  Tobramycin: S <=2      -  Trimethoprim/Sulfamethoxazole: S <=0.5/9.5    Culture - Other (collected 30 Jan 2023 17:05)  Source: Wound RIGHT CALF WOUND  Final Report:    Few Alcaligenes faecalis    Numerous Enterococcus faecalis    Numerous Corynebacterium species "Susceptibilities not performed"  Organism: Alcaligenes faecalis  Enterococcus faecalis  Organism: Enterococcus faecalis    Sensitivities:      Method Type: JAYCOB      -  Ampicillin: S <=2 Predicts results to ampicillin/sulbactam, amoxacillin-clavulanate and  piperacillin-tazobactam.      -  Tetracycline: R >8      -  Vancomycin: S 2  Organism: Alcaligenes faecalis    Sensitivities:      Method Type: JAYCOB      -  Amikacin: S <=16      -  Aztreonam: I 16      -  Cefepime: S 4      -  Ceftriaxone: S <=1      -  Ciprofloxacin: S 1      -  Gentamicin: S <=2      -  Levofloxacin: S <=0.5      -  Meropenem: S <=1      -  Piperacillin/Tazobactam: S <=8      -  Tobramycin: S <=2      -  Trimethoprim/Sulfamethoxazole: S <=0.5/9.5    Culture - Blood (collected 26 Jan 2023 12:28)  Source: .Blood Blood  Final Report:    No Growth Final        C-Reactive Protein, Serum: 31.4 (07-27)    RADIOLOGY & ADDITIONAL TESTS:  I have personally reviewed the relevant images.   CXR      CT        WEIGHT  Weight (kg): 229.6 (07-28-23 @ 10:52)  Creatinine: 0.8 mg/dL (07-31-23 @ 10:40)      All available historical records have been reviewed

## 2023-08-01 LAB
ALBUMIN SERPL ELPH-MCNC: 4.1 G/DL — SIGNIFICANT CHANGE UP (ref 3.5–5.2)
ALP SERPL-CCNC: 91 U/L — SIGNIFICANT CHANGE UP (ref 30–115)
ALT FLD-CCNC: 32 U/L — SIGNIFICANT CHANGE UP (ref 0–41)
ANION GAP SERPL CALC-SCNC: 11 MMOL/L — SIGNIFICANT CHANGE UP (ref 7–14)
AST SERPL-CCNC: 28 U/L — SIGNIFICANT CHANGE UP (ref 0–41)
BASOPHILS # BLD AUTO: 0.06 K/UL — SIGNIFICANT CHANGE UP (ref 0–0.2)
BASOPHILS NFR BLD AUTO: 0.9 % — SIGNIFICANT CHANGE UP (ref 0–1)
BILIRUB SERPL-MCNC: 0.3 MG/DL — SIGNIFICANT CHANGE UP (ref 0.2–1.2)
BUN SERPL-MCNC: 11 MG/DL — SIGNIFICANT CHANGE UP (ref 10–20)
CALCIUM SERPL-MCNC: 9.1 MG/DL — SIGNIFICANT CHANGE UP (ref 8.4–10.5)
CHLORIDE SERPL-SCNC: 99 MMOL/L — SIGNIFICANT CHANGE UP (ref 98–110)
CO2 SERPL-SCNC: 30 MMOL/L — SIGNIFICANT CHANGE UP (ref 17–32)
CREAT SERPL-MCNC: 0.8 MG/DL — SIGNIFICANT CHANGE UP (ref 0.7–1.5)
EGFR: 105 ML/MIN/1.73M2 — SIGNIFICANT CHANGE UP
EOSINOPHIL # BLD AUTO: 0.35 K/UL — SIGNIFICANT CHANGE UP (ref 0–0.7)
EOSINOPHIL NFR BLD AUTO: 5.4 % — SIGNIFICANT CHANGE UP (ref 0–8)
GLUCOSE SERPL-MCNC: 134 MG/DL — HIGH (ref 70–99)
HCT VFR BLD CALC: 40.6 % — LOW (ref 42–52)
HGB BLD-MCNC: 12.3 G/DL — LOW (ref 14–18)
IMM GRANULOCYTES NFR BLD AUTO: 0.3 % — SIGNIFICANT CHANGE UP (ref 0.1–0.3)
LYMPHOCYTES # BLD AUTO: 2.01 K/UL — SIGNIFICANT CHANGE UP (ref 1.2–3.4)
LYMPHOCYTES # BLD AUTO: 30.8 % — SIGNIFICANT CHANGE UP (ref 20.5–51.1)
MCHC RBC-ENTMCNC: 24.3 PG — LOW (ref 27–31)
MCHC RBC-ENTMCNC: 30.3 G/DL — LOW (ref 32–37)
MCV RBC AUTO: 80.2 FL — SIGNIFICANT CHANGE UP (ref 80–94)
MONOCYTES # BLD AUTO: 0.59 K/UL — SIGNIFICANT CHANGE UP (ref 0.1–0.6)
MONOCYTES NFR BLD AUTO: 9 % — SIGNIFICANT CHANGE UP (ref 1.7–9.3)
NEUTROPHILS # BLD AUTO: 3.5 K/UL — SIGNIFICANT CHANGE UP (ref 1.4–6.5)
NEUTROPHILS NFR BLD AUTO: 53.6 % — SIGNIFICANT CHANGE UP (ref 42.2–75.2)
NRBC # BLD: 0 /100 WBCS — SIGNIFICANT CHANGE UP (ref 0–0)
PLATELET # BLD AUTO: 350 K/UL — SIGNIFICANT CHANGE UP (ref 130–400)
PMV BLD: 9.6 FL — SIGNIFICANT CHANGE UP (ref 7.4–10.4)
POTASSIUM SERPL-MCNC: 5.1 MMOL/L — HIGH (ref 3.5–5)
POTASSIUM SERPL-SCNC: 5.1 MMOL/L — HIGH (ref 3.5–5)
PROT SERPL-MCNC: 6.9 G/DL — SIGNIFICANT CHANGE UP (ref 6–8)
RBC # BLD: 5.06 M/UL — SIGNIFICANT CHANGE UP (ref 4.7–6.1)
RBC # FLD: 16.9 % — HIGH (ref 11.5–14.5)
SODIUM SERPL-SCNC: 140 MMOL/L — SIGNIFICANT CHANGE UP (ref 135–146)
WBC # BLD: 6.53 K/UL — SIGNIFICANT CHANGE UP (ref 4.8–10.8)
WBC # FLD AUTO: 6.53 K/UL — SIGNIFICANT CHANGE UP (ref 4.8–10.8)

## 2023-08-01 PROCEDURE — 99232 SBSQ HOSP IP/OBS MODERATE 35: CPT

## 2023-08-01 PROCEDURE — 29581 APPL MULTLAYER CMPRN SYS LEG: CPT | Mod: 50

## 2023-08-01 RX ORDER — HYDROMORPHONE HYDROCHLORIDE 2 MG/ML
3 INJECTION INTRAMUSCULAR; INTRAVENOUS; SUBCUTANEOUS EVERY 4 HOURS
Refills: 0 | Status: DISCONTINUED | OUTPATIENT
Start: 2023-08-01 | End: 2023-08-02

## 2023-08-01 RX ADMIN — HYDROMORPHONE HYDROCHLORIDE 4 MILLIGRAM(S): 2 INJECTION INTRAMUSCULAR; INTRAVENOUS; SUBCUTANEOUS at 05:56

## 2023-08-01 RX ADMIN — MORPHINE SULFATE 30 MILLIGRAM(S): 50 CAPSULE, EXTENDED RELEASE ORAL at 06:31

## 2023-08-01 RX ADMIN — MORPHINE SULFATE 30 MILLIGRAM(S): 50 CAPSULE, EXTENDED RELEASE ORAL at 05:55

## 2023-08-01 RX ADMIN — HYDROMORPHONE HYDROCHLORIDE 4 MILLIGRAM(S): 2 INJECTION INTRAMUSCULAR; INTRAVENOUS; SUBCUTANEOUS at 01:37

## 2023-08-01 RX ADMIN — CEFEPIME 100 MILLIGRAM(S): 1 INJECTION, POWDER, FOR SOLUTION INTRAMUSCULAR; INTRAVENOUS at 17:25

## 2023-08-01 RX ADMIN — HYDROMORPHONE HYDROCHLORIDE 4 MILLIGRAM(S): 2 INJECTION INTRAMUSCULAR; INTRAVENOUS; SUBCUTANEOUS at 10:08

## 2023-08-01 RX ADMIN — LOSARTAN POTASSIUM 100 MILLIGRAM(S): 100 TABLET, FILM COATED ORAL at 06:17

## 2023-08-01 RX ADMIN — MORPHINE SULFATE 30 MILLIGRAM(S): 50 CAPSULE, EXTENDED RELEASE ORAL at 22:10

## 2023-08-01 RX ADMIN — HYDROMORPHONE HYDROCHLORIDE 3 MILLIGRAM(S): 2 INJECTION INTRAMUSCULAR; INTRAVENOUS; SUBCUTANEOUS at 18:29

## 2023-08-01 RX ADMIN — HYDROMORPHONE HYDROCHLORIDE 4 MILLIGRAM(S): 2 INJECTION INTRAMUSCULAR; INTRAVENOUS; SUBCUTANEOUS at 10:49

## 2023-08-01 RX ADMIN — LINEZOLID 300 MILLIGRAM(S): 600 INJECTION, SOLUTION INTRAVENOUS at 05:01

## 2023-08-01 RX ADMIN — MUPIROCIN 1 APPLICATION(S): 20 OINTMENT TOPICAL at 09:34

## 2023-08-01 RX ADMIN — CEFEPIME 100 MILLIGRAM(S): 1 INJECTION, POWDER, FOR SOLUTION INTRAMUSCULAR; INTRAVENOUS at 05:00

## 2023-08-01 RX ADMIN — HYDROMORPHONE HYDROCHLORIDE 4 MILLIGRAM(S): 2 INJECTION INTRAMUSCULAR; INTRAVENOUS; SUBCUTANEOUS at 14:34

## 2023-08-01 RX ADMIN — MORPHINE SULFATE 30 MILLIGRAM(S): 50 CAPSULE, EXTENDED RELEASE ORAL at 21:49

## 2023-08-01 RX ADMIN — HYDROMORPHONE HYDROCHLORIDE 4 MILLIGRAM(S): 2 INJECTION INTRAMUSCULAR; INTRAVENOUS; SUBCUTANEOUS at 14:15

## 2023-08-01 RX ADMIN — MORPHINE SULFATE 30 MILLIGRAM(S): 50 CAPSULE, EXTENDED RELEASE ORAL at 14:01

## 2023-08-01 RX ADMIN — Medication 100 MILLIGRAM(S): at 17:26

## 2023-08-01 RX ADMIN — HYDROMORPHONE HYDROCHLORIDE 3 MILLIGRAM(S): 2 INJECTION INTRAMUSCULAR; INTRAVENOUS; SUBCUTANEOUS at 22:30

## 2023-08-01 RX ADMIN — Medication 40 MILLIGRAM(S): at 05:55

## 2023-08-01 RX ADMIN — Medication 100 MILLIGRAM(S): at 05:55

## 2023-08-01 RX ADMIN — MORPHINE SULFATE 30 MILLIGRAM(S): 50 CAPSULE, EXTENDED RELEASE ORAL at 15:03

## 2023-08-01 NOTE — PROGRESS NOTE ADULT - SUBJECTIVE AND OBJECTIVE BOX
57-year-old male past history of hypertension, chronic back pain on opioids, chronic wounds to bilateral lower extremities referred to the ED by his podiatrist for evaluation of nonhealing wound to right lower extremity despite outpatient antibiotic treatment.     Today:  Seen at bedside, no new complaints.          REVIEW OF SYSTEMS:  No new complaints      MEDICATIONS  (STANDING):  cefepime   IVPB 2000 milliGRAM(s) IV Intermittent every 12 hours  enoxaparin Injectable 40 milliGRAM(s) SubCutaneous every 24 hours  furosemide    Tablet 40 milliGRAM(s) Oral daily  linezolid  IVPB 600 milliGRAM(s) IV Intermittent every 12 hours  losartan 100 milliGRAM(s) Oral daily  morphine ER Tablet 30 milliGRAM(s) Oral three times a day  mupirocin 2% Ointment 1 Application(s) Topical <User Schedule>  pregabalin 100 milliGRAM(s) Oral two times a day  senna 2 Tablet(s) Oral at bedtime    MEDICATIONS  (PRN):  HYDROmorphone  Injectable 4 milliGRAM(s) IV Push every 4 hours PRN Pain  ondansetron Injectable 4 milliGRAM(s) IV Push once PRN Nausea and/or Vomiting      Allergies  penicillin (Unknown)  IV Contrast (Sneezing (Mod to Severe))  Penicillin G  Reaction: Unknown  Tolerated cefepime during 7/2023 admission. (Unknown)      FAMILY HISTORY:  Family history of early CAD (Father)  Family history of diabetes mellitus in mother (Mother)        Vital Signs Last 24 Hrs  T(C): 35.7 (01 Aug 2023 13:02), Max: 36.1 (31 Jul 2023 21:46)  T(F): 96.2 (01 Aug 2023 13:02), Max: 96.9 (31 Jul 2023 21:46)  HR: 80 (01 Aug 2023 13:02) (62 - 80)  BP: 138/80 (01 Aug 2023 13:02) (138/80 - 156/84)  RR: 16 (01 Aug 2023 13:02) (16 - 16)  SpO2: 100% (01 Aug 2023 13:02) (100% - 100%)    Parameters below as of 01 Aug 2023 13:02  Patient On (Oxygen Delivery Method): room air        PHYSICAL EXAM:  GEN: No acute distress  HEENT: normocephalic, atraumatic, aniceteric  LUNGS: Clear to auscultation bilaterally, no rales/wheezing/ rhonchi  HEART: S1/S2 present. RRR, no murmurs  ABD: Soft, non-tender, non-distended. Bowel sounds present  EXT: RLE edema > LLE edema , bl LE clean dressing   NEURO: AAOX3, normal affect      LABS:                        12.3   6.53  )-----------( 350      ( 01 Aug 2023 04:30 )             40.6     08-01    140  |  99  |  11  ----------------------------<  134<H>  5.1<H>   |  30  |  0.8    Ca    9.1      01 Aug 2023 04:30    TPro  6.9  /  Alb  4.1  /  TBili  0.3  /  DBili  x   /  AST  28  /  ALT  32  /  AlkPhos  91  08-01      Urinalysis Basic - ( 01 Aug 2023 04:30 )    Color: x / Appearance: x / SG: x / pH: x  Gluc: 134 mg/dL / Ketone: x  / Bili: x / Urobili: x   Blood: x / Protein: x / Nitrite: x   Leuk Esterase: x / RBC: x / WBC x   Sq Epi: x / Non Sq Epi: x / Bacteria: x

## 2023-08-01 NOTE — PROGRESS NOTE ADULT - ASSESSMENT
57-year-old male past history of hypertension, chronic back pain on opioids, chronic wounds to bilateral lower extremities referred to the ED by his podiatrist for evaluation of nonhealing wound to right lower extremity despite outpatient antibiotic treatment.    ID is following for nonhealing bilateral LE wounds  Persistent and worsening wound for years  Currently followed by Podiatry and ID outpatient, on PO doxycycline ppx  He noticed increased malodorous drainage and increased pain in the past few days  Evaluated by Dr. Lambert outpatient and was referred to ED  Afebrile, no leukocytosis  Prior wound Cx with pseudomonas in 2020 and Acinetobacter 1/2023 (S to cefepime but at breakpoint)    Antibiotics:  Vancomycin 7/26  Cefepime/Flagyl 7/26 ->    Overall chronic bilateral LE wounds, exacerbated by severe venous stasis dermatitis  Overall not severely infected on examination, minimal periwound erythema and warmth  Sharp wound edge, no necrosis  Continue with cefepime and Flagyl for now pending further Podiatry intervention    Penicillin allergy: told by his mother that he had allergic reaction when he was young, tolerated cefepime and Augmentin in the past.      IMPRESSION:  Bilateral LE wounds  Chronic venous stasis dermatitis  Morbid obesity  Drug allergy    RECOMMENDATIONS:  - D/C linezolid  - Can continue IV cefepime 2gm q12hrs, on discharge, can send him with PO Bactrim 2 DS q12hrs for 14 days post-debridement (until 8/10)  - After completion of Bactrim, he will need to go back to PO doxycycline 100mg q24hrs for prophylaxis of skin infection due to prior MRSA infection  - Routine dressing change as per the podiatry.   - Trend WBC, fever curve, transaminases, creatinine daily  - Will continue to follow  - Podiatry follow up  - He may also benefit from hyperbaric chamber therapy- to be evaluated in the outpatient setting      * THIS IS AN INCOMPLETE NOTE. FINAL RECOMMENDATION IS PENDING *     57-year-old male past history of hypertension, chronic back pain on opioids, chronic wounds to bilateral lower extremities referred to the ED by his podiatrist for evaluation of nonhealing wound to right lower extremity despite outpatient antibiotic treatment.    ID is following for nonhealing bilateral LE wounds  Persistent and worsening wound for years  Currently followed by Podiatry and ID outpatient, on PO doxycycline ppx  He noticed increased malodorous drainage and increased pain in the past few days  Evaluated by Dr. Lambert outpatient and was referred to ED  Afebrile, no leukocytosis  Prior wound Cx with pseudomonas in 2020 and Acinetobacter 1/2023 (S to cefepime but at breakpoint)    Antibiotics:  Vancomycin 7/26  Cefepime/Flagyl 7/26 ->    Overall chronic bilateral LE wounds, exacerbated by severe venous stasis dermatitis  Overall not severely infected on examination, minimal periwound erythema and warmth  Sharp wound edge, no necrosis  S/p debridement on 7/28  Wound Cx grew Alcaligenes faecalis (S to Bactrim), MDR enterobacter cloacae (S to Bactrim), and E. avium    Penicillin allergy: told by his mother that he had allergic reaction when he was young, tolerated cefepime and Augmentin in the past.      IMPRESSION:  Bilateral LE wounds  Chronic venous stasis dermatitis  Morbid obesity  Drug allergy    RECOMMENDATIONS:  - D/C linezolid  - Can continue IV cefepime 2gm q12hrs, on discharge, can send him with PO Bactrim 2 DS q12hrs for 14 days post-debridement (until 8/10)  - After completion of Bactrim, he will need to go back to PO doxycycline for prophylaxis of skin infection due to prior MRSA infection  - Routine dressing change as per the podiatry.   - Trend WBC, fever curve, transaminases, creatinine daily  - Will continue to follow  - Podiatry follow up  - He may also benefit from hyperbaric chamber therapy, to be evaluated in the outpatient setting      Indy Menchaca D.O.  Attending Physician  Division of Infectious Diseases  [facility]  Please contact me via [employer] Teams

## 2023-08-01 NOTE — PROGRESS NOTE ADULT - TIME-BASED BILLING (NON-CRITICAL CARE)
Time-based billing (NON-critical care)
84

## 2023-08-01 NOTE — PROGRESS NOTE ADULT - PROVIDER SPECIALTY LIST ADULT
Hospitalist
Infectious Disease
Infectious Disease
Podiatry
Hospitalist
Infectious Disease
Podiatry
Infectious Disease

## 2023-08-01 NOTE — PROGRESS NOTE ADULT - REASON FOR ADMISSION
Non healing Bilateral LE wound

## 2023-08-01 NOTE — PROGRESS NOTE ADULT - SUBJECTIVE AND OBJECTIVE BOX
INFECTIOUS DISEASE FOLLOW UP NOTE:    Interval History/ROS: Patient is a 57y old  Male who presents with a chief complaint of Non healing Bilateral LE wound (01 Aug 2023 09:21)      Overnight events:    REVIEW OF SYSTEMS:  CONSTITUTIONAL: No fever or chills  HEAD: No lesion on scalp  EYES: No visual disturbance  ENT: No sore throat  RESPIRATORY: No cough, no shortness of breath  CARDIOVASCULAR: No chest pain or palpitations  GASTROINTESTINAL: No abdominal or epigastric pain  GENITOURINARY: No dysuria  NEUROLOGICAL: No headache/dizziness  MUSCULOSKELETAL: No joint pain, erythema, or swelling; no back pain  SKIN: No itching, rashes  All other ROS negative except noted above      Prior hospital charts reviewed [Yes]  Primary team notes reviewed [Yes]  Other consultant notes reviewed [Yes]    Allergies:  penicillin (Unknown)  IV Contrast (Sneezing (Mod to Severe))  Penicillin G  Reaction: Unknown  Tolerated cefepime during 7/2023 admission. (Unknown)      ANTIMICROBIALS:   cefepime   IVPB 2000 every 12 hours  linezolid  IVPB 600 every 12 hours      OTHER MEDS: MEDICATIONS  (STANDING):  enoxaparin Injectable 40 every 24 hours  furosemide    Tablet 40 daily  HYDROmorphone  Injectable 4 every 4 hours PRN  losartan 100 daily  morphine ER Tablet 30 three times a day  ondansetron Injectable 4 once PRN  pregabalin 100 two times a day  senna 2 at bedtime      Vital Signs Last 24 Hrs  T(F): 96.1 (08-01-23 @ 04:54), Max: 98.9 (07-26-23 @ 09:16)    Vital Signs Last 24 Hrs  HR: 62 (08-01-23 @ 04:54) (62 - 86)  BP: 144/75 (08-01-23 @ 04:54) (144/75 - 173/97)  RR: 16 (08-01-23 @ 04:54)  SpO2: --  Wt(kg): --    EXAM:  GENERAL: NAD, lying in bed  HEAD: No head lesions  EYES: Conjunctiva pink and cornea white  EAR, NOSE, MOUTH, THROAT: Normal external ears and nose, no discharges; moist mucous membranes  NECK: Supple, nontender to palpation; no JVD  RESPIRATORY: Clear to auscultation bilaterally  CARDIOVASCULAR: S1 S2  GASTROINTESTINAL: Soft, nontender, nondistended; normoactive bowel sounds  EXTREMITIES: No clubbing, cyanosis, or petal edema  NERVOUS SYSTEM: Alert and oriented to person, time, place and situation, speech clear. No focal deficits   MUSCULOSKELETAL: No joint erythema, swelling or pain  SKIN: No rashes or lesions, no superficial thrombophlebitis  PSYCH: Normal affect    Labs:                        12.3   6.53  )-----------( 350      ( 01 Aug 2023 04:30 )             40.6     07-31    139  |  99  |  13  ----------------------------<  187<H>  4.8   |  29  |  0.8    Ca    9.1      31 Jul 2023 10:40    TPro  7.0  /  Alb  4.0  /  TBili  0.2  /  DBili  x   /  AST  38  /  ALT  36  /  AlkPhos  96  07-31      WBC Trend:  WBC Count: 6.53 (08-01-23 @ 04:30)  WBC Count: 8.08 (07-31-23 @ 10:40)  WBC Count: 7.06 (07-30-23 @ 08:35)  WBC Count: 8.07 (07-29-23 @ 08:00)      Creatine Trend:  Creatinine: 0.8 (07-31)  Creatinine: 0.8 (07-30)  Creatinine: 0.7 (07-29)  Creatinine: 0.8 (07-28)      Liver Biochemical Testing Trend:  Alanine Aminotransferase (ALT/SGPT): 36 (07-31)  Alanine Aminotransferase (ALT/SGPT): 34 (07-30)  Alanine Aminotransferase (ALT/SGPT): 12 (07-29)  Alanine Aminotransferase (ALT/SGPT): 13 (07-28)  Alanine Aminotransferase (ALT/SGPT): 11 (07-26)  Aspartate Aminotransferase (AST/SGOT): 38 (07-31-23 @ 10:40)  Aspartate Aminotransferase (AST/SGOT): 42 (07-30-23 @ 08:35)  Aspartate Aminotransferase (AST/SGOT): 12 (07-29-23 @ 08:00)  Aspartate Aminotransferase (AST/SGOT): 13 (07-28-23 @ 08:37)  Aspartate Aminotransferase (AST/SGOT): 9 (07-26-23 @ 10:22)  Bilirubin Total: 0.2 (07-31)  Bilirubin Total: 0.4 (07-30)  Bilirubin Total: <0.2 (07-29)  Bilirubin Total: 0.4 (07-28)  Bilirubin Total: 0.2 (07-26)      Trend LDH      Urinalysis Basic - ( 31 Jul 2023 10:40 )    Color: x / Appearance: x / SG: x / pH: x  Gluc: 187 mg/dL / Ketone: x  / Bili: x / Urobili: x   Blood: x / Protein: x / Nitrite: x   Leuk Esterase: x / RBC: x / WBC x   Sq Epi: x / Non Sq Epi: x / Bacteria: x        MICROBIOLOGY:    MRSA PCR Result.: Negative (07-28-23 @ 07:45)      Culture - Other (collected 28 Jul 2023 12:41)  Source: .Other deep wound culture right leg  Final Report:    Rare Enterobacter cloacae (Carbapenem Resistant)    Few Alcaligenes faecalis    Few Enterococcus avium  Organism: Alcaligenes faecalis  Enterobacter cloacae (Carbapenem Resistant)  Enterobacter cloacae (Carbapenem Resistant)  Enterococcus avium  Organism: Enterococcus avium    Sensitivities:      -  Vancomycin: S 0.5      -  Ampicillin: S <=2 Predicts results to ampicillin/sulbactam, amoxacillin-clavulanate and  piperacillin-tazobactam.      -  Tetracycline: R >8      Method Type: JAYCOB  Organism: Enterobacter cloacae (Carbapenem Resistant)    Sensitivities:      -  Resistance Gene to Carbapenem: Nondet      Method Type: CarbaR  Organism: Enterobacter cloacae (Carbapenem Resistant)    Sensitivities:      -  Levofloxacin: R >4      -  Tobramycin: S <=2      -  Ceftazidime/Avibactam: S <=4      -  Aztreonam: S 8      -  Gentamicin: S <=2      -  Cefazolin: R >16 Enterobacter, Klebsiella aerogenes, Citrobacter, and Serratia may develop resistance during prolonged therapy (3-4 days)      -  Cefepime: S <=2      -  Piperacillin/Tazobactam: S <=8      -  Ciprofloxacin: R >2      -  Imipenem: S <=1      -  Ceftriaxone: R 32 Enterobacter, Klebsiella aerogenes, Citrobacter, and Serratia may develop resistance during prolonged therapy      -  Ampicillin: R >16 These ampicillin results predict results for amoxicillin      Method Type: JAYCOB      -  Meropenem: S <=1      -  Ampicillin/Sulbactam: R >16/8 Enterobacter, Klebsiella aerogenes, Citrobacter, and Serratia may develop resistance during prolonged therapy (3-4 days)      -  Cefoxitin: R >16      -  Amikacin: S <=16      -  Amoxicillin/Clavulanic Acid: R >16/8      -  Ceftolozane/tazobactam: S <=2      -  Trimethoprim/Sulfamethoxazole: S <=0.5/9.5      -  Ertapenem: R >1  Organism: Alcaligenes faecalis    Sensitivities:      -  Levofloxacin: S 2      -  Tobramycin: S 4      -  Aztreonam: R >16      -  Gentamicin: S 4      -  Cefepime: R >16      -  Piperacillin/Tazobactam: S <=8      -  Ciprofloxacin: R >2      -  Ceftriaxone: S <=1      Method Type: JAYCOB      -  Meropenem: S <=1      -  Amikacin: S <=16      -  Trimethoprim/Sulfamethoxazole: S <=0.5/9.5    Culture - Blood (collected 26 Jul 2023 10:22)  Source: .Blood Blood  Final Report:    No growth at 5 days    Culture - Blood (collected 26 Jul 2023 10:22)  Source: .Blood Blood  Final Report:    No growth at 5 days    Culture - Blood (collected 30 Apr 2023 14:25)  Source: .Blood Blood-Peripheral  Final Report:    No Growth Final    Culture - Blood (collected 30 Apr 2023 14:25)  Source: .Blood Blood-Peripheral  Final Report:    No Growth Final    Culture - Blood (collected 05 Feb 2023 08:06)  Source: .Blood None  Final Report:    No Growth Final    Culture - Blood (collected 04 Feb 2023 16:02)  Source: .Blood None  Final Report:    No Growth Final    Culture - Other (collected 30 Jan 2023 17:06)  Source: Wound LEFT CALF WOUND  Final Report:    Numerous Acinetobacter baumannii/nosocomialis group    Numerous Enterococcus faecalis  Organism: Acinetobacter baumannii/nosocomialis group  Enterococcus faecalis  Organism: Enterococcus faecalis    Sensitivities:      -  Vancomycin: S 2      -  Ampicillin: S <=2 Predicts results to ampicillin/sulbactam, amoxacillin-clavulanate and  piperacillin-tazobactam.      -  Tetracycline: R >8      Method Type: JAYCOB  Organism: Acinetobacter baumannii/nosocomialis group    Sensitivities:      -  Levofloxacin: S <=0.5      -  Tobramycin: S <=2      -  Gentamicin: S <=2      -  Cefepime: S 4      -  Ciprofloxacin: S <=0.25      -  Imipenem: S <=1      Method Type: JAYCOB      -  Meropenem: S <=1      -  Ampicillin/Sulbactam: S 8/4      -  Ceftazidime: S 4      -  Amikacin: S <=16      -  Trimethoprim/Sulfamethoxazole: S <=0.5/9.5    Culture - Other (collected 30 Jan 2023 17:05)  Source: Wound RIGHT CALF WOUND  Final Report:    Few Alcaligenes faecalis    Numerous Enterococcus faecalis    Numerous Corynebacterium species "Susceptibilities not performed"  Organism: Alcaligenes faecalis  Enterococcus faecalis  Organism: Enterococcus faecalis    Sensitivities:      -  Vancomycin: S 2      -  Ampicillin: S <=2 Predicts results to ampicillin/sulbactam, amoxacillin-clavulanate and  piperacillin-tazobactam.      -  Tetracycline: R >8      Method Type: JAYCOB  Organism: Alcaligenes faecalis    Sensitivities:      -  Levofloxacin: S <=0.5      -  Tobramycin: S <=2      -  Aztreonam: I 16      -  Gentamicin: S <=2      -  Cefepime: S 4      -  Piperacillin/Tazobactam: S <=8      -  Ciprofloxacin: S 1      -  Ceftriaxone: S <=1      Method Type: JAYCOB      -  Meropenem: S <=1      -  Amikacin: S <=16      -  Trimethoprim/Sulfamethoxazole: S <=0.5/9.5    Culture - Blood (collected 26 Jan 2023 12:28)  Source: .Blood Blood  Final Report:    No Growth Final      C-Reactive Protein, Serum: 31.4 (07-27)    RADIOLOGY:  imaging below personally reviewed    < from: [facility] Duplex Lower Ext Vein Scan, Right (07.28.23 @ 17:03) >  Impression:    No evidence of deep venous thrombosis or superficial thrombophlebitis in   the right lower extremity.      < end of copied text >      < from: [facility] Duplex Lower Ext Vein Scan, Bilat (07.27.23 @ 17:06) >  IMPRESSION:  No evidence of deep venous thrombosis in either lower extremity.  Bilateral calf veins are not visualized    < end of copied text >   INFECTIOUS DISEASE FOLLOW UP NOTE:    Interval History/ROS: Patient is a 57y old  Male who presents with a chief complaint of Non healing Bilateral LE wound (01 Aug 2023 09:21)    Overnight events: Feels well today. Bilateral LE pain is much less.    REVIEW OF SYSTEMS:  CONSTITUTIONAL: No fever or chills  HEAD: No lesion on scalp  EYES: No visual disturbance  ENT: No sore throat  RESPIRATORY: No cough, no shortness of breath  CARDIOVASCULAR: No chest pain or palpitations  GASTROINTESTINAL: No abdominal or epigastric pain  GENITOURINARY: No dysuria  NEUROLOGICAL: No headache/dizziness  MUSCULOSKELETAL: No joint pain, erythema, or swelling; no back pain  SKIN: + bilateral LE wounds  All other ROS negative except noted above      Prior hospital charts reviewed [Yes]  Primary team notes reviewed [Yes]  Other consultant notes reviewed [Yes]    Allergies:  penicillin (Unknown)  IV Contrast (Sneezing (Mod to Severe))  Penicillin G  Reaction: Unknown  Tolerated cefepime during 7/2023 admission. (Unknown)      ANTIMICROBIALS:   cefepime   IVPB 2000 every 12 hours  linezolid  IVPB 600 every 12 hours      OTHER MEDS: MEDICATIONS  (STANDING):  enoxaparin Injectable 40 every 24 hours  furosemide    Tablet 40 daily  HYDROmorphone  Injectable 4 every 4 hours PRN  losartan 100 daily  morphine ER Tablet 30 three times a day  ondansetron Injectable 4 once PRN  pregabalin 100 two times a day  senna 2 at bedtime      Vital Signs Last 24 Hrs  T(F): 96.1 (08-01-23 @ 04:54), Max: 98.9 (07-26-23 @ 09:16)    Vital Signs Last 24 Hrs  HR: 62 (08-01-23 @ 04:54) (62 - 86)  BP: 144/75 (08-01-23 @ 04:54) (144/75 - 173/97)  RR: 16 (08-01-23 @ 04:54)  SpO2: --  Wt(kg): --    EXAM:  GENERAL: NAD, lying in bed; morbidly obese  HEAD: No head lesions  EYES: Conjunctiva pink and cornea white  EAR, NOSE, MOUTH, THROAT: Normal external ears and nose, no discharges; moist mucous membranes  NECK: Supple, nontender to palpation; no JVD  RESPIRATORY: Clear to auscultation bilaterally  CARDIOVASCULAR: S1 S2  GASTROINTESTINAL: Soft, nontender, nondistended; normoactive bowel sounds  EXTREMITIES: No clubbing, cyanosis; chronic venous stasis changes with dry and scaly skin  NERVOUS SYSTEM: Alert and oriented to person, time, place and situation, speech clear. No focal deficits   MUSCULOSKELETAL: No joint erythema, swelling or pain  SKIN: + bilateral wounds, wrapped with ACE bandage, no superficial thrombophlebitis  PSYCH: Normal affect, calm    Labs:                        12.3   6.53  )-----------( 350      ( 01 Aug 2023 04:30 )             40.6     07-31    139  |  99  |  13  ----------------------------<  187<H>  4.8   |  29  |  0.8    Ca    9.1      31 Jul 2023 10:40    TPro  7.0  /  Alb  4.0  /  TBili  0.2  /  DBili  x   /  AST  38  /  ALT  36  /  AlkPhos  96  07-31      WBC Trend:  WBC Count: 6.53 (08-01-23 @ 04:30)  WBC Count: 8.08 (07-31-23 @ 10:40)  WBC Count: 7.06 (07-30-23 @ 08:35)  WBC Count: 8.07 (07-29-23 @ 08:00)      Creatine Trend:  Creatinine: 0.8 (07-31)  Creatinine: 0.8 (07-30)  Creatinine: 0.7 (07-29)  Creatinine: 0.8 (07-28)      Liver Biochemical Testing Trend:  Alanine Aminotransferase (ALT/SGPT): 36 (07-31)  Alanine Aminotransferase (ALT/SGPT): 34 (07-30)  Alanine Aminotransferase (ALT/SGPT): 12 (07-29)  Alanine Aminotransferase (ALT/SGPT): 13 (07-28)  Alanine Aminotransferase (ALT/SGPT): 11 (07-26)  Aspartate Aminotransferase (AST/SGOT): 38 (07-31-23 @ 10:40)  Aspartate Aminotransferase (AST/SGOT): 42 (07-30-23 @ 08:35)  Aspartate Aminotransferase (AST/SGOT): 12 (07-29-23 @ 08:00)  Aspartate Aminotransferase (AST/SGOT): 13 (07-28-23 @ 08:37)  Aspartate Aminotransferase (AST/SGOT): 9 (07-26-23 @ 10:22)  Bilirubin Total: 0.2 (07-31)  Bilirubin Total: 0.4 (07-30)  Bilirubin Total: <0.2 (07-29)  Bilirubin Total: 0.4 (07-28)  Bilirubin Total: 0.2 (07-26)      Trend LDH      Urinalysis Basic - ( 31 Jul 2023 10:40 )    Color: x / Appearance: x / SG: x / pH: x  Gluc: 187 mg/dL / Ketone: x  / Bili: x / Urobili: x   Blood: x / Protein: x / Nitrite: x   Leuk Esterase: x / RBC: x / WBC x   Sq Epi: x / Non Sq Epi: x / Bacteria: x        MICROBIOLOGY:    MRSA PCR Result.: Negative (07-28-23 @ 07:45)      Culture - Other (collected 28 Jul 2023 12:41)  Source: .Other deep wound culture right leg  Final Report:    Rare Enterobacter cloacae (Carbapenem Resistant)    Few Alcaligenes faecalis    Few Enterococcus avium  Organism: Alcaligenes faecalis  Enterobacter cloacae (Carbapenem Resistant)  Enterobacter cloacae (Carbapenem Resistant)  Enterococcus avium  Organism: Enterococcus avium    Sensitivities:      -  Vancomycin: S 0.5      -  Ampicillin: S <=2 Predicts results to ampicillin/sulbactam, amoxacillin-clavulanate and  piperacillin-tazobactam.      -  Tetracycline: R >8      Method Type: JAYCOB  Organism: Enterobacter cloacae (Carbapenem Resistant)    Sensitivities:      -  Resistance Gene to Carbapenem: Nondet      Method Type: CarbaR  Organism: Enterobacter cloacae (Carbapenem Resistant)    Sensitivities:      -  Levofloxacin: R >4      -  Tobramycin: S <=2      -  Ceftazidime/Avibactam: S <=4      -  Aztreonam: S 8      -  Gentamicin: S <=2      -  Cefazolin: R >16 Enterobacter, Klebsiella aerogenes, Citrobacter, and Serratia may develop resistance during prolonged therapy (3-4 days)      -  Cefepime: S <=2      -  Piperacillin/Tazobactam: S <=8      -  Ciprofloxacin: R >2      -  Imipenem: S <=1      -  Ceftriaxone: R 32 Enterobacter, Klebsiella aerogenes, Citrobacter, and Serratia may develop resistance during prolonged therapy      -  Ampicillin: R >16 These ampicillin results predict results for amoxicillin      Method Type: JAYCOB      -  Meropenem: S <=1      -  Ampicillin/Sulbactam: R >16/8 Enterobacter, Klebsiella aerogenes, Citrobacter, and Serratia may develop resistance during prolonged therapy (3-4 days)      -  Cefoxitin: R >16      -  Amikacin: S <=16      -  Amoxicillin/Clavulanic Acid: R >16/8      -  Ceftolozane/tazobactam: S <=2      -  Trimethoprim/Sulfamethoxazole: S <=0.5/9.5      -  Ertapenem: R >1  Organism: Alcaligenes faecalis    Sensitivities:      -  Levofloxacin: S 2      -  Tobramycin: S 4      -  Aztreonam: R >16      -  Gentamicin: S 4      -  Cefepime: R >16      -  Piperacillin/Tazobactam: S <=8      -  Ciprofloxacin: R >2      -  Ceftriaxone: S <=1      Method Type: JAYCOB      -  Meropenem: S <=1      -  Amikacin: S <=16      -  Trimethoprim/Sulfamethoxazole: S <=0.5/9.5    Culture - Blood (collected 26 Jul 2023 10:22)  Source: .Blood Blood  Final Report:    No growth at 5 days    Culture - Blood (collected 26 Jul 2023 10:22)  Source: .Blood Blood  Final Report:    No growth at 5 days    Culture - Blood (collected 30 Apr 2023 14:25)  Source: .Blood Blood-Peripheral  Final Report:    No Growth Final    Culture - Blood (collected 30 Apr 2023 14:25)  Source: .Blood Blood-Peripheral  Final Report:    No Growth Final    Culture - Blood (collected 05 Feb 2023 08:06)  Source: .Blood None  Final Report:    No Growth Final    Culture - Blood (collected 04 Feb 2023 16:02)  Source: .Blood None  Final Report:    No Growth Final    Culture - Other (collected 30 Jan 2023 17:06)  Source: Wound LEFT CALF WOUND  Final Report:    Numerous Acinetobacter baumannii/nosocomialis group    Numerous Enterococcus faecalis  Organism: Acinetobacter baumannii/nosocomialis group  Enterococcus faecalis  Organism: Enterococcus faecalis    Sensitivities:      -  Vancomycin: S 2      -  Ampicillin: S <=2 Predicts results to ampicillin/sulbactam, amoxacillin-clavulanate and  piperacillin-tazobactam.      -  Tetracycline: R >8      Method Type: JAYCOB  Organism: Acinetobacter baumannii/nosocomialis group    Sensitivities:      -  Levofloxacin: S <=0.5      -  Tobramycin: S <=2      -  Gentamicin: S <=2      -  Cefepime: S 4      -  Ciprofloxacin: S <=0.25      -  Imipenem: S <=1      Method Type: JAYCOB      -  Meropenem: S <=1      -  Ampicillin/Sulbactam: S 8/4      -  Ceftazidime: S 4      -  Amikacin: S <=16      -  Trimethoprim/Sulfamethoxazole: S <=0.5/9.5    Culture - Other (collected 30 Jan 2023 17:05)  Source: Wound RIGHT CALF WOUND  Final Report:    Few Alcaligenes faecalis    Numerous Enterococcus faecalis    Numerous Corynebacterium species "Susceptibilities not performed"  Organism: Alcaligenes faecalis  Enterococcus faecalis  Organism: Enterococcus faecalis    Sensitivities:      -  Vancomycin: S 2      -  Ampicillin: S <=2 Predicts results to ampicillin/sulbactam, amoxacillin-clavulanate and  piperacillin-tazobactam.      -  Tetracycline: R >8      Method Type: JAYCOB  Organism: Alcaligenes faecalis    Sensitivities:      -  Levofloxacin: S <=0.5      -  Tobramycin: S <=2      -  Aztreonam: I 16      -  Gentamicin: S <=2      -  Cefepime: S 4      -  Piperacillin/Tazobactam: S <=8      -  Ciprofloxacin: S 1      -  Ceftriaxone: S <=1      Method Type: JAYCOB      -  Meropenem: S <=1      -  Amikacin: S <=16      -  Trimethoprim/Sulfamethoxazole: S <=0.5/9.5    Culture - Blood (collected 26 Jan 2023 12:28)  Source: .Blood Blood  Final Report:    No Growth Final      C-Reactive Protein, Serum: 31.4 (07-27)    RADIOLOGY:  imaging below personally reviewed    < from: [facility] Duplex Lower Ext Vein Scan, Right (07.28.23 @ 17:03) >  Impression:    No evidence of deep venous thrombosis or superficial thrombophlebitis in   the right lower extremity.      < end of copied text >      < from: [facility] Duplex Lower Ext Vein Scan, Bilat (07.27.23 @ 17:06) >  IMPRESSION:  No evidence of deep venous thrombosis in either lower extremity.  Bilateral calf veins are not visualized    < end of copied text >

## 2023-08-01 NOTE — PROGRESS NOTE ADULT - SUBJECTIVE AND OBJECTIVE BOX
PROGRESS NOTE   Pt seen @ bedside during AM rounds. Dressing +Clean, +Dry, + Intact. pain B/L LE       Vital Signs Last 24 Hrs  T(C): 35.6 (01 Aug 2023 04:54), Max: 36.1 (31 Jul 2023 21:46)  T(F): 96.1 (01 Aug 2023 04:54), Max: 96.9 (31 Jul 2023 21:46)  HR: 62 (01 Aug 2023 04:54) (62 - 86)  BP: 144/75 (01 Aug 2023 04:54) (144/75 - 173/97)  BP(mean): --  RR: 16 (01 Aug 2023 04:54) (16 - 16)  SpO2: --    Parameters below as of 31 Jul 2023 13:50  Patient On (Oxygen Delivery Method): room air                              12.3   6.53  )-----------( 350      ( 01 Aug 2023 04:30 )             40.6               07-31    139  |  99  |  13  ----------------------------<  187<H>  4.8   |  29  |  0.8    Ca    9.1      31 Jul 2023 10:40    TPro  7.0  /  Alb  4.0  /  TBili  0.2  /  DBili  x   /  AST  38  /  ALT  36  /  AlkPhos  96  07-31        Physical Exam - Lower Extremity Focused:   Derm: Full thickness ulcerations on the RLE on the lateral and posterior aspect with central granular wound base and fibrotic wound edges. Mild drainage. Ulceration noted on the lateral LLE with granular wound base and macerated wound edges. Mild drainage.  Vascular: DP and PT Pulses Diminished; Foot is Warm to Warm to the touch; Capillary Refill Time < 3 Seconds;    Neuro: Moderate pain On Palpation at Wound Site (L>R).    Assessment:  S/P Excisional debridement of soft tissue BLE (DOS: 7/28/23)  Bilateral venous stasis ulcers    Plan:  Chart reviewed and Patient evaluated. All Questions and Concerns Addressed and Answered  Local Wound Care; Wound Flushed w/ NS; Mupirocin applied to wound bed with xeroform over top. Dressed wound with multiple abd, kerlix and ace bandage.  Multilayer compression dressing applied B/L LE   Cont with wound care and abx .  Daily dressing change orders placed.  Weight Bearing Status; AT    Podiatry

## 2023-08-01 NOTE — PROGRESS NOTE ADULT - ASSESSMENT
57-year-old male past history of hypertension, chronic back pain on opioids, chronic wounds to bilateral lower extremities referred to the ED by his podiatrist for evaluation of nonhealing wound to right lower extremity despite outpatient antibiotic treatment.     Assessment:  # Chronic bilateral non healing wounds suspect due to PVD  # Chronic lower back pain sp spinal stimulator with revisions x3 complicated by mrsa infection on chronic opoid medication management  # H/O  HTN  #H/O Morbid obesity BMI >40    PLAN:   - no sepsis present on admission  - XRAY RLE - no acute fx  - blood clx NGTD  - duplex venous and arterial unremarkable   - repeat RLE venous duplex neg for DVT  - dressing changes per podiatry -   - WBAT per podiatry    - bowel regimen   - s/p surgical debridement for Friday 7/28   -ID consult appreciated:  - D/C linezolid  - Can continue IV cefepime 2gm q12hrs, on discharge, can send him with PO Bactrim 2 DS q12hrs for 14 days post-debridement (until 8/10)  - After completion of Bactrim, he will need to go back to PO doxycycline 100mg q24hrs for prophylaxis of skin infection due to prior MRSA infection  Per pain mgmt:  Can maintain IV hydromorphone 4mg IV q4h PRN for another 24h. The patient should be titrated to his oral bernardo regimen.   We can use oxycodone 10mg q4h PRN (vs home dose q6h PRN) for the additional pain s/p debridement    dvt/gi ppx/diet

## 2023-08-02 ENCOUNTER — TRANSCRIPTION ENCOUNTER (OUTPATIENT)
Age: 58
End: 2023-08-02

## 2023-08-02 VITALS
HEART RATE: 66 BPM | RESPIRATION RATE: 18 BRPM | DIASTOLIC BLOOD PRESSURE: 70 MMHG | TEMPERATURE: 97 F | SYSTOLIC BLOOD PRESSURE: 147 MMHG

## 2023-08-02 PROCEDURE — 99238 HOSP IP/OBS DSCHRG MGMT 30/<: CPT

## 2023-08-02 RX ORDER — OXYCODONE AND ACETAMINOPHEN 5; 325 MG/1; MG/1
1 TABLET ORAL
Qty: 0 | Refills: 0 | DISCHARGE

## 2023-08-02 RX ADMIN — MORPHINE SULFATE 30 MILLIGRAM(S): 50 CAPSULE, EXTENDED RELEASE ORAL at 05:16

## 2023-08-02 RX ADMIN — CEFEPIME 100 MILLIGRAM(S): 1 INJECTION, POWDER, FOR SOLUTION INTRAMUSCULAR; INTRAVENOUS at 05:19

## 2023-08-02 RX ADMIN — MORPHINE SULFATE 30 MILLIGRAM(S): 50 CAPSULE, EXTENDED RELEASE ORAL at 06:05

## 2023-08-02 RX ADMIN — Medication 40 MILLIGRAM(S): at 05:16

## 2023-08-02 RX ADMIN — Medication 100 MILLIGRAM(S): at 05:17

## 2023-08-02 RX ADMIN — HYDROMORPHONE HYDROCHLORIDE 3 MILLIGRAM(S): 2 INJECTION INTRAMUSCULAR; INTRAVENOUS; SUBCUTANEOUS at 02:40

## 2023-08-02 RX ADMIN — HYDROMORPHONE HYDROCHLORIDE 3 MILLIGRAM(S): 2 INJECTION INTRAMUSCULAR; INTRAVENOUS; SUBCUTANEOUS at 05:17

## 2023-08-02 RX ADMIN — MUPIROCIN 1 APPLICATION(S): 20 OINTMENT TOPICAL at 09:41

## 2023-08-02 RX ADMIN — LOSARTAN POTASSIUM 100 MILLIGRAM(S): 100 TABLET, FILM COATED ORAL at 05:16

## 2023-08-02 NOTE — DISCHARGE NOTE PROVIDER - CARE PROVIDER_API CALL
Fernando Lambert  Podiatric Medicine and Surgery  [address] [place] [address]  Phone: [phone]  Fax: [phone]  Follow Up Time:     Ayan Londono  Infectious Disease  [address] [place] [address]  Phone: [phone]  Fax: [phone]  Follow Up Time:

## 2023-08-02 NOTE — DISCHARGE NOTE NURSING/CASE MANAGEMENT/SOCIAL WORK - PATIENT PORTAL LINK FT
You can access the FollowMyHealth Patient Portal offered by [facility] by registering at the following website: [email or web address]. By joining [facility]’s FollowMyHealth portal, you will also be able to view your health information using other applications (apps) compatible with our system. Cheek Interpolation Flap Division And Inset Text: Division and inset of the cheek interpolation flap was performed to achieve optimal aesthetic result, restore normal anatomic appearance and avoid distortion of normal anatomy, expedite and facilitate wound healing, achieve optimal functional result and because linear closure either not possible or would produce suboptimal result. The patient was prepped and draped in the usual manner. The pedicle was infiltrated with local anesthesia. The pedicle was sectioned with a #15 blade. The pedicle was de-bulked and trimmed to match the shape of the defect. Hemostasis was achieved. The flap donor site and free margin of the flap were secured with deep buried sutures and the wound edges were re-approximated.

## 2023-08-02 NOTE — DISCHARGE NOTE NURSING/CASE MANAGEMENT/SOCIAL WORK - NSDCPEFALRISK_GEN_ALL_CORE
For information on Fall & Injury Prevention, visit: [email or web address] OR  [email or web address]/[email or web address] OR  https://www.cdc.gov/steadi/patient.html

## 2023-08-02 NOTE — DISCHARGE NOTE PROVIDER - NSDCFUADDINST_GEN_ALL_CORE_FT
Local Wound Care; Wound Flush w/ NS; Mupirocin apply to wound bed with xeroform over top. Dressed wound with multiple abd, kerlix and ace bandage.  Multilayer compression dressing applied both legs  Weight bearing as tolerated

## 2023-08-02 NOTE — DISCHARGE NOTE PROVIDER - NSDCCPCAREPLAN_GEN_ALL_CORE_FT
PRINCIPAL DISCHARGE DIAGNOSIS  Diagnosis: Cellulitis  Assessment and Plan of Treatment: Please take Bactrim DS as prescribed.  After completing Bactrim, please resume your home dose of doxcycline.  Follow up with your regular doctors after discharge.

## 2023-08-02 NOTE — DISCHARGE NOTE NURSING/CASE MANAGEMENT/SOCIAL WORK - NSDCPEWEB_GEN_ALL_CORE
[facility] for Tobacco Control website --- [email or web address] website --- [email or web address]

## 2023-08-02 NOTE — DISCHARGE NOTE NURSING/CASE MANAGEMENT/SOCIAL WORK - NSDCPEEMAIL_GEN_ALL_CORE
[facility] for Tobacco Control email [email or web address]

## 2023-08-02 NOTE — DISCHARGE NOTE PROVIDER - NSDCMRMEDTOKEN_GEN_ALL_CORE_FT
Bactrim  mg-160 mg oral tablet: 1 tab(s) orally every 12 hours  doxycycline hyclate 100 mg oral tablet: 1 tab(s) orally 2 times a day PLEASE RESUME THIS AFTER FINISHING BACTRIM  furosemide 40 mg oral tablet: 1 tab(s) orally once a day  losartan 100 mg oral tablet: 1 tab(s) orally once a day  morphine 30 mg oral tablet: 1 tab(s) orally 3 times a day, As Needed  Percocet 10/325 oral tablet: 1 tab(s) orally every 6 hours as needed for - You can use this medication every 4 hours for pain after dressing changes, if needed  pregabalin 100 mg oral capsule: 1 cap(s) orally 2 times a day

## 2023-08-09 DIAGNOSIS — M54.50 LOW BACK PAIN, UNSPECIFIED: ICD-10-CM

## 2023-08-09 DIAGNOSIS — L97.829 NON-PRESSURE CHRONIC ULCER OF OTHER PART OF LEFT LOWER LEG WITH UNSPECIFIED SEVERITY: ICD-10-CM

## 2023-08-09 DIAGNOSIS — I10 ESSENTIAL (PRIMARY) HYPERTENSION: ICD-10-CM

## 2023-08-09 DIAGNOSIS — G47.33 OBSTRUCTIVE SLEEP APNEA (ADULT) (PEDIATRIC): ICD-10-CM

## 2023-08-09 DIAGNOSIS — L97.819 NON-PRESSURE CHRONIC ULCER OF OTHER PART OF RIGHT LOWER LEG WITH UNSPECIFIED SEVERITY: ICD-10-CM

## 2023-08-09 DIAGNOSIS — Z88.0 ALLERGY STATUS TO PENICILLIN: ICD-10-CM

## 2023-08-09 DIAGNOSIS — I83.018 VARICOSE VEINS OF RIGHT LOWER EXTREMITY WITH ULCER OTHER PART OF LOWER LEG: ICD-10-CM

## 2023-08-09 DIAGNOSIS — Z79.899 OTHER LONG TERM (CURRENT) DRUG THERAPY: ICD-10-CM

## 2023-08-09 DIAGNOSIS — G89.29 OTHER CHRONIC PAIN: ICD-10-CM

## 2023-08-09 DIAGNOSIS — I83.028 VARICOSE VEINS OF LEFT LOWER EXTREMITY WITH ULCER OTHER PART OF LOWER LEG: ICD-10-CM

## 2023-08-09 DIAGNOSIS — F11.99 OPIOID USE, UNSPECIFIED WITH UNSPECIFIED OPIOID-INDUCED DISORDER: ICD-10-CM

## 2023-08-09 DIAGNOSIS — E66.01 MORBID (SEVERE) OBESITY DUE TO EXCESS CALORIES: ICD-10-CM

## 2023-09-05 RX ORDER — LINEZOLID 600 MG/300ML
1 INJECTION, SOLUTION INTRAVENOUS
Qty: 20 | Refills: 0
Start: 2023-09-05 | End: 2023-09-14

## 2023-09-05 RX ORDER — LINEZOLID 600 MG/300ML
1 INJECTION, SOLUTION INTRAVENOUS
Qty: 20 | Refills: 0
Start: 2023-09-05 | End: 2023-10-08

## 2023-09-19 ENCOUNTER — APPOINTMENT (OUTPATIENT)
Dept: PODIATRY | Facility: CLINIC | Age: 58
End: 2023-09-19
Payer: MEDICARE

## 2023-09-19 ENCOUNTER — OUTPATIENT (OUTPATIENT)
Dept: OUTPATIENT SERVICES | Facility: HOSPITAL | Age: 58
LOS: 1 days | End: 2023-09-19
Payer: MEDICARE

## 2023-09-19 DIAGNOSIS — M79.606 PAIN IN LEG, UNSPECIFIED: ICD-10-CM

## 2023-09-19 DIAGNOSIS — L97.812 NON-PRESSURE CHRONIC ULCER OF OTHER PART OF RIGHT LOWER LEG WITH FAT LAYER EXPOSED: ICD-10-CM

## 2023-09-19 DIAGNOSIS — M79.89 OTHER SPECIFIED SOFT TISSUE DISORDERS: ICD-10-CM

## 2023-09-19 DIAGNOSIS — L97.822 NON-PRESSURE CHRONIC ULCER OF OTHER PART OF LEFT LOWER LEG WITH FAT LAYER EXPOSED: ICD-10-CM

## 2023-09-19 DIAGNOSIS — I83.029 VARICOSE VEINS OF LEFT LOWER EXTREMITY WITH ULCER OF UNSPECIFIED SITE: ICD-10-CM

## 2023-09-19 DIAGNOSIS — L97.929 VARICOSE VEINS OF LEFT LOWER EXTREMITY WITH ULCER OF UNSPECIFIED SITE: ICD-10-CM

## 2023-09-19 DIAGNOSIS — Z98.890 OTHER SPECIFIED POSTPROCEDURAL STATES: Chronic | ICD-10-CM

## 2023-09-19 DIAGNOSIS — Z96.89 PRESENCE OF OTHER SPECIFIED FUNCTIONAL IMPLANTS: Chronic | ICD-10-CM

## 2023-09-19 DIAGNOSIS — I87.2 VENOUS INSUFFICIENCY (CHRONIC) (PERIPHERAL): ICD-10-CM

## 2023-09-19 DIAGNOSIS — Z00.00 ENCOUNTER FOR GENERAL ADULT MEDICAL EXAMINATION WITHOUT ABNORMAL FINDINGS: ICD-10-CM

## 2023-09-19 DIAGNOSIS — L97.919 VARICOSE VEINS OF RIGHT LOWER EXTREMITY WITH ULCER OF UNSPECIFIED SITE: ICD-10-CM

## 2023-09-19 DIAGNOSIS — I83.019 VARICOSE VEINS OF RIGHT LOWER EXTREMITY WITH ULCER OF UNSPECIFIED SITE: ICD-10-CM

## 2023-09-19 PROCEDURE — 29581 APPL MULTLAYER CMPRN SYS LEG: CPT | Mod: 50

## 2023-09-25 DIAGNOSIS — M79.89 OTHER SPECIFIED SOFT TISSUE DISORDERS: ICD-10-CM
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
## 2023-09-25 DIAGNOSIS — I83.029 VARICOSE VEINS OF LEFT LOWER EXTREMITY WITH ULCER OF UNSPECIFIED SITE: ICD-10-CM

## 2023-09-25 DIAGNOSIS — I87.2 VENOUS INSUFFICIENCY (CHRONIC) (PERIPHERAL): ICD-10-CM

## 2023-09-25 DIAGNOSIS — M79.606 PAIN IN LEG, UNSPECIFIED: ICD-10-CM

## 2023-09-25 DIAGNOSIS — L97.812 NON-PRESSURE CHRONIC ULCER OF OTHER PART OF RIGHT LOWER LEG WITH FAT LAYER EXPOSED: ICD-10-CM

## 2023-09-25 DIAGNOSIS — L97.822 NON-PRESSURE CHRONIC ULCER OF OTHER PART OF LEFT LOWER LEG WITH FAT LAYER EXPOSED: ICD-10-CM

## 2023-09-25 DIAGNOSIS — I83.019 VARICOSE VEINS OF RIGHT LOWER EXTREMITY WITH ULCER OF UNSPECIFIED SITE: ICD-10-CM

## 2023-09-28 NOTE — DISCHARGE NOTE NURSING/CASE MANAGEMENT/SOCIAL WORK - NSTOBACCONEVERSMOKERY/N_GEN_A
-Your blood pressures look very good,   -Continue to be active as much as possible, walk daily if you can  -Keep hydrated   Yes

## 2023-09-29 RX ORDER — LINEZOLID 600 MG/300ML
1 INJECTION, SOLUTION INTRAVENOUS
Qty: 28 | Refills: 0
Start: 2023-09-29 | End: 2023-10-12

## 2023-10-23 NOTE — ED ADULT TRIAGE NOTE - BP NONINVASIVE SYSTOLIC (MM HG)
"Caller: Dorothea Cruz \"Taniya\"    Relationship: Self    Best call back number: [phone]    Requested Prescriptions:   Requested Prescriptions     Pending Prescriptions Disp Refills    Lactobacillus Probiotic tablet 180 tablet 3     Sig: Take 1 tablet by mouth 2 (Two) Times a Day.        Pharmacy where request should be sent: [place] [phone] [place] [phone]      Last office visit with prescribing clinician: 9/11/2023   Last telemedicine visit with prescribing clinician: Visit date not found   Next office visit with prescribing clinician: Visit date not found         Does the patient have less than a 3 day supply:  [] Yes  [x] No    Would you like a call back once the refill request has been completed: [] Yes [x] No    If the office needs to give you a call back, can they leave a voicemail: [] Yes [x] No    Jackelyn Singh Rep   10/23/23 09:39 EDT         " 143

## 2023-11-12 NOTE — CONSULT NOTE ADULT - REASON FOR ADMISSION
Non healing Bilateral LE wound
0 = understands/communicates without difficulty

## 2024-01-09 NOTE — DISCHARGE NOTE NURSING/CASE MANAGEMENT/SOCIAL WORK - NSDCPETBCESMAN_GEN_ALL_CORE
complains of pain/discomfort If you are a smoker, it is important for your health to stop smoking. Please be aware that second hand smoke is also harmful.

## 2024-04-01 ENCOUNTER — RX RENEWAL (OUTPATIENT)
Age: 59
End: 2024-04-01

## 2024-04-23 NOTE — ED ADULT TRIAGE NOTE - MEANS OF ARRIVAL
Problem: Hemodialysis  Goal: Dialysis: Safe, effective, and comfortable hemodialysis treatment (Hemodialysis nurse only)  Outcome: Monitoring/Evaluating progress  Note: Pt tolerated treatment well, no fluid removed over 3 hours of dialysis. VSS throughout. No complications.   Goal: Dialysis: Free of complications related to initiation/termination of dialysis (Hemodialysis nurse only)  Outcome: Monitoring/Evaluating progress  Note: No access issues, BFR maintained. Capped with 4% sodium citrate. Dressing is CDI. No complications.       ambulatory

## 2024-05-16 NOTE — ED ADULT NURSE NOTE - PAIN: PRESENCE, MLM
For information on Fall & Injury Prevention, visit: [email or web address]/[email or web address] OR  [email or web address] OR  https://www.cdc.gov/steadi/patient.html complains of pain/discomfort

## 2024-05-23 NOTE — PROGRESS NOTE ADULT - ASSESSMENT
· Assessment		  51 yo M with PMHx as listed presents with acute  back pain along with drainage from back.       1. acute on chronic back pain possibly 2/2 spinal abscess 2/2 MEREDITH  - Per neurosurgery: CT scan did not show spinal abscess. Pt likely has superficial abscess from stitches, no need to take out spinal stimulator for now, continue with antibiotics  - per ID: PICC line and IV antibiotics  for 6 weeks. PATIENT RECEIVED PICC LINE.   -patient following with Neurosurgery 2nd opinion from [facility] staff   - , switched to PO MS contin 60mg q12 and oxycodine 10mg q6 prn    Patient is having Break through pain and it is not acceptably controlled. case discussed with Dr. Lombardo and we are in agreement to discontinue Oxycontin 60 Q12H and start the patient on Oxycontin 80 q12h. Dr. Lombardo will follow with patient today tolerance and assessment of pain.   -will follow with ID as patient wants further information on infection start and duration   Dr. Restrepo (Neurosurgery) patient needs to have MRI done as outpatient once discharged.   Follow with Pain management as they were recalled as patient wants more information and states he is still in pain.   Follow with PMD as out patient.   2.  bilateral lower extremity pain and swelling  - lasix increased to 40mg twice daily PO  Metolazone 5 mg added as per Renal   Follow with further podiatry recommendations as patient wants further recommendations and clarification. podiatry verbally stated patient is to only use clotrimazole cream no other intervention or recommendations at this time   follow with Lower Extremity Venous and Arterial duplex both negative for DVT or arterial occlusive disease.   - echo shows normal EF  -Lower extremities are improved patient ambulating more at this point.     3. HTN- controlled  - c/w losartan 100mg qd and nifedipine    4. RUSSELL on cpap    5. DVT ppx- lovenox subq   rehab/pt  to work with pt, he has 11 pts in his home  dc in am  discussed with hayes [FreeTextEntry1] : 10/17/23 Bone marrow biopsy diagnosis: - Normocellularity and focal marked hypercellularity, with dilated sinusoids, no fibrosis, erythroid hyperplasia with maturation and increased pronormoblasts, maturing and mature myeloid elements present, moderate megakaryocytosis, and iron stores not seen.

## 2024-06-10 NOTE — ED PROCEDURE NOTE - NS ED PROC PERFORMED BY1 FT
pantoprazole (PROTONIX) 40 MG tablet         [facility] [address] [place] [address] - [address] [place] [address] - P [phone] - F [phone]  [address] [place] [id number]  Phone: [phone]  Fax: [phone]      Says 30 day supply was called in instead of 90 day supply..not able to refill at this time    Please advise..     Contact number:[phone]   Dr. Elkins

## 2024-09-19 ENCOUNTER — INPATIENT (INPATIENT)
Facility: HOSPITAL | Age: 59
LOS: 1 days | Discharge: HOME | DRG: 607 | End: 2024-09-21
Attending: STUDENT IN AN ORGANIZED HEALTH CARE EDUCATION/TRAINING PROGRAM | Admitting: STUDENT IN AN ORGANIZED HEALTH CARE EDUCATION/TRAINING PROGRAM
Payer: COMMERCIAL

## 2024-09-19 VITALS
HEIGHT: 78 IN | TEMPERATURE: 99 F | RESPIRATION RATE: 20 BRPM | OXYGEN SATURATION: 98 % | WEIGHT: 315 LBS | SYSTOLIC BLOOD PRESSURE: 150 MMHG | DIASTOLIC BLOOD PRESSURE: 70 MMHG | HEART RATE: 107 BPM

## 2024-09-19 DIAGNOSIS — Z96.89 PRESENCE OF OTHER SPECIFIED FUNCTIONAL IMPLANTS: Chronic | ICD-10-CM

## 2024-09-19 DIAGNOSIS — Z98.890 OTHER SPECIFIED POSTPROCEDURAL STATES: Chronic | ICD-10-CM

## 2024-09-19 DIAGNOSIS — B87.9 MYIASIS, UNSPECIFIED: ICD-10-CM

## 2024-09-19 LAB
ALBUMIN SERPL ELPH-MCNC: 3.8 G/DL — SIGNIFICANT CHANGE UP (ref 3.5–5.2)
ALP SERPL-CCNC: 114 U/L — SIGNIFICANT CHANGE UP (ref 30–115)
ALT FLD-CCNC: 7 U/L — SIGNIFICANT CHANGE UP (ref 0–41)
ANION GAP SERPL CALC-SCNC: 8 MMOL/L — SIGNIFICANT CHANGE UP (ref 7–14)
AST SERPL-CCNC: 8 U/L — SIGNIFICANT CHANGE UP (ref 0–41)
BASOPHILS # BLD AUTO: 0.05 K/UL — SIGNIFICANT CHANGE UP (ref 0–0.2)
BASOPHILS NFR BLD AUTO: 0.7 % — SIGNIFICANT CHANGE UP (ref 0–1)
BILIRUB SERPL-MCNC: 0.3 MG/DL — SIGNIFICANT CHANGE UP (ref 0.2–1.2)
BUN SERPL-MCNC: 12 MG/DL — SIGNIFICANT CHANGE UP (ref 10–20)
CALCIUM SERPL-MCNC: 8.9 MG/DL — SIGNIFICANT CHANGE UP (ref 8.4–10.5)
CHLORIDE SERPL-SCNC: 101 MMOL/L — SIGNIFICANT CHANGE UP (ref 98–110)
CO2 SERPL-SCNC: 31 MMOL/L — SIGNIFICANT CHANGE UP (ref 17–32)
CREAT SERPL-MCNC: 1 MG/DL — SIGNIFICANT CHANGE UP (ref 0.7–1.5)
EGFR: 88 ML/MIN/1.73M2 — SIGNIFICANT CHANGE UP
EGFR: 88 ML/MIN/1.73M2 — SIGNIFICANT CHANGE UP
EOSINOPHIL # BLD AUTO: 0.17 K/UL — SIGNIFICANT CHANGE UP (ref 0–0.7)
EOSINOPHIL NFR BLD AUTO: 2.3 % — SIGNIFICANT CHANGE UP (ref 0–8)
GLUCOSE SERPL-MCNC: 159 MG/DL — HIGH (ref 70–99)
HCT VFR BLD CALC: 36.8 % — LOW (ref 42–52)
HGB BLD-MCNC: 11.1 G/DL — LOW (ref 14–18)
IMM GRANULOCYTES NFR BLD AUTO: 0.3 % — SIGNIFICANT CHANGE UP (ref 0.1–0.3)
LACTATE SERPL-SCNC: 1.8 MMOL/L — SIGNIFICANT CHANGE UP (ref 0.7–2)
LYMPHOCYTES # BLD AUTO: 1.72 K/UL — SIGNIFICANT CHANGE UP (ref 1.2–3.4)
LYMPHOCYTES # BLD AUTO: 22.8 % — SIGNIFICANT CHANGE UP (ref 20.5–51.1)
MCHC RBC-ENTMCNC: 23 PG — LOW (ref 27–31)
MCHC RBC-ENTMCNC: 30.2 G/DL — LOW (ref 32–37)
MCV RBC AUTO: 76.2 FL — LOW (ref 80–94)
MONOCYTES # BLD AUTO: 0.7 K/UL — HIGH (ref 0.1–0.6)
MONOCYTES NFR BLD AUTO: 9.3 % — SIGNIFICANT CHANGE UP (ref 1.7–9.3)
NEUTROPHILS # BLD AUTO: 4.89 K/UL — SIGNIFICANT CHANGE UP (ref 1.4–6.5)
NEUTROPHILS NFR BLD AUTO: 64.6 % — SIGNIFICANT CHANGE UP (ref 42.2–75.2)
NRBC # BLD: 0 /100 WBCS — SIGNIFICANT CHANGE UP (ref 0–0)
NRBC BLD-RTO: 0 /100 WBCS — SIGNIFICANT CHANGE UP (ref 0–0)
PLATELET # BLD AUTO: 302 K/UL — SIGNIFICANT CHANGE UP (ref 130–400)
PMV BLD: 9.1 FL — SIGNIFICANT CHANGE UP (ref 7.4–10.4)
POTASSIUM SERPL-MCNC: 4 MMOL/L — SIGNIFICANT CHANGE UP (ref 3.5–5)
POTASSIUM SERPL-SCNC: 4 MMOL/L — SIGNIFICANT CHANGE UP (ref 3.5–5)
PROT SERPL-MCNC: 7.1 G/DL — SIGNIFICANT CHANGE UP (ref 6–8)
RBC # BLD: 4.83 M/UL — SIGNIFICANT CHANGE UP (ref 4.7–6.1)
RBC # FLD: 17.7 % — HIGH (ref 11.5–14.5)
SODIUM SERPL-SCNC: 140 MMOL/L — SIGNIFICANT CHANGE UP (ref 135–146)
WBC # BLD: 7.55 K/UL — SIGNIFICANT CHANGE UP (ref 4.8–10.8)
WBC # FLD AUTO: 7.55 K/UL — SIGNIFICANT CHANGE UP (ref 4.8–10.8)

## 2024-09-19 PROCEDURE — 36415 COLL VENOUS BLD VENIPUNCTURE: CPT

## 2024-09-19 PROCEDURE — 83735 ASSAY OF MAGNESIUM: CPT

## 2024-09-19 PROCEDURE — 73590 X-RAY EXAM OF LOWER LEG: CPT | Mod: 26,LT,RT

## 2024-09-19 PROCEDURE — 93971 EXTREMITY STUDY: CPT | Mod: RT

## 2024-09-19 PROCEDURE — 83036 HEMOGLOBIN GLYCOSYLATED A1C: CPT

## 2024-09-19 PROCEDURE — 99222 1ST HOSP IP/OBS MODERATE 55: CPT

## 2024-09-19 PROCEDURE — 85025 COMPLETE CBC W/AUTO DIFF WBC: CPT

## 2024-09-19 PROCEDURE — 99285 EMERGENCY DEPT VISIT HI MDM: CPT

## 2024-09-19 PROCEDURE — 93925 LOWER EXTREMITY STUDY: CPT

## 2024-09-19 PROCEDURE — 80053 COMPREHEN METABOLIC PANEL: CPT

## 2024-09-19 RX ORDER — PREGABALIN 50 MG/1
200 CAPSULE ORAL THREE TIMES A DAY
Refills: 0 | Status: DISCONTINUED | OUTPATIENT
Start: 2024-09-19 | End: 2024-09-21

## 2024-09-19 RX ORDER — VANCOMYCIN HCL IN 5 % DEXTROSE 1.5G/250ML
1500 PLASTIC BAG, INJECTION (ML) INTRAVENOUS ONCE
Refills: 0 | Status: COMPLETED | OUTPATIENT
Start: 2024-09-19 | End: 2024-09-19

## 2024-09-19 RX ORDER — INFLUENZA A VIRUS A/IDAHO/07/2018 (H1N1) ANTIGEN (MDCK CELL DERIVED, PROPIOLACTONE INACTIVATED, INFLUENZA A VIRUS A/INDIANA/08/2018 (H3N2) ANTIGEN (MDCK CELL DERIVED, PROPIOLACTONE INACTIVATED), INFLUENZA B VIRUS B/SINGAPORE/INFTT-16-0610/2016 ANTIGEN (MDCK CELL DERIVED, PROPIOLACTONE INACTIVATED), INFLUENZA B VIRUS B/IOWA/06/2017 ANTIGEN (MDCK CELL DERIVED, PROPIOLACTONE INACTIVATED) 15; 15; 15; 15 UG/.5ML; UG/.5ML; UG/.5ML; UG/.5ML
0.5 INJECTION, SUSPENSION INTRAMUSCULAR ONCE
Refills: 0 | Status: DISCONTINUED | OUTPATIENT
Start: 2024-09-19 | End: 2024-09-21

## 2024-09-19 RX ORDER — CEFEPIME 2 G/20ML
2000 INJECTION, POWDER, FOR SOLUTION INTRAVENOUS ONCE
Refills: 0 | Status: COMPLETED | OUTPATIENT
Start: 2024-09-19 | End: 2024-09-19

## 2024-09-19 RX ORDER — ENOXAPARIN SODIUM 100 MG/ML
40 INJECTION SUBCUTANEOUS EVERY 12 HOURS
Refills: 0 | Status: DISCONTINUED | OUTPATIENT
Start: 2024-09-19 | End: 2024-09-21

## 2024-09-19 RX ORDER — LOSARTAN POTASSIUM 100 MG/1
100 TABLET, FILM COATED ORAL DAILY
Refills: 0 | Status: DISCONTINUED | OUTPATIENT
Start: 2024-09-19 | End: 2024-09-21

## 2024-09-19 RX ORDER — LINEZOLID 2 MG/ML
600 INJECTION, SOLUTION INTRAVENOUS EVERY 12 HOURS
Refills: 0 | Status: DISCONTINUED | OUTPATIENT
Start: 2024-09-20 | End: 2024-09-21

## 2024-09-19 RX ORDER — CLINDAMYCIN PHOSPHATE 150 MG/ML
600 VIAL (ML) INJECTION EVERY 8 HOURS
Refills: 0 | Status: DISCONTINUED | OUTPATIENT
Start: 2024-09-20 | End: 2024-09-20

## 2024-09-19 RX ORDER — HYDROMORPHONE/SOD CHLOR,ISO/PF 2 MG/10 ML
1 SYRINGE (ML) INJECTION ONCE
Refills: 0 | Status: DISCONTINUED | OUTPATIENT
Start: 2024-09-19 | End: 2024-09-19

## 2024-09-19 RX ORDER — METRONIDAZOLE 250 MG
500 TABLET ORAL ONCE
Refills: 0 | Status: COMPLETED | OUTPATIENT
Start: 2024-09-19 | End: 2024-09-19

## 2024-09-19 RX ORDER — FUROSEMIDE 10 MG/ML
40 INJECTION INTRAMUSCULAR; INTRAVENOUS DAILY
Refills: 0 | Status: DISCONTINUED | OUTPATIENT
Start: 2024-09-19 | End: 2024-09-21

## 2024-09-19 RX ORDER — HYDROMORPHONE/SOD CHLOR,ISO/PF 2 MG/10 ML
2 SYRINGE (ML) INJECTION ONCE
Refills: 0 | Status: DISCONTINUED | OUTPATIENT
Start: 2024-09-19 | End: 2024-09-19

## 2024-09-19 RX ADMIN — CEFEPIME 100 MILLIGRAM(S): 2 INJECTION, POWDER, FOR SOLUTION INTRAVENOUS at 13:25

## 2024-09-19 RX ADMIN — Medication 2 MILLIGRAM(S): at 16:04

## 2024-09-19 RX ADMIN — PREGABALIN 200 MILLIGRAM(S): 50 CAPSULE ORAL at 21:38

## 2024-09-19 RX ADMIN — Medication 30 MILLIGRAM(S): at 21:55

## 2024-09-19 RX ADMIN — Medication 30 MILLIGRAM(S): at 20:23

## 2024-09-19 RX ADMIN — Medication 100 MILLIGRAM(S): at 13:25

## 2024-09-19 RX ADMIN — Medication 1 MILLIGRAM(S): at 15:44

## 2024-09-19 RX ADMIN — Medication 1 MILLIGRAM(S): at 22:22

## 2024-09-19 RX ADMIN — Medication 1 MILLIGRAM(S): at 15:10

## 2024-09-19 RX ADMIN — Medication 300 MILLIGRAM(S): at 13:57

## 2024-09-20 LAB
A1C WITH ESTIMATED AVERAGE GLUCOSE RESULT: 9 % — HIGH (ref 4–5.6)
ALBUMIN SERPL ELPH-MCNC: 3.7 G/DL — SIGNIFICANT CHANGE UP (ref 3.5–5.2)
ALP SERPL-CCNC: 109 U/L — SIGNIFICANT CHANGE UP (ref 30–115)
ALT FLD-CCNC: 7 U/L — SIGNIFICANT CHANGE UP (ref 0–41)
ANION GAP SERPL CALC-SCNC: 12 MMOL/L — SIGNIFICANT CHANGE UP (ref 7–14)
AST SERPL-CCNC: 7 U/L — SIGNIFICANT CHANGE UP (ref 0–41)
BASOPHILS # BLD AUTO: 0.05 K/UL — SIGNIFICANT CHANGE UP (ref 0–0.2)
BASOPHILS NFR BLD AUTO: 0.8 % — SIGNIFICANT CHANGE UP (ref 0–1)
BILIRUB SERPL-MCNC: 0.4 MG/DL — SIGNIFICANT CHANGE UP (ref 0.2–1.2)
BUN SERPL-MCNC: 14 MG/DL — SIGNIFICANT CHANGE UP (ref 10–20)
CALCIUM SERPL-MCNC: 8.8 MG/DL — SIGNIFICANT CHANGE UP (ref 8.4–10.5)
CHLORIDE SERPL-SCNC: 99 MMOL/L — SIGNIFICANT CHANGE UP (ref 98–110)
CO2 SERPL-SCNC: 28 MMOL/L — SIGNIFICANT CHANGE UP (ref 17–32)
CREAT SERPL-MCNC: 0.9 MG/DL — SIGNIFICANT CHANGE UP (ref 0.7–1.5)
EGFR: 100 ML/MIN/1.73M2 — SIGNIFICANT CHANGE UP
EGFR: 100 ML/MIN/1.73M2 — SIGNIFICANT CHANGE UP
EOSINOPHIL # BLD AUTO: 0.21 K/UL — SIGNIFICANT CHANGE UP (ref 0–0.7)
EOSINOPHIL NFR BLD AUTO: 3.2 % — SIGNIFICANT CHANGE UP (ref 0–8)
ESTIMATED AVERAGE GLUCOSE: 212 MG/DL — HIGH (ref 68–114)
GLUCOSE SERPL-MCNC: 173 MG/DL — HIGH (ref 70–99)
HCT VFR BLD CALC: 36 % — LOW (ref 42–52)
HGB BLD-MCNC: 10.7 G/DL — LOW (ref 14–18)
IMM GRANULOCYTES NFR BLD AUTO: 0.2 % — SIGNIFICANT CHANGE UP (ref 0.1–0.3)
LYMPHOCYTES # BLD AUTO: 1.69 K/UL — SIGNIFICANT CHANGE UP (ref 1.2–3.4)
LYMPHOCYTES # BLD AUTO: 25.4 % — SIGNIFICANT CHANGE UP (ref 20.5–51.1)
MAGNESIUM SERPL-MCNC: 2.2 MG/DL — SIGNIFICANT CHANGE UP (ref 1.8–2.4)
MCHC RBC-ENTMCNC: 22.9 PG — LOW (ref 27–31)
MCHC RBC-ENTMCNC: 29.7 G/DL — LOW (ref 32–37)
MCV RBC AUTO: 77.1 FL — LOW (ref 80–94)
MONOCYTES # BLD AUTO: 0.58 K/UL — SIGNIFICANT CHANGE UP (ref 0.1–0.6)
MONOCYTES NFR BLD AUTO: 8.7 % — SIGNIFICANT CHANGE UP (ref 1.7–9.3)
NEUTROPHILS # BLD AUTO: 4.12 K/UL — SIGNIFICANT CHANGE UP (ref 1.4–6.5)
NEUTROPHILS NFR BLD AUTO: 61.7 % — SIGNIFICANT CHANGE UP (ref 42.2–75.2)
NRBC # BLD: 0 /100 WBCS — SIGNIFICANT CHANGE UP (ref 0–0)
NRBC BLD-RTO: 0 /100 WBCS — SIGNIFICANT CHANGE UP (ref 0–0)
PLATELET # BLD AUTO: 332 K/UL — SIGNIFICANT CHANGE UP (ref 130–400)
PMV BLD: 9.7 FL — SIGNIFICANT CHANGE UP (ref 7.4–10.4)
POTASSIUM SERPL-MCNC: 4.1 MMOL/L — SIGNIFICANT CHANGE UP (ref 3.5–5)
POTASSIUM SERPL-SCNC: 4.1 MMOL/L — SIGNIFICANT CHANGE UP (ref 3.5–5)
PROT SERPL-MCNC: 7 G/DL — SIGNIFICANT CHANGE UP (ref 6–8)
RBC # BLD: 4.67 M/UL — LOW (ref 4.7–6.1)
RBC # FLD: 17.8 % — HIGH (ref 11.5–14.5)
SODIUM SERPL-SCNC: 139 MMOL/L — SIGNIFICANT CHANGE UP (ref 135–146)
WBC # BLD: 6.66 K/UL — SIGNIFICANT CHANGE UP (ref 4.8–10.8)
WBC # FLD AUTO: 6.66 K/UL — SIGNIFICANT CHANGE UP (ref 4.8–10.8)

## 2024-09-20 PROCEDURE — 99232 SBSQ HOSP IP/OBS MODERATE 35: CPT

## 2024-09-20 RX ORDER — AMPICILLIN SODIUM AND SULBACTAM SODIUM 1; .5 G/1; G/1
3 INJECTION, POWDER, FOR SOLUTION INTRAMUSCULAR; INTRAVENOUS EVERY 6 HOURS
Refills: 0 | Status: DISCONTINUED | OUTPATIENT
Start: 2024-09-20 | End: 2024-09-20

## 2024-09-20 RX ORDER — BISACODYL 5 MG
5 TABLET, DELAYED RELEASE (ENTERIC COATED) ORAL AT BEDTIME
Refills: 0 | Status: DISCONTINUED | OUTPATIENT
Start: 2024-09-20 | End: 2024-09-21

## 2024-09-20 RX ORDER — CLINDAMYCIN PHOSPHATE 150 MG/ML
600 VIAL (ML) INJECTION EVERY 8 HOURS
Refills: 0 | Status: DISCONTINUED | OUTPATIENT
Start: 2024-09-20 | End: 2024-09-21

## 2024-09-20 RX ORDER — SENNA 187 MG
2 TABLET ORAL AT BEDTIME
Refills: 0 | Status: DISCONTINUED | OUTPATIENT
Start: 2024-09-20 | End: 2024-09-21

## 2024-09-20 RX ORDER — AMPICILLIN SODIUM AND SULBACTAM SODIUM 1; .5 G/1; G/1
3 INJECTION, POWDER, FOR SOLUTION INTRAMUSCULAR; INTRAVENOUS ONCE
Refills: 0 | Status: COMPLETED | OUTPATIENT
Start: 2024-09-20 | End: 2024-09-20

## 2024-09-20 RX ORDER — KETOROLAC TROMETHAMINE 30 MG/ML
15 INJECTION, SOLUTION INTRAMUSCULAR; INTRAVENOUS ONCE
Refills: 0 | Status: DISCONTINUED | OUTPATIENT
Start: 2024-09-20 | End: 2024-09-20

## 2024-09-20 RX ORDER — AMPICILLIN SODIUM AND SULBACTAM SODIUM 1; .5 G/1; G/1
INJECTION, POWDER, FOR SOLUTION INTRAMUSCULAR; INTRAVENOUS
Refills: 0 | Status: DISCONTINUED | OUTPATIENT
Start: 2024-09-20 | End: 2024-09-20

## 2024-09-20 RX ORDER — HYDROMORPHONE/SOD CHLOR,ISO/PF 2 MG/10 ML
1 SYRINGE (ML) INJECTION ONCE
Refills: 0 | Status: DISCONTINUED | OUTPATIENT
Start: 2024-09-20 | End: 2024-09-20

## 2024-09-20 RX ADMIN — Medication 1 MILLIGRAM(S): at 00:52

## 2024-09-20 RX ADMIN — KETOROLAC TROMETHAMINE 15 MILLIGRAM(S): 30 INJECTION, SOLUTION INTRAMUSCULAR; INTRAVENOUS at 08:26

## 2024-09-20 RX ADMIN — LOSARTAN POTASSIUM 100 MILLIGRAM(S): 100 TABLET, FILM COATED ORAL at 05:09

## 2024-09-20 RX ADMIN — PREGABALIN 200 MILLIGRAM(S): 50 CAPSULE ORAL at 05:09

## 2024-09-20 RX ADMIN — Medication 30 MILLIGRAM(S): at 02:55

## 2024-09-20 RX ADMIN — Medication 30 MILLIGRAM(S): at 01:02

## 2024-09-20 RX ADMIN — LINEZOLID 300 MILLIGRAM(S): 2 INJECTION, SOLUTION INTRAVENOUS at 17:54

## 2024-09-20 RX ADMIN — Medication 1 MILLIGRAM(S): at 03:26

## 2024-09-20 RX ADMIN — PREGABALIN 200 MILLIGRAM(S): 50 CAPSULE ORAL at 21:13

## 2024-09-20 RX ADMIN — FUROSEMIDE 40 MILLIGRAM(S): 10 INJECTION INTRAMUSCULAR; INTRAVENOUS at 05:09

## 2024-09-20 RX ADMIN — Medication 30 MILLIGRAM(S): at 21:30

## 2024-09-20 RX ADMIN — AMPICILLIN SODIUM AND SULBACTAM SODIUM 200 GRAM(S): 1; .5 INJECTION, POWDER, FOR SOLUTION INTRAMUSCULAR; INTRAVENOUS at 12:04

## 2024-09-20 RX ADMIN — LINEZOLID 300 MILLIGRAM(S): 2 INJECTION, SOLUTION INTRAVENOUS at 05:09

## 2024-09-20 RX ADMIN — Medication 30 MILLIGRAM(S): at 11:02

## 2024-09-20 RX ADMIN — Medication 30 MILLIGRAM(S): at 21:39

## 2024-09-20 RX ADMIN — Medication 100 MILLIGRAM(S): at 05:08

## 2024-09-20 RX ADMIN — Medication 30 MILLIGRAM(S): at 11:37

## 2024-09-21 ENCOUNTER — TRANSCRIPTION ENCOUNTER (OUTPATIENT)
Age: 59
End: 2024-09-21

## 2024-09-21 VITALS — HEART RATE: 64 BPM | DIASTOLIC BLOOD PRESSURE: 77 MMHG | SYSTOLIC BLOOD PRESSURE: 131 MMHG

## 2024-09-21 PROCEDURE — 93971 EXTREMITY STUDY: CPT | Mod: 26,RT

## 2024-09-21 PROCEDURE — 99232 SBSQ HOSP IP/OBS MODERATE 35: CPT

## 2024-09-21 PROCEDURE — 93925 LOWER EXTREMITY STUDY: CPT | Mod: 26

## 2024-09-21 RX ORDER — LINEZOLID 2 MG/ML
600 INJECTION, SOLUTION INTRAVENOUS EVERY 12 HOURS
Refills: 0 | Status: DISCONTINUED | OUTPATIENT
Start: 2024-09-21 | End: 2024-09-21

## 2024-09-21 RX ORDER — CLINDAMYCIN PHOSPHATE 150 MG/ML
2 VIAL (ML) INJECTION
Qty: 42 | Refills: 0
Start: 2024-09-21 | End: 2024-09-27

## 2024-09-21 RX ORDER — HYDROMORPHONE/SOD CHLOR,ISO/PF 2 MG/10 ML
3 SYRINGE (ML) INJECTION ONCE
Refills: 0 | Status: DISCONTINUED | OUTPATIENT
Start: 2024-09-21 | End: 2024-09-21

## 2024-09-21 RX ORDER — LINEZOLID 2 MG/ML
1 INJECTION, SOLUTION INTRAVENOUS
Qty: 14 | Refills: 0
Start: 2024-09-21 | End: 2024-09-27

## 2024-09-21 RX ORDER — CLINDAMYCIN PHOSPHATE 150 MG/ML
450 VIAL (ML) INJECTION THREE TIMES A DAY
Refills: 0 | Status: DISCONTINUED | OUTPATIENT
Start: 2024-09-21 | End: 2024-09-21

## 2024-09-21 RX ORDER — COLLAGENASE CLOSTRIDIUM HIST. 250 UNIT/G
1 OINTMENT (GRAM) TOPICAL
Qty: 0 | Refills: 0 | DISCHARGE
Start: 2024-09-21

## 2024-09-21 RX ORDER — COLLAGENASE CLOSTRIDIUM HIST. 250 UNIT/G
1 OINTMENT (GRAM) TOPICAL DAILY
Refills: 0 | Status: DISCONTINUED | OUTPATIENT
Start: 2024-09-21 | End: 2024-09-21

## 2024-09-21 RX ORDER — HYDROMORPHONE/SOD CHLOR,ISO/PF 2 MG/10 ML
4 SYRINGE (ML) INJECTION ONCE
Refills: 0 | Status: DISCONTINUED | OUTPATIENT
Start: 2024-09-21 | End: 2024-09-21

## 2024-09-21 RX ORDER — CLINDAMYCIN PHOSPHATE 150 MG/ML
1 VIAL (ML) INJECTION
Refills: 0 | DISCHARGE

## 2024-09-21 RX ADMIN — Medication 30 MILLIGRAM(S): at 18:38

## 2024-09-21 RX ADMIN — LINEZOLID 600 MILLIGRAM(S): 2 INJECTION, SOLUTION INTRAVENOUS at 18:38

## 2024-09-21 RX ADMIN — ENOXAPARIN SODIUM 40 MILLIGRAM(S): 100 INJECTION SUBCUTANEOUS at 05:28

## 2024-09-21 RX ADMIN — LINEZOLID 300 MILLIGRAM(S): 2 INJECTION, SOLUTION INTRAVENOUS at 05:28

## 2024-09-21 RX ADMIN — Medication 1 APPLICATION(S): at 18:39

## 2024-09-21 RX ADMIN — Medication 3 MILLIGRAM(S): at 14:29

## 2024-09-21 RX ADMIN — PREGABALIN 200 MILLIGRAM(S): 50 CAPSULE ORAL at 14:29

## 2024-09-21 RX ADMIN — PREGABALIN 200 MILLIGRAM(S): 50 CAPSULE ORAL at 05:42

## 2024-09-21 RX ADMIN — Medication 100 MILLIGRAM(S): at 05:24

## 2024-09-21 RX ADMIN — FUROSEMIDE 40 MILLIGRAM(S): 10 INJECTION INTRAMUSCULAR; INTRAVENOUS at 05:26

## 2024-09-21 RX ADMIN — Medication 30 MILLIGRAM(S): at 05:25

## 2024-09-21 RX ADMIN — LOSARTAN POTASSIUM 100 MILLIGRAM(S): 100 TABLET, FILM COATED ORAL at 05:27

## 2024-09-21 RX ADMIN — Medication 3 MILLIGRAM(S): at 14:45

## 2024-09-21 RX ADMIN — Medication 450 MILLIGRAM(S): at 14:28

## 2024-09-23 ENCOUNTER — NON-APPOINTMENT (OUTPATIENT)
Age: 59
End: 2024-09-23

## 2024-09-26 DIAGNOSIS — B87.1 WOUND MYIASIS: ICD-10-CM

## 2024-09-26 DIAGNOSIS — I87.2 VENOUS INSUFFICIENCY (CHRONIC) (PERIPHERAL): ICD-10-CM

## 2024-09-26 DIAGNOSIS — K59.03 DRUG INDUCED CONSTIPATION: ICD-10-CM

## 2024-09-26 DIAGNOSIS — Z79.891 LONG TERM (CURRENT) USE OF OPIATE ANALGESIC: ICD-10-CM

## 2024-09-26 DIAGNOSIS — Z88.0 ALLERGY STATUS TO PENICILLIN: ICD-10-CM

## 2024-09-26 DIAGNOSIS — Z79.2 LONG TERM (CURRENT) USE OF ANTIBIOTICS: ICD-10-CM

## 2024-09-26 DIAGNOSIS — Z96.82 PRESENCE OF NEUROSTIMULATOR: ICD-10-CM

## 2024-09-26 DIAGNOSIS — Z91.041 RADIOGRAPHIC DYE ALLERGY STATUS: ICD-10-CM

## 2024-09-26 DIAGNOSIS — G47.33 OBSTRUCTIVE SLEEP APNEA (ADULT) (PEDIATRIC): ICD-10-CM

## 2024-09-26 DIAGNOSIS — I70.90 UNSPECIFIED ATHEROSCLEROSIS: ICD-10-CM

## 2024-09-26 DIAGNOSIS — I10 ESSENTIAL (PRIMARY) HYPERTENSION: ICD-10-CM

## 2024-09-26 DIAGNOSIS — L97.819 NON-PRESSURE CHRONIC ULCER OF OTHER PART OF RIGHT LOWER LEG WITH UNSPECIFIED SEVERITY: ICD-10-CM

## 2024-09-26 DIAGNOSIS — L03.115 CELLULITIS OF RIGHT LOWER LIMB: ICD-10-CM

## 2024-09-26 DIAGNOSIS — Z88.1 ALLERGY STATUS TO OTHER ANTIBIOTIC AGENTS: ICD-10-CM
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
## 2024-09-26 DIAGNOSIS — E66.01 MORBID (SEVERE) OBESITY DUE TO EXCESS CALORIES: ICD-10-CM

## 2024-09-26 DIAGNOSIS — I89.0 LYMPHEDEMA, NOT ELSEWHERE CLASSIFIED: ICD-10-CM

## 2024-09-26 DIAGNOSIS — T40.2X5A ADVERSE EFFECT OF OTHER OPIOIDS, INITIAL ENCOUNTER: ICD-10-CM

## 2024-09-26 DIAGNOSIS — E11.9 TYPE 2 DIABETES MELLITUS WITHOUT COMPLICATIONS: ICD-10-CM

## 2024-09-30 NOTE — PATIENT PROFILE ADULT - FUNCTIONAL ASSESSMENT - BASIC MOBILITY 6.
Detail Level: Zone
Render In Strict Bullet Format?: No
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
Samples Given: Dr Marcos jang
4 = No assist / stand by assistance

## 2024-10-07 NOTE — ED PROVIDER NOTE - PHYSICAL EXAMINATION
CONSTITUTIONAL: Well-appearing; well-nourished; in no apparent distress.   EYES: PERRL; EOM intact.   CARDIOVASCULAR: Normal S1, S2; no murmurs, rubs, or gallops.   RESPIRATORY: Normal chest excursion with respiration; breath sounds clear and equal bilaterally; no wheezes, rhonchi, or rales.  GI/: Normal bowel sounds; non-distended; non-tender; no palpable organomegaly.   MS: b/l Legs lymphedema   SKIN: + 10 cm chronic stasis ulcers to lateral aspect of b/l LE. wound with granulating tissues and serous drainage. wound was covered with non-adhesive dressing.   NEURO/PSYCH: A & O x 4; grossly unremarkable. No

## 2024-10-19 NOTE — PROGRESS NOTE ADULT - PROBLEM SELECTOR PLAN 2
F/U neurosurgery recommendations.   Follow with Dr. Lombardo Pain management.   DVT PPI ppx
F/U neurosurgery recommendations.   Follow with Dr. Lombardo Pain management.   DVT PPI ppx
F/U neurosurgery recommendations.   Follow with Dr. Lombardo Pain management pending consult and pain management recommendations   DVT PPI ppx
F/U neurosurgery recommendations   DVT PPI ppx
F/U neurosurgery recommendations once imagin obtained.   DVT PPI ppx
Her/She

## 2024-11-20 NOTE — PROGRESS NOTE ADULT - TIME BILLING
Airway    Performed by: Shiv Bradley MD  Authorized by: Shiv Bradley MD    Final Airway Type:  Supraglottic airway  Final Supraglottic Airway:  IGel  SGA Size*:  5  Attempts*:  1  Number of Other Approaches Attempted:  0   Patient Identified, Procedure confirmed, Emergency equipment available and Safety protocols followed  Location:  OR  Urgency:  Elective  Difficult Airway: No    Indications for Airway Management:  Anesthesia  Spontaneous Ventilation: absent    Sedation Level:  Deep  MILS Maintained Throughout: No    Mask Difficulty Assessment:  0 - not attempted  Start Time: 11/20/2024 12:38 PM      
I have personally seen and examined this patient.    I have reviewed all pertinent clinical information and reviewed all relevant imaging and diagnostic studies personally.   I counseled the patient about diagnostic testing and treatment plan. All questions were answered.   I discussed recommendations with the primary team.
I have personally seen and examined this patient.    I have reviewed all pertinent clinical information and reviewed all relevant imaging and diagnostic studies personally.   I counseled the patient about diagnostic testing and treatment plan. All questions were answered.   I discussed recommendations with the primary team.

## 2025-03-19 NOTE — PROGRESS NOTE ADULT - SUBJECTIVE AND OBJECTIVE BOX
58 y/o male with hx of obesity, DM (?diet controlled), RUSSELL, HTN, back pain s/p spinal cord stimulator, venous stasis ulcers x 3 years was seen by podiatry this morning and was sent for further management of bilateral LE ulcers.    Today:  Seen at bedside, complains of LE pain.        REVIEW OF SYSTEMS:  LE pain      MEDICATIONS  (STANDING):  cadexomer iodine 0.9% Gel 1 Application(s) Topical daily  cefepime   IVPB 2000 milliGRAM(s) IV Intermittent every 8 hours  celecoxib 200 milliGRAM(s) Oral daily  enoxaparin Injectable 40 milliGRAM(s) SubCutaneous every 12 hours  furosemide    Tablet 40 milliGRAM(s) Oral daily  losartan 100 milliGRAM(s) Oral daily  nicotine - 21 mG/24Hr(s) Patch 1 Patch Transdermal daily  oxyCODONE  ER Tablet 10 milliGRAM(s) Oral every 12 hours  pregabalin 100 milliGRAM(s) Oral two times a day    MEDICATIONS  (PRN):  acetaminophen     Tablet .. 650 milliGRAM(s) Oral every 6 hours PRN Temp greater or equal to 38C (100.4F), Mild Pain (1 - 3)  HYDROmorphone  Injectable 2 milliGRAM(s) IV Push every 4 hours PRN Severe Pain (7 - 10)  ondansetron Injectable 4 milliGRAM(s) IV Push every 4 hours PRN Nausea and/or Vomiting  oxycodone    5 mG/acetaminophen 325 mG 2 Tablet(s) Oral every 6 hours PRN Moderate Pain (4 - 6)      Allergies  IV Contrast (Sneezing (Mod to Severe))  penicillin (Unknown)        FAMILY HISTORY:  Family history of early CAD (Father)  Family history of diabetes mellitus in mother (Mother)        Vital Signs Last 24 Hrs  T(C): 35.7 (30 Jan 2023 05:20), Max: 35.7 (29 Jan 2023 13:29)  T(F): 96.2 (30 Jan 2023 05:20), Max: 96.3 (29 Jan 2023 13:29)  HR: 63 (30 Jan 2023 05:20) (63 - 75)  BP: 143/65 (30 Jan 2023 05:20) (143/65 - 180/96)  RR: 18 (30 Jan 2023 05:20) (18 - 18)  SpO2: 98% (29 Jan 2023 13:29) (98% - 98%)        PHYSICAL EXAM:  GENERAL: obese man in NAD, sitting in bed comfortably  HEAD:  Atraumatic, Normocephalic  EYES: EOMI  ENT: Moist mucous membranes  NECK: Supple  CHEST/LUNG: Clear to auscultation bilaterally, no wheezing, or rubs  HEART: Regular rate and rhythm; no  rubs, or gallops  ABDOMEN: obese abdomen, Bowel sounds present; Soft, Nontender  EXTREMITIES: Bilateral ankle ulcers wrapped  NERVOUS SYSTEM:  Alert & Oriented X3, speech clear. No gross deficits   SKIN: No rashes or lesions      LABS:                        13.0   7.90  )-----------( 350      ( 29 Jan 2023 08:22 )             41.9     01-29    140  |  99  |  16  ----------------------------<  204<H>  4.6   |  31  |  0.9    Ca    9.5      29 Jan 2023 08:22    TPro  7.0  /  Alb  4.1  /  TBili  0.4  /  DBili  x   /  AST  13  /  ALT  13  /  AlkPhos  106  01-29     <-- Click to add NO significant Past Surgical History

## 2025-08-31 RX ORDER — PREGABALIN 50 MG/1
1 CAPSULE ORAL
Refills: 0 | DISCHARGE

## 2025-09-13 ENCOUNTER — TRANSCRIPTION ENCOUNTER (OUTPATIENT)
Age: 60
End: 2025-09-13

## 2025-09-15 ENCOUNTER — TRANSCRIPTION ENCOUNTER (OUTPATIENT)
Age: 60
End: 2025-09-15